# Patient Record
Sex: MALE | Race: WHITE
[De-identification: names, ages, dates, MRNs, and addresses within clinical notes are randomized per-mention and may not be internally consistent; named-entity substitution may affect disease eponyms.]

---

## 2019-01-28 ENCOUNTER — HOSPITAL ENCOUNTER (OUTPATIENT)
Age: 54
End: 2019-01-28
Payer: MEDICAID

## 2019-01-28 DIAGNOSIS — E11.621: Primary | ICD-10-CM

## 2019-01-28 DIAGNOSIS — L97.519: ICD-10-CM

## 2019-01-28 DIAGNOSIS — E11.8: ICD-10-CM

## 2019-01-28 LAB
ALBUMIN LEVEL: 3.3 GM/DL (ref 3.4–5)
ALBUMIN/GLOB SERPL: 1 {RATIO} (ref 1.1–1.8)
ALP ISO SERPL-ACNC: 58 U/L (ref 46–116)
ALT SERPLBLD-CCNC: 28 U/L (ref 12–78)
ANION GAP SERPL CALC-SCNC: 13.3 MEQ/L (ref 5–15)
AST SERPL QL: 28 U/L (ref 15–37)
BILIRUBIN,TOTAL: 0.3 MG/DL (ref 0.2–1)
BUN SERPL-MCNC: 15 MG/DL (ref 7–18)
CALCIUM SPEC-MCNC: 9.2 MG/DL (ref 8.5–10.1)
CHLORIDE SPEC-SCNC: 101 MMOL/L (ref 98–107)
CO2 SERPL-SCNC: 29 MMOL/L (ref 21–32)
CREAT BLD-SCNC: 1.08 MG/DL (ref 0.7–1.3)
CRP SERPL HS-MCNC: 2.1 MG/L (ref 0–0.9)
ESTIMATED GLOMERULAR FILT RATE: 71 ML/MIN (ref 60–?)
GFR (AFRICAN AMERICAN): 86 ML/MIN (ref 60–?)
GLOBULIN SER CALC-MCNC: 3.3 GM/DL (ref 1.3–3.2)
GLUCOSE: 78 MG/DL (ref 74–106)
HBA1C MFR BLD: 6.4 % (ref 0–7)
HCT VFR BLD CALC: 37.5 % (ref 42–52)
HGB BLD-MCNC: 12.1 G/DL (ref 14.1–18)
MCHC RBC-ENTMCNC: 32.3 G/DL (ref 31.8–35.4)
MCV RBC: 88 FL (ref 80–94)
MEAN CORPUSCULAR HEMOGLOBIN: 28.4 PG (ref 27–31.2)
PLATELET # BLD: 360 K/MM3 (ref 142–424)
POTASSIUM: 4.3 MMOL/L (ref 3.5–5.1)
PROT SERPL-MCNC: 6.6 GM/DL (ref 6.4–8.2)
RBC # BLD AUTO: 4.26 M/MM3 (ref 4.6–6.2)
SODIUM SPEC-SCNC: 139 MMOL/L (ref 136–145)
WBC # BLD AUTO: 7.1 K/MM3 (ref 4.8–10.8)

## 2019-01-28 PROCEDURE — 83036 HEMOGLOBIN GLYCOSYLATED A1C: CPT

## 2019-01-28 PROCEDURE — 36415 COLL VENOUS BLD VENIPUNCTURE: CPT

## 2019-01-28 PROCEDURE — 80053 COMPREHEN METABOLIC PANEL: CPT

## 2019-01-28 PROCEDURE — 86140 C-REACTIVE PROTEIN: CPT

## 2019-01-28 PROCEDURE — 73630 X-RAY EXAM OF FOOT: CPT

## 2019-01-28 PROCEDURE — 85651 RBC SED RATE NONAUTOMATED: CPT

## 2019-01-28 PROCEDURE — 85025 COMPLETE CBC W/AUTO DIFF WBC: CPT

## 2019-02-05 ENCOUNTER — HOSPITAL ENCOUNTER (OUTPATIENT)
Age: 54
End: 2019-02-05
Payer: MEDICAID

## 2019-02-05 DIAGNOSIS — L03.031: Primary | ICD-10-CM

## 2019-02-05 DIAGNOSIS — L97.514: ICD-10-CM

## 2019-02-05 DIAGNOSIS — L97.512: ICD-10-CM

## 2019-02-05 PROCEDURE — 87077 CULTURE AEROBIC IDENTIFY: CPT

## 2019-02-05 PROCEDURE — 87205 SMEAR GRAM STAIN: CPT

## 2019-02-05 PROCEDURE — 73720 MRI LWR EXTREMITY W/O&W/DYE: CPT

## 2019-02-05 PROCEDURE — 87186 SC STD MICRODIL/AGAR DIL: CPT

## 2019-02-05 PROCEDURE — 87070 CULTURE OTHR SPECIMN AEROBIC: CPT

## 2019-02-05 PROCEDURE — A9576 INJ PROHANCE MULTIPACK: HCPCS

## 2019-04-09 ENCOUNTER — HOSPITAL ENCOUNTER (OUTPATIENT)
Dept: HOSPITAL 22 - PT | Age: 54
Discharge: HOME | End: 2019-04-09
Payer: MEDICAID

## 2019-04-09 DIAGNOSIS — L97.512: ICD-10-CM

## 2019-04-09 DIAGNOSIS — L97.514: ICD-10-CM

## 2019-04-09 DIAGNOSIS — M86.9: ICD-10-CM

## 2019-04-09 DIAGNOSIS — L03.031: Primary | ICD-10-CM

## 2019-04-09 PROCEDURE — 97597 DBRDMT OPN WND 1ST 20 CM/<: CPT

## 2019-04-09 PROCEDURE — 97161 PT EVAL LOW COMPLEX 20 MIN: CPT

## 2019-04-10 ENCOUNTER — HOSPITAL ENCOUNTER (OUTPATIENT)
Age: 54
End: 2019-04-10
Payer: MEDICAID

## 2019-04-10 DIAGNOSIS — Z01.818: Primary | ICD-10-CM

## 2019-04-10 DIAGNOSIS — M86.9: ICD-10-CM

## 2019-04-10 DIAGNOSIS — L97.512: ICD-10-CM

## 2019-04-10 DIAGNOSIS — E11.9: ICD-10-CM

## 2019-04-10 DIAGNOSIS — L03.031: ICD-10-CM

## 2019-04-10 LAB
ALBUMIN LEVEL: 3.2 GM/DL (ref 3.4–5)
ALBUMIN/GLOB SERPL: 0.9 {RATIO} (ref 1.1–1.8)
ALP ISO SERPL-ACNC: 72 U/L (ref 46–116)
ALT SERPLBLD-CCNC: 26 U/L (ref 12–78)
ANION GAP SERPL CALC-SCNC: 13.8 MEQ/L (ref 5–15)
AST SERPL QL: 20 U/L (ref 15–37)
BILIRUBIN,TOTAL: 0.3 MG/DL (ref 0.2–1)
BUN SERPL-MCNC: 17 MG/DL (ref 7–18)
CALCIUM SPEC-MCNC: 9.2 MG/DL (ref 8.5–10.1)
CHLORIDE SPEC-SCNC: 101 MMOL/L (ref 98–107)
CO2 SERPL-SCNC: 28 MMOL/L (ref 21–32)
CREAT BLD-SCNC: 0.91 MG/DL (ref 0.7–1.3)
CRP SERPL HS-MCNC: 41.7 MG/L (ref 0–0.9)
ESTIMATED GLOMERULAR FILT RATE: 87 ML/MIN (ref 60–?)
GFR (AFRICAN AMERICAN): 105 ML/MIN (ref 60–?)
GLOBULIN SER CALC-MCNC: 3.6 GM/DL (ref 1.3–3.2)
GLUCOSE: 199 MG/DL (ref 74–106)
HBA1C MFR BLD: 7.1 % (ref 0–7)
HCT VFR BLD CALC: 38.8 % (ref 42–52)
HGB BLD-MCNC: 12.9 G/DL (ref 14.1–18)
MCHC RBC-ENTMCNC: 33.2 G/DL (ref 31.8–35.4)
MCV RBC: 83.5 FL (ref 80–94)
MEAN CORPUSCULAR HEMOGLOBIN: 27.7 PG (ref 27–31.2)
PLATELET # BLD: 271 K/MM3 (ref 142–424)
POTASSIUM: 3.8 MMOL/L (ref 3.5–5.1)
PROT SERPL-MCNC: 6.8 GM/DL (ref 6.4–8.2)
RBC # BLD AUTO: 4.64 M/MM3 (ref 4.6–6.2)
SODIUM SPEC-SCNC: 139 MMOL/L (ref 136–145)
WBC # BLD AUTO: 11.3 K/MM3 (ref 4.8–10.8)

## 2019-04-10 PROCEDURE — 85025 COMPLETE CBC W/AUTO DIFF WBC: CPT

## 2019-04-10 PROCEDURE — 87077 CULTURE AEROBIC IDENTIFY: CPT

## 2019-04-10 PROCEDURE — 83036 HEMOGLOBIN GLYCOSYLATED A1C: CPT

## 2019-04-10 PROCEDURE — 87186 SC STD MICRODIL/AGAR DIL: CPT

## 2019-04-10 PROCEDURE — 85651 RBC SED RATE NONAUTOMATED: CPT

## 2019-04-10 PROCEDURE — 80053 COMPREHEN METABOLIC PANEL: CPT

## 2019-04-10 PROCEDURE — 73630 X-RAY EXAM OF FOOT: CPT

## 2019-04-10 PROCEDURE — 71046 X-RAY EXAM CHEST 2 VIEWS: CPT

## 2019-04-10 PROCEDURE — 87205 SMEAR GRAM STAIN: CPT

## 2019-04-10 PROCEDURE — 86140 C-REACTIVE PROTEIN: CPT

## 2019-04-10 PROCEDURE — 36415 COLL VENOUS BLD VENIPUNCTURE: CPT

## 2019-04-10 PROCEDURE — 87070 CULTURE OTHR SPECIMN AEROBIC: CPT

## 2019-05-02 ENCOUNTER — HOSPITAL ENCOUNTER (OUTPATIENT)
Age: 54
End: 2019-05-02
Payer: MEDICAID

## 2019-05-02 DIAGNOSIS — Z51.89: ICD-10-CM

## 2019-05-02 DIAGNOSIS — E11.9: ICD-10-CM

## 2019-05-02 DIAGNOSIS — Z98.890: Primary | ICD-10-CM

## 2019-05-02 LAB
ALBUMIN LEVEL: 3.7 GM/DL (ref 3.4–5)
ALBUMIN/GLOB SERPL: 1.1 {RATIO} (ref 1.1–1.8)
ALP ISO SERPL-ACNC: 67 U/L (ref 46–116)
ALT SERPLBLD-CCNC: 32 U/L (ref 12–78)
ANION GAP SERPL CALC-SCNC: 14.1 MEQ/L (ref 5–15)
AST SERPL QL: 14 U/L (ref 15–37)
BILIRUBIN,TOTAL: 0.3 MG/DL (ref 0.2–1)
BUN SERPL-MCNC: 17 MG/DL (ref 7–18)
CALCIUM SPEC-MCNC: 8.9 MG/DL (ref 8.5–10.1)
CHLORIDE SPEC-SCNC: 101 MMOL/L (ref 98–107)
CO2 SERPL-SCNC: 27 MMOL/L (ref 21–32)
CREAT BLD-SCNC: 0.82 MG/DL (ref 0.7–1.3)
CRP SERPL HS-MCNC: < 0.2 MG/L (ref 0–0.9)
ESTIMATED GLOMERULAR FILT RATE: 98 ML/MIN (ref 60–?)
GFR (AFRICAN AMERICAN): 118 ML/MIN (ref 60–?)
GLOBULIN SER CALC-MCNC: 3.4 GM/DL (ref 1.3–3.2)
GLUCOSE: 85 MG/DL (ref 74–106)
HBA1C MFR BLD: 6.9 % (ref 0–7)
HCT VFR BLD CALC: 42.2 % (ref 42–52)
HGB BLD-MCNC: 14 G/DL (ref 14.1–18)
MCHC RBC-ENTMCNC: 33.1 G/DL (ref 31.8–35.4)
MCV RBC: 83.4 FL (ref 80–94)
MEAN CORPUSCULAR HEMOGLOBIN: 27.6 PG (ref 27–31.2)
PLATELET # BLD: 265 K/MM3 (ref 142–424)
POTASSIUM: 4.1 MMOL/L (ref 3.5–5.1)
PROT SERPL-MCNC: 7.1 GM/DL (ref 6.4–8.2)
RBC # BLD AUTO: 5.06 M/MM3 (ref 4.6–6.2)
SODIUM SPEC-SCNC: 138 MMOL/L (ref 136–145)
WBC # BLD AUTO: 8 K/MM3 (ref 4.8–10.8)

## 2019-05-02 PROCEDURE — 36415 COLL VENOUS BLD VENIPUNCTURE: CPT

## 2019-05-02 PROCEDURE — 73630 X-RAY EXAM OF FOOT: CPT

## 2019-05-02 PROCEDURE — 80053 COMPREHEN METABOLIC PANEL: CPT

## 2019-05-02 PROCEDURE — 85651 RBC SED RATE NONAUTOMATED: CPT

## 2019-05-02 PROCEDURE — 86140 C-REACTIVE PROTEIN: CPT

## 2019-05-02 PROCEDURE — 83036 HEMOGLOBIN GLYCOSYLATED A1C: CPT

## 2019-05-02 PROCEDURE — 85025 COMPLETE CBC W/AUTO DIFF WBC: CPT

## 2020-09-02 ENCOUNTER — HOSPITAL ENCOUNTER (OUTPATIENT)
Age: 55
End: 2020-09-02
Payer: MEDICAID

## 2020-09-02 DIAGNOSIS — Z79.84: ICD-10-CM

## 2020-09-02 DIAGNOSIS — L97.511: ICD-10-CM

## 2020-09-02 DIAGNOSIS — E11.621: Primary | ICD-10-CM

## 2020-09-02 LAB
ALBUMIN LEVEL: 4.2 G/DL (ref 3.5–5)
ALBUMIN/GLOB SERPL: 1.4 {RATIO} (ref 1.1–1.8)
ALP ISO SERPL-ACNC: 69 U/L (ref 38–126)
ALT SERPLBLD-CCNC: 17 U/L (ref 12–78)
ANION GAP SERPL CALC-SCNC: 13.6 MEQ/L (ref 5–15)
AST SERPL QL: 26 U/L (ref 17–59)
BILIRUBIN,TOTAL: 0.4 MG/DL (ref 0.2–1.3)
BUN SERPL-MCNC: 21 MG/DL (ref 9–20)
CALCIUM SPEC-MCNC: 9.7 MG/DL (ref 8.4–10.2)
CHLORIDE SPEC-SCNC: 96 MMOL/L (ref 98–107)
CO2 SERPL-SCNC: 31 MMOL/L (ref 22–30)
CREAT BLD-SCNC: 0.9 MG/DL (ref 0.66–1.25)
ESTIMATED GLOMERULAR FILT RATE: 88 ML/MIN (ref 60–?)
GFR (AFRICAN AMERICAN): 106 ML/MIN (ref 60–?)
GLOBULIN SER CALC-MCNC: 2.9 G/DL (ref 1.3–3.2)
GLUCOSE: 140 MG/DL (ref 74–100)
HBA1C MFR BLD: 8 % (ref 4–6)
HCT VFR BLD CALC: 38.6 % (ref 42–52)
HGB BLD-MCNC: 13.2 G/DL (ref 14.1–18)
MCHC RBC-ENTMCNC: 34.1 G/DL (ref 31.8–35.4)
MCV RBC: 85.2 FL (ref 80–94)
MEAN CORPUSCULAR HEMOGLOBIN: 29.1 PG (ref 27–31.2)
PLATELET # BLD: 264 K/MM3 (ref 142–424)
POTASSIUM: 4.6 MMOL/L (ref 3.5–5.1)
PROT SERPL-MCNC: 7.1 G/DL (ref 6.3–8.2)
RBC # BLD AUTO: 4.53 M/MM3 (ref 4.6–6.2)
SODIUM SPEC-SCNC: 136 MMOL/L (ref 136–145)
WBC # BLD AUTO: 7.8 K/MM3 (ref 4.8–10.8)

## 2020-09-02 PROCEDURE — 85651 RBC SED RATE NONAUTOMATED: CPT

## 2020-09-02 PROCEDURE — 83036 HEMOGLOBIN GLYCOSYLATED A1C: CPT

## 2020-09-02 PROCEDURE — 36415 COLL VENOUS BLD VENIPUNCTURE: CPT

## 2020-09-02 PROCEDURE — 85025 COMPLETE CBC W/AUTO DIFF WBC: CPT

## 2020-09-02 PROCEDURE — 80053 COMPREHEN METABOLIC PANEL: CPT

## 2020-09-02 PROCEDURE — 73630 X-RAY EXAM OF FOOT: CPT

## 2020-09-04 ENCOUNTER — HOSPITAL ENCOUNTER (OUTPATIENT)
Age: 55
End: 2020-09-04
Payer: MEDICAID

## 2020-09-04 DIAGNOSIS — Z51.89: ICD-10-CM

## 2020-09-04 DIAGNOSIS — E08.621: Primary | ICD-10-CM

## 2020-09-04 DIAGNOSIS — Z79.4: ICD-10-CM

## 2020-09-04 DIAGNOSIS — L97.511: ICD-10-CM

## 2020-09-04 LAB — CRP SERPL HS-MCNC: 3.5 MG/L (ref 0–4)

## 2020-09-04 PROCEDURE — 86140 C-REACTIVE PROTEIN: CPT

## 2020-09-17 ENCOUNTER — HOSPITAL ENCOUNTER (OUTPATIENT)
Age: 55
End: 2020-09-17
Payer: MEDICAID

## 2020-09-17 DIAGNOSIS — L97.512: ICD-10-CM

## 2020-09-17 DIAGNOSIS — E11.621: ICD-10-CM

## 2020-09-17 DIAGNOSIS — Z51.89: Primary | ICD-10-CM

## 2020-09-17 LAB
ALBUMIN LEVEL: 4 G/DL (ref 3.5–5)
ALBUMIN/GLOB SERPL: 1.5 {RATIO} (ref 1.1–1.8)
ALP ISO SERPL-ACNC: 89 U/L (ref 38–126)
ALT SERPLBLD-CCNC: 17 U/L (ref 12–78)
ANION GAP SERPL CALC-SCNC: 12.4 MEQ/L (ref 5–15)
AST SERPL QL: 24 U/L (ref 17–59)
BILIRUBIN,TOTAL: 0.2 MG/DL (ref 0.2–1.3)
BUN SERPL-MCNC: 20 MG/DL (ref 9–20)
CALCIUM SPEC-MCNC: 9.4 MG/DL (ref 8.4–10.2)
CHLORIDE SPEC-SCNC: 97 MMOL/L (ref 98–107)
CO2 SERPL-SCNC: 31 MMOL/L (ref 22–30)
CREAT BLD-SCNC: 0.9 MG/DL (ref 0.66–1.25)
CRP SERPL HS-MCNC: 3.3 MG/L (ref 0–4)
ESTIMATED GLOMERULAR FILT RATE: 88 ML/MIN (ref 60–?)
GFR (AFRICAN AMERICAN): 106 ML/MIN (ref 60–?)
GLOBULIN SER CALC-MCNC: 2.6 G/DL (ref 1.3–3.2)
GLUCOSE: 278 MG/DL (ref 74–100)
HCT VFR BLD CALC: 38.8 % (ref 42–52)
HGB BLD-MCNC: 12.8 G/DL (ref 14.1–18)
MCHC RBC-ENTMCNC: 33 G/DL (ref 31.8–35.4)
MCV RBC: 88.1 FL (ref 80–94)
MEAN CORPUSCULAR HEMOGLOBIN: 29.1 PG (ref 27–31.2)
PLATELET # BLD: 328 K/MM3 (ref 142–424)
POTASSIUM: 4.4 MMOL/L (ref 3.5–5.1)
PROT SERPL-MCNC: 6.6 G/DL (ref 6.3–8.2)
RBC # BLD AUTO: 4.4 M/MM3 (ref 4.6–6.2)
SODIUM SPEC-SCNC: 136 MMOL/L (ref 136–145)
WBC # BLD AUTO: 6.2 K/MM3 (ref 4.8–10.8)

## 2020-09-17 PROCEDURE — 86140 C-REACTIVE PROTEIN: CPT

## 2020-09-17 PROCEDURE — 85025 COMPLETE CBC W/AUTO DIFF WBC: CPT

## 2020-09-17 PROCEDURE — 36415 COLL VENOUS BLD VENIPUNCTURE: CPT

## 2020-09-17 PROCEDURE — 85651 RBC SED RATE NONAUTOMATED: CPT

## 2020-09-17 PROCEDURE — 80053 COMPREHEN METABOLIC PANEL: CPT

## 2020-10-07 ENCOUNTER — HOSPITAL ENCOUNTER (OUTPATIENT)
Age: 55
End: 2020-10-07
Payer: MEDICAID

## 2020-10-07 DIAGNOSIS — M86.171: Primary | ICD-10-CM

## 2020-10-07 PROCEDURE — A9576 INJ PROHANCE MULTIPACK: HCPCS

## 2020-10-07 PROCEDURE — 73720 MRI LWR EXTREMITY W/O&W/DYE: CPT

## 2022-03-23 ENCOUNTER — HOSPITAL ENCOUNTER (OUTPATIENT)
Dept: HOSPITAL 22 - PT | Age: 57
Discharge: HOME | End: 2022-03-23
Payer: MEDICAID

## 2022-03-23 DIAGNOSIS — Z89.512: ICD-10-CM

## 2022-03-23 DIAGNOSIS — B99.9: Primary | ICD-10-CM

## 2022-03-23 PROCEDURE — 97597 DBRDMT OPN WND 1ST 20 CM/<: CPT

## 2022-03-23 PROCEDURE — 97162 PT EVAL MOD COMPLEX 30 MIN: CPT

## 2022-03-23 PROCEDURE — 97164 PT RE-EVAL EST PLAN CARE: CPT

## 2022-04-01 ENCOUNTER — HOSPITAL ENCOUNTER (EMERGENCY)
Age: 57
Discharge: ANOTHER HOSPITAL | End: 2022-04-01
Payer: MEDICAID

## 2022-04-01 VITALS
RESPIRATION RATE: 18 BRPM | TEMPERATURE: 98.4 F | HEART RATE: 92 BPM | DIASTOLIC BLOOD PRESSURE: 74 MMHG | OXYGEN SATURATION: 97 % | SYSTOLIC BLOOD PRESSURE: 112 MMHG

## 2022-04-01 VITALS — BODY MASS INDEX: 40.6 KG/M2

## 2022-04-01 VITALS
RESPIRATION RATE: 20 BRPM | SYSTOLIC BLOOD PRESSURE: 93 MMHG | HEART RATE: 102 BPM | DIASTOLIC BLOOD PRESSURE: 63 MMHG | TEMPERATURE: 98.4 F | OXYGEN SATURATION: 99 %

## 2022-04-01 DIAGNOSIS — E66.01: ICD-10-CM

## 2022-04-01 DIAGNOSIS — I10: ICD-10-CM

## 2022-04-01 DIAGNOSIS — Z79.899: ICD-10-CM

## 2022-04-01 DIAGNOSIS — M86.142: Primary | ICD-10-CM

## 2022-04-01 DIAGNOSIS — N30.00: ICD-10-CM

## 2022-04-01 DIAGNOSIS — E11.40: ICD-10-CM

## 2022-04-01 DIAGNOSIS — A41.9: ICD-10-CM

## 2022-04-01 DIAGNOSIS — E78.5: ICD-10-CM

## 2022-04-01 LAB
ALBUMIN LEVEL: 3.2 G/DL (ref 3.5–5)
ALBUMIN/GLOB SERPL: 0.9 {RATIO} (ref 1.1–1.8)
ALP ISO SERPL-ACNC: 136 U/L (ref 38–126)
ALT SERPLBLD-CCNC: 28 U/L (ref 12–78)
ANION GAP SERPL CALC-SCNC: 15.9 MEQ/L (ref 5–15)
AST SERPL QL: 29 U/L (ref 17–59)
BILIRUBIN,TOTAL: 0.8 MG/DL (ref 0.2–1.3)
BUN SERPL-MCNC: 48 MG/DL (ref 9–20)
CALCIUM SPEC-MCNC: 8.8 MG/DL (ref 8.4–10.2)
CELLS COUNTED: 100
CHLORIDE SPEC-SCNC: 90 MMOL/L (ref 98–107)
CO2 SERPL-SCNC: 22 MMOL/L (ref 22–30)
COLOR UR: YELLOW
CREAT BLD-SCNC: 1.6 MG/DL (ref 0.66–1.25)
CREATININE CLEARANCE ESTIMATED: 98 ML/MIN (ref 50–200)
CRP SERPL HS-MCNC: 455.9 MG/L (ref 0–4)
ESTIMATED GLOMERULAR FILT RATE: 45 ML/MIN (ref 60–?)
GFR (AFRICAN AMERICAN): 54 ML/MIN (ref 60–?)
GLOBULIN SER CALC-MCNC: 3.4 G/DL (ref 1.3–3.2)
GLUCOSE BLDC GLUCOMTR-MCNC: 528 MG/DL (ref 70–110)
GLUCOSE UR QL STRIP.AUTO: (no result)
GLUCOSE: 717 MG/DL (ref 74–100)
HCT VFR BLD CALC: 34 % (ref 42–52)
HGB BLD-MCNC: 10.4 G/DL (ref 14.1–18)
HYPOCHROMIA BLD QL: (no result)
LEUKOCYTE ESTERASE UR QL STRIP: (no result)
MANUAL DIFFERENTIAL: (no result)
MCHC RBC-ENTMCNC: 30.7 G/DL (ref 31.8–35.4)
MCV RBC: 86.5 FL (ref 80–94)
MEAN CORPUSCULAR HEMOGLOBIN: 26.5 PG (ref 27–31.2)
MICRO URNS: (no result)
PH UR: 6 [PH] (ref 5–8.5)
PLATELET # BLD: 300 K/MM3 (ref 142–424)
POTASSIUM: 4.9 MMOL/L (ref 3.5–5.1)
PROCALCITONIN: 8.12 NG/ML (ref 0–2)
PROT SERPL-MCNC: 6.6 G/DL (ref 6.3–8.2)
RBC # BLD AUTO: 3.93 M/MM3 (ref 4.6–6.2)
RBC #/AREA URNS HPF: (no result) #/HPF (ref 0–3)
REFLEX LACTIC: (no result)
ROULEAUX BLD QL SMEAR: (no result)
SODIUM SPEC-SCNC: 123 MMOL/L (ref 136–145)
SP GR UR: <= 1.005 (ref 1–1.03)
TROPONIN I: < 0.01 NG/ML (ref 0–0.03)
UROBILINOGEN UR QL: 0.2 EU/DL
WBC # BLD AUTO: 12 K/MM3 (ref 4.8–10.8)
WBC # UR: (no result) #/HPF (ref 0–3)

## 2022-04-01 PROCEDURE — 82009 KETONE BODYS QUAL: CPT

## 2022-04-01 PROCEDURE — 85025 COMPLETE CBC W/AUTO DIFF WBC: CPT

## 2022-04-01 PROCEDURE — 99291 CRITICAL CARE FIRST HOUR: CPT

## 2022-04-01 PROCEDURE — 82962 GLUCOSE BLOOD TEST: CPT

## 2022-04-01 PROCEDURE — 87077 CULTURE AEROBIC IDENTIFY: CPT

## 2022-04-01 PROCEDURE — 96366 THER/PROPH/DIAG IV INF ADDON: CPT

## 2022-04-01 PROCEDURE — 96365 THER/PROPH/DIAG IV INF INIT: CPT

## 2022-04-01 PROCEDURE — U0005 INFEC AGEN DETEC AMPLI PROBE: HCPCS

## 2022-04-01 PROCEDURE — 87040 BLOOD CULTURE FOR BACTERIA: CPT

## 2022-04-01 PROCEDURE — 85007 BL SMEAR W/DIFF WBC COUNT: CPT

## 2022-04-01 PROCEDURE — 87186 SC STD MICRODIL/AGAR DIL: CPT

## 2022-04-01 PROCEDURE — 85651 RBC SED RATE NONAUTOMATED: CPT

## 2022-04-01 PROCEDURE — 87086 URINE CULTURE/COLONY COUNT: CPT

## 2022-04-01 PROCEDURE — 96375 TX/PRO/DX INJ NEW DRUG ADDON: CPT

## 2022-04-01 PROCEDURE — U0003 INFECTIOUS AGENT DETECTION BY NUCLEIC ACID (DNA OR RNA); SEVERE ACUTE RESPIRATORY SYNDROME CORONAVIRUS 2 (SARS-COV-2) (CORONAVIRUS DISEASE [COVID-19]), AMPLIFIED PROBE TECHNIQUE, MAKING USE OF HIGH THROUGHPUT TECHNOLOGIES AS DESCRIBED BY CMS-2020-01-R: HCPCS

## 2022-04-01 PROCEDURE — 84484 ASSAY OF TROPONIN QUANT: CPT

## 2022-04-01 PROCEDURE — 99292 CRITICAL CARE ADDL 30 MIN: CPT

## 2022-04-01 PROCEDURE — 86140 C-REACTIVE PROTEIN: CPT

## 2022-04-01 PROCEDURE — 84145 PROCALCITONIN (PCT): CPT

## 2022-04-01 PROCEDURE — 93005 ELECTROCARDIOGRAM TRACING: CPT

## 2022-04-01 PROCEDURE — 80053 COMPREHEN METABOLIC PANEL: CPT

## 2022-04-01 PROCEDURE — C9803 HOPD COVID-19 SPEC COLLECT: HCPCS

## 2022-04-01 PROCEDURE — 71045 X-RAY EXAM CHEST 1 VIEW: CPT

## 2022-04-01 PROCEDURE — 73130 X-RAY EXAM OF HAND: CPT

## 2022-04-01 PROCEDURE — 81001 URINALYSIS AUTO W/SCOPE: CPT

## 2022-04-01 PROCEDURE — 83605 ASSAY OF LACTIC ACID: CPT

## 2022-04-01 NOTE — PC.NURSE
PHONE CALL FROM CHARLENE @ [facility], PATIENT IS ACCEPTED AND WILL BE GOING BY POV TO ROOM 1460

## 2022-07-01 ENCOUNTER — HOSPITAL ENCOUNTER (EMERGENCY)
Age: 57
Discharge: HOME | End: 2022-07-01
Payer: MEDICAID

## 2022-07-01 VITALS — OXYGEN SATURATION: 96 % | HEART RATE: 102 BPM | SYSTOLIC BLOOD PRESSURE: 102 MMHG | DIASTOLIC BLOOD PRESSURE: 67 MMHG

## 2022-07-01 VITALS — DIASTOLIC BLOOD PRESSURE: 65 MMHG | OXYGEN SATURATION: 98 % | HEART RATE: 92 BPM | SYSTOLIC BLOOD PRESSURE: 91 MMHG

## 2022-07-01 VITALS — OXYGEN SATURATION: 94 % | SYSTOLIC BLOOD PRESSURE: 106 MMHG | HEART RATE: 102 BPM | DIASTOLIC BLOOD PRESSURE: 64 MMHG

## 2022-07-01 VITALS — HEART RATE: 98 BPM | SYSTOLIC BLOOD PRESSURE: 105 MMHG | OXYGEN SATURATION: 96 % | DIASTOLIC BLOOD PRESSURE: 66 MMHG

## 2022-07-01 VITALS — OXYGEN SATURATION: 98 % | HEART RATE: 104 BPM | SYSTOLIC BLOOD PRESSURE: 96 MMHG | DIASTOLIC BLOOD PRESSURE: 64 MMHG

## 2022-07-01 VITALS — OXYGEN SATURATION: 97 % | DIASTOLIC BLOOD PRESSURE: 70 MMHG | HEART RATE: 98 BPM | SYSTOLIC BLOOD PRESSURE: 97 MMHG

## 2022-07-01 VITALS — HEART RATE: 99 BPM | OXYGEN SATURATION: 97 % | SYSTOLIC BLOOD PRESSURE: 102 MMHG | DIASTOLIC BLOOD PRESSURE: 64 MMHG

## 2022-07-01 VITALS
TEMPERATURE: 98.3 F | HEART RATE: 99 BPM | DIASTOLIC BLOOD PRESSURE: 69 MMHG | OXYGEN SATURATION: 94 % | SYSTOLIC BLOOD PRESSURE: 101 MMHG | RESPIRATION RATE: 20 BRPM

## 2022-07-01 VITALS — OXYGEN SATURATION: 94 % | HEART RATE: 103 BPM | SYSTOLIC BLOOD PRESSURE: 96 MMHG | DIASTOLIC BLOOD PRESSURE: 66 MMHG

## 2022-07-01 VITALS
DIASTOLIC BLOOD PRESSURE: 70 MMHG | OXYGEN SATURATION: 97 % | SYSTOLIC BLOOD PRESSURE: 91 MMHG | RESPIRATION RATE: 18 BRPM | HEART RATE: 98 BPM | TEMPERATURE: 98.24 F

## 2022-07-01 VITALS — OXYGEN SATURATION: 95 % | HEART RATE: 93 BPM | DIASTOLIC BLOOD PRESSURE: 63 MMHG | SYSTOLIC BLOOD PRESSURE: 89 MMHG

## 2022-07-01 VITALS — OXYGEN SATURATION: 98 % | HEART RATE: 100 BPM | SYSTOLIC BLOOD PRESSURE: 101 MMHG | DIASTOLIC BLOOD PRESSURE: 67 MMHG

## 2022-07-01 VITALS — BODY MASS INDEX: 33.8 KG/M2

## 2022-07-01 DIAGNOSIS — E11.65: Primary | ICD-10-CM

## 2022-07-01 DIAGNOSIS — Z79.4: ICD-10-CM

## 2022-07-01 DIAGNOSIS — N28.9: ICD-10-CM

## 2022-07-01 DIAGNOSIS — I10: ICD-10-CM

## 2022-07-01 DIAGNOSIS — Z87.891: ICD-10-CM

## 2022-07-01 LAB
ALBUMIN LEVEL: 3.5 G/DL (ref 3.5–5)
ALBUMIN/GLOB SERPL: 0.9 {RATIO} (ref 1.1–1.8)
ALP ISO SERPL-ACNC: 129 U/L (ref 38–126)
ALT SERPLBLD-CCNC: 28 U/L (ref 12–78)
ANION GAP SERPL CALC-SCNC: 20.5 MEQ/L (ref 5–15)
AST SERPL QL: 41 U/L (ref 17–59)
BILIRUBIN,TOTAL: 1.3 MG/DL (ref 0.2–1.3)
BUN SERPL-MCNC: 98 MG/DL (ref 9–20)
CALCIUM SPEC-MCNC: 10.1 MG/DL (ref 8.4–10.2)
CELLS COUNTED: 100
CHLORIDE SPEC-SCNC: 97 MMOL/L (ref 98–107)
CO2 SERPL-SCNC: 16 MMOL/L (ref 22–30)
CREAT BLD-SCNC: 2.4 MG/DL (ref 0.66–1.25)
CREATININE CLEARANCE ESTIMATED: 61 ML/MIN (ref 50–200)
DOHLE BOD BLD QL SMEAR: (no result)
ESTIMATED GLOMERULAR FILT RATE: 28 ML/MIN (ref 60–?)
GFR (AFRICAN AMERICAN): 34 ML/MIN (ref 60–?)
GLOBULIN SER CALC-MCNC: 4.1 G/DL (ref 1.3–3.2)
GLUCOSE BLDC GLUCOMTR-MCNC: 346 MG/DL (ref 70–110)
GLUCOSE BLDC GLUCOMTR-MCNC: 360 MG/DL (ref 70–110)
GLUCOSE BLDC GLUCOMTR-MCNC: 428 MG/DL (ref 70–110)
GLUCOSE: 390 MG/DL (ref 74–100)
HCT VFR BLD CALC: 31.4 % (ref 42–52)
HGB BLD-MCNC: 9.4 G/DL (ref 14.1–18)
HYPOCHROMIA BLD QL: (no result)
MANUAL DIFFERENTIAL: (no result)
MCHC RBC-ENTMCNC: 29.9 G/DL (ref 31.8–35.4)
MCV RBC: 79.4 FL (ref 80–94)
MEAN CORPUSCULAR HEMOGLOBIN: 23.7 PG (ref 27–31.2)
NT PRO BRAIN NATRIURETIC PEP.: 400 PG/ML (ref 0–125)
PLATELET # BLD: 522 K/MM3 (ref 142–424)
POTASSIUM: 5.5 MMOL/L (ref 3.5–5.1)
PROT SERPL-MCNC: 7.6 G/DL (ref 6.3–8.2)
RBC # BLD AUTO: 3.96 M/MM3 (ref 4.6–6.2)
SODIUM SPEC-SCNC: 128 MMOL/L (ref 136–145)
TROPONIN I: < 0.01 NG/ML (ref 0–0.03)
TROPONIN I: < 0.01 NG/ML (ref 0–0.03)
WBC # BLD AUTO: 10.8 K/MM3 (ref 4.8–10.8)

## 2022-07-01 PROCEDURE — 85007 BL SMEAR W/DIFF WBC COUNT: CPT

## 2022-07-01 PROCEDURE — 93005 ELECTROCARDIOGRAM TRACING: CPT

## 2022-07-01 PROCEDURE — 83880 ASSAY OF NATRIURETIC PEPTIDE: CPT

## 2022-07-01 PROCEDURE — 85025 COMPLETE CBC W/AUTO DIFF WBC: CPT

## 2022-07-01 PROCEDURE — 96361 HYDRATE IV INFUSION ADD-ON: CPT

## 2022-07-01 PROCEDURE — 71045 X-RAY EXAM CHEST 1 VIEW: CPT

## 2022-07-01 PROCEDURE — 80053 COMPREHEN METABOLIC PANEL: CPT

## 2022-07-01 PROCEDURE — 82962 GLUCOSE BLOOD TEST: CPT

## 2022-07-01 PROCEDURE — 96375 TX/PRO/DX INJ NEW DRUG ADDON: CPT

## 2022-07-01 PROCEDURE — 96374 THER/PROPH/DIAG INJ IV PUSH: CPT

## 2022-07-01 PROCEDURE — 96372 THER/PROPH/DIAG INJ SC/IM: CPT

## 2022-07-01 PROCEDURE — 84484 ASSAY OF TROPONIN QUANT: CPT

## 2022-07-01 PROCEDURE — 99284 EMERGENCY DEPT VISIT MOD MDM: CPT

## 2022-07-01 NOTE — PC.NURSE
pts fluids are complete. His wife is on her way to pick him up and reports she is about 30 mins away.

## 2022-07-01 NOTE — PC.NURSE
Notified MD of pt's current v/s, including BP. MD states that he would like to receive BNP results and assess for CHF before starting fluids.

## 2022-07-01 NOTE — PC.NURSE
pt sitting up on side of bed per his request.  Pt requested urinal, curtain closed to room for pt privacy

## 2022-11-29 ENCOUNTER — HOSPITAL ENCOUNTER (OUTPATIENT)
Dept: HOSPITAL 22 - PT | Age: 57
Discharge: HOME | End: 2022-11-29
Payer: MEDICAID

## 2022-11-29 DIAGNOSIS — L97.519: Primary | ICD-10-CM

## 2022-11-29 DIAGNOSIS — S98.921D: ICD-10-CM

## 2022-11-29 DIAGNOSIS — T81.89XA: ICD-10-CM

## 2022-11-29 PROCEDURE — 97597 DBRDMT OPN WND 1ST 20 CM/<: CPT

## 2022-11-29 PROCEDURE — 97163 PT EVAL HIGH COMPLEX 45 MIN: CPT

## 2022-11-30 ENCOUNTER — APPOINTMENT (OUTPATIENT)
Dept: GENERAL RADIOLOGY | Facility: HOSPITAL | Age: 57
DRG: 854 | End: 2022-11-30
Payer: MEDICAID

## 2022-11-30 ENCOUNTER — HOSPITAL ENCOUNTER (INPATIENT)
Facility: HOSPITAL | Age: 57
LOS: 37 days | Discharge: HOME HEALTH | DRG: 854 | End: 2023-01-06
Attending: EMERGENCY MEDICINE | Admitting: FAMILY MEDICINE
Payer: MEDICAID

## 2022-11-30 DIAGNOSIS — R13.13 PHARYNGEAL DYSPHAGIA: ICD-10-CM

## 2022-11-30 DIAGNOSIS — E16.2 HYPOGLYCEMIA: ICD-10-CM

## 2022-11-30 DIAGNOSIS — L89.159 DECUBITUS ULCER OF COCCYGEAL REGION, UNSPECIFIED ULCER STAGE: ICD-10-CM

## 2022-11-30 DIAGNOSIS — L08.9 RIGHT FOOT INFECTION: Primary | ICD-10-CM

## 2022-11-30 PROBLEM — I10 ESSENTIAL HYPERTENSION: Status: ACTIVE | Noted: 2022-11-30

## 2022-11-30 PROBLEM — E78.5 HYPERLIPIDEMIA: Status: ACTIVE | Noted: 2022-11-30

## 2022-11-30 PROBLEM — E87.1 HYPONATREMIA: Status: ACTIVE | Noted: 2022-11-30

## 2022-11-30 PROBLEM — I48.0 PAROXYSMAL ATRIAL FIBRILLATION: Status: ACTIVE | Noted: 2022-11-30

## 2022-11-30 PROBLEM — E88.09 HYPOALBUMINEMIA: Status: ACTIVE | Noted: 2022-11-30

## 2022-11-30 PROBLEM — D64.9 ANEMIA: Status: ACTIVE | Noted: 2022-11-30

## 2022-11-30 LAB
ALBUMIN SERPL-MCNC: 3 G/DL (ref 3.5–5.2)
ALBUMIN/GLOB SERPL: 0.7 G/DL
ALP SERPL-CCNC: 85 U/L (ref 39–117)
ALT SERPL W P-5'-P-CCNC: 12 U/L (ref 1–41)
ANION GAP SERPL CALCULATED.3IONS-SCNC: 13 MMOL/L (ref 5–15)
AST SERPL-CCNC: 19 U/L (ref 1–40)
BASOPHILS # BLD AUTO: 0.03 10*3/MM3 (ref 0–0.2)
BASOPHILS NFR BLD AUTO: 0.2 % (ref 0–1.5)
BILIRUB SERPL-MCNC: 0.2 MG/DL (ref 0–1.2)
BUN SERPL-MCNC: 18 MG/DL (ref 6–20)
BUN/CREAT SERPL: 14 (ref 7–25)
CALCIUM SPEC-SCNC: 9.1 MG/DL (ref 8.6–10.5)
CHLORIDE SERPL-SCNC: 100 MMOL/L (ref 98–107)
CO2 SERPL-SCNC: 21 MMOL/L (ref 22–29)
CREAT SERPL-MCNC: 1.29 MG/DL (ref 0.76–1.27)
D-LACTATE SERPL-SCNC: 2.1 MMOL/L (ref 0.5–2)
DEPRECATED RDW RBC AUTO: 53.1 FL (ref 37–54)
EGFRCR SERPLBLD CKD-EPI 2021: 64.3 ML/MIN/1.73
EOSINOPHIL # BLD AUTO: 0.03 10*3/MM3 (ref 0–0.4)
EOSINOPHIL NFR BLD AUTO: 0.2 % (ref 0.3–6.2)
ERYTHROCYTE [DISTWIDTH] IN BLOOD BY AUTOMATED COUNT: 17.5 % (ref 12.3–15.4)
GLOBULIN UR ELPH-MCNC: 4.6 GM/DL
GLUCOSE BLDC GLUCOMTR-MCNC: 45 MG/DL (ref 70–130)
GLUCOSE BLDC GLUCOMTR-MCNC: 50 MG/DL (ref 70–130)
GLUCOSE BLDC GLUCOMTR-MCNC: 83 MG/DL (ref 70–130)
GLUCOSE SERPL-MCNC: 54 MG/DL (ref 65–99)
HCT VFR BLD AUTO: 35.5 % (ref 37.5–51)
HGB BLD-MCNC: 11.1 G/DL (ref 13–17.7)
HOLD SPECIMEN: NORMAL
IMM GRANULOCYTES # BLD AUTO: 0.14 10*3/MM3 (ref 0–0.05)
IMM GRANULOCYTES NFR BLD AUTO: 1 % (ref 0–0.5)
LIPASE SERPL-CCNC: 14 U/L (ref 13–60)
LYMPHOCYTES # BLD AUTO: 1.64 10*3/MM3 (ref 0.7–3.1)
LYMPHOCYTES NFR BLD AUTO: 11.8 % (ref 19.6–45.3)
MCH RBC QN AUTO: 26.4 PG (ref 26.6–33)
MCHC RBC AUTO-ENTMCNC: 31.3 G/DL (ref 31.5–35.7)
MCV RBC AUTO: 84.5 FL (ref 79–97)
MONOCYTES # BLD AUTO: 0.64 10*3/MM3 (ref 0.1–0.9)
MONOCYTES NFR BLD AUTO: 4.6 % (ref 5–12)
NEUTROPHILS NFR BLD AUTO: 11.44 10*3/MM3 (ref 1.7–7)
NEUTROPHILS NFR BLD AUTO: 82.2 % (ref 42.7–76)
NRBC BLD AUTO-RTO: 0 /100 WBC (ref 0–0.2)
PLATELET # BLD AUTO: 532 10*3/MM3 (ref 140–450)
PMV BLD AUTO: 8.5 FL (ref 6–12)
POTASSIUM SERPL-SCNC: 4.1 MMOL/L (ref 3.5–5.2)
PROT SERPL-MCNC: 7.6 G/DL (ref 6–8.5)
RBC # BLD AUTO: 4.2 10*6/MM3 (ref 4.14–5.8)
SODIUM SERPL-SCNC: 134 MMOL/L (ref 136–145)
WBC NRBC COR # BLD: 13.92 10*3/MM3 (ref 3.4–10.8)
WHOLE BLOOD HOLD COAG: NORMAL
WHOLE BLOOD HOLD SPECIMEN: NORMAL

## 2022-11-30 PROCEDURE — 88312 SPECIAL STAINS GROUP 1: CPT | Performed by: EMERGENCY MEDICINE

## 2022-11-30 PROCEDURE — 82962 GLUCOSE BLOOD TEST: CPT

## 2022-11-30 PROCEDURE — 83540 ASSAY OF IRON: CPT | Performed by: PHYSICIAN ASSISTANT

## 2022-11-30 PROCEDURE — 83690 ASSAY OF LIPASE: CPT | Performed by: EMERGENCY MEDICINE

## 2022-11-30 PROCEDURE — 86900 BLOOD TYPING SEROLOGIC ABO: CPT

## 2022-11-30 PROCEDURE — 71045 X-RAY EXAM CHEST 1 VIEW: CPT

## 2022-11-30 PROCEDURE — 84145 PROCALCITONIN (PCT): CPT | Performed by: INTERNAL MEDICINE

## 2022-11-30 PROCEDURE — 82746 ASSAY OF FOLIC ACID SERUM: CPT | Performed by: PHYSICIAN ASSISTANT

## 2022-11-30 PROCEDURE — 87150 DNA/RNA AMPLIFIED PROBE: CPT | Performed by: EMERGENCY MEDICINE

## 2022-11-30 PROCEDURE — 86901 BLOOD TYPING SEROLOGIC RH(D): CPT

## 2022-11-30 PROCEDURE — 36415 COLL VENOUS BLD VENIPUNCTURE: CPT

## 2022-11-30 PROCEDURE — 87040 BLOOD CULTURE FOR BACTERIA: CPT | Performed by: EMERGENCY MEDICINE

## 2022-11-30 PROCEDURE — 83605 ASSAY OF LACTIC ACID: CPT | Performed by: EMERGENCY MEDICINE

## 2022-11-30 PROCEDURE — 87186 SC STD MICRODIL/AGAR DIL: CPT | Performed by: EMERGENCY MEDICINE

## 2022-11-30 PROCEDURE — 84466 ASSAY OF TRANSFERRIN: CPT | Performed by: PHYSICIAN ASSISTANT

## 2022-11-30 PROCEDURE — 87147 CULTURE TYPE IMMUNOLOGIC: CPT | Performed by: EMERGENCY MEDICINE

## 2022-11-30 PROCEDURE — 99284 EMERGENCY DEPT VISIT MOD MDM: CPT

## 2022-11-30 PROCEDURE — 82607 VITAMIN B-12: CPT | Performed by: PHYSICIAN ASSISTANT

## 2022-11-30 PROCEDURE — 85045 AUTOMATED RETICULOCYTE COUNT: CPT | Performed by: PHYSICIAN ASSISTANT

## 2022-11-30 PROCEDURE — 85025 COMPLETE CBC W/AUTO DIFF WBC: CPT | Performed by: EMERGENCY MEDICINE

## 2022-11-30 PROCEDURE — 82728 ASSAY OF FERRITIN: CPT | Performed by: PHYSICIAN ASSISTANT

## 2022-11-30 PROCEDURE — 80053 COMPREHEN METABOLIC PANEL: CPT | Performed by: EMERGENCY MEDICINE

## 2022-11-30 PROCEDURE — 99223 1ST HOSP IP/OBS HIGH 75: CPT | Performed by: INTERNAL MEDICINE

## 2022-11-30 PROCEDURE — 25010000002 PIPERACILLIN SOD-TAZOBACTAM PER 1 G: Performed by: EMERGENCY MEDICINE

## 2022-11-30 RX ORDER — SODIUM CHLORIDE 0.9 % (FLUSH) 0.9 %
10 SYRINGE (ML) INJECTION AS NEEDED
Status: DISCONTINUED | OUTPATIENT
Start: 2022-11-30 | End: 2022-12-20

## 2022-11-30 RX ORDER — DEXTROSE MONOHYDRATE 25 G/50ML
25 INJECTION, SOLUTION INTRAVENOUS ONCE
Status: COMPLETED | OUTPATIENT
Start: 2022-11-30 | End: 2022-12-02

## 2022-11-30 RX ADMIN — TAZOBACTAM SODIUM AND PIPERACILLIN SODIUM 3.38 G: 375; 3 INJECTION, SOLUTION INTRAVENOUS at 23:35

## 2022-11-30 NOTE — LETTER
EMS Transport Request  For use at [facility], [place], [place], and [place] only   Patient Name: Buster Velasco : 1965   Weight:107 kg (235 lb) Pick-up Location: [facility] BLS/ALS: BLS/ALS: BLS   Insurance: [place] MEDICAID Auth End Date: 2023   Pre-Cert #: D/C Summary complete:    Destination: Home FOR [place]ZANIN: 24-hour notice needed and given, How many stairs 0, Will the patient be on the main level yes, Is there a ramp available yes, Can the patient stand and pivot no, Address [address] [address] and Name/contact number for who will be present wife Elina Velasco [phone]   Contact Precautions: None   Equipment (O2, Fluids, etc.): None   Arrive By Date/Time: 2023 Stretcher/WC: Stretcher   CM Requesting: Alan Downing RN Ext: [phone]   Notes/Medical Necessity: unable to sit in a wheelchair for extended period of time due to incontinence and skin issues, bilateral above the knee amputations.      ______________________________________________________________________    *Only 2 patient bags OR 1 carry-on size bag are permitted.  Wheelchairs and walkers CANNOT transported with the patient. Acknowledge: Yes

## 2022-11-30 NOTE — Clinical Note
Level of Care: Telemetry [5]   Diagnosis: Right foot infection [601295]   Admitting Physician: JULIOCESAR ARMSTRONG [[id number]]   Attending Physician: JULIOCESAR ARMSTRONG [[id number]]   Isolate for COVID?: No [0]   Bed Request Comments: tele   Certification: I Certify That Inpatient Hospital Services Are Medically Necessary For Greater Than 2 Midnights

## 2022-12-01 ENCOUNTER — ANESTHESIA EVENT (OUTPATIENT)
Dept: PERIOP | Facility: HOSPITAL | Age: 57
DRG: 854 | End: 2022-12-01
Payer: MEDICAID

## 2022-12-01 ENCOUNTER — APPOINTMENT (OUTPATIENT)
Dept: GENERAL RADIOLOGY | Facility: HOSPITAL | Age: 57
DRG: 854 | End: 2022-12-01
Payer: MEDICAID

## 2022-12-01 ENCOUNTER — APPOINTMENT (OUTPATIENT)
Dept: MRI IMAGING | Facility: HOSPITAL | Age: 57
DRG: 854 | End: 2022-12-01
Payer: MEDICAID

## 2022-12-01 PROBLEM — A41.9 SEPSIS: Status: ACTIVE | Noted: 2022-12-01

## 2022-12-01 LAB
ABO GROUP BLD: NORMAL
ABO GROUP BLD: NORMAL
ANION GAP SERPL CALCULATED.3IONS-SCNC: 11 MMOL/L (ref 5–15)
BASOPHILS # BLD AUTO: 0.02 10*3/MM3 (ref 0–0.2)
BASOPHILS NFR BLD AUTO: 0.3 % (ref 0–1.5)
BLD GP AB SCN SERPL QL: NEGATIVE
BUN SERPL-MCNC: 18 MG/DL (ref 6–20)
BUN/CREAT SERPL: 14.5 (ref 7–25)
CALCIUM SPEC-SCNC: 9.2 MG/DL (ref 8.6–10.5)
CHLORIDE SERPL-SCNC: 103 MMOL/L (ref 98–107)
CK SERPL-CCNC: 79 U/L (ref 20–200)
CO2 SERPL-SCNC: 21 MMOL/L (ref 22–29)
CREAT SERPL-MCNC: 1.24 MG/DL (ref 0.76–1.27)
CRP SERPL-MCNC: 9.1 MG/DL (ref 0–0.5)
D-LACTATE SERPL-SCNC: 2 MMOL/L (ref 0.5–2)
DEPRECATED RDW RBC AUTO: 53.2 FL (ref 37–54)
EGFRCR SERPLBLD CKD-EPI 2021: 67.4 ML/MIN/1.73
EOSINOPHIL # BLD AUTO: 0.06 10*3/MM3 (ref 0–0.4)
EOSINOPHIL NFR BLD AUTO: 0.8 % (ref 0.3–6.2)
ERYTHROCYTE [DISTWIDTH] IN BLOOD BY AUTOMATED COUNT: 17.5 % (ref 12.3–15.4)
ERYTHROCYTE [SEDIMENTATION RATE] IN BLOOD: 109 MM/HR (ref 0–20)
FERRITIN SERPL-MCNC: 317.4 NG/ML (ref 30–400)
FOLATE SERPL-MCNC: 15.9 NG/ML (ref 4.78–24.2)
GLUCOSE BLDC GLUCOMTR-MCNC: 101 MG/DL (ref 70–130)
GLUCOSE BLDC GLUCOMTR-MCNC: 105 MG/DL (ref 70–130)
GLUCOSE BLDC GLUCOMTR-MCNC: 123 MG/DL (ref 70–130)
GLUCOSE BLDC GLUCOMTR-MCNC: 147 MG/DL (ref 70–130)
GLUCOSE BLDC GLUCOMTR-MCNC: 154 MG/DL (ref 70–130)
GLUCOSE BLDC GLUCOMTR-MCNC: 165 MG/DL (ref 70–130)
GLUCOSE BLDC GLUCOMTR-MCNC: 42 MG/DL (ref 70–130)
GLUCOSE BLDC GLUCOMTR-MCNC: 44 MG/DL (ref 70–130)
GLUCOSE BLDC GLUCOMTR-MCNC: 46 MG/DL (ref 70–130)
GLUCOSE BLDC GLUCOMTR-MCNC: 55 MG/DL (ref 70–130)
GLUCOSE BLDC GLUCOMTR-MCNC: 60 MG/DL (ref 70–130)
GLUCOSE BLDC GLUCOMTR-MCNC: 62 MG/DL (ref 70–130)
GLUCOSE BLDC GLUCOMTR-MCNC: 70 MG/DL (ref 70–130)
GLUCOSE BLDC GLUCOMTR-MCNC: 78 MG/DL (ref 70–130)
GLUCOSE BLDC GLUCOMTR-MCNC: 80 MG/DL (ref 70–130)
GLUCOSE SERPL-MCNC: 52 MG/DL (ref 65–99)
HBA1C MFR BLD: 6.9 % (ref 4.8–5.6)
HCT VFR BLD AUTO: 32.2 % (ref 37.5–51)
HGB BLD-MCNC: 10 G/DL (ref 13–17.7)
IMM GRANULOCYTES # BLD AUTO: 0.06 10*3/MM3 (ref 0–0.05)
IMM GRANULOCYTES NFR BLD AUTO: 0.8 % (ref 0–0.5)
IRON 24H UR-MRATE: 18 MCG/DL (ref 59–158)
IRON SATN MFR SERPL: 6 % (ref 20–50)
LYMPHOCYTES # BLD AUTO: 1.02 10*3/MM3 (ref 0.7–3.1)
LYMPHOCYTES NFR BLD AUTO: 14.1 % (ref 19.6–45.3)
MCH RBC QN AUTO: 26.2 PG (ref 26.6–33)
MCHC RBC AUTO-ENTMCNC: 31.1 G/DL (ref 31.5–35.7)
MCV RBC AUTO: 84.3 FL (ref 79–97)
MONOCYTES # BLD AUTO: 0.42 10*3/MM3 (ref 0.1–0.9)
MONOCYTES NFR BLD AUTO: 5.8 % (ref 5–12)
NEUTROPHILS NFR BLD AUTO: 5.64 10*3/MM3 (ref 1.7–7)
NEUTROPHILS NFR BLD AUTO: 78.2 % (ref 42.7–76)
NRBC BLD AUTO-RTO: 0 /100 WBC (ref 0–0.2)
OSMOLALITY SERPL: 285 MOSM/KG (ref 275–295)
OSMOLALITY UR: 361 MOSM/KG (ref 300–1100)
PLATELET # BLD AUTO: 413 10*3/MM3 (ref 140–450)
PMV BLD AUTO: 8.9 FL (ref 6–12)
POTASSIUM SERPL-SCNC: 4.2 MMOL/L (ref 3.5–5.2)
PROCALCITONIN SERPL-MCNC: 0.17 NG/ML (ref 0–0.25)
RBC # BLD AUTO: 3.82 10*6/MM3 (ref 4.14–5.8)
RETICS # AUTO: 0.07 10*6/MM3 (ref 0.02–0.13)
RETICS/RBC NFR AUTO: 1.59 % (ref 0.7–1.9)
RH BLD: POSITIVE
RH BLD: POSITIVE
SODIUM SERPL-SCNC: 135 MMOL/L (ref 136–145)
SODIUM UR-SCNC: 64 MMOL/L
T&S EXPIRATION DATE: NORMAL
TIBC SERPL-MCNC: 277 MCG/DL (ref 298–536)
TRANSFERRIN SERPL-MCNC: 186 MG/DL (ref 200–360)
VIT B12 BLD-MCNC: 717 PG/ML (ref 211–946)
WBC NRBC COR # BLD: 7.22 10*3/MM3 (ref 3.4–10.8)

## 2022-12-01 PROCEDURE — 82550 ASSAY OF CK (CPK): CPT | Performed by: INTERNAL MEDICINE

## 2022-12-01 PROCEDURE — C1751 CATH, INF, PER/CENT/MIDLINE: HCPCS

## 2022-12-01 PROCEDURE — 86923 COMPATIBILITY TEST ELECTRIC: CPT

## 2022-12-01 PROCEDURE — 92610 EVALUATE SWALLOWING FUNCTION: CPT

## 2022-12-01 PROCEDURE — 93005 ELECTROCARDIOGRAM TRACING: CPT | Performed by: INTERNAL MEDICINE

## 2022-12-01 PROCEDURE — 80048 BASIC METABOLIC PNL TOTAL CA: CPT | Performed by: PHYSICIAN ASSISTANT

## 2022-12-01 PROCEDURE — 25010000002 DAPTOMYCIN PER 1 MG: Performed by: INTERNAL MEDICINE

## 2022-12-01 PROCEDURE — 99233 SBSQ HOSP IP/OBS HIGH 50: CPT | Performed by: INTERNAL MEDICINE

## 2022-12-01 PROCEDURE — 85652 RBC SED RATE AUTOMATED: CPT | Performed by: INTERNAL MEDICINE

## 2022-12-01 PROCEDURE — 73718 MRI LOWER EXTREMITY W/O DYE: CPT

## 2022-12-01 PROCEDURE — C1894 INTRO/SHEATH, NON-LASER: HCPCS

## 2022-12-01 PROCEDURE — 86140 C-REACTIVE PROTEIN: CPT | Performed by: INTERNAL MEDICINE

## 2022-12-01 PROCEDURE — 92611 MOTION FLUOROSCOPY/SWALLOW: CPT

## 2022-12-01 PROCEDURE — 82962 GLUCOSE BLOOD TEST: CPT

## 2022-12-01 PROCEDURE — 83036 HEMOGLOBIN GLYCOSYLATED A1C: CPT | Performed by: PHYSICIAN ASSISTANT

## 2022-12-01 PROCEDURE — 84300 ASSAY OF URINE SODIUM: CPT | Performed by: PHYSICIAN ASSISTANT

## 2022-12-01 PROCEDURE — 86901 BLOOD TYPING SEROLOGIC RH(D): CPT | Performed by: PHYSICIAN ASSISTANT

## 2022-12-01 PROCEDURE — 86900 BLOOD TYPING SEROLOGIC ABO: CPT | Performed by: PHYSICIAN ASSISTANT

## 2022-12-01 PROCEDURE — 74230 X-RAY XM SWLNG FUNCJ C+: CPT

## 2022-12-01 PROCEDURE — 25010000002 HEPARIN (PORCINE) PER 1000 UNITS: Performed by: PHYSICIAN ASSISTANT

## 2022-12-01 PROCEDURE — 83935 ASSAY OF URINE OSMOLALITY: CPT | Performed by: PHYSICIAN ASSISTANT

## 2022-12-01 PROCEDURE — 83930 ASSAY OF BLOOD OSMOLALITY: CPT | Performed by: PHYSICIAN ASSISTANT

## 2022-12-01 PROCEDURE — 25010000002 PIPERACILLIN SOD-TAZOBACTAM PER 1 G

## 2022-12-01 PROCEDURE — 83605 ASSAY OF LACTIC ACID: CPT | Performed by: EMERGENCY MEDICINE

## 2022-12-01 PROCEDURE — 86850 RBC ANTIBODY SCREEN: CPT | Performed by: PHYSICIAN ASSISTANT

## 2022-12-01 PROCEDURE — 93010 ELECTROCARDIOGRAM REPORT: CPT | Performed by: INTERNAL MEDICINE

## 2022-12-01 PROCEDURE — 85025 COMPLETE CBC W/AUTO DIFF WBC: CPT | Performed by: PHYSICIAN ASSISTANT

## 2022-12-01 PROCEDURE — 25010000002 VANCOMYCIN 10 G RECONSTITUTED SOLUTION: Performed by: EMERGENCY MEDICINE

## 2022-12-01 RX ORDER — CHOLECALCIFEROL (VITAMIN D3) 125 MCG
5 CAPSULE ORAL NIGHTLY PRN
Status: DISCONTINUED | OUTPATIENT
Start: 2022-12-01 | End: 2022-12-01

## 2022-12-01 RX ORDER — ACETAMINOPHEN 325 MG/1
650 TABLET ORAL EVERY 4 HOURS PRN
Status: DISCONTINUED | OUTPATIENT
Start: 2022-12-01 | End: 2022-12-01

## 2022-12-01 RX ORDER — CHOLECALCIFEROL (VITAMIN D3) 125 MCG
5 CAPSULE ORAL NIGHTLY PRN
Status: DISCONTINUED | OUTPATIENT
Start: 2022-12-01 | End: 2022-12-13

## 2022-12-01 RX ORDER — NICOTINE POLACRILEX 4 MG
15 LOZENGE BUCCAL
Status: DISCONTINUED | OUTPATIENT
Start: 2022-12-01 | End: 2022-12-20

## 2022-12-01 RX ORDER — OXYCODONE HYDROCHLORIDE 5 MG/1
5 TABLET ORAL EVERY 6 HOURS PRN
Status: DISCONTINUED | OUTPATIENT
Start: 2022-12-01 | End: 2022-12-02

## 2022-12-01 RX ORDER — ONDANSETRON 2 MG/ML
4 INJECTION INTRAMUSCULAR; INTRAVENOUS EVERY 6 HOURS PRN
Status: DISCONTINUED | OUTPATIENT
Start: 2022-12-01 | End: 2022-12-01

## 2022-12-01 RX ORDER — SODIUM CHLORIDE, SODIUM LACTATE, POTASSIUM CHLORIDE, CALCIUM CHLORIDE 600; 310; 30; 20 MG/100ML; MG/100ML; MG/100ML; MG/100ML
100 INJECTION, SOLUTION INTRAVENOUS CONTINUOUS
Status: DISCONTINUED | OUTPATIENT
Start: 2022-12-01 | End: 2022-12-01

## 2022-12-01 RX ORDER — ATORVASTATIN CALCIUM 20 MG/1
20 TABLET, FILM COATED ORAL NIGHTLY
Status: DISCONTINUED | OUTPATIENT
Start: 2022-12-01 | End: 2022-12-01

## 2022-12-01 RX ORDER — DEXTROSE MONOHYDRATE 50 MG/ML
75 INJECTION, SOLUTION INTRAVENOUS CONTINUOUS
Status: DISCONTINUED | OUTPATIENT
Start: 2022-12-01 | End: 2022-12-01

## 2022-12-01 RX ORDER — HYDROXYZINE HYDROCHLORIDE 25 MG/1
50 TABLET, FILM COATED ORAL NIGHTLY PRN
Status: DISCONTINUED | OUTPATIENT
Start: 2022-12-01 | End: 2022-12-13

## 2022-12-01 RX ORDER — DEXTROSE, SODIUM CHLORIDE, SODIUM LACTATE, POTASSIUM CHLORIDE, AND CALCIUM CHLORIDE 5; .6; .31; .03; .02 G/100ML; G/100ML; G/100ML; G/100ML; G/100ML
100 INJECTION, SOLUTION INTRAVENOUS CONTINUOUS
Status: DISCONTINUED | OUTPATIENT
Start: 2022-12-01 | End: 2022-12-04

## 2022-12-01 RX ORDER — INSULIN LISPRO 100 [IU]/ML
0-9 INJECTION, SOLUTION INTRAVENOUS; SUBCUTANEOUS
Status: DISCONTINUED | OUTPATIENT
Start: 2022-12-01 | End: 2022-12-02

## 2022-12-01 RX ORDER — ATORVASTATIN CALCIUM 20 MG/1
20 TABLET, FILM COATED ORAL NIGHTLY
Status: DISCONTINUED | OUTPATIENT
Start: 2022-12-01 | End: 2022-12-13

## 2022-12-01 RX ORDER — DEXTROSE, SODIUM CHLORIDE, SODIUM LACTATE, POTASSIUM CHLORIDE, AND CALCIUM CHLORIDE 5; .6; .31; .03; .02 G/100ML; G/100ML; G/100ML; G/100ML; G/100ML
75 INJECTION, SOLUTION INTRAVENOUS CONTINUOUS
Status: DISCONTINUED | OUTPATIENT
Start: 2022-12-01 | End: 2022-12-01

## 2022-12-01 RX ORDER — ONDANSETRON 4 MG/1
4 TABLET, FILM COATED ORAL EVERY 6 HOURS PRN
Status: DISCONTINUED | OUTPATIENT
Start: 2022-12-01 | End: 2022-12-01

## 2022-12-01 RX ORDER — VANCOMYCIN HYDROCHLORIDE 1 G/200ML
1000 INJECTION, SOLUTION INTRAVENOUS EVERY 12 HOURS
Status: DISCONTINUED | OUTPATIENT
Start: 2022-12-01 | End: 2022-12-01

## 2022-12-01 RX ORDER — ACETAMINOPHEN 325 MG/1
650 TABLET ORAL EVERY 4 HOURS PRN
Status: DISCONTINUED | OUTPATIENT
Start: 2022-12-01 | End: 2022-12-13

## 2022-12-01 RX ORDER — SODIUM CHLORIDE 0.9 % (FLUSH) 0.9 %
10 SYRINGE (ML) INJECTION AS NEEDED
Status: DISCONTINUED | OUTPATIENT
Start: 2022-12-01 | End: 2023-01-06 | Stop reason: HOSPADM

## 2022-12-01 RX ORDER — BUMETANIDE 0.5 MG/1
0.5 TABLET ORAL DAILY
COMMUNITY

## 2022-12-01 RX ORDER — HYDROXYZINE HYDROCHLORIDE 25 MG/1
50 TABLET, FILM COATED ORAL NIGHTLY PRN
COMMUNITY

## 2022-12-01 RX ORDER — SODIUM CHLORIDE 0.9 % (FLUSH) 0.9 %
10 SYRINGE (ML) INJECTION EVERY 12 HOURS SCHEDULED
Status: DISCONTINUED | OUTPATIENT
Start: 2022-12-01 | End: 2023-01-06 | Stop reason: HOSPADM

## 2022-12-01 RX ORDER — SODIUM CHLORIDE 0.9 % (FLUSH) 0.9 %
10 SYRINGE (ML) INJECTION AS NEEDED
Status: DISCONTINUED | OUTPATIENT
Start: 2022-12-01 | End: 2022-12-20

## 2022-12-01 RX ORDER — NICOTINE POLACRILEX 4 MG
15 LOZENGE BUCCAL
Status: DISCONTINUED | OUTPATIENT
Start: 2022-12-01 | End: 2022-12-01

## 2022-12-01 RX ORDER — SODIUM CHLORIDE 9 MG/ML
40 INJECTION, SOLUTION INTRAVENOUS AS NEEDED
Status: DISCONTINUED | OUTPATIENT
Start: 2022-12-01 | End: 2022-12-02

## 2022-12-01 RX ORDER — TAMSULOSIN HYDROCHLORIDE 0.4 MG/1
1 CAPSULE ORAL DAILY
COMMUNITY

## 2022-12-01 RX ORDER — SIMVASTATIN 40 MG
40 TABLET ORAL NIGHTLY
COMMUNITY

## 2022-12-01 RX ORDER — DEXTROSE MONOHYDRATE 100 MG/ML
50 INJECTION, SOLUTION INTRAVENOUS CONTINUOUS
Status: DISCONTINUED | OUTPATIENT
Start: 2022-12-01 | End: 2022-12-01

## 2022-12-01 RX ORDER — HYDROXYZINE HYDROCHLORIDE 25 MG/1
50 TABLET, FILM COATED ORAL NIGHTLY PRN
Status: DISCONTINUED | OUTPATIENT
Start: 2022-12-01 | End: 2022-12-01

## 2022-12-01 RX ORDER — DEXTROSE MONOHYDRATE 25 G/50ML
25 INJECTION, SOLUTION INTRAVENOUS
Status: DISCONTINUED | OUTPATIENT
Start: 2022-12-01 | End: 2023-01-06 | Stop reason: HOSPADM

## 2022-12-01 RX ORDER — FERROUS SULFATE 325(65) MG
325 TABLET ORAL
COMMUNITY

## 2022-12-01 RX ORDER — DAPAGLIFLOZIN 10 MG/1
10 TABLET, FILM COATED ORAL DAILY
COMMUNITY

## 2022-12-01 RX ORDER — HEPARIN SODIUM 5000 [USP'U]/ML
5000 INJECTION, SOLUTION INTRAVENOUS; SUBCUTANEOUS EVERY 8 HOURS SCHEDULED
Status: DISCONTINUED | OUTPATIENT
Start: 2022-12-01 | End: 2022-12-02

## 2022-12-01 RX ORDER — METHADONE HYDROCHLORIDE 10 MG/1
10 TABLET ORAL 4 TIMES DAILY
COMMUNITY

## 2022-12-01 RX ORDER — METHADONE HYDROCHLORIDE 10 MG/1
10 TABLET ORAL ONCE
Status: COMPLETED | OUTPATIENT
Start: 2022-12-01 | End: 2022-12-01

## 2022-12-01 RX ORDER — FLUTICASONE PROPIONATE 50 MCG
2 SPRAY, SUSPENSION (ML) NASAL 2 TIMES DAILY PRN
COMMUNITY

## 2022-12-01 RX ORDER — INSULIN DEGLUDEC 100 U/ML
72 INJECTION, SOLUTION SUBCUTANEOUS DAILY
COMMUNITY

## 2022-12-01 RX ORDER — LOSARTAN POTASSIUM 50 MG/1
50 TABLET ORAL DAILY
COMMUNITY

## 2022-12-01 RX ADMIN — ATORVASTATIN CALCIUM 20 MG: 20 TABLET, FILM COATED ORAL at 20:25

## 2022-12-01 RX ADMIN — BARIUM SULFATE 50 ML: 400 SUSPENSION ORAL at 15:57

## 2022-12-01 RX ADMIN — VANCOMYCIN HYDROCHLORIDE 2000 MG: 10 INJECTION, POWDER, LYOPHILIZED, FOR SOLUTION INTRAVENOUS at 01:15

## 2022-12-01 RX ADMIN — METHADONE HYDROCHLORIDE 10 MG: 10 TABLET ORAL at 22:09

## 2022-12-01 RX ADMIN — TAZOBACTAM SODIUM AND PIPERACILLIN SODIUM 3.38 G: 375; 3 INJECTION, SOLUTION INTRAVENOUS at 06:04

## 2022-12-01 RX ADMIN — HYDROXYZINE HYDROCHLORIDE 50 MG: 25 TABLET ORAL at 22:31

## 2022-12-01 RX ADMIN — Medication 10 ML: at 12:51

## 2022-12-01 RX ADMIN — DEXTROSE MONOHYDRATE 25 G: 25 INJECTION, SOLUTION INTRAVENOUS at 06:04

## 2022-12-01 RX ADMIN — DAPTOMYCIN 600 MG: 500 INJECTION, POWDER, LYOPHILIZED, FOR SOLUTION INTRAVENOUS at 11:39

## 2022-12-01 RX ADMIN — SODIUM CHLORIDE, POTASSIUM CHLORIDE, SODIUM LACTATE AND CALCIUM CHLORIDE 100 ML/HR: 600; 310; 30; 20 INJECTION, SOLUTION INTRAVENOUS at 01:15

## 2022-12-01 RX ADMIN — Medication 10 ML: at 20:26

## 2022-12-01 RX ADMIN — Medication 10 ML: at 01:17

## 2022-12-01 RX ADMIN — DEXTROSE MONOHYDRATE 25 G: 25 INJECTION, SOLUTION INTRAVENOUS at 01:31

## 2022-12-01 RX ADMIN — SODIUM CHLORIDE, SODIUM LACTATE, POTASSIUM CHLORIDE, CALCIUM CHLORIDE AND DEXTROSE MONOHYDRATE 100 ML/HR: 5; 600; 310; 30; 20 INJECTION, SOLUTION INTRAVENOUS at 12:47

## 2022-12-01 RX ADMIN — TAZOBACTAM SODIUM AND PIPERACILLIN SODIUM 3.38 G: 375; 3 INJECTION, SOLUTION INTRAVENOUS at 22:08

## 2022-12-01 RX ADMIN — BARIUM SULFATE 30 ML: 0.81 POWDER, FOR SUSPENSION ORAL at 15:57

## 2022-12-01 RX ADMIN — DEXTROSE MONOHYDRATE 50 ML/HR: 100 INJECTION, SOLUTION INTRAVENOUS at 06:12

## 2022-12-01 RX ADMIN — HEPARIN SODIUM 5000 UNITS: 5000 INJECTION INTRAVENOUS; SUBCUTANEOUS at 15:07

## 2022-12-01 RX ADMIN — DEXTROSE MONOHYDRATE 25 G: 25 INJECTION, SOLUTION INTRAVENOUS at 03:34

## 2022-12-01 RX ADMIN — OXYCODONE 5 MG: 5 TABLET ORAL at 17:15

## 2022-12-01 RX ADMIN — DEXTROSE MONOHYDRATE 25 G: 25 INJECTION, SOLUTION INTRAVENOUS at 05:02

## 2022-12-01 RX ADMIN — DEXTROSE MONOHYDRATE 75 ML/HR: 50 INJECTION, SOLUTION INTRAVENOUS at 02:18

## 2022-12-01 RX ADMIN — HEPARIN SODIUM 5000 UNITS: 5000 INJECTION INTRAVENOUS; SUBCUTANEOUS at 22:09

## 2022-12-01 RX ADMIN — SODIUM CHLORIDE, SODIUM LACTATE, POTASSIUM CHLORIDE, CALCIUM CHLORIDE AND DEXTROSE MONOHYDRATE 100 ML/HR: 5; 600; 310; 30; 20 INJECTION, SOLUTION INTRAVENOUS at 22:22

## 2022-12-01 RX ADMIN — TAZOBACTAM SODIUM AND PIPERACILLIN SODIUM 3.38 G: 375; 3 INJECTION, SOLUTION INTRAVENOUS at 15:07

## 2022-12-01 RX ADMIN — DEXTROSE MONOHYDRATE 25 G: 25 INJECTION, SOLUTION INTRAVENOUS at 08:54

## 2022-12-01 RX ADMIN — Medication 10 ML: at 11:40

## 2022-12-01 RX ADMIN — METOPROLOL TARTRATE 25 MG: 25 TABLET, FILM COATED ORAL at 20:25

## 2022-12-01 RX ADMIN — HEPARIN SODIUM 5000 UNITS: 5000 INJECTION INTRAVENOUS; SUBCUTANEOUS at 06:04

## 2022-12-01 RX ADMIN — Medication 10 ML: at 20:27

## 2022-12-01 RX ADMIN — SODIUM CHLORIDE, POTASSIUM CHLORIDE, SODIUM LACTATE AND CALCIUM CHLORIDE 500 ML: 600; 310; 30; 20 INJECTION, SOLUTION INTRAVENOUS at 12:19

## 2022-12-01 RX ADMIN — BARIUM SULFATE 20 ML: 400 PASTE ORAL at 15:57

## 2022-12-01 NOTE — PAYOR COMM NOTE
"  Hospital Admission:  Pt ID: [id number]    Utilization Review  Phone [phone]  Fax [phone]    [place] [address]    Buster Velasco (57 y.o. Male)     Date of Birth   1965    Social Security Number       Address   [address] [place] [address]    Home Phone   [phone]    MRN   [id number]       Protestant   Scientologist    Marital Status                               Admission Date   22    Admission Type   Emergency    Admitting Provider   Ernestina Chin MD    Attending Provider   Ernestina Chin MD    Department, Room/Bed   [facility] 3G, S366/1       Discharge Date       Discharge Disposition       Discharge Destination                               Attending Provider: Ernestina Chin MD    Allergies: Gabapentin, Lyrica [Pregabalin]    Isolation: None   Infection: None   Code Status: CPR    Ht: 193 cm (76\")   Wt: 100 kg (221 lb)    Admission Cmt: None   Principal Problem: Right foot infection [L08.9]                 Active Insurance as of 2022     Patient has no active insurance coverage on file for 2022.          Emergency Contacts      (Rel.) Home Phone Work Phone Mobile Phone    NELSY VELASCO (Spouse) [phone] -- [phone]            [place]: [facility] [id number] Tax ID [id number]  Insurance Information                [facility] MEDICAID Phone: [phone]    Subscriber: Buster Velasco Subscriber#: [id number]    Group#: -- Precert#: --             History & Physical      Tracy Oliveira DO at [facility]              [facility]  HISTORY AND PHYSICAL    Patient Name: Buster Velasco  : 1965  MRN: [id number]  Primary Care Physician: Ludivina Bowers MD  Date of admission: 2022    Subjective    Subjective     Chief Complaint:  Ulcer on foot    HPI:  Buster Alanis " Krista is a 57 y.o. 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 He states that these have been present for a few months, but have gotten worse. He does admit to some fatigue and chills, but denies fever, chest pain, SOB, nausea or vomiting. He denies any recent antibiotic use.           Review of Systems   Constitutional: Positive for chills and fatigue. Negative for fever and unexpected weight change.   HENT: Positive for voice change (chronic hoarseness). Negative for sinus pressure, sore throat and trouble swallowing.    Eyes: Negative for photophobia and visual disturbance.   Respiratory: Negative for cough, shortness of breath and wheezing.    Cardiovascular: Negative for chest pain and palpitations.   Gastrointestinal: Negative for abdominal pain, diarrhea, nausea and vomiting.   Genitourinary: Negative for dysuria and hematuria.   Musculoskeletal: Positive for arthralgias and myalgias.   Skin: Positive for color change and wound.   Neurological: Negative for tremors, syncope, speech difficulty and weakness.   Psychiatric/Behavioral: Negative for confusion. The patient is not nervous/anxious.       All other systems reviewed and are negative.     Personal History     Past Medical History:   Diagnosis Date   • Diabetes (HCC)    • DVT (deep venous thrombosis) (HCC)    • Hypertension    • Mood disorder (HCC)              Past Surgical History:   Procedure Laterality Date   • BELOW KNEE AMPUTATION Left    • TRANS METATARSAL AMPUTATION Right        Family History:  family history includes Diabetes in his maternal uncle; Diabetes (age of onset: 65) in his mother; Heart attack in his maternal grandfather;  Stroke (age of onset: 74) in his mother. Otherwise pertinent FHx was reviewed and unremarkable.     Social History:  reports that he has never smoked. He has never used smokeless tobacco. He reports that he does not drink alcohol.  Social History     Social History Narrative   • Not on file       Medications:  Insulin Degludec, bumetanide, dapagliflozin Propanediol, ferrous sulfate, fluticasone, hydrOXYzine, losartan, metFORMIN, methadone, metoprolol tartrate, simvastatin, and tamsulosin    Allergies   Allergen Reactions   • Gabapentin Rash   • Lyrica [Pregabalin] Rash       Objective    Objective     Vital Signs:   Temp:  [98 °F (36.7 °C)] 98 °F (36.7 °C)  Heart Rate:  [] 106  Resp:  [16-20] 16  BP: (102-108)/(58-65) 102/58    Physical Exam  Constitutional:       General: He is not in acute distress.     Appearance: Normal appearance.   HENT:      Head: Atraumatic.      Right Ear: External ear normal.      Left Ear: External ear normal.      Nose: Nose normal.      Mouth/Throat:      Comments: Chronic hoarseness when speaking.   Eyes:      Extraocular Movements: Extraocular movements intact.      Conjunctiva/sclera: Conjunctivae normal.      Pupils: Pupils are equal, round, and reactive to light.   Cardiovascular:      Rate and Rhythm: Regular rhythm. Tachycardia present.      Heart sounds: Normal heart sounds. No murmur heard.  Pulmonary:      Effort: Pulmonary effort is normal. No respiratory distress.      Breath sounds: Normal breath sounds. No wheezing, rhonchi or rales.   Abdominal:      General: Bowel sounds are normal. There is no distension.      Tenderness: There is no abdominal tenderness. There is no guarding or rebound.      Comments: PEG tube in place. No evidence of erythema.    Musculoskeletal:      Cervical back: No rigidity.      Comments: Hx left BKA. Hx right transmetatarsal amputation.   Skin:     General: Skin is warm and dry.      Coloration: Skin is not jaundiced.      Findings:  Erythema and lesion present. No rash.      Comments: 2 Large ulcerations on right lateral foot. Area of surrounding erythema.    Neurological:      General: No focal deficit present.      Mental Status: He is alert and oriented to person, place, and time.   Psychiatric:         Attention and Perception: Attention normal.         Mood and Affect: Mood normal.         Behavior: Behavior normal.         Thought Content: Thought content normal.          Result Review:  I have personally reviewed the results from the time of this admission to 12/1/2022 01:39 EST and agree with these findings:  [x]  Laboratory list / accordion  [x]  Microbiology  []  Radiology  [x]  EKG/Telemetry   []  Cardiology/Vascular   []  Pathology  []  Old records  []  Other:    LAB RESULTS:      Lab 12/01/22  0053 11/30/22 1905   WBC  --  13.92*   HEMOGLOBIN  --  11.1*   HEMATOCRIT  --  35.5*   PLATELETS  --  532*   NEUTROS ABS  --  11.44*   IMMATURE GRANS (ABS)  --  0.14*   LYMPHS ABS  --  1.64   MONOS ABS  --  0.64   EOS ABS  --  0.03   MCV  --  84.5   PROCALCITONIN  --  0.17   LACTATE 2.0 2.1*         Lab 11/30/22  1905   SODIUM 134*   POTASSIUM 4.1   CHLORIDE 100   CO2 21.0*   ANION GAP 13.0   BUN 18   CREATININE 1.29*   EGFR 64.3   GLUCOSE 54*   CALCIUM 9.1         Lab 11/30/22 1905   TOTAL PROTEIN 7.6   ALBUMIN 3.00*   GLOBULIN 4.6   ALT (SGPT) 12   AST (SGOT) 19   BILIRUBIN 0.2   ALK PHOS 85   LIPASE 14                 Lab 11/30/22 1905   IRON 18*   IRON SATURATION 6*   TIBC 277*   TRANSFERRIN 186*   FERRITIN 317.40         Brief Urine Lab Results  (Last result in the past 365 days)      Color   Clarity   Blood   Leuk Est   Nitrite   Protein   CREAT   Urine HCG        08/20/22 0108 Yellow   Clear   Negative   Negative     Negative               Microbiology Results (last 10 days)     ** No results found for the last 240 hours. **          XR Chest 1 View    Result Date: 12/1/2022  EXAMINATION: Chest one view. DATE: 11/30/2022.  COMPARISON: None available. CLINICAL HISTORY: Infection. FINDINGS: The lungs and pleural spaces are clear. The cardiomediastinal silhouette and the pulmonary vessels are within normal limits.     Impression: No acute cardiopulmonary process. Electronically signed by:  Pierre Baron D.O.  11/30/2022 10:15 PM Mountain Time          Assessment & Plan   Assessment & Plan       Right foot infection    Hypoglycemia    Sepsis (HCC)    Essential hypertension    Hyperlipidemia    Paroxysmal atrial fibrillation (HCC)    Anemia    Hyponatremia    Hypoalbuminemia      Buster Velasco is a 57 y.o. 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   Sepsis: infectious source, tachycardia, borderline BP, leukocytosis, elevated lactate.  - Check CRP, sed rate  -Blood cultures pending  -Given hx of MRSA osteomyelitis as well as multiple amputations, will need to start IV Vancomycin tonight.   -MRI right foot tonight to investigate for possible osteomyelitis.  -Wound care for the am  -Infectious Disease consult for the am  -Dr. Peguero to see in the am. May ultimately need right BKA.  -IV fluids    Decubitus ulcer  -Wound care to see.   -Bedrest. Turn q2    Chronic Vocal Cord Paralysis  -NPO tonight.   -Has PEG tube in place, but reportedly is requesting to be taken out?  -SLP to see in the am.     Hypoglycemia  T2DM  -Blood glucose 45 on arrival, persistent with rechecks.   -Point-of-care glucose check every hour  - Start D5W at 75 cc/hour  - Patient on Tresiba 75 units every morning, hold  - We will start SSI when  needed  -Check A1C  -Repeat bmp in the am    HTN  HLD  -Hold home BP meds for now d/t borderline hypotension  -Continue statin    Parox Atrial Fib  -Noted during admission to  back in July.   -EKG pending  -Not currently on anticoagulation    Anemia  -Hgb 11.1, Hct 35.5  -Appears chronic. Actually has improved since prior results.  -Anemia studies   -Repeat cbc in the am    Hyponatremia  -Na+ 134. Likely 2ry to poor PO intake.   -Check Serum Osm, Urine Osm, and Urine Na+  -IV fluids tonight  -Repeat bmp in the am    Hypoalbuminemia  -Alb 3.0  -Nutrition consult for the am    History of DVT  - S/p IVC filter      DVT prophylaxis:  Hep subQ    CODE STATUS:  Full Code  Code Status (Patient has no pulse and is not breathing): CPR (Attempt to Resuscitate)  Medical Interventions (Patient has pulse or is breathing): Full Support      This note has been completed as part of a split-shared workflow.     Signature: Electronically signed by Cecily Molina PA-C, 11/30/22, 11:33 PM EST      Attending   Admission Attestation       I have performed an independent face-to-face diagnostic evaluation including performing an independent physical examination as documented here.  The documented plan of care above was reviewed and developed with the advanced practice clinician (APC).      Brief Summary Statement:   Buster Velasco is a 57 y.o. male with a PMH significant for insulin-dependent diabetes mellitus type 2, DVT s/p IVC filter, atrial fibrillation not on anticoagulation, HTN, HLD, history of osteomyelitis s/p left BKA, s/p right transmetatarsal amputation, MRSA osteomyelitis s/p third metacarpal resection who comes to the ED due to worsening foot infection.  Patient reports a 3-month history of an open wound on his right foot.  Over the past several weeks the wound has become more erythematous, painful with drainage.  He reports fatigue, chills.  He denies fever, vomiting.  He reports diarrhea which is  currently resolved.  He is not following with wound care outpatient and has not been on any recent antibiotics.    Remainder of detailed HPI is as noted by APC and has been reviewed and/or edited by me for completeness.    Attending Physical Exam:  Temp:  [98 °F (36.7 °C)-98.6 °F (37 °C)] 98.6 °F (37 °C)  Heart Rate:  [] 89  Resp:  [16-20] 16  BP: (102-112)/(58-76) 112/74    Constitutional: Awake, alert  Eyes: PERRLA, sclerae anicteric, no conjunctival injection  HENT: NCAT, mucous membranes moist  Neck: Supple, no thyromegaly, no lymphadenopathy, trachea midline  Respiratory: Clear to auscultation bilaterally, nonlabored respirations   Cardiovascular: RRR, no murmurs, rubs, or gallops, palpable radial pulses bilaterally  Gastrointestinal: Positive bowel sounds, soft, nontender, nondistended  Musculoskeletal: Left BKA, metatarsal resection to right foot, foot bandaged  Psychiatric: Appropriate affect, cooperative  Neurologic: Oriented x 3, strength symmetric in all extremities, Cranial Nerves grossly intact to confrontation, speech clear  Skin: Bandage dry and intact to right foot      Brief Assessment/Plan :  See detailed assessment and plan developed with APC which I have reviewed and/or edited for completeness.    Tracy Oliveira DO  12/01/22                        Electronically signed by Tracy Oliveira DO at 12/01/22 0347       Emergency Department Notes    No notes of this type exist for this encounter.         Physician Progress Notes (last 24 hours)  Notes from 11/30/22 1511 through 12/01/22 1511   No notes of this type exist for this encounter.            Consult Notes (last 24 hours)      Zeke George MD at 12/01/22 0830      Consult Orders    1. Inpatient Infectious Diseases Consult [[id number]] ordered by Cecily Molina PA-C at 12/01/22 0055               Buster Velasco  1965  [id number]    Date of Consult: 12/1/2022    Admit Date:  11/30/2022      Requesting  Provider: No ref. provider found  Evaluating Physician: Zeke George MD    Chief Complaint:  Right LE infection    Reason for Consultation: rigth foot infection    History of present illness:    Patient is a 57 y.o.  Yr old male with history of prior lower extremity infection/amputations done in [facility].  He has a history of left BKA years ago.  More recent right transmetatarsal amputation but specific dates unclear and unclear who the surgeon was.  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  He has history DVT/IVC filter, diabetes and other comorbidities as below.  He reports a chronic right lateral foot wound present for months but apparently not precipitated by any specific event or trauma.  He is admitted to the hospital on November 30 with deterioration at the foot including redness/swelling    He has pain to the right foot which is dull at present, constant, nonradiating, worse with palpation, generally better with pain meds and 3 out of 10 in severity.  Not progressive    He denies any other specific exposure.  No specific blunt force or penetrating trauma.  No animal insect or arthropod bite.  No fresh/brackish/salt water exposure.  No prior history of VRE C. difficile or ESBL/K PC/CRE organisms    No headache photophobia or neck stiffness.  No shortness of breath cough or hemoptysis.  No nausea vomiting diarrhea or abdominal pain.  No dysuria hematuria or pyuria.  No other new skin rash    Past Medical History:   Diagnosis Date   • Diabetes (HCC)    • DVT (deep venous thrombosis) ([facility])    • Hypertension    • Mood disorder ([facility])        Past Surgical History:   Procedure Laterality Date   • BELOW KNEE AMPUTATION Left    • TRANS METATARSAL AMPUTATION Right        Pediatric History    Patient Parents   • Not on file     Other Topics Concern   • Not on file   Social History Narrative   • Not on file       family history includes Diabetes in his maternal uncle; Diabetes (age of onset: 65) in his mother; Heart attack in his maternal grandfather; Stroke (age of onset: 74) in his mother.    Allergies   Allergen Reactions   • Gabapentin Rash   • Lyrica [Pregabalin] Rash       Medication:  Current Facility-Administered Medications   Medication Dose Route Frequency Provider Last Rate Last Admin   • acetaminophen (TYLENOL) tablet 650 mg  650 mg Per PEG Tube Q4H PRN Ron Calles RPH       • atorvastatin (LIPITOR) tablet 20 mg  20 mg Per PEG Tube Nightly Ron Calles, TRICEH       • DAPTOmycin (CUBICIN) 600 mg in sodium chloride 0.9 % 50 mL IVPB  6 mg/kg Intravenous Q24H Zeke George MD       • dextrose (D50W) (25 g/50 mL) IV injection 25 g  25 g Intravenous Once Reggie Batres MD       • dextrose (D50W) (25 g/50 mL) IV injection 25 g  25 g Intravenous Q15 Min PRN Cecily Molina PA-C   25 g at 12/01/22 0604   • dextrose (GLUTOSE) oral gel 15 g  15 g Per PEG Tube Q15 Min PRN Ron Calles RPH       • dextrose 10 % infusion  50 mL/hr Intravenous Continuous Ernestina Chin MD       • glucagon (human recombinant) (GLUCAGEN DIAGNOSTIC) injection 1 mg  1 mg Intramuscular Q15 Min PRN Cecily Molina PA-C       • heparin (porcine) 5000 UNIT/ML injection 5,000 Units  5,000 Units Subcutaneous Q8H Cecily Molina PA-C   5,000 Units at 12/01/22 0604   • hydrOXYzine (ATARAX) tablet 50 mg  50 mg Per PEG Tube Nightly PRN Ron Calles RPH       • Insulin Lispro (humaLOG) injection 0-9 Units  0-9 Units Subcutaneous TID AC Cecily Molina PA-C       • melatonin tablet 5 mg  5 mg Per PEG Tube Nightly PRN Ron Calles RPH       • ondansetron (ZOFRAN) tablet 4 mg  4 mg Per PEG Tube Q6H PRN Ron Calles RPH        Or   • ondansetron (ZOFRAN) injection 4 mg  4 mg  Intravenous Q6H PRN Ron Calles, [facility]       • Pharmacy Consult - Pharmacy to dose   Does not apply Continuous PRN Cecily Molina PA-C       • piperacillin-tazobactam (ZOSYN) 3.375 g in iso-osmotic dextrose 50 ml (premix)  3.375 g Intravenous Q8H Saurav Frederick, PharmD   3.375 g at 12/01/22 0604   • sodium chloride 0.9 % flush 10 mL  10 mL Intravenous PRN Reggie Batres MD       • sodium chloride 0.9 % flush 10 mL  10 mL Intravenous Q12H Cecily Molina PA-C   10 mL at 12/01/22 0117   • sodium chloride 0.9 % flush 10 mL  10 mL Intravenous PRN Cecily Molina PA-C       • sodium chloride 0.9 % infusion 40 mL  40 mL Intravenous PRN Cecily Molina PA-C           Antibiotics:  Anti-Infectives (From admission, onward)    Ordered     Dose/Rate Route Frequency Start Stop    12/01/22 0829  DAPTOmycin (CUBICIN) 600 mg in sodium chloride 0.9 % 50 mL IVPB        Ordering Provider: Zeke George MD    6 mg/kg × 100 kg  100 mL/hr over 30 Minutes Intravenous Every 24 Hours 12/01/22 0915 12/15/22 0914    12/01/22 0055  piperacillin-tazobactam (ZOSYN) 3.375 g in iso-osmotic dextrose 50 ml (premix)        Ordering Provider: Saurav Frederick, PharmD    3.375 g  over 4 Hours Intravenous Every 8 Hours 12/01/22 0600 12/06/22 0559    11/30/22 2219  vancomycin 2000 mg/500 mL 0.9% NS IVPB ([facility])        Ordering Provider: Reggie Batres MD    20 mg/kg × 98.9 kg  over 2 Hours Intravenous Once 11/30/22 2221 12/01/22 0315    11/30/22 2219  piperacillin-tazobactam (ZOSYN) 3.375 g in iso-osmotic dextrose 50 ml (premix)        Ordering Provider: Reggie Batres MD    3.375 g Intravenous Once 11/30/22 2221 11/30/22 2335            Review of Systems    Constitutional-- No Fever, chills or sweats.  Appetite good, and no malaise. No fatigue.  Heent--chronic hoarse voice.  No new vision, hearing or throat complaints.  No epistaxis or oral sores.   No flashers, floaters or eye pain.   No headache,  "photophobia or neck stiffness.  Chronic PEG tube  CV-- No chest pain, palpitation or syncope  Resp-- No SOB/cough/Hemoptysis  GI- No nausea, vomiting, or diarrhea.  No hematochezia, melena, or hematemesis. Denies jaundice or chronic liver disease.  -- No dysuria, hematuria, or flank pain.  Denies hesitancy, urgency or flank pain.  Lymph- no swollen lymph nodes in neck/axilla or groin.   Heme- No active bruising or bleeding; no Hx of DVT or PE.  MS-- no swelling or pain in the bones or joints of arms/legs.  No new back pain.  Neuro-- No acute focal weakness or numbness in the arms or legs.  No seizures.    Full 12 point review of systems reviewed and negative otherwise for acute complaints, except for above    Physical Exam:   Vital Signs   BP 93/61 (BP Location: Right arm, Patient Position: Lying)   Pulse 86   Temp 98 °F (36.7 °C) (Oral)   Resp 16   Ht 193 cm (76\")   Wt 100 kg (221 lb)   SpO2 100%   BMI 26.90 kg/m²     GENERAL: Awake and alert, in no acute distress.  Hoarse voice noted  HEENT: Normocephalic, atraumatic.  PERRL. EOMI. No conjunctival injection. No icterus. Oropharynx   without evidence of thrush or exudate. No evidence of peridontal disease.    NECK: Supple without nuchal rigidity. No mass.  LYMPH: No cervical, axillary or inguinal lymphadenopathy.  HEART: RRR; No murmur, rubs, gallops.   LUNGS: Diminished at bases but otherwise clear to auscultation bilaterally without wheezing, rales, rhonchi. Normal respiratory effort. Nonlabored. No dullness.  ABDOMEN: Soft, nontender, nondistended. Positive bowel sounds. No rebound or guarding. NO mass or HSM.  PEG tube noted  EXT:  No cyanosis, clubbing or edema. No cord.  : Genitalia generally unremarkable.  Without Orantes catheter.  MSK: FROM without joint effusions noted arms/legs.    SKIN: Warm and dry without cutaneous eruptions on Inspection/palpation.    NEURO: Oriented to PPT. No focal deficits on motor/sensory exam at arms/legs.  PSYCHIATRIC: " Normal insight and judgement. Cooperative with PE    Right lower extremity with open wound lateral aspect near his fifth metatarsal, probable probe to bone and vague surrounding erythema but no discrete mass bulge or fluctuance.  No crepitus or bulla.  Right lateral ankle with open wound as well, unable to determine depth but likely through dermis and vague surrounding redness but no discrete mass bulge or fluctuance.  No crepitus or bulla.    No peripheral stigmata/phenomenon of endocarditis    IV without obvious redness or drainage    Left BKA site with no redness induration or warmth.  No discrete mass bulge or fluctuance.    Laboratory Data    Results from last 7 days   Lab Units 11/30/22 1905   WBC 10*3/mm3 13.92*   HEMOGLOBIN g/dL 11.1*   HEMATOCRIT % 35.5*   PLATELETS 10*3/mm3 532*     Results from last 7 days   Lab Units 11/30/22 1905   SODIUM mmol/L 134*   POTASSIUM mmol/L 4.1   CHLORIDE mmol/L 100   CO2 mmol/L 21.0*   BUN mg/dL 18   CREATININE mg/dL 1.29*   GLUCOSE mg/dL 54*   CALCIUM mg/dL 9.1     Results from last 7 days   Lab Units 11/30/22 1905   ALK PHOS U/L 85   BILIRUBIN mg/dL 0.2   ALT (SGPT) U/L 12   AST (SGOT) U/L 19               Estimated Creatinine Clearance: 88.3 mL/min (A) (by C-G formula based on SCr of 1.29 mg/dL (H)).      Microbiology:      Radiology:  Imaging Results (Last 72 Hours)     Procedure Component Value Units Date/Time    MRI Foot Right Without Contrast [[id number]] Collected: 12/01/22 0444     Updated: 12/01/22 0645    Narrative:      Preliminary report:    There is amputation of the forefoot. There is some mild bone marrow edema near the cut margin of the foot as this could relate to signs of osteomyelitis. Some mild cellulitic changes are also suspected.    XR Chest 1 View [[id number]] Collected: 11/30/22 2215     Updated: 12/01/22 0016    Narrative:      EXAMINATION: Chest one view.    DATE: 11/30/2022.    COMPARISON: None available.    CLINICAL HISTORY:  Infection.    FINDINGS:    The lungs and pleural spaces are clear.    The cardiomediastinal silhouette and the pulmonary vessels are within normal limits.      Impression:        No acute cardiopulmonary process.    Electronically signed by:  Pierre Baron D.O.    11/30/2022 10:15 PM Mountain Time            Impression:     --Acute right foot/ankle with multifocal ulceration, cellulitis/wound infection and probable osteomyelitis with probe to bone at the lateral foot.  Other comorbidities as outlined above and high risk for further serious morbidity and other serious sequelae including persistent/recurrent or nonhealing wounds, persistent/progressive or recurrent infection and risk for further functional/limb loss/amputation.  Surgery following to help define timing/option of threshold for surgical intervention including extent of debridement/amputation at their discretion.  High risk for mixed infection in the setting including gram-positive/gram-negative's and aerobic/anaerobic organisms.  Empiric broad-spectrum antimicrobials ongoing until further data    --History MRSA    --Chronic vocal cord paralysis per notes with chronic hoarseness and indwelling PEG tube.  Medicine team to clarify    --History left BKA.    --Diabetes.  You need to tightly control blood sugar to give best chance for healing    --History of DVT with IVC filter    PLAN: Thank you for asking us to see Buster Velasco, I recommend the following:    -- Daptomycin/Zosyn IV    -- Check/review labs cultures and scans    -- Partial history per nursing staff    -- Discussed with microbiology    --d/w Dr Peguero; he is making surgical plans    -- Highly complex set of issues with high risk for further serious morbidity and other serious sequela           Zeke George MD  12/1/2022                Electronically signed by Zeke George MD at 12/01/22 5611     Sherry Hinson PA-C at 12/01/22 0826      Consult Orders    1.  Inpatient Cardiothoracic Surgery Consult [[id number]] ordered by Cecily Molina PA-C at 12/01/22 0033               CTS Consult    Patient Care Team:  Ludivina Bowers MD as PCP - General (Family Medicine)      Reason for Consult:  Right foot wound    HPI  Patient is a 57 y.o. male with a history of paroxysmal atrial fibrillation, type 2 diabetes mellitus, DVT s/p IVC filter, hypertension, hyperlipidemia and previous osteomyelitis s/p left below knee amputation and right transmetatarsal amputations who presented to [facility] ED yesterday with complaints of progressively worsening wounds on right foot.  Patient states that wounds have been present for several months on right lateral foot with malodor over the last week and purulent discharge.  He denies fever, chills, nausea or vomiting.      Review of Systems  All other systems are reviewed and are negative unless otherwise stated in HPI    History  Past Medical History:   Diagnosis Date   • Diabetes (HCC)    • DVT (deep venous thrombosis) (HCC)    • Hypertension    • Mood disorder (HCC)      Past Surgical History:   Procedure Laterality Date   • BELOW KNEE AMPUTATION Left    • TRANS METATARSAL AMPUTATION Right      Family History   Problem Relation Age of Onset   • Diabetes Mother 65   • Stroke Mother 74   • Diabetes Maternal Uncle    • Heart attack Maternal Grandfather      Social History     Tobacco Use   • Smoking status: Never   • Smokeless tobacco: Never   Vaping Use   • Vaping Use: Never used   Substance Use Topics   • Alcohol use: Never     Medications Prior to Admission   Medication Sig Dispense Refill Last Dose   • bumetanide (BUMEX) 0.5 MG tablet Take 0.5 mg by mouth Daily.      • dapagliflozin Propanediol (Farxiga) 10 MG tablet Take 10 mg by mouth Daily.      • ferrous sulfate 325 (65 FE) MG tablet Take 325 mg by mouth Daily With Breakfast.      • fluticasone (FLONASE) 50 MCG/ACT nasal spray 2 sprays into the nostril(s) as directed by provider 2 (Two)  Times a Day As Needed for Rhinitis.      • hydrOXYzine (ATARAX) 25 MG tablet Take 50 mg by mouth At Night As Needed (sleep).      • Insulin Degludec (Tresiba) 100 UNIT/ML solution injection Inject 72 Units under the skin into the appropriate area as directed Daily.      • losartan (COZAAR) 50 MG tablet Take 50 mg by mouth Daily.      • metFORMIN (GLUCOPHAGE) 1000 MG tablet Take 1,000 mg by mouth 2 (Two) Times a Day With Meals.      • methadone (DOLOPHINE) 10 MG tablet Take 10 mg by mouth 4 (Four) Times a Day,      • metoprolol tartrate (LOPRESSOR) 25 MG tablet Take 25 mg by mouth 2 (Two) Times a Day.      • simvastatin (ZOCOR) 40 MG tablet Take 40 mg by mouth Every Night.      • tamsulosin (FLOMAX) 0.4 MG capsule 24 hr capsule Take 1 capsule by mouth Daily.        Allergies:  Gabapentin and Lyrica [pregabalin]    Objective    Vital Signs  Temp:  [98 °F (36.7 °C)-98.6 °F (37 °C)] 98 °F (36.7 °C)  Heart Rate:  [] 89  Resp:  [16-20] 16  BP: (102-112)/(58-76) 112/74    Physical Exam:  General Appearance: alert, in no acute distress  Head: normocephalic, without obvious abnormality and atraumatic  Throat: gums healthy and no oral lesions  Neck: no adenopathy, suppple, trachea midline, no thyromegaly, no carotid bruit and no JVD  Lungs: clear to auscultation, respirations regular, respirations even and respirations unlabored  Heart: regular rhythm & normal rate, normal S1, S2, no murmur, no veronique, no rub and no click  Abdomen: normal bowel sounds, no masses and soft non-tender  Extremities: left BKA  Skin: Right lateral foot and malleolus ulcerations with surrounding erythema and foul odor. Previous transmetatarsal amputation right foot.  Pulses:  Palpable right DP/AT      Data Review:  Results from last 7 days   Lab Units 11/30/22  1905   WBC 10*3/mm3 13.92*   HEMOGLOBIN g/dL 11.1*   HEMATOCRIT % 35.5*   PLATELETS 10*3/mm3 532*     Results from last 7 days   Lab Units 11/30/22  1905   SODIUM mmol/L 134*    POTASSIUM mmol/L 4.1   CHLORIDE mmol/L 100   CO2 mmol/L 21.0*   BUN mg/dL 18   CREATININE mg/dL 1.29*   GLUCOSE mg/dL 54*   CALCIUM mg/dL 9.1     Coagulation: No results found for: INR, APTT  Cardiac markers:     ABGs:       Invalid input(s): PO2      Imaging Results (Last 72 Hours)     Procedure Component Value Units Date/Time    MRI Foot Right Without Contrast [[id number]] Collected: 12/01/22 0444     Updated: 12/01/22 0645    Narrative:      Preliminary report:    There is amputation of the forefoot. There is some mild bone marrow edema near the cut margin of the foot as this could relate to signs of osteomyelitis. Some mild cellulitic changes are also suspected.    XR Chest 1 View [[id number]] Collected: 11/30/22 2215     Updated: 12/01/22 0016    Narrative:      EXAMINATION: Chest one view.    DATE: 11/30/2022.    COMPARISON: None available.    CLINICAL HISTORY: Infection.    FINDINGS:    The lungs and pleural spaces are clear.    The cardiomediastinal silhouette and the pulmonary vessels are within normal limits.      Impression:        No acute cardiopulmonary process.    Electronically signed by:  Pierre Baron D.O.    11/30/2022 10:15 PM Mountain Time            Assessment:        Right foot infection    Hypoglycemia    Anemia    Hyponatremia    Hypoalbuminemia    Essential hypertension    Hyperlipidemia    Paroxysmal atrial fibrillation (HCC)    Sepsis (HCC)      Plan:  Tentative right BKA tomorrow with Dr. Sudhir LOPES after midnight  Comorbidities per hospitalist  Antibiotics per ID      Sherry Hinson PA-C  12/01/22  08:11 EST        Electronically signed by Sherry Hinson PA-C at 12/01/22 0865

## 2022-12-01 NOTE — PROGRESS NOTES
[facility] Medicine Services  PROGRESS NOTE    Patient Name: Buster Velasco  : 1965  MRN: [id number]    Date of Admission: 2022  Primary Care Physician: Ludivina Bowers MD    Subjective   Subjective     CC:  F/u foot infection    HPI:  Complains of pain on buttocks. Otherwise feeling much better than earlier, awaiting surgery    ROS:  Gen- No fevers, chills  CV- No chest pain, palpitations  Resp- No cough, dyspnea  GI- No N/V/D, abd pain     Objective   Objective     Vital Signs:   Temp:  [98 °F (36.7 °C)-98.6 °F (37 °C)] 98.1 °F (36.7 °C)  Heart Rate:  [] 89  Resp:  [16-20] 16  BP: ()/(54-84) 110/84     Physical Exam:  Constitutional: No acute distress, awake, alert  HENT: NCAT, mucous membranes moist  Respiratory: Clear to auscultation bilaterally, respiratory effort normal   Cardiovascular: RRR, no murmurs, rubs, or gallops  Gastrointestinal: Soft, nontender, nondistended  Musculoskeletal: Digit amputation left hand, right foot without toes s/p surgical removal + wound on lateral side w dressing in place. Muscle tone within normal limits  Psychiatric: Appropriate affect, cooperative  Neurologic: Alert and oriented, facial movements symmetric and spontaneous movement of all 4 extremities grossly equal bilaterally, speech clear  Skin: No rashes    Results Reviewed:  LAB RESULTS:      Lab 22  0810 223 22  1905   WBC 7.22  --  13.92*   HEMOGLOBIN 10.0*  --  11.1*   HEMATOCRIT 32.2*  --  35.5*   PLATELETS 413  --  532*   NEUTROS ABS 5.64  --  11.44*   IMMATURE GRANS (ABS) 0.06*  --  0.14*   LYMPHS ABS 1.02  --  1.64   MONOS ABS 0.42  --  0.64   EOS ABS 0.06  --  0.03   MCV 84.3  --  84.5   SED RATE 109*  --   --    CRP 9.10*  --   --    PROCALCITONIN  --   --  0.17   LACTATE  --  2.0 2.1*         Lab 22  0810 22  1905   SODIUM 135* 134*   POTASSIUM 4.2 4.1   CHLORIDE 103 100   CO2 21.0* 21.0*   ANION GAP 11.0 13.0   BUN  18 18   CREATININE 1.24 1.29*   EGFR 67.4 64.3   GLUCOSE 52* 54*   CALCIUM 9.2 9.1   HEMOGLOBIN A1C 6.90*  --          Lab 11/30/22  1905   TOTAL PROTEIN 7.6   ALBUMIN 3.00*   GLOBULIN 4.6   ALT (SGPT) 12   AST (SGOT) 19   BILIRUBIN 0.2   ALK PHOS 85   LIPASE 14                 Lab 12/01/22  1035 11/30/22  1905   IRON  --  18*   IRON SATURATION  --  6*   TIBC  --  277*   TRANSFERRIN  --  186*   FERRITIN  --  317.40   FOLATE  --  15.90   VITAMIN B 12  --  717   ABO TYPING O O   RH TYPING Positive Positive   ANTIBODY SCREEN Negative  --          Brief Urine Lab Results  (Last result in the past 365 days)      Color   Clarity   Blood   Leuk Est   Nitrite   Protein   CREAT   Urine HCG        08/20/22 0108 Yellow   Clear   Negative   Negative     Negative                 Microbiology Results Abnormal     None          XR Chest 1 View    Result Date: 12/1/2022  EXAMINATION: Chest one view. DATE: 11/30/2022. COMPARISON: None available. CLINICAL HISTORY: Infection. FINDINGS: The lungs and pleural spaces are clear. The cardiomediastinal silhouette and the pulmonary vessels are within normal limits.     Impression: No acute cardiopulmonary process. Electronically signed by:  Pierre Baron D.O.  11/30/2022 10:15 PM Mountain Time    MRI Foot Right Without Contrast    Result Date: 12/1/2022  EXAMINATION: MRI FOOT RIGHT WO CONTRAST-  INDICATION: Right foot wound. Osteomyelitis?; L08.9-Local infection of the skin and subcutaneous tissue, unspecified; E16.2-Hypoglycemia, unspecified; L89.159-Pressure ulcer of sacral region, unspecified stage  TECHNIQUE: Multiplanar, multisequence MR imaging of the right foot without IV contrast.  COMPARISON: Photographs of right foot wounds available in the electronic medical record dated 11/30/2022  FINDINGS:  The exam is somewhat limited owing to some motion degradation of a few sequences.  There is evidence of prior transmetatarsal amputation.  There is a large deep wound/ulcer at the lateral  aspect of the stump without evidence of discrete/drainable fluid collection. There is tenosynovitis at the master knot of [place] which may be reactive or infectious. There is abnormal T2/STIR hyperintense, T1 hypointense marrow signal change of the remnant fifth metatarsal base with likely erosion, compatible with osteomyelitis (short axis image 17, sagittal image 6). There is some patchy T2/STIR hyperintense marrow signal within the adjacent third and fourth metatarsal base remnants and distal cuboid, without definite loss of the normal T1 hyperintense marrow signal, likely reactive marrow edema although contiguous early osteomyelitis would be difficult to exclude.  Additional wound at the lateral ankle, without evidence of discrete/drainable fluid collection. There is abnormal T2/STIR hyperintense marrow signal and T1 hypointensity of the underlying lateral malleolus (long axis image 5, short axis image 27).      Impression:  Deep ulceration at the lateral stump with osteomyelitis of the underlying remnant fifth metatarsal base.  Wound at the lateral ankle with osteomyelitis of the underlying lateral malleolus.  This report was finalized on 12/1/2022 1:33 PM by Min Tom MD.            I have reviewed the medications:  Scheduled Meds:atorvastatin, 20 mg, Per PEG Tube, Nightly  DAPTOmycin, 6 mg/kg, Intravenous, Q24H  dextrose, 25 g, Intravenous, Once  heparin (porcine), 5,000 Units, Subcutaneous, Q8H  insulin lispro, 0-9 Units, Subcutaneous, TID AC  piperacillin-tazobactam, 3.375 g, Intravenous, Q8H  sodium chloride, 10 mL, Intravenous, Q12H  sodium chloride, 10 mL, Intravenous, Q12H      Continuous Infusions:dextrose 5 % and lactated Ringer's, 100 mL/hr, Last Rate: 100 mL/hr (12/01/22 1247)  Pharmacy Consult - Pharmacy to dose,       PRN Meds:.•  acetaminophen  •  dextrose  •  dextrose  •  glucagon (human recombinant)  •  hydrOXYzine  •  melatonin  •  ondansetron **OR** ondansetron  •  oxyCODONE  •  Pharmacy  Consult - Pharmacy to dose  •  sodium chloride  •  sodium chloride  •  sodium chloride  •  sodium chloride    Assessment & Plan   Assessment & Plan     Active Hospital Problems    Diagnosis  POA   • **Right foot infection [L08.9]  Yes   • Sepsis (HCC) [A41.9]  Yes   • Hypoglycemia [E16.2]  Yes   • Anemia [D64.9]  Yes   • Hyponatremia [E87.1]  Yes   • Hypoalbuminemia [E88.09]  Yes   • Essential hypertension [I10]  Yes   • Hyperlipidemia [E78.5]  Yes   • Paroxysmal atrial fibrillation (HCC) [I48.0]  Yes      Resolved Hospital Problems   No resolved problems to display.        Brief Hospital Course to date:  Buster Velasco is a 57 y.o. male with a history of prior osteomyelitis s/p left BKA, s/p right transmetatarsal amputation, left hand 3rd metacarpal resection, paroxysmal Afib not on anticoagulation and DVT s/p IVC filter who presents w right foot wounds. Surgical removal 12/2 w Dr Peguero    1) Sepsis 2/2 acute right foot non-healing wound and likely osteomyelitis  -right BKA 12/2 w Dr Peguero, IV abx per ID    2) ITD of lower gluteal cleft - wound care following    3) Hypoglycemia in setting of DMII - high dose Tresiba qAM before arrival. On D5 maintenance fluids w resolution. SSI alone for now    Chronic vocal cord paralysis w PEG tube in place - SLP evaluation pending  HTN - hold home meds, hypotensive  HLD - statin  Paroxysmal Afib - per chart EKG's in 7-8/2022, unclear why not on anticoagulation. Metoprolol w hold parameters  DVT s/p IVC filter  H/o seizure in setting of meth use - not on AED's given meth trigger  BPH - tamsulosin    Expected Discharge Location and Transportation: home  Expected Discharge Date: 12/5 (Discharge date is tentative pending patient's medical condition and is subject to change)    DVT prophylaxis:  Medical DVT prophylaxis orders are present.          CODE STATUS:   Code Status and Medical Interventions:   Ordered at: 12/01/22 0035     Code Status (Patient has no pulse and is  not breathing):    CPR (Attempt to Resuscitate)     Medical Interventions (Patient has pulse or is breathing):    Full Support       Ernestina Chin MD  12/01/22

## 2022-12-01 NOTE — CONSULTS
Buster Velasco  1965  [id number]    Date of Consult: 12/1/2022    Admit Date:  11/30/2022      Requesting Provider: No ref. provider found  Evaluating Physician: Zeke George MD    Chief Complaint:  Right LE infection    Reason for Consultation: rigth foot infection    History of present illness:    Patient is a 57 y.o.  Yr old male with history of prior lower extremity infection/amputations done in [facility].  He has a history of left BKA years ago.  More recent right transmetatarsal amputation but specific dates unclear and unclear who the surgeon was.  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  He has history DVT/IVC filter, diabetes and other comorbidities as below.  He reports a chronic right lateral foot wound present for months but apparently not precipitated by any specific event or trauma.  He is admitted to the hospital on November 30 with deterioration at the foot including redness/swelling    He has pain to the right foot which is dull at present, constant, nonradiating, worse with palpation, generally better with pain meds and 3 out of 10 in severity.  Not progressive    He denies any other specific exposure.  No specific blunt force or penetrating trauma.  No animal insect or arthropod bite.  No fresh/brackish/salt water exposure.  No prior history of VRE C. difficile or ESBL/K PC/CRE organisms    No headache photophobia or neck stiffness.  No shortness of breath cough or hemoptysis.  No nausea vomiting diarrhea or abdominal pain.  No dysuria hematuria or pyuria.  No other new skin rash    Past Medical History:   Diagnosis Date   • Diabetes (HCC)    • DVT (deep venous thrombosis) (HCC)    • Hypertension    • Mood disorder (HCC)        Past Surgical History:    Procedure Laterality Date   • BELOW KNEE AMPUTATION Left    • TRANS METATARSAL AMPUTATION Right        Pediatric History   Patient Parents   • Not on file     Other Topics Concern   • Not on file   Social History Narrative   • Not on file       family history includes Diabetes in his maternal uncle; Diabetes (age of onset: 65) in his mother; Heart attack in his maternal grandfather; Stroke (age of onset: 74) in his mother.    Allergies   Allergen Reactions   • Gabapentin Rash   • Lyrica [Pregabalin] Rash       Medication:  Current Facility-Administered Medications   Medication Dose Route Frequency Provider Last Rate Last Admin   • acetaminophen (TYLENOL) tablet 650 mg  650 mg Per PEG Tube Q4H PRN Ron Calles, [facility]       • atorvastatin (LIPITOR) tablet 20 mg  20 mg Per PEG Tube Nightly Ron Calles, RP       • DAPTOmycin (CUBICIN) 600 mg in sodium chloride 0.9 % 50 mL IVPB  6 mg/kg Intravenous Q24H Zeke George MD       • dextrose (D50W) (25 g/50 mL) IV injection 25 g  25 g Intravenous Once Reggie Batres MD       • dextrose (D50W) (25 g/50 mL) IV injection 25 g  25 g Intravenous Q15 Min PRN Cecily Molina PA-C   25 g at 12/01/22 0604   • dextrose (GLUTOSE) oral gel 15 g  15 g Per PEG Tube Q15 Min PRN Ron Calles RPH       • dextrose 10 % infusion  50 mL/hr Intravenous Continuous Ernestina Chin MD       • glucagon (human recombinant) (GLUCAGEN DIAGNOSTIC) injection 1 mg  1 mg Intramuscular Q15 Min PRN Cecily Molina PA-C       • heparin (porcine) 5000 UNIT/ML injection 5,000 Units  5,000 Units Subcutaneous Q8H Cecily Molina PA-C   5,000 Units at 12/01/22 0604   • hydrOXYzine (ATARAX) tablet 50 mg  50 mg Per PEG Tube Nightly PRN Ron Calles RPH       • Insulin Lispro (humaLOG) injection 0-9 Units  0-9 Units Subcutaneous TID AC Cecily Molina PA-C       • melatonin tablet 5 mg  5 mg Per PEG Tube Nightly PRN Ron Calles, [facility]       • ondansetron (ZOFRAN)  tablet 4 mg  4 mg Per PEG Tube Q6H PRN Ron Calles, RPILEANA        Or   • ondansetron (ZOFRAN) injection 4 mg  4 mg Intravenous Q6H PRN Ron Calles, RPH       • Pharmacy Consult - Pharmacy to dose   Does not apply Continuous PRN Cecily Molina PA-C       • piperacillin-tazobactam (ZOSYN) 3.375 g in iso-osmotic dextrose 50 ml (premix)  3.375 g Intravenous Q8H Saurav Frederick, PharmD   3.375 g at 12/01/22 0604   • sodium chloride 0.9 % flush 10 mL  10 mL Intravenous PRN Reggie Batres MD       • sodium chloride 0.9 % flush 10 mL  10 mL Intravenous Q12H Cecily Molina PA-C   10 mL at 12/01/22 0117   • sodium chloride 0.9 % flush 10 mL  10 mL Intravenous PRN Cecily Molina PA-C       • sodium chloride 0.9 % infusion 40 mL  40 mL Intravenous PRN Cecily Molina PA-C           Antibiotics:  Anti-Infectives (From admission, onward)    Ordered     Dose/Rate Route Frequency Start Stop    12/01/22 0829  DAPTOmycin (CUBICIN) 600 mg in sodium chloride 0.9 % 50 mL IVPB        Ordering Provider: Zeke George MD    6 mg/kg × 100 kg  100 mL/hr over 30 Minutes Intravenous Every 24 Hours 12/01/22 0915 12/15/22 0914    12/01/22 0055  piperacillin-tazobactam (ZOSYN) 3.375 g in iso-osmotic dextrose 50 ml (premix)        Ordering Provider: Saurav Frederick, PharmD    3.375 g  over 4 Hours Intravenous Every 8 Hours 12/01/22 0600 12/06/22 0559    11/30/22 2219  vancomycin 2000 mg/500 mL 0.9% NS IVPB ([facility])        Ordering Provider: Reggie Batres MD    20 mg/kg × 98.9 kg  over 2 Hours Intravenous Once 11/30/22 2221 12/01/22 0315    11/30/22 2219  piperacillin-tazobactam (ZOSYN) 3.375 g in iso-osmotic dextrose 50 ml (premix)        Ordering Provider: Reggie Batres MD    3.375 g Intravenous Once 11/30/22 2221 11/30/22 3492            Review of Systems    Constitutional-- No Fever, chills or sweats.  Appetite good, and no malaise. No fatigue.  Heent--chronic hoarse voice.  No new vision,  "hearing or throat complaints.  No epistaxis or oral sores.   No flashers, floaters or eye pain.   No headache, photophobia or neck stiffness.  Chronic PEG tube  CV-- No chest pain, palpitation or syncope  Resp-- No SOB/cough/Hemoptysis  GI- No nausea, vomiting, or diarrhea.  No hematochezia, melena, or hematemesis. Denies jaundice or chronic liver disease.  -- No dysuria, hematuria, or flank pain.  Denies hesitancy, urgency or flank pain.  Lymph- no swollen lymph nodes in neck/axilla or groin.   Heme- No active bruising or bleeding; no Hx of DVT or PE.  MS-- no swelling or pain in the bones or joints of arms/legs.  No new back pain.  Neuro-- No acute focal weakness or numbness in the arms or legs.  No seizures.    Full 12 point review of systems reviewed and negative otherwise for acute complaints, except for above    Physical Exam:   Vital Signs   BP 93/61 (BP Location: Right arm, Patient Position: Lying)   Pulse 86   Temp 98 °F (36.7 °C) (Oral)   Resp 16   Ht 193 cm (76\")   Wt 100 kg (221 lb)   SpO2 100%   BMI 26.90 kg/m²     GENERAL: Awake and alert, in no acute distress.  Hoarse voice noted  HEENT: Normocephalic, atraumatic.  PERRL. EOMI. No conjunctival injection. No icterus. Oropharynx   without evidence of thrush or exudate. No evidence of peridontal disease.    NECK: Supple without nuchal rigidity. No mass.  LYMPH: No cervical, axillary or inguinal lymphadenopathy.  HEART: RRR; No murmur, rubs, gallops.   LUNGS: Diminished at bases but otherwise clear to auscultation bilaterally without wheezing, rales, rhonchi. Normal respiratory effort. Nonlabored. No dullness.  ABDOMEN: Soft, nontender, nondistended. Positive bowel sounds. No rebound or guarding. NO mass or HSM.  PEG tube noted  EXT:  No cyanosis, clubbing or edema. No cord.  : Genitalia generally unremarkable.  Without Orantes catheter.  MSK: FROM without joint effusions noted arms/legs.    SKIN: Warm and dry without cutaneous eruptions on " Inspection/palpation.    NEURO: Oriented to PPT. No focal deficits on motor/sensory exam at arms/legs.  PSYCHIATRIC: Normal insight and judgement. Cooperative with PE    Right lower extremity with open wound lateral aspect near his fifth metatarsal, probable probe to bone and vague surrounding erythema but no discrete mass bulge or fluctuance.  No crepitus or bulla.  Right lateral ankle with open wound as well, unable to determine depth but likely through dermis and vague surrounding redness but no discrete mass bulge or fluctuance.  No crepitus or bulla.    No peripheral stigmata/phenomenon of endocarditis    IV without obvious redness or drainage    Left BKA site with no redness induration or warmth.  No discrete mass bulge or fluctuance.    Laboratory Data    Results from last 7 days   Lab Units 11/30/22  1905   WBC 10*3/mm3 13.92*   HEMOGLOBIN g/dL 11.1*   HEMATOCRIT % 35.5*   PLATELETS 10*3/mm3 532*     Results from last 7 days   Lab Units 11/30/22  1905   SODIUM mmol/L 134*   POTASSIUM mmol/L 4.1   CHLORIDE mmol/L 100   CO2 mmol/L 21.0*   BUN mg/dL 18   CREATININE mg/dL 1.29*   GLUCOSE mg/dL 54*   CALCIUM mg/dL 9.1     Results from last 7 days   Lab Units 11/30/22  1905   ALK PHOS U/L 85   BILIRUBIN mg/dL 0.2   ALT (SGPT) U/L 12   AST (SGOT) U/L 19               Estimated Creatinine Clearance: 88.3 mL/min (A) (by C-G formula based on SCr of 1.29 mg/dL (H)).      Microbiology:      Radiology:  Imaging Results (Last 72 Hours)     Procedure Component Value Units Date/Time    MRI Foot Right Without Contrast [[id number]] Collected: 12/01/22 0444     Updated: 12/01/22 0645    Narrative:      Preliminary report:    There is amputation of the forefoot. There is some mild bone marrow edema near the cut margin of the foot as this could relate to signs of osteomyelitis. Some mild cellulitic changes are also suspected.    XR Chest 1 View [[id number]] Collected: 11/30/22 2215     Updated: 12/01/22 0016    Narrative:       EXAMINATION: Chest one view.    DATE: 11/30/2022.    COMPARISON: None available.    CLINICAL HISTORY: Infection.    FINDINGS:    The lungs and pleural spaces are clear.    The cardiomediastinal silhouette and the pulmonary vessels are within normal limits.      Impression:        No acute cardiopulmonary process.    Electronically signed by:  Pierre Baron D.O.    11/30/2022 10:15 PM Mountain Time            Impression:     --Acute right foot/ankle with multifocal ulceration, cellulitis/wound infection and probable osteomyelitis with probe to bone at the lateral foot.  Other comorbidities as outlined above and high risk for further serious morbidity and other serious sequelae including persistent/recurrent or nonhealing wounds, persistent/progressive or recurrent infection and risk for further functional/limb loss/amputation.  Surgery following to help define timing/option of threshold for surgical intervention including extent of debridement/amputation at their discretion.  High risk for mixed infection in the setting including gram-positive/gram-negative's and aerobic/anaerobic organisms.  Empiric broad-spectrum antimicrobials ongoing until further data    --History MRSA    --Chronic vocal cord paralysis per notes with chronic hoarseness and indwelling PEG tube.  Medicine team to clarify    --History left BKA.    --Diabetes.  You need to tightly control blood sugar to give best chance for healing    --History of DVT with IVC filter    PLAN: Thank you for asking us to see Buster Velasco, I recommend the following:    -- Daptomycin/Zosyn IV    -- Check/review labs cultures and scans    -- Partial history per nursing staff    -- Discussed with microbiology    --d/w Dr Peguero; he is making surgical plans    -- Highly complex set of issues with high risk for further serious morbidity and other serious sequela           Zeke George MD  12/1/2022

## 2022-12-01 NOTE — CASE MANAGEMENT/SOCIAL WORK
Discharge Planning Assessment  [facility]     Patient Name: Buster Velasco  MRN: [id number]  Today's Date: 12/1/2022    Admit Date: 11/30/2022    Plan: Home   Discharge Needs Assessment     Row Name 12/01/22 1536       Living Environment    People in Home spouse    Current Living Arrangements home    Primary Care Provided by self    Provides Primary Care For no one    Family Caregiver if Needed spouse    Able to Return to Prior Arrangements yes       Transition Planning    Patient/Family Anticipates Transition to home    Transportation Anticipated family or friend will provide       Discharge Needs Assessment    Equipment Currently Used at Home wheelchair;prosthesis;slide board;cane, straight;crutches;shower chair;commode    Current Discharge Risk physical impairment               Discharge Plan     Row Name 12/01/22 1537       Plan    Plan Home    Patient/Family in Agreement with Plan yes    Plan Comments I met with Mr. Velasco at the bedside. He lives with his wife in [facility]. He is independent with mobility and activities of daily living. He drives himself when leaving the home and is not current with home or outpatient services (he has used [facility] in the past). He has a wheelchair, slide board, straight cane, crutches, prothesis, shower chair, and bedside commode. He denies any difficulty in obtaining or affording his medications. He anticipates going home at the time of discharge and states that his wife or brother will be able to transport him home at that time. Case management will continue to follow.    Final Discharge Disposition Code 01 - home or self-care              Continued Care and Services - Admitted Since 11/30/2022    Coordination has not been started for this encounter.       Expected Discharge Date and Time     Expected Discharge Date Expected Discharge Time    Dec 2, 2022          Demographic Summary     Row Name 12/01/22 1535       General Information    General  Information Comments Confirmed PCP to be sulema Bowers and [facility] Medicaid to be insurer.               Functional Status     Row Name 12/01/22 1535       Functional Status, IADL    Medications independent    Meal Preparation independent    Housekeeping independent    Laundry independent    Shopping independent       Employment/    Employment Status disabled               Psychosocial    No documentation.                Abuse/Neglect    No documentation.                Legal    No documentation.                Substance Abuse    No documentation.                Patient Forms    No documentation.                   Alan Downing RN

## 2022-12-01 NOTE — THERAPY EVALUATION
Acute Care - Speech Language Pathology   Swallow Initial Evaluation [facility]   Clinical Swallow Evaluation     Patient Name: Buster Velasco  : 1965  MRN: [id number]  Today's Date: 2022               Admit Date: 2022    Visit Dx:     ICD-10-CM ICD-9-CM   1. Right foot infection  L08.9 686.9   2. Hypoglycemia  E16.2 251.2   3. Decubitus ulcer of coccygeal region, unspecified ulcer stage  L89.159 707.03     707.20     Patient Active Problem List   Diagnosis   • Right foot infection   • Hypoglycemia   • Anemia   • Hyponatremia   • Hypoalbuminemia   • Essential hypertension   • Hyperlipidemia   • Paroxysmal atrial fibrillation (HCC)   • Sepsis (HCC)     Past Medical History:   Diagnosis Date   • Diabetes (HCC)    • DVT (deep venous thrombosis) (HCC)    • Hypertension    • Mood disorder (HCC)      Past Surgical History:   Procedure Laterality Date   • BELOW KNEE AMPUTATION Left    • TRANS METATARSAL AMPUTATION Right        SLP Recommendation and Plan  SLP Swallowing Diagnosis: suspected pharyngeal dysphagia (22 102)  SLP Diet Recommendation: other (see comments) (diet per MD discretion until MBS - pt reported was rec'd thickened liquids after last swallow study but has been drinking thin @ home) (22 102)     SLP Rec. for Method of Medication Administration: meds whole, meds crushed, with puree, as tolerated (22 102)     Monitor for Signs of Aspiration: notify SLP if any concerns (22 102)  Recommended Diagn[place] [place] (22 102)  Swallow Criteria for Skilled Therapeutic Interventions Met: demonstrates skilled criteria (22 102)  Anticipated Discharge Disposition (SLP): unknown, anticipate therapy at next level of care (22 102)  Rehab Potential/Prognosis, Swallowing: good, to achieve stated therapy goals (22 102)     Predicted Duration Therapy Intervention (Days): until discharge (22)                                         Plan of Care Reviewed With: patient      SWALLOW EVALUATION (last 72 hours)     SLP Adult Swallow Evaluation     Row Name 12/01/22 1025                   Rehab Evaluation    Document Type evaluation  -MP        Subjective Information no complaints  -MP        Patient Observations alert;cooperative  -MP        Patient/Family/Caregiver Comments/Observations No family present  -MP        Patient Effort good  -MP           General Information    Patient Profile Reviewed yes  -MP        Pertinent History Of Current Problem Adm 11/30 w/ sepsis, R foot ulceration w/ cellulitis. Hx ? chronic vocal cord paralysis & dysphagia s/p PEG. Pt reports following surgery ~March, was intubated & had post-extubation dysphagia. Was rec'd thickened liquids during last swallow study, but has been drinking thin @ home. CXR w/ no acute process. Scheduled for BKA tomorrow. 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  -MP        Current Method of Nutrition regular textures;thin liquids  -MP        Precautions/Limitations, Vision WFL  -MP        Precautions/Limitations, Hearing WFL  -MP        Prior Level of Function-Communication WFL  -MP        Prior Level of Function-Swallowing other (see comments)  see hx above  -MP        Plans/Goals Discussed with patient  -MP        Barriers to Rehab previous functional deficit  -MP        Patient's Goals for Discharge patient did not state  -MP           Pain    Additional Documentation Pain Scale: FACES Pre/Post-Treatment (Group)  -MP           Pain Scale: FACES Pre/Post-Treatment    Pain: FACES Scale, Pretreatment 0-->no hurt  -MP        Posttreatment Pain Rating 0-->no hurt  -MP           Oral Motor Structure and Function    Dentition Assessment natural, present and adequate  -MP        Secretion Management WNL/WFL  -MP        Mucosal Quality moist, healthy  -MP           Oral Musculature and  Cranial Nerve Assessment    Oral Motor General Assessment vocal impairment  -MP        Vocal Impairment, Detail. Cranial Nerve X (Vagus) vocal quality abnormality (see comments)  -MP        Oral Motor, Comment Hoarse VQ - pt reported was almost back to baseline in re: voice, but was yelling a few days ago & now worse than usual  -MP           General Eating/Swallowing Observations    Respiratory Support Currently in Use room air  -MP        Eating/Swallowing Skills self-fed  -MP        Positioning During Eating upright in bed  -MP        Utensils Used spoon;cup;straw  -MP        Consistencies Trialed thin liquids;pureed;regular textures  -           Clinical Swallow Eval    Pharyngeal Phase suspected pharyngeal impairment  -        Clinical Swallow Evaluation Summary Throat clear/cough w/ thin liquids. No s/sxs w/ puree/solid. Pt in agrmt to complete MBS today to determine current swallow function, especially in setting of wanting PEG removed.  -           Pharyngeal Phase Concerns    Pharyngeal Phase Concerns throat clear;cough  -MP        Cough thin  -MP        Throat Clear thin  -MP           SLP Evaluation Clinical Impression    SLP Swallowing Diagnosis suspected pharyngeal dysphagia  -        Functional Impact risk of aspiration/pneumonia  -        Rehab Potential/Prognosis, Swallowing good, to achieve stated therapy goals  -        Swallow Criteria for Skilled Therapeutic Interventions Met demonstrates skilled criteria  -           Recommendations    Predicted Duration Therapy Intervention (Days) until discharge  -        SLP Diet Recommendation other (see comments)  diet per MD discretion until MBS - pt reported was rec'd thickened liquids after last swallow study but has been drinking thin @ home  -        Recommended Diagnostics [facility] ([facility] [place])  -        Oral Care Recommendations Oral Care BID/PRN;Suction toothbrush  -        SLP Rec. for Method of Medication Administration meds whole;meds  crushed;with puree;as tolerated  -MP        Monitor for Signs of Aspiration notify SLP if any concerns  -MP        Anticipated Discharge Disposition (SLP) unknown;anticipate therapy at next level of care  -MP              User Key  (r) = Recorded By, (t) = Taken By, (c) = Cosigned By    Initials Name Effective Dates    Jordan Morales MS CCC-SLP 12/28/21 -                 EDUCATION  The patient has been educated in the following areas:   Dysphagia (Swallowing Impairment).              Time Calculation:    Time Calculation- SLP     Row Name 12/01/22 1104             Time Calculation- SLP    SLP Start Time 1030  -MP      SLP Received On 12/01/22  -MP         Untimed Charges    86145-OF Eval Oral Pharyng Swallow Minutes 40  -MP         Total Minutes    Untimed Charges Total Minutes 40  -MP       Total Minutes 40  -MP            User Key  (r) = Recorded By, (t) = Taken By, (c) = Cosigned By    Initials Name Provider Type    Jordan Morales MS CCC-SLP Speech and Language Pathologist                Therapy Charges for Today     Code Description Service Date Service Provider Modifiers Qty    [id number] HC ST EVAL ORAL PHARYNG SWALLOW 3 12/1/2022 Jordan Crystal MS CCC-SLP GN 1               MS ALIA Barnes  12/1/2022   3

## 2022-12-01 NOTE — PLAN OF CARE
Goal Outcome Evaluation:  Plan of Care Reviewed With: patient            SLP evaluation completed. Will  plan for MBS to further assess current swallow function . Please see note for further details and recommendations.

## 2022-12-01 NOTE — NURSING NOTE
"Patient presents with ITD in the lower gluteal cleft near coccyx and buttocks.  All of area blanches there is yellow fibrinous material with pink epithelial islets again blanches.  Roughly about 1 cm x 0.5 cm x 0.01 cm.  There is another open area closer to the Raymundo rectum on the left buttock as well.  All of buttocks areas was reddened with Z guard in place.  Patient noted to be on a urine saturated shock so this tells Woc that this is moisture related.    Patient has irritant contact dermatitis related to frequent exposure to urine.    The therapy for this is to cleanse with pH no rinse foam and blue wipes and cover with Z guard.  Z guard was noted to not be sticking out well so calycine lotion was left in the room this was explained to nurse nurse knows this cream at is is is the pink paste \"\" and that this was left in room nursing notified to contact Woc if more is needed.    Woc also consulted for right foot but    Apparently from notes right foot is being considered for right BKA with Dr. Rivas in the morning.  We will sign off on that.    Pressure injury care and intervention interventions to be done by nursing incontinence associated dermatitis to be taken care of by nursing.  Woc will sign off.    Woc will order specialty mattress due to upcoming right BKA surgery.  "

## 2022-12-01 NOTE — PROGRESS NOTES
"Pharmacy Consult-Vancomycin Dosing  Buster Velasco is a  57 y.o. male receiving vancomycin therapy.     Indication: sepsis  Consulting Provider: hospitalist  ID Consult:     Goal AUC: 400 - 600 mg/L*hr    Current Antimicrobial Therapy  Anti-Infectives (From admission, onward)      Ordered     Dose/Rate Route Frequency Start Stop    12/01/22 0250  vancomycin (VANCOCIN) 1000 mg/200 mL dextrose 5% IVPB        Ordering Provider: Saurav Frederick, PharmD    1,000 mg  over 60 Minutes Intravenous Every 12 Hours 12/01/22 1800 12/06/22 1759    12/01/22 0055  piperacillin-tazobactam (ZOSYN) 3.375 g in iso-osmotic dextrose 50 ml (premix)        Ordering Provider: Saurav Frederick, PharmD    3.375 g  over 4 Hours Intravenous Every 8 Hours 12/01/22 0600 12/06/22 0559    11/30/22 2219  vancomycin 2000 mg/500 mL 0.9% NS IVPB ([facility])        Ordering Provider: Reggie Batres MD    20 mg/kg × 98.9 kg  over 2 Hours Intravenous Once 11/30/22 2221 11/30/22 2219  piperacillin-tazobactam (ZOSYN) 3.375 g in iso-osmotic dextrose 50 ml (premix)        Ordering Provider: Reggie Batres MD    3.375 g Intravenous Once 11/30/22 2221 11/30/22 2335            Allergies  Allergies as of 11/30/2022 - Reviewed 11/30/2022   Allergen Reaction Noted    Gabapentin Rash 11/30/2022    Lyrica [pregabalin] Rash 11/30/2022       Labs    Results from last 7 days   Lab Units 11/30/22  1905   BUN mg/dL 18   CREATININE mg/dL 1.29*       Results from last 7 days   Lab Units 11/30/22  1905   WBC 10*3/mm3 13.92*       Evaluation of Dosing     Last Dose Received in the ED/Outside Facility: 2000mg  Is Patient on Dialysis or Renal Replacement: no    Ht - 193 cm (76\")  Wt - 100 kg (221 lb)    Estimated Creatinine Clearance: 88.3 mL/min (A) (by C-G formula based on SCr of 1.29 mg/dL (H)).    Intake & Output (last 3 days)         11/28 0701  11/29 0700 11/29 0701 11/30 0700 11/30 0701 12/01 0700    Urine (mL/kg/hr)   750    Total Output   750    Net   " -750                   Microbiology and Radiology  Microbiology Results (last 10 days)       ** No results found for the last 240 hours. **            Reported Vancomycin Levels                         [facility] AUC Calculation:    Current AUC:   0 mg/L*hr    Predicted Steady State AUC :   559 mg/L*hr    Assessment/Plan: The patient will be started on a bolus of 20mg/kg for a dose of 2000mg . Will initiate maintenance dose at 1000 mg IV every 12 hours.  Plan for trough as patient approaches steady state, prior to the 3rd dose.  Due to infection severity, will target an AUC of 400-600.      Pharmacy will continue to follow the patient’s culture results and clinical progress daily.    Saurav Frederick, LeticiaD

## 2022-12-01 NOTE — MBS/VFSS/FEES
Acute Care - Speech Language Pathology   Swallow Initial Evaluation  Kirstie   Modified Barium Swallow Study (MBS)     Patient Name: Buster Velasco  : 1965  MRN: [id number]  Today's Date: 2022               Admit Date: 2022    Visit Dx:     ICD-10-CM ICD-9-CM   1. Right foot infection  L08.9 686.9   2. Hypoglycemia  E16.2 251.2   3. Decubitus ulcer of coccygeal region, unspecified ulcer stage  L89.159 707.03     707.20   4. Dysphagia, unspecified type  R13.10 787.20     Patient Active Problem List   Diagnosis   • Right foot infection   • Hypoglycemia   • Anemia   • Hyponatremia   • Hypoalbuminemia   • Essential hypertension   • Hyperlipidemia   • Paroxysmal atrial fibrillation (HCC)   • Sepsis (HCC)     Past Medical History:   Diagnosis Date   • Diabetes (HCC)    • DVT (deep venous thrombosis) (HCC)    • Hypertension    • Mood disorder (HCC)      Past Surgical History:   Procedure Laterality Date   • BELOW KNEE AMPUTATION Left    • TRANS METATARSAL AMPUTATION Right        SLP Recommendation and Plan  SLP Swallowing Diagnosis: mild-moderate, pharyngeal dysphagia (22 1600)  SLP Diet Recommendation: regular textures, no mixed consistencies, nectar thick liquids, ice chips between meals after oral care, with supervision (22)  Recommended Precautions and Strategies: no straw, upright posture during/after eating, small bites of food and sips of liquid, general aspiration precautions, other (see comments) (tsp sized sips nectar-thick) (22 1600)  SLP Rec. for Method of Medication Administration: meds whole, meds crushed, with puree, as tolerated (22 1600)     Monitor for Signs of Aspiration: notify SLP if any concerns (22)  Recommended Diagnostics: VFSS (MBS) (22 1025)  Swallow Criteria for Skilled Therapeutic Interventions Met: demonstrates skilled criteria (22)  Anticipated Discharge Disposition (SLP): unknown, anticipate therapy at  next level of care (12/01/22 1600)  Rehab Potential/Prognosis, Swallowing: good, to achieve stated therapy goals (12/01/22 1600)  Therapy Frequency (Swallow): 5 days per week (12/01/22 1600)  Predicted Duration Therapy Intervention (Days): until discharge (12/01/22 1600)                                        Plan of Care Reviewed With: patient      SWALLOW EVALUATION (last 72 hours)     SLP Adult Swallow Evaluation     Row Name 12/01/22 1600 12/01/22 1023                Rehab Evaluation    Document Type evaluation  -MP evaluation  -MP       Subjective Information no complaints  -MP no complaints  -MP       Patient Observations alert;cooperative  -MP alert;cooperative  -MP       Patient/Family/Caregiver Comments/Observations No family present  -MP No family present  -MP       Patient Effort good  -MP good  -MP          General Information    Patient Profile Reviewed yes  -MP yes  -MP       Pertinent History Of Current Problem See AM eval  -MP Adm 11/30 w/ sepsis, R foot ulceration w/ cellulitis. Hx ? chronic vocal cord paralysis & dysphagia s/p PEG. Pt reports following surgery ~March, was intubated & had post-extubation dysphagia. Was rec'd thickened liquids during last swallow study, but has been drinking thin @ home. CXR w/ no acute process. Scheduled for BKA tomorrow. 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  -MP       Current Method of Nutrition regular textures;thin liquids  -MP regular textures;thin liquids  -MP       Precautions/Limitations, Vision -- WFL  -MP       Precautions/Limitations, Hearing -- WFL  -MP       Prior Level of Function-Communication -- WFL  -MP       Prior Level of Function-Swallowing -- other (see comments)  see hx above  -MP       Plans/Goals Discussed with -- patient  -MP       Barriers to Rehab -- previous functional deficit  -MP       Patient's Goals for Discharge -- patient did  not state  -MP          Pain    Additional Documentation Pain Scale: FACES Pre/Post-Treatment (Group)  -MP Pain Scale: FACES Pre/Post-Treatment (Group)  -MP          Pain Scale: FACES Pre/Post-Treatment    Pain: FACES Scale, Pretreatment 0-->no hurt  -MP 0-->no hurt  -MP       Posttreatment Pain Rating 0-->no hurt  -MP 0-->no hurt  -MP          Oral Motor Structure and Function    Dentition Assessment -- natural, present and adequate  -MP       Secretion Management -- WNL/WFL  -MP       Mucosal Quality -- moist, healthy  -MP          Oral Musculature and Cranial Nerve Assessment    Oral Motor General Assessment -- vocal impairment  -MP       Vocal Impairment, Detail. Cranial Nerve X (Vagus) -- vocal quality abnormality (see comments)  -MP       Oral Motor, Comment -- Hoarse VQ - pt reported was almost back to baseline in re: voice, but was yelling a few days ago & now worse than usual  -MP          General Eating/Swallowing Observations    Respiratory Support Currently in Use -- room air  -MP       Eating/Swallowing Skills -- self-fed  -MP       Positioning During Eating -- upright in bed  -MP       Utensils Used -- spoon;cup;straw  -MP       Consistencies Trialed -- thin liquids;pureed;regular textures  -MP          Clinical Swallow Eval    Pharyngeal Phase -- suspected pharyngeal impairment  -MP       Clinical Swallow Evaluation Summary -- Throat clear/cough w/ thin liquids. No s/sxs w/ puree/solid. Pt in agrmt to complete MBS today to determine current swallow function, especially in setting of wanting PEG removed.  -MP          Pharyngeal Phase Concerns    Pharyngeal Phase Concerns -- throat clear;cough  -MP       Cough -- thin  -MP       Throat Clear -- thin  -MP          MBS/VFSS    Utensils Used spoon;cup;straw  -MP --       Consistencies Trialed thin liquids;nectar/syrup-thick liquids;pudding thick;regular textures  -MP --          MBS/VFSS Interpretation    Oral Prep Phase WFL  -MP --       Oral Transit  Phase WFL  -MP --       Oral Residue WFL  -MP --       VFSS Summary Mild-moderate pharyngeal dysphagia. Penetration during/after the swallow w/ thin & nectar-thick liquids, w/ eventual silent aspiration after the swallow w/ thin. Variety of compensations trialed (tsp-size bolus, chin tuck, head turn) - none prevented penetration/aspiration w/ thin liquid, but tsp-size bolus prevented penetration/aspiration w/ nectar-thick. No penetration/aspiration w/ pudding or solid. Moderate pyriform residue w/ thin liquids that eventually cleared w/ consecutive swallows. Mild-moderate vallecular residue across consistencies - never fully cleared but did not pose significant safety risk. Rec regular diet, no mixed consistencies, nectar-thick liquids via tsp sized sips, no straws. Meds whole or crushed in pudding/puree. Pt in agrmt to trial thickened liquids during acute hospital stay (did not continue thickened liquids when last rec'd by OSH SLP).  -MP --          Initiation of Pharyngeal Swallow    Initiation of Pharyngeal Swallow bolus in valleculae  -MP --       Pharyngeal Phase impaired pharyngeal phase of swallowing  -MP --       Anatomical abnormalities noted anterior cervical c-spine prominence;other (see comments)  -MP --       Anatomical abnormalities functional impact functional impact on swallowing;other (see comments)  prominence/curvature @ the level of C2-3 prevents complete inversion of epiglottis  -MP --       Penetration During the Swallow thin liquids;nectar-thick liquids;secondary to reduced laryngeal elevation;secondary to reduced vestibular closure  -MP --       Penetration After the Swallow thin liquids;nectar-thick liquids;secondary to residue;in valleculae;in pyriform sinuses  -MP --       Aspiration After the Swallow thin liquids;secondary to residue;in laryngeal vestibule  -MP --       Response to Penetration No  -MP --       No spontaneous response to penetration and non-effective laryngeal clearance  with cue (see comments)  -MP --       Response to Aspiration No  -MP --       No spontaneous response to aspiration and non-effective subglottic clearance with cue (see comments)  -MP --       Rosenbek's Scale thin:;8--->level 8  -MP --       Pharyngeal Residue thin liquids;pyriform sinuses;all consistencies tested;valleculae;secondary to reduced base of tongue retraction;secondary to reduced laryngeal elevation;secondary to reduced hyolaryngeal excursion;other (see comments)  incomplete epiglottic inversion contributing to vallecular residue  -MP --       Response to Residue other (see comments)  cleared pyriform residue  -MP --       Attempted Compensatory Maneuvers bolus size;bolus presentation style;chin tuck;head turn to right;additional subsequent swallow  -MP --       Response to Attempted Compensatory Maneuvers prevented penetration;prevented aspiration;other (see comments)  tsp-sized sips of nectar-thick  -MP --       Successful Compensatory Maneuver Competency patient able to;demonstrate compensations;teach back compensations;independently  -MP --          SLP Evaluation Clinical Impression    SLP Swallowing Diagnosis mild-moderate;pharyngeal dysphagia  -MP suspected pharyngeal dysphagia  -MP       Functional Impact risk of aspiration/pneumonia  -MP risk of aspiration/pneumonia  -MP       Rehab Potential/Prognosis, Swallowing good, to achieve stated therapy goals  -MP good, to achieve stated therapy goals  -MP       Swallow Criteria for Skilled Therapeutic Interventions Met demonstrates skilled criteria  -MP demonstrates skilled criteria  -MP          Recommendations    Therapy Frequency (Swallow) 5 days per week  -MP --       Predicted Duration Therapy Intervention (Days) until discharge  -MP until discharge  -MP       SLP Diet Recommendation regular textures;no mixed consistencies;nectar thick liquids;ice chips between meals after oral care, with supervision  -MP other (see comments)  diet per MD  discretion until MBS - pt reported was rec'd thickened liquids after last swallow study but has been drinking thin @ home  -MP       Recommended Diagnostics -- VFSS ([facility] [place])  -MP       Recommended Precautions and Strategies no straw;upright posture during/after eating;small bites of food and sips of liquid;general aspiration precautions;other (see comments)  tsp sized sips nectar-thick  -MP --       Oral Care Recommendations Oral Care BID/PRN;Suction toothbrush  -MP Oral Care BID/PRN;Suction toothbrush  -MP       SLP Rec. for Method of Medication Administration meds whole;meds crushed;with puree;as tolerated  -MP meds whole;meds crushed;with puree;as tolerated  -MP       Monitor for Signs of Aspiration notify SLP if any concerns  -MP notify SLP if any concerns  -MP       Anticipated Discharge Disposition (SLP) unknown;anticipate therapy at next level of care  -MP unknown;anticipate therapy at next level of care  -MP             User Key  (r) = Recorded By, (t) = Taken By, (c) = Cosigned By    Initials Name Effective Dates    MP Jordan Crystal MS CCC-SLP 12/28/21 -                 EDUCATION  The patient has been educated in the following areas:   Dysphagia (Swallowing Impairment) Modified Diet Instruction.        SLP GOALS     Row Name 12/01/22 1600             (LTG) Patient will demonstrate functional swallow for    Diet Texture (Demonstrate functional swallow) regular textures  -MP      Liquid viscosity (Demonstrate functional swallow) nectar/ mildly thick liquids  -MP      [place] (Demonstrate functional swallow) independently (over 90% accuracy)  -MP      Time Frame (Demonstrate functional swallow) by discharge  -MP         (STG) Patient will tolerate trials of    Consistencies Trialed (Tolerate trials) regular textures;nectar/ mildly thick liquids  -MP      Desired Outcome (Tolerate trials) without signs/symptoms of aspiration;without signs of distress  -MP      [place] (Tolerate trials)  independently (over 90% accuracy)  -MP      Time Frame (Tolerate trials) by discharge  -MP         (STG) Patient will tolerate therapeutic trials of    Consistencies Trialed (Tolerate therapeutic trials) thin liquids  -MP      Desired Outcome (Tolerate therapeutic trials) without signs/symptoms of aspiration;without signs of distress  -MP      [place] (Tolerate therapeutic trials) with minimal cues (75-90% accuracy)  -MP      Time Frame (Tolerate therapeutic trials) 1 week  -MP      Comment (Tolerate therapeutic trials) Silent on MBS but throat clearing during initial clinical swallow eval  -MP         (STG) Pharyngeal Strengthening Exercise Goal 1 (SLP)    Activity (Pharyngeal Strengthening Goal 1, SLP) increase superior movement of the hyolaryngeal complex;increase anterior movement of the hyolaryngeal complex;increase epiglottic inversion and retroflexion;increase closure at entrance to airway/closure of airway at glottis;increase squeeze/positive pressure generation;increase tongue base retraction  -MP      Increase Superior Movement of the Hyolaryngeal Complex effortful pitch glide (falsetto + pharyngeal squeeze)  -MP      Increase Anterior Movement of the Hyolaryngeal Complex chin tuck against resistance (CTAR)  -MP      Increase Epiglottic Inversion and Retroflexion hard effortful swallow  -MP      Increase Closure at Entrance to Airway/Closure of Airway at Glottis supraglottic swallow  -MP      Increase Squeeze/Positive Pressure Generation hard effortful swallow  -MP      Increase Tongue Base Retraction janine  -MP      [place]/Accuracy (Pharyngeal Strengthening Goal 1, SLP) with minimal cues (75-90% accuracy)  -MP      Time Frame (Pharyngeal Strengthening Goal 1, SLP) short term goal (STG)  -MP         (STG) Swallow Management Recall Goal 1 (SLP)    Activity (Swallow Management Recall Goal 1, SLP) independent recall of;compensatory swallow strategies/techniques;safe diet/liquid level  -MP       [place]/Accuracy (Swallow Management Recall Goal 1, SLP) independently (over 90% accuracy)  -MP      Time Frame (Swallow Management Recall Goal 1, SLP) short term goal (STG)  -MP         (STG) Swallow Compensatory Strategies Goal 1 (SLP)    Activity (Swallow Compensatory Strategies/Techniques Goal 1, SLP) compensatory strategies;small cup sips;during p.o. trials;during meal intake;other (see comments)  tsp-sized sips  -MP      [place]/Accuracy (Swallow Compensatory Strategies/Techniques Goal 1, SLP) independently (over 90% accuracy)  -MP      Time Frame (Swallow Compensatory Strategies/Techniques Goal 1, SLP) short term goal (STG)  -MP            User Key  (r) = Recorded By, (t) = Taken By, (c) = Cosigned By    Initials Name Provider Type    Jordan Morales MS CCC-SLP Speech and Language Pathologist                   Time Calculation:    Time Calculation- SLP     Row Name 12/01/22 1624 12/01/22 1104          Time Calculation- SLP    SLP Start Time 1600  -MP 1030  -MP     SLP Received On 12/01/22  -MP 12/01/22  -MP        Untimed Charges    52273-BH Eval Oral Pharyng Swallow Minutes -- 40  -MP     48440-UI Motion Fluoro Eval Swallow Minutes 60  -MP --        Total Minutes    Untimed Charges Total Minutes 60  -MP 40  -MP      Total Minutes 60  -MP 40  -MP           User Key  (r) = Recorded By, (t) = Taken By, (c) = Cosigned By    Initials Name Provider Type    Jordan Morales MS CCC-SLP Speech and Language Pathologist                Therapy Charges for Today     Code Description Service Date Service Provider Modifiers Qty    21848030653 HC ST EVAL ORAL PHARYNG SWALLOW 3 12/1/2022 Jordan Crystal MS CCC-SLP GN 1    [id number] HC ST MOTION FLUORO EVAL SWALLOW 4 12/1/2022 Jordan Crystal MS CCC-SLP GN 1               MS ALIA Barnes  12/1/2022

## 2022-12-01 NOTE — CONSULTS
"                    Clinical Nutrition       Patient Name: Buster Velasco  YOB: 1965  MRN: [id number]  Date of Encounter: 12/01/22 10:47 EST  Admission date: 11/30/2022    Pt not a great historian. Is able to tell me he had recent hospitalization where he had PEG placed/TFs and dysphagia diet. Dysphagia diet recommended for home, per his report TFs were stopped prior to d/c. Has been eating regular diet at home and per his report, eating very well since he has been home.    Does not currently meet criteria malnutrition. He has had weight loss not significant for time frame/body habitus in terms of malnutrition. Does not endorse poor intakes and had enteral nutrition when previously unable to meet needs.    Please note visceral proteins are not recommended to be used as indicators of nutritional status and not included in ASPEN malnutrition criteria due to being acute inflammatory markers. Note elevated CRP indicating along with low Albumin, acute inflammation as expected with infection.    Reason for Visit   Chronic Poor Intake    EMR  Reviewed   Yes    Height: Height: 193 cm (76\")  Weight: Weight: 100 kg (221 lb) (12/01/22 0101)  BMI: BMI (Calculated): 26.9     UBW: per EMR used to be ~300 lb => ~230 lb recently, per MDOV weights:  (1/26/21) 298 lb  (7/15/22) 238 lb  (8/17/22) 229 lb  (9/17/22) 236 lb    Weight Change: pt previously lost 60 lb / 20% body weight from 1/21-7/22  since then has lost 15 lb / 6.4% body weight in the past almost 3 months   Neither loss is significant for time frame/body habitus in terms of malnutrition    Also of note, while past records not available to verify, strongly suspect that weight loss at least partially intentional due to suspected permissive underfeeding when he was recently receiving EN r/t obesity.     Problem:    Right foot infection    Hypoglycemia    Anemia    Hyponatremia    Hypoalbuminemia    Essential hypertension    Hyperlipidemia    " "Paroxysmal atrial fibrillation (HCC)    Sepsis (HCC)    SLP Swallowing Diagnosis: suspected pharyngeal dysphagia (12/01/22 1025)  SLP Diet Recommendation: other (see comments) (diet per MD discretion until MBS - pt reported was rec'd thickened liquids after last swallow study but has been drinking thin @ home)     Reported/Observed/Food/Nutrition Related - Comments     Pt admit with nonhealing foot ulcer, to have R BKA tomorrow. Has previously had L BKA. Has another DU as well. EMR reviewed, does not appear to meet criteria malnutrition. He has had gradual weight loss since 1/2021. Of note he was admit at  at that time for seizures r/t methamphetamine use which pt reported at that time that he was \"using to lose weight.\" Of note pt with PEG which he has not been using per his report and asked MD about taking out, no current plan in place. SLP saw this am and reported pt had been recommended dysphagia diet at home but has not been following. Regular diet given with plans to f/u for MBS.    Pt tells me he had recent hospitalization where he had PEG placed, eventually had dysphagia diet, unclear if TFs were ever recommended to be stopped as he was sent home with PEG. He does not seem to have clear hx of care. Tells me he thinks tube feeds were stopped in 9/22 or 10/22. I do not see records of reported recent admission at OSH available in care everywhere and he is very cloudy on details. He is aware of weight loss and tells me \"because they were feeding me that mush I cant eat that\" and that \"the stuff they gave me through this tube gave me awful diarrhea.\" Tells me now bowels are normal and he is eating \"great\" has \"excellent appetite.\" States he knows he was recommened dysphagia diet but has been and plans to continue regular diet. Tells me he wants PEG out. Denies further dietary needs/preferences, NKFA.     Current Nutrition Prescription     NPO Diet NPO Type: Strict NPO  Diet: Regular/House Diet; Texture: Regular " Texture (IDDSI 7); Fluid Consistency: Thin (IDDSI 0)  Orders Placed This Encounter      DIET MESSAGE Could he get a tray if possible for breakfast?      Average Intake from Charting: none yet documented    Nutrition Diagnosis     Problem Swallowing difficulty   Etiology suspected pharyngeal dysphagia   Signs/Symptoms Med hx/pt report, SLP eval   Status:    Problem Increased nutrient needs   Etiology Wound healing   Signs/Symptoms DU, PI   Status:    Actions     Follow treatment progress, Care plan reviewed, Advise alternate selection, Advised available snacks, Interview for preferences, Menu provided, Encourage intake, Education Provided    Monitor Per Protocol      Fabiola Bentley RD  Time Spent: 25

## 2022-12-01 NOTE — ED PROVIDER NOTES
EMERGENCY DEPARTMENT ENCOUNTER    Pt Name: Buster Velasco  MRN: [id number]  Pt :   1965  Room Number:    Date of encounter:  2022  PCP: Ludivina Bowers MD  ED Provider: Reggie Batres MD    Historian: Patient      HPI:  Chief Complaint: Right foot infection        Context: Buster Velasco is a 57 y.o. male who presents to the ED c/o right foot infection that has been gradually worsening over the last 1 to 2 months.  The patient reports undergoing toe amputations in  of this year secondary to diabetic complications.  Now over the last couple months he has developed wounds on his right lateral foot.  He notes they have become deeper and deeper.  He notes that the odor has worsened over the last week.  They continue to drain.  His wife has been caring for him and he has no home health care.  She bandaged him yesterday.  The patient denies fever and chills.  He has had no nausea or vomiting.  He does complain of intermittent pain but has peripheral neuropathy and states that the pain is inconsistent.      PAST MEDICAL HISTORY  No past medical history on file.      PAST SURGICAL HISTORY  No past surgical history on file.      FAMILY HISTORY  No family history on file.      SOCIAL HISTORY  Social History     Socioeconomic History   • Marital status:          ALLERGIES  Patient has no allergy information on record.        REVIEW OF SYSTEMS  Review of Systems       All systems reviewed and negative except for those discussed in HPI.       PHYSICAL EXAM    I have reviewed the triage vital signs and nursing notes.    ED Triage Vitals [22 1632]   Temp Heart Rate Resp BP SpO2   98 °F (36.7 °C) (!) 20 20 108/65 100 %      Temp src Heart Rate Source Patient Position BP Location FiO2 (%)   Oral Monitor Sitting Left arm --       Physical Exam  GENERAL:   Appears very pleasant, nontoxic.  HENT: Nares patent.  Slightly dry mucous membranes  EYES: No scleral icterus.  CV:  Regular rhythm, regular rate.  2+ radial pulse  RESPIRATORY: Normal effort.  No audible wheezes, rales or rhonchi.  ABDOMEN: Soft, nontender  MUSCULOSKELETAL: Right foot with all toes amputated.  No signs of infection at this site of the amputations..   NEURO: Alert, moves all extremities, follows commands.  SKIN:         LAB RESULTS  Recent Results (from the past 24 hour(s))   Comprehensive Metabolic Panel    Collection Time: 11/30/22  7:05 PM    Specimen: Blood   Result Value Ref Range    Glucose 54 (L) 65 - 99 mg/dL    BUN 18 6 - 20 mg/dL    Creatinine 1.29 (H) 0.76 - 1.27 mg/dL    Sodium 134 (L) 136 - 145 mmol/L    Potassium 4.1 3.5 - 5.2 mmol/L    Chloride 100 98 - 107 mmol/L    CO2 21.0 (L) 22.0 - 29.0 mmol/L    Calcium 9.1 8.6 - 10.5 mg/dL    Total Protein 7.6 6.0 - 8.5 g/dL    Albumin 3.00 (L) 3.50 - 5.20 g/dL    ALT (SGPT) 12 1 - 41 U/L    AST (SGOT) 19 1 - 40 U/L    Alkaline Phosphatase 85 39 - 117 U/L    Total Bilirubin 0.2 0.0 - 1.2 mg/dL    Globulin 4.6 gm/dL    A/G Ratio 0.7 g/dL    BUN/Creatinine Ratio 14.0 7.0 - 25.0    Anion Gap 13.0 5.0 - 15.0 mmol/L    eGFR 64.3 >60.0 mL/min/1.73   Lipase    Collection Time: 11/30/22  7:05 PM    Specimen: Blood   Result Value Ref Range    Lipase 14 13 - 60 U/L   Green Top (Gel)    Collection Time: 11/30/22  7:05 PM   Result Value Ref Range    Extra Tube Hold for add-ons.    Lavender Top    Collection Time: 11/30/22  7:05 PM   Result Value Ref Range    Extra Tube hold for add-on    Gold Top - SST    Collection Time: 11/30/22  7:05 PM   Result Value Ref Range    Extra Tube Hold for add-ons.    Light Blue Top    Collection Time: 11/30/22  7:05 PM   Result Value Ref Range    Extra Tube Hold for add-ons.    CBC Auto Differential    Collection Time: 11/30/22  7:05 PM    Specimen: Blood   Result Value Ref Range    WBC 13.92 (H) 3.40 - 10.80 10*3/mm3    RBC 4.20 4.14 - 5.80 10*6/mm3    Hemoglobin 11.1 (L) 13.0 - 17.7 g/dL    Hematocrit 35.5 (L) 37.5 - 51.0 %    MCV 84.5  79.0 - 97.0 fL    MCH 26.4 (L) 26.6 - 33.0 pg    MCHC 31.3 (L) 31.5 - 35.7 g/dL    RDW 17.5 (H) 12.3 - 15.4 %    RDW-SD 53.1 37.0 - 54.0 fl    MPV 8.5 6.0 - 12.0 fL    Platelets 532 (H) 140 - 450 10*3/mm3    Neutrophil % 82.2 (H) 42.7 - 76.0 %    Lymphocyte % 11.8 (L) 19.6 - 45.3 %    Monocyte % 4.6 (L) 5.0 - 12.0 %    Eosinophil % 0.2 (L) 0.3 - 6.2 %    Basophil % 0.2 0.0 - 1.5 %    Immature Grans % 1.0 (H) 0.0 - 0.5 %    Neutrophils, Absolute 11.44 (H) 1.70 - 7.00 10*3/mm3    Lymphocytes, Absolute 1.64 0.70 - 3.10 10*3/mm3    Monocytes, Absolute 0.64 0.10 - 0.90 10*3/mm3    Eosinophils, Absolute 0.03 0.00 - 0.40 10*3/mm3    Basophils, Absolute 0.03 0.00 - 0.20 10*3/mm3    Immature Grans, Absolute 0.14 (H) 0.00 - 0.05 10*3/mm3    nRBC 0.0 0.0 - 0.2 /100 WBC   POC Glucose Once    Collection Time: 11/30/22  9:16 PM    Specimen: Blood   Result Value Ref Range    Glucose 45 (C) 70 - 130 mg/dL   POC Glucose Once    Collection Time: 11/30/22  9:38 PM    Specimen: Blood   Result Value Ref Range    Glucose 50 (L) 70 - 130 mg/dL       If labs were ordered, I independently reviewed the results.        RADIOLOGY  No Radiology Exams Resulted Within Past 24 Hours    I ordered and reviewed the above noted radiographic studies.        See radiologist's dictation for official interpretation.        PROCEDURES    Procedures    No orders to display       MEDICATIONS GIVEN IN ER    Medications   sodium chloride 0.9 % flush 10 mL (has no administration in time range)   vancomycin 2000 mg/500 mL 0.9% NS IVPB ([facility]) (has no administration in time range)   piperacillin-tazobactam (ZOSYN) 3.375 g in iso-osmotic dextrose 50 ml (premix) (has no administration in time range)   dextrose (D50W) (25 g/50 mL) IV injection 25 g (has no administration in time range)         PROGRESS, DATA ANALYSIS, CONSULTS, AND MEDICAL DECISION MAKING    All labs have been independently reviewed by me.  All radiology studies have been reviewed by me and the  radiologist dictating the report.   EKG's have been independently viewed and interpreted by me.      Differential diagnoses: Probable osteomyelitis.      ED Course as of 11/30/22 2221 Wed Nov 30, 2022 1912 Reggie Batres MD  I saw this patient in the hallway however he will need to be moved to a room for more formal evaluation. [MS]   2215 Glucose(!): 50 [MS]      ED Course User Index  [MS] Reggie Batres MD             AS OF 22:21 EST VITALS:    BP - 108/65  HR - 106  TEMP - 98 °F (36.7 °C) (Oral)  O2 SATS - 97%                  DIAGNOSIS  Final diagnoses:   Right foot infection   Hypoglycemia   Decubitus ulcer of coccygeal region, unspecified ulcer stage         DISPOSITION  Admission           Reggie Batres MD  12/04/22 1808

## 2022-12-01 NOTE — CONSULTS
CTS Consult    Patient Care Team:  Ludivina Bowers MD as PCP - General (Family Medicine)      Reason for Consult:  Right foot wound    HPI  Patient is a 57 y.o. male with a history of paroxysmal atrial fibrillation, type 2 diabetes mellitus, DVT s/p IVC filter, hypertension, hyperlipidemia and previous osteomyelitis s/p left below knee amputation and right transmetatarsal amputations who presented to [facility] ED yesterday with complaints of progressively worsening wounds on right foot.  Patient states that wounds have been present for several months on right lateral foot with malodor over the last week and purulent discharge.  He denies fever, chills, nausea or vomiting.      Review of Systems  All other systems are reviewed and are negative unless otherwise stated in HPI    History  Past Medical History:   Diagnosis Date   • Diabetes (HCC)    • DVT (deep venous thrombosis) (HCC)    • Hypertension    • Mood disorder (HCC)      Past Surgical History:   Procedure Laterality Date   • BELOW KNEE AMPUTATION Left    • TRANS METATARSAL AMPUTATION Right      Family History   Problem Relation Age of Onset   • Diabetes Mother 65   • Stroke Mother 74   • Diabetes Maternal Uncle    • Heart attack Maternal Grandfather      Social History     Tobacco Use   • Smoking status: Never   • Smokeless tobacco: Never   Vaping Use   • Vaping Use: Never used   Substance Use Topics   • Alcohol use: Never     Medications Prior to Admission   Medication Sig Dispense Refill Last Dose   • bumetanide (BUMEX) 0.5 MG tablet Take 0.5 mg by mouth Daily.      • dapagliflozin Propanediol (Farxiga) 10 MG tablet Take 10 mg by mouth Daily.      • ferrous sulfate 325 (65 FE) MG tablet Take 325 mg by mouth Daily With Breakfast.      • fluticasone (FLONASE) 50 MCG/ACT nasal spray 2 sprays into the nostril(s) as directed by provider 2 (Two) Times a Day As Needed for Rhinitis.      • hydrOXYzine (ATARAX) 25 MG tablet Take 50 mg by mouth At Night As Needed (sleep).       • Insulin Degludec (Tresiba) 100 UNIT/ML solution injection Inject 72 Units under the skin into the appropriate area as directed Daily.      • losartan (COZAAR) 50 MG tablet Take 50 mg by mouth Daily.      • metFORMIN (GLUCOPHAGE) 1000 MG tablet Take 1,000 mg by mouth 2 (Two) Times a Day With Meals.      • methadone (DOLOPHINE) 10 MG tablet Take 10 mg by mouth 4 (Four) Times a Day,      • metoprolol tartrate (LOPRESSOR) 25 MG tablet Take 25 mg by mouth 2 (Two) Times a Day.      • simvastatin (ZOCOR) 40 MG tablet Take 40 mg by mouth Every Night.      • tamsulosin (FLOMAX) 0.4 MG capsule 24 hr capsule Take 1 capsule by mouth Daily.        Allergies:  Gabapentin and Lyrica [pregabalin]    Objective    Vital Signs  Temp:  [98 °F (36.7 °C)-98.6 °F (37 °C)] 98 °F (36.7 °C)  Heart Rate:  [] 89  Resp:  [16-20] 16  BP: (102-112)/(58-76) 112/74    Physical Exam:  General Appearance: alert, in no acute distress  Head: normocephalic, without obvious abnormality and atraumatic  Throat: gums healthy and no oral lesions  Neck: no adenopathy, suppple, trachea midline, no thyromegaly, no carotid bruit and no JVD  Lungs: clear to auscultation, respirations regular, respirations even and respirations unlabored  Heart: regular rhythm & normal rate, normal S1, S2, no murmur, no veronique, no rub and no click  Abdomen: normal bowel sounds, no masses and soft non-tender  Extremities: left BKA  Skin: Right lateral foot and malleolus ulcerations with surrounding erythema and foul odor. Previous transmetatarsal amputation right foot.  Pulses:  Palpable right DP/AT      Data Review:  Results from last 7 days   Lab Units 11/30/22 1905   WBC 10*3/mm3 13.92*   HEMOGLOBIN g/dL 11.1*   HEMATOCRIT % 35.5*   PLATELETS 10*3/mm3 532*     Results from last 7 days   Lab Units 11/30/22 1905   SODIUM mmol/L 134*   POTASSIUM mmol/L 4.1   CHLORIDE mmol/L 100   CO2 mmol/L 21.0*   BUN mg/dL 18   CREATININE mg/dL 1.29*   GLUCOSE mg/dL 54*   CALCIUM  mg/dL 9.1     Coagulation: No results found for: INR, APTT  Cardiac markers:     ABGs:       Invalid input(s): PO2      Imaging Results (Last 72 Hours)     Procedure Component Value Units Date/Time    MRI Foot Right Without Contrast [[id number]] Collected: 12/01/22 0444     Updated: 12/01/22 0645    Narrative:      Preliminary report:    There is amputation of the forefoot. There is some mild bone marrow edema near the cut margin of the foot as this could relate to signs of osteomyelitis. Some mild cellulitic changes are also suspected.    XR Chest 1 View [[id number]] Collected: 11/30/22 2215     Updated: 12/01/22 0016    Narrative:      EXAMINATION: Chest one view.    DATE: 11/30/2022.    COMPARISON: None available.    CLINICAL HISTORY: Infection.    FINDINGS:    The lungs and pleural spaces are clear.    The cardiomediastinal silhouette and the pulmonary vessels are within normal limits.      Impression:        No acute cardiopulmonary process.    Electronically signed by:  Pierre Baron D.O.    11/30/2022 10:15 PM Mountain Time            Assessment:        Right foot infection    Hypoglycemia    Anemia    Hyponatremia    Hypoalbuminemia    Essential hypertension    Hyperlipidemia    Paroxysmal atrial fibrillation (HCC)    Sepsis (HCC)      Plan:  Tentative right BKA tomorrow with Dr. Sudhir LOPES after midnight  Comorbidities per hospitalist  Antibiotics per JAKOB Hinson PA-C  12/01/22  08:11 EST

## 2022-12-01 NOTE — H&P
[facility] Medicine Services  HISTORY AND PHYSICAL    Patient Name: Buster Velasco  : 1965  MRN: [id number]  Primary Care Physician: Ludivina Bowers MD  Date of admission: 2022    Subjective   Subjective     Chief Complaint:  Ulcer on foot    HPI:  Buster Velasco is a 57 y.o. 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 He states that these have been present for a few months, but have gotten worse. He does admit to some fatigue and chills, but denies fever, chest pain, SOB, nausea or vomiting. He denies any recent antibiotic use.           Review of Systems   Constitutional: Positive for chills and fatigue. Negative for fever and unexpected weight change.   HENT: Positive for voice change (chronic hoarseness). Negative for sinus pressure, sore throat and trouble swallowing.    Eyes: Negative for photophobia and visual disturbance.   Respiratory: Negative for cough, shortness of breath and wheezing.    Cardiovascular: Negative for chest pain and palpitations.   Gastrointestinal: Negative for abdominal pain, diarrhea, nausea and vomiting.   Genitourinary: Negative for dysuria and hematuria.   Musculoskeletal: Positive for arthralgias and myalgias.   Skin: Positive for color change and wound.   Neurological: Negative for tremors, syncope, speech difficulty and weakness.   Psychiatric/Behavioral: Negative for confusion. The patient is not nervous/anxious.       All other systems reviewed and are negative.     Personal History     Past Medical History:   Diagnosis Date   • Diabetes (HCC)    • DVT (deep venous thrombosis) (HCC)    • Hypertension    • Mood  disorder (HCC)              Past Surgical History:   Procedure Laterality Date   • BELOW KNEE AMPUTATION Left    • TRANS METATARSAL AMPUTATION Right        Family History:  family history includes Diabetes in his maternal uncle; Diabetes (age of onset: 65) in his mother; Heart attack in his maternal grandfather; Stroke (age of onset: 74) in his mother. Otherwise pertinent FHx was reviewed and unremarkable.     Social History:  reports that he has never smoked. He has never used smokeless tobacco. He reports that he does not drink alcohol.  Social History     Social History Narrative   • Not on file       Medications:  Insulin Degludec, bumetanide, dapagliflozin Propanediol, ferrous sulfate, fluticasone, hydrOXYzine, losartan, metFORMIN, methadone, metoprolol tartrate, simvastatin, and tamsulosin    Allergies   Allergen Reactions   • Gabapentin Rash   • Lyrica [Pregabalin] Rash       Objective   Objective     Vital Signs:   Temp:  [98 °F (36.7 °C)] 98 °F (36.7 °C)  Heart Rate:  [] 106  Resp:  [16-20] 16  BP: (102-108)/(58-65) 102/58    Physical Exam  Constitutional:       General: He is not in acute distress.     Appearance: Normal appearance.   HENT:      Head: Atraumatic.      Right Ear: External ear normal.      Left Ear: External ear normal.      Nose: Nose normal.      Mouth/Throat:      Comments: Chronic hoarseness when speaking.   Eyes:      Extraocular Movements: Extraocular movements intact.      Conjunctiva/sclera: Conjunctivae normal.      Pupils: Pupils are equal, round, and reactive to light.   Cardiovascular:      Rate and Rhythm: Regular rhythm. Tachycardia present.      Heart sounds: Normal heart sounds. No murmur heard.  Pulmonary:      Effort: Pulmonary effort is normal. No respiratory distress.      Breath sounds: Normal breath sounds. No wheezing, rhonchi or rales.   Abdominal:      General: Bowel sounds are normal. There is no distension.      Tenderness: There is no abdominal tenderness.  There is no guarding or rebound.      Comments: PEG tube in place. No evidence of erythema.    Musculoskeletal:      Cervical back: No rigidity.      Comments: Hx left BKA. Hx right transmetatarsal amputation.   Skin:     General: Skin is warm and dry.      Coloration: Skin is not jaundiced.      Findings: Erythema and lesion present. No rash.      Comments: 2 Large ulcerations on right lateral foot. Area of surrounding erythema.    Neurological:      General: No focal deficit present.      Mental Status: He is alert and oriented to person, place, and time.   Psychiatric:         Attention and Perception: Attention normal.         Mood and Affect: Mood normal.         Behavior: Behavior normal.         Thought Content: Thought content normal.          Result Review:  I have personally reviewed the results from the time of this admission to 12/1/2022 01:39 EST and agree with these findings:  [x]  Laboratory list / accordion  [x]  Microbiology  []  Radiology  [x]  EKG/Telemetry   []  Cardiology/Vascular   []  Pathology  []  Old records  []  Other:    LAB RESULTS:      Lab 12/01/22  0053 11/30/22 1905   WBC  --  13.92*   HEMOGLOBIN  --  11.1*   HEMATOCRIT  --  35.5*   PLATELETS  --  532*   NEUTROS ABS  --  11.44*   IMMATURE GRANS (ABS)  --  0.14*   LYMPHS ABS  --  1.64   MONOS ABS  --  0.64   EOS ABS  --  0.03   MCV  --  84.5   PROCALCITONIN  --  0.17   LACTATE 2.0 2.1*         Lab 11/30/22 1905   SODIUM 134*   POTASSIUM 4.1   CHLORIDE 100   CO2 21.0*   ANION GAP 13.0   BUN 18   CREATININE 1.29*   EGFR 64.3   GLUCOSE 54*   CALCIUM 9.1         Lab 11/30/22 1905   TOTAL PROTEIN 7.6   ALBUMIN 3.00*   GLOBULIN 4.6   ALT (SGPT) 12   AST (SGOT) 19   BILIRUBIN 0.2   ALK PHOS 85   LIPASE 14                 Lab 11/30/22 1905   IRON 18*   IRON SATURATION 6*   TIBC 277*   TRANSFERRIN 186*   FERRITIN 317.40         Brief Urine Lab Results  (Last result in the past 365 days)      Color   Clarity   Blood   Leuk Est   Nitrite    Protein   CREAT   Urine HCG        08/20/22 0108 Yellow   Clear   Negative   Negative     Negative               Microbiology Results (last 10 days)     ** No results found for the last 240 hours. **          XR Chest 1 View    Result Date: 12/1/2022  EXAMINATION: Chest one view. DATE: 11/30/2022. COMPARISON: None available. CLINICAL HISTORY: Infection. FINDINGS: The lungs and pleural spaces are clear. The cardiomediastinal silhouette and the pulmonary vessels are within normal limits.     Impression: No acute cardiopulmonary process. Electronically signed by:  Pierre Baron D.O.  11/30/2022 10:15 PM Mountain Time          Assessment & Plan   Assessment & Plan       Right foot infection    Hypoglycemia    Sepsis (HCC)    Essential hypertension    Hyperlipidemia    Paroxysmal atrial fibrillation (HCC)    Anemia    Hyponatremia    Hypoalbuminemia      Buster Velasco is a 57 y.o. 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   Sepsis: infectious source, tachycardia, borderline BP, leukocytosis, elevated lactate.  - Check CRP, sed rate  -Blood cultures pending  -Given hx of MRSA osteomyelitis as well as multiple amputations, will need to start IV Vancomycin tonight.   -MRI right foot tonight to investigate for possible osteomyelitis.  -Wound care for the am  -Infectious Disease consult for the am  -Dr. Peguero to see in the am. May ultimately need right BKA.  -IV fluids    Decubitus ulcer  -Wound care to see.   -Bedrest. Turn q2    Chronic Vocal Cord Paralysis  -NPO tonight.   -Has PEG tube in place,  but reportedly is requesting to be taken out?  -SLP to see in the am.     Hypoglycemia  T2DM  -Blood glucose 45 on arrival, persistent with rechecks.   -Point-of-care glucose check every hour  - Start D5W at 75 cc/hour  - Patient on Tresiba 75 units every morning, hold  - We will start SSI when needed  -Check A1C  -Repeat bmp in the am    HTN  HLD  -Hold home BP meds for now d/t borderline hypotension  -Continue statin    Parox Atrial Fib  -Noted during admission to  back in July.   -EKG pending  -Not currently on anticoagulation    Anemia  -Hgb 11.1, Hct 35.5  -Appears chronic. Actually has improved since prior results.  -Anemia studies   -Repeat cbc in the am    Hyponatremia  -Na+ 134. Likely 2ry to poor PO intake.   -Check Serum Osm, Urine Osm, and Urine Na+  -IV fluids tonight  -Repeat bmp in the am    Hypoalbuminemia  -Alb 3.0  -Nutrition consult for the am    History of DVT  - S/p IVC filter      DVT prophylaxis:  Hep subQ    CODE STATUS:  Full Code  Code Status (Patient has no pulse and is not breathing): CPR (Attempt to Resuscitate)  Medical Interventions (Patient has pulse or is breathing): Full Support      This note has been completed as part of a split-shared workflow.     Signature: Electronically signed by Cecily Molina PA-C, 11/30/22, 11:33 PM EST      Attending   Admission Attestation       I have performed an independent face-to-face diagnostic evaluation including performing an independent physical examination as documented here.  The documented plan of care above was reviewed and developed with the advanced practice clinician (APC).      Brief Summary Statement:   Buster Velasco is a 57 y.o. male with a PMH significant for insulin-dependent diabetes mellitus type 2, DVT s/p IVC filter, atrial fibrillation not on anticoagulation, HTN, HLD, history of osteomyelitis s/p left BKA, s/p right transmetatarsal amputation, MRSA osteomyelitis s/p third metacarpal resection who comes  to the ED due to worsening foot infection.  Patient reports a 3-month history of an open wound on his right foot.  Over the past several weeks the wound has become more erythematous, painful with drainage.  He reports fatigue, chills.  He denies fever, vomiting.  He reports diarrhea which is currently resolved.  He is not following with wound care outpatient and has not been on any recent antibiotics.    Remainder of detailed HPI is as noted by APC and has been reviewed and/or edited by me for completeness.    Attending Physical Exam:  Temp:  [98 °F (36.7 °C)-98.6 °F (37 °C)] 98.6 °F (37 °C)  Heart Rate:  [] 89  Resp:  [16-20] 16  BP: (102-112)/(58-76) 112/74    Constitutional: Awake, alert  Eyes: PERRLA, sclerae anicteric, no conjunctival injection  HENT: NCAT, mucous membranes moist  Neck: Supple, no thyromegaly, no lymphadenopathy, trachea midline  Respiratory: Clear to auscultation bilaterally, nonlabored respirations   Cardiovascular: RRR, no murmurs, rubs, or gallops, palpable radial pulses bilaterally  Gastrointestinal: Positive bowel sounds, soft, nontender, nondistended  Musculoskeletal: Left BKA, metatarsal resection to right foot, foot bandaged  Psychiatric: Appropriate affect, cooperative  Neurologic: Oriented x 3, strength symmetric in all extremities, Cranial Nerves grossly intact to confrontation, speech clear  Skin: Bandage dry and intact to right foot      Brief Assessment/Plan :  See detailed assessment and plan developed with APC which I have reviewed and/or edited for completeness.    Tracy Oliveira DO  12/01/22                       21-May-2021

## 2022-12-02 ENCOUNTER — ANESTHESIA EVENT CONVERTED (OUTPATIENT)
Dept: ANESTHESIOLOGY | Facility: HOSPITAL | Age: 57
DRG: 854 | End: 2022-12-02
Payer: MEDICAID

## 2022-12-02 ENCOUNTER — ANESTHESIA (OUTPATIENT)
Dept: PERIOP | Facility: HOSPITAL | Age: 57
DRG: 854 | End: 2022-12-02
Payer: MEDICAID

## 2022-12-02 LAB
ANION GAP SERPL CALCULATED.3IONS-SCNC: 11 MMOL/L (ref 5–15)
BASOPHILS # BLD AUTO: 0.06 10*3/MM3 (ref 0–0.2)
BASOPHILS NFR BLD AUTO: 0.9 % (ref 0–1.5)
BUN SERPL-MCNC: 14 MG/DL (ref 6–20)
BUN/CREAT SERPL: 12.4 (ref 7–25)
CALCIUM SPEC-SCNC: 9.2 MG/DL (ref 8.6–10.5)
CHLORIDE SERPL-SCNC: 105 MMOL/L (ref 98–107)
CO2 SERPL-SCNC: 23 MMOL/L (ref 22–29)
CREAT SERPL-MCNC: 1.13 MG/DL (ref 0.76–1.27)
DEPRECATED RDW RBC AUTO: 54.4 FL (ref 37–54)
EGFRCR SERPLBLD CKD-EPI 2021: 75.3 ML/MIN/1.73
EOSINOPHIL # BLD AUTO: 0.13 10*3/MM3 (ref 0–0.4)
EOSINOPHIL NFR BLD AUTO: 1.9 % (ref 0.3–6.2)
ERYTHROCYTE [DISTWIDTH] IN BLOOD BY AUTOMATED COUNT: 17.5 % (ref 12.3–15.4)
GLUCOSE BLDC GLUCOMTR-MCNC: 115 MG/DL (ref 70–130)
GLUCOSE BLDC GLUCOMTR-MCNC: 147 MG/DL (ref 70–130)
GLUCOSE BLDC GLUCOMTR-MCNC: 236 MG/DL (ref 70–130)
GLUCOSE BLDC GLUCOMTR-MCNC: 294 MG/DL (ref 70–130)
GLUCOSE BLDC GLUCOMTR-MCNC: 42 MG/DL (ref 70–130)
GLUCOSE BLDC GLUCOMTR-MCNC: 44 MG/DL (ref 70–130)
GLUCOSE BLDC GLUCOMTR-MCNC: 48 MG/DL (ref 70–130)
GLUCOSE BLDC GLUCOMTR-MCNC: 56 MG/DL (ref 70–130)
GLUCOSE BLDC GLUCOMTR-MCNC: 92 MG/DL (ref 70–130)
GLUCOSE SERPL-MCNC: 46 MG/DL (ref 65–99)
HCT VFR BLD AUTO: 36.1 % (ref 37.5–51)
HGB BLD-MCNC: 11 G/DL (ref 13–17.7)
IMM GRANULOCYTES # BLD AUTO: 0.09 10*3/MM3 (ref 0–0.05)
IMM GRANULOCYTES NFR BLD AUTO: 1.3 % (ref 0–0.5)
LYMPHOCYTES # BLD AUTO: 1.59 10*3/MM3 (ref 0.7–3.1)
LYMPHOCYTES NFR BLD AUTO: 23.1 % (ref 19.6–45.3)
MCH RBC QN AUTO: 26 PG (ref 26.6–33)
MCHC RBC AUTO-ENTMCNC: 30.5 G/DL (ref 31.5–35.7)
MCV RBC AUTO: 85.3 FL (ref 79–97)
MONOCYTES # BLD AUTO: 0.59 10*3/MM3 (ref 0.1–0.9)
MONOCYTES NFR BLD AUTO: 8.6 % (ref 5–12)
NEUTROPHILS NFR BLD AUTO: 4.43 10*3/MM3 (ref 1.7–7)
NEUTROPHILS NFR BLD AUTO: 64.2 % (ref 42.7–76)
NRBC BLD AUTO-RTO: 0 /100 WBC (ref 0–0.2)
PLATELET # BLD AUTO: 405 10*3/MM3 (ref 140–450)
PMV BLD AUTO: 8.7 FL (ref 6–12)
POTASSIUM SERPL-SCNC: 5 MMOL/L (ref 3.5–5.2)
QT INTERVAL: 382 MS
QTC INTERVAL: 467 MS
RBC # BLD AUTO: 4.23 10*6/MM3 (ref 4.14–5.8)
SODIUM SERPL-SCNC: 139 MMOL/L (ref 136–145)
WBC NRBC COR # BLD: 6.89 10*3/MM3 (ref 3.4–10.8)

## 2022-12-02 PROCEDURE — 80048 BASIC METABOLIC PNL TOTAL CA: CPT | Performed by: PHYSICIAN ASSISTANT

## 2022-12-02 PROCEDURE — 88307 TISSUE EXAM BY PATHOLOGIST: CPT | Performed by: THORACIC SURGERY (CARDIOTHORACIC VASCULAR SURGERY)

## 2022-12-02 PROCEDURE — 0Y6H0Z2 DETACHMENT AT RIGHT LOWER LEG, MID, OPEN APPROACH: ICD-10-PCS | Performed by: THORACIC SURGERY (CARDIOTHORACIC VASCULAR SURGERY)

## 2022-12-02 PROCEDURE — 25010000002 DAPTOMYCIN PER 1 MG: Performed by: PHYSICIAN ASSISTANT

## 2022-12-02 PROCEDURE — 25010000002 FENTANYL CITRATE (PF) 50 MCG/ML SOLUTION: Performed by: NURSE ANESTHETIST, CERTIFIED REGISTERED

## 2022-12-02 PROCEDURE — 99233 SBSQ HOSP IP/OBS HIGH 50: CPT | Performed by: INTERNAL MEDICINE

## 2022-12-02 PROCEDURE — 99024 POSTOP FOLLOW-UP VISIT: CPT | Performed by: THORACIC SURGERY (CARDIOTHORACIC VASCULAR SURGERY)

## 2022-12-02 PROCEDURE — 88311 DECALCIFY TISSUE: CPT | Performed by: THORACIC SURGERY (CARDIOTHORACIC VASCULAR SURGERY)

## 2022-12-02 PROCEDURE — 25010000002 PROPOFOL 10 MG/ML EMULSION: Performed by: NURSE ANESTHETIST, CERTIFIED REGISTERED

## 2022-12-02 PROCEDURE — 25010000002 DEXAMETHASONE SODIUM PHOSPHATE 10 MG/ML SOLUTION: Performed by: NURSE ANESTHETIST, CERTIFIED REGISTERED

## 2022-12-02 PROCEDURE — 25010000002 HEPARIN (PORCINE) PER 1000 UNITS: Performed by: PHYSICIAN ASSISTANT

## 2022-12-02 PROCEDURE — 25010000002 BUPRENORPHINE PER 0.1 MG: Performed by: NURSE ANESTHETIST, CERTIFIED REGISTERED

## 2022-12-02 PROCEDURE — 25010000002 MORPHINE PER 10 MG: Performed by: THORACIC SURGERY (CARDIOTHORACIC VASCULAR SURGERY)

## 2022-12-02 PROCEDURE — 25010000002 HYDROMORPHONE 1 MG/ML SOLUTION

## 2022-12-02 PROCEDURE — 25010000002 PIPERACILLIN SOD-TAZOBACTAM PER 1 G: Performed by: PHYSICIAN ASSISTANT

## 2022-12-02 PROCEDURE — 25010000002 ONDANSETRON PER 1 MG: Performed by: NURSE ANESTHETIST, CERTIFIED REGISTERED

## 2022-12-02 PROCEDURE — 82962 GLUCOSE BLOOD TEST: CPT

## 2022-12-02 PROCEDURE — 25010000002 PIPERACILLIN SOD-TAZOBACTAM PER 1 G

## 2022-12-02 PROCEDURE — 25010000002 ROPIVACAINE PER 1 MG: Performed by: NURSE ANESTHETIST, CERTIFIED REGISTERED

## 2022-12-02 PROCEDURE — 25010000002 FENTANYL CITRATE (PF) 50 MCG/ML SOLUTION

## 2022-12-02 PROCEDURE — 27880 AMPUTATION OF LOWER LEG: CPT | Performed by: THORACIC SURGERY (CARDIOTHORACIC VASCULAR SURGERY)

## 2022-12-02 PROCEDURE — 25010000002 PHENYLEPHRINE 10 MG/ML SOLUTION 1 ML VIAL: Performed by: NURSE ANESTHETIST, CERTIFIED REGISTERED

## 2022-12-02 PROCEDURE — 25010000002 DEXAMETHASONE PER 1 MG: Performed by: NURSE ANESTHETIST, CERTIFIED REGISTERED

## 2022-12-02 PROCEDURE — 85025 COMPLETE CBC W/AUTO DIFF WBC: CPT | Performed by: PHYSICIAN ASSISTANT

## 2022-12-02 PROCEDURE — 27880 AMPUTATION OF LOWER LEG: CPT | Performed by: PHYSICIAN ASSISTANT

## 2022-12-02 RX ORDER — AMOXICILLIN 250 MG
2 CAPSULE ORAL 2 TIMES DAILY PRN
Status: DISCONTINUED | OUTPATIENT
Start: 2022-12-02 | End: 2022-12-02

## 2022-12-02 RX ORDER — METHADONE HYDROCHLORIDE 10 MG/1
10 TABLET ORAL EVERY 6 HOURS PRN
Status: DISCONTINUED | OUTPATIENT
Start: 2022-12-02 | End: 2022-12-03

## 2022-12-02 RX ORDER — BISACODYL 10 MG
10 SUPPOSITORY, RECTAL RECTAL DAILY PRN
Status: DISCONTINUED | OUTPATIENT
Start: 2022-12-02 | End: 2022-12-22

## 2022-12-02 RX ORDER — FENTANYL CITRATE 50 UG/ML
INJECTION, SOLUTION INTRAMUSCULAR; INTRAVENOUS
Status: COMPLETED | OUTPATIENT
Start: 2022-12-02 | End: 2022-12-02

## 2022-12-02 RX ORDER — MIDAZOLAM HYDROCHLORIDE 1 MG/ML
1 INJECTION INTRAMUSCULAR; INTRAVENOUS
Status: DISCONTINUED | OUTPATIENT
Start: 2022-12-02 | End: 2022-12-02 | Stop reason: HOSPADM

## 2022-12-02 RX ORDER — ONDANSETRON 4 MG/1
4 TABLET, FILM COATED ORAL EVERY 6 HOURS PRN
Status: DISCONTINUED | OUTPATIENT
Start: 2022-12-02 | End: 2022-12-13

## 2022-12-02 RX ORDER — DEXAMETHASONE SODIUM PHOSPHATE 10 MG/ML
INJECTION, SOLUTION INTRAMUSCULAR; INTRAVENOUS
Status: COMPLETED | OUTPATIENT
Start: 2022-12-02 | End: 2022-12-02

## 2022-12-02 RX ORDER — SODIUM CHLORIDE 0.9 % (FLUSH) 0.9 %
10 SYRINGE (ML) INJECTION EVERY 12 HOURS SCHEDULED
Status: DISCONTINUED | OUTPATIENT
Start: 2022-12-02 | End: 2022-12-02 | Stop reason: HOSPADM

## 2022-12-02 RX ORDER — DEXAMETHASONE SODIUM PHOSPHATE 4 MG/ML
INJECTION, SOLUTION INTRA-ARTICULAR; INTRALESIONAL; INTRAMUSCULAR; INTRAVENOUS; SOFT TISSUE AS NEEDED
Status: DISCONTINUED | OUTPATIENT
Start: 2022-12-02 | End: 2022-12-02 | Stop reason: SURG

## 2022-12-02 RX ORDER — SODIUM CHLORIDE 450 MG/100ML
35 INJECTION, SOLUTION INTRAVENOUS CONTINUOUS
Status: DISCONTINUED | OUTPATIENT
Start: 2022-12-02 | End: 2022-12-02

## 2022-12-02 RX ORDER — FENTANYL CITRATE 50 UG/ML
INJECTION, SOLUTION INTRAMUSCULAR; INTRAVENOUS
Status: COMPLETED
Start: 2022-12-02 | End: 2022-12-02

## 2022-12-02 RX ORDER — HYDROMORPHONE HYDROCHLORIDE 1 MG/ML
0.5 INJECTION, SOLUTION INTRAMUSCULAR; INTRAVENOUS; SUBCUTANEOUS
Status: DISCONTINUED | OUTPATIENT
Start: 2022-12-02 | End: 2022-12-02

## 2022-12-02 RX ORDER — ONDANSETRON 4 MG/1
4 TABLET, FILM COATED ORAL EVERY 6 HOURS PRN
Status: DISCONTINUED | OUTPATIENT
Start: 2022-12-02 | End: 2022-12-02

## 2022-12-02 RX ORDER — ONDANSETRON 2 MG/ML
4 INJECTION INTRAMUSCULAR; INTRAVENOUS ONCE AS NEEDED
Status: DISCONTINUED | OUTPATIENT
Start: 2022-12-02 | End: 2022-12-02

## 2022-12-02 RX ORDER — ONDANSETRON 2 MG/ML
4 INJECTION INTRAMUSCULAR; INTRAVENOUS EVERY 6 HOURS PRN
Status: DISCONTINUED | OUTPATIENT
Start: 2022-12-02 | End: 2022-12-02

## 2022-12-02 RX ORDER — SODIUM CHLORIDE 0.9 % (FLUSH) 0.9 %
10 SYRINGE (ML) INJECTION AS NEEDED
Status: CANCELLED | OUTPATIENT
Start: 2022-12-02

## 2022-12-02 RX ORDER — FAMOTIDINE 20 MG/1
20 TABLET, FILM COATED ORAL ONCE
Status: COMPLETED | OUTPATIENT
Start: 2022-12-02 | End: 2022-12-02

## 2022-12-02 RX ORDER — ROPIVACAINE HYDROCHLORIDE 2 MG/ML
INJECTION, SOLUTION EPIDURAL; INFILTRATION; PERINEURAL CONTINUOUS
Status: DISCONTINUED | OUTPATIENT
Start: 2022-12-02 | End: 2022-12-19

## 2022-12-02 RX ORDER — LIDOCAINE HYDROCHLORIDE 10 MG/ML
INJECTION, SOLUTION EPIDURAL; INFILTRATION; INTRACAUDAL; PERINEURAL AS NEEDED
Status: DISCONTINUED | OUTPATIENT
Start: 2022-12-02 | End: 2022-12-02 | Stop reason: SURG

## 2022-12-02 RX ORDER — POLYETHYLENE GLYCOL 3350 17 G/17G
17 POWDER, FOR SOLUTION ORAL DAILY PRN
Status: DISCONTINUED | OUTPATIENT
Start: 2022-12-02 | End: 2022-12-22

## 2022-12-02 RX ORDER — DEXTROSE MONOHYDRATE 25 G/50ML
INJECTION, SOLUTION INTRAVENOUS
Status: COMPLETED
Start: 2022-12-02 | End: 2022-12-02

## 2022-12-02 RX ORDER — LIDOCAINE HYDROCHLORIDE 10 MG/ML
0.5 INJECTION, SOLUTION EPIDURAL; INFILTRATION; INTRACAUDAL; PERINEURAL ONCE AS NEEDED
Status: DISCONTINUED | OUTPATIENT
Start: 2022-12-02 | End: 2022-12-02 | Stop reason: HOSPADM

## 2022-12-02 RX ORDER — CEFAZOLIN SODIUM 2 G/100ML
2 INJECTION, SOLUTION INTRAVENOUS EVERY 8 HOURS
Status: DISCONTINUED | OUTPATIENT
Start: 2022-12-02 | End: 2022-12-02 | Stop reason: SDUPTHER

## 2022-12-02 RX ORDER — FENTANYL CITRATE 50 UG/ML
50 INJECTION, SOLUTION INTRAMUSCULAR; INTRAVENOUS
Status: DISCONTINUED | OUTPATIENT
Start: 2022-12-02 | End: 2022-12-02

## 2022-12-02 RX ORDER — FAMOTIDINE 10 MG/ML
20 INJECTION, SOLUTION INTRAVENOUS ONCE
Status: CANCELLED | OUTPATIENT
Start: 2022-12-02 | End: 2022-12-02

## 2022-12-02 RX ORDER — SODIUM CHLORIDE 9 MG/ML
40 INJECTION, SOLUTION INTRAVENOUS AS NEEDED
Status: CANCELLED | OUTPATIENT
Start: 2022-12-02

## 2022-12-02 RX ORDER — PHENYLEPHRINE HCL IN 0.9% NACL 1 MG/10 ML
SYRINGE (ML) INTRAVENOUS AS NEEDED
Status: DISCONTINUED | OUTPATIENT
Start: 2022-12-02 | End: 2022-12-02 | Stop reason: SURG

## 2022-12-02 RX ORDER — BISACODYL 5 MG/1
5 TABLET, DELAYED RELEASE ORAL DAILY PRN
Status: DISCONTINUED | OUTPATIENT
Start: 2022-12-02 | End: 2022-12-22

## 2022-12-02 RX ORDER — MORPHINE SULFATE 2 MG/ML
2 INJECTION, SOLUTION INTRAMUSCULAR; INTRAVENOUS EVERY 4 HOURS PRN
Status: DISCONTINUED | OUTPATIENT
Start: 2022-12-02 | End: 2022-12-09

## 2022-12-02 RX ORDER — ONDANSETRON 2 MG/ML
4 INJECTION INTRAMUSCULAR; INTRAVENOUS EVERY 6 HOURS PRN
Status: DISCONTINUED | OUTPATIENT
Start: 2022-12-02 | End: 2022-12-13

## 2022-12-02 RX ORDER — ONDANSETRON 2 MG/ML
INJECTION INTRAMUSCULAR; INTRAVENOUS AS NEEDED
Status: DISCONTINUED | OUTPATIENT
Start: 2022-12-02 | End: 2022-12-02 | Stop reason: SURG

## 2022-12-02 RX ORDER — ROCURONIUM BROMIDE 10 MG/ML
INJECTION, SOLUTION INTRAVENOUS AS NEEDED
Status: DISCONTINUED | OUTPATIENT
Start: 2022-12-02 | End: 2022-12-02 | Stop reason: SURG

## 2022-12-02 RX ORDER — BUPIVACAINE HYDROCHLORIDE 2.5 MG/ML
INJECTION, SOLUTION EPIDURAL; INFILTRATION; INTRACAUDAL
Status: COMPLETED | OUTPATIENT
Start: 2022-12-02 | End: 2022-12-02

## 2022-12-02 RX ORDER — PROPOFOL 10 MG/ML
VIAL (ML) INTRAVENOUS AS NEEDED
Status: DISCONTINUED | OUTPATIENT
Start: 2022-12-02 | End: 2022-12-02 | Stop reason: SURG

## 2022-12-02 RX ORDER — AMOXICILLIN 250 MG
2 CAPSULE ORAL 2 TIMES DAILY PRN
Status: DISCONTINUED | OUTPATIENT
Start: 2022-12-02 | End: 2022-12-13

## 2022-12-02 RX ORDER — OXYCODONE HYDROCHLORIDE 5 MG/1
5 TABLET ORAL EVERY 6 HOURS PRN
Status: DISCONTINUED | OUTPATIENT
Start: 2022-12-02 | End: 2022-12-02

## 2022-12-02 RX ORDER — BUPRENORPHINE HYDROCHLORIDE 0.32 MG/ML
INJECTION INTRAMUSCULAR; INTRAVENOUS
Status: COMPLETED | OUTPATIENT
Start: 2022-12-02 | End: 2022-12-02

## 2022-12-02 RX ORDER — AMOXICILLIN 250 MG
2 CAPSULE ORAL 2 TIMES DAILY
Status: DISCONTINUED | OUTPATIENT
Start: 2022-12-02 | End: 2022-12-22

## 2022-12-02 RX ORDER — BISACODYL 10 MG
10 SUPPOSITORY, RECTAL RECTAL DAILY PRN
Status: DISCONTINUED | OUTPATIENT
Start: 2022-12-02 | End: 2022-12-02

## 2022-12-02 RX ORDER — SODIUM CHLORIDE, SODIUM LACTATE, POTASSIUM CHLORIDE, CALCIUM CHLORIDE 600; 310; 30; 20 MG/100ML; MG/100ML; MG/100ML; MG/100ML
9 INJECTION, SOLUTION INTRAVENOUS CONTINUOUS
Status: DISCONTINUED | OUTPATIENT
Start: 2022-12-02 | End: 2022-12-13

## 2022-12-02 RX ADMIN — Medication 200 MCG: at 10:20

## 2022-12-02 RX ADMIN — DEXTROSE MONOHYDRATE 25 G: 25 INJECTION, SOLUTION INTRAVENOUS at 13:25

## 2022-12-02 RX ADMIN — DEXTROSE MONOHYDRATE 25 G: 500 INJECTION PARENTERAL at 13:25

## 2022-12-02 RX ADMIN — FENTANYL CITRATE 50 MCG: 50 INJECTION, SOLUTION INTRAMUSCULAR; INTRAVENOUS at 13:00

## 2022-12-02 RX ADMIN — METOPROLOL TARTRATE 25 MG: 25 TABLET, FILM COATED ORAL at 21:00

## 2022-12-02 RX ADMIN — ONDANSETRON 4 MG: 2 INJECTION INTRAMUSCULAR; INTRAVENOUS at 10:14

## 2022-12-02 RX ADMIN — FAMOTIDINE 20 MG: 20 TABLET ORAL at 08:43

## 2022-12-02 RX ADMIN — Medication 10 ML: at 21:33

## 2022-12-02 RX ADMIN — ATORVASTATIN CALCIUM 20 MG: 20 TABLET, FILM COATED ORAL at 21:00

## 2022-12-02 RX ADMIN — PHENYLEPHRINE HYDROCHLORIDE 88 MCG/MIN: 10 INJECTION INTRAVENOUS at 10:30

## 2022-12-02 RX ADMIN — MORPHINE SULFATE 2 MG: 2 INJECTION, SOLUTION INTRAMUSCULAR; INTRAVENOUS at 21:32

## 2022-12-02 RX ADMIN — METHADONE HYDROCHLORIDE 10 MG: 10 TABLET ORAL at 21:00

## 2022-12-02 RX ADMIN — DEXTROSE MONOHYDRATE 25 G: 25 INJECTION, SOLUTION INTRAVENOUS at 07:37

## 2022-12-02 RX ADMIN — HYDROMORPHONE HYDROCHLORIDE 0.5 MG: 1 INJECTION, SOLUTION INTRAMUSCULAR; INTRAVENOUS; SUBCUTANEOUS at 12:42

## 2022-12-02 RX ADMIN — TAZOBACTAM SODIUM AND PIPERACILLIN SODIUM 3.38 G: 375; 3 INJECTION, SOLUTION INTRAVENOUS at 21:02

## 2022-12-02 RX ADMIN — TAZOBACTAM SODIUM AND PIPERACILLIN SODIUM 3.38 G: 375; 3 INJECTION, SOLUTION INTRAVENOUS at 15:00

## 2022-12-02 RX ADMIN — METOPROLOL TARTRATE 25 MG: 25 TABLET, FILM COATED ORAL at 08:43

## 2022-12-02 RX ADMIN — PROPOFOL 150 MG: 10 INJECTION, EMULSION INTRAVENOUS at 10:14

## 2022-12-02 RX ADMIN — Medication 300 MCG: at 10:28

## 2022-12-02 RX ADMIN — FENTANYL CITRATE 100 MCG: 50 INJECTION, SOLUTION INTRAMUSCULAR; INTRAVENOUS at 09:28

## 2022-12-02 RX ADMIN — Medication 1000 MG: at 13:17

## 2022-12-02 RX ADMIN — DEXAMETHASONE SODIUM PHOSPHATE 2 MG: 10 INJECTION, SOLUTION INTRAMUSCULAR; INTRAVENOUS at 09:28

## 2022-12-02 RX ADMIN — Medication 300 MCG: at 10:31

## 2022-12-02 RX ADMIN — Medication 300 MCG: at 10:24

## 2022-12-02 RX ADMIN — SODIUM CHLORIDE, POTASSIUM CHLORIDE, SODIUM LACTATE AND CALCIUM CHLORIDE: 600; 310; 30; 20 INJECTION, SOLUTION INTRAVENOUS at 10:14

## 2022-12-02 RX ADMIN — HYDROMORPHONE HYDROCHLORIDE 0.5 MG: 1 INJECTION, SOLUTION INTRAMUSCULAR; INTRAVENOUS; SUBCUTANEOUS at 12:18

## 2022-12-02 RX ADMIN — DEXAMETHASONE SODIUM PHOSPHATE 4 MG: 4 INJECTION, SOLUTION INTRAMUSCULAR; INTRAVENOUS at 10:14

## 2022-12-02 RX ADMIN — LIDOCAINE HYDROCHLORIDE 40 MG: 10 INJECTION, SOLUTION EPIDURAL; INFILTRATION; INTRACAUDAL; PERINEURAL at 10:14

## 2022-12-02 RX ADMIN — SODIUM CHLORIDE, POTASSIUM CHLORIDE, SODIUM LACTATE AND CALCIUM CHLORIDE 1000 ML: 600; 310; 30; 20 INJECTION, SOLUTION INTRAVENOUS at 15:35

## 2022-12-02 RX ADMIN — FENTANYL CITRATE 50 MCG: 50 INJECTION, SOLUTION INTRAMUSCULAR; INTRAVENOUS at 13:25

## 2022-12-02 RX ADMIN — ROCURONIUM BROMIDE 50 MG: 10 SOLUTION INTRAVENOUS at 10:14

## 2022-12-02 RX ADMIN — BUPIVACAINE HYDROCHLORIDE 30 ML: 2.5 INJECTION, SOLUTION EPIDURAL; INFILTRATION; INTRACAUDAL; PERINEURAL at 09:28

## 2022-12-02 RX ADMIN — OXYCODONE 5 MG: 5 TABLET ORAL at 07:36

## 2022-12-02 RX ADMIN — Medication 200 MCG: at 10:19

## 2022-12-02 RX ADMIN — DAPTOMYCIN 600 MG: 500 INJECTION, POWDER, LYOPHILIZED, FOR SOLUTION INTRAVENOUS at 14:10

## 2022-12-02 RX ADMIN — TAZOBACTAM SODIUM AND PIPERACILLIN SODIUM 3.38 G: 375; 3 INJECTION, SOLUTION INTRAVENOUS at 05:50

## 2022-12-02 RX ADMIN — HYDROXYZINE HYDROCHLORIDE 50 MG: 25 TABLET ORAL at 23:17

## 2022-12-02 RX ADMIN — BUPIVACAINE HYDROCHLORIDE 30 ML: 2.5 INJECTION, SOLUTION EPIDURAL; INFILTRATION; INTRACAUDAL at 09:48

## 2022-12-02 RX ADMIN — BUPRENORPHINE HYDROCHLORIDE 0.3 MG: 0.32 INJECTION INTRAMUSCULAR; INTRAVENOUS at 09:28

## 2022-12-02 RX ADMIN — MORPHINE SULFATE 2 MG: 2 INJECTION, SOLUTION INTRAMUSCULAR; INTRAVENOUS at 17:39

## 2022-12-02 RX ADMIN — SUGAMMADEX 200 MG: 100 INJECTION, SOLUTION INTRAVENOUS at 11:51

## 2022-12-02 RX ADMIN — OXYCODONE 5 MG: 5 TABLET ORAL at 14:38

## 2022-12-02 RX ADMIN — SODIUM CHLORIDE 35 ML/HR: 4.5 INJECTION, SOLUTION INTRAVENOUS at 15:06

## 2022-12-02 NOTE — PROGRESS NOTES
[facility] Medicine Services  PROGRESS NOTE    Patient Name: Buster Velasco  : 1965  MRN: [id number]    Date of Admission: 2022  Primary Care Physician: Ludivina Bowers MD    Subjective   Subjective     CC:  F/u foot infection    HPI:  Saw patient post-op. Feels procedure went well. Asking for home methadone - verifying previous methadone dosing    ROS:  Gen- No fevers, chills  CV- No chest pain, palpitations  Resp- No cough, dyspnea  GI- No N/V/D, abd pain     Objective   Objective     Vital Signs:   Temp:  [97.2 °F (36.2 °C)-99 °F (37.2 °C)] 98.6 °F (37 °C)  Heart Rate:  [65-98] 76  Resp:  [16-22] 20  BP: ()/(46-85) 77/53  Flow (L/min):  [2] 2     Physical Exam:  Constitutional: Uncomfortable appearing awake alert male lying in bed  HENT: NCAT, mucous membranes moist  Respiratory: Clear to auscultation bilaterally, respiratory effort normal   Cardiovascular: RRR, no murmurs, rubs, or gallops  Gastrointestinal: Soft, nontender, nondistended  Musculoskeletal: Digit amputation left hand, right leg s/p BKA with surgical dressing and MEL drain in place. Muscle tone within normal limits  Psychiatric: Appropriate affect, cooperative  Neurologic: Alert and oriented, facial movements symmetric and spontaneous movement of all 4 extremities grossly equal bilaterally, speech clear  Skin: No rashes    Results Reviewed:  LAB RESULTS:      Lab 22  0715 22  0810 22  0053 22  1905   WBC 6.89 7.22  --  13.92*   HEMOGLOBIN 11.0* 10.0*  --  11.1*   HEMATOCRIT 36.1* 32.2*  --  35.5*   PLATELETS 405 413  --  532*   NEUTROS ABS 4.43 5.64  --  11.44*   IMMATURE GRANS (ABS) 0.09* 0.06*  --  0.14*   LYMPHS ABS 1.59 1.02  --  1.64   MONOS ABS 0.59 0.42  --  0.64   EOS ABS 0.13 0.06  --  0.03   MCV 85.3 84.3  --  84.5   SED RATE  --  109*  --   --    CRP  --  9.10*  --   --    PROCALCITONIN  --   --   --  0.17   LACTATE  --   --  2.0 2.1*         Lab  12/02/22  0715 12/01/22  0810 11/30/22  1905   SODIUM 139 135* 134*   POTASSIUM 5.0 4.2 4.1   CHLORIDE 105 103 100   CO2 23.0 21.0* 21.0*   ANION GAP 11.0 11.0 13.0   BUN 14 18 18   CREATININE 1.13 1.24 1.29*   EGFR 75.3 67.4 64.3   GLUCOSE 46* 52* 54*   CALCIUM 9.2 9.2 9.1   HEMOGLOBIN A1C  --  6.90*  --          Lab 11/30/22  1905   TOTAL PROTEIN 7.6   ALBUMIN 3.00*   GLOBULIN 4.6   ALT (SGPT) 12   AST (SGOT) 19   BILIRUBIN 0.2   ALK PHOS 85   LIPASE 14                 Lab 12/01/22  1035 11/30/22  1905   IRON  --  18*   IRON SATURATION  --  6*   TIBC  --  277*   TRANSFERRIN  --  186*   FERRITIN  --  317.40   FOLATE  --  15.90   VITAMIN B 12  --  717   ABO TYPING O O   RH TYPING Positive Positive   ANTIBODY SCREEN Negative  --          Brief Urine Lab Results  (Last result in the past 365 days)      Color   Clarity   Blood   Leuk Est   Nitrite   Protein   CREAT   Urine HCG        08/20/22 0108 Yellow   Clear   Negative   Negative     Negative                 Microbiology Results Abnormal     Procedure Component Value - Date/Time    Blood Culture - Blood, Arm, Right [[id number]]  (Normal) Collected: 11/30/22 2250    Lab Status: Preliminary result Specimen: Blood from Arm, Right Updated: 12/02/22 1500     Blood Culture No growth at 24 hours          FL Video Swallow With Speech Single Contrast    Result Date: 12/1/2022  EXAMINATION: FL VIDEO SWALLOW W SPEECH SINGLE-CONTRAST-  INDICATION: hx dysphagia, PEG- wants removed; L08.9-Local infection of the skin and subcutaneous tissue, unspecified; E16.2-Hypoglycemia, unspecified; L89.159-Pressure ulcer of sacral region, unspecified stage; R13.10-Dysphagia, unspecified  TECHNIQUE: 1 minute and 18 seconds of fluoroscopic time was used for this exam. 11 cc of fluoroscopic loops were saved. The patient was evaluated in the seated lateral position while taking a variety of consistencies of barium by mouth under the direction of speech pathology.  COMPARISON: NONE  FINDINGS: 1  minute and 18 seconds of fluoroscopy provided for a modified barium swallow. Please see speech therapy report for full details and recommendations.       Impression: Fluoroscopy provided for a modified barium swallow. Please see speech therapy report for full details and recommendations.    This report was finalized on 12/1/2022 10:11 PM by Dr. Yobany Dickey MD.      XR Chest 1 View    Result Date: 12/1/2022  EXAMINATION: Chest one view. DATE: 11/30/2022. COMPARISON: None available. CLINICAL HISTORY: Infection. FINDINGS: The lungs and pleural spaces are clear. The cardiomediastinal silhouette and the pulmonary vessels are within normal limits.     Impression: No acute cardiopulmonary process. Electronically signed by:  Pierre Baron D.O.  11/30/2022 10:15 PM Mountain Time    MRI Foot Right Without Contrast    Result Date: 12/1/2022  EXAMINATION: MRI FOOT RIGHT WO CONTRAST-  INDICATION: Right foot wound. Osteomyelitis?; L08.9-Local infection of the skin and subcutaneous tissue, unspecified; E16.2-Hypoglycemia, unspecified; L89.159-Pressure ulcer of sacral region, unspecified stage  TECHNIQUE: Multiplanar, multisequence MR imaging of the right foot without IV contrast.  COMPARISON: Photographs of right foot wounds available in the electronic medical record dated 11/30/2022  FINDINGS:  The exam is somewhat limited owing to some motion degradation of a few sequences.  There is evidence of prior transmetatarsal amputation.  There is a large deep wound/ulcer at the lateral aspect of the stump without evidence of discrete/drainable fluid collection. There is tenosynovitis at the master knot of Nav[place] which may be reactive or infectious. There is abnormal T2/STIR hyperintense, T1 hypointense marrow signal change of the remnant fifth metatarsal base with likely erosion, compatible with osteomyelitis (short axis image 17, sagittal image 6). There is some patchy T2/STIR hyperintense marrow signal within the adjacent third and  fourth metatarsal base remnants and distal cuboid, without definite loss of the normal T1 hyperintense marrow signal, likely reactive marrow edema although contiguous early osteomyelitis would be difficult to exclude.  Additional wound at the lateral ankle, without evidence of discrete/drainable fluid collection. There is abnormal T2/STIR hyperintense marrow signal and T1 hypointensity of the underlying lateral malleolus (long axis image 5, short axis image 27).      Impression:  Deep ulceration at the lateral stump with osteomyelitis of the underlying remnant fifth metatarsal base.  Wound at the lateral ankle with osteomyelitis of the underlying lateral malleolus.  This report was finalized on 12/1/2022 1:33 PM by Min Tom MD.      Right Popliteal Nerve Catheter    Result Date: 12/2/2022  Yokasta Fonseca CRNA     12/2/2022  9:56 AM Right Popliteal Nerve Catheter Patient reassessed immediately prior to procedure Patient location during procedure: pre-op Reason for block: at surgeon's request and post-op pain management Performed by ESTUARDO/CAA: Yokasta Fonseca CRNA Assisted by: Ruth Bales RN Preanesthetic Checklist Completed: patient identified, IV checked, site marked, risks and benefits discussed, surgical consent, monitors and equipment checked, pre-op evaluation and timeout performed Prep: Pt Position: left lateral decubitus Sterile barriers:cap, gloves, mask and washed/disinfected hands Prep: ChloraPrep Patient monitoring: blood pressure monitoring, continuous pulse oximetry and EKG Procedure Sedation: yes Performed under: local infiltration Guidance:ultrasound guided ULTRASOUND INTERPRETATION.  Using ultrasound guidance a 20 G gauge needle was placed in close proximity to the nerve, at which point, under ultrasound guidance anesthetic was injected in the area of the nerve and spread of the anesthesia was seen on ultrasound in close proximity thereto.  There were no abnormalities seen on  ultrasound; a digital image was taken; and the patient tolerated the procedure with no complications. Images:still images obtained, printed/placed on chart Laterality:right Block Type:popliteal Injection Technique:catheter Needle Type:echogenic and Tuohy Needle Gauge:18 G Resistance on Injection: none Catheter Size:20 G Cath Depth at skin: 8 cm Medications Used: bupivacaine PF (MARCAINE) 0.25 % injection - Injection, Popliteal  30 mL - 12/2/2022 9:48:00 AM Post Assessment Injection Assessment: negative aspiration for heme, no paresthesia on injection and incremental injection Patient Tolerance:comfortable throughout block Complications:no Additional Notes CATHETER                             A high-frequency linear transducer, with sterile cover, was placed in the popliteal fossa to identify the popliteal artery and vein, Tibial nerve (TN) and Common Peroneal nerve (CP). The transducer was then moved in a cephalad fashion to observe the TN and CP nerve bifurcation to form the Sciatic Nerve. The insertion site was prepped and draped in sterile fashion. Skin and cutaneous tissue was infiltrated with 2-5 ml of 1% Lidocaine. Using ultrasound-guidance, an 18-gauge Contiplex Ultra 360 Touhy needle was advanced in plane from lateral to medial. Preservative-free normal saline was utilized for hydro-dissection of tissue, advancement of Touhy needle, and to confirm final needle placement posterior to the nerves. Local anesthetic injection spread, in incremental 3-5 ml injections, to surround both nerve structures. Aspiration every 5 ml to prevent intravascular injection. Injection was completed with negative aspiration of blood and negative intravascular injection. Injection pressures were normal with minimal resistance. A 20-gauge Contiplex Echo catheter was placed through the needle and advance out the tip of the Touhy 1-3 cm. The Touhy needle was then removed, and final catheter position verified (Below/above or  "Anterior/Posterior) the nerve structures. The catheter was secured in the usual fashion with skin glue, benzoin, steri-strips, CHG tegaderm and Label noting \"Nerve Block Catheter\". Jerk tape applied at yellow connector and catheter connection.     Right Adductor Canal SS block    Result Date: 12/2/2022  Yokasta Fonseca CRNA     12/2/2022  9:55 AM Right Adductor Canal SS block Patient reassessed immediately prior to procedure Patient location during procedure: pre-op Reason for block: at surgeon's request and post-op pain management Performed by ESTUARDO/CAA: Yokasta Fonseca CRNA Assisted by: Ruth Bales RN Preanesthetic Checklist Completed: patient identified, IV checked, site marked, risks and benefits discussed, surgical consent, monitors and equipment checked, pre-op evaluation and timeout performed Prep: Pt Position: supine Sterile barriers:cap, gloves, mask, sterile barriers and washed/disinfected hands Prep: ChloraPrep Patient monitoring: blood pressure monitoring, continuous pulse oximetry and EKG Procedure Performed under: spinal Guidance:ultrasound guided ULTRASOUND INTERPRETATION.  Using ultrasound guidance a 20 G gauge needle was placed in close proximity to the nerve, at which point, under ultrasound guidance anesthetic was injected in the area of the nerve and spread of the anesthesia was seen on ultrasound in close proximity thereto.  There were no abnormalities seen on ultrasound; a digital image was taken; and the patient tolerated the procedure with no complications. Images:still images obtained, printed/placed on chart Laterality:right Block Type:adductor canal block Injection Technique:single-shot Needle Type:echogenic and short-bevel Needle Gauge:20 G Resistance on Injection: none Medications Used: fentaNYL citrate (PF) (SUBLIMAZE) injection - Intravenous, Right Arm  100 mcg - 12/2/2022 9:28:00 AM buprenorphine (BUPRENEX) injection - Injection, Adductor Canal  0.3 mg - 12/2/2022 9:28:00 " "AM dexamethasone sodium phosphate injection - Injection, Adductor Canal  2 mg - 12/2/2022 9:28:00 AM bupivacaine PF (MARCAINE) 0.25 % injection - Injection, Adductor Canal  30 mL - 12/2/2022 9:28:00 AM Post Assessment Injection Assessment: negative aspiration for heme, incremental injection and no paresthesia on injection Patient Tolerance:comfortable throughout block Complications:no Additional Notes SINGLE shot A high-frequency linear transducer, with sterile cover, was placed on the anterior mid-thigh (between the anterior superior iliac spine and patella). The transducer was then moved medially to identify the Sartorius muscle (Stevie), Vastus Medialis muscle (VMM), Superficial Femoral Artery (SFA) and Vein. The transducer was then moved cephalad or caudad to position the SFA in the middle of the Stevie. The insertion site was prepped and draped in sterile fashion. Skin and cutaneous tissue was infiltrated with 2-5 ml of 1% Lidocaine. Using ultrasound-guidance, a 20-gauge B-Butterfield 4\" Ultraplex 360 non-stimulating echogenic needle was advanced in plane from lateral to medial. Preservative-free normal saline was utilized for hydro-dissection of tissue, advancement of needle, and to confirm needle placement below the fascial plane of the Stevie where the Nerve to the VMM is located. Local anesthetic (LA) 5 ml deposited here. The needle continues its path lateral to the SFA at the level of the Saphenous Nerve. The remainder of the LA was deposited at the 10-11 o'clock position of the SFA. This injection created a space between the Stevie and the SFA. Aspiration every 5 ml to prevent intravascular injection. Injection was completed with negative aspiration of blood and negative intravascular injection. Injection pressures were normal with minimal resistance.           I have reviewed the medications:  Scheduled Meds:atorvastatin, 20 mg, Per PEG Tube, Nightly  DAPTOmycin, 6 mg/kg, Intravenous, Q24H  HYDROmorphone, , ,   insulin " lispro, 0-9 Units, Subcutaneous, TID AC  metoprolol tartrate, 25 mg, Per PEG Tube, Q12H  piperacillin-tazobactam, 3.375 g, Intravenous, Q8H  sodium chloride, 10 mL, Intravenous, Q12H  sodium chloride, 10 mL, Intravenous, Q12H      Continuous Infusions:dextrose 5 % and lactated Ringer's, 100 mL/hr, Last Rate: 100 mL/hr (12/01/22 2222)  lactated ringers, 9 mL/hr  Pharmacy Consult - Pharmacy to dose,   ropivacaine,   sodium chloride, 35 mL/hr, Last Rate: 35 mL/hr (12/02/22 1506)      PRN Meds:.•  acetaminophen  •  bisacodyl  •  dextrose  •  dextrose  •  glucagon (human recombinant)  •  hydrOXYzine  •  melatonin  •  Morphine  •  ondansetron **OR** ondansetron  •  oxyCODONE  •  Pharmacy Consult - Pharmacy to dose  •  sennosides-docusate  •  sodium chloride  •  sodium chloride  •  sodium chloride  •  sodium chloride    Assessment & Plan   Assessment & Plan     Active Hospital Problems    Diagnosis  POA   • **Right foot infection [L08.9]  Yes   • Sepsis (HCC) [A41.9]  Yes   • Hypoglycemia [E16.2]  Yes   • Anemia [D64.9]  Yes   • Hyponatremia [E87.1]  Yes   • Hypoalbuminemia [E88.09]  Yes   • Essential hypertension [I10]  Yes   • Hyperlipidemia [E78.5]  Yes   • Paroxysmal atrial fibrillation (HCC) [I48.0]  Yes      Resolved Hospital Problems   No resolved problems to display.        Brief Hospital Course to date:  Buster Velasco is a 57 y.o. male with a history of prior osteomyelitis s/p left BKA, s/p right transmetatarsal amputation, left hand 3rd metacarpal resection,  and DVT s/p IVC filter who presents w right foot wounds. S/p right BKA on 12/2 w Dr Peguero    1) Sepsis 2/2 acute right foot non-healing wound and likely osteomyelitis  -s/p right BKA 12/2 w Dr Peguero  -IV abx per ID  -post-op pain control, bowel regimen    2) ITD of lower gluteal cleft - wound care following    3) Recurrent hypoglycemia in setting of DMII - high dose Tresiba qAM before arrival which has been held. Low Glc when NPO --> on D5  "maintenance fluids now. D/c when able. Stop SSI    4) Chronic pain on opioids - restart home methadone 10 mg up to 4 times daily. Morphine for breakthrough post-op. ANGELIQUE reviewed and 3 day courses immediately before arrival    5) Reported h/o Afib - d/w patient who says this was evaluated and \"just a problem with the test\" and denies any h/o Afib. Not previously on anticoagulation and given murky history will not start. On metoprolol dose at home which is continued    Chronic vocal cord paralysis w PEG tube in place - SLP evaluation w clearance for diet  HTN - hold home meds 2/2 borderline BP  HLD - statin  DVT s/p IVC filter - d/w patient, appears this was placed in spring 2022. Defer to PCP for further assessment and time of removal  H/o seizure in setting of meth use - not on AED's given meth trigger  BPH - tamsulosin    Expected Discharge Location and Transportation: home  Expected Discharge Date: 12/5 (Discharge date is tentative pending patient's medical condition and is subject to change)    DVT prophylaxis:  Mechanical DVT prophylaxis orders are present.          CODE STATUS:   Code Status and Medical Interventions:   Ordered at: 12/01/22 0035     Code Status (Patient has no pulse and is not breathing):    CPR (Attempt to Resuscitate)     Medical Interventions (Patient has pulse or is breathing):    Full Support       Ernestina Chin MD  12/02/22              "

## 2022-12-02 NOTE — PLAN OF CARE
Goal Outcome Evaluation:  Plan of Care Reviewed With: patient        Progress: improving  Outcome Evaluation: VSS. Blood glucose are now stable. IV and PICC line are intact and patent. Patient schedule to have surgery tomorrow. He is now on a specialty bed due to limnited mobility. PRN medication provided for pain control.

## 2022-12-02 NOTE — PROGRESS NOTES
Cardiothoracic Surgery Progress Note      POD #:     LOS: 2 days      Subjective: Talk to patient today about pending below-knee amputation the right leg.  Told him about the MRI findings.  He understands.  He agrees to for us to proceed ahead with below-knee amputation the right leg.  Risk and benefits were explained.  They include bleeding, stroke, heart attack, death, possible postop phantom pain, possible need for amputation a higher level in the future due to infection and/or ischemia.    Objective:  Vital Signs vital signs below noted T-max past 2490.6  Temp:  [98 °F (36.7 °C)-98.6 °F (37 °C)] 98.2 °F (36.8 °C)  Heart Rate:  [69-98] 69  Resp:  [16] 16  BP: ()/(54-84) 101/65    Physical Exam:   General Appearance: Awake and oriented x3   Lungs:   Heart:   Skin:   Incision:     Results:  Results from last 7 days   Lab Units 12/01/22  0810   WBC 10*3/mm3 7.22   HEMOGLOBIN g/dL 10.0*   HEMATOCRIT % 32.2*   PLATELETS 10*3/mm3 413     Results from last 7 days   Lab Units 12/01/22  0810   SODIUM mmol/L 135*   POTASSIUM mmol/L 4.2   CHLORIDE mmol/L 103   CO2 mmol/L 21.0*   BUN mg/dL 18   CREATININE mg/dL 1.24   GLUCOSE mg/dL 52*   CALCIUM mg/dL 9.2   MRI reviewed with radiologist yesterday obvious osteomyelitis of the fifth metatarsal bone tarsal bones and lateral malleolar of the fibula      Assessment: Osteomyelitis of right foot and  right ankle  #2 status post left below-knee amputation in the past due to diabetic neuropathy osteomyelitis.    Plan: Right below-knee amputation planned for today.  Patient understands agrees for me to proceed ahead.  Risk and benefits were explained as noted above.  Overall medical manage per hospitalist.  Antibiotics infectious ease.      John Peguero MD - 12/02/22 - 05:37 EST

## 2022-12-02 NOTE — ANESTHESIA PROCEDURE NOTES
Right Popliteal Nerve Catheter      Patient reassessed immediately prior to procedure    Patient location during procedure: pre-op  Reason for block: at surgeon's request and post-op pain management  Performed by  CRNA/CAA: Yokasta Fonseac CRNA  Assisted by: Ruth Bales RN  Preanesthetic Checklist  Completed: patient identified, IV checked, site marked, risks and benefits discussed, surgical consent, monitors and equipment checked, pre-op evaluation and timeout performed  Prep:  Pt Position: left lateral decubitus  Sterile barriers:cap, gloves, mask and washed/disinfected hands  Prep: ChloraPrep  Patient monitoring: blood pressure monitoring, continuous pulse oximetry and EKG  Procedure    Sedation: yes  Performed under: local infiltration  Guidance:ultrasound guided    ULTRASOUND INTERPRETATION.  Using ultrasound guidance a 20 G gauge needle was placed in close proximity to the nerve, at which point, under ultrasound guidance anesthetic was injected in the area of the nerve and spread of the anesthesia was seen on ultrasound in close proximity thereto.  There were no abnormalities seen on ultrasound; a digital image was taken; and the patient tolerated the procedure with no complications. Images:still images obtained, printed/placed on chart    Laterality:right  Block Type:popliteal  Injection Technique:catheter  Needle Type:echogenic and Tuohy  Needle Gauge:18 G  Resistance on Injection: none  Catheter Size:20 G  Cath Depth at skin: 8 cm    Medications Used: bupivacaine PF (MARCAINE) 0.25 % injection - Injection, Popliteal   30 mL - 12/2/2022 9:48:00 AM      Post Assessment  Injection Assessment: negative aspiration for heme, no paresthesia on injection and incremental injection  Patient Tolerance:comfortable throughout block  Complications:no  Additional Notes  CATHETER                               A high-frequency linear transducer, with sterile cover, was placed in the popliteal fossa to identify the  "popliteal artery and vein, Tibial nerve (TN) and Common Peroneal nerve (CP). The transducer was then moved in a cephalad fashion to observe the TN and CP nerve bifurcation to form the Sciatic Nerve. The insertion site was prepped and draped in sterile fashion. Skin and cutaneous tissue was infiltrated with 2-5 ml of 1% Lidocaine. Using ultrasound-guidance, an 18-gauge Contiplex Ultra 360 Touhy needle was advanced in plane from lateral to medial. Preservative-free normal saline was utilized for hydro-dissection of tissue, advancement of Touhy needle, and to confirm final needle placement posterior to the nerves. Local anesthetic injection spread, in incremental 3-5 ml injections, to surround both nerve structures. Aspiration every 5 ml to prevent intravascular injection. Injection was completed with negative aspiration of blood and negative intravascular injection. Injection pressures were normal with minimal resistance. A 20-gauge VideoProsiplex Echo catheter was placed through the needle and advance out the tip of the Touhy 1-3 cm. The Touhy needle was then removed, and final catheter position verified (Below/above or Anterior/Posterior) the nerve structures. The catheter was secured in the usual fashion with skin glue, benzoin, steri-strips, CHG tegaderm and Label noting \"Nerve Block Catheter\". Jerk tape applied at yellow connector and catheter connection.            "

## 2022-12-02 NOTE — ANESTHESIA POSTPROCEDURE EVALUATION
Patient: Buster Velasco    Procedure Summary     Date: 12/02/22 Room / [facility]    Anesthesia Start: 1006 Anesthesia Stop: 1203    Procedure: Right AMPUTATION BELOW KNEE (Right: Leg Lower) Diagnosis:       Right foot infection      (Right foot infection [L08.9])    Surgeons: John Peguero MD Provider: Siva Crawford MD    Anesthesia Type: general ASA Status: 4          Anesthesia Type: general    Vitals  Vitals Value Taken Time   BP     Temp     Pulse     Resp     SpO2 99 % 12/02/22 1203           Post Anesthesia Care and Evaluation    Patient location during evaluation: PACU  Patient participation: complete - patient participated  Level of consciousness: awake and alert  Pain management: adequate    Airway patency: patent  Anesthetic complications: No anesthetic complications  PONV Status: none  Cardiovascular status: hemodynamically stable and acceptable  Respiratory status: nonlabored ventilation, acceptable and nasal cannula  Hydration status: acceptable

## 2022-12-02 NOTE — PLAN OF CARE
Goal Outcome Evaluation:  Plan of Care Reviewed With: patient, family        Progress: improving  Outcome Evaluation: VSS, alert and oriented, blood sugar was stable throughout the night, pt given one dose of methadone last night, he has been on methadone for 17 yrs.

## 2022-12-02 NOTE — PROGRESS NOTES
[facility] Progress Note        Antibiotics:  Anti-Infectives (From admission, onward)    Ordered     Dose/Rate Route Frequency Start Stop    12/01/22 0829  DAPTOmycin (CUBICIN) 600 mg in sodium chloride 0.9 % 50 mL IVPB        Ordering Provider: Zeke George MD    6 mg/kg × 100 kg  100 mL/hr over 30 Minutes Intravenous Every 24 Hours 12/01/22 1000 12/15/22 0959    12/01/22 0055  piperacillin-tazobactam (ZOSYN) 3.375 g in iso-osmotic dextrose 50 ml (premix)        Ordering Provider: Saurav Frederick, PharmD    3.375 g  over 4 Hours Intravenous Every 8 Hours 12/01/22 0600 12/06/22 0559    11/30/22 2219  vancomycin 2000 mg/500 mL 0.9% NS IVPB ([facility])        Ordering Provider: Reggie Batres MD    20 mg/kg × 98.9 kg  over 2 Hours Intravenous Once 11/30/22 2221 12/01/22 0315    11/30/22 2219  piperacillin-tazobactam (ZOSYN) 3.375 g in iso-osmotic dextrose 50 ml (premix)        Ordering Provider: Reggie Batres MD    3.375 g Intravenous Once 11/30/22 2221 11/30/22 2335          CC: foot infection    HPI:    Patient is a 57 y.o.  Yr old male with history of prior lower extremity infection/amputations done in [facility].  He has a history of left BKA years ago.  More recent right transmetatarsal amputation but specific dates unclear and unclear who the surgeon was.  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  He has history DVT/IVC filter, diabetes and other comorbidities as below.  He reports a chronic right lateral foot wound present for months but apparently not precipitated by any specific event or trauma.  He is admitted to the hospital on November 30 with deterioration at the foot including redness/swelling     12/2/22 Dr Peguero making surgical plans; same pain to the right foot which is dull at  "present, constant, nonradiating, worse with palpation, generally better with pain meds and 3 out of 10 in severity.  Not progressive     No headache photophobia or neck stiffness.  No shortness of breath cough or hemoptysis.  No nausea vomiting diarrhea or abdominal pain.  No dysuria hematuria or pyuria.  No other new skin rash       ROS:      12/2/22 No f/c/s. No n/v/d. No rash. No new ADR to Abx.     Constitutional-- No Fever, chills or sweats.  Appetite good, and no malaise. No fatigue.  Heent--chronic hoarse voice.  No new vision, hearing or throat complaints.  No epistaxis or oral sores.   No flashers, floaters or eye pain.   No headache, photophobia or neck stiffness.  Chronic PEG tube  CV-- No chest pain, palpitation or syncope  Resp-- No SOB/cough/Hemoptysis  GI- No nausea, vomiting, or diarrhea.  No hematochezia, melena, or hematemesis. Denies jaundice or chronic liver disease.  -- No dysuria, hematuria, or flank pain.  Denies hesitancy, urgency or flank pain.  Lymph- no swollen lymph nodes in neck/axilla or groin.   Heme- No active bruising or bleeding; no Hx of DVT or PE.  MS-- no swelling or pain in the bones or joints of arms/legs.  No new back pain.  Neuro-- No acute focal weakness or numbness in the arms or legs.  No seizures.     Full 12 point review of systems reviewed and negative otherwise for acute complaints, except for above      PE:   BP 98/72 (BP Location: Left arm, Patient Position: Lying)   Pulse 70   Temp 97.2 °F (36.2 °C) (Temporal)   Resp 16   Ht 193 cm (76\")   Wt 100 kg (221 lb)   SpO2 93%   BMI 26.90 kg/m²          GENERAL: Awake and alert, in no acute distress.  Hoarse voice noted  HEENT: Normocephalic, atraumatic.  PERRL. EOMI. No conjunctival injection. No icterus. Oropharynx   without evidence of thrush or exudate. No evidence of peridontal disease.    NECK: Supple without nuchal rigidity. No mass.  LYMPH: No cervical, axillary or inguinal lymphadenopathy.  HEART: RRR; No " murmur, rubs, gallops.   LUNGS: Diminished at bases but otherwise clear to auscultation bilaterally without wheezing, rales, rhonchi. Normal respiratory effort. Nonlabored. No dullness.  ABDOMEN: Soft, nontender, nondistended. Positive bowel sounds. No rebound or guarding. NO mass or HSM.  PEG tube noted  EXT:  No cyanosis, clubbing or edema. No cord.  : Genitalia generally unremarkable.  Without Orantes catheter.  MSK: FROM without joint effusions noted arms/legs.    SKIN: Warm and dry without cutaneous eruptions on Inspection/palpation.    NEURO: Oriented to PPT. No focal deficits on motor/sensory exam at arms/legs.  PSYCHIATRIC: Normal insight and judgement. Cooperative with PE     Right lower extremity with open wound lateral aspect near his fifth metatarsal, probable probe to bone and vague surrounding erythema but no discrete mass bulge or fluctuance.  No crepitus or bulla.  Right lateral ankle with open wound as well, unable to determine depth but likely through dermis and vague surrounding redness but no discrete mass bulge or fluctuance.  No crepitus or bulla.     No peripheral stigmata/phenomenon of endocarditis     IV without obvious redness or drainage     Left BKA site with no redness induration or warmth.  No discrete mass bulge or fluctuance.    Laboratory Data    Results from last 7 days   Lab Units 12/02/22  0715 12/01/22  0810 11/30/22  1905   WBC 10*3/mm3 6.89 7.22 13.92*   HEMOGLOBIN g/dL 11.0* 10.0* 11.1*   HEMATOCRIT % 36.1* 32.2* 35.5*   PLATELETS 10*3/mm3 405 413 532*     Results from last 7 days   Lab Units 12/02/22  0715   SODIUM mmol/L 139   POTASSIUM mmol/L 5.0   CHLORIDE mmol/L 105   CO2 mmol/L 23.0   BUN mg/dL 14   CREATININE mg/dL 1.13   GLUCOSE mg/dL 46*   CALCIUM mg/dL 9.2     Results from last 7 days   Lab Units 11/30/22  1905   ALK PHOS U/L 85   BILIRUBIN mg/dL 0.2   ALT (SGPT) U/L 12   AST (SGOT) U/L 19     Results from last 7 days   Lab Units 12/01/22  0810   SED RATE mm/hr 109*      Results from last 7 days   Lab Units 12/01/22  0810   CRP mg/dL 9.10*       Estimated Creatinine Clearance: 100.8 mL/min (by C-G formula based on SCr of 1.13 mg/dL).      Microbiology:      Radiology:  Imaging Results (Last 72 Hours)     Procedure Component Value Units Date/Time    FL Video Swallow With Speech Single Contrast [[id number]] Collected: 12/01/22 1611     Updated: 12/01/22 2214    Narrative:      EXAMINATION: FL VIDEO SWALLOW W SPEECH SINGLE-CONTRAST-     INDICATION: hx dysphagia, PEG- wants removed; L08.9-Local infection of  the skin and subcutaneous tissue, unspecified; E16.2-Hypoglycemia,  unspecified; L89.159-Pressure ulcer of sacral region, unspecified stage;  R13.10-Dysphagia, unspecified     TECHNIQUE: 1 minute and 18 seconds of fluoroscopic time was used for  this exam. 11 cc of fluoroscopic loops were saved. The patient was  evaluated in the seated lateral position while taking a variety of  consistencies of barium by mouth under the direction of speech  pathology.     COMPARISON: NONE     FINDINGS: 1 minute and 18 seconds of fluoroscopy provided for a modified  barium swallow. Please see speech therapy report for full details and  recommendations.          Impression:      Fluoroscopy provided for a modified barium swallow. Please  see speech therapy report for full details and recommendations.         This report was finalized on 12/1/2022 10:11 PM by Dr. Yobany Dickey MD.       MRI Foot Right Without Contrast [[id number]] Collected: 12/01/22 0444     Updated: 12/01/22 1336    Narrative:      EXAMINATION: MRI FOOT RIGHT WO CONTRAST-      INDICATION: Right foot wound. Osteomyelitis?; L08.9-Local infection of  the skin and subcutaneous tissue, unspecified; E16.2-Hypoglycemia,  unspecified; L89.159-Pressure ulcer of sacral region, unspecified stage     TECHNIQUE: Multiplanar, multisequence MR imaging of the right foot  without IV contrast.     COMPARISON: Photographs of right foot wounds available  in the electronic  medical record dated 11/30/2022     FINDINGS:      The exam is somewhat limited owing to some motion degradation of a few  sequences.     There is evidence of prior transmetatarsal amputation.     There is a large deep wound/ulcer at the lateral aspect of the stump  without evidence of discrete/drainable fluid collection. There is  tenosynovitis at the master knot of Nav[place]et which may be reactive or  infectious. There is abnormal T2/STIR hyperintense, T1 hypointense  marrow signal change of the remnant fifth metatarsal base with likely  erosion, compatible with osteomyelitis (short axis image 17, sagittal  image 6). There is some patchy T2/STIR hyperintense marrow signal within  the adjacent third and fourth metatarsal base remnants and distal  cuboid, without definite loss of the normal T1 hyperintense marrow  signal, likely reactive marrow edema although contiguous early  osteomyelitis would be difficult to exclude.     Additional wound at the lateral ankle, without evidence of  discrete/drainable fluid collection. There is abnormal T2/STIR  hyperintense marrow signal and T1 hypointensity of the underlying  lateral malleolus (long axis image 5, short axis image 27).       Impression:         Deep ulceration at the lateral stump with osteomyelitis of the  underlying remnant fifth metatarsal base.     Wound at the lateral ankle with osteomyelitis of the underlying lateral  malleolus.     This report was finalized on 12/1/2022 1:33 PM by Min Tom MD.       XR Chest 1 View [[id number]] Collected: 11/30/22 2215     Updated: 12/01/22 0016    Narrative:      EXAMINATION: Chest one view.    DATE: 11/30/2022.    COMPARISON: None available.    CLINICAL HISTORY: Infection.    FINDINGS:    The lungs and pleural spaces are clear.    The cardiomediastinal silhouette and the pulmonary vessels are within normal limits.      Impression:        No acute cardiopulmonary process.    Electronically signed by:   Pierre Baron D.O.    11/30/2022 10:15 PM Mountain Time            Impression:     --Acute right foot/ankle with multifocal ulceration, cellulitis/wound infection and probable osteomyelitis with probe to bone at the lateral foot and abnormal MRI.  Other comorbidities as outlined above and high risk for further serious morbidity and other serious sequelae including persistent/recurrent or nonhealing wounds, persistent/progressive or recurrent infection and risk for further functional/limb loss/amputation.  Surgery following to help define timing/option of threshold for surgical intervention including extent of debridement/amputation at their discretion.  High risk for mixed infection in the setting including gram-positive/gram-negative's and aerobic/anaerobic organisms.  Empiric broad-spectrum antimicrobials ongoing until further data     --History MRSA     --Chronic vocal cord paralysis per notes with chronic hoarseness and indwelling PEG tube.  Medicine team to clarify     --History left BKA.     --Diabetes.  You need to tightly control blood sugar to give best chance for healing     --History of DVT with IVC filter     PLAN:      -- Daptomycin/Zosyn IV     -- Check/review labs cultures and scans     -- Partial history per nursing staff     -- Discussed with microbiology and family     --d/w Dr Peguero; he is making surgical plans     -- Highly complex set of issues with high risk for further serious morbidity and other serious sequela     Zeke George MD  12/2/2022

## 2022-12-02 NOTE — CASE MANAGEMENT/SOCIAL WORK
Continued Stay Note   [place]     Patient Name: Buster Velasco  MRN: [id number]  Today's Date: 12/2/2022    Admit Date: 11/30/2022    Plan: Home   Discharge Plan     Row Name 12/02/22 1216       Plan    Plan Home    Patient/Family in Agreement with Plan yes    Plan Comments Mr. Velasco is in surgery today with Dr. Peguero for a R BKA. Case management will continue to follow.    Final Discharge Disposition Code 01 - home or self-care               Discharge Codes    No documentation.               Expected Discharge Date and Time     Expected Discharge Date Expected Discharge Time    Dec 3, 2022             Alan Downing RN

## 2022-12-02 NOTE — ANESTHESIA PREPROCEDURE EVALUATION
Anesthesia Evaluation     Patient summary reviewed and Nursing notes reviewed                Airway   Mallampati: I  TM distance: >3 FB  Neck ROM: full  No difficulty expected  Dental - normal exam     Pulmonary - negative pulmonary ROS and normal exam   Cardiovascular - normal exam    (+) hypertension, dysrhythmias Atrial Fib, DVT, hyperlipidemia,       Neuro/Psych  (+) psychiatric history,    GI/Hepatic/Renal/Endo    (+)   diabetes mellitus,     Musculoskeletal (-) negative ROS    Abdominal  - normal exam    Bowel sounds: normal.   Substance History - negative use     OB/GYN negative ob/gyn ROS         Other                        Anesthesia Plan    ASA 4     general     (POPLITEAL CATHETER/ FEMORAL SS FOR POSTOP PAIN)  intravenous induction     Anesthetic plan, risks, benefits, and alternatives have been provided, discussed and informed consent has been obtained with: patient.    Plan discussed with CRNA.        CODE STATUS:    Code Status (Patient has no pulse and is not breathing): CPR (Attempt to Resuscitate)  Medical Interventions (Patient has pulse or is breathing): Full Support

## 2022-12-02 NOTE — ANESTHESIA PROCEDURE NOTES
Right Adductor Canal SS block      Patient reassessed immediately prior to procedure    Patient location during procedure: pre-op  Reason for block: at surgeon's request and post-op pain management  Performed by  CRNA/CAA: Yokasta Fonseca CRNA  Assisted by: Ruth Bales RN  Preanesthetic Checklist  Completed: patient identified, IV checked, site marked, risks and benefits discussed, surgical consent, monitors and equipment checked, pre-op evaluation and timeout performed  Prep:  Pt Position: supine  Sterile barriers:cap, gloves, mask, sterile barriers and washed/disinfected hands  Prep: ChloraPrep  Patient monitoring: blood pressure monitoring, continuous pulse oximetry and EKG  Procedure  Performed under: spinal  Guidance:ultrasound guided    ULTRASOUND INTERPRETATION.  Using ultrasound guidance a 20 G gauge needle was placed in close proximity to the nerve, at which point, under ultrasound guidance anesthetic was injected in the area of the nerve and spread of the anesthesia was seen on ultrasound in close proximity thereto.  There were no abnormalities seen on ultrasound; a digital image was taken; and the patient tolerated the procedure with no complications. Images:still images obtained, printed/placed on chart    Laterality:right  Block Type:adductor canal block  Injection Technique:single-shot  Needle Type:echogenic and short-bevel  Needle Gauge:20 G  Resistance on Injection: none    Medications Used: fentaNYL citrate (PF) (SUBLIMAZE) injection - Intravenous, Right Arm   100 mcg - 12/2/2022 9:28:00 AM  buprenorphine (BUPRENEX) injection - Injection, Adductor Canal   0.3 mg - 12/2/2022 9:28:00 AM  dexamethasone sodium phosphate injection - Injection, Adductor Canal   2 mg - 12/2/2022 9:28:00 AM  bupivacaine PF (MARCAINE) 0.25 % injection - Injection, Adductor Canal   30 mL - 12/2/2022 9:28:00 AM      Post Assessment  Injection Assessment: negative aspiration for heme, incremental injection and no  "paresthesia on injection  Patient Tolerance:comfortable throughout block  Complications:no  Additional Notes  SINGLE shot   A high-frequency linear transducer, with sterile cover, was placed on the anterior mid-thigh (between the anterior superior iliac spine and patella). The transducer was then moved medially to identify the Sartorius muscle (Stevie), Vastus Medialis muscle (VMM), Superficial Femoral Artery (SFA) and Vein. The transducer was then moved cephalad or caudad to position the SFA in the middle of the Stevie. The insertion site was prepped and draped in sterile fashion. Skin and cutaneous tissue was infiltrated with 2-5 ml of 1% Lidocaine. Using ultrasound-guidance, a 20-gauge B-Butterfield 4\" Ultraplex 360 non-stimulating echogenic needle was advanced in plane from lateral to medial. Preservative-free normal saline was utilized for hydro-dissection of tissue, advancement of needle, and to confirm needle placement below the fascial plane of the Stevie where the Nerve to the VMM is located. Local anesthetic (LA) 5 ml deposited here. The needle continues its path lateral to the SFA at the level of the Saphenous Nerve. The remainder of the LA was deposited at the 10-11 o'clock position of the SFA. This injection created a space between the Stevie and the SFA. Aspiration every 5 ml to prevent intravascular injection. Injection was completed with negative aspiration of blood and negative intravascular injection. Injection pressures were normal with minimal resistance.           "

## 2022-12-02 NOTE — ANESTHESIA PROCEDURE NOTES
Airway  Urgency: elective    Date/Time: 12/2/2022 10:15 AM  Airway not difficult    General Information and Staff    Patient location during procedure: OR  CRNA/CAA: Rey Rodriguez CRNA    Indications and Patient Condition  Indications for airway management: airway protection    Preoxygenated: yes  MILS not maintained throughout  Mask difficulty assessment: 1 - vent by mask    Final Airway Details  Final airway type: endotracheal airway      Successful airway: ETT  Cuffed: yes   Successful intubation technique: direct laryngoscopy  Endotracheal tube insertion site: oral  Blade: Lizet  Blade size: 3  ETT size (mm): 7.5  Cormack-Lehane Classification: grade I - full view of glottis  Placement verified by: chest auscultation and capnometry   Measured from: lips  ETT/EBT  to lips (cm): 21  Number of attempts at approach: 1  Assessment: lips, teeth, and gum same as pre-op and atraumatic intubation    Additional Comments  Negative epigastric sounds, Breath sound equal bilaterally with symmetric chest rise and fall

## 2022-12-03 LAB
ANION GAP SERPL CALCULATED.3IONS-SCNC: 8 MMOL/L (ref 5–15)
BACTERIA SPEC AEROBE CULT: ABNORMAL
BASOPHILS # BLD AUTO: 0.02 10*3/MM3 (ref 0–0.2)
BASOPHILS NFR BLD AUTO: 0.2 % (ref 0–1.5)
BUN SERPL-MCNC: 17 MG/DL (ref 6–20)
BUN/CREAT SERPL: 13.5 (ref 7–25)
CALCIUM SPEC-SCNC: 9.1 MG/DL (ref 8.6–10.5)
CHLORIDE SERPL-SCNC: 102 MMOL/L (ref 98–107)
CO2 SERPL-SCNC: 25 MMOL/L (ref 22–29)
CREAT SERPL-MCNC: 1.26 MG/DL (ref 0.76–1.27)
DEPRECATED RDW RBC AUTO: 53.8 FL (ref 37–54)
EGFRCR SERPLBLD CKD-EPI 2021: 66.1 ML/MIN/1.73
EOSINOPHIL # BLD AUTO: 0.02 10*3/MM3 (ref 0–0.4)
EOSINOPHIL NFR BLD AUTO: 0.2 % (ref 0.3–6.2)
ERYTHROCYTE [DISTWIDTH] IN BLOOD BY AUTOMATED COUNT: 17.3 % (ref 12.3–15.4)
GLUCOSE BLDC GLUCOMTR-MCNC: 149 MG/DL (ref 70–130)
GLUCOSE BLDC GLUCOMTR-MCNC: 167 MG/DL (ref 70–130)
GLUCOSE BLDC GLUCOMTR-MCNC: 167 MG/DL (ref 70–130)
GLUCOSE BLDC GLUCOMTR-MCNC: 192 MG/DL (ref 70–130)
GLUCOSE SERPL-MCNC: 211 MG/DL (ref 65–99)
GRAM STN SPEC: ABNORMAL
HCT VFR BLD AUTO: 30.6 % (ref 37.5–51)
HGB BLD-MCNC: 9.3 G/DL (ref 13–17.7)
IMM GRANULOCYTES # BLD AUTO: 0.09 10*3/MM3 (ref 0–0.05)
IMM GRANULOCYTES NFR BLD AUTO: 1 % (ref 0–0.5)
ISOLATED FROM: ABNORMAL
LYMPHOCYTES # BLD AUTO: 1.33 10*3/MM3 (ref 0.7–3.1)
LYMPHOCYTES NFR BLD AUTO: 14.9 % (ref 19.6–45.3)
MCH RBC QN AUTO: 26 PG (ref 26.6–33)
MCHC RBC AUTO-ENTMCNC: 30.4 G/DL (ref 31.5–35.7)
MCV RBC AUTO: 85.5 FL (ref 79–97)
MONOCYTES # BLD AUTO: 0.57 10*3/MM3 (ref 0.1–0.9)
MONOCYTES NFR BLD AUTO: 6.4 % (ref 5–12)
NEUTROPHILS NFR BLD AUTO: 6.88 10*3/MM3 (ref 1.7–7)
NEUTROPHILS NFR BLD AUTO: 77.3 % (ref 42.7–76)
NRBC BLD AUTO-RTO: 0 /100 WBC (ref 0–0.2)
PLATELET # BLD AUTO: 425 10*3/MM3 (ref 140–450)
PMV BLD AUTO: 8.7 FL (ref 6–12)
POTASSIUM SERPL-SCNC: 4.4 MMOL/L (ref 3.5–5.2)
RBC # BLD AUTO: 3.58 10*6/MM3 (ref 4.14–5.8)
SODIUM SERPL-SCNC: 135 MMOL/L (ref 136–145)
WBC NRBC COR # BLD: 8.91 10*3/MM3 (ref 3.4–10.8)

## 2022-12-03 PROCEDURE — 25010000002 DAPTOMYCIN PER 1 MG: Performed by: PHYSICIAN ASSISTANT

## 2022-12-03 PROCEDURE — 25010000002 PIPERACILLIN SOD-TAZOBACTAM PER 1 G: Performed by: PHYSICIAN ASSISTANT

## 2022-12-03 PROCEDURE — 25010000002 MORPHINE PER 10 MG: Performed by: INTERNAL MEDICINE

## 2022-12-03 PROCEDURE — 80048 BASIC METABOLIC PNL TOTAL CA: CPT | Performed by: PHYSICIAN ASSISTANT

## 2022-12-03 PROCEDURE — 85025 COMPLETE CBC W/AUTO DIFF WBC: CPT | Performed by: PHYSICIAN ASSISTANT

## 2022-12-03 PROCEDURE — 99024 POSTOP FOLLOW-UP VISIT: CPT | Performed by: THORACIC SURGERY (CARDIOTHORACIC VASCULAR SURGERY)

## 2022-12-03 PROCEDURE — 25010000002 MORPHINE PER 10 MG: Performed by: THORACIC SURGERY (CARDIOTHORACIC VASCULAR SURGERY)

## 2022-12-03 PROCEDURE — 99233 SBSQ HOSP IP/OBS HIGH 50: CPT | Performed by: INTERNAL MEDICINE

## 2022-12-03 PROCEDURE — 82962 GLUCOSE BLOOD TEST: CPT

## 2022-12-03 RX ORDER — METHADONE HYDROCHLORIDE 10 MG/1
10 TABLET ORAL EVERY 6 HOURS SCHEDULED
Status: DISCONTINUED | OUTPATIENT
Start: 2022-12-03 | End: 2022-12-03

## 2022-12-03 RX ORDER — MORPHINE SULFATE 2 MG/ML
1 INJECTION, SOLUTION INTRAMUSCULAR; INTRAVENOUS ONCE
Status: COMPLETED | OUTPATIENT
Start: 2022-12-03 | End: 2022-12-03

## 2022-12-03 RX ORDER — METHADONE HYDROCHLORIDE 10 MG/1
10 TABLET ORAL EVERY 6 HOURS
Status: DISCONTINUED | OUTPATIENT
Start: 2022-12-03 | End: 2023-01-06 | Stop reason: HOSPADM

## 2022-12-03 RX ADMIN — METHADONE HYDROCHLORIDE 10 MG: 10 TABLET ORAL at 08:56

## 2022-12-03 RX ADMIN — MORPHINE SULFATE 1 MG: 2 INJECTION, SOLUTION INTRAMUSCULAR; INTRAVENOUS at 18:17

## 2022-12-03 RX ADMIN — MORPHINE SULFATE 2 MG: 2 INJECTION, SOLUTION INTRAMUSCULAR; INTRAVENOUS at 10:08

## 2022-12-03 RX ADMIN — Medication 10 ML: at 08:59

## 2022-12-03 RX ADMIN — DAPTOMYCIN 600 MG: 500 INJECTION, POWDER, LYOPHILIZED, FOR SOLUTION INTRAVENOUS at 08:59

## 2022-12-03 RX ADMIN — MORPHINE SULFATE 2 MG: 2 INJECTION, SOLUTION INTRAMUSCULAR; INTRAVENOUS at 06:19

## 2022-12-03 RX ADMIN — METHADONE HYDROCHLORIDE 10 MG: 10 TABLET ORAL at 20:16

## 2022-12-03 RX ADMIN — MORPHINE SULFATE 2 MG: 2 INJECTION, SOLUTION INTRAMUSCULAR; INTRAVENOUS at 02:18

## 2022-12-03 RX ADMIN — METHADONE HYDROCHLORIDE 10 MG: 10 TABLET ORAL at 15:33

## 2022-12-03 RX ADMIN — Medication 10 ML: at 20:17

## 2022-12-03 RX ADMIN — TAZOBACTAM SODIUM AND PIPERACILLIN SODIUM 3.38 G: 375; 3 INJECTION, SOLUTION INTRAVENOUS at 21:05

## 2022-12-03 RX ADMIN — MORPHINE SULFATE 2 MG: 2 INJECTION, SOLUTION INTRAMUSCULAR; INTRAVENOUS at 15:20

## 2022-12-03 RX ADMIN — HYDROXYZINE HYDROCHLORIDE 50 MG: 25 TABLET ORAL at 21:05

## 2022-12-03 RX ADMIN — ACETAMINOPHEN 650 MG: 325 TABLET, FILM COATED ORAL at 17:22

## 2022-12-03 RX ADMIN — SENNOSIDES AND DOCUSATE SODIUM 2 TABLET: 50; 8.6 TABLET ORAL at 08:57

## 2022-12-03 RX ADMIN — TAZOBACTAM SODIUM AND PIPERACILLIN SODIUM 3.38 G: 375; 3 INJECTION, SOLUTION INTRAVENOUS at 06:19

## 2022-12-03 RX ADMIN — TAZOBACTAM SODIUM AND PIPERACILLIN SODIUM 3.38 G: 375; 3 INJECTION, SOLUTION INTRAVENOUS at 15:33

## 2022-12-03 RX ADMIN — ATORVASTATIN CALCIUM 20 MG: 20 TABLET, FILM COATED ORAL at 20:15

## 2022-12-03 NOTE — PLAN OF CARE
Goal Outcome Evaluation:   Pt aox4, vss, room air, dressing CDI, angel output 20 ml this shift, c/o pain per pt with little relief from prn meds-paged md and new orders placed, repositioned q2h, infu block in place with some help with pain relief, voids spontaneously,  cm following for dc plan

## 2022-12-03 NOTE — PROGRESS NOTES
Cardiothoracic Surgery Progress Note      POD #: 1-right below-knee amputation     LOS: 3 days      Subjective: Awake and alert has incisional pain    Objective:  Vital Signs vital signs below noted T-max past 24 hours 99 °F  Temp:  [97.2 °F (36.2 °C)-99 °F (37.2 °C)] 98 °F (36.7 °C)  Heart Rate:  [65-84] 77  Resp:  [16-22] 16  BP: ()/(46-85) 108/71    Physical Exam:   General Appearance: Oriented x3   Lungs:   Heart:   Skin:   Incision: Amputation site dressing is dry and intact.  MEL drain since surgery: 60 mL     Results:  Results from last 7 days   Lab Units 12/02/22  0715   WBC 10*3/mm3 6.89   HEMOGLOBIN g/dL 11.0*   HEMATOCRIT % 36.1*   PLATELETS 10*3/mm3 405     Results from last 7 days   Lab Units 12/02/22  0715   SODIUM mmol/L 139   POTASSIUM mmol/L 5.0   CHLORIDE mmol/L 105   CO2 mmol/L 23.0   BUN mg/dL 14   CREATININE mg/dL 1.13   GLUCOSE mg/dL 46*   CALCIUM mg/dL 9.2         Assessment: #1.  Postop day 1 right below-knee amputation for osteomyelitis of the right foot  2.  Status post remote left below-knee amputation for diabetic neuropathy/ulceration      Plan: We will change amputation site dressing tomorrow and remove MEL drain.  Overall medical manage per hospitalist and antibiotics per infectious disease      John Peguero MD - 12/03/22 - 05:57 EST

## 2022-12-03 NOTE — PROGRESS NOTES
[facility]    Acute pain service Inpatient Progress Note    Patient Name: Buster Velasco  :  1965  MRN:  [id number]        Acute Pain  Service Inpatient Progress Note:    Analgesia:Good  Pain Score:7/10  LOC: alert and awake  Resp Status: room air  Cardiac: VS stable  Side Effects:None  Catheter Site:clean, dressing intact and dry  Cath type: peripheral nerve cath with ON Q  Volume: 1mL,8ml, 8ml InfuSystem Pump.  Catheter Plan:Catheter to remain Insitu and Continue catheter infusion rate unchanged  Comments:    His methadone had been given q 6 hours prn and was changed to his home dose of 10 mg q 6 .

## 2022-12-03 NOTE — PROGRESS NOTES
"[facility] Progress Note        Antibiotics:  Anti-Infectives (From admission, onward)    Ordered     Dose/Rate Route Frequency Start Stop    12/01/22 0829  DAPTOmycin (CUBICIN) 600 mg in sodium chloride 0.9 % 50 mL IVPB        Ordering Provider: Erlin Arzola PA    6 mg/kg × 100 kg  100 mL/hr over 30 Minutes Intravenous Every 24 Hours 12/01/22 1000 12/15/22 0959    12/01/22 0055  piperacillin-tazobactam (ZOSYN) 3.375 g in iso-osmotic dextrose 50 ml (premix)        Ordering Provider: Erlin Arzola PA    3.375 g  over 4 Hours Intravenous Every 8 Hours 12/01/22 0600 12/06/22 0559    11/30/22 2219  vancomycin 2000 mg/500 mL 0.9% NS IVPB ([facility])        Ordering Provider: Reggie Batres MD    20 mg/kg × 98.9 kg  over 2 Hours Intravenous Once 11/30/22 2221 12/01/22 0315    11/30/22 2219  piperacillin-tazobactam (ZOSYN) 3.375 g in iso-osmotic dextrose 50 ml (premix)        Ordering Provider: Reggie Batres MD    3.375 g Intravenous Once 11/30/22 2221 11/30/22 2335          CC: foot infection    HPI:    Patient is a 57 y.o.  Yr old male with history of prior lower extremity infection/amputations done in [facility].  He has a history of left BKA years ago.  More recent right transmetatarsal amputation but specific dates unclear and unclear who the surgeon was.  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  He has history DVT/IVC filter, diabetes and other comorbidities as below.  He reports a chronic right lateral foot wound present for months but apparently not precipitated by any specific event or trauma.  He is admitted to the hospital on November 30 with deterioration at the foot including redness/swelling     12/2/22 Dr Peguero did surgery  \"Procedure(s): Mid level right below-knee amputation and " "primary closure \"    12/3/22 sleepy; postop pain to the right LE which is dull at present, constant, nonradiating, worse with palpation, generally better with pain meds and 3 out of 10 in severity.  Not progressive     No headache photophobia or neck stiffness.  No shortness of breath cough or hemoptysis.  No nausea vomiting diarrhea or abdominal pain.  No dysuria hematuria or pyuria.  No other new skin rash       ROS:      12/3/22 No f/c/s. No n/v/d. No rash. No new ADR to Abx.     Constitutional-- No Fever, chills or sweats.  Appetite good, and no malaise. No fatigue.  Heent--chronic hoarse voice.  No new vision, hearing or throat complaints.  No epistaxis or oral sores.   No flashers, floaters or eye pain.   No headache, photophobia or neck stiffness.  Chronic PEG tube  CV-- No chest pain, palpitation or syncope  Resp-- No SOB/cough/Hemoptysis  GI- No nausea, vomiting, or diarrhea.  No hematochezia, melena, or hematemesis. Denies jaundice or chronic liver disease.  -- No dysuria, hematuria, or flank pain.  Denies hesitancy, urgency or flank pain.  Lymph- no swollen lymph nodes in neck/axilla or groin.   Heme- No active bruising or bleeding; no Hx of DVT or PE.  MS-- no swelling or pain in the bones or joints of arms/legs.  No new back pain.  Neuro-- No acute focal weakness or numbness in the arms or legs.  No seizures.     Full 12 point review of systems reviewed and negative otherwise for acute complaints, except for above      PE:   /72 (BP Location: Left arm, Patient Position: Lying)   Pulse 72   Temp 97.6 °F (36.4 °C) (Oral)   Resp 18   Ht 193 cm (76\")   Wt 100 kg (221 lb)   SpO2 93%   BMI 26.90 kg/m²          GENERAL: Awake and alert, in no acute distress.  Hoarse voice noted  HEENT: Normocephalic, atraumatic.  PERRL. EOMI. No conjunctival injection. No icterus. Oropharynx   without evidence of thrush or exudate. No evidence of peridontal disease.    NECK: Supple without nuchal rigidity. No " mass.  LYMPH: No cervical, axillary or inguinal lymphadenopathy.  HEART: RRR; No murmur, rubs, gallops.   LUNGS: Diminished at bases but otherwise clear to auscultation bilaterally without wheezing, rales, rhonchi. Normal respiratory effort. Nonlabored. No dullness.  ABDOMEN: Soft, nontender, nondistended. Positive bowel sounds. No rebound or guarding. NO mass or HSM.  PEG tube noted  EXT:  No cyanosis, clubbing or edema. No cord.  : Genitalia generally unremarkable.  Without Orantes catheter.  MSK: FROM without joint effusions noted arms/legs.    SKIN: Warm and dry without cutaneous eruptions on Inspection/palpation.    NEURO: Oriented to PPT. No focal deficits on motor/sensory exam at arms/legs.  PSYCHIATRIC: Normal insight and judgement. Cooperative with PE     Right lower extremity surgical site noted;  No new redness;  no discrete mass bulge or fluctuance.  No crepitus or bulla.       No peripheral stigmata/phenomenon of endocarditis     IV without obvious redness or drainage     Left BKA site with no redness induration or warmth.  No discrete mass bulge or fluctuance.    Laboratory Data    Results from last 7 days   Lab Units 12/02/22  0715 12/01/22  0810 11/30/22  1905   WBC 10*3/mm3 6.89 7.22 13.92*   HEMOGLOBIN g/dL 11.0* 10.0* 11.1*   HEMATOCRIT % 36.1* 32.2* 35.5*   PLATELETS 10*3/mm3 405 413 532*     Results from last 7 days   Lab Units 12/02/22  0715   SODIUM mmol/L 139   POTASSIUM mmol/L 5.0   CHLORIDE mmol/L 105   CO2 mmol/L 23.0   BUN mg/dL 14   CREATININE mg/dL 1.13   GLUCOSE mg/dL 46*   CALCIUM mg/dL 9.2     Results from last 7 days   Lab Units 11/30/22  1905   ALK PHOS U/L 85   BILIRUBIN mg/dL 0.2   ALT (SGPT) U/L 12   AST (SGOT) U/L 19     Results from last 7 days   Lab Units 12/01/22  0810   SED RATE mm/hr 109*     Results from last 7 days   Lab Units 12/01/22  0810   CRP mg/dL 9.10*       Estimated Creatinine Clearance: 100.8 mL/min (by C-G formula based on SCr of 1.13  mg/dL).      Microbiology:      Radiology:  Imaging Results (Last 72 Hours)     Procedure Component Value Units Date/Time    FL Video Swallow With Speech Single Contrast [[id number]] Collected: 12/01/22 1611     Updated: 12/01/22 2214    Narrative:      EXAMINATION: FL VIDEO SWALLOW W SPEECH SINGLE-CONTRAST-     INDICATION: hx dysphagia, PEG- wants removed; L08.9-Local infection of  the skin and subcutaneous tissue, unspecified; E16.2-Hypoglycemia,  unspecified; L89.159-Pressure ulcer of sacral region, unspecified stage;  R13.10-Dysphagia, unspecified     TECHNIQUE: 1 minute and 18 seconds of fluoroscopic time was used for  this exam. 11 cc of fluoroscopic loops were saved. The patient was  evaluated in the seated lateral position while taking a variety of  consistencies of barium by mouth under the direction of speech  pathology.     COMPARISON: NONE     FINDINGS: 1 minute and 18 seconds of fluoroscopy provided for a modified  barium swallow. Please see speech therapy report for full details and  recommendations.          Impression:      Fluoroscopy provided for a modified barium swallow. Please  see speech therapy report for full details and recommendations.         This report was finalized on 12/1/2022 10:11 PM by Dr. Yobany Dickey MD.       MRI Foot Right Without Contrast [[id number]] Collected: 12/01/22 0444     Updated: 12/01/22 1336    Narrative:      EXAMINATION: MRI FOOT RIGHT WO CONTRAST-      INDICATION: Right foot wound. Osteomyelitis?; L08.9-Local infection of  the skin and subcutaneous tissue, unspecified; E16.2-Hypoglycemia,  unspecified; L89.159-Pressure ulcer of sacral region, unspecified stage     TECHNIQUE: Multiplanar, multisequence MR imaging of the right foot  without IV contrast.     COMPARISON: Photographs of right foot wounds available in the electronic  medical record dated 11/30/2022     FINDINGS:      The exam is somewhat limited owing to some motion degradation of a few  sequences.      There is evidence of prior transmetatarsal amputation.     There is a large deep wound/ulcer at the lateral aspect of the stump  without evidence of discrete/drainable fluid collection. There is  tenosynovitis at the master knot of [place] which may be reactive or  infectious. There is abnormal T2/STIR hyperintense, T1 hypointense  marrow signal change of the remnant fifth metatarsal base with likely  erosion, compatible with osteomyelitis (short axis image 17, sagittal  image 6). There is some patchy T2/STIR hyperintense marrow signal within  the adjacent third and fourth metatarsal base remnants and distal  cuboid, without definite loss of the normal T1 hyperintense marrow  signal, likely reactive marrow edema although contiguous early  osteomyelitis would be difficult to exclude.     Additional wound at the lateral ankle, without evidence of  discrete/drainable fluid collection. There is abnormal T2/STIR  hyperintense marrow signal and T1 hypointensity of the underlying  lateral malleolus (long axis image 5, short axis image 27).       Impression:         Deep ulceration at the lateral stump with osteomyelitis of the  underlying remnant fifth metatarsal base.     Wound at the lateral ankle with osteomyelitis of the underlying lateral  malleolus.     This report was finalized on 12/1/2022 1:33 PM by Min Tom MD.       XR Chest 1 View [[id number]] Collected: 11/30/22 2215     Updated: 12/01/22 0016    Narrative:      EXAMINATION: Chest one view.    DATE: 11/30/2022.    COMPARISON: None available.    CLINICAL HISTORY: Infection.    FINDINGS:    The lungs and pleural spaces are clear.    The cardiomediastinal silhouette and the pulmonary vessels are within normal limits.      Impression:        No acute cardiopulmonary process.    Electronically signed by:  Pierre Baron D.O.    11/30/2022 10:15 PM Mountain Time            Impression:     --Acute right foot/ankle with multifocal ulceration, cellulitis/wound  infection and probable osteomyelitis with probe to bone at the lateral foot and abnormal MRI.  Other comorbidities as outlined above and high risk for further serious morbidity and other serious sequelae including persistent/recurrent or nonhealing wounds, persistent/progressive or recurrent infection and risk for further functional/limb loss/amputation.  Surgery following to help define timing/option of threshold for any future surgical intervention .      **Surgery 12/2 with BKA     --History MRSA     --Chronic vocal cord paralysis per notes with chronic hoarseness and indwelling PEG tube.  Medicine team to clarify     --History left BKA.     --Diabetes.  You need to tightly control blood sugar to give best chance for healing     --History of DVT with IVC filter     PLAN:      -- Daptomycin/Zosyn IV for now but anticipate taper soon if healing okay postop     -- Check/review labs cultures and scans     -- Partial history per nursing staff     -- Discussed with microbiology and family     --d/w Dr Peguero      -- Highly complex set of issues with high risk for further serious morbidity and other serious sequela     Zeke George MD  12/3/2022

## 2022-12-03 NOTE — PROGRESS NOTES
[facility] Medicine Services  PROGRESS NOTE    Patient Name: Buster Velasco  : 1965  MRN: [id number]    Date of Admission: 2022  Primary Care Physician: Ludivina Bowers MD    Subjective   Subjective     CC:  F/u foot infection    HPI:  Feels ok.  Some pain but controlled.     ROS:  Gen- No fevers, chills  CV- No chest pain, palpitations  Resp- No cough, dyspnea  GI- No N/V/D, abd pain     Objective   Objective     Vital Signs:   Temp:  [97.6 °F (36.4 °C)-99 °F (37.2 °C)] 97.6 °F (36.4 °C)  Heart Rate:  [65-84] 72  Resp:  [16-22] 18  BP: ()/(46-85) 102/72  Flow (L/min):  [2] 2     Physical Exam:  Constitutional: No acute distress, awake, alert  HENT: NCAT, mucous membranes moist  Respiratory: Clear to auscultation bilaterally, respiratory effort normal   Cardiovascular: RRR, no murmurs, rubs, or gallops  Gastrointestinal: Positive bowel sounds, soft, nontender, nondistended  Musculoskeletal: new right BKA bandaged with MEL drain with serosanguinous drainage, old left AKA  Psychiatric: Appropriate affect, cooperative  Neurologic: Oriented x 3, strength symmetric in all extremities, Cranial Nerves grossly intact to confrontation, speech clear  Skin: No rashes      Results Reviewed:  LAB RESULTS:      Lab 22  0715 22  0810 22  0053 22  1905   WBC 6.89 7.22  --  13.92*   HEMOGLOBIN 11.0* 10.0*  --  11.1*   HEMATOCRIT 36.1* 32.2*  --  35.5*   PLATELETS 405 413  --  532*   NEUTROS ABS 4.43 5.64  --  11.44*   IMMATURE GRANS (ABS) 0.09* 0.06*  --  0.14*   LYMPHS ABS 1.59 1.02  --  1.64   MONOS ABS 0.59 0.42  --  0.64   EOS ABS 0.13 0.06  --  0.03   MCV 85.3 84.3  --  84.5   SED RATE  --  109*  --   --    CRP  --  9.10*  --   --    PROCALCITONIN  --   --   --  0.17   LACTATE  --   --  2.0 2.1*         Lab 22  0715 22  0810 22  1905   SODIUM 139 135* 134*   POTASSIUM 5.0 4.2 4.1   CHLORIDE 105 103 100   CO2 23.0 21.0* 21.0*    ANION GAP 11.0 11.0 13.0   BUN 14 18 18   CREATININE 1.13 1.24 1.29*   EGFR 75.3 67.4 64.3   GLUCOSE 46* 52* 54*   CALCIUM 9.2 9.2 9.1   HEMOGLOBIN A1C  --  6.90*  --          Lab 11/30/22  1905   TOTAL PROTEIN 7.6   ALBUMIN 3.00*   GLOBULIN 4.6   ALT (SGPT) 12   AST (SGOT) 19   BILIRUBIN 0.2   ALK PHOS 85   LIPASE 14                 Lab 12/01/22  1035 11/30/22  1905   IRON  --  18*   IRON SATURATION  --  6*   TIBC  --  277*   TRANSFERRIN  --  186*   FERRITIN  --  317.40   FOLATE  --  15.90   VITAMIN B 12  --  717   ABO TYPING O O   RH TYPING Positive Positive   ANTIBODY SCREEN Negative  --          Brief Urine Lab Results  (Last result in the past 365 days)      Color   Clarity   Blood   Leuk Est   Nitrite   Protein   CREAT   Urine HCG        08/20/22 0108 Yellow   Clear   Negative   Negative     Negative                 Microbiology Results Abnormal     Procedure Component Value - Date/Time    Blood Culture - Blood, Arm, Right [[id number]]  (Normal) Collected: 11/30/22 2250    Lab Status: Preliminary result Specimen: Blood from Arm, Right Updated: 12/02/22 1500     Blood Culture No growth at 24 hours          FL Video Swallow With Speech Single Contrast    Result Date: 12/1/2022  EXAMINATION: FL VIDEO SWALLOW W SPEECH SINGLE-CONTRAST-  INDICATION: hx dysphagia, PEG- wants removed; L08.9-Local infection of the skin and subcutaneous tissue, unspecified; E16.2-Hypoglycemia, unspecified; L89.159-Pressure ulcer of sacral region, unspecified stage; R13.10-Dysphagia, unspecified  TECHNIQUE: 1 minute and 18 seconds of fluoroscopic time was used for this exam. 11 cc of fluoroscopic loops were saved. The patient was evaluated in the seated lateral position while taking a variety of consistencies of barium by mouth under the direction of speech pathology.  COMPARISON: NONE  FINDINGS: 1 minute and 18 seconds of fluoroscopy provided for a modified barium swallow. Please see speech therapy report for full details and  recommendations.       Impression: Fluoroscopy provided for a modified barium swallow. Please see speech therapy report for full details and recommendations.    This report was finalized on 12/1/2022 10:11 PM by Dr. Yobany Dickey MD.      Right Popliteal Nerve Catheter    Result Date: 12/2/2022  Yokasta Fonseca CRNA     12/2/2022  9:56 AM Right Popliteal Nerve Catheter Patient reassessed immediately prior to procedure Patient location during procedure: pre-op Reason for block: at surgeon's request and post-op pain management Performed by ESTUARDO/CAA: Yokasta Fonseca CRNA Assisted by: Ruth Bales RN Preanesthetic Checklist Completed: patient identified, IV checked, site marked, risks and benefits discussed, surgical consent, monitors and equipment checked, pre-op evaluation and timeout performed Prep: Pt Position: left lateral decubitus Sterile barriers:cap, gloves, mask and washed/disinfected hands Prep: ChloraPrep Patient monitoring: blood pressure monitoring, continuous pulse oximetry and EKG Procedure Sedation: yes Performed under: local infiltration Guidance:ultrasound guided ULTRASOUND INTERPRETATION.  Using ultrasound guidance a 20 G gauge needle was placed in close proximity to the nerve, at which point, under ultrasound guidance anesthetic was injected in the area of the nerve and spread of the anesthesia was seen on ultrasound in close proximity thereto.  There were no abnormalities seen on ultrasound; a digital image was taken; and the patient tolerated the procedure with no complications. Images:still images obtained, printed/placed on chart Laterality:right Block Type:popliteal Injection Technique:catheter Needle Type:echogenic and Tuohy Needle Gauge:18 G Resistance on Injection: none Catheter Size:20 G Cath Depth at skin: 8 cm Medications Used: bupivacaine PF (MARCAINE) 0.25 % injection - Injection, Popliteal  30 mL - 12/2/2022 9:48:00 AM Post Assessment Injection Assessment: negative aspiration  "for heme, no paresthesia on injection and incremental injection Patient Tolerance:comfortable throughout block Complications:no Additional Notes CATHETER                             A high-frequency linear transducer, with sterile cover, was placed in the popliteal fossa to identify the popliteal artery and vein, Tibial nerve (TN) and Common Peroneal nerve (CP). The transducer was then moved in a cephalad fashion to observe the TN and CP nerve bifurcation to form the Sciatic Nerve. The insertion site was prepped and draped in sterile fashion. Skin and cutaneous tissue was infiltrated with 2-5 ml of 1% Lidocaine. Using ultrasound-guidance, an 18-gauge Contiplex Ultra 360 Touhy needle was advanced in plane from lateral to medial. Preservative-free normal saline was utilized for hydro-dissection of tissue, advancement of Touhy needle, and to confirm final needle placement posterior to the nerves. Local anesthetic injection spread, in incremental 3-5 ml injections, to surround both nerve structures. Aspiration every 5 ml to prevent intravascular injection. Injection was completed with negative aspiration of blood and negative intravascular injection. Injection pressures were normal with minimal resistance. A 20-gauge Contiplex Echo catheter was placed through the needle and advance out the tip of the Touhy 1-3 cm. The Touhy needle was then removed, and final catheter position verified (Below/above or Anterior/Posterior) the nerve structures. The catheter was secured in the usual fashion with skin glue, benzoin, steri-strips, CHG tegaderm and Label noting \"Nerve Block Catheter\". Jerk tape applied at yellow connector and catheter connection.     Right Adductor Canal SS block    Result Date: 12/2/2022  Yokasta Fonseca, ESTUARDO     12/2/2022  9:55 AM Right Adductor Canal SS block Patient reassessed immediately prior to procedure Patient location during procedure: pre-op Reason for block: at surgeon's request and post-op " pain management Performed by CRNA/CAA: Yokasta Fonseca CRNA Assisted by: Ruth Bales RN Preanesthetic Checklist Completed: patient identified, IV checked, site marked, risks and benefits discussed, surgical consent, monitors and equipment checked, pre-op evaluation and timeout performed Prep: Pt Position: supine Sterile barriers:cap, gloves, mask, sterile barriers and washed/disinfected hands Prep: ChloraPrep Patient monitoring: blood pressure monitoring, continuous pulse oximetry and EKG Procedure Performed under: spinal Guidance:ultrasound guided ULTRASOUND INTERPRETATION.  Using ultrasound guidance a 20 G gauge needle was placed in close proximity to the nerve, at which point, under ultrasound guidance anesthetic was injected in the area of the nerve and spread of the anesthesia was seen on ultrasound in close proximity thereto.  There were no abnormalities seen on ultrasound; a digital image was taken; and the patient tolerated the procedure with no complications. Images:still images obtained, printed/placed on chart Laterality:right Block Type:adductor canal block Injection Technique:single-shot Needle Type:echogenic and short-bevel Needle Gauge:20 G Resistance on Injection: none Medications Used: fentaNYL citrate (PF) (SUBLIMAZE) injection - Intravenous, Right Arm  100 mcg - 12/2/2022 9:28:00 AM buprenorphine (BUPRENEX) injection - Injection, Adductor Canal  0.3 mg - 12/2/2022 9:28:00 AM dexamethasone sodium phosphate injection - Injection, Adductor Canal  2 mg - 12/2/2022 9:28:00 AM bupivacaine PF (MARCAINE) 0.25 % injection - Injection, Adductor Canal  30 mL - 12/2/2022 9:28:00 AM Post Assessment Injection Assessment: negative aspiration for heme, incremental injection and no paresthesia on injection Patient Tolerance:comfortable throughout block Complications:no Additional Notes SINGLE shot A high-frequency linear transducer, with sterile cover, was placed on the anterior mid-thigh (between the  "anterior superior iliac spine and patella). The transducer was then moved medially to identify the Sartorius muscle (Stevie), Vastus Medialis muscle (VMM), Superficial Femoral Artery (SFA) and Vein. The transducer was then moved cephalad or caudad to position the SFA in the middle of the Stevie. The insertion site was prepped and draped in sterile fashion. Skin and cutaneous tissue was infiltrated with 2-5 ml of 1% Lidocaine. Using ultrasound-guidance, a 20-gauge B-Butterfield 4\" Ultraplex 360 non-stimulating echogenic needle was advanced in plane from lateral to medial. Preservative-free normal saline was utilized for hydro-dissection of tissue, advancement of needle, and to confirm needle placement below the fascial plane of the Stevie where the Nerve to the VMM is located. Local anesthetic (LA) 5 ml deposited here. The needle continues its path lateral to the SFA at the level of the Saphenous Nerve. The remainder of the LA was deposited at the 10-11 o'clock position of the SFA. This injection created a space between the Stevie and the SFA. Aspiration every 5 ml to prevent intravascular injection. Injection was completed with negative aspiration of blood and negative intravascular injection. Injection pressures were normal with minimal resistance.           I have reviewed the medications:  Scheduled Meds:atorvastatin, 20 mg, Per PEG Tube, Nightly  DAPTOmycin, 6 mg/kg, Intravenous, Q24H  metoprolol tartrate, 25 mg, Per PEG Tube, Q12H  piperacillin-tazobactam, 3.375 g, Intravenous, Q8H  senna-docusate sodium, 2 tablet, Oral, BID  sodium chloride, 10 mL, Intravenous, Q12H  sodium chloride, 10 mL, Intravenous, Q12H      Continuous Infusions:dextrose 5 % and lactated Ringer's, 100 mL/hr, Last Rate: 100 mL/hr (12/01/22 2222)  lactated ringers, 9 mL/hr  Pharmacy Consult - Pharmacy to dose,   ropivacaine,       PRN Meds:.•  acetaminophen  •  senna-docusate sodium **AND** polyethylene glycol **AND** bisacodyl **AND** bisacodyl  •  " "dextrose  •  dextrose  •  glucagon (human recombinant)  •  hydrOXYzine  •  melatonin  •  methadone  •  Morphine  •  ondansetron **OR** ondansetron  •  Pharmacy Consult - Pharmacy to dose  •  sennosides-docusate  •  sodium chloride  •  sodium chloride  •  sodium chloride    Assessment & Plan   Assessment & Plan     Active Hospital Problems    Diagnosis  POA   • **Right foot infection [L08.9]  Yes   • Sepsis (HCC) [A41.9]  Yes   • Hypoglycemia [E16.2]  Yes   • Anemia [D64.9]  Yes   • Hyponatremia [E87.1]  Yes   • Hypoalbuminemia [E88.09]  Yes   • Essential hypertension [I10]  Yes   • Hyperlipidemia [E78.5]  Yes   • Paroxysmal atrial fibrillation (HCC) [I48.0]  Yes      Resolved Hospital Problems   No resolved problems to display.        Brief Hospital Course to date:  Buster Velasco is a 57 y.o. male with a history of prior osteomyelitis s/p left BKA, s/p right transmetatarsal amputation, left hand 3rd metacarpal resection,  and DVT s/p IVC filter who presents w right foot wounds. S/p right BKA on 12/2 w Dr Peguero    Sepsis 2/2 acute right foot non-healing wound and likely osteomyelitis  -s/p right BKA 12/2 w Dr Peguero  -IV abx per ID  -post-op pain control, bowel regimen     ITD of lower gluteal cleft - wound care following    Recurrent hypoglycemia in setting of DMII - high dose Tresiba qAM before arrival which has been held. Low Glc when NPO --> on D5 maintenance fluids now. D/c when able. Stop SSI    Chronic pain on opioids - restart home methadone 10 mg up to 4 times daily. Morphine for breakthrough post-op. ANGELIQUE reviewed and 3 day courses immediately before arrival    Reported h/o Afib - d/w patient who says this was evaluated and \"just a problem with the test\" and denies any h/o Afib. Not previously on anticoagulation and given murky history will not start. On metoprolol dose at home which is continued    Chronic vocal cord paralysis w PEG tube in place - SLP evaluation w clearance for " diet    Hypotension with h/o HTN - hold home meds HLD - statin    DVT s/p IVC filter -  placed in spring 2022 per patient. Defer to PCP for further assessment and time of removal    H/o seizure in setting of meth use - not on AED's given meth trigger    BPH - tamsulosin    Expected Discharge Location and Transportation: home  Expected Discharge Date: 12/7    DVT prophylaxis:  Mechanical DVT prophylaxis orders are present.          CODE STATUS:   Code Status and Medical Interventions:   Ordered at: 12/01/22 0035     Code Status (Patient has no pulse and is not breathing):    CPR (Attempt to Resuscitate)     Medical Interventions (Patient has pulse or is breathing):    Full Support       Justino Gee MD  12/03/22

## 2022-12-03 NOTE — PLAN OF CARE
Goal Outcome Evaluation:      Pt alert and orientated x4. VSS. Pt had complaints of pain but it was controlled with PRN meds.

## 2022-12-04 LAB
ANION GAP SERPL CALCULATED.3IONS-SCNC: 10 MMOL/L (ref 5–15)
BACTERIA BLD CULT: ABNORMAL
BUN SERPL-MCNC: 16 MG/DL (ref 6–20)
BUN/CREAT SERPL: 17.8 (ref 7–25)
CALCIUM SPEC-SCNC: 9.1 MG/DL (ref 8.6–10.5)
CHLORIDE SERPL-SCNC: 101 MMOL/L (ref 98–107)
CO2 SERPL-SCNC: 25 MMOL/L (ref 22–29)
CREAT SERPL-MCNC: 0.9 MG/DL (ref 0.76–1.27)
DEPRECATED RDW RBC AUTO: 53 FL (ref 37–54)
EGFRCR SERPLBLD CKD-EPI 2021: 99 ML/MIN/1.73
ERYTHROCYTE [DISTWIDTH] IN BLOOD BY AUTOMATED COUNT: 17.2 % (ref 12.3–15.4)
GLUCOSE BLDC GLUCOMTR-MCNC: 110 MG/DL (ref 70–130)
GLUCOSE BLDC GLUCOMTR-MCNC: 125 MG/DL (ref 70–130)
GLUCOSE BLDC GLUCOMTR-MCNC: 267 MG/DL (ref 70–130)
GLUCOSE BLDC GLUCOMTR-MCNC: 64 MG/DL (ref 70–130)
GLUCOSE BLDC GLUCOMTR-MCNC: 86 MG/DL (ref 70–130)
GLUCOSE SERPL-MCNC: 118 MG/DL (ref 65–99)
HCT VFR BLD AUTO: 31.6 % (ref 37.5–51)
HGB BLD-MCNC: 9.7 G/DL (ref 13–17.7)
MCH RBC QN AUTO: 26 PG (ref 26.6–33)
MCHC RBC AUTO-ENTMCNC: 30.7 G/DL (ref 31.5–35.7)
MCV RBC AUTO: 84.7 FL (ref 79–97)
PLATELET # BLD AUTO: 420 10*3/MM3 (ref 140–450)
PMV BLD AUTO: 8.6 FL (ref 6–12)
POTASSIUM SERPL-SCNC: 4.3 MMOL/L (ref 3.5–5.2)
RBC # BLD AUTO: 3.73 10*6/MM3 (ref 4.14–5.8)
SODIUM SERPL-SCNC: 136 MMOL/L (ref 136–145)
WBC NRBC COR # BLD: 6.8 10*3/MM3 (ref 3.4–10.8)

## 2022-12-04 PROCEDURE — 25010000002 PIPERACILLIN SOD-TAZOBACTAM PER 1 G: Performed by: PHYSICIAN ASSISTANT

## 2022-12-04 PROCEDURE — 25010000002 DAPTOMYCIN PER 1 MG: Performed by: PHYSICIAN ASSISTANT

## 2022-12-04 PROCEDURE — 99232 SBSQ HOSP IP/OBS MODERATE 35: CPT | Performed by: INTERNAL MEDICINE

## 2022-12-04 PROCEDURE — 80048 BASIC METABOLIC PNL TOTAL CA: CPT | Performed by: INTERNAL MEDICINE

## 2022-12-04 PROCEDURE — 85027 COMPLETE CBC AUTOMATED: CPT | Performed by: INTERNAL MEDICINE

## 2022-12-04 PROCEDURE — 82962 GLUCOSE BLOOD TEST: CPT

## 2022-12-04 PROCEDURE — 25010000002 MORPHINE PER 10 MG: Performed by: THORACIC SURGERY (CARDIOTHORACIC VASCULAR SURGERY)

## 2022-12-04 RX ADMIN — METHADONE HYDROCHLORIDE 10 MG: 10 TABLET ORAL at 13:27

## 2022-12-04 RX ADMIN — MORPHINE SULFATE 2 MG: 2 INJECTION, SOLUTION INTRAMUSCULAR; INTRAVENOUS at 11:22

## 2022-12-04 RX ADMIN — Medication 5 MG: at 21:14

## 2022-12-04 RX ADMIN — METHADONE HYDROCHLORIDE 10 MG: 10 TABLET ORAL at 01:49

## 2022-12-04 RX ADMIN — MORPHINE SULFATE 2 MG: 2 INJECTION, SOLUTION INTRAMUSCULAR; INTRAVENOUS at 05:59

## 2022-12-04 RX ADMIN — SODIUM CHLORIDE 500 ML: 9 INJECTION, SOLUTION INTRAVENOUS at 00:36

## 2022-12-04 RX ADMIN — DAPTOMYCIN 600 MG: 500 INJECTION, POWDER, LYOPHILIZED, FOR SOLUTION INTRAVENOUS at 09:34

## 2022-12-04 RX ADMIN — Medication 10 ML: at 20:06

## 2022-12-04 RX ADMIN — Medication 10 ML: at 09:37

## 2022-12-04 RX ADMIN — ATORVASTATIN CALCIUM 20 MG: 20 TABLET, FILM COATED ORAL at 20:06

## 2022-12-04 RX ADMIN — METHADONE HYDROCHLORIDE 10 MG: 10 TABLET ORAL at 19:42

## 2022-12-04 RX ADMIN — MORPHINE SULFATE 2 MG: 2 INJECTION, SOLUTION INTRAMUSCULAR; INTRAVENOUS at 15:03

## 2022-12-04 RX ADMIN — MORPHINE SULFATE 2 MG: 2 INJECTION, SOLUTION INTRAMUSCULAR; INTRAVENOUS at 21:14

## 2022-12-04 RX ADMIN — TAZOBACTAM SODIUM AND PIPERACILLIN SODIUM 3.38 G: 375; 3 INJECTION, SOLUTION INTRAVENOUS at 05:16

## 2022-12-04 RX ADMIN — METHADONE HYDROCHLORIDE 10 MG: 10 TABLET ORAL at 09:34

## 2022-12-04 NOTE — PROGRESS NOTES
Cardiothoracic Surgery Progress Note      POD #: 2-right below-knee amputation     LOS: 4 days      Subjective: Awake and alert in a lot of postop pain    Objective:  Vital Signs vital signs below noted T-max past 24 hours 90.1 °F  Temp:  [97.6 °F (36.4 °C)-98.1 °F (36.7 °C)] 97.7 °F (36.5 °C)  Heart Rate:  [62-84] 80  Resp:  [18] 18  BP: ()/(53-85) 128/81    Physical Exam:   General Appearance: Oriented times   Lungs:   Heart:   Skin:   Incision: The amputation site dressing change.  Sutures in intact skin margin viable.  MEL drain was removed.     Results:  Results from last 7 days   Lab Units 12/03/22  0936   WBC 10*3/mm3 8.91   HEMOGLOBIN g/dL 9.3*   HEMATOCRIT % 30.6*   PLATELETS 10*3/mm3 425     Results from last 7 days   Lab Units 12/03/22  0936   SODIUM mmol/L 135*   POTASSIUM mmol/L 4.4   CHLORIDE mmol/L 102   CO2 mmol/L 25.0   BUN mg/dL 17   CREATININE mg/dL 1.26   GLUCOSE mg/dL 211*   CALCIUM mg/dL 9.1         Assessment: #1.  Postop day 2 right below-knee amputation healing well at this time  2.Status post remote left below-knee amputation for diabetic neuropathy ulceration.    Plan: Continue daily dressing change amputation site.  MEL drain was removed today by me.  Overall medical manage per hospitalist.  Antibiotics per infectious disease.      John Peguero MD - 12/04/22 - 06:09 EST

## 2022-12-04 NOTE — PLAN OF CARE
Goal Outcome Evaluation:  Plan of Care Reviewed With: patient      Pt aox4, vss, room air, pain severe at beginning of shift with little relief by prn meds-anesthesia gave pt a bolus and pt stated pain had improved more, dressing is CDI, pt is resting comfortably, infu block infusing and dressing is cdi, voiding spontaneously, adequate intake, no dc plans currently.

## 2022-12-04 NOTE — PROGRESS NOTES
[facility] Medicine Services  PROGRESS NOTE    Patient Name: Buster Velasco  : 1965  MRN: [id number]    Date of Admission: 2022  Primary Care Physician: Ludivina Bowers MD    Subjective   Subjective     CC:  F/u foot infection    HPI:  Says his pain is an 8/10 and asks for something for that. Also wants a warm blanket. No other issues overnight, says he plans to go home with his wife at discharge.    ROS:  Gen- No fevers, chills  CV- No chest pain, palpitations  Resp- No cough, dyspnea  GI- No N/V/D, abd pain    Objective   Objective     Vital Signs:   Temp:  [97.7 °F (36.5 °C)-98.1 °F (36.7 °C)] 98.1 °F (36.7 °C)  Heart Rate:  [62-93] 93  Resp:  [18] 18  BP: ()/(53-85) 117/80     Physical Exam:  Constitutional: No acute distress, awake, alert  HENT: NCAT, mucous membranes moist  Respiratory: Clear to auscultation bilaterally, respiratory effort normal   Cardiovascular: RRR, no murmurs, rubs, or gallops  Gastrointestinal: Positive bowel sounds, soft, nontender, nondistended  Musculoskeletal: R BKA, bandaged and did not remove today.  Psychiatric: Appropriate affect, cooperative  Neurologic: Oriented x 3, speech clear, no focal deficits  Skin: No rashes      Results Reviewed:  LAB RESULTS:      Lab 22  0936 22  0715 22  0810 22  0053 22  1905   WBC 8.91 6.89 7.22  --  13.92*   HEMOGLOBIN 9.3* 11.0* 10.0*  --  11.1*   HEMATOCRIT 30.6* 36.1* 32.2*  --  35.5*   PLATELETS 425 405 413  --  532*   NEUTROS ABS 6.88 4.43 5.64  --  11.44*   IMMATURE GRANS (ABS) 0.09* 0.09* 0.06*  --  0.14*   LYMPHS ABS 1.33 1.59 1.02  --  1.64   MONOS ABS 0.57 0.59 0.42  --  0.64   EOS ABS 0.02 0.13 0.06  --  0.03   MCV 85.5 85.3 84.3  --  84.5   SED RATE  --   --  109*  --   --    CRP  --   --  9.10*  --   --    PROCALCITONIN  --   --   --   --  0.17   LACTATE  --   --   --  2.0 2.1*         Lab 22  0936 22  0715 22  0810 22  1902    SODIUM 135* 139 135* 134*   POTASSIUM 4.4 5.0 4.2 4.1   CHLORIDE 102 105 103 100   CO2 25.0 23.0 21.0* 21.0*   ANION GAP 8.0 11.0 11.0 13.0   BUN 17 14 18 18   CREATININE 1.26 1.13 1.24 1.29*   EGFR 66.1 75.3 67.4 64.3   GLUCOSE 211* 46* 52* 54*   CALCIUM 9.1 9.2 9.2 9.1   HEMOGLOBIN A1C  --   --  6.90*  --          Lab 11/30/22  1905   TOTAL PROTEIN 7.6   ALBUMIN 3.00*   GLOBULIN 4.6   ALT (SGPT) 12   AST (SGOT) 19   BILIRUBIN 0.2   ALK PHOS 85   LIPASE 14                 Lab 12/01/22  1035 11/30/22  1905   IRON  --  18*   IRON SATURATION  --  6*   TIBC  --  277*   TRANSFERRIN  --  186*   FERRITIN  --  317.40   FOLATE  --  15.90   VITAMIN B 12  --  717   ABO TYPING O O   RH TYPING Positive Positive   ANTIBODY SCREEN Negative  --          Brief Urine Lab Results  (Last result in the past 365 days)      Color   Clarity   Blood   Leuk Est   Nitrite   Protein   CREAT   Urine HCG        08/20/22 0108 Yellow   Clear   Negative   Negative     Negative                 Microbiology Results Abnormal     Procedure Component Value - Date/Time    Blood Culture - Blood, Arm, Right [[id number]]  (Normal) Collected: 11/30/22 2250    Lab Status: Preliminary result Specimen: Blood from Arm, Right Updated: 12/03/22 1501     Blood Culture No growth at 2 days          No radiology results from the last 24 hrs        I have reviewed the medications:  Scheduled Meds:atorvastatin, 20 mg, Per PEG Tube, Nightly  DAPTOmycin, 6 mg/kg, Intravenous, Q24H  methadone, 10 mg, Oral, Q6H  metoprolol tartrate, 25 mg, Per PEG Tube, Q12H  piperacillin-tazobactam, 3.375 g, Intravenous, Q8H  senna-docusate sodium, 2 tablet, Oral, BID  sodium chloride, 10 mL, Intravenous, Q12H  sodium chloride, 10 mL, Intravenous, Q12H      Continuous Infusions:lactated ringers, 9 mL/hr  Pharmacy Consult - Pharmacy to dose,   ropivacaine,       PRN Meds:.•  acetaminophen  •  senna-docusate sodium **AND** polyethylene glycol **AND** bisacodyl **AND** bisacodyl  •   dextrose  •  dextrose  •  glucagon (human recombinant)  •  hydrOXYzine  •  melatonin  •  Morphine  •  ondansetron **OR** ondansetron  •  Pharmacy Consult - Pharmacy to dose  •  sennosides-docusate  •  sodium chloride  •  sodium chloride  •  sodium chloride    Assessment & Plan   Assessment & Plan     Active Hospital Problems    Diagnosis  POA   • **Right foot infection [L08.9]  Yes   • Sepsis (HCC) [A41.9]  Yes   • Hypoglycemia [E16.2]  Yes   • Anemia [D64.9]  Yes   • Hyponatremia [E87.1]  Yes   • Hypoalbuminemia [E88.09]  Yes   • Essential hypertension [I10]  Yes   • Hyperlipidemia [E78.5]  Yes   • Paroxysmal atrial fibrillation (HCC) [I48.0]  Yes      Resolved Hospital Problems   No resolved problems to display.        Brief Hospital Course to date:  Buster Velasco is a 57 y.o. male with a history of prior osteomyelitis s/p left BKA, s/p right transmetatarsal amputation, left hand 3rd metacarpal resection,  and DVT s/p IVC filter who presents w right foot wounds. S/p right BKA on 12/2 w Dr Peguero    Sepsis 2/2 acute right foot non-healing wound and likely osteomyelitis  -s/p right BKA 12/2 w Dr Peugero  -IV abx per ID, currently on Dapto and Zosyn  -post-op pain control - has nerve block, bowel regimen    Recurrent hypoglycemia in setting of DMII   - high dose Tresiba qAM before arrival which has been held.   - briefly required D5, now discontinued    Chronic pain on opioids   - restart home methadone 10 mg up to 4 times daily.   - Morphine for breakthrough post-op.     Reported h/o Afib   - patient denies any h/o Afib. Not previously on anticoagulation and given murky history will not start.   - On metoprolol dose at home which is continued    Chronic vocal cord paralysis s/p PEG  - SLP evaluation w clearance for diet    Hypotension with h/o HTN   - hold home meds     HLD - statin    DVT s/p IVC filter -  placed in spring 2022 per patient. Defer to PCP for further assessment and time of removal    H/o  seizure in setting of meth use - not on AED's given meth trigger    BPH - tamsulosin    Expected Discharge Location and Transportation: home  Expected Discharge Date: 12/7    DVT prophylaxis:  Mechanical DVT prophylaxis orders are present.          CODE STATUS:   Code Status and Medical Interventions:   Ordered at: 12/01/22 0035     Code Status (Patient has no pulse and is not breathing):    CPR (Attempt to Resuscitate)     Medical Interventions (Patient has pulse or is breathing):    Full Support       Tianna Musa, DO  12/04/22

## 2022-12-04 NOTE — PROGRESS NOTES
"[facility] Progress Note        Antibiotics:  Anti-Infectives (From admission, onward)    Ordered     Dose/Rate Route Frequency Start Stop    12/01/22 0829  DAPTOmycin (CUBICIN) 600 mg in sodium chloride 0.9 % 50 mL IVPB        Ordering Provider: Erlin Arzola PA    6 mg/kg × 100 kg  100 mL/hr over 30 Minutes Intravenous Every 24 Hours 12/01/22 1000 12/15/22 0959    11/30/22 2219  vancomycin 2000 mg/500 mL 0.9% NS IVPB ([facility])        Ordering Provider: Reggie Batres MD    20 mg/kg × 98.9 kg  over 2 Hours Intravenous Once 11/30/22 2221 12/01/22 0315    11/30/22 2219  piperacillin-tazobactam (ZOSYN) 3.375 g in iso-osmotic dextrose 50 ml (premix)        Ordering Provider: Reggie Batres MD    3.375 g Intravenous Once 11/30/22 2221 11/30/22 2335          CC: foot infection    HPI:    Patient is a 57 y.o.  Yr old male with history of prior lower extremity infection/amputations done in [facility].  He has a history of left BKA years ago.  More recent right transmetatarsal amputation but specific dates unclear and unclear who the surgeon was.  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  He has history DVT/IVC filter, diabetes and other comorbidities as below.  He reports a chronic right lateral foot wound present for months but apparently not precipitated by any specific event or trauma.  He is admitted to the hospital on November 30 with deterioration at the foot including redness/swelling     12/2/22 Dr Peguero did surgery  \"Procedure(s): Mid level right below-knee amputation and primary closure \"    12/4/22 seen early and sleepy; nursing reports no new distress; postop pain to the right LE which is dull at present, constant, nonradiating, worse with palpation, generally better with pain meds and 2-3 out " "of 10 in severity.  Not progressive     No headache photophobia or neck stiffness.  No shortness of breath cough or hemoptysis.  No nausea vomiting diarrhea or abdominal pain.  No dysuria hematuria or pyuria.  No other new skin rash       ROS:      12/4/22 No f/c/s. No n/v/d. No rash. No new ADR to Abx.     Constitutional-- No Fever, chills or sweats.  Appetite good, and no malaise. No fatigue.  Heent--chronic hoarse voice.  No new vision, hearing or throat complaints.  No epistaxis or oral sores.   No flashers, floaters or eye pain.   No headache, photophobia or neck stiffness.  Chronic PEG tube  CV-- No chest pain, palpitation or syncope  Resp-- No SOB/cough/Hemoptysis  GI- No nausea, vomiting, or diarrhea.  No hematochezia, melena, or hematemesis. Denies jaundice or chronic liver disease.  -- No dysuria, hematuria, or flank pain.  Denies hesitancy, urgency or flank pain.  Lymph- no swollen lymph nodes in neck/axilla or groin.   Heme- No active bruising or bleeding; no Hx of DVT or PE.  MS-- no swelling or pain in the bones or joints of arms/legs.  No new back pain.  Neuro-- No acute focal weakness or numbness in the arms or legs.  No seizures.     Full 12 point review of systems reviewed and negative otherwise for acute complaints, except for above      PE:   /80   Pulse 93   Temp 98.1 °F (36.7 °C) (Oral)   Resp 18   Ht 193 cm (76\")   Wt 100 kg (221 lb)   SpO2 95%   BMI 26.90 kg/m²          GENERAL: sleepy, in no acute distress.  Hoarse voice noted  HEENT: Normocephalic, atraumatic.  PERRL. EOMI. No conjunctival injection. No icterus. Oropharynx   without evidence of thrush or exudate. No evidence of peridontal disease.    NECK: Supple without nuchal rigidity. No mass.  LYMPH: No cervical, axillary or inguinal lymphadenopathy.  HEART: RRR; No murmur, rubs, gallops.   LUNGS: Diminished at bases but otherwise clear to auscultation bilaterally without wheezing, rales, rhonchi. Normal respiratory " effort. Nonlabored. No dullness.  ABDOMEN: Soft, nontender, nondistended. Positive bowel sounds. No rebound or guarding. NO mass or HSM.  PEG tube noted  EXT:  No cyanosis, clubbing or edema. No cord.   MSK: FROM without joint effusions noted arms/legs.    SKIN: Warm and dry without cutaneous eruptions on Inspection/palpation.    NEURO: sleepy     Right lower extremity surgical site noted;  No new redness;  no discrete mass bulge or fluctuance.  No crepitus or bulla.       No peripheral stigmata/phenomenon of endocarditis     IV without obvious redness or drainage     Left BKA site with no redness induration or warmth.  No discrete mass bulge or fluctuance.    Laboratory Data    Results from last 7 days   Lab Units 12/03/22  0936 12/02/22  0715 12/01/22  0810   WBC 10*3/mm3 8.91 6.89 7.22   HEMOGLOBIN g/dL 9.3* 11.0* 10.0*   HEMATOCRIT % 30.6* 36.1* 32.2*   PLATELETS 10*3/mm3 425 405 413     Results from last 7 days   Lab Units 12/03/22  0936   SODIUM mmol/L 135*   POTASSIUM mmol/L 4.4   CHLORIDE mmol/L 102   CO2 mmol/L 25.0   BUN mg/dL 17   CREATININE mg/dL 1.26   GLUCOSE mg/dL 211*   CALCIUM mg/dL 9.1     Results from last 7 days   Lab Units 11/30/22  1905   ALK PHOS U/L 85   BILIRUBIN mg/dL 0.2   ALT (SGPT) U/L 12   AST (SGOT) U/L 19     Results from last 7 days   Lab Units 12/01/22  0810   SED RATE mm/hr 109*     Results from last 7 days   Lab Units 12/01/22  0810   CRP mg/dL 9.10*       Estimated Creatinine Clearance: 90.4 mL/min (by C-G formula based on SCr of 1.26 mg/dL).      Microbiology:      Radiology:  Imaging Results (Last 72 Hours)     Procedure Component Value Units Date/Time    FL Video Swallow With Speech Single Contrast [[id number]] Collected: 12/01/22 1611     Updated: 12/01/22 2214    Narrative:      EXAMINATION: FL VIDEO SWALLOW W SPEECH SINGLE-CONTRAST-     INDICATION: hx dysphagia, PEG- wants removed; L08.9-Local infection of  the skin and subcutaneous tissue, unspecified;  E16.2-Hypoglycemia,  unspecified; L89.159-Pressure ulcer of sacral region, unspecified stage;  R13.10-Dysphagia, unspecified     TECHNIQUE: 1 minute and 18 seconds of fluoroscopic time was used for  this exam. 11 cc of fluoroscopic loops were saved. The patient was  evaluated in the seated lateral position while taking a variety of  consistencies of barium by mouth under the direction of speech  pathology.     COMPARISON: NONE     FINDINGS: 1 minute and 18 seconds of fluoroscopy provided for a modified  barium swallow. Please see speech therapy report for full details and  recommendations.          Impression:      Fluoroscopy provided for a modified barium swallow. Please  see speech therapy report for full details and recommendations.         This report was finalized on 12/1/2022 10:11 PM by Dr. Yobany Dickey MD.       MRI Foot Right Without Contrast [[id number]] Collected: 12/01/22 0444     Updated: 12/01/22 1336    Narrative:      EXAMINATION: MRI FOOT RIGHT WO CONTRAST-      INDICATION: Right foot wound. Osteomyelitis?; L08.9-Local infection of  the skin and subcutaneous tissue, unspecified; E16.2-Hypoglycemia,  unspecified; L89.159-Pressure ulcer of sacral region, unspecified stage     TECHNIQUE: Multiplanar, multisequence MR imaging of the right foot  without IV contrast.     COMPARISON: Photographs of right foot wounds available in the electronic  medical record dated 11/30/2022     FINDINGS:      The exam is somewhat limited owing to some motion degradation of a few  sequences.     There is evidence of prior transmetatarsal amputation.     There is a large deep wound/ulcer at the lateral aspect of the stump  without evidence of discrete/drainable fluid collection. There is  tenosynovitis at the master knot of [place] which may be reactive or  infectious. There is abnormal T2/STIR hyperintense, T1 hypointense  marrow signal change of the remnant fifth metatarsal base with likely  erosion, compatible with  osteomyelitis (short axis image 17, sagittal  image 6). There is some patchy T2/STIR hyperintense marrow signal within  the adjacent third and fourth metatarsal base remnants and distal  cuboid, without definite loss of the normal T1 hyperintense marrow  signal, likely reactive marrow edema although contiguous early  osteomyelitis would be difficult to exclude.     Additional wound at the lateral ankle, without evidence of  discrete/drainable fluid collection. There is abnormal T2/STIR  hyperintense marrow signal and T1 hypointensity of the underlying  lateral malleolus (long axis image 5, short axis image 27).       Impression:         Deep ulceration at the lateral stump with osteomyelitis of the  underlying remnant fifth metatarsal base.     Wound at the lateral ankle with osteomyelitis of the underlying lateral  malleolus.     This report was finalized on 12/1/2022 1:33 PM by Min Tom MD.               Impression:     --Acute right foot/ankle with multifocal ulceration, cellulitis/wound infection and probable osteomyelitis with probe to bone at the lateral foot and abnormal MRI.  Other comorbidities as outlined above and high risk for further serious morbidity and other serious sequelae including persistent/recurrent or nonhealing wounds, persistent/progressive or recurrent infection and risk for further functional/limb loss/amputation.  Surgery following to help define timing/option of threshold for any future surgical intervention .      **Surgery 12/2 with BKA     --History MRSA    --blood culture + 1 of 2, CN staph and ?contaminant so far     --Chronic vocal cord paralysis per notes with chronic hoarseness and indwelling PEG tube.  Medicine team to clarify     --History left BKA.     --Diabetes.  You need to tightly control blood sugar to give best chance for healing     --History of DVT with IVC filter     PLAN:      -- Daptomycin  IV for now but anticipate further taper   if healing okay postop and  no new pathogen     -- Check/review labs cultures and scans     -- Partial history per nursing staff     -- Discussed with microbiology and family     --d/w Dr Peguero      -- Highly complex set of issues with high risk for further serious morbidity and other serious sequela     Zeke George MD  12/4/2022

## 2022-12-04 NOTE — PROGRESS NOTES
[facility]    Acute pain service Inpatient Progress Note    Patient Name: Buster Velasco  :  1965  MRN:  [id number]        Acute Pain  Service Inpatient Progress Note:    Analgesia:Fair  Pain Score:8/10  LOC: alert and awake  Resp Status: room air  Cardiac: VS stable  Side Effects:None  Catheter Site:clean, dressing intact and dry  Cath type: peripheral nerve cath with ON Q  Volume: 1mL,5ml, 5ml InfuSystem Pump.  Dosing/Volume: ropivacaine 0.2%  Catheter Plan:Catheter to remain Insitu and Continue catheter infusion rate unchanged  Comments:     10 cc bolus of 0.2% Ropivacaine given via neve catheter

## 2022-12-04 NOTE — PLAN OF CARE
Goal Outcome Evaluation:            Pt had episodes of hypotension. Order to give 500ml bolus. B/P came up. NSR on monitor. RA. Will continue with POC.

## 2022-12-05 LAB
BH BB BLOOD EXPIRATION DATE: NORMAL
BH BB BLOOD TYPE BARCODE: 5100
BH BB BLOOD TYPE BARCODE: 5100
BH BB BLOOD TYPE BARCODE: 9500
BH BB BLOOD TYPE BARCODE: 9500
BH BB DISPENSE STATUS: NORMAL
BH BB PRODUCT CODE: NORMAL
BH BB UNIT NUMBER: NORMAL
CROSSMATCH INTERPRETATION: NORMAL
CYTO UR: NORMAL
GLUCOSE BLDC GLUCOMTR-MCNC: 135 MG/DL (ref 70–130)
GLUCOSE BLDC GLUCOMTR-MCNC: 141 MG/DL (ref 70–130)
GLUCOSE BLDC GLUCOMTR-MCNC: 224 MG/DL (ref 70–130)
GLUCOSE BLDC GLUCOMTR-MCNC: 280 MG/DL (ref 70–130)
LAB AP CASE REPORT: NORMAL
LAB AP CLINICAL INFORMATION: NORMAL
PATH REPORT.FINAL DX SPEC: NORMAL
PATH REPORT.GROSS SPEC: NORMAL
UNIT  ABO: NORMAL
UNIT  RH: NORMAL

## 2022-12-05 PROCEDURE — 99024 POSTOP FOLLOW-UP VISIT: CPT | Performed by: THORACIC SURGERY (CARDIOTHORACIC VASCULAR SURGERY)

## 2022-12-05 PROCEDURE — 82962 GLUCOSE BLOOD TEST: CPT

## 2022-12-05 PROCEDURE — 99232 SBSQ HOSP IP/OBS MODERATE 35: CPT | Performed by: NURSE PRACTITIONER

## 2022-12-05 PROCEDURE — 25010000002 DAPTOMYCIN PER 1 MG: Performed by: PHYSICIAN ASSISTANT

## 2022-12-05 PROCEDURE — 92526 ORAL FUNCTION THERAPY: CPT

## 2022-12-05 PROCEDURE — 25010000002 MORPHINE PER 10 MG: Performed by: THORACIC SURGERY (CARDIOTHORACIC VASCULAR SURGERY)

## 2022-12-05 RX ADMIN — METHADONE HYDROCHLORIDE 10 MG: 10 TABLET ORAL at 02:23

## 2022-12-05 RX ADMIN — SENNOSIDES AND DOCUSATE SODIUM 2 TABLET: 50; 8.6 TABLET ORAL at 08:55

## 2022-12-05 RX ADMIN — METHADONE HYDROCHLORIDE 10 MG: 10 TABLET ORAL at 19:44

## 2022-12-05 RX ADMIN — METOPROLOL TARTRATE 25 MG: 25 TABLET, FILM COATED ORAL at 21:00

## 2022-12-05 RX ADMIN — METOPROLOL TARTRATE 25 MG: 25 TABLET, FILM COATED ORAL at 08:55

## 2022-12-05 RX ADMIN — METHADONE HYDROCHLORIDE 10 MG: 10 TABLET ORAL at 14:11

## 2022-12-05 RX ADMIN — ATORVASTATIN CALCIUM 20 MG: 20 TABLET, FILM COATED ORAL at 21:00

## 2022-12-05 RX ADMIN — DAPTOMYCIN 600 MG: 500 INJECTION, POWDER, LYOPHILIZED, FOR SOLUTION INTRAVENOUS at 09:59

## 2022-12-05 RX ADMIN — Medication 10 ML: at 21:00

## 2022-12-05 RX ADMIN — Medication 10 ML: at 08:55

## 2022-12-05 RX ADMIN — MORPHINE SULFATE 2 MG: 2 INJECTION, SOLUTION INTRAMUSCULAR; INTRAVENOUS at 18:16

## 2022-12-05 RX ADMIN — METHADONE HYDROCHLORIDE 10 MG: 10 TABLET ORAL at 08:55

## 2022-12-05 NOTE — PROGRESS NOTES
[facility]    Acute pain service Inpatient Progress Note    Patient Name: Buster Velasco  :  1965  MRN:  [id number]        Acute Pain  Service Inpatient Progress Note:    Analgesia:Good  LOC: alert and awake  Resp Status: room air  Cardiac: VS stable  Side Effects:None  Catheter Site:clean, dressing intact and dry  Cath type: peripheral nerve cath with ON Q  Volume: 1mL,8ml, 8ml InfuSystem Pump.  Catheter Plan:Catheter to remain Insitu and Continue catheter infusion rate unchanged  Comments:   Patient tells me that pain has been an issue since his surgery.  I see over the weekend that they got him on his scheduled home dose of methadone.  He has received several boluses through his popliteal nerve catheter.  He tells me that his pain is better today than it was yesterday.  He tells me he has been able to get a lot of sleep and appears quite comfortable.  We will continue our infuse system pump until the bag is complete which will probably be in 2 more days.

## 2022-12-05 NOTE — CASE MANAGEMENT/SOCIAL WORK
Continued Stay Note  [facility]     Patient Name: Buster Velasco  MRN: [id number]  Today's Date: 12/5/2022    Admit Date: 11/30/2022    Plan: Home with home health care   Discharge Plan     Row Name 12/05/22 1414       Plan    Plan Home with home health care    Patient/Family in Agreement with Plan yes    Plan Comments Mr. Velasco continues to anticipate going home at the time of discharge. He is interested in home health services and I will make these arrangements. Case management will continue to follow.    Final Discharge Disposition Code 06 - home with home health care               Discharge Codes    No documentation.               Expected Discharge Date and Time     Expected Discharge Date Expected Discharge Time    Dec 9, 2022             Alan Downing RN

## 2022-12-05 NOTE — PROGRESS NOTES
"[facility] Progress Note        Antibiotics:  Anti-Infectives (From admission, onward)    Ordered     Dose/Rate Route Frequency Start Stop    12/01/22 0829  DAPTOmycin (CUBICIN) 600 mg in sodium chloride 0.9 % 50 mL IVPB        Ordering Provider: Erlin Arzola PA    6 mg/kg × 100 kg  100 mL/hr over 30 Minutes Intravenous Every 24 Hours 12/01/22 1000 12/15/22 0959    11/30/22 2219  vancomycin 2000 mg/500 mL 0.9% NS IVPB ([facility])        Ordering Provider: Reggie Batres MD    20 mg/kg × 98.9 kg  over 2 Hours Intravenous Once 11/30/22 2221 12/01/22 0315    11/30/22 2219  piperacillin-tazobactam (ZOSYN) 3.375 g in iso-osmotic dextrose 50 ml (premix)        Ordering Provider: Reggie Batres MD    3.375 g Intravenous Once 11/30/22 2221 11/30/22 2335          CC: foot infection    HPI:    Patient is a 57 y.o.  Yr old male with history of prior lower extremity infection/amputations done in [facility].  He has a history of left BKA years ago.  More recent right transmetatarsal amputation but specific dates unclear and unclear who the surgeon was.  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  He has history DVT/IVC filter, diabetes and other comorbidities as below.  He reports a chronic right lateral foot wound present for months but apparently not precipitated by any specific event or trauma.  He is admitted to the hospital on November 30 with deterioration at the foot including redness/swelling     12/2/22 Dr Peguero did surgery  \"Procedure(s): Mid level right below-knee amputation and primary closure \"    12/5/22 seen early and sleepy; nursing reports no new distress; postop pain to the right LE which is dull at present, constant, nonradiating, worse with palpation, generally better with pain meds and 2-3 out " "of 10 in severity.  Not progressive     No headache photophobia or neck stiffness.  No shortness of breath cough or hemoptysis.  No nausea vomiting diarrhea or abdominal pain.  No dysuria hematuria or pyuria.  No other new skin rash       ROS:      12/5/22 No f/c/s. No n/v/d. No rash. No new ADR to Abx.     Constitutional-- No Fever, chills or sweats.  Appetite good, and no malaise. No fatigue.  Heent--chronic hoarse voice.  No new vision, hearing or throat complaints.  No epistaxis or oral sores.   No flashers, floaters or eye pain.   No headache, photophobia or neck stiffness.  Chronic PEG tube  CV-- No chest pain, palpitation or syncope  Resp-- No SOB/cough/Hemoptysis  GI- No nausea, vomiting, or diarrhea.  No hematochezia, melena, or hematemesis. Denies jaundice or chronic liver disease.  -- No dysuria, hematuria, or flank pain.  Denies hesitancy, urgency or flank pain.  Lymph- no swollen lymph nodes in neck/axilla or groin.   Heme- No active bruising or bleeding; no Hx of DVT or PE.  MS-- no swelling or pain in the bones or joints of arms/legs.  No new back pain.  Neuro-- No acute focal weakness or numbness in the arms or legs.  No seizures.     Full 12 point review of systems reviewed and negative otherwise for acute complaints, except for above      PE:   /69 (BP Location: Left arm, Patient Position: Lying)   Pulse 81   Temp 97.6 °F (36.4 °C) (Oral)   Resp 16   Ht 193 cm (76\")   Wt 100 kg (221 lb)   SpO2 96%   BMI 26.90 kg/m²          GENERAL: sleepy, in no acute distress.  Hoarse voice noted  HEENT: Normocephalic, atraumatic.  PERRL. EOMI. No conjunctival injection. No icterus. Oropharynx   without evidence of thrush or exudate. No evidence of peridontal disease.    NECK: Supple without nuchal rigidity. No mass.  LYMPH: No cervical, axillary or inguinal lymphadenopathy.  HEART: RRR; No murmur, rubs, gallops.   LUNGS: Diminished at bases but otherwise clear to auscultation bilaterally without " wheezing, rales, rhonchi. Normal respiratory effort. Nonlabored. No dullness.  ABDOMEN: Soft, nontender, nondistended. Positive bowel sounds. No rebound or guarding. NO mass or HSM.  PEG tube noted  EXT:  No cyanosis, clubbing or edema. No cord.   MSK: FROM without joint effusions noted arms/legs.    SKIN: Warm and dry without cutaneous eruptions on Inspection/palpation.    NEURO: sleepy     Right lower extremity surgical site noted;  No new redness;  no discrete mass bulge or fluctuance.  No crepitus or bulla.       No peripheral stigmata/phenomenon of endocarditis     IV without obvious redness or drainage     Left BKA site with no redness induration or warmth.  No discrete mass bulge or fluctuance.    Laboratory Data    Results from last 7 days   Lab Units 12/04/22  1149 12/03/22  0936 12/02/22  0715   WBC 10*3/mm3 6.80 8.91 6.89   HEMOGLOBIN g/dL 9.7* 9.3* 11.0*   HEMATOCRIT % 31.6* 30.6* 36.1*   PLATELETS 10*3/mm3 420 425 405     Results from last 7 days   Lab Units 12/04/22  1149   SODIUM mmol/L 136   POTASSIUM mmol/L 4.3   CHLORIDE mmol/L 101   CO2 mmol/L 25.0   BUN mg/dL 16   CREATININE mg/dL 0.90   GLUCOSE mg/dL 118*   CALCIUM mg/dL 9.1     Results from last 7 days   Lab Units 11/30/22  1905   ALK PHOS U/L 85   BILIRUBIN mg/dL 0.2   ALT (SGPT) U/L 12   AST (SGOT) U/L 19     Results from last 7 days   Lab Units 12/01/22  0810   SED RATE mm/hr 109*     Results from last 7 days   Lab Units 12/01/22  0810   CRP mg/dL 9.10*       Estimated Creatinine Clearance: 126.5 mL/min (by C-G formula based on SCr of 0.9 mg/dL).      Microbiology:      Radiology:  Imaging Results (Last 72 Hours)     ** No results found for the last 72 hours. **            Impression:     --Acute right foot/ankle with multifocal ulceration, cellulitis/wound infection and probable osteomyelitis with probe to bone at the lateral foot and abnormal MRI.  Other comorbidities as outlined above and high risk for further serious morbidity and other  serious sequelae including persistent/recurrent or nonhealing wounds, persistent/progressive or recurrent infection and risk for further functional/limb loss/amputation.  Surgery following to help define timing/option of threshold for any future surgical intervention .      **Surgery 12/2 with BKA     --History MRSA    --blood culture + 1 of 2, CN staph and ?contaminant so far     --Chronic vocal cord paralysis per notes with chronic hoarseness and indwelling PEG tube.  Medicine team to clarify     --History left BKA.     --Diabetes.  You need to tightly control blood sugar to give best chance for healing     --History of DVT with IVC filter     PLAN:      -- Daptomycin  IV for now but anticipate further taper   if healing okay postop and no new pathogen     -- Check/review labs cultures and scans     -- Partial history per nursing staff     -- Discussed with microbiology and family     --d/w Dr Peguero      -- Highly complex set of issues with high risk for further serious morbidity and other serious sequela     Zeke George MD  12/5/2022       minor surgery planned

## 2022-12-05 NOTE — PLAN OF CARE
Goal Outcome Evaluation:  Plan of Care Reviewed With: patient A&OX4 VSS  SR on tele. Pain in right incision controlled with INFUpump and IV narcotics. Small blood strike noted to right incision dressing. RLE elevated on pillows. Much encouragement needed to get pt turned due to increased pain.Dressing loose to PICC line and changed. Awaiting Case management for plan.

## 2022-12-05 NOTE — PROGRESS NOTES
[facility] Medicine Services  PROGRESS NOTE    Patient Name: Buster Velasco  : 1965  MRN: [id number]    Date of Admission: 2022  Primary Care Physician: Ludivina Bowers MD    Subjective   Subjective     CC:  F/u foot infection    HPI:  Still in quite a bit of pain even with home methadone restarted, but is better than yesterday  No other issues reported    ROS:  Gen- No fevers, chills  CV- No chest pain, palpitations  Resp- No cough, dyspnea  GI- No N/V/D, abd pain    Objective   Objective     Vital Signs:   Temp:  [97.6 °F (36.4 °C)-99 °F (37.2 °C)] 97.8 °F (36.6 °C)  Heart Rate:  [80-89] 81  Resp:  [16-18] 16  BP: (103-116)/(63-82) 103/71  Flow (L/min):  [2] 2     Physical Exam:  Constitutional: No acute distress, awake, alert, resting in bed  HENT: NCAT, mucous membranes moist  Respiratory: Clear to auscultation bilaterally, respiratory effort normal   Cardiovascular: RRR, no murmurs, rubs, or gallops  Gastrointestinal: Positive bowel sounds, soft, nontender, nondistended  Musculoskeletal: R BKA, bandaged and intact. Old L BKA  Psychiatric: Appropriate affect, cooperative  Neurologic: Oriented x 3, speech clear  Skin: No rashes      Results Reviewed:  LAB RESULTS:      Lab 22  1149 22  0936 22  0715 22  0810 22  0053 22  1905   WBC 6.80 8.91 6.89 7.22  --  13.92*   HEMOGLOBIN 9.7* 9.3* 11.0* 10.0*  --  11.1*   HEMATOCRIT 31.6* 30.6* 36.1* 32.2*  --  35.5*   PLATELETS 420 425 405 413  --  532*   NEUTROS ABS  --  6.88 4.43 5.64  --  11.44*   IMMATURE GRANS (ABS)  --  0.09* 0.09* 0.06*  --  0.14*   LYMPHS ABS  --  1.33 1.59 1.02  --  1.64   MONOS ABS  --  0.57 0.59 0.42  --  0.64   EOS ABS  --  0.02 0.13 0.06  --  0.03   MCV 84.7 85.5 85.3 84.3  --  84.5   SED RATE  --   --   --  109*  --   --    CRP  --   --   --  9.10*  --   --    PROCALCITONIN  --   --   --   --   --  0.17   LACTATE  --   --   --   --  2.0 2.1*         Lab  12/04/22  1149 12/03/22  0936 12/02/22  0715 12/01/22  0810 11/30/22  1905   SODIUM 136 135* 139 135* 134*   POTASSIUM 4.3 4.4 5.0 4.2 4.1   CHLORIDE 101 102 105 103 100   CO2 25.0 25.0 23.0 21.0* 21.0*   ANION GAP 10.0 8.0 11.0 11.0 13.0   BUN 16 17 14 18 18   CREATININE 0.90 1.26 1.13 1.24 1.29*   EGFR 99.0 66.1 75.3 67.4 64.3   GLUCOSE 118* 211* 46* 52* 54*   CALCIUM 9.1 9.1 9.2 9.2 9.1   HEMOGLOBIN A1C  --   --   --  6.90*  --          Lab 11/30/22 1905   TOTAL PROTEIN 7.6   ALBUMIN 3.00*   GLOBULIN 4.6   ALT (SGPT) 12   AST (SGOT) 19   BILIRUBIN 0.2   ALK PHOS 85   LIPASE 14                 Lab 12/01/22  1035 11/30/22  1905   IRON  --  18*   IRON SATURATION  --  6*   TIBC  --  277*   TRANSFERRIN  --  186*   FERRITIN  --  317.40   FOLATE  --  15.90   VITAMIN B 12  --  717   ABO TYPING O O   RH TYPING Positive Positive   ANTIBODY SCREEN Negative  --          Brief Urine Lab Results  (Last result in the past 365 days)      Color   Clarity   Blood   Leuk Est   Nitrite   Protein   CREAT   Urine HCG        08/20/22 0108 Yellow   Clear   Negative   Negative     Negative                 Microbiology Results Abnormal     Procedure Component Value - Date/Time    Blood Culture - Blood, Arm, Right [[id number]]  (Normal) Collected: 11/30/22 2250    Lab Status: Preliminary result Specimen: Blood from Arm, Right Updated: 12/04/22 1501     Blood Culture No growth at 3 days          No radiology results from the last 24 hrs        I have reviewed the medications:  Scheduled Meds:atorvastatin, 20 mg, Per PEG Tube, Nightly  DAPTOmycin, 6 mg/kg, Intravenous, Q24H  methadone, 10 mg, Oral, Q6H  metoprolol tartrate, 25 mg, Per PEG Tube, Q12H  senna-docusate sodium, 2 tablet, Oral, BID  sodium chloride, 10 mL, Intravenous, Q12H  sodium chloride, 10 mL, Intravenous, Q12H      Continuous Infusions:lactated ringers, 9 mL/hr  Pharmacy Consult - Pharmacy to dose,   ropivacaine,       PRN Meds:.•  acetaminophen  •  senna-docusate sodium  **AND** polyethylene glycol **AND** bisacodyl **AND** bisacodyl  •  dextrose  •  dextrose  •  glucagon (human recombinant)  •  hydrOXYzine  •  melatonin  •  Morphine  •  ondansetron **OR** ondansetron  •  Pharmacy Consult - Pharmacy to dose  •  sennosides-docusate  •  sodium chloride  •  sodium chloride  •  sodium chloride    Assessment & Plan   Assessment & Plan     Active Hospital Problems    Diagnosis  POA   • **Right foot infection [L08.9]  Yes   • Sepsis (HCC) [A41.9]  Yes   • Hypoglycemia [E16.2]  Yes   • Anemia [D64.9]  Yes   • Hyponatremia [E87.1]  Yes   • Hypoalbuminemia [E88.09]  Yes   • Essential hypertension [I10]  Yes   • Hyperlipidemia [E78.5]  Yes   • Paroxysmal atrial fibrillation (HCC) [I48.0]  Yes      Resolved Hospital Problems   No resolved problems to display.        Brief Hospital Course to date:  Buster Velasco is a 57 y.o. male with a history of prior osteomyelitis s/p left BKA, s/p right transmetatarsal amputation, left hand 3rd metacarpal resection,  and DVT s/p IVC filter who presents w right foot wounds. S/p right BKA on 12/2 w Dr Peguero    This patient's problems and plans were partially entered by my partner and updated as appropriate by me 12/05/22.      Sepsis 2/2 acute right foot non-healing wound and likely osteomyelitis  -s/p right BKA 12/2 w Dr Peguero  -IV abx per ID, currently on Dapto Monotherapy.   -post-op pain control - has nerve block, prn morphine and scheduled methadone  - bowel regimen    Recurrent hypoglycemia in setting of DMII   - high dose Tresiba qAM before arrival which has been held.   - briefly required D5, now discontinued  -blood sugars acceptable off insulin, continue accuchecks    Chronic pain on opioids   - restarted home methadone 10 mg QID  - Morphine for breakthrough post-op.     Reported h/o Afib   - patient denies any h/o Afib. Not previously on anticoagulation and given murky history will not start.   - On metoprolol dose at home which is  continued    Chronic vocal cord paralysis s/p PEG  - SLP evaluation w clearance for regular diet    Hypotension with h/o HTN   - hold parameters with metoprolol, hypotension resolved    HLD - statin    DVT s/p IVC filter -  placed in spring 2022 per patient. Defer to PCP for further assessment and time of removal    H/o seizure in setting of meth use - not on AED's given meth trigger    BPH - tamsulosin    Expected Discharge Location and Transportation: home  Expected Discharge Date: 12/7    DVT prophylaxis:  Mechanical DVT prophylaxis orders are present.          CODE STATUS:   Code Status and Medical Interventions:   Ordered at: 12/01/22 0035     Code Status (Patient has no pulse and is not breathing):    CPR (Attempt to Resuscitate)     Medical Interventions (Patient has pulse or is breathing):    Full Support       Tere Molina, APRN  12/05/22

## 2022-12-05 NOTE — PLAN OF CARE
Goal Outcome Evaluation:  Plan of Care Reviewed With: patient, spouse        Progress: no change   SLP treatment completed. Will continue to address dysphagia in tx and will tentatively plan for repeat instrumental study prior to d/c. Please see note for further details and recommendations.

## 2022-12-05 NOTE — THERAPY TREATMENT NOTE
Acute Care - Speech Language Pathology   Swallow Treatment Note [facility]     Patient Name: Buster Velasco  : 1965  MRN: [id number]  Today's Date: 2022               Admit Date: 2022    Visit Dx:     ICD-10-CM ICD-9-CM   1. Right foot infection  L08.9 686.9   2. Hypoglycemia  E16.2 251.2   3. Decubitus ulcer of coccygeal region, unspecified ulcer stage  L89.159 707.03     707.20   4. Pharyngeal dysphagia  R13.13 787.23     Patient Active Problem List   Diagnosis   • Right foot infection   • Hypoglycemia   • Anemia   • Hyponatremia   • Hypoalbuminemia   • Essential hypertension   • Hyperlipidemia   • Paroxysmal atrial fibrillation (HCC)   • Sepsis (HCC)     Past Medical History:   Diagnosis Date   • Diabetes (HCC)    • DVT (deep venous thrombosis) (HCC)    • Hypertension    • Mood disorder (HCC)      Past Surgical History:   Procedure Laterality Date   • BELOW KNEE AMPUTATION Left    • BELOW KNEE AMPUTATION Right 2022    Procedure: AMPUTATION BELOW KNEE RIGHT;  Surgeon: John Peguero MD;  Location: [facility];  Service: Vascular;  Laterality: Right;   • TRANS METATARSAL AMPUTATION Right        SLP Recommendation and Plan     SLP Diet Recommendation: regular textures, no mixed consistencies, nectar thick liquids, ice chips between meals after oral care, with supervision (22 163)  Recommended Precautions and Strategies: no straw, upright posture during/after eating, small bites of food and sips of liquid, general aspiration precautions, other (see comments) (tsp-sized sips nectar-thick) (22 1630)                 Anticipated Discharge Disposition (SLP): anticipate therapy at next level of care (22 163)           Demonstrates Need for Referral to Another Service: otolaryngology (ENT) (Reported he has never followed-up w/ ENT to address vocal cord paralysis (acute onset in July following intubation). May be able to provide recommendations/interventions that could  improve both voice & swallow function.) (12/05/22 1630)     Daily Summary of Progress (SLP): progress toward functional goals as expected (12/05/22 1630)               Treatment Assessment (SLP): continued, clinical signs of, aspiration, suspected, mild-moderate, pharyngeal dysphagia (silent aspiration per MBS 12/1) (12/05/22 1630)     Plan for Continued Treatment (SLP): continue treatment per plan of care, further assessment needed (see comments), other (see comments) (will tentatively plan for repeat instrumental swallow study in next few days prior to d/c) (12/05/22 1630)         Plan of Care Reviewed With: patient, spouse  Progress: no change      SWALLOW EVALUATION (last 72 hours)     SLP Adult Swallow Evaluation     Row Name 12/05/22 1630                   Rehab Evaluation    Document Type therapy note (daily note)  -AC        Subjective Information no complaints  -AC        Patient Observations alert;cooperative  -AC        Patient/Family/Caregiver Comments/Observations Spouse present.  -AC        Patient Effort good  -AC           Pain    Additional Documentation Pain Scale: FACES Pre/Post-Treatment (Group)  -AC           Pain Scale: FACES Pre/Post-Treatment    Pain: FACES Scale, Pretreatment 2-->hurts little bit  -AC        Posttreatment Pain Rating 2-->hurts little bit  -AC        Pain Location - Side/Orientation Right  -AC        Pain Location lower  -AC        Pain Location - extremity  -AC        Pre/Posttreatment Pain Comment R/t recent BKA. Pt reported RN managing.  -AC           SLP Treatment Clinical Impressions    Treatment Assessment (SLP) continued;clinical signs of;aspiration;suspected;mild-moderate;pharyngeal dysphagia  silent aspiration per MBS 12/1  -AC        Daily Summary of Progress (SLP) progress toward functional goals as expected  -AC        Barriers to Overall Progress (SLP) Baseline deficits;Other (see comments)  since hospitalization in July '22  -        Plan for Continued  Treatment (SLP) continue treatment per plan of care;further assessment needed (see comments);other (see comments)  will tentatively plan for repeat instrumental swallow study in next few days prior to d/c  -AC        Care Plan Review evaluation/treatment results reviewed;care plan/treatment goals reviewed;risks/benefits reviewed;current/potential barriers reviewed;patient/other agree to care plan  -        Care Plan Review, Other Participant(s) spouse  -           Recommendations    SLP Diet Recommendation regular textures;no mixed consistencies;nectar thick liquids;ice chips between meals after oral care, with supervision  -        Recommended Precautions and Strategies no straw;upright posture during/after eating;small bites of food and sips of liquid;general aspiration precautions;other (see comments)  tsp-sized sips nectar-thick  -AC        Anticipated Discharge Disposition (SLP) anticipate therapy at next level of care  -AC        Demonstrates Need for Referral to Another Service otolaryngology (ENT)  Reported he has never followed-up w/ ENT to address vocal cord paralysis (acute onset in July following intubation). May be able to provide recommendations/interventions that could improve both voice & swallow function.  -AC              User Key  (r) = Recorded By, (t) = Taken By, (c) = Cosigned By    Initials Name Effective Dates    AC Alma Rosa Tovar, MS CCC-SLP 06/16/21 -                 EDUCATION  The patient has been educated in the following areas:   Dysphagia (Swallowing Impairment) Oral Care/Hydration Modified Diet Instruction.        SLP GOALS     Row Name 12/05/22 4570             (LTG) Patient will demonstrate functional swallow for    Diet Texture (Demonstrate functional swallow) regular textures  -AC      Liquid viscosity (Demonstrate functional swallow) nectar/ mildly thick liquids  -AC      [place] (Demonstrate functional swallow) independently (over 90% accuracy)  -      Time Frame  (Demonstrate functional swallow) by discharge  -AC      Progress/Outcomes (Demonstrate functional swallow) continuing progress toward goal  -AC         (STG) Patient will tolerate trials of    Consistencies Trialed (Tolerate trials) regular textures;nectar/ mildly thick liquids  -AC      Desired Outcome (Tolerate trials) without signs/symptoms of aspiration;without signs of distress  -AC      [place] (Tolerate trials) independently (over 90% accuracy)  -AC      Time Frame (Tolerate trials) by discharge  -AC      Progress/Outcomes (Tolerate trials) continuing progress toward goal  -AC      Comment (Tolerate trials) Assessed w/ dinner tray. Impulsive self-feeding behaviors. No overt s/s aspiration w/ regular/cohesive textures & nectar-thick liquids via cup sips (but needs max cues).  -AC         (STG) Patient will tolerate therapeutic trials of    Consistencies Trialed (Tolerate therapeutic trials) thin liquids  -AC      Desired Outcome (Tolerate therapeutic trials) without signs/symptoms of aspiration;without signs of distress  -AC      [place] (Tolerate therapeutic trials) with minimal cues (75-90% accuracy)  -AC      Time Frame (Tolerate therapeutic trials) 1 week  -AC      Progress/Outcomes (Tolerate therapeutic trials) continuing progress toward goal  -AC      Comment (Tolerate therapeutic trials) Intemittent delayed cough. Has been drinking thin liquids since after BKA 12/2. Silent aspiration per MBS on 12/1.  -AC         (STG) Pharyngeal Strengthening Exercise Goal 1 (SLP)    Increase Superior Movement of the Hyolaryngeal Complex effortful pitch glide (falsetto + pharyngeal squeeze)  -AC      Increase Anterior Movement of the Hyolaryngeal Complex chin tuck against resistance (CTAR)  -AC      Increase Epiglottic Inversion and Retroflexion hard effortful swallow  -AC      Increase Closure at Entrance to Airway/Closure of Airway at Glottis supraglottic swallow  -AC      Increase Squeeze/Positive Pressure  Generation hard effortful swallow  -AC      Increase Tongue Base Retraction janine  -AC      [place]/Accuracy (Pharyngeal Strengthening Goal 1, SLP) with minimal cues (75-90% accuracy)  -AC      Time Frame (Pharyngeal Strengthening Goal 1, SLP) short term goal (STG)  -AC      Progress/Outcomes (Pharyngeal Strengthening Goal 1, SLP) continuing progress toward goal  -AC      Comment (Pharyngeal Strengthening Goal 1, SLP) Provided exercises/explanation/rationale. Encouraged pt to practice 3x/day, preferrably after meals. He indicated that exercises familiar to him.  -AC         (STG) Swallow Management Recall Goal 1 (SLP)    Activity (Swallow Management Recall Goal 1, SLP) independent recall of;compensatory swallow strategies/techniques;safe diet/liquid level  -AC      [place]/Accuracy (Swallow Management Recall Goal 1, SLP) independently (over 90% accuracy)  -AC      Time Frame (Swallow Management Recall Goal 1, SLP) short term goal (STG)  -AC      Progress/Outcomes (Swallow Management Recall Goal 1, SLP) goal ongoing  -AC         (STG) Swallow Compensatory Strategies Goal 1 (SLP)    Activity (Swallow Compensatory Strategies/Techniques Goal 1, SLP) compensatory strategies;small cup sips;during p.o. trials;during meal intake;other (see comments)  -AC      [place]/Accuracy (Swallow Compensatory Strategies/Techniques Goal 1, SLP) independently (over 90% accuracy)  -AC      Time Frame (Swallow Compensatory Strategies/Techniques Goal 1, SLP) short term goal (STG)  -AC      Progress/Outcomes (Swallow Compensatory Strategies/Techniques Goal 1, SLP) continuing progress toward goal  -AC      Comment (Swallow Compensatory Strategies/Techniques Goal 1, SLP) Impulsive self-feeding behaviors. W/ cues to take small sips, still took ~2oz liquid per drink. Required max cues.  -AC            User Key  (r) = Recorded By, (t) = Taken By, (c) = Cosigned By    Initials Name Provider Type    AC Alma Rosa Tovar MS CCC-SLP  Speech and Language Pathologist                   Time Calculation:    Time Calculation- SLP     Row Name 12/05/22 1715             Time Calculation- SLP    SLP Start Time 1630  -AC      SLP Received On 12/05/22  -AC         Untimed Charges    48685-[id number] Treatment Swallow Minutes 51  -AC         Total Minutes    Untimed Charges Total Minutes 51  -AC       Total Minutes 51  -AC            User Key  (r) = Recorded By, (t) = Taken By, (c) = Cosigned By    Initials Name Provider Type     Alma Rosa Tovar MS CCC-SLP Speech and Language Pathologist                Therapy Charges for Today     Code Description Service Date Service Provider Modifiers Qty    [id number]  ST TREATMENT SWALLOW 3 12/5/2022 Alma Rosa Tovar MS CCC-SLP GN 1        Patient was not wearing a face mask and did exhibit coughing during this therapy encounter.  Procedure performed was aerosolizing, involved close contact (within 6 feet for at least 15 minutes or longer), and did not involve contact with infectious secretions or specimens.  Therapist used appropriate personal protective equipment including gloves, standard procedure mask and eye protection.  Appropriate PPE was worn during the entire therapy session.  Hand hygiene was completed before and after therapy session.          MS JOE RileySLP  12/5/2022

## 2022-12-05 NOTE — PLAN OF CARE
Goal Outcome Evaluation:            Pt is s/p R BKA, he has an Infublock and is on methadone. He is getting IV antibiotics through a PICC line. Pt able to turn independently in bed, wound care per MD.

## 2022-12-05 NOTE — PROGRESS NOTES
Cardiothoracic Surgery Progress Note      POD #: 3-right below-knee amputation     LOS: 5 days      Subjective: Awake and alert    Objective:  Vital Signs vital signs below noted T-max past 24 hours 99 °F  Temp:  [97.6 °F (36.4 °C)-99 °F (37.2 °C)] 97.6 °F (36.4 °C)  Heart Rate:  [80-93] 80  Resp:  [16-18] 18  BP: (104-132)/(63-82) 104/63    Physical Exam:   General Appearance: Oriented x3   Lungs:   Heart:   Skin:   Incision: Amputation site dressing change.  Suture intact skin margin viable skin flaps viable.     Results:  Results from last 7 days   Lab Units 12/04/22  1149   WBC 10*3/mm3 6.80   HEMOGLOBIN g/dL 9.7*   HEMATOCRIT % 31.6*   PLATELETS 10*3/mm3 420     Results from last 7 days   Lab Units 12/04/22  1149   SODIUM mmol/L 136   POTASSIUM mmol/L 4.3   CHLORIDE mmol/L 101   CO2 mmol/L 25.0   BUN mg/dL 16   CREATININE mg/dL 0.90   GLUCOSE mg/dL 118*   CALCIUM mg/dL 9.1         Assessment: 1.  Postop day 3 right below-knee amputation healing well  2.  Status post remote left below-knee amputation for diabetic neuropathy/ulceration      Plan: Continue daily dressing changes amputation site.  Overall medical manage per hospitalist.  Antibiotics per infectious disease.      John Peguero MD - 12/05/22 - 05:41 EST

## 2022-12-06 ENCOUNTER — APPOINTMENT (OUTPATIENT)
Dept: CARDIOLOGY | Facility: HOSPITAL | Age: 57
DRG: 854 | End: 2022-12-06
Payer: MEDICAID

## 2022-12-06 ENCOUNTER — ANCILLARY PROCEDURE (OUTPATIENT)
Dept: SPEECH THERAPY | Facility: HOSPITAL | Age: 57
DRG: 854 | End: 2022-12-06
Payer: MEDICAID

## 2022-12-06 PROBLEM — R78.81 MRSA BACTEREMIA: Status: ACTIVE | Noted: 2022-12-06

## 2022-12-06 PROBLEM — B95.62 MRSA BACTEREMIA: Status: ACTIVE | Noted: 2022-12-06

## 2022-12-06 LAB
GLUCOSE BLDC GLUCOMTR-MCNC: 147 MG/DL (ref 70–130)
GLUCOSE BLDC GLUCOMTR-MCNC: 190 MG/DL (ref 70–130)
GLUCOSE BLDC GLUCOMTR-MCNC: 220 MG/DL (ref 70–130)
GLUCOSE BLDC GLUCOMTR-MCNC: 232 MG/DL (ref 70–130)

## 2022-12-06 PROCEDURE — 63710000001 INSULIN LISPRO (HUMAN) PER 5 UNITS: Performed by: HOSPITALIST

## 2022-12-06 PROCEDURE — 87040 BLOOD CULTURE FOR BACTERIA: CPT | Performed by: INTERNAL MEDICINE

## 2022-12-06 PROCEDURE — 25010000002 DAPTOMYCIN PER 1 MG: Performed by: PHYSICIAN ASSISTANT

## 2022-12-06 PROCEDURE — 25010000002 SULFUR HEXAFLUORIDE MICROSPH 60.7-25 MG RECONSTITUTED SUSPENSION: Performed by: HOSPITALIST

## 2022-12-06 PROCEDURE — 93306 TTE W/DOPPLER COMPLETE: CPT | Performed by: INTERNAL MEDICINE

## 2022-12-06 PROCEDURE — 25010000002 MORPHINE PER 10 MG: Performed by: THORACIC SURGERY (CARDIOTHORACIC VASCULAR SURGERY)

## 2022-12-06 PROCEDURE — 92612 ENDOSCOPY SWALLOW (FEES) VID: CPT | Performed by: SPEECH-LANGUAGE PATHOLOGIST

## 2022-12-06 PROCEDURE — 82962 GLUCOSE BLOOD TEST: CPT

## 2022-12-06 PROCEDURE — 99024 POSTOP FOLLOW-UP VISIT: CPT | Performed by: THORACIC SURGERY (CARDIOTHORACIC VASCULAR SURGERY)

## 2022-12-06 PROCEDURE — 99233 SBSQ HOSP IP/OBS HIGH 50: CPT | Performed by: HOSPITALIST

## 2022-12-06 PROCEDURE — 93306 TTE W/DOPPLER COMPLETE: CPT

## 2022-12-06 RX ORDER — INSULIN LISPRO 100 [IU]/ML
0-7 INJECTION, SOLUTION INTRAVENOUS; SUBCUTANEOUS
Status: DISCONTINUED | OUTPATIENT
Start: 2022-12-06 | End: 2023-01-06 | Stop reason: HOSPADM

## 2022-12-06 RX ADMIN — Medication 5 MG: at 20:59

## 2022-12-06 RX ADMIN — METHADONE HYDROCHLORIDE 10 MG: 10 TABLET ORAL at 14:24

## 2022-12-06 RX ADMIN — SULFUR HEXAFLUORIDE 2 ML: KIT at 19:18

## 2022-12-06 RX ADMIN — METHADONE HYDROCHLORIDE 10 MG: 10 TABLET ORAL at 20:59

## 2022-12-06 RX ADMIN — INSULIN LISPRO 3 UNITS: 100 INJECTION, SOLUTION INTRAVENOUS; SUBCUTANEOUS at 16:44

## 2022-12-06 RX ADMIN — METHADONE HYDROCHLORIDE 10 MG: 10 TABLET ORAL at 09:18

## 2022-12-06 RX ADMIN — METHADONE HYDROCHLORIDE 10 MG: 10 TABLET ORAL at 02:48

## 2022-12-06 RX ADMIN — ATORVASTATIN CALCIUM 20 MG: 20 TABLET, FILM COATED ORAL at 21:00

## 2022-12-06 RX ADMIN — ACETAMINOPHEN 650 MG: 325 TABLET, FILM COATED ORAL at 20:59

## 2022-12-06 RX ADMIN — DAPTOMYCIN 600 MG: 500 INJECTION, POWDER, LYOPHILIZED, FOR SOLUTION INTRAVENOUS at 09:19

## 2022-12-06 RX ADMIN — MORPHINE SULFATE 2 MG: 2 INJECTION, SOLUTION INTRAMUSCULAR; INTRAVENOUS at 16:35

## 2022-12-06 RX ADMIN — SENNOSIDES AND DOCUSATE SODIUM 2 TABLET: 50; 8.6 TABLET ORAL at 09:18

## 2022-12-06 RX ADMIN — SENNOSIDES AND DOCUSATE SODIUM 2 TABLET: 50; 8.6 TABLET ORAL at 21:00

## 2022-12-06 RX ADMIN — Medication 10 ML: at 09:28

## 2022-12-06 NOTE — MBS/VFSS/FEES
Acute Care - Speech Language Pathology   Swallow Re-Evaluation [facility]   Fiberoptic Endoscopic Evaluation of Swallowing (FEES)       Patient Name: Buster Velasco  : 1965  MRN: [id number]  Today's Date: 2022               Admit Date: 2022    Visit Dx:     ICD-10-CM ICD-9-CM   1. Right foot infection  L08.9 686.9   2. Hypoglycemia  E16.2 251.2   3. Decubitus ulcer of coccygeal region, unspecified ulcer stage  L89.159 707.03     707.20   4. Pharyngeal dysphagia  R13.13 787.23     Patient Active Problem List   Diagnosis   • Right foot infection   • Hypoglycemia   • Anemia   • Hyponatremia   • Hypoalbuminemia   • Essential hypertension   • Hyperlipidemia   • Paroxysmal atrial fibrillation (HCC)   • Sepsis (HCC)     Past Medical History:   Diagnosis Date   • Diabetes (HCC)    • DVT (deep venous thrombosis) (HCC)    • Hypertension    • Mood disorder (HCC)      Past Surgical History:   Procedure Laterality Date   • BELOW KNEE AMPUTATION Left    • BELOW KNEE AMPUTATION Right 2022    Procedure: AMPUTATION BELOW KNEE RIGHT;  Surgeon: John Peguero MD;  Location: Replaced by [facility];  Service: Vascular;  Laterality: Right;   • TRANS METATARSAL AMPUTATION Right        SLP Recommendation and Plan  SLP Swallowing Diagnosis: mild-moderate, pharyngeal dysphagia (22)  SLP Diet Recommendation: regular textures, no mixed consistencies, nectar thick liquids, ice chips between meals after oral care, with supervision (22)  Recommended Precautions and Strategies: no straw, upright posture during/after eating, small bites of food and sips of liquid, general aspiration precautions, other (see comments) (tsp sized sips) (22)  SLP Rec. for Method of Medication Administration: meds whole, meds crushed, with puree, as tolerated (22)     Monitor for Signs of Aspiration: notify SLP if any concerns (22)     Swallow Criteria for Skilled Therapeutic Interventions  Met: demonstrates skilled criteria (12/06/22 1100)  Anticipated Discharge Disposition (SLP): anticipate therapy at next level of care (12/06/22 1100)  Rehab Potential/Prognosis, Swallowing: good, to achieve stated therapy goals (12/06/22 1100)  Therapy Frequency (Swallow): 5 days per week (12/06/22 1100)  Predicted Duration Therapy Intervention (Days): until discharge (12/06/22 1100)                                        Plan of Care Reviewed With: patient  Progress: no change      SWALLOW EVALUATION (last 72 hours)     SLP Adult Swallow Evaluation     Row Name 12/06/22 1100       Rehab Evaluation    Document Type re-evaluation  -CJ    Subjective Information no complaints  -    Patient Observations alert;cooperative  -    Patient/Family/Caregiver Comments/Observations no family present  -    Patient Effort good  -    Symptoms Noted During/After Treatment none  -       General Information    Patient Profile Reviewed yes  -CJ    Pertinent History Of Current Problem see initial eval; referred for FEES  -    Current Method of Nutrition regular textures;thin liquids  -    Precautions/Limitations, Vision WFL  -    Precautions/Limitations, Hearing WFL  -    Prior Level of Function-Communication WFL  -    Prior Level of Function-Swallowing other (see comments)  -    Plans/Goals Discussed with patient  -    Barriers to Rehab previous functional deficit  -    Patient's Goals for Discharge patient did not state  -       Pain    Additional Documentation Pain Scale: FACES Pre/Post-Treatment (Group)  -       Pain Scale: FACES Pre/Post-Treatment    Pain: FACES Scale, Pretreatment 0-->no hurt  -CJ    Posttreatment Pain Rating 0-->no hurt  -CJ    Pain Location - Side/Orientation --    Pain Location --    Pain Location - --    Pre/Posttreatment Pain Comment --       Fiberoptic Endoscopic Evaluation of Swallowing (FEES)    Risks/Benefits Reviewed risks/benefits explained;patient;agreed to eval  -     Nasal Entry right:  -CJ    Scope serial number/identification [id number]  -CJ       Anatomy and Physiology    Anatomic Considerations anatomic deviation observed (see comments);other (see comments);vocal folds  -CJ    Comment Prominent posterior pharyngeal wall concerning for cspine prominence per most recent MBS; L true VF appears immobile  -CJ    Velopharyngeal WFL  -CJ    Base of Tongue symmetrical  -CJ    Epiglottis WFL  -CJ    Laryngeal Function Breathing decreased movement left;asymmetrical  -CJ    Laryngeal Function Phonation asymmetrical;decreased movement left  -CJ    Laryngeal Function to Breath Hold can't sustain closure  -CJ    Secretion Rating Scale (Lance et al. 1996) 2- secretions initially outside the vestibule but later entered the vestibule  -CJ    Secretion Description thin;clear  -CJ    Ice Chips DNA  -CJ    Spontaneous Swallow frequency reduced  -CJ    Sensory sensed scope  -CJ    Utensils Used Spoon;Cup;Straw  -CJ    Consistencies Trialed thin liquids;nectar-thick liquids;pudding/puree;regular textures;spoon;cup;straw  -CJ       FEES Interpretation    Oral Phase prespill of liquids into pharynx  -CJ       Initiation of Pharyngeal Swallow    Initiation of Pharyngeal Swallow bolus in pyriform sinuses  -CJ    Pharyngeal Phase impaired pharyngeal phase of swallowing  -CJ    Aspiration During the Swallow thin liquids;nectar-thick liquids;secondary to delayed swallow initiation or mistiming;secondary to reduced laryngeal elevation;secondary to reduced vestibular closure;other (see comments)  nectar via straw  -CJ    Aspiration After the Swallow thin liquids;nectar-thick liquids;secondary to residue;in pyriform sinuses;in laryngeal vestibule  -CJ    Depth of Penetration deep  -CJ    Response to Penetration No  -CJ    No spontaneous response to penetration and non-effective laryngeal clearance with cue (see comments)  -CJ    Response to Aspiration No  -CJ    No spontaneous response to aspiration with  non-effective subglottic clearance with cue (see comments)  -    Rosenbek's Scale thin:;nectar:;8-->Level 8  nectar via straw  -    Residue all consistencies tested;diffuse within pharynx;secondary to reduced posterior pharyngeal wall stripping;secondary to reduced laryngeal elevation;secondary to reduced hyolaryngeal excursion  -CJ    Response to Residue other (see comments)  partially cleared w/ cued swallow  -CJ    Attempted Compensatory Maneuvers bolus size;bolus presentation style;additional subsequent swallow;multiple swallows;throat clear after swallow  -CJ    Response to Attempted Compensatory Maneuvers reduced residue;prevented aspiration  -CJ    Successful Compensatory Maneuver Competency patient able to;demonstrate compensations  -    FEES Summary Mild-moderate pharyngeal dysphagia. C/w pt's most recent instrumental evaluation. Laryngeal penetration and silent aspiration noted w/ thins and nectar via straw/large cup sips during/after the swallow. Cued cough ineffective to clear. Able to eliminate aspiration w/ nectar thick liquids via teaspoon or small cup sips and extra swallow to diminish residue. Pt able to demonstrate these compensations. No other laryngeal penetration or aspiration evidenced across this evaluation. Recommned continue regular w/ no mixed consistencies, nectar thick and no straw, small sips  -       SLP Evaluation Clinical Impression    SLP Swallowing Diagnosis mild-moderate;pharyngeal dysphagia  -    Functional Impact risk of aspiration/pneumonia  -    Rehab Potential/Prognosis, Swallowing good, to achieve stated therapy goals  -    Swallow Criteria for Skilled Therapeutic Interventions Met demonstrates skilled criteria  -       SLP Treatment Clinical Impressions    Treatment Assessment (SLP) --    Daily Summary of Progress (SLP) --    Barriers to Overall Progress (SLP) --    Plan for Continued Treatment (SLP) --    Care Plan Review --    Care Plan Review, Other  Participant(s) --       Recommendations    Therapy Frequency (Swallow) 5 days per week  -    Predicted Duration Therapy Intervention (Days) until discharge  -    SLP Diet Recommendation regular textures;no mixed consistencies;nectar thick liquids;ice chips between meals after oral care, with supervision  -    Recommended Precautions and Strategies no straw;upright posture during/after eating;small bites of food and sips of liquid;general aspiration precautions;other (see comments)  tsp sized sips  -    Oral Care Recommendations Oral Care BID/PRN;Suction toothbrush  -    SLP Rec. for Method of Medication Administration meds whole;meds crushed;with puree;as tolerated  -    Monitor for Signs of Aspiration notify SLP if any concerns  -    Anticipated Discharge Disposition (SLP) anticipate therapy at next level of care  -CJ    Demonstrates Need for Referral to Another Service otolaryngology (ENT)  Reported he has never followed-up w/ ENT to address vocal cord paralysis (acute onset in July following intubation). May be able to provide recommendations/interventions that could improve both voice & swallow function          User Key  (r) = Recorded By, (t) = Taken By, (c) = Cosigned By    Initials Name Effective Dates    AC Alma Rosa Tovar, MS CCC-SLP 06/16/21 -     CJ Jaye Denney, MS CCC-SLP 06/16/21 -                 EDUCATION  The patient has been educated in the following areas:   Dysphagia (Swallowing Impairment) Oral Care/Hydration Modified Diet Instruction.        SLP GOALS     Row Name 12/06/22 1100 12/05/22 1630          (LTG) Patient will demonstrate functional swallow for    Diet Texture (Demonstrate functional swallow) regular textures  - regular textures  -AC     Liquid viscosity (Demonstrate functional swallow) nectar/ mildly thick liquids  -CJ nectar/ mildly thick liquids  -AC     [place] (Demonstrate functional swallow) independently (over 90% accuracy)  -CJ independently (over 90%  accuracy)  -AC     Time Frame (Demonstrate functional swallow) by discharge  -CJ by discharge  -AC     Progress/Outcomes (Demonstrate functional swallow) goal ongoing  -CJ continuing progress toward goal  -AC        (STG) Patient will tolerate trials of    Consistencies Trialed (Tolerate trials) regular textures;nectar/ mildly thick liquids  -CJ regular textures;nectar/ mildly thick liquids  -AC     Desired Outcome (Tolerate trials) without signs/symptoms of aspiration;without signs of distress  -CJ without signs/symptoms of aspiration;without signs of distress  -AC     [place] (Tolerate trials) independently (over 90% accuracy)  -CJ independently (over 90% accuracy)  -AC     Time Frame (Tolerate trials) by discharge  -CJ by discharge  -AC     Progress/Outcomes (Tolerate trials) goal ongoing  -CJ continuing progress toward goal  -AC     Comment (Tolerate trials) -- Assessed w/ dinner tray. Impulsive self-feeding behaviors. No overt s/s aspiration w/ regular/cohesive textures & nectar-thick liquids via cup sips (but needs max cues).  -AC        (STG) Patient will tolerate therapeutic trials of    Consistencies Trialed (Tolerate therapeutic trials) thin liquids  -CJ thin liquids  -AC     Desired Outcome (Tolerate therapeutic trials) without signs/symptoms of aspiration;without signs of distress  -CJ without signs/symptoms of aspiration;without signs of distress  -AC     [place] (Tolerate therapeutic trials) with minimal cues (75-90% accuracy)  -CJ with minimal cues (75-90% accuracy)  -AC     Time Frame (Tolerate therapeutic trials) 1 week  -CJ 1 week  -AC     Progress/Outcomes (Tolerate therapeutic trials) goal ongoing  -CJ continuing progress toward goal  -AC     Comment (Tolerate therapeutic trials) -- Intemittent delayed cough. Has been drinking thin liquids since after BKA 12/2. Silent aspiration per MBS on 12/1.  -AC        (STG) Pharyngeal Strengthening Exercise Goal 1 (SLP)    Activity (Pharyngeal  Strengthening Goal 1, SLP) increase superior movement of the hyolaryngeal complex;increase anterior movement of the hyolaryngeal complex;increase epiglottic inversion and retroflexion;increase closure at entrance to airway/closure of airway at glottis;increase squeeze/positive pressure generation;increase tongue base retraction  -CJ --     Increase Superior Movement of the Hyolaryngeal Complex effortful pitch glide (falsetto + pharyngeal squeeze)  -CJ effortful pitch glide (falsetto + pharyngeal squeeze)  -AC     Increase Anterior Movement of the Hyolaryngeal Complex chin tuck against resistance (CTAR)  -CJ chin tuck against resistance (CTAR)  -AC     Increase Epiglottic Inversion and Retroflexion hard effortful swallow  -CJ hard effortful swallow  -AC     Increase Closure at Entrance to Airway/Closure of Airway at Glottis supraglottic swallow  -CJ supraglottic swallow  -AC     Increase Squeeze/Positive Pressure Generation hard effortful swallow  -CJ hard effortful swallow  -AC     Increase Tongue Base Retraction janine  -CJ janine  -AC     [place]/Accuracy (Pharyngeal Strengthening Goal 1, SLP) with minimal cues (75-90% accuracy)  -CJ with minimal cues (75-90% accuracy)  -AC     Time Frame (Pharyngeal Strengthening Goal 1, SLP) short term goal (STG)  -CJ short term goal (STG)  -AC     Progress/Outcomes (Pharyngeal Strengthening Goal 1, SLP) goal ongoing  -CJ continuing progress toward goal  -AC     Comment (Pharyngeal Strengthening Goal 1, SLP) -- Provided exercises/explanation/rationale. Encouraged pt to practice 3x/day, preferrably after meals. He indicated that exercises familiar to him.  -AC        (STG) Swallow Management Recall Goal 1 (SLP)    Activity (Swallow Management Recall Goal 1, SLP) independent recall of;compensatory swallow strategies/techniques;safe diet/liquid level  -CJ independent recall of;compensatory swallow strategies/techniques;safe diet/liquid level  -AC     [place]/Accuracy  (Swallow Management Recall Goal 1, SLP) independently (over 90% accuracy)  -CJ independently (over 90% accuracy)  -AC     Time Frame (Swallow Management Recall Goal 1, SLP) short term goal (STG)  -CJ short term goal (STG)  -AC     Progress/Outcomes (Swallow Management Recall Goal 1, SLP) goal ongoing  -CJ goal ongoing  -AC        (STG) Swallow Compensatory Strategies Goal 1 (SLP)    Activity (Swallow Compensatory Strategies/Techniques Goal 1, SLP) compensatory strategies;small cup sips;during p.o. trials;during meal intake;other (see comments)  -CJ compensatory strategies;small cup sips;during p.o. trials;during meal intake;other (see comments)  -AC     [place]/Accuracy (Swallow Compensatory Strategies/Techniques Goal 1, SLP) independently (over 90% accuracy)  -CJ independently (over 90% accuracy)  -AC     Time Frame (Swallow Compensatory Strategies/Techniques Goal 1, SLP) short term goal (STG)  -CJ short term goal (STG)  -AC     Progress/Outcomes (Swallow Compensatory Strategies/Techniques Goal 1, SLP) goal ongoing  -CJ continuing progress toward goal  -AC     Comment (Swallow Compensatory Strategies/Techniques Goal 1, SLP) -- Impulsive self-feeding behaviors. W/ cues to take small sips, still took ~2oz liquid per drink. Required max cues.  -           User Key  (r) = Recorded By, (t) = Taken By, (c) = Cosigned By    Initials Name Provider Type    Alma Rosa Callahan, MS CCC-SLP Speech and Language Pathologist    Jaye Kumari, MS CCC-SLP Speech and Language Pathologist                   Time Calculation:    Time Calculation- SLP     Row Name 12/06/22 1325             Time Calculation- SLP    SLP Start Time 1100  -CJ      SLP Received On 12/06/22  -         Untimed Charges    80367-TK Fiberoptic Endo Eval Swallow Minutes 98  -CJ         Total Minutes    Untimed Charges Total Minutes 98  -CJ       Total Minutes 98  -CJ            User Key  (r) = Recorded By, (t) = Taken By, (c) = Cosigned By    Initials  Name Provider Type     Jaye Denney, MS CCC-SLP Speech and Language Pathologist                Therapy Charges for Today     Code Description Service Date Service Provider Modifiers Qty    53614592010 HC ST FIBEROPTIC ENDO EVAL SWALL 7 12/6/2022 Jaye Denney, MS CCC-SLP GN 1               Jaye Denney MS CCC-SLP  12/6/2022

## 2022-12-06 NOTE — PROGRESS NOTES
[facility]    Acute pain service Inpatient Progress Note    Patient Name: Buster Velasco  :  1965  MRN:  [id number]        Acute Pain  Service Inpatient Progress Note:    Analgesia:Fair  Pain Score:6/10  LOC: alert and awake  Resp Status: room air  Cardiac: VS stable  Side Effects:None  Catheter Site:clean, dressing intact and dry  Cath type: peripheral nerve cath with ON Q  Volume: 1mL,5ml, 5ml InfuSystem Pump.  Dosing/Volume: ropivacaine 0.2%  Catheter Plan:Catheter to remain Insitu and Continue catheter infusion rate unchanged  Comments:

## 2022-12-06 NOTE — PROGRESS NOTES
[facility] Medicine Services  PROGRESS NOTE    Patient Name: Buster Velasco  : 1965  MRN: [id number]    Date of Admission: 2022  Primary Care Physician: Ludivina Bowers MD    Subjective   Subjective     CC:  F/U s/p right BKA    HPI:  Patient seen this morning. No major complaints. Does not want nectar thick liquids.     ROS:  Gen-no fevers, no chills  CV-no chest pain, no palpitations  Resp-no cough, no dyspnea  GI-no N/V/D, no abd pain    All other systems reviewed and negative except any additional pertinent positives and negatives as discussed in HPI.       Objective   Objective     Vital Signs:   Temp:  [97.5 °F (36.4 °C)-98.7 °F (37.1 °C)] 98.7 °F (37.1 °C)  Heart Rate:  [65-93] 65  Resp:  [18] 18  BP: ()/(63-85) 92/70  Flow (L/min):  [2] 2     Physical Exam:  Gen-no acute distress  HENT-NCAT, mucous membranes moist  CV-RRR, S1 S2 normal, no m/r/g  Resp-CTAB, no wheezes or rales  Abd-soft, NT, ND, +BS  Ext-left BKA present, new right BKA with dressing intact  Neuro-A&Ox3, no focal deficits  Skin-no rashes  Psych-appropriate mood      Results Reviewed:  LAB RESULTS:      Lab 22  1149 22  0936 22  0715 22  0810 22  0053 22  1905   WBC 6.80 8.91 6.89 7.22  --  13.92*   HEMOGLOBIN 9.7* 9.3* 11.0* 10.0*  --  11.1*   HEMATOCRIT 31.6* 30.6* 36.1* 32.2*  --  35.5*   PLATELETS 420 425 405 413  --  532*   NEUTROS ABS  --  6.88 4.43 5.64  --  11.44*   IMMATURE GRANS (ABS)  --  0.09* 0.09* 0.06*  --  0.14*   LYMPHS ABS  --  1.33 1.59 1.02  --  1.64   MONOS ABS  --  0.57 0.59 0.42  --  0.64   EOS ABS  --  0.02 0.13 0.06  --  0.03   MCV 84.7 85.5 85.3 84.3  --  84.5   SED RATE  --   --   --  109*  --   --    CRP  --   --   --  9.10*  --   --    PROCALCITONIN  --   --   --   --   --  0.17   LACTATE  --   --   --   --  2.0 2.1*         Lab 22  1149 22  0936 22  0715 22  0810 22  1905   SODIUM 136 135*  139 135* 134*   POTASSIUM 4.3 4.4 5.0 4.2 4.1   CHLORIDE 101 102 105 103 100   CO2 25.0 25.0 23.0 21.0* 21.0*   ANION GAP 10.0 8.0 11.0 11.0 13.0   BUN 16 17 14 18 18   CREATININE 0.90 1.26 1.13 1.24 1.29*   EGFR 99.0 66.1 75.3 67.4 64.3   GLUCOSE 118* 211* 46* 52* 54*   CALCIUM 9.1 9.1 9.2 9.2 9.1   HEMOGLOBIN A1C  --   --   --  6.90*  --          Lab 11/30/22  1905   TOTAL PROTEIN 7.6   ALBUMIN 3.00*   GLOBULIN 4.6   ALT (SGPT) 12   AST (SGOT) 19   BILIRUBIN 0.2   ALK PHOS 85   LIPASE 14                 Lab 12/01/22  1035 11/30/22  1905   IRON  --  18*   IRON SATURATION  --  6*   TIBC  --  277*   TRANSFERRIN  --  186*   FERRITIN  --  317.40   FOLATE  --  15.90   VITAMIN B 12  --  717   ABO TYPING O O   RH TYPING Positive Positive   ANTIBODY SCREEN Negative  --          Brief Urine Lab Results  (Last result in the past 365 days)      Color   Clarity   Blood   Leuk Est   Nitrite   Protein   CREAT   Urine HCG        08/20/22 0108 Yellow   Clear   Negative   Negative     Negative                 Microbiology Results Abnormal     None          SLP FEES - Fiberoptic Endo Eval Swallow    Result Date: 12/6/2022  This procedure was auto-finalized with no dictation required.          I have reviewed the medications:  Scheduled Meds:atorvastatin, 20 mg, Per PEG Tube, Nightly  DAPTOmycin, 6 mg/kg, Intravenous, Q24H  methadone, 10 mg, Oral, Q6H  metoprolol tartrate, 25 mg, Per PEG Tube, Q12H  senna-docusate sodium, 2 tablet, Oral, BID  sodium chloride, 10 mL, Intravenous, Q12H  sodium chloride, 10 mL, Intravenous, Q12H      Continuous Infusions:lactated ringers, 9 mL/hr  Pharmacy Consult - Pharmacy to dose,   ropivacaine,       PRN Meds:.•  acetaminophen  •  senna-docusate sodium **AND** polyethylene glycol **AND** bisacodyl **AND** bisacodyl  •  dextrose  •  dextrose  •  glucagon (human recombinant)  •  hydrOXYzine  •  melatonin  •  Morphine  •  ondansetron **OR** ondansetron  •  Pharmacy Consult - Pharmacy to dose  •   sennosides-docusate  •  sodium chloride  •  sodium chloride  •  sodium chloride    Assessment & Plan   Assessment & Plan     Active Hospital Problems    Diagnosis  POA   • **Right foot infection [L08.9]  Yes   • MRSA bacteremia [R78.81, B95.62]  Unknown   • Sepsis (HCC) [A41.9]  Yes   • Hypoglycemia [E16.2]  Yes   • Anemia [D64.9]  Yes   • Hyponatremia [E87.1]  Yes   • Hypoalbuminemia [E88.09]  Yes   • Essential hypertension [I10]  Yes   • Hyperlipidemia [E78.5]  Yes   • Paroxysmal atrial fibrillation (HCC) [I48.0]  Yes      Resolved Hospital Problems   No resolved problems to display.        Brief Hospital Course to date:  Buster Velasco is a 57 y.o. male with hx of prior osteomyelitis s/p left BKA, s/p right transmetatarsal amputation, left hand 3rd metacarpal resection (April 2022 at OSH, had 6 weeks of IV antibiotics for MRSA bacteremia), and DVT s/p IVC filter who presents with right foot wounds. S/p right BKA on 12/2/22 with Dr. Peguero.     This patient's problems and plans were partially entered by my partner and updated as appropriate by me 12/06/22.    *All problems are new to me today.     Sepsis secondary acute right foot non-healing wound and likely osteomyelitis  MRSA bacteremia  - s/p right BKA 12/2/22 with Dr. Peguero  - ID following, currently on Daptomycin  - Blood cultures now with coag negative Staph and MRSA, have discussed with Dr. George, he will require IV antibiotics at discharge  - Repeat blood cultures ordered  - Echo ordered     Recurrent hypoglycemia in setting of DMII   - HbA1c 6.9%  - High dose Tresiba qAM before arrival which has been held  - Briefly required D5, now discontinued  - Glucose trending back up, will add low dose SSI     Chronic pain on opioids   Previous IVDA/substance abuse  - Restarted home methadone 10 mg QID  - Morphine for breakthrough post-op pain  - Patient denies hx of IVDA to me but in Care Everywhere admission notes from April 2022 at [facility]  they documented prior IVDA/substance abuse     Reported hx of Afib   - Patient denies any hx of Afib. Not previously on anticoagulation and given murky history will not start.   - On Metoprolol at home which is continued     Chronic vocal cord paralysis s/p PEG  - SLP evaluation: regular diet but with nectar thick liquids recommended, patient wants thin liquids and understands risks of aspiration  - Comfort diet as tolerated  - Discussed code status today, he will think about it     Hypotension with h/o HTN  - Hold parameters with metoprolol  - Hypotension resolved     HLD   - Continue statin     DVT s/p IVC filter   - Placed in spring 2022 per patient. Defer to PCP for further assessment and time of removal.     Hx of seizure in setting of meth use   - Not on AED's given meth trigger     BPH   - Continue Flomax       Expected Discharge Location and Transportation: home  Expected Discharge Date: 12/12/22, TBD    DVT prophylaxis:  Mechanical DVT prophylaxis orders are present.          CODE STATUS:   Code Status and Medical Interventions:   Ordered at: 12/01/22 0035     Code Status (Patient has no pulse and is not breathing):    CPR (Attempt to Resuscitate)     Medical Interventions (Patient has pulse or is breathing):    Full Support       Carmen Bolaños MD  12/06/22

## 2022-12-06 NOTE — PLAN OF CARE
Goal Outcome Evaluation:  Plan of Care Reviewed With: patient        Progress: no change   SLP re-evaluation completed. Will continue to address dysphagia. FEES completed. Please see note for further details and recommendations.

## 2022-12-06 NOTE — PROGRESS NOTES
Cardiothoracic Surgery Progress Note      POD #: 4-right below-knee amputation     LOS: 6 days      Subjective: awake and alert    Objective:  Vital Signs vital signs below noted T-max past 24 hours 98.1 °F  Temp:  [97.6 °F (36.4 °C)-98.1 °F (36.7 °C)] 97.6 °F (36.4 °C)  Heart Rate:  [67-93] 68  Resp:  [16-18] 18  BP: ()/(67-85) 101/77    Physical Exam:   General Appearance: Oriented x3   Lungs:   Heart:   Skin:   Incision:   Amputation site dressing change.  Suture intact skin margin viable skin flaps are viable  Results:  Results from last 7 days   Lab Units 12/04/22  1149   WBC 10*3/mm3 6.80   HEMOGLOBIN g/dL 9.7*   HEMATOCRIT % 31.6*   PLATELETS 10*3/mm3 420     Results from last 7 days   Lab Units 12/04/22  1149   SODIUM mmol/L 136   POTASSIUM mmol/L 4.3   CHLORIDE mmol/L 101   CO2 mmol/L 25.0   BUN mg/dL 16   CREATININE mg/dL 0.90   GLUCOSE mg/dL 118*   CALCIUM mg/dL 9.1         Assessment: #1.  Postop day 4 right below-knee amputation is healing well  2 status post remote left below-knee amputation for diabetic neuropathy ulceration      Plan: Continue daily dressing change amputation site.  Overall medical manage per hospitalist.  Antibiotics per infectious disease.   working on rehab facility.      John Peguero MD - 12/06/22 - 05:46 EST

## 2022-12-06 NOTE — PLAN OF CARE
Goal Outcome Evaluation:  Plan of Care Reviewed With: patientpatient A&OX4 VSS  SR on tele. Pain in right incision controlled with INFUpump and IV narcotics. Small blood strike noted to right incision dressing. RLE elevated on pillows. Much encouragement needed to get pt turned due to increased pain. Awaiting Case management for plan.

## 2022-12-06 NOTE — PROGRESS NOTES
"[facility] Progress Note        Antibiotics:  Anti-Infectives (From admission, onward)    Ordered     Dose/Rate Route Frequency Start Stop    12/01/22 0829  DAPTOmycin (CUBICIN) 600 mg in sodium chloride 0.9 % 50 mL IVPB        Ordering Provider: Erlin Arzola PA    6 mg/kg × 100 kg  100 mL/hr over 30 Minutes Intravenous Every 24 Hours 12/01/22 1000 12/15/22 0959    11/30/22 2219  vancomycin 2000 mg/500 mL 0.9% NS IVPB ([facility])        Ordering Provider: Reggie Batres MD    20 mg/kg × 98.9 kg  over 2 Hours Intravenous Once 11/30/22 2221 12/01/22 0315    11/30/22 2219  piperacillin-tazobactam (ZOSYN) 3.375 g in iso-osmotic dextrose 50 ml (premix)        Ordering Provider: Reggie Batres MD    3.375 g Intravenous Once 11/30/22 2221 11/30/22 2335          CC: foot infection    HPI:    Patient is a 57 y.o.  Yr old male with history of prior lower extremity infection/amputations done in [facility].  He has a history of left BKA years ago.  More recent right transmetatarsal amputation but specific dates unclear and unclear who the surgeon was.  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  He has history DVT/IVC filter, diabetes and other comorbidities as below.  He reports a chronic right lateral foot wound present for months but apparently not precipitated by any specific event or trauma.  He is admitted to the hospital on November 30 with deterioration at the foot including redness/swelling     12/2/22 Dr Peguero did surgery  \"Procedure(s): Mid level right below-knee amputation and primary closure \"    12/6/22 d/w micro regarding blood culture results;   postop pain to the right LE which is controlled/dull at present, constant, nonradiating, worse with palpation, generally better with pain meds and 2-3 out " "of 10 in severity.  Not progressive     No headache photophobia or neck stiffness.  No shortness of breath cough or hemoptysis.  No nausea vomiting diarrhea or abdominal pain.  No dysuria hematuria or pyuria.  No other new skin rash       ROS:      12/6/22 No f/c/s. No n/v/d. No rash. No new ADR to Abx.     Constitutional-- No Fever, chills or sweats.  Appetite good, and no malaise. No fatigue.  Heent--chronic hoarse voice.  No new vision, hearing or throat complaints.  No epistaxis or oral sores.   No flashers, floaters or eye pain.   No headache, photophobia or neck stiffness.  Chronic PEG tube  CV-- No chest pain, palpitation or syncope  Resp-- No SOB/cough/Hemoptysis  GI- No nausea, vomiting, or diarrhea.  No hematochezia, melena, or hematemesis. Denies jaundice or chronic liver disease.  -- No dysuria, hematuria, or flank pain.  Denies hesitancy, urgency or flank pain.  Lymph- no swollen lymph nodes in neck/axilla or groin.   Heme- No active bruising or bleeding; no Hx of DVT or PE.  MS-- no swelling or pain in the bones or joints of arms/legs.  No new back pain.  Neuro-- No acute focal weakness or numbness in the arms or legs.  No seizures.     Full 12 point review of systems reviewed and negative otherwise for acute complaints, except for above      PE:   BP 94/65 (BP Location: Left arm, Patient Position: Lying)   Pulse 66   Temp 97.5 °F (36.4 °C) (Oral)   Resp 18   Ht 193 cm (76\")   Wt 100 kg (221 lb)   SpO2 97%   BMI 26.90 kg/m²          GENERAL: sleepy, in no acute distress.  chronic Hoarse voice noted  HEENT: Normocephalic, atraumatic.   No conjunctival injection. No icterus. Oropharynx   without evidence of thrush or exudate. No evidence of peridontal disease.    NECK: Supple without nuchal rigidity. No mass.   HEART: RRR; No murmur, rubs, gallops.   LUNGS: Diminished at bases but otherwise clear to auscultation bilaterally without wheezing, rales, rhonchi. Normal respiratory effort. Nonlabored. " No dullness.  ABDOMEN: Soft, nontender, nondistended. Positive bowel sounds. No rebound or guarding. NO mass or HSM.  PEG tube noted  EXT:  No cyanosis, clubbing;No cord.   MSK: FROM without joint effusions noted arms/legs.    SKIN: Warm and dry without cutaneous eruptions on Inspection/palpation.    NEURO: sleepy     Right lower extremity surgical site noted;  No new redness;  no discrete mass bulge or fluctuance.  No crepitus or bulla.       No peripheral stigmata/phenomenon of endocarditis     IV without obvious redness or drainage     Left BKA site with no redness induration or warmth.  No discrete mass bulge or fluctuance.    Laboratory Data    Results from last 7 days   Lab Units 12/04/22  1149 12/03/22  0936 12/02/22  0715   WBC 10*3/mm3 6.80 8.91 6.89   HEMOGLOBIN g/dL 9.7* 9.3* 11.0*   HEMATOCRIT % 31.6* 30.6* 36.1*   PLATELETS 10*3/mm3 420 425 405     Results from last 7 days   Lab Units 12/04/22  1149   SODIUM mmol/L 136   POTASSIUM mmol/L 4.3   CHLORIDE mmol/L 101   CO2 mmol/L 25.0   BUN mg/dL 16   CREATININE mg/dL 0.90   GLUCOSE mg/dL 118*   CALCIUM mg/dL 9.1     Results from last 7 days   Lab Units 11/30/22  1905   ALK PHOS U/L 85   BILIRUBIN mg/dL 0.2   ALT (SGPT) U/L 12   AST (SGOT) U/L 19     Results from last 7 days   Lab Units 12/01/22  0810   SED RATE mm/hr 109*     Results from last 7 days   Lab Units 12/01/22  0810   CRP mg/dL 9.10*       Estimated Creatinine Clearance: 126.5 mL/min (by C-G formula based on SCr of 0.9 mg/dL).      Microbiology:      Radiology:  Imaging Results (Last 72 Hours)     ** No results found for the last 72 hours. **            Impression:     --Acute right foot/ankle with multifocal ulceration, cellulitis/wound infection and probable osteomyelitis with probe to bone at the lateral foot and abnormal MRI.  Other comorbidities as outlined above and high risk for further serious morbidity and other serious sequelae including persistent/recurrent or nonhealing wounds,  persistent/progressive or recurrent infection and risk for further functional/limb loss/amputation.  Surgery following to help define timing/option of threshold for any future surgical intervention .      **Surgery 12/2 with BKA     --History MRSA    --blood culture +  ; initial set with CN staph in anaerobic bottle;  subsequently the other set anaerobic bottle with GPC also; micro is working this up; ?contaminant -v- pthatogen     --Chronic vocal cord paralysis per notes with chronic hoarseness and indwelling PEG tube.  Medicine team to clarify     --History left BKA.     --Diabetes.  You need to tightly control blood sugar to give best chance for healing     --History of DVT with IVC filter     PLAN:      -- Daptomycin  IV for now with blood culture isolates gettnig further evaluation     -- Check/review labs cultures and scans     -- Partial history per nursing staff     -- Discussed with microbiology and family     --d/w Dr Peguero      -- Highly complex set of issues with high risk for further serious morbidity and other serious sequela     **Copied text in this note has been reviewed and is accurate as of 12/06/22.     Zeke George MD  12/6/2022

## 2022-12-07 LAB
ANION GAP SERPL CALCULATED.3IONS-SCNC: 9 MMOL/L (ref 5–15)
BACTERIA SPEC AEROBE CULT: ABNORMAL
BH CV ECHO MEAS - AO MAX PG: 3.8 MMHG
BH CV ECHO MEAS - AO MEAN PG: 1.84 MMHG
BH CV ECHO MEAS - AO ROOT DIAM: 3.5 CM
BH CV ECHO MEAS - AO V2 MAX: 97.1 CM/SEC
BH CV ECHO MEAS - AO V2 VTI: 16 CM
BH CV ECHO MEAS - AVA(I,D): 3.2 CM2
BH CV ECHO MEAS - EDV(CUBED): 181.4 ML
BH CV ECHO MEAS - EDV(MOD-SP2): 138 ML
BH CV ECHO MEAS - EDV(MOD-SP4): 172 ML
BH CV ECHO MEAS - EF(MOD-BP): 59.2 %
BH CV ECHO MEAS - EF(MOD-SP2): 60.7 %
BH CV ECHO MEAS - EF(MOD-SP4): 58.7 %
BH CV ECHO MEAS - ESV(CUBED): 66.6 ML
BH CV ECHO MEAS - ESV(MOD-SP2): 54.3 ML
BH CV ECHO MEAS - ESV(MOD-SP4): 71 ML
BH CV ECHO MEAS - FS: 28.4 %
BH CV ECHO MEAS - IVS/LVPW: 0.79 CM
BH CV ECHO MEAS - IVSD: 0.41 CM
BH CV ECHO MEAS - LA DIMENSION: 4 CM
BH CV ECHO MEAS - LAT PEAK E' VEL: 10.6 CM/SEC
BH CV ECHO MEAS - LV MASS(C)D: 86.8 GRAMS
BH CV ECHO MEAS - LV MAX PG: 4.2 MMHG
BH CV ECHO MEAS - LV MEAN PG: 1.58 MMHG
BH CV ECHO MEAS - LV V1 MAX: 102.4 CM/SEC
BH CV ECHO MEAS - LV V1 VTI: 17.1 CM
BH CV ECHO MEAS - LVIDD: 5.7 CM
BH CV ECHO MEAS - LVIDS: 4.1 CM
BH CV ECHO MEAS - LVOT AREA: 3 CM2
BH CV ECHO MEAS - LVOT DIAM: 1.96 CM
BH CV ECHO MEAS - LVPWD: 0.52 CM
BH CV ECHO MEAS - MED PEAK E' VEL: 6.1 CM/SEC
BH CV ECHO MEAS - MV A MAX VEL: 60 CM/SEC
BH CV ECHO MEAS - MV DEC SLOPE: 254.2 CM/SEC2
BH CV ECHO MEAS - MV DEC TIME: 0.28 MSEC
BH CV ECHO MEAS - MV E MAX VEL: 52.7 CM/SEC
BH CV ECHO MEAS - MV E/A: 0.88
BH CV ECHO MEAS - MV MAX PG: 1.54 MMHG
BH CV ECHO MEAS - MV MEAN PG: 0.8 MMHG
BH CV ECHO MEAS - MV P1/2T: 66.6 MSEC
BH CV ECHO MEAS - MV V2 VTI: 18.9 CM
BH CV ECHO MEAS - MVA(P1/2T): 3.3 CM2
BH CV ECHO MEAS - MVA(VTI): 2.7 CM2
BH CV ECHO MEAS - PA ACC TIME: 0.09 SEC
BH CV ECHO MEAS - PA PR(ACCEL): 37.8 MMHG
BH CV ECHO MEAS - PA V2 MAX: 85.3 CM/SEC
BH CV ECHO MEAS - SV(LVOT): 51.6 ML
BH CV ECHO MEAS - SV(MOD-SP2): 83.7 ML
BH CV ECHO MEAS - SV(MOD-SP4): 101 ML
BH CV ECHO MEAS - TAPSE (>1.6): 1.83 CM
BH CV ECHO MEASUREMENTS AVERAGE E/E' RATIO: 6.31
BH CV ECHO SHUNT ASSESSMENT PERFORMED (HIDDEN SCRIPTING): 1
BH CV XLRA - TDI S': 15.6 CM/SEC
BUN SERPL-MCNC: 25 MG/DL (ref 6–20)
BUN/CREAT SERPL: 28.1 (ref 7–25)
CALCIUM SPEC-SCNC: 9.3 MG/DL (ref 8.6–10.5)
CHLORIDE SERPL-SCNC: 103 MMOL/L (ref 98–107)
CK SERPL-CCNC: 23 U/L (ref 20–200)
CO2 SERPL-SCNC: 24 MMOL/L (ref 22–29)
CREAT SERPL-MCNC: 0.89 MG/DL (ref 0.76–1.27)
DEPRECATED RDW RBC AUTO: 52.5 FL (ref 37–54)
EGFRCR SERPLBLD CKD-EPI 2021: 99.3 ML/MIN/1.73
ERYTHROCYTE [DISTWIDTH] IN BLOOD BY AUTOMATED COUNT: 17 % (ref 12.3–15.4)
GLUCOSE BLDC GLUCOMTR-MCNC: 133 MG/DL (ref 70–130)
GLUCOSE BLDC GLUCOMTR-MCNC: 171 MG/DL (ref 70–130)
GLUCOSE BLDC GLUCOMTR-MCNC: 190 MG/DL (ref 70–130)
GLUCOSE BLDC GLUCOMTR-MCNC: 216 MG/DL (ref 70–130)
GLUCOSE SERPL-MCNC: 181 MG/DL (ref 65–99)
GRAM STN SPEC: ABNORMAL
HCT VFR BLD AUTO: 30.1 % (ref 37.5–51)
HGB BLD-MCNC: 9.2 G/DL (ref 13–17.7)
ISOLATED FROM: ABNORMAL
LEFT ATRIUM VOLUME INDEX: 20 ML/M2
LV EF 2D ECHO EST: 65 %
MAXIMAL PREDICTED HEART RATE: 162 BPM
MCH RBC QN AUTO: 26 PG (ref 26.6–33)
MCHC RBC AUTO-ENTMCNC: 30.6 G/DL (ref 31.5–35.7)
MCV RBC AUTO: 85 FL (ref 79–97)
PLATELET # BLD AUTO: 388 10*3/MM3 (ref 140–450)
PMV BLD AUTO: 8.8 FL (ref 6–12)
POTASSIUM SERPL-SCNC: 4.3 MMOL/L (ref 3.5–5.2)
RBC # BLD AUTO: 3.54 10*6/MM3 (ref 4.14–5.8)
SODIUM SERPL-SCNC: 136 MMOL/L (ref 136–145)
STRESS TARGET HR: 138 BPM
WBC NRBC COR # BLD: 6.24 10*3/MM3 (ref 3.4–10.8)

## 2022-12-07 PROCEDURE — 99232 SBSQ HOSP IP/OBS MODERATE 35: CPT | Performed by: HOSPITALIST

## 2022-12-07 PROCEDURE — 97162 PT EVAL MOD COMPLEX 30 MIN: CPT

## 2022-12-07 PROCEDURE — 25010000002 DAPTOMYCIN PER 1 MG: Performed by: INTERNAL MEDICINE

## 2022-12-07 PROCEDURE — 99024 POSTOP FOLLOW-UP VISIT: CPT | Performed by: THORACIC SURGERY (CARDIOTHORACIC VASCULAR SURGERY)

## 2022-12-07 PROCEDURE — 85027 COMPLETE CBC AUTOMATED: CPT | Performed by: HOSPITALIST

## 2022-12-07 PROCEDURE — 82550 ASSAY OF CK (CPK): CPT | Performed by: INTERNAL MEDICINE

## 2022-12-07 PROCEDURE — 80048 BASIC METABOLIC PNL TOTAL CA: CPT | Performed by: HOSPITALIST

## 2022-12-07 PROCEDURE — 82962 GLUCOSE BLOOD TEST: CPT

## 2022-12-07 PROCEDURE — 97166 OT EVAL MOD COMPLEX 45 MIN: CPT

## 2022-12-07 PROCEDURE — 63710000001 INSULIN LISPRO (HUMAN) PER 5 UNITS: Performed by: HOSPITALIST

## 2022-12-07 RX ADMIN — METHADONE HYDROCHLORIDE 10 MG: 10 TABLET ORAL at 01:57

## 2022-12-07 RX ADMIN — SENNOSIDES AND DOCUSATE SODIUM 2 TABLET: 50; 8.6 TABLET ORAL at 08:38

## 2022-12-07 RX ADMIN — ATORVASTATIN CALCIUM 20 MG: 20 TABLET, FILM COATED ORAL at 21:07

## 2022-12-07 RX ADMIN — METOPROLOL TARTRATE 25 MG: 25 TABLET, FILM COATED ORAL at 08:39

## 2022-12-07 RX ADMIN — INSULIN LISPRO 3 UNITS: 100 INJECTION, SOLUTION INTRAVENOUS; SUBCUTANEOUS at 18:24

## 2022-12-07 RX ADMIN — METHADONE HYDROCHLORIDE 10 MG: 10 TABLET ORAL at 21:08

## 2022-12-07 RX ADMIN — HYDROXYZINE HYDROCHLORIDE 50 MG: 25 TABLET ORAL at 21:12

## 2022-12-07 RX ADMIN — METHADONE HYDROCHLORIDE 10 MG: 10 TABLET ORAL at 08:39

## 2022-12-07 RX ADMIN — DAPTOMYCIN 800 MG: 500 INJECTION, POWDER, LYOPHILIZED, FOR SOLUTION INTRAVENOUS at 10:30

## 2022-12-07 RX ADMIN — METHADONE HYDROCHLORIDE 10 MG: 10 TABLET ORAL at 14:47

## 2022-12-07 RX ADMIN — INSULIN LISPRO 2 UNITS: 100 INJECTION, SOLUTION INTRAVENOUS; SUBCUTANEOUS at 14:46

## 2022-12-07 RX ADMIN — INSULIN LISPRO 2 UNITS: 100 INJECTION, SOLUTION INTRAVENOUS; SUBCUTANEOUS at 08:38

## 2022-12-07 NOTE — PROGRESS NOTES
[facility]    Acute pain service Inpatient Progress Note    Patient Name: Buster Velasco  :  1965  MRN:  [id number]        Acute Pain  Service Inpatient Progress Note:    Analgesia:Fair  Pain Score:6/10  LOC: alert and awake  Resp Status: room air  Cardiac: VS stable  Side Effects:None  Catheter Site:clean, dressing intact and dry  Cath type: peripheral nerve cath with ON Q  Volume: 1mL,5ml, 5ml InfuSystem Pump.  Catheter Plan:Catheter to remain Insitu and Continue catheter infusion rate unchanged  Comments:

## 2022-12-07 NOTE — PLAN OF CARE
Goal Outcome Evaluation:  Plan of Care Reviewed With: patient        Progress: no change (Initial Eval)  Outcome Evaluation: OT initial eval completed. Pt presenting s/p R BKA. Pt limited by pain and decreased skin integrity. OOB deferred d/t pain. ModA for supine to sit, pt tolerated sitting EOB for approx 5 minutes. Returned to supine w/ CGA. MaxA x 2 to boost pt higher in bed. SUA for feeding and grooming tasks. IP OT warranted to address deficits. Recommend inpatient rehab at d/c.

## 2022-12-07 NOTE — PLAN OF CARE
Goal Outcome Evaluation:  Plan of Care Reviewed With: patient           Outcome Evaluation: PATIENT IS VERY LIMITED WITH HIS MOBILITY DUE TO PAIN AND WEAKNESS. HE HAS NOT WALKED IN MONTHS AND HAS BEEN IN BED FOR THE LAST 2 WEEKS. I BELIEVE HE NEED AN ACUTE REHAB STAY TO ADDRESS HIS MOBILITY DEFICITS ALONG WITH HIS NEED FOR NEW W/C AND W/C CUSHION.

## 2022-12-07 NOTE — PROGRESS NOTES
Cardiothoracic Surgery Progress Note      POD #: 5-right below-knee amputation   LOS: 7 days      Subjective: Awake and alert    Objective:  Vital Signs vital signs below noted T-max past 24 hours 98.7 °F  Temp:  [97.5 °F (36.4 °C)-98.7 °F (37.1 °C)] 97.9 °F (36.6 °C)  Heart Rate:  [65-76] 65  Resp:  [16-18] 18  BP: ()/(63-81) 95/66    Physical Exam:   General Appearance: Oriented x3   Lungs:   Heart:   Skin:   Incision: Amputation site dressing change.  Sutures intact skin margin viable skin flaps are viable     Results:  Results from last 7 days   Lab Units 12/07/22  0436   WBC 10*3/mm3 6.24   HEMOGLOBIN g/dL 9.2*   HEMATOCRIT % 30.1*   PLATELETS 10*3/mm3 388     Results from last 7 days   Lab Units 12/07/22  0436   SODIUM mmol/L 136   POTASSIUM mmol/L 4.3   CHLORIDE mmol/L 103   CO2 mmol/L 24.0   BUN mg/dL 25*   CREATININE mg/dL 0.89   GLUCOSE mg/dL 181*   CALCIUM mg/dL 9.3         Assessment: #1 postop day 5 right below-knee amputation healing well  2 status post remote left below-knee amputation for diabetic neuropathy/ulceration completely healed    Plan: Continue daily dressing change amputation site.  Overall medical manage per hospitalist.  Antibiotics per infectious disease.  Case management working on rehab facility      John Peguero MD - 12/07/22 - 06:14 EST

## 2022-12-07 NOTE — THERAPY EVALUATION
Patient Name: Buster Velasco  : 1965    MRN: [id number]                              Today's Date: 2022       Admit Date: 2022    Visit Dx:     ICD-10-CM ICD-9-CM   1. Right foot infection  L08.9 686.9   2. Hypoglycemia  E16.2 251.2   3. Decubitus ulcer of coccygeal region, unspecified ulcer stage  L89.159 707.03     707.20   4. Pharyngeal dysphagia  R13.13 787.23     Patient Active Problem List   Diagnosis   • Right foot infection   • Hypoglycemia   • Anemia   • Hyponatremia   • Hypoalbuminemia   • Essential hypertension   • Hyperlipidemia   • Paroxysmal atrial fibrillation (HCC)   • Sepsis (HCC)   • MRSA bacteremia     Past Medical History:   Diagnosis Date   • Diabetes (HCC)    • DVT (deep venous thrombosis) (HCC)    • Hypertension    • Mood disorder (HCC)      Past Surgical History:   Procedure Laterality Date   • BELOW KNEE AMPUTATION Left    • BELOW KNEE AMPUTATION Right 2022    Procedure: AMPUTATION BELOW KNEE RIGHT;  Surgeon: John Peguero MD;  Location: [facility];  Service: Vascular;  Laterality: Right;   • TRANS METATARSAL AMPUTATION Right       General Information     Row Name 22 1101          Physical Therapy Time and Intention    Document Type evaluation  -SC     Mode of Treatment physical therapy  -SC     Row Name 22 1101          General Information    Patient Profile Reviewed yes  -SC     Prior Level of Function mod assist:  HAS BEEN IN BED FOR TWO WEEKS. HAS NOT WALKED WITH HIS PROSTHESIS IN A YEAR. BASELINE FOR THE PAST YEAR IS ASSISTANCE WITH SLIDING BOARD TRANSFERS. HE IS INDEPENDENT WITH W/C MOBILITY  -SC     Existing Precautions/Restrictions fall  NEW L BKA  -SC     Barriers to Rehab previous functional deficit;medically complex  -SC     Row Name 22 1101          Living Environment    People in Home spouse  -SC     Row Name 22 1101          Home Main Entrance    Number of Stairs, Main Entrance none  -SC     Row Name 22 1101           Stairs Within Home, Primary    Number of Stairs, Within Home, Primary none  -SC     Row Name 12/07/22 1101          Cognition    Orientation Status (Cognition) oriented x 4  -SC     Row Name 12/07/22 1101          Safety Issues, Functional Mobility    Impairments Affecting Function (Mobility) balance;endurance/activity tolerance;strength;pain  -SC     Comment, Safety Issues/Impairments (Mobility) ALERT, FOLLOWING COMMANDS  -SC           User Key  (r) = Recorded By, (t) = Taken By, (c) = Cosigned By    Initials Name Provider Type    SC Shamika Patel, PT Physical Therapist               Mobility     Row Name 12/07/22 1104          Bed Mobility    Bed Mobility supine-sit;scooting/bridging  -SC     Scooting/Bridging [place] (Bed Mobility) verbal cues;contact guard  -SC     Supine-Sit [place] (Bed Mobility) moderate assist (50% patient effort)  -SC     Assistive Device (Bed Mobility) head of bed elevated  -SC     Comment, (Bed Mobility) UP TO EDGE OF BED . REQUIRED UE SUPPORT. C/O STUMP PAIN ON SITTING. ABLE TO SCOOT FORWARD USING BILATERAL UE'S  -[facility] Name 12/07/22 1104          Transfers    Comment, (Transfers) DEFERRED- PATIENT C/O BUTT PAIN FOR WOUNDS  -[facility] Name 12/07/22 1104          Gait/Stairs (Locomotion)    Comment, (Gait/Stairs) NO AMBULATORY FOR ONE YEAR  -[facility] Name 12/07/22 1104          Mobility    Extremity Weight-bearing Status right lower extremity  -SC     Right Lower Extremity (Weight-bearing Status) non weight-bearing (NWB)  -SC           User Key  (r) = Recorded By, (t) = Taken By, (c) = Cosigned By    Initials Name Provider Type    SC Shamika Patel, PT Physical Therapist               Obj/Interventions     Row Name 12/07/22 1106          Range of Motion Comprehensive    General Range of Motion upper extremity range of motion deficits identified  -SC     Comment, General Range of Motion R KNEE ROM LIMITED 30% BY PAIN, UES WITH SOME LIMITATIONS--SEE OT NOTE  -SC      [facility] Name 12/07/22 1106          Strength Comprehensive (MMT)    Comment, General Manual Muscle Testing (MMT) Assessment R RESIDUAL LIMB,   QUADS 3/5, HIP FLEXORS 3+/5, L RESIDUAL LIMB :GROSSLY 3+/5  -[facility] Name 12/07/22 1106          Motor Skills    Therapeutic Exercise knee;hip  -[facility] Name 12/07/22 1106          Hip (Therapeutic Exercise)    Hip (Therapeutic Exercise) AROM (active range of motion)  -SC     Hip AROM (Therapeutic Exercise) right;aDduction;10 repetitions;supine  -[facility] Name 12/07/22 1106          Knee (Therapeutic Exercise)    Knee (Therapeutic Exercise) AROM (active range of motion)  -SC     Knee AROM (Therapeutic Exercise) right;flexion;extension;sitting;10 repetitions;SLR (straight leg raise)  -[facility] Name 12/07/22 1106          Balance    Balance Assessment sitting static balance  -SC     Static Sitting Balance standby assist  -SC     Position, Sitting Balance unsupported;sitting edge of bed  -SC     Comment, Balance NO LOB ON EOB  -[facility] Name 12/07/22 1106          Sensory Assessment (Somatosensory)    Sensory Assessment (Somatosensory) other (see comments)  + PHANTOM PAIN  -SC           User Key  (r) = Recorded By, (t) = Taken By, (c) = Cosigned By    Initials Name Provider Type    SC Shamika Patel, PT Physical Therapist               Goals/Plan     [facility] Name 12/07/22 1113          Bed Mobility Goal 1 (PT)    Activity/Assistive Device (Bed Mobility Goal 1, PT) bed mobility activities, all  -SC     [place] Level/Cues Needed (Bed Mobility Goal 1, PT) modified independence  -SC     Time Frame (Bed Mobility Goal 1, PT) long term goal (LTG);10 days  -[facility] Name 12/07/22 1113          Transfer Goal 1 (PT)    Activity/Assistive Device (Transfer Goal 1, PT) bed-to-chair/chair-to-bed;wheelchair transfer;sliding board  -SC     [place] Level/Cues Needed (Transfer Goal 1, PT) maximum assist (25-49% patient effort)  -SC     Time Frame (Transfer Goal 1, PT) long term  goal (LTG);10 days  -SC     Row Name 12/07/22 1113          Problem Specific Goal 1 (PT)    Problem Specific Goal 1 (PT) PROPELL W/C INDEPENDENTLY 150 FEET  -SC     Time Frame (Problem Specific Goal 1, PT) 2 weeks;long-term goal (LTG)  -SC           User Key  (r) = Recorded By, (t) = Taken By, (c) = Cosigned By    Initials Name Provider Type    SC Shamika Patel PT Physical Therapist               Clinical Impression     Row Name 12/07/22 1109          Pain    Pretreatment Pain Rating 8/10  -SC     Posttreatment Pain Rating 8/10  -SC     Pain Location - Side/Orientation Right  -SC     Pain Location lower  -SC     Pain Location - residual limb  -SC     Pain Intervention(s) Repositioned  -SC     Row Name 12/07/22 1109          Plan of Care Review    Plan of Care Reviewed With patient  -SC     Outcome Evaluation PATIENT IS VERY LIMITED WITH HIS MOBILITY DUE TO PAIN AND WEAKNESS. HE HAS NOT WALKED IN MONTHS AND HAS BEEN IN BED FOR THE LAST 2 WEEKS. I BELIEVE HE NEED AN ACUTE REHAB STAY TO ADDRESS HIS MOBILITY DEFICITS ALONG WITH HIS NEED FOR NEW W/C AND W/C CUSHION.  -SC     Row Name 12/07/22 1109          Therapy Assessment/Plan (PT)    Patient/Family Therapy Goals Statement (PT) GO TO REHAB  -SC     Rehab Potential (PT) good, to achieve stated therapy goals  -SC     Criteria for Skilled Interventions Met (PT) yes;meets criteria;skilled treatment is necessary  -SC     Therapy Frequency (PT) daily  -SC     Row Name 12/07/22 1109          Vital Signs    Intra Systolic BP Rehab 95  -SC     Intra Treatment Diastolic BP 69  -SC           User Key  (r) = Recorded By, (t) = Taken By, (c) = Cosigned By    Initials Name Provider Type    SC Shamika Patel PT Physical Therapist               Outcome Measures     Row Name 12/07/22 1114          How much help from another person do you currently need...    Turning from your back to your side while in flat bed without using bedrails? 3  -SC     Moving from lying on back to  sitting on the side of a flat bed without bedrails? 2  -SC     Moving to and from a bed to a chair (including a wheelchair)? 1  -SC     Standing up from a chair using your arms (e.g., wheelchair, bedside chair)? 1  -SC     Climbing 3-5 steps with a railing? 1  -SC     To walk in hospital room? 1  -SC     AM-PAC 6 Clicks Score (PT) 9  -SC     Highest level of mobility 3 --> Sat at edge of bed  -SC     Row Name 12/07/22 1114 12/07/22 1110       Functional Assessment    Outcome Measure Options AM-PAC 6 Clicks Basic Mobility (PT)  -SC AM-PAC 6 Clicks Daily Activity (OT)  -          User Key  (r) = Recorded By, (t) = Taken By, (c) = Cosigned By    Initials Name Provider Type    SC Shamika Patel, PT Physical Therapist    Marilou Mcfarland, OT Occupational Therapist                             Physical Therapy Education     Title: PT OT SLP Therapies (Done)     Topic: Physical Therapy (Done)     Point: Mobility training (Done)     Learning Progress Summary           Patient JuanKRIS VU, DU by SC at 12/7/2022 1115    Comment: REVIEWED HEP                   Point: Home exercise program (Done)     Learning Progress Summary           Patient KRIS Martinez VU, DU by SC at 12/7/2022 1115    Comment: REVIEWED HEP                   Point: Body mechanics (Done)     Learning Progress Summary           Patient KRIS Martinez VU, DU by SC at 12/7/2022 1115    Comment: REVIEWED HEP                   Point: Precautions (Done)     Learning Progress Summary           Patient KRIS Martinez VU, DU by SC at 12/7/2022 1115    Comment: REVIEWED HEP                               User Key     Initials Effective Dates Name Provider Type [facility] 06/16/21 -  Shamika Patel PT Physical Therapist PT              PT Recommendation and Plan     Plan of Care Reviewed With: patient  Outcome Evaluation: PATIENT IS VERY LIMITED WITH HIS MOBILITY DUE TO PAIN AND WEAKNESS. HE HAS NOT WALKED IN MONTHS AND HAS BEEN IN BED FOR THE LAST 2 WEEKS. I BELIEVE HE NEED  AN ACUTE REHAB STAY TO ADDRESS HIS MOBILITY DEFICITS ALONG WITH HIS NEED FOR NEW W/C AND W/C CUSHION.     Time Calculation:    PT Charges     Row Name 12/07/22 1038             Time Calculation    Start Time 1038  -SC      PT Received On 12/07/22  -SC      PT Goal Re-Cert Due Date 12/17/22  -SC         Untimed Charges    PT Eval/Re-eval Minutes 40  -SC         Total Minutes    Untimed Charges Total Minutes 40  -SC       Total Minutes 40  -SC            User Key  (r) = Recorded By, (t) = Taken By, (c) = Cosigned By    Initials Name Provider Type    SC Shamika Patel PT Physical Therapist              Therapy Charges for Today     Code Description Service Date Service Provider Modifiers Qty    [id number] HC PT EVAL MOD COMPLEXITY 3 12/7/2022 Shamika Patel PT GP 1          PT G-Codes  Outcome Measure Options: AM-PAC 6 Clicks Basic Mobility (PT)  AM-PAC 6 Clicks Score (PT): 9  AM-PAC 6 Clicks Score (OT): 11       Shamika Patel PT  12/7/2022

## 2022-12-07 NOTE — PROGRESS NOTES
[facility] Progress Note        Antibiotics:  Anti-Infectives (From admission, onward)    Ordered     Dose/Rate Route Frequency Start Stop    12/07/22 0603  DAPTOmycin (CUBICIN) 800 mg in sodium chloride 0.9 % 50 mL IVPB        Ordering Provider: Zeke George MD    8 mg/kg × 100 kg  100 mL/hr over 30 Minutes Intravenous Every 24 Hours 12/07/22 1000 12/15/22 0959    11/30/22 2219  vancomycin 2000 mg/500 mL 0.9% NS IVPB ([facility])        Ordering Provider: Reggie Batres MD    20 mg/kg × 98.9 kg  over 2 Hours Intravenous Once 11/30/22 2221 12/01/22 0315    11/30/22 2219  piperacillin-tazobactam (ZOSYN) 3.375 g in iso-osmotic dextrose 50 ml (premix)        Ordering Provider: Reggie Batres MD    3.375 g Intravenous Once 11/30/22 2221 11/30/22 2335          CC: foot infection    HPI:    Patient is a 57 y.o.  Yr old male with history of prior lower extremity infection/amputations done in [facility].  He has a history of left BKA years ago.  More recent right transmetatarsal amputation but specific dates unclear and unclear who the surgeon was.  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  He has history DVT/IVC filter, diabetes and other comorbidities as below.  He reports a chronic right lateral foot wound present for months but apparently not precipitated by any specific event or trauma.  He is admitted to the hospital on November 30 with deterioration at the foot including redness/swelling    **patient had reported remote IV drug abuse, 17 years ago and abstains, not recent and on chronic methadone.    ** patient reports MRSA blood stream infection treated in [place] spring of 2022, 6 weeks of vancomycin by his report.  Otherwise data deficit.  Try to retrieve information     12/2/22   "Sudhir did surgery  \"Procedure(s): Mid level right below-knee amputation and primary closure \"    12/6 with MRSA in blood culture;  TTE doen but limited per cardiology    12/7/22   No new focal pain or distress per nursing;  postop pain to the right LE which is controlled/dull at present, constant, nonradiating, worse with palpation, generally better with pain meds and 2-3 out of 10 in severity.  Not progressive     No headache photophobia or neck stiffness.  No shortness of breath cough or hemoptysis.  No nausea vomiting diarrhea or abdominal pain.  No dysuria hematuria or pyuria.  No other new skin rash       ROS:      12/7/22 No f/c/s. No n/v/d. No rash. No new ADR to Abx.     Constitutional-- No Fever, chills or sweats.  Appetite good, and no malaise. No fatigue.  Heent--chronic hoarse voice.  No new vision, hearing or throat complaints.  No epistaxis or oral sores.   No flashers, floaters or eye pain.   No headache, photophobia or neck stiffness.  Chronic PEG tube  CV-- No chest pain, palpitation or syncope  Resp-- No SOB/cough/Hemoptysis  GI- No nausea, vomiting, or diarrhea.  No hematochezia, melena, or hematemesis. Denies jaundice or chronic liver disease.  -- No dysuria, hematuria, or flank pain.  Denies hesitancy, urgency or flank pain.  Lymph- no swollen lymph nodes in neck/axilla or groin.   Heme- No active bruising or bleeding; no Hx of DVT or PE.  MS-- no swelling or pain in the bones or joints of arms/legs.  No new back pain.  Neuro-- No acute focal weakness or numbness in the arms or legs.  No seizures.     Full 12 point review of systems reviewed and negative otherwise for acute complaints, except for above      PE:   BP 92/60   Pulse 67   Temp 97.7 °F (36.5 °C) (Oral)   Resp 18   Ht 193 cm (75.98\")   Wt 100 kg (220 lb 7.4 oz)   SpO2 96%   BMI 26.85 kg/m²          GENERAL: awake;  in no acute distress.  chronic Hoarse voice noted and chronic per him  HEENT: Normocephalic, atraumatic.   No " conjunctival injection. No icterus. Oropharynx   without evidence of thrush or exudate. No evidence of peridontal disease.    NECK: Supple without nuchal rigidity. No mass.   HEART: RRR; No murmur, rubs, gallops.   LUNGS: Diminished at bases but otherwise clear to auscultation bilaterally without wheezing, rales, rhonchi. Normal respiratory effort. Nonlabored. No dullness.  ABDOMEN: Soft, nontender, nondistended. Positive bowel sounds. No rebound or guarding. NO mass or HSM.  PEG tube noted  EXT:  No cyanosis, clubbing;No cord.   MSK: FROM without joint effusions noted arms/legs.    SKIN: Warm and dry without cutaneous eruptions on Inspection/palpation.    NEURO: awake     Right lower extremity surgical site noted;  No new redness;  no discrete mass bulge or fluctuance.  No crepitus or bulla.       No peripheral stigmata/phenomenon of endocarditis     IV without obvious redness or drainage     Left BKA site with no redness induration or warmth.  No discrete mass bulge or fluctuance.    Laboratory Data    Results from last 7 days   Lab Units 12/07/22  0436 12/04/22  1149 12/03/22  0936   WBC 10*3/mm3 6.24 6.80 8.91   HEMOGLOBIN g/dL 9.2* 9.7* 9.3*   HEMATOCRIT % 30.1* 31.6* 30.6*   PLATELETS 10*3/mm3 388 420 425     Results from last 7 days   Lab Units 12/07/22  0436   SODIUM mmol/L 136   POTASSIUM mmol/L 4.3   CHLORIDE mmol/L 103   CO2 mmol/L 24.0   BUN mg/dL 25*   CREATININE mg/dL 0.89   GLUCOSE mg/dL 181*   CALCIUM mg/dL 9.3     Results from last 7 days   Lab Units 11/30/22  1905   ALK PHOS U/L 85   BILIRUBIN mg/dL 0.2   ALT (SGPT) U/L 12   AST (SGOT) U/L 19     Results from last 7 days   Lab Units 12/01/22  0810   SED RATE mm/hr 109*     Results from last 7 days   Lab Units 12/01/22  0810   CRP mg/dL 9.10*       Estimated Creatinine Clearance: 128 mL/min (by C-G formula based on SCr of 0.89 mg/dL).      Microbiology:      Radiology:  Imaging Results (Last 72 Hours)     Procedure Component Value Units Date/Time     SLP FEES - Fiberoptic Endo Eval Swallow [[id number]] Resulted: 12/06/22 1142     Updated: 12/06/22 1142    Narrative:      This procedure was auto-finalized with no dictation required.            Impression:     --Acute right foot/ankle with multifocal ulceration, cellulitis/wound infection and probable osteomyelitis with probe to bone at the lateral foot and abnormal MRI.  Other comorbidities as outlined above and high risk for further serious morbidity and other serious sequelae including persistent/recurrent or nonhealing wounds, persistent/progressive or recurrent infection and risk for further functional/limb loss/amputation.  Surgery following to help define timing/option of threshold for any future surgical intervention .      **Surgery 12/2 with BKA    --MRSA bacteremia from November 30 (daptomycin sensitive).  Presumed from foot but also risk for endovascular focus particularly with  History of prior MRSA bloodstream infection.  Transthoracic echocardiogram limited per cardiology.  JERALD ordered     --History MRSA with bacteremia by his report in spring/summer 2022 and treated with 6 weeks of vancomycin in [place].  Specifics unclear and trying to retrieve information    --coag-negative staph in blood culture likely contaminant     --Chronic vocal cord paralysis per notes with chronic hoarseness and indwelling PEG tube.  Medicine team to clarify     --History left BKA.     --Diabetes.  You need to tightly control blood sugar to give best chance for healing     --History of DVT with IVC filter     PLAN:      -- Daptomycin  IV potentially 6 weeks but final duration to depend on clinical course of study results and response to therapy     -- Check/review labs cultures and scans     -- Partial history per nursing staff     -- Discussed with microbiology and family     --d/w Dr Peguero      -- Highly complex set of issues with high risk for further serious morbidity and other serious sequela     --JERALD  pending    **Copied text in this note has been reviewed and is accurate as of 12/07/22.     Zeke George MD  12/7/2022

## 2022-12-07 NOTE — PLAN OF CARE
Goal Outcome Evaluation:  Plan of Care Reviewed With: patient A&OX4 VSS Pain increased with positioning and controlled with PO meds. Dresssing to RLE C/D/I. Voiding without difficulty. Awaiting rehab placement.

## 2022-12-07 NOTE — CASE MANAGEMENT/SOCIAL WORK
Continued Stay Note  [facility]     Patient Name: Buster Velasco  MRN: [id number]  Today's Date: 12/7/2022    Admit Date: 11/30/2022    Plan: Rehab   Discharge Plan     Row Name 12/07/22 1449       Plan    Plan Rehab    Patient/Family in Agreement with Plan yes    Plan Comments Mr. Velasco has worked with therapy and they are recommending inpatient rehab. He is agreeable to this and would like a referral sent to [facility]. I submitted a referral to April, admissions liaison with [facility], and due to Mr. Velasco's history of IVDA he will be unable to go to [facility] until the last 2 weeks of his antibiotic therapy as they will not discharge a patient with a PICC and an IVDA history to home. Will speak with Mr. Velasco about additional facilities in which to make referrals. Case management will continue to follow.    Final Discharge Disposition Code 30 - still a patient               Discharge Codes    No documentation.               Expected Discharge Date and Time     Expected Discharge Date Expected Discharge Time    Dec 7, 2022             Alan Downing RN

## 2022-12-07 NOTE — PROGRESS NOTES
[facility] Medicine Services  PROGRESS NOTE    Patient Name: Buster Velasco  : 1965  MRN: [id number]    Date of Admission: 2022  Primary Care Physician: Ludivina Bowers MD    Subjective   Subjective     CC:  F/U s/p right BKA    HPI:  Patient seen this morning. About to work with PT. He has no major complaints at this time.     ROS:  Gen-no fevers, no chills  CV-no chest pain, no palpitations  Resp-no cough, no dyspnea  GI-no N/V/D, no abd pain    All other systems reviewed and negative except any additional pertinent positives and negatives as discussed in HPI.       Objective   Objective     Vital Signs:   Temp:  [97.6 °F (36.4 °C)-98.2 °F (36.8 °C)] 98.2 °F (36.8 °C)  Heart Rate:  [65-76] 67  Resp:  [16-18] 18  BP: ()/(60-81) 97/68  Flow (L/min):  [2] 2     Physical Exam:  Gen-no acute distress  HENT-NCAT, mucous membranes moist  CV-RRR, S1 S2 normal, no m/r/g  Resp-CTAB, no wheezes or rales  Abd-soft, NT, ND, +BS  Ext-left BKA present, new right BKA with dressing intact  Neuro-A&Ox3, no focal deficits  Skin-no rashes  Psych-appropriate mood  No change from yesterday      Results Reviewed:  LAB RESULTS:      Lab 22  0436 22  1149 22  0936 22  0715 22  0810 22  0053 22  1905   WBC 6.24 6.80 8.91 6.89 7.22  --  13.92*   HEMOGLOBIN 9.2* 9.7* 9.3* 11.0* 10.0*  --  11.1*   HEMATOCRIT 30.1* 31.6* 30.6* 36.1* 32.2*  --  35.5*   PLATELETS 388 420 425 405 413  --  532*   NEUTROS ABS  --   --  6.88 4.43 5.64  --  11.44*   IMMATURE GRANS (ABS)  --   --  0.09* 0.09* 0.06*  --  0.14*   LYMPHS ABS  --   --  1.33 1.59 1.02  --  1.64   MONOS ABS  --   --  0.57 0.59 0.42  --  0.64   EOS ABS  --   --  0.02 0.13 0.06  --  0.03   MCV 85.0 84.7 85.5 85.3 84.3  --  84.5   SED RATE  --   --   --   --  109*  --   --    CRP  --   --   --   --  9.10*  --   --    PROCALCITONIN  --   --   --   --   --   --  0.17   LACTATE  --   --   --   --    --  2.0 2.1*         Lab 12/07/22  0436 12/04/22  1149 12/03/22  0936 12/02/22  0715 12/01/22  0810   SODIUM 136 136 135* 139 135*   POTASSIUM 4.3 4.3 4.4 5.0 4.2   CHLORIDE 103 101 102 105 103   CO2 24.0 25.0 25.0 23.0 21.0*   ANION GAP 9.0 10.0 8.0 11.0 11.0   BUN 25* 16 17 14 18   CREATININE 0.89 0.90 1.26 1.13 1.24   EGFR 99.3 99.0 66.1 75.3 67.4   GLUCOSE 181* 118* 211* 46* 52*   CALCIUM 9.3 9.1 9.1 9.2 9.2   HEMOGLOBIN A1C  --   --   --   --  6.90*         Lab 11/30/22  1905   TOTAL PROTEIN 7.6   ALBUMIN 3.00*   GLOBULIN 4.6   ALT (SGPT) 12   AST (SGOT) 19   BILIRUBIN 0.2   ALK PHOS 85   LIPASE 14                 Lab 12/01/22  1035 11/30/22  1905   IRON  --  18*   IRON SATURATION  --  6*   TIBC  --  277*   TRANSFERRIN  --  186*   FERRITIN  --  317.40   FOLATE  --  15.90   VITAMIN B 12  --  717   ABO TYPING O O   RH TYPING Positive Positive   ANTIBODY SCREEN Negative  --          Brief Urine Lab Results  (Last result in the past 365 days)      Color   Clarity   Blood   Leuk Est   Nitrite   Protein   CREAT   Urine HCG        08/20/22 0108 Yellow   Clear   Negative   Negative     Negative                 Microbiology Results Abnormal     None          Adult Transthoracic Echo Complete W/ Cont if Necessary Per Protocol    Result Date: 12/7/2022  •  Left ventricular systolic function is normal. Estimated left ventricular EF = 65% •  The right ventricular cavity is mildly dilated. •  Saline test results are negative.     SLP FEES - Fiberoptic Endo Eval Swallow    Result Date: 12/6/2022  This procedure was auto-finalized with no dictation required.      Results for orders placed during the hospital encounter of 11/30/22    Adult Transthoracic Echo Complete W/ Cont if Necessary Per Protocol    Interpretation Summary  •  Left ventricular systolic function is normal. Estimated left ventricular EF = 65%  •  The right ventricular cavity is mildly dilated.  •  Saline test results are negative.      I have reviewed the  medications:  Scheduled Meds:atorvastatin, 20 mg, Per PEG Tube, Nightly  DAPTOmycin, 8 mg/kg, Intravenous, Q24H  insulin lispro, 0-7 Units, Subcutaneous, TID AC  methadone, 10 mg, Oral, Q6H  metoprolol tartrate, 25 mg, Per PEG Tube, Q12H  senna-docusate sodium, 2 tablet, Oral, BID  sodium chloride, 10 mL, Intravenous, Q12H  sodium chloride, 10 mL, Intravenous, Q12H      Continuous Infusions:lactated ringers, 9 mL/hr  ropivacaine,       PRN Meds:.•  acetaminophen  •  senna-docusate sodium **AND** polyethylene glycol **AND** bisacodyl **AND** bisacodyl  •  dextrose  •  dextrose  •  glucagon (human recombinant)  •  hydrOXYzine  •  melatonin  •  Morphine  •  ondansetron **OR** ondansetron  •  sennosides-docusate  •  sodium chloride  •  sodium chloride  •  sodium chloride    Assessment & Plan   Assessment & Plan     Active Hospital Problems    Diagnosis  POA   • **Right foot infection [L08.9]  Yes   • MRSA bacteremia [R78.81, B95.62]  Unknown   • Sepsis (HCC) [A41.9]  Yes   • Hypoglycemia [E16.2]  Yes   • Anemia [D64.9]  Yes   • Hyponatremia [E87.1]  Yes   • Hypoalbuminemia [E88.09]  Yes   • Essential hypertension [I10]  Yes   • Hyperlipidemia [E78.5]  Yes   • Paroxysmal atrial fibrillation (HCC) [I48.0]  Yes      Resolved Hospital Problems   No resolved problems to display.        Brief Hospital Course to date:  Buster Velasco is a 57 y.o. male with hx of prior osteomyelitis s/p left BKA, s/p right transmetatarsal amputation, left hand 3rd metacarpal resection (April 2022 at OSH, had 6 weeks of IV antibiotics for MRSA bacteremia), remote hx of IVDA, more recent hx of polysubstance abuse (meth), and DVT s/p IVC filter who presents with right foot wounds. S/p right BKA on 12/2/22 with Dr. Peguero.     This patient's problems and plans were partially entered by my partner and updated as appropriate by me 12/07/22.    Sepsis secondary acute right foot non-healing wound and likely osteomyelitis  MRSA bacteremia  -  s/p right BKA 12/2/22 with Dr. Peguero  - ID following, currently on Daptomycin monotherapy  - Blood cultures now with coag negative Staph and MRSA, have discussed with Dr. George, he will require IV antibiotics at discharge for at least 6 weeks but final duration TBD  - Repeat blood cultures in progress  - TTE no acute findings, JERALD has been ordered  - Not a good candidate for PICC line/home IV antibiotics given hx of substance abuse, current plan is discharge to rehab     Recurrent hypoglycemia in setting of DMII   - HbA1c 6.9%  - High dose Tresiba qAM before arrival which has been held  - Briefly required D5, now discontinued  - Glucose trending back up, added back low dose SSI     Chronic pain on opioids   Previous IVDA/substance abuse  - Restarted home methadone 10 mg QID  - Morphine for breakthrough post-op pain  - Patient denied hx of IVDA to me but in [facility] admission notes from April 2022 at [facility], they documented prior IVDA/substance abuse, most recently with meth in [facility] from 4/2/22     Reported hx of Afib   - Patient denies any hx of Afib. Not previously on anticoagulation and given murky history will not start.   - On Metoprolol at home which is continued     Chronic vocal cord paralysis s/p PEG  Dysphagia  - SLP evaluation: regular diet but with nectar thick liquids recommended, patient wants thin liquids and understands risks of aspiration  - Comfort diet as tolerated  - Discussed code status 12/6/22, he will think about it, will discuss again tomorrow  - SLP should continue to follow for possible advance of diet recommendations  - Unclear where/when PEG was placed, will eventually need it removed but defer to outpatient setting as it was not placed here     Hypotension with h/o HTN  - Hold parameters with metoprolol  - Hypotension resolved     HLD   - Continue statin     DVT s/p IVC filter   - Placed in spring 2022 per patient. Defer to PCP for further assessment and time of  removal.     Hx of seizure in setting of meth use   - Not on AED's given meth trigger     BPH   - Continue Flomax       Expected Discharge Location and Transportation: rehab  Expected Discharge Date: 12/12/22, TBD, pending ID workup of bacteremia    DVT prophylaxis:  Mechanical DVT prophylaxis orders are present.     AM-PAC 6 Clicks Score (PT): 9 (12/07/22 1114)    CODE STATUS:   Code Status and Medical Interventions:   Ordered at: 12/01/22 0035     Code Status (Patient has no pulse and is not breathing):    CPR (Attempt to Resuscitate)     Medical Interventions (Patient has pulse or is breathing):    Full Support       Carmen Bolaños MD  12/07/22

## 2022-12-07 NOTE — THERAPY EVALUATION
Patient Name: Buster Velasco  : 1965    MRN: [id number]                              Today's Date: 2022       Admit Date: 2022    Visit Dx:     ICD-10-CM ICD-9-CM   1. Right foot infection  L08.9 686.9   2. Hypoglycemia  E16.2 251.2   3. Decubitus ulcer of coccygeal region, unspecified ulcer stage  L89.159 707.03     707.20   4. Pharyngeal dysphagia  R13.13 787.23     Patient Active Problem List   Diagnosis   • Right foot infection   • Hypoglycemia   • Anemia   • Hyponatremia   • Hypoalbuminemia   • Essential hypertension   • Hyperlipidemia   • Paroxysmal atrial fibrillation (HCC)   • Sepsis (HCC)   • MRSA bacteremia     Past Medical History:   Diagnosis Date   • Diabetes (HCC)    • DVT (deep venous thrombosis) (HCC)    • Hypertension    • Mood disorder (HCC)      Past Surgical History:   Procedure Laterality Date   • BELOW KNEE AMPUTATION Left    • BELOW KNEE AMPUTATION Right 2022    Procedure: AMPUTATION BELOW KNEE RIGHT;  Surgeon: John Peguero MD;  Location: [facility];  Service: Vascular;  Laterality: Right;   • TRANS METATARSAL AMPUTATION Right       General Information     Row Name 22 1100          OT Time and Intention    Document Type evaluation  -MR     Mode of Treatment occupational therapy  -MR     Row Name 22 1100          General Information    Patient Profile Reviewed yes  -MR     Prior Level of Function min assist:;transfer;w/c or scooter;all household mobility;bed mobility;ADL's  PTA pt requiring assistance for transfers, bed mobility and ADLs.  -MR     Existing Precautions/Restrictions fall;other (see comments)  B BKA (R BKA 2022); PEG tube; Poor skin integrity on buttocks; R peripheral nerve cath  -MR     Barriers to Rehab medically complex;previous functional deficit;physical barrier  -MR     Row Name 22 1100          Living Environment    People in Home spouse  -MR     Row Name 22 1100          Home Main Entrance    Number of  Stairs, Main Entrance other (see comments)  ramp to enter home  -MR     Row Name 12/07/22 1100          Stairs Within Home, Primary    Stairs, Within Home, Primary Lives in 2 story home but resides on the main floor w/ all needs met.  -MR     Number of Stairs, Within Home, Primary other (see comments)  see above  -MR     Row Name 12/07/22 1100          Cognition    Orientation Status (Cognition) oriented x 4  -MR     Row Name 12/07/22 1100          Safety Issues, Functional Mobility    Safety Issues Affecting Function (Mobility) awareness of need for assistance;insight into deficits/self-awareness;ability to follow commands;safety precaution awareness;judgment;friction/shear risk;safety precautions follow-through/compliance;sequencing abilities  -MR     Impairments Affecting Function (Mobility) balance;endurance/activity tolerance;strength;pain  -MR           User Key  (r) = Recorded By, (t) = Taken By, (c) = Cosigned By    Initials Name Provider Type    MR Marilou Lennon, OT Occupational Therapist                 Mobility/ADL's     Row Name 12/07/22 1103          Bed Mobility    Bed Mobility supine-sit;sit-supine;scooting/bridging  -MR     Scooting/Bridging [place] (Bed Mobility) maximum assist (25% patient effort);2 person assist;verbal cues;nonverbal cues (demo/gesture)  -MR     Supine-Sit [place] (Bed Mobility) moderate assist (50% patient effort);verbal cues;nonverbal cues (demo/gesture)  -MR     Sit-Supine [place] (Bed Mobility) contact guard;verbal cues;nonverbal cues (demo/gesture)  -MR     Assistive Device (Bed Mobility) head of bed elevated  -MR     Comment, (Bed Mobility) Pt utilized therapist to pull self to upright w/ ModA. Returned to supine w/ CGA.  -MR     Row Name 12/07/22 1103          Transfers    Comment, (Transfers) Pt deferred OOB to chair d/t poor skin integrity on buttocks  -MR     Row Name 12/07/22 1103          Activities of Daily Living    BADL Assessment/Intervention  feeding;grooming  -     Row Name 12/07/22 1103          Self-Feeding Assessment/Training    [place] Level (Feeding) liquids to mouth;set up  -MR     Position (Self-Feeding) sitting up in bed  -MR     Row Name 12/07/22 1103          Grooming Assessment/Training    [place] Level (Grooming) wash face, hands;set up  -MR     Position (Grooming) sitting up in bed  -MR           User Key  (r) = Recorded By, (t) = Taken By, (c) = Cosigned By    Initials Name Provider Type    Marilou Mcfarland OT Occupational Therapist               Obj/Interventions     Row Name 12/07/22 1104          Sensory Assessment (Somatosensory)    Sensory Assessment (Somatosensory) UE sensation intact  -MR     Row Name 12/07/22 1104          Vision Assessment/Intervention    Visual Impairment/Limitations WFL  -MR     Row Name 12/07/22 1104          Range of Motion Comprehensive    General Range of Motion upper extremity range of motion deficits identified  -MR     Comment, General Range of Motion RUE shoulder ROM to approx 90 degrees, LUE approx 115 degrees. PROM WFL  -MR     Row Name 12/07/22 1104          Strength Comprehensive (MMT)    Comment, General Manual Muscle Testing (MMT) Assessment BUE grossly 4/5 in all functional planes  -     Row Name 12/07/22 1104          Balance    Balance Assessment sitting static balance;sitting dynamic balance  -MR     Static Sitting Balance supervision  -MR     Dynamic Sitting Balance contact guard  -MR     Position, Sitting Balance unsupported;sitting edge of bed  -MR     Balance Interventions sitting;dynamic;static  -MR           User Key  (r) = Recorded By, (t) = Taken By, (c) = Cosigned By    Initials Name Provider Type     Marilou Lennon OT Occupational Therapist               Goals/Plan     Row Name 12/07/22 1109          Transfer Goal 1 (OT)    Activity/Assistive Device (Transfer Goal 1, OT) bed-to-chair/chair-to-bed;other (see comments)  sliding board transfer bed to w/c.  -      [place] Level/Cues Needed (Transfer Goal 1, OT) moderate assist (50-74% patient effort);tactile cues required;verbal cues required  -MR     Time Frame (Transfer Goal 1, OT) long term goal (LTG);by discharge  -MR     Row Name 12/07/22 1109          Dressing Goal 1 (OT)    Activity/Device (Dressing Goal 1, OT) upper body dressing  -MR     [place]/Cues Needed (Dressing Goal 1, OT) minimum assist (75% or more patient effort)  -MR     Time Frame (Dressing Goal 1, OT) long term goal (LTG);by discharge  -MR     Progress/Outcome (Dressing Goal 1, OT) new goal  -MR     Row Name 12/07/22 1109          Grooming Goal 1 (OT)    Activity/Device (Grooming Goal 1, OT) grooming skills, all;other (see comments)  sitting at EOB  -MR     [place] (Grooming Goal 1, OT) supervision required  -MR     Time Frame (Grooming Goal 1, OT) long term goal (LTG);by discharge  -MR     Progress/Outcome (Grooming Goal 1, OT) new goal  -MR     Row Name 12/07/22 1109          Therapy Assessment/Plan (OT)    Planned Therapy Interventions (OT) activity tolerance training;adaptive equipment training;BADL retraining;functional balance retraining;IADL retraining;occupation/activity based interventions;patient/caregiver education/training;transfer/mobility retraining;strengthening exercise;ROM/therapeutic exercise  -MR           User Key  (r) = Recorded By, (t) = Taken By, (c) = Cosigned By    Initials Name Provider Type    Marilou Mcfarland, OT Occupational Therapist               Clinical Impression     Row Name 12/07/22 1108          Pain Assessment    Pretreatment Pain Rating 8/10  -MR     Posttreatment Pain Rating 8/10  -MR     Pain Location - Side/Orientation Right  -MR     Pain Location incisional  -MR     Pain Location - other (see comments)  stump  -MR     Pre/Posttreatment Pain Comment RN notified. Pt utilized PCA button on nerve cath during session.  -MR     Pain Intervention(s) Ambulation/increased activity;Repositioned  -MR     Row  Name 12/07/22 1105          Plan of Care Review    Plan of Care Reviewed With patient  -MR     Progress no change  Initial Eval  -MR     Outcome Evaluation OT initial eval completed. Pt presenting s/p R BKA. Pt limited by pain and decreased skin integrity. OOB deferred d/t pain. ModA for supine to sit, pt tolerated sitting EOB for approx 5 minutes. Returned to supine w/ CGA. MaxA x 2 to boost pt higher in bed. SUA for feeding and grooming tasks. IP OT warranted to address deficits. Recommend inpatient rehab at d/c.  -MR     Row Name 12/07/22 1105          Therapy Assessment/Plan (OT)    Patient/Family Therapy Goal Statement (OT) Return to PLOF  -MR     Rehab Potential (OT) good, to achieve stated therapy goals  -MR     Criteria for Skilled Therapeutic Interventions Met (OT) yes;meets criteria;skilled treatment is necessary  -MR     Therapy Frequency (OT) daily  -MR     Row Name 12/07/22 1105          Therapy Plan Review/Discharge Plan (OT)    Anticipated Discharge Disposition (OT) inpatient rehabilitation facility  -MR     Row Name 12/07/22 1105          Vital Signs    Pre Systolic BP Rehab 87  -MR     Pre Treatment Diastolic BP 55  -MR     Post Systolic BP Rehab 95  -MR     Post Treatment Diastolic BP 69  -MR     Pretreatment Heart Rate (beats/min) 78  -MR     Pre SpO2 (%) 96  -MR     O2 Delivery Pre Treatment room air  -MR     O2 Delivery Intra Treatment room air  -MR     O2 Delivery Post Treatment room air  -MR     Pre Patient Position Supine  -MR     Intra Patient Position Sitting  -MR     Post Patient Position Supine  -MR     Row Name 12/07/22 1105          Positioning and Restraints    Pre-Treatment Position in bed  -MR     Post Treatment Position bed  -MR     In Bed supine;with PT  -MR           User Key  (r) = Recorded By, (t) = Taken By, (c) = Cosigned By    Initials Name Provider Type    MR Marilou Lennon, OT Occupational Therapist               Outcome Measures     Row Name 12/07/22 1118          How much  help from another is currently needed...    Putting on and taking off regular lower body clothing? 1  -MR     Bathing (including washing, rinsing, and drying) 1  -MR     Toileting (which includes using toilet bed pan or urinal) 1  -MR     Putting on and taking off regular upper body clothing 2  -MR     Taking care of personal grooming (such as brushing teeth) 3  -MR     Eating meals 3  -MR     AM-PAC 6 Clicks Score (OT) 11  -MR     Row Name 12/07/22 1110          Functional Assessment    Outcome Measure Options AM-PAC 6 Clicks Daily Activity (OT)  -MR           User Key  (r) = Recorded By, (t) = Taken By, (c) = Cosigned By    Initials Name Provider Type    MR Marilou Lennon, OT Occupational Therapist                Occupational Therapy Education     Title: PT OT SLP Therapies (In Progress)     Topic: Occupational Therapy (Done)     Point: ADL training (Done)     Description:   Instruct learner(s) on proper safety adaptation and remediation techniques during self care or transfers.   Instruct in proper use of assistive devices.              Learning Progress Summary           Patient Acceptance, E, VU by MR at 12/7/2022 1110                   Point: Home exercise program (Done)     Description:   Instruct learner(s) on appropriate technique for monitoring, assisting and/or progressing therapeutic exercises/activities.              Learning Progress Summary           Patient Acceptance, E, VU by MR at 12/7/2022 1110                   Point: Precautions (Done)     Description:   Instruct learner(s) on prescribed precautions during self-care and functional transfers.              Learning Progress Summary           Patient Acceptance, E, VU by MR at 12/7/2022 1110                   Point: Body mechanics (Done)     Description:   Instruct learner(s) on proper positioning and spine alignment during self-care, functional mobility activities and/or exercises.              Learning Progress Summary           Patient  Acceptance, E, VU by MR at 12/7/2022 1110                               User Key     Initials Effective Dates Name Provider Type Discipline    MR 09/22/22 -  Marilou Lennon OT Occupational Therapist OT              OT Recommendation and Plan  Planned Therapy Interventions (OT): activity tolerance training, adaptive equipment training, BADL retraining, functional balance retraining, IADL retraining, occupation/activity based interventions, patient/caregiver education/training, transfer/mobility retraining, strengthening exercise, ROM/therapeutic exercise  Therapy Frequency (OT): daily  Plan of Care Review  Plan of Care Reviewed With: patient  Progress: no change (Initial Eval)  Outcome Evaluation: OT initial eval completed. Pt presenting s/p R BKA. Pt limited by pain and decreased skin integrity. OOB deferred d/t pain. ModA for supine to sit, pt tolerated sitting EOB for approx 5 minutes. Returned to supine w/ CGA. MaxA x 2 to boost pt higher in bed. SUA for feeding and grooming tasks. IP OT warranted to address deficits. Recommend inpatient rehab at d/c.     Time Calculation:    Time Calculation- OT     Row Name 12/07/22 1112             Time Calculation- OT    OT Start Time 1020  -MR      OT Received On 12/07/22  -MR      OT Goal Re-Cert Due Date 12/17/22  -MR         Untimed Charges    OT Eval/Re-eval Minutes 49  -MR         Total Minutes    Untimed Charges Total Minutes 49  -MR       Total Minutes 49  -MR            User Key  (r) = Recorded By, (t) = Taken By, (c) = Cosigned By    Initials Name Provider Type    MR Marilou Lennon OT Occupational Therapist              Therapy Charges for Today     Code Description Service Date Service Provider Modifiers Qty    [id number]  OT EVAL MOD COMPLEXITY 4 12/7/2022 Marilou Lennon OT GO 1               Marilou Lennon OT  12/7/2022

## 2022-12-08 LAB
GLUCOSE BLDC GLUCOMTR-MCNC: 106 MG/DL (ref 70–130)
GLUCOSE BLDC GLUCOMTR-MCNC: 147 MG/DL (ref 70–130)
GLUCOSE BLDC GLUCOMTR-MCNC: 178 MG/DL (ref 70–130)
GLUCOSE BLDC GLUCOMTR-MCNC: 192 MG/DL (ref 70–130)

## 2022-12-08 PROCEDURE — 63710000001 INSULIN LISPRO (HUMAN) PER 5 UNITS: Performed by: HOSPITALIST

## 2022-12-08 PROCEDURE — 99024 POSTOP FOLLOW-UP VISIT: CPT | Performed by: THORACIC SURGERY (CARDIOTHORACIC VASCULAR SURGERY)

## 2022-12-08 PROCEDURE — 25010000002 DAPTOMYCIN PER 1 MG: Performed by: INTERNAL MEDICINE

## 2022-12-08 PROCEDURE — 82962 GLUCOSE BLOOD TEST: CPT

## 2022-12-08 PROCEDURE — 99232 SBSQ HOSP IP/OBS MODERATE 35: CPT | Performed by: HOSPITALIST

## 2022-12-08 RX ADMIN — METHADONE HYDROCHLORIDE 10 MG: 10 TABLET ORAL at 02:06

## 2022-12-08 RX ADMIN — Medication 10 ML: at 08:52

## 2022-12-08 RX ADMIN — METHADONE HYDROCHLORIDE 10 MG: 10 TABLET ORAL at 08:47

## 2022-12-08 RX ADMIN — METHADONE HYDROCHLORIDE 10 MG: 10 TABLET ORAL at 14:20

## 2022-12-08 RX ADMIN — INSULIN LISPRO 2 UNITS: 100 INJECTION, SOLUTION INTRAVENOUS; SUBCUTANEOUS at 12:01

## 2022-12-08 RX ADMIN — DAPTOMYCIN 800 MG: 500 INJECTION, POWDER, LYOPHILIZED, FOR SOLUTION INTRAVENOUS at 09:43

## 2022-12-08 RX ADMIN — HYDROXYZINE HYDROCHLORIDE 50 MG: 25 TABLET ORAL at 20:31

## 2022-12-08 RX ADMIN — Medication 10 ML: at 20:31

## 2022-12-08 RX ADMIN — METHADONE HYDROCHLORIDE 10 MG: 10 TABLET ORAL at 20:31

## 2022-12-08 RX ADMIN — ATORVASTATIN CALCIUM 20 MG: 20 TABLET, FILM COATED ORAL at 20:31

## 2022-12-08 NOTE — PROGRESS NOTES
[facility] Medicine Services  PROGRESS NOTE    Patient Name: Buster Velasco  : 1965  MRN: [id number]    Date of Admission: 2022  Primary Care Physician: Ludivina Bowers MD    Subjective   Subjective     CC:  F/U s/p right BKA    HPI:  Patient seen this morning. No major complaints. Says he has been drinking the nectar thick liquids they are bringing him, even though he doesn't like it. He is hoping speech will come back and re-evaluate him soon.    ROS:  Gen-no fevers, no chills  CV-no chest pain, no palpitations  Resp-no cough, no dyspnea  GI-no N/V/D, no abd pain    All other systems reviewed and negative except any additional pertinent positives and negatives as discussed in HPI.       Objective   Objective     Vital Signs:   Temp:  [97.9 °F (36.6 °C)-98 °F (36.7 °C)] 98 °F (36.7 °C)  Heart Rate:  [60-76] 60  Resp:  [16-18] 18  BP: ()/(61-85) 124/78  Flow (L/min):  [2] 2     Physical Exam:  Gen-no acute distress  HENT-NCAT, mucous membranes moist  CV-RRR, S1 S2 normal, no m/r/g  Resp-CTAB, no wheezes or rales  Abd-soft, NT, ND, +BS  Ext-left BKA present, new right BKA with dressing intact  Neuro-A&Ox3, no focal deficits  Skin-no rashes  Psych-appropriate mood  No change from yesterday      Results Reviewed:  LAB RESULTS:      Lab 22  0436 22  1149 22  0936 22  0715   WBC 6.24 6.80 8.91 6.89   HEMOGLOBIN 9.2* 9.7* 9.3* 11.0*   HEMATOCRIT 30.1* 31.6* 30.6* 36.1*   PLATELETS 388 420 425 405   NEUTROS ABS  --   --  6.88 4.43   IMMATURE GRANS (ABS)  --   --  0.09* 0.09*   LYMPHS ABS  --   --  1.33 1.59   MONOS ABS  --   --  0.57 0.59   EOS ABS  --   --  0.02 0.13   MCV 85.0 84.7 85.5 85.3         Lab 22  0436 22  1149 22  0936 22  0715   SODIUM 136 136 135* 139   POTASSIUM 4.3 4.3 4.4 5.0   CHLORIDE 103 101 102 105   CO2 24.0 25.0 25.0 23.0   ANION GAP 9.0 10.0 8.0 11.0   BUN 25* 16 17 14   CREATININE 0.89 0.90  1.26 1.13   EGFR 99.3 99.0 66.1 75.3   GLUCOSE 181* 118* 211* 46*   CALCIUM 9.3 9.1 9.1 9.2                         Brief Urine Lab Results  (Last result in the past 365 days)      Color   Clarity   Blood   Leuk Est   Nitrite   Protein   CREAT   Urine HCG        08/20/22 0108 Yellow   Clear   Negative   Negative     Negative                 Microbiology Results Abnormal     Procedure Component Value - Date/Time    Blood Culture - Blood, Wrist, Left [[id number]]  (Normal) Collected: 12/06/22 1402    Lab Status: Preliminary result Specimen: Blood from Wrist, Left Updated: 12/07/22 1545     Blood Culture No growth at 24 hours    Blood Culture - Blood, Arm, Left [[id number]]  (Normal) Collected: 12/06/22 1402    Lab Status: Preliminary result Specimen: Blood from Arm, Left Updated: 12/07/22 1545     Blood Culture No growth at 24 hours          Adult Transthoracic Echo Complete W/ Cont if Necessary Per Protocol    Result Date: 12/7/2022  •  Left ventricular systolic function is normal. Estimated left ventricular EF = 65% •  The right ventricular cavity is mildly dilated. •  Saline test results are negative.       Results for orders placed during the hospital encounter of 11/30/22    Adult Transthoracic Echo Complete W/ Cont if Necessary Per Protocol    Interpretation Summary  •  Left ventricular systolic function is normal. Estimated left ventricular EF = 65%  •  The right ventricular cavity is mildly dilated.  •  Saline test results are negative.      I have reviewed the medications:  Scheduled Meds:atorvastatin, 20 mg, Per PEG Tube, Nightly  DAPTOmycin, 8 mg/kg, Intravenous, Q24H  insulin lispro, 0-7 Units, Subcutaneous, TID AC  methadone, 10 mg, Oral, Q6H  metoprolol tartrate, 25 mg, Per PEG Tube, Q12H  senna-docusate sodium, 2 tablet, Oral, BID  sodium chloride, 10 mL, Intravenous, Q12H  sodium chloride, 10 mL, Intravenous, Q12H      Continuous Infusions:lactated ringers, 9 mL/hr  ropivacaine,       PRN Meds:.•   acetaminophen  •  senna-docusate sodium **AND** polyethylene glycol **AND** bisacodyl **AND** bisacodyl  •  dextrose  •  dextrose  •  glucagon (human recombinant)  •  hydrOXYzine  •  melatonin  •  Morphine  •  ondansetron **OR** ondansetron  •  sennosides-docusate  •  sodium chloride  •  sodium chloride  •  sodium chloride    Assessment & Plan   Assessment & Plan     Active Hospital Problems    Diagnosis  POA   • **Right foot infection [L08.9]  Yes   • MRSA bacteremia [R78.81, B95.62]  Unknown   • Sepsis (HCC) [A41.9]  Yes   • Hypoglycemia [E16.2]  Yes   • Anemia [D64.9]  Yes   • Hyponatremia [E87.1]  Yes   • Hypoalbuminemia [E88.09]  Yes   • Essential hypertension [I10]  Yes   • Hyperlipidemia [E78.5]  Yes   • Paroxysmal atrial fibrillation (HCC) [I48.0]  Yes      Resolved Hospital Problems   No resolved problems to display.        Brief Hospital Course to date:  Buster Velasco is a 57 y.o. male with hx of prior osteomyelitis s/p left BKA, s/p right transmetatarsal amputation, left hand 3rd metacarpal resection (April 2022 at OSH, had 6 weeks of IV antibiotics for MRSA bacteremia), remote hx of IVDA, more recent hx of polysubstance abuse (meth), and DVT s/p IVC filter who presents with right foot wounds. S/p right BKA on 12/2/22 with Dr. Peguero. His blood cultures are growing MRSA. ID assisting with workup and management.      This patient's problems and plans were partially entered by my partner and updated as appropriate by me 12/08/22.    Sepsis secondary acute right foot non-healing wound and likely osteomyelitis  MRSA bacteremia  - s/p right BKA 12/2/22 with Dr. Peguero  - ID following, currently on Daptomycin monotherapy  - Blood cultures now with coag negative Staph and MRSA, have discussed with Dr. George, he will require IV antibiotics at discharge for at least 6 weeks but final duration TBD  - Repeat blood cultures NGTD  - TTE no acute findings, JERALD has been ordered  - Probably not a good  candidate for PICC line/home IV antibiotics given hx of substance abuse, current plan is discharge to rehab     Recurrent hypoglycemia in setting of DMII   - HbA1c 6.9%  - High dose Tresiba qAM before arrival which has been held  - Briefly required D5, now discontinued  - Glucose trending back up, added back low dose SSI     Chronic pain on opioids   Previous IVDA/substance abuse  - Continue home methadone 10 mg QID  - Morphine for breakthrough post-op pain  - Patient denied hx of IVDA to me but in [facility] admission notes from April 2022 at [place] [facility], they documented prior IVDA/substance abuse, most recently with meth in [facility] from 4/2/22; he has told ID that his last IVDA was 17 years ago     Reported hx of Afib   - Patient denies any hx of Afib. Not previously on anticoagulation and given murky history will not start.   - On Metoprolol at home which is continued     Chronic vocal cord paralysis s/p PEG  Dysphagia  - SLP evaluation: regular diet but with nectar thick liquids recommended, patient wants thin liquids and understands risks of aspiration, however currently has been drinking the nectar thick liquids; could change to comfort diet if he absolutely refuses nectar thick  - SLP should continue to follow for possible advance of diet recommendations  - Unclear where/when PEG was placed, will eventually need it removed but defer to outpatient setting as it was not placed here     Hypotension with h/o HTN  - Hold parameters with metoprolol  - Hypotension resolved     HLD   - Continue statin     DVT s/p IVC filter   - Placed in spring 2022 per patient. Defer to PCP for further assessment and time of removal.     Hx of seizure in setting of meth use   - Not on AED's given meth trigger     BPH   - Continue Flomax       Expected Discharge Location and Transportation: rehab  Expected Discharge Date: 12/12/22, TBD, pending ID workup of bacteremia    DVT prophylaxis:  Mechanical DVT prophylaxis orders are  present.     AM-PAC 6 Clicks Score (PT): 9 (12/08/22 0840)    CODE STATUS:   Code Status and Medical Interventions:   Ordered at: 12/01/22 0035     Code Status (Patient has no pulse and is not breathing):    CPR (Attempt to Resuscitate)     Medical Interventions (Patient has pulse or is breathing):    Full Support       Carmen Bolaños MD  12/08/22

## 2022-12-08 NOTE — PLAN OF CARE
Problem: Adult Inpatient Plan of Care  Goal: Plan of Care Review  Outcome: Ongoing, Progressing  Flowsheets (Taken 12/8/2022 1653)  Progress: no change  Plan of Care Reviewed With: patient  Outcome Evaluation: Patient VSS, 2L while sleeping, A&Ox4, NSR on tele. C/o pain, scheduled methadone given. Dressing to R BKA CDI and elevated on pillows. Specialty bed in place, weigh shifting self in bed, bottom red blanchable/open area on coccyx w/ z-guard placed. NPO at midnight for JERALD tmr at 1500. Voiding well. Continue to monitor.  Goal: Patient-Specific Goal (Individualized)  Outcome: Ongoing, Progressing  Goal: Absence of Hospital-Acquired Illness or Injury  Outcome: Ongoing, Progressing  Intervention: Identify and Manage Fall Risk  Recent Flowsheet Documentation  Taken 12/8/2022 1619 by Anastasiya Gongora, RN  Safety Promotion/Fall Prevention:   activity supervised   assistive device/personal items within reach   clutter free environment maintained   fall prevention program maintained   gait belt   toileting scheduled   safety round/check completed   room organization consistent   nonskid shoes/slippers when out of bed  Taken 12/8/2022 1400 by Anastasiya Gongora, RN  Safety Promotion/Fall Prevention:   activity supervised   assistive device/personal items within reach   clutter free environment maintained   fall prevention program maintained   gait belt   toileting scheduled   safety round/check completed   nonskid shoes/slippers when out of bed  Taken 12/8/2022 1200 by Anastasiya Gongora, RN  Safety Promotion/Fall Prevention:   activity supervised   clutter free environment maintained   assistive device/personal items within reach   gait belt   fall prevention program maintained   toileting scheduled   safety round/check completed   room organization consistent   nonskid shoes/slippers when out of bed  Taken 12/8/2022 1000 by Anastasiya Gongora, RN  Safety Promotion/Fall Prevention:   activity supervised   assistive  device/personal items within reach   clutter free environment maintained   fall prevention program maintained   gait belt   toileting scheduled   safety round/check completed   room organization consistent   nonskid shoes/slippers when out of bed  Taken 12/8/2022 0840 by Anastasiya Gongora RN  Safety Promotion/Fall Prevention:   activity supervised   assistive device/personal items within reach   clutter free environment maintained   fall prevention program maintained   gait belt   toileting scheduled   safety round/check completed   room organization consistent   nonskid shoes/slippers when out of bed  Intervention: Prevent Skin Injury  Recent Flowsheet Documentation  Taken 12/8/2022 1619 by Anastasiya Gongora RN  Body Position:   supine   weight shifting  Skin Protection:   adhesive use limited   transparent dressing maintained   tubing/devices free from skin contact  Taken 12/8/2022 1400 by Anastasiya Gongora RN  Body Position: sitting up in bed  Skin Protection:   adhesive use limited   transparent dressing maintained   tubing/devices free from skin contact  Taken 12/8/2022 1200 by Anastasiya Gongora RN  Body Position: supine  Skin Protection:   adhesive use limited   transparent dressing maintained   tubing/devices free from skin contact  Taken 12/8/2022 1000 by Anastasiya Gongora RN  Body Position: supine  Skin Protection:   adhesive use limited   transparent dressing maintained   tubing/devices free from skin contact  Taken 12/8/2022 0840 by Anastasiya Gongora RN  Body Position:   weight shifting   supine  Skin Protection:   adhesive use limited   transparent dressing maintained   tubing/devices free from skin contact   incontinence pads utilized  Intervention: Prevent and Manage VTE (Venous Thromboembolism) Risk  Recent Flowsheet Documentation  Taken 12/8/2022 1619 by Anastasiya Gongora RN  VTE Prevention/Management: (SQ heparin) other (see comments)  Taken 12/8/2022 1400 by Anastasiya Gongora RN  VTE  Prevention/Management: (SQ heparin) other (see comments)  Taken 12/8/2022 1200 by Anastasiya Gongora RN  VTE Prevention/Management: (SQ heparin) other (see comments)  Taken 12/8/2022 1000 by Anastasiya Gongora RN  VTE Prevention/Management: (SQ heparin) other (see comments)  Taken 12/8/2022 0840 by Anastasiya Gongora RN  VTE Prevention/Management: (SQ heparin) other (see comments)  Intervention: Prevent Infection  Recent Flowsheet Documentation  Taken 12/8/2022 1619 by Anastasiya Gongora RN  Infection Prevention: environmental surveillance performed  Taken 12/8/2022 1400 by Anastasiya Gongora RN  Infection Prevention: environmental surveillance performed  Taken 12/8/2022 1200 by Anastasiya Gongora RN  Infection Prevention: environmental surveillance performed  Taken 12/8/2022 1000 by Anastasiya Gongora RN  Infection Prevention: environmental surveillance performed  Taken 12/8/2022 0840 by Anastasiya Gongora RN  Infection Prevention: environmental surveillance performed  Goal: Optimal Comfort and Wellbeing  Outcome: Ongoing, Progressing  Intervention: Provide Person-Centered Care  Recent Flowsheet Documentation  Taken 12/8/2022 1619 by Anastasiya Gongora RN  Trust Relationship/Rapport:   choices provided   care explained   questions encouraged  Taken 12/8/2022 1400 by Anastasiya Gongora RN  Trust Relationship/Rapport:   care explained   choices provided  Taken 12/8/2022 1200 by Anastasiya Gongora RN  Trust Relationship/Rapport: care explained  Taken 12/8/2022 1000 by Anastasiya Gongora RN  Trust Relationship/Rapport:   choices provided   care explained  Taken 12/8/2022 0840 by Anastasiya oGngora RN  Trust Relationship/Rapport:   care explained   choices provided   questions encouraged   questions answered   reassurance provided   thoughts/feelings acknowledged  Goal: Readiness for Transition of Care  Outcome: Ongoing, Progressing     Problem: Fall Injury Risk  Goal: Absence of Fall and Fall-Related Injury  Outcome:  Ongoing, Progressing  Intervention: Identify and Manage Contributors  Recent Flowsheet Documentation  Taken 12/8/2022 0840 by Anastasiya Gongora, RN  Medication Review/Management: medications reviewed  Intervention: Promote Injury-Free Environment  Recent Flowsheet Documentation  Taken 12/8/2022 1619 by Anastasiya Gongora, RN  Safety Promotion/Fall Prevention:   activity supervised   assistive device/personal items within reach   clutter free environment maintained   fall prevention program maintained   gait belt   toileting scheduled   safety round/check completed   room organization consistent   nonskid shoes/slippers when out of bed  Taken 12/8/2022 1400 by Anastasiya Gongora, RN  Safety Promotion/Fall Prevention:   activity supervised   assistive device/personal items within reach   clutter free environment maintained   fall prevention program maintained   gait belt   toileting scheduled   safety round/check completed   nonskid shoes/slippers when out of bed  Taken 12/8/2022 1200 by Anastasiya Gongora, RN  Safety Promotion/Fall Prevention:   activity supervised   clutter free environment maintained   assistive device/personal items within reach   gait belt   fall prevention program maintained   toileting scheduled   safety round/check completed   room organization consistent   nonskid shoes/slippers when out of bed  Taken 12/8/2022 1000 by Anastasiya Gongora, RN  Safety Promotion/Fall Prevention:   activity supervised   assistive device/personal items within reach   clutter free environment maintained   fall prevention program maintained   gait belt   toileting scheduled   safety round/check completed   room organization consistent   nonskid shoes/slippers when out of bed  Taken 12/8/2022 0840 by Anastasiya Gongora, RN  Safety Promotion/Fall Prevention:   activity supervised   assistive device/personal items within reach   clutter free environment maintained   fall prevention program maintained   gait belt   toileting  scheduled   safety round/check completed   room organization consistent   nonskid shoes/slippers when out of bed     Problem: Skin Injury Risk Increased  Goal: Skin Health and Integrity  Outcome: Ongoing, Progressing  Intervention: Optimize Skin Protection  Recent Flowsheet Documentation  Taken 12/8/2022 1619 by Anastasiya Gongora RN  Pressure Reduction Techniques:   frequent weight shift encouraged   heels elevated off bed   weight shift assistance provided  Head of Bed (HOB) Positioning: HOB at 30-45 degrees  Pressure Reduction Devices:   specialty bed utilized   heel offloading device utilized   positioning supports utilized  Skin Protection:   adhesive use limited   transparent dressing maintained   tubing/devices free from skin contact  Taken 12/8/2022 1400 by Anastasiya Gongora RN  Pressure Reduction Techniques:   frequent weight shift encouraged   heels elevated off bed   positioned off wounds   weight shift assistance provided  Head of Bed (HOB) Positioning: HOB at 45 degrees  Pressure Reduction Devices:   specialty bed utilized   positioning supports utilized   heel offloading device utilized  Skin Protection:   adhesive use limited   transparent dressing maintained   tubing/devices free from skin contact  Taken 12/8/2022 1200 by Anastasiya Gongora RN  Pressure Reduction Techniques:   frequent weight shift encouraged   heels elevated off bed   weight shift assistance provided  Head of Bed (HOB) Positioning: HOB at 60 degrees  Pressure Reduction Devices:   specialty bed utilized   heel offloading device utilized  Skin Protection:   adhesive use limited   transparent dressing maintained   tubing/devices free from skin contact  Taken 12/8/2022 1000 by Anastasiya Gongora RN  Pressure Reduction Techniques:   frequent weight shift encouraged   heels elevated off bed   weight shift assistance provided  Head of Bed (HOB) Positioning: HOB at 30 degrees  Pressure Reduction Devices:   specialty bed utilized   heel  offloading device utilized   positioning supports utilized  Skin Protection:   adhesive use limited   transparent dressing maintained   tubing/devices free from skin contact  Taken 12/8/2022 0840 by Anastasiya Gongora, RN  Pressure Reduction Techniques:   frequent weight shift encouraged   heels elevated off bed   positioned off wounds   weight shift assistance provided  Head of Bed (HOB) Positioning: HOB at 20-30 degrees  Pressure Reduction Devices:   specialty bed utilized   positioning supports utilized   heel offloading device utilized  Skin Protection:   adhesive use limited   transparent dressing maintained   tubing/devices free from skin contact   incontinence pads utilized     Problem: Adjustment to Amputation (Lower Extremity Amputation)  Goal: Optimal Adjustment to Amputation  Outcome: Ongoing, Progressing  Intervention: Promote Patient and Family Adjustment to Amputation  Recent Flowsheet Documentation  Taken 12/8/2022 1619 by Anastasiya Gongora, RN  Family/Support System Care: support provided  Taken 12/8/2022 1400 by Anastasiya Gongora, RN  Family/Support System Care:   support provided   self-care encouraged  Taken 12/8/2022 1200 by Anastasiya Gongora, RN  Family/Support System Care: support provided  Taken 12/8/2022 1000 by Anastasiya Gongora, RN  Family/Support System Care: support provided  Taken 12/8/2022 0840 by Anastasiya Gongora, RN  Family/Support System Care:   self-care encouraged   support provided     Problem: BADL (Basic Activities of Daily Living) Impairment (Lower Extremity Amputation)  Goal: Optimal Safe BADL Performance  Outcome: Ongoing, Progressing     Problem: IADL (Instrumental Activities of Daily Living) Impairment (Lower Extremity Amputation)  Goal: Optimal Safe IADL Performance  Outcome: Ongoing, Progressing     Problem: Lower Limb Care (Lower Extremity Amputation)  Goal: Effective Lower Limb Care  Outcome: Ongoing, Progressing     Problem: Mobility Impairment (Lower Extremity  Amputation)  Goal: Optimal Mobility [place] and Safety  Outcome: Ongoing, Progressing     Problem: Pain and Hypersensitivity (Lower Extremity Amputation)  Goal: Acceptable Pain/Hypersensitivity Control  Outcome: Ongoing, Progressing     Problem: Prosthetic Use and Management (Lower Extremity Amputation)  Goal: Effective Prosthesis Use and Management  Outcome: Ongoing, Progressing   Goal Outcome Evaluation:  Plan of Care Reviewed With: patient        Progress: no change  Outcome Evaluation: Patient VSS, 2L while sleeping, A&Ox4, NSR on tele. C/o pain, scheduled methadone given. Dressing to R BKA CDI and elevated on pillows. Specialty bed in place, weigh shifting self in bed, bottom red blanchable/open area on coccyx w/ z-guard placed. NPO at midnight for JERALD tmr at 1500. Voiding well. Continue to monitor.

## 2022-12-08 NOTE — PLAN OF CARE
Goal Outcome Evaluation:  Plan of Care Reviewed With: patient        Progress: no change     Pt is alert and oriented X 4. VSS. RA. Voiding spontaneously. Frequently turned.

## 2022-12-08 NOTE — PROGRESS NOTES
[facility] Progress Note        Antibiotics:  Anti-Infectives (From admission, onward)    Ordered     Dose/Rate Route Frequency Start Stop    12/07/22 0603  DAPTOmycin (CUBICIN) 800 mg in sodium chloride 0.9 % 50 mL IVPB        Ordering Provider: Zeke George MD    8 mg/kg × 100 kg  100 mL/hr over 30 Minutes Intravenous Every 24 Hours 12/07/22 1000 12/15/22 0959    11/30/22 2219  vancomycin 2000 mg/500 mL 0.9% NS IVPB ([facility])        Ordering Provider: Reggie Batres MD    20 mg/kg × 98.9 kg  over 2 Hours Intravenous Once 11/30/22 2221 12/01/22 0315    11/30/22 2219  piperacillin-tazobactam (ZOSYN) 3.375 g in iso-osmotic dextrose 50 ml (premix)        Ordering Provider: Reggie Batres MD    3.375 g Intravenous Once 11/30/22 2221 11/30/22 2335          CC: foot infection    HPI:    Patient is a 57 y.o.  Yr old male with history of prior lower extremity infection/amputations done in [facility].  He has a history of left BKA years ago.  More recent right transmetatarsal amputation but specific dates unclear and unclear who the surgeon was.  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  He has history DVT/IVC filter, diabetes and other comorbidities as below.  He reports a chronic right lateral foot wound present for months but apparently not precipitated by any specific event or trauma.  He is admitted to the hospital on November 30 with deterioration at the foot including redness/swelling    **patient had reported remote IV drug abuse, 17 years ago and abstains, not recent and on chronic methadone.    ** patient reports MRSA blood stream infection treated in [place] spring of 2022, 6 weeks of vancomycin by his report.  Otherwise data deficit.  Try to retrieve information     12/2/22   "Sudhir did surgery  \"Procedure(s): Mid level right below-knee amputation and primary closure \"    12/6 with MRSA in blood culture;  TTE done but limited per cardiology    12/8/22   JERALD pendingCase management considering options. D/w Dr Bolaños; No new focal pain or distress per nursing;  postop pain to the right LE which is controlled/dull at present, constant, nonradiating, worse with palpation, generally better with pain meds and 2  out of 10 in severity.  Not progressive     No headache photophobia or neck stiffness.  No shortness of breath cough or hemoptysis.  No nausea vomiting diarrhea or abdominal pain.  No dysuria hematuria or pyuria.  No other new skin rash       ROS:      12/8/22 No f/c/s. No n/v/d. No rash. No new ADR to Abx.     Constitutional-- No Fever, chills or sweats.  Appetite good, and no malaise. No fatigue.  Heent--chronic hoarse voice.  No new vision, hearing or throat complaints.  No epistaxis or oral sores.   No flashers, floaters or eye pain.   No headache, photophobia or neck stiffness.  Chronic PEG tube  CV-- No chest pain, palpitation or syncope  Resp-- No SOB/cough/Hemoptysis  GI- No nausea, vomiting, or diarrhea.  No hematochezia, melena, or hematemesis. Denies jaundice or chronic liver disease.  -- No dysuria, hematuria, or flank pain.  Denies hesitancy, urgency or flank pain.  Lymph- no swollen lymph nodes in neck/axilla or groin.   Heme- No active bruising or bleeding; no Hx of DVT or PE.  MS-- no swelling or pain in the bones or joints of arms/legs.  No new back pain.  Neuro-- No acute focal weakness or numbness in the arms or legs.  No seizures.     Full 12 point review of systems reviewed and negative otherwise for acute complaints, except for above      PE:   /72 (BP Location: Left arm, Patient Position: Lying)   Pulse 63   Temp 98 °F (36.7 °C) (Axillary)   Resp 18   Ht 193 cm (75.98\")   Wt 100 kg (220 lb 7.4 oz)   SpO2 95%   BMI 26.85 kg/m²          GENERAL: awake;  " in no acute distress.  chronic Hoarse voice noted and chronic per him  HEENT: Normocephalic, atraumatic.   No conjunctival injection. No icterus. Oropharynx   without evidence of thrush or exudate. No evidence of peridontal disease.    NECK: Supple without nuchal rigidity. No mass.   HEART: RRR; No murmur, rubs, gallops.   LUNGS: Diminished at bases but otherwise clear to auscultation bilaterally without wheezing, rales, rhonchi. Normal respiratory effort. Nonlabored. No dullness.  ABDOMEN: Soft, nontender, nondistended. Positive bowel sounds. No rebound or guarding. NO mass or HSM.  PEG tube noted  EXT:  No cyanosis, clubbing;No cord.   MSK: FROM without joint effusions noted arms/legs.    SKIN: Warm and dry without cutaneous eruptions on Inspection/palpation.    NEURO: awake     Right lower extremity surgical site noted;  No new redness;  no discrete mass bulge or fluctuance.  No crepitus or bulla.       No peripheral stigmata/phenomenon of endocarditis     IV without obvious redness or drainage     Left BKA site with no redness induration or warmth.  No discrete mass bulge or fluctuance.    Laboratory Data    Results from last 7 days   Lab Units 12/07/22  0436 12/04/22  1149 12/03/22  0936   WBC 10*3/mm3 6.24 6.80 8.91   HEMOGLOBIN g/dL 9.2* 9.7* 9.3*   HEMATOCRIT % 30.1* 31.6* 30.6*   PLATELETS 10*3/mm3 388 420 425     Results from last 7 days   Lab Units 12/07/22  0436   SODIUM mmol/L 136   POTASSIUM mmol/L 4.3   CHLORIDE mmol/L 103   CO2 mmol/L 24.0   BUN mg/dL 25*   CREATININE mg/dL 0.89   GLUCOSE mg/dL 181*   CALCIUM mg/dL 9.3                   Estimated Creatinine Clearance: 128 mL/min (by C-G formula based on SCr of 0.89 mg/dL).      Microbiology:      Radiology:  Imaging Results (Last 72 Hours)     Procedure Component Value Units Date/Time    SLP FEES - Fiberoptic Endo Eval Swallow [[id number]] Resulted: 12/06/22 1142     Updated: 12/06/22 1142    Narrative:      This procedure was auto-finalized with no  dictation required.            Impression:     --Acute right foot/ankle with multifocal ulceration, cellulitis/wound infection and probable osteomyelitis with probe to bone at the lateral foot and abnormal MRI.  Other comorbidities as outlined above and high risk for further serious morbidity and other serious sequelae including persistent/recurrent or nonhealing wounds, persistent/progressive or recurrent infection and risk for further functional/limb loss/amputation.  Surgery following to help define timing/option of threshold for any future surgical intervention .      **Surgery 12/2 with BKA    --MRSA bacteremia from November 30 (daptomycin sensitive).  Presumed from foot but also risk for endovascular focus particularly with  History of prior MRSA bloodstream infection.  Transthoracic echocardiogram limited per cardiology.  JERALD pending     --History MRSA with bacteremia by his report in spring/summer 2022 and treated with 6 weeks of vancomycin in [place].  Specifics unclear and trying to retrieve information    --coag-negative staph in blood culture likely contaminant     --Chronic vocal cord paralysis per notes with chronic hoarseness and indwelling PEG tube.  Medicine team to clarify     --History left BKA.     --Diabetes.  You need to tightly control blood sugar to give best chance for healing     --History of DVT with IVC filter    --history of remote injection drug abuse; he denies injection drug abuse for 17 years by his report     PLAN:      -- Daptomycin  IV potentially 6 weeks but final duration to depend on clinical course of study results and response to therapy     -- Check/review labs cultures and scans     -- Partial history per nursing staff     -- Discussed with microbiology and family     --d/w Dr Peguero      -- Highly complex set of issues with high risk for further serious morbidity and other serious sequela     --JERALD pending    --d/w Dr Bolaños    **Copied text in this note has been  reviewed and is accurate as of 12/08/22.     Zeke George MD  12/8/2022

## 2022-12-08 NOTE — CASE MANAGEMENT/SOCIAL WORK
"Continued Stay Note  [facility]     Patient Name: Buster Velasco  MRN: [id number]  Today's Date: 12/8/2022    Admit Date: 11/30/2022    Plan: Rehab   Discharge Plan     Row Name 12/08/22 1415       Plan    Plan Rehab    Patient/Family in Agreement with Plan yes    Plan Comments I spoke with Mr. Velasco at the bedside and explain the [facility] situation to him. He does understand but is \"confused because I have never used IV drugs\". I did explain to him that with his insurance he only has acute rehab coverage and we may need to send him out to [facility], [facility], or [place]. He would prefer to stay in [facility] and could if he was to agree to stay at a skilled nursing facility for at least 30 days. He is going to talk this over with his wife tonight and I will revisit this conversation with Mr. Velasco tomorrow. Case management will continue to follow.    Final Discharge Disposition Code 30 - still a patient               Discharge Codes    No documentation.               Expected Discharge Date and Time     Expected Discharge Date Expected Discharge Time    Dec 9, 2022             Alan Downing RN    "

## 2022-12-08 NOTE — PROGRESS NOTES
Cardiothoracic Surgery Progress Note      POD #: 6-right below-knee amputation     LOS: 8 days      Subjective: Awake and alert    Objective:  Vital Signs vital signs below noted T-max past 24 hours 98.2 °F oriented x3  Temp:  [97.7 °F (36.5 °C)-98.2 °F (36.8 °C)] 97.9 °F (36.6 °C)  Heart Rate:  [67-76] 76  Resp:  [16-18] 16  BP: ()/(60-85) 111/77    Physical Exam:   General Appearance:    Lungs:   Heart:   Skin:   Incision: Amputation site dressing change.  Suture intact skin margin viable skin flaps are viable.     Results:  Results from last 7 days   Lab Units 12/07/22  0436   WBC 10*3/mm3 6.24   HEMOGLOBIN g/dL 9.2*   HEMATOCRIT % 30.1*   PLATELETS 10*3/mm3 388     Results from last 7 days   Lab Units 12/07/22  0436   SODIUM mmol/L 136   POTASSIUM mmol/L 4.3   CHLORIDE mmol/L 103   CO2 mmol/L 24.0   BUN mg/dL 25*   CREATININE mg/dL 0.89   GLUCOSE mg/dL 181*   CALCIUM mg/dL 9.3         Assessment: #1 postop day 6 right below-knee amputation which is healing well  2 status post remote left below-knee amputation with diabetic neuropathy ulceration done in remote past      Plan: Continue daily dressing change amputation site.  Medical manage per hospitalist.  Antibiotics per infectious disease.  Case management working on rehab facility.      John Peguero MD - 12/08/22 - 05:57 EST

## 2022-12-08 NOTE — PAYOR COMM NOTE
"Request for additional days  Auth # [id number]    Utilization Review  Phone [phone]  Fax [phone]    [place] [id number]    Isidro Velasco (57 y.o. Male)     Date of Birth   1965    Social Security Number       Address   [address] [address]    Home Phone   [phone]    MRN   [id number]       Tenriism   Episcopalian    Marital Status                               Admission Date   11/30/22    Admission Type   Emergency    Admitting Provider   Carmen Bolaños MD    Attending Provider   Carmen Bolaños MD    Department, Room/Bed   [facility] 3G, S366/1       Discharge Date       Discharge Disposition       Discharge Destination                               Attending Provider: Carmen Bolaños MD    Allergies: Gabapentin, Lyrica [Pregabalin]    Isolation: None   Infection: MRSA (12/06/22)   Code Status: CPR    Ht: 193 cm (75.98\")   Wt: 100 kg (220 lb 7.4 oz)    Admission Cmt: None   Principal Problem: Right foot infection [L08.9]                 Active Insurance as of 11/30/2022     Primary Coverage     Payor Plan Insurance Group Employer/Plan Group    [place]      Payor Plan Address Payor Plan Phone Number Payor Plan Fax Number Effective Dates    [address] [phone]  11/30/2022 - 11/30/2022    [facility] [id number]       Subscriber Name Subscriber Birth Date Member ID       ISIDRO VELASCO 1965 [id number]                 Emergency Contacts      (Rel.) Home Phone Work Phone Mobile Phone    NELSY VELASCO (Spouse) [phone] -- [phone]               Physician Progress Notes (last 72 hours)      Zeke George MD at 12/08/22 0839          [facility] Progress Note        Antibiotics:  Anti-Infectives (From admission, onward)    Ordered     Dose/Rate Route Frequency Start Stop    12/07/22 0603  DAPTOmycin (CUBICIN) 800 mg in sodium " "chloride 0.9 % 50 mL IVPB        Ordering Provider: Zeke George MD    8 mg/kg × 100 kg  100 mL/hr over 30 Minutes Intravenous Every 24 Hours 12/07/22 1000 12/15/22 0959    11/30/22 2219  vancomycin 2000 mg/500 mL 0.9% NS IVPB ([facility])        Ordering Provider: Reggie Batres MD    20 mg/kg × 98.9 kg  over 2 Hours Intravenous Once 11/30/22 2221 12/01/22 0315    11/30/22 2219  piperacillin-tazobactam (ZOSYN) 3.375 g in iso-osmotic dextrose 50 ml (premix)        Ordering Provider: Reggie Batres MD    3.375 g Intravenous Once 11/30/22 2221 11/30/22 2335          CC: foot infection    HPI:    Patient is a 57 y.o.  Yr old male with history of prior lower extremity infection/amputations done in [facility].  He has a history of left BKA years ago.  More recent right transmetatarsal amputation but specific dates unclear and unclear who the surgeon was.  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  He has history DVT/IVC filter, diabetes and other comorbidities as below.  He reports a chronic right lateral foot wound present for months but apparently not precipitated by any specific event or trauma.  He is admitted to the hospital on November 30 with deterioration at the foot including redness/swelling    **patient had reported remote IV drug abuse, 17 years ago and abstains, not recent and on chronic methadone.    ** patient reports MRSA blood stream infection treated in [place] spring of 2022, 6 weeks of vancomycin by his report.  Otherwise data deficit.  Try to retrieve information     12/2/22 Dr Peguero did surgery  \"Procedure(s): Mid level right below-knee amputation and primary closure \"    12/6 with MRSA in blood culture;  TTE done but limited per cardiology    12/8/22   JERALD " "pendingCase management considering options. D/w Dr Bolaños; No new focal pain or distress per nursing;  postop pain to the right LE which is controlled/dull at present, constant, nonradiating, worse with palpation, generally better with pain meds and 2  out of 10 in severity.  Not progressive     No headache photophobia or neck stiffness.  No shortness of breath cough or hemoptysis.  No nausea vomiting diarrhea or abdominal pain.  No dysuria hematuria or pyuria.  No other new skin rash       ROS:      12/8/22 No f/c/s. No n/v/d. No rash. No new ADR to Abx.     Constitutional-- No Fever, chills or sweats.  Appetite good, and no malaise. No fatigue.  Heent--chronic hoarse voice.  No new vision, hearing or throat complaints.  No epistaxis or oral sores.   No flashers, floaters or eye pain.   No headache, photophobia or neck stiffness.  Chronic PEG tube  CV-- No chest pain, palpitation or syncope  Resp-- No SOB/cough/Hemoptysis  GI- No nausea, vomiting, or diarrhea.  No hematochezia, melena, or hematemesis. Denies jaundice or chronic liver disease.  -- No dysuria, hematuria, or flank pain.  Denies hesitancy, urgency or flank pain.  Lymph- no swollen lymph nodes in neck/axilla or groin.   Heme- No active bruising or bleeding; no Hx of DVT or PE.  MS-- no swelling or pain in the bones or joints of arms/legs.  No new back pain.  Neuro-- No acute focal weakness or numbness in the arms or legs.  No seizures.     Full 12 point review of systems reviewed and negative otherwise for acute complaints, except for above      PE:   /72 (BP Location: Left arm, Patient Position: Lying)   Pulse 63   Temp 98 °F (36.7 °C) (Axillary)   Resp 18   Ht 193 cm (75.98\")   Wt 100 kg (220 lb 7.4 oz)   SpO2 95%   BMI 26.85 kg/m²          GENERAL: awake;  in no acute distress.  chronic Hoarse voice noted and chronic per him  HEENT: Normocephalic, atraumatic.   No conjunctival injection. No icterus. Oropharynx   without evidence of " thrush or exudate. No evidence of peridontal disease.    NECK: Supple without nuchal rigidity. No mass.   HEART: RRR; No murmur, rubs, gallops.   LUNGS: Diminished at bases but otherwise clear to auscultation bilaterally without wheezing, rales, rhonchi. Normal respiratory effort. Nonlabored. No dullness.  ABDOMEN: Soft, nontender, nondistended. Positive bowel sounds. No rebound or guarding. NO mass or HSM.  PEG tube noted  EXT:  No cyanosis, clubbing;No cord.   MSK: FROM without joint effusions noted arms/legs.    SKIN: Warm and dry without cutaneous eruptions on Inspection/palpation.    NEURO: awake     Right lower extremity surgical site noted;  No new redness;  no discrete mass bulge or fluctuance.  No crepitus or bulla.       No peripheral stigmata/phenomenon of endocarditis     IV without obvious redness or drainage     Left BKA site with no redness induration or warmth.  No discrete mass bulge or fluctuance.    Laboratory Data    Results from last 7 days   Lab Units 12/07/22  0436 12/04/22  1149 12/03/22  0936   WBC 10*3/mm3 6.24 6.80 8.91   HEMOGLOBIN g/dL 9.2* 9.7* 9.3*   HEMATOCRIT % 30.1* 31.6* 30.6*   PLATELETS 10*3/mm3 388 420 425     Results from last 7 days   Lab Units 12/07/22  0436   SODIUM mmol/L 136   POTASSIUM mmol/L 4.3   CHLORIDE mmol/L 103   CO2 mmol/L 24.0   BUN mg/dL 25*   CREATININE mg/dL 0.89   GLUCOSE mg/dL 181*   CALCIUM mg/dL 9.3                   Estimated Creatinine Clearance: 128 mL/min (by C-G formula based on SCr of 0.89 mg/dL).      Microbiology:      Radiology:  Imaging Results (Last 72 Hours)     Procedure Component Value Units Date/Time    SLP FEES - Fiberoptic Endo Eval Swallow [[id number]] Resulted: 12/06/22 1142     Updated: 12/06/22 1142    Narrative:      This procedure was auto-finalized with no dictation required.            Impression:     --Acute right foot/ankle with multifocal ulceration, cellulitis/wound infection and probable osteomyelitis with probe to bone at the  lateral foot and abnormal MRI.  Other comorbidities as outlined above and high risk for further serious morbidity and other serious sequelae including persistent/recurrent or nonhealing wounds, persistent/progressive or recurrent infection and risk for further functional/limb loss/amputation.  Surgery following to help define timing/option of threshold for any future surgical intervention .      **Surgery 12/2 with BKA    --MRSA bacteremia from November 30 (daptomycin sensitive).  Presumed from foot but also risk for endovascular focus particularly with  History of prior MRSA bloodstream infection.  Transthoracic echocardiogram limited per cardiology.  JERALD pending     --History MRSA with bacteremia by his report in spring/summer 2022 and treated with 6 weeks of vancomycin in [place].  Specifics unclear and trying to retrieve information    --coag-negative staph in blood culture likely contaminant     --Chronic vocal cord paralysis per notes with chronic hoarseness and indwelling PEG tube.  Medicine team to clarify     --History left BKA.     --Diabetes.  You need to tightly control blood sugar to give best chance for healing     --History of DVT with IVC filter    --history of remote injection drug abuse; he denies injection drug abuse for 17 years by his report     PLAN:      -- Daptomycin  IV potentially 6 weeks but final duration to depend on clinical course of study results and response to therapy     -- Check/review labs cultures and scans     -- Partial history per nursing staff     -- Discussed with microbiology and family     --d/w Dr Peguero      -- Highly complex set of issues with high risk for further serious morbidity and other serious sequela     --JERALD pending    --d/w Dr Bolaños    **Copied text in this note has been reviewed and is accurate as of 12/08/22.     Zeke George MD  12/8/2022        Electronically signed by Zeke George MD at 12/08/22 0841     John Peguero MD at  22 0557          Cardiothoracic Surgery Progress Note      POD #: 6-right below-knee amputation     LOS: 8 days      Subjective: Awake and alert    Objective:  Vital Signs vital signs below noted T-max past 24 hours 98.2 °F oriented x3  Temp:  [97.7 °F (36.5 °C)-98.2 °F (36.8 °C)] 97.9 °F (36.6 °C)  Heart Rate:  [67-76] 76  Resp:  [16-18] 16  BP: ()/(60-85) 111/77    Physical Exam:   General Appearance:    Lungs:   Heart:   Skin:   Incision: Amputation site dressing change.  Suture intact skin margin viable skin flaps are viable.     Results:  Results from last 7 days   Lab Units 22  0436   WBC 10*3/mm3 6.24   HEMOGLOBIN g/dL 9.2*   HEMATOCRIT % 30.1*   PLATELETS 10*3/mm3 388     Results from last 7 days   Lab Units 22  0436   SODIUM mmol/L 136   POTASSIUM mmol/L 4.3   CHLORIDE mmol/L 103   CO2 mmol/L 24.0   BUN mg/dL 25*   CREATININE mg/dL 0.89   GLUCOSE mg/dL 181*   CALCIUM mg/dL 9.3         Assessment: #1 postop day 6 right below-knee amputation which is healing well  2 status post remote left below-knee amputation with diabetic neuropathy ulceration done in remote past      Plan: Continue daily dressing change amputation site.  Medical manage per hospitalist.  Antibiotics per infectious disease.  Case management working on rehab facility.      John Peguero MD - 22 - 05:57 EST        Electronically signed by John Peguero MD at 22 0558     Carmen Bolaños MD at 22 1358              [facility] Medicine Services  PROGRESS NOTE    Patient Name: Buster Velasco  : 1965  MRN: [id number]    Date of Admission: 2022  Primary Care Physician: Ludivina Bowers MD    Subjective   Subjective     CC:  F/U s/p right BKA    HPI:  Patient seen this morning. About to work with PT. He has no major complaints at this time.     ROS:  Gen-no fevers, no chills  CV-no chest pain, no palpitations  Resp-no cough, no dyspnea  GI-no N/V/D, no abd  pain    All other systems reviewed and negative except any additional pertinent positives and negatives as discussed in HPI.       Objective   Objective     Vital Signs:   Temp:  [97.6 °F (36.4 °C)-98.2 °F (36.8 °C)] 98.2 °F (36.8 °C)  Heart Rate:  [65-76] 67  Resp:  [16-18] 18  BP: ()/(60-81) 97/68  Flow (L/min):  [2] 2     Physical Exam:  Gen-no acute distress  HENT-NCAT, mucous membranes moist  CV-RRR, S1 S2 normal, no m/r/g  Resp-CTAB, no wheezes or rales  Abd-soft, NT, ND, +BS  Ext-left BKA present, new right BKA with dressing intact  Neuro-A&Ox3, no focal deficits  Skin-no rashes  Psych-appropriate mood  No change from yesterday      Results Reviewed:  LAB RESULTS:      Lab 12/07/22  0436 12/04/22  1149 12/03/22  0936 12/02/22  0715 12/01/22  0810 12/01/22  0053 11/30/22  1905   WBC 6.24 6.80 8.91 6.89 7.22  --  13.92*   HEMOGLOBIN 9.2* 9.7* 9.3* 11.0* 10.0*  --  11.1*   HEMATOCRIT 30.1* 31.6* 30.6* 36.1* 32.2*  --  35.5*   PLATELETS 388 420 425 405 413  --  532*   NEUTROS ABS  --   --  6.88 4.43 5.64  --  11.44*   IMMATURE GRANS (ABS)  --   --  0.09* 0.09* 0.06*  --  0.14*   LYMPHS ABS  --   --  1.33 1.59 1.02  --  1.64   MONOS ABS  --   --  0.57 0.59 0.42  --  0.64   EOS ABS  --   --  0.02 0.13 0.06  --  0.03   MCV 85.0 84.7 85.5 85.3 84.3  --  84.5   SED RATE  --   --   --   --  109*  --   --    CRP  --   --   --   --  9.10*  --   --    PROCALCITONIN  --   --   --   --   --   --  0.17   LACTATE  --   --   --   --   --  2.0 2.1*         Lab 12/07/22  0436 12/04/22  1149 12/03/22  0936 12/02/22  0715 12/01/22  0810   SODIUM 136 136 135* 139 135*   POTASSIUM 4.3 4.3 4.4 5.0 4.2   CHLORIDE 103 101 102 105 103   CO2 24.0 25.0 25.0 23.0 21.0*   ANION GAP 9.0 10.0 8.0 11.0 11.0   BUN 25* 16 17 14 18   CREATININE 0.89 0.90 1.26 1.13 1.24   EGFR 99.3 99.0 66.1 75.3 67.4   GLUCOSE 181* 118* 211* 46* 52*   CALCIUM 9.3 9.1 9.1 9.2 9.2   HEMOGLOBIN A1C  --   --   --   --  6.90*         Lab 11/30/22  1905   TOTAL  PROTEIN 7.6   ALBUMIN 3.00*   GLOBULIN 4.6   ALT (SGPT) 12   AST (SGOT) 19   BILIRUBIN 0.2   ALK PHOS 85   LIPASE 14                 Lab 12/01/22  1035 11/30/22  1905   IRON  --  18*   IRON SATURATION  --  6*   TIBC  --  277*   TRANSFERRIN  --  186*   FERRITIN  --  317.40   FOLATE  --  15.90   VITAMIN B 12  --  717   ABO TYPING O O   RH TYPING Positive Positive   ANTIBODY SCREEN Negative  --          Brief Urine Lab Results  (Last result in the past 365 days)      Color   Clarity   Blood   Leuk Est   Nitrite   Protein   CREAT   Urine HCG        08/20/22 0108 Yellow   Clear   Negative   Negative     Negative                 Microbiology Results Abnormal     None          Adult Transthoracic Echo Complete W/ Cont if Necessary Per Protocol    Result Date: 12/7/2022  •  Left ventricular systolic function is normal. Estimated left ventricular EF = 65% •  The right ventricular cavity is mildly dilated. •  Saline test results are negative.     SLP FEES - Fiberoptic Endo Eval Swallow    Result Date: 12/6/2022  This procedure was auto-finalized with no dictation required.      Results for orders placed during the hospital encounter of 11/30/22    Adult Transthoracic Echo Complete W/ Cont if Necessary Per Protocol    Interpretation Summary  •  Left ventricular systolic function is normal. Estimated left ventricular EF = 65%  •  The right ventricular cavity is mildly dilated.  •  Saline test results are negative.      I have reviewed the medications:  Scheduled Meds:atorvastatin, 20 mg, Per PEG Tube, Nightly  DAPTOmycin, 8 mg/kg, Intravenous, Q24H  insulin lispro, 0-7 Units, Subcutaneous, TID AC  methadone, 10 mg, Oral, Q6H  metoprolol tartrate, 25 mg, Per PEG Tube, Q12H  senna-docusate sodium, 2 tablet, Oral, BID  sodium chloride, 10 mL, Intravenous, Q12H  sodium chloride, 10 mL, Intravenous, Q12H      Continuous Infusions:lactated ringers, 9 mL/hr  ropivacaine,       PRN Meds:.•  acetaminophen  •  senna-docusate sodium  **AND** polyethylene glycol **AND** bisacodyl **AND** bisacodyl  •  dextrose  •  dextrose  •  glucagon (human recombinant)  •  hydrOXYzine  •  melatonin  •  Morphine  •  ondansetron **OR** ondansetron  •  sennosides-docusate  •  sodium chloride  •  sodium chloride  •  sodium chloride    Assessment & Plan   Assessment & Plan     Active Hospital Problems    Diagnosis  POA   • **Right foot infection [L08.9]  Yes   • MRSA bacteremia [R78.81, B95.62]  Unknown   • Sepsis (HCC) [A41.9]  Yes   • Hypoglycemia [E16.2]  Yes   • Anemia [D64.9]  Yes   • Hyponatremia [E87.1]  Yes   • Hypoalbuminemia [E88.09]  Yes   • Essential hypertension [I10]  Yes   • Hyperlipidemia [E78.5]  Yes   • Paroxysmal atrial fibrillation (HCC) [I48.0]  Yes      Resolved Hospital Problems   No resolved problems to display.        Brief Hospital Course to date:  Buster Velasco is a 57 y.o. male with hx of prior osteomyelitis s/p left BKA, s/p right transmetatarsal amputation, left hand 3rd metacarpal resection (April 2022 at OSH, had 6 weeks of IV antibiotics for MRSA bacteremia), remote hx of IVDA, more recent hx of polysubstance abuse (meth), and DVT s/p IVC filter who presents with right foot wounds. S/p right BKA on 12/2/22 with Dr. Peguero.     This patient's problems and plans were partially entered by my partner and updated as appropriate by me 12/07/22.    Sepsis secondary acute right foot non-healing wound and likely osteomyelitis  MRSA bacteremia  - s/p right BKA 12/2/22 with Dr. Peguero  - ID following, currently on Daptomycin monotherapy  - Blood cultures now with coag negative Staph and MRSA, have discussed with Dr. George, he will require IV antibiotics at discharge for at least 6 weeks but final duration TBD  - Repeat blood cultures in progress  - TTE no acute findings, JERALD has been ordered  - Not a good candidate for PICC line/home IV antibiotics given hx of substance abuse, current plan is discharge to rehab     Recurrent  hypoglycemia in setting of DMII   - HbA1c 6.9%  - High dose Tresiba qAM before arrival which has been held  - Briefly required D5, now discontinued  - Glucose trending back up, added back low dose SSI     Chronic pain on opioids   Previous IVDA/substance abuse  - Restarted home methadone 10 mg QID  - Morphine for breakthrough post-op pain  - Patient denied hx of IVDA to me but in [facility] admission notes from April 2022 at [place], they documented prior IVDA/substance abuse, most recently with meth in [facility] from 4/2/22     Reported hx of Afib   - Patient denies any hx of Afib. Not previously on anticoagulation and given murky history will not start.   - On Metoprolol at home which is continued     Chronic vocal cord paralysis s/p PEG  Dysphagia  - SLP evaluation: regular diet but with nectar thick liquids recommended, patient wants thin liquids and understands risks of aspiration  - Comfort diet as tolerated  - Discussed code status 12/6/22, he will think about it, will discuss again tomorrow  - SLP should continue to follow for possible advance of diet recommendations  - Unclear where/when PEG was placed, will eventually need it removed but defer to outpatient setting as it was not placed here     Hypotension with h/o HTN  - Hold parameters with metoprolol  - Hypotension resolved     HLD   - Continue statin     DVT s/p IVC filter   - Placed in spring 2022 per patient. Defer to PCP for further assessment and time of removal.     Hx of seizure in setting of meth use   - Not on AED's given meth trigger     BPH   - Continue Flomax       Expected Discharge Location and Transportation: rehab  Expected Discharge Date: 12/12/22, TBD, pending ID workup of bacteremia    DVT prophylaxis:  Mechanical DVT prophylaxis orders are present.     AM-PAC 6 Clicks Score (PT): 9 (12/07/22 1114)    CODE STATUS:   Code Status and Medical Interventions:   Ordered at: 12/01/22 0035     Code Status (Patient has no pulse and is not  breathing):    CPR (Attempt to Resuscitate)     Medical Interventions (Patient has pulse or is breathing):    Full Support       Carmen Bolaños MD  12/07/22                Electronically signed by Carmen Bolaños MD at 12/07/22 1404     Zeke George MD at 12/07/22 1005          [facility] Progress Note        Antibiotics:  Anti-Infectives (From admission, onward)    Ordered     Dose/Rate Route Frequency Start Stop    12/07/22 0603  DAPTOmycin (CUBICIN) 800 mg in sodium chloride 0.9 % 50 mL IVPB        Ordering Provider: Zeke George MD    8 mg/kg × 100 kg  100 mL/hr over 30 Minutes Intravenous Every 24 Hours 12/07/22 1000 12/15/22 0959    11/30/22 2219  vancomycin 2000 mg/500 mL 0.9% NS IVPB ([facility])        Ordering Provider: Reggie Batres MD    20 mg/kg × 98.9 kg  over 2 Hours Intravenous Once 11/30/22 2221 12/01/22 0315    11/30/22 2219  piperacillin-tazobactam (ZOSYN) 3.375 g in iso-osmotic dextrose 50 ml (premix)        Ordering Provider: Reggie Batres MD    3.375 g Intravenous Once 11/30/22 2221 11/30/22 2335          CC: foot infection    HPI:    Patient is a 57 y.o.  Yr old male with history of prior lower extremity infection/amputations done in [facility].  He has a history of left BKA years ago.  More recent right transmetatarsal amputation but specific dates unclear and unclear who the surgeon was.  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  He has history DVT/IVC filter, diabetes and other comorbidities as below.  He reports a chronic right lateral foot wound present for months but apparently not precipitated by any specific event or trauma.  He is admitted to the hospital on November 30 with deterioration at the foot including redness/swelling    **patient had reported  "remote IV drug abuse, 17 years ago and abstains, not recent and on chronic methadone.    ** patient reports MRSA blood stream infection treated in [place] spring of 2022, 6 weeks of vancomycin by his report.  Otherwise data deficit.  Try to retrieve information     12/2/22 Dr Peguero did surgery  \"Procedure(s): Mid level right below-knee amputation and primary closure \"    12/6 with MRSA in blood culture;  TTE doen but limited per cardiology    12/7/22   No new focal pain or distress per nursing;  postop pain to the right LE which is controlled/dull at present, constant, nonradiating, worse with palpation, generally better with pain meds and 2-3 out of 10 in severity.  Not progressive     No headache photophobia or neck stiffness.  No shortness of breath cough or hemoptysis.  No nausea vomiting diarrhea or abdominal pain.  No dysuria hematuria or pyuria.  No other new skin rash       ROS:      12/7/22 No f/c/s. No n/v/d. No rash. No new ADR to Abx.     Constitutional-- No Fever, chills or sweats.  Appetite good, and no malaise. No fatigue.  Heent--chronic hoarse voice.  No new vision, hearing or throat complaints.  No epistaxis or oral sores.   No flashers, floaters or eye pain.   No headache, photophobia or neck stiffness.  Chronic PEG tube  CV-- No chest pain, palpitation or syncope  Resp-- No SOB/cough/Hemoptysis  GI- No nausea, vomiting, or diarrhea.  No hematochezia, melena, or hematemesis. Denies jaundice or chronic liver disease.  -- No dysuria, hematuria, or flank pain.  Denies hesitancy, urgency or flank pain.  Lymph- no swollen lymph nodes in neck/axilla or groin.   Heme- No active bruising or bleeding; no Hx of DVT or PE.  MS-- no swelling or pain in the bones or joints of arms/legs.  No new back pain.  Neuro-- No acute focal weakness or numbness in the arms or legs.  No seizures.     Full 12 point review of systems reviewed and negative otherwise for acute complaints, except for above      PE: " "  BP 92/60   Pulse 67   Temp 97.7 °F (36.5 °C) (Oral)   Resp 18   Ht 193 cm (75.98\")   Wt 100 kg (220 lb 7.4 oz)   SpO2 96%   BMI 26.85 kg/m²          GENERAL: awake;  in no acute distress.  chronic Hoarse voice noted and chronic per him  HEENT: Normocephalic, atraumatic.   No conjunctival injection. No icterus. Oropharynx   without evidence of thrush or exudate. No evidence of peridontal disease.    NECK: Supple without nuchal rigidity. No mass.   HEART: RRR; No murmur, rubs, gallops.   LUNGS: Diminished at bases but otherwise clear to auscultation bilaterally without wheezing, rales, rhonchi. Normal respiratory effort. Nonlabored. No dullness.  ABDOMEN: Soft, nontender, nondistended. Positive bowel sounds. No rebound or guarding. NO mass or HSM.  PEG tube noted  EXT:  No cyanosis, clubbing;No cord.   MSK: FROM without joint effusions noted arms/legs.    SKIN: Warm and dry without cutaneous eruptions on Inspection/palpation.    NEURO: awake     Right lower extremity surgical site noted;  No new redness;  no discrete mass bulge or fluctuance.  No crepitus or bulla.       No peripheral stigmata/phenomenon of endocarditis     IV without obvious redness or drainage     Left BKA site with no redness induration or warmth.  No discrete mass bulge or fluctuance.    Laboratory Data    Results from last 7 days   Lab Units 12/07/22  0436 12/04/22  1149 12/03/22  0936   WBC 10*3/mm3 6.24 6.80 8.91   HEMOGLOBIN g/dL 9.2* 9.7* 9.3*   HEMATOCRIT % 30.1* 31.6* 30.6*   PLATELETS 10*3/mm3 388 420 425     Results from last 7 days   Lab Units 12/07/22  0436   SODIUM mmol/L 136   POTASSIUM mmol/L 4.3   CHLORIDE mmol/L 103   CO2 mmol/L 24.0   BUN mg/dL 25*   CREATININE mg/dL 0.89   GLUCOSE mg/dL 181*   CALCIUM mg/dL 9.3     Results from last 7 days   Lab Units 11/30/22  1905   ALK PHOS U/L 85   BILIRUBIN mg/dL 0.2   ALT (SGPT) U/L 12   AST (SGOT) U/L 19     Results from last 7 days   Lab Units 12/01/22  0810   SED RATE mm/hr 109* "     Results from last 7 days   Lab Units 12/01/22  0810   CRP mg/dL 9.10*       Estimated Creatinine Clearance: 128 mL/min (by C-G formula based on SCr of 0.89 mg/dL).      Microbiology:      Radiology:  Imaging Results (Last 72 Hours)     Procedure Component Value Units Date/Time    SLP FEES - Fiberoptic Endo Eval Swallow [[id number]] Resulted: 12/06/22 1142     Updated: 12/06/22 1142    Narrative:      This procedure was auto-finalized with no dictation required.            Impression:     --Acute right foot/ankle with multifocal ulceration, cellulitis/wound infection and probable osteomyelitis with probe to bone at the lateral foot and abnormal MRI.  Other comorbidities as outlined above and high risk for further serious morbidity and other serious sequelae including persistent/recurrent or nonhealing wounds, persistent/progressive or recurrent infection and risk for further functional/limb loss/amputation.  Surgery following to help define timing/option of threshold for any future surgical intervention .      **Surgery 12/2 with BKA    --MRSA bacteremia from November 30 (daptomycin sensitive).  Presumed from foot but also risk for endovascular focus particularly with  History of prior MRSA bloodstream infection.  Transthoracic echocardiogram limited per cardiology.  JERALD ordered     --History MRSA with bacteremia by his report in spring/summer 2022 and treated with 6 weeks of vancomycin in [place].  Specifics unclear and trying to retrieve information    --coag-negative staph in blood culture likely contaminant     --Chronic vocal cord paralysis per notes with chronic hoarseness and indwelling PEG tube.  Medicine team to clarify     --History left BKA.     --Diabetes.  You need to tightly control blood sugar to give best chance for healing     --History of DVT with IVC filter     PLAN:      -- Daptomycin  IV potentially 6 weeks but final duration to depend on clinical course of study results and response  to therapy     -- Check/review labs cultures and scans     -- Partial history per nursing staff     -- Discussed with microbiology and family     --d/w Dr Peguero      -- Highly complex set of issues with high risk for further serious morbidity and other serious sequela     --JERALD pending    **Copied text in this note has been reviewed and is accurate as of 12/07/22.     Zeke George MD  12/7/2022        Electronically signed by Zeke George MD at 12/07/22 1009     John Peguero MD at 12/07/22 0614          Cardiothoracic Surgery Progress Note      POD #: 5-right below-knee amputation   LOS: 7 days      Subjective: Awake and alert    Objective:  Vital Signs vital signs below noted T-max past 24 hours 98.7 °F  Temp:  [97.5 °F (36.4 °C)-98.7 °F (37.1 °C)] 97.9 °F (36.6 °C)  Heart Rate:  [65-76] 65  Resp:  [16-18] 18  BP: ()/(63-81) 95/66    Physical Exam:   General Appearance: Oriented x3   Lungs:   Heart:   Skin:   Incision: Amputation site dressing change.  Sutures intact skin margin viable skin flaps are viable     Results:  Results from last 7 days   Lab Units 12/07/22  0436   WBC 10*3/mm3 6.24   HEMOGLOBIN g/dL 9.2*   HEMATOCRIT % 30.1*   PLATELETS 10*3/mm3 388     Results from last 7 days   Lab Units 12/07/22  0436   SODIUM mmol/L 136   POTASSIUM mmol/L 4.3   CHLORIDE mmol/L 103   CO2 mmol/L 24.0   BUN mg/dL 25*   CREATININE mg/dL 0.89   GLUCOSE mg/dL 181*   CALCIUM mg/dL 9.3         Assessment: #1 postop day 5 right below-knee amputation healing well  2 status post remote left below-knee amputation for diabetic neuropathy/ulceration completely healed    Plan: Continue daily dressing change amputation site.  Overall medical manage per hospitalist.  Antibiotics per infectious disease.  Case management working on rehab facility      John Peguero MD - 12/07/22 - 06:14 EST        Electronically signed by John Peguero MD at 12/07/22 0615     Carmen Bolaños MD at 12/06/22 1349               [facility] Medicine Services  PROGRESS NOTE    Patient Name: Buster Velasco  : 1965  MRN: [id number]    Date of Admission: 2022  Primary Care Physician: Ludivina Bowers MD    Subjective   Subjective     CC:  F/U s/p right BKA    HPI:  Patient seen this morning. No major complaints. Does not want nectar thick liquids.     ROS:  Gen-no fevers, no chills  CV-no chest pain, no palpitations  Resp-no cough, no dyspnea  GI-no N/V/D, no abd pain    All other systems reviewed and negative except any additional pertinent positives and negatives as discussed in HPI.       Objective   Objective     Vital Signs:   Temp:  [97.5 °F (36.4 °C)-98.7 °F (37.1 °C)] 98.7 °F (37.1 °C)  Heart Rate:  [65-93] 65  Resp:  [18] 18  BP: ()/(63-85) 92/70  Flow (L/min):  [2] 2     Physical Exam:  Gen-no acute distress  HENT-NCAT, mucous membranes moist  CV-RRR, S1 S2 normal, no m/r/g  Resp-CTAB, no wheezes or rales  Abd-soft, NT, ND, +BS  Ext-left BKA present, new right BKA with dressing intact  Neuro-A&Ox3, no focal deficits  Skin-no rashes  Psych-appropriate mood      Results Reviewed:  LAB RESULTS:      Lab 22  1149 22  0936 22  0715 22  0810 22  0053 22  1905   WBC 6.80 8.91 6.89 7.22  --  13.92*   HEMOGLOBIN 9.7* 9.3* 11.0* 10.0*  --  11.1*   HEMATOCRIT 31.6* 30.6* 36.1* 32.2*  --  35.5*   PLATELETS 420 425 405 413  --  532*   NEUTROS ABS  --  6.88 4.43 5.64  --  11.44*   IMMATURE GRANS (ABS)  --  0.09* 0.09* 0.06*  --  0.14*   LYMPHS ABS  --  1.33 1.59 1.02  --  1.64   MONOS ABS  --  0.57 0.59 0.42  --  0.64   EOS ABS  --  0.02 0.13 0.06  --  0.03   MCV 84.7 85.5 85.3 84.3  --  84.5   SED RATE  --   --   --  109*  --   --    CRP  --   --   --  9.10*  --   --    PROCALCITONIN  --   --   --   --   --  0.17   LACTATE  --   --   --   --  2.0 2.1*         Lab 22  1149 22  0936 22  0715 22  0810 22  1905   SODIUM 136  135* 139 135* 134*   POTASSIUM 4.3 4.4 5.0 4.2 4.1   CHLORIDE 101 102 105 103 100   CO2 25.0 25.0 23.0 21.0* 21.0*   ANION GAP 10.0 8.0 11.0 11.0 13.0   BUN 16 17 14 18 18   CREATININE 0.90 1.26 1.13 1.24 1.29*   EGFR 99.0 66.1 75.3 67.4 64.3   GLUCOSE 118* 211* 46* 52* 54*   CALCIUM 9.1 9.1 9.2 9.2 9.1   HEMOGLOBIN A1C  --   --   --  6.90*  --          Lab 11/30/22  1905   TOTAL PROTEIN 7.6   ALBUMIN 3.00*   GLOBULIN 4.6   ALT (SGPT) 12   AST (SGOT) 19   BILIRUBIN 0.2   ALK PHOS 85   LIPASE 14                 Lab 12/01/22  1035 11/30/22  1905   IRON  --  18*   IRON SATURATION  --  6*   TIBC  --  277*   TRANSFERRIN  --  186*   FERRITIN  --  317.40   FOLATE  --  15.90   VITAMIN B 12  --  717   ABO TYPING O O   RH TYPING Positive Positive   ANTIBODY SCREEN Negative  --          Brief Urine Lab Results  (Last result in the past 365 days)      Color   Clarity   Blood   Leuk Est   Nitrite   Protein   CREAT   Urine HCG        08/20/22 0108 Yellow   Clear   Negative   Negative     Negative                 Microbiology Results Abnormal     None          SLP FEES - Fiberoptic Endo Eval Swallow    Result Date: 12/6/2022  This procedure was auto-finalized with no dictation required.          I have reviewed the medications:  Scheduled Meds:atorvastatin, 20 mg, Per PEG Tube, Nightly  DAPTOmycin, 6 mg/kg, Intravenous, Q24H  methadone, 10 mg, Oral, Q6H  metoprolol tartrate, 25 mg, Per PEG Tube, Q12H  senna-docusate sodium, 2 tablet, Oral, BID  sodium chloride, 10 mL, Intravenous, Q12H  sodium chloride, 10 mL, Intravenous, Q12H      Continuous Infusions:lactated ringers, 9 mL/hr  Pharmacy Consult - Pharmacy to dose,   ropivacaine,       PRN Meds:.•  acetaminophen  •  senna-docusate sodium **AND** polyethylene glycol **AND** bisacodyl **AND** bisacodyl  •  dextrose  •  dextrose  •  glucagon (human recombinant)  •  hydrOXYzine  •  melatonin  •  Morphine  •  ondansetron **OR** ondansetron  •  Pharmacy Consult - Pharmacy to dose  •   sennosides-docusate  •  sodium chloride  •  sodium chloride  •  sodium chloride    Assessment & Plan   Assessment & Plan     Active Hospital Problems    Diagnosis  POA   • **Right foot infection [L08.9]  Yes   • MRSA bacteremia [R78.81, B95.62]  Unknown   • Sepsis (HCC) [A41.9]  Yes   • Hypoglycemia [E16.2]  Yes   • Anemia [D64.9]  Yes   • Hyponatremia [E87.1]  Yes   • Hypoalbuminemia [E88.09]  Yes   • Essential hypertension [I10]  Yes   • Hyperlipidemia [E78.5]  Yes   • Paroxysmal atrial fibrillation (HCC) [I48.0]  Yes      Resolved Hospital Problems   No resolved problems to display.        Brief Hospital Course to date:  Buster Velasco is a 57 y.o. male with hx of prior osteomyelitis s/p left BKA, s/p right transmetatarsal amputation, left hand 3rd metacarpal resection (April 2022 at OSH, had 6 weeks of IV antibiotics for MRSA bacteremia), and DVT s/p IVC filter who presents with right foot wounds. S/p right BKA on 12/2/22 with Dr. Peguero.     This patient's problems and plans were partially entered by my partner and updated as appropriate by me 12/06/22.    *All problems are new to me today.     Sepsis secondary acute right foot non-healing wound and likely osteomyelitis  MRSA bacteremia  - s/p right BKA 12/2/22 with Dr. Peguero  - ID following, currently on Daptomycin  - Blood cultures now with coag negative Staph and MRSA, have discussed with Dr. George, he will require IV antibiotics at discharge  - Repeat blood cultures ordered  - Echo ordered     Recurrent hypoglycemia in setting of DMII   - HbA1c 6.9%  - High dose Tresiba qAM before arrival which has been held  - Briefly required D5, now discontinued  - Glucose trending back up, will add low dose SSI     Chronic pain on opioids   Previous IVDA/substance abuse  - Restarted home methadone 10 mg QID  - Morphine for breakthrough post-op pain  - Patient denies hx of IVDA to me but in Care Everywhere admission notes from April [facility] at [facility]  they documented prior IVDA/substance abuse     Reported hx of Afib   - Patient denies any hx of Afib. Not previously on anticoagulation and given murky history will not start.   - On Metoprolol at home which is continued     Chronic vocal cord paralysis s/p PEG  - SLP evaluation: regular diet but with nectar thick liquids recommended, patient wants thin liquids and understands risks of aspiration  - Comfort diet as tolerated  - Discussed code status today, he will think about it     Hypotension with h/o HTN  - Hold parameters with metoprolol  - Hypotension resolved     HLD   - Continue statin     DVT s/p IVC filter   - Placed in spring 2022 per patient. Defer to PCP for further assessment and time of removal.     Hx of seizure in setting of meth use   - Not on AED's given meth trigger     BPH   - Continue Flomax       Expected Discharge Location and Transportation: home  Expected Discharge Date: 12/12/22, TBD    DVT prophylaxis:  Mechanical DVT prophylaxis orders are present.          CODE STATUS:   Code Status and Medical Interventions:   Ordered at: 12/01/22 0035     Code Status (Patient has no pulse and is not breathing):    CPR (Attempt to Resuscitate)     Medical Interventions (Patient has pulse or is breathing):    Full Support       Carmen Bolaños MD  12/06/22                Electronically signed by Carmen Bolaños MD at 12/06/22 1408     Zeke George MD at 12/06/22 0855          [facility] Progress Note        Antibiotics:  Anti-Infectives (From admission, onward)    Ordered     Dose/Rate Route Frequency Start Stop    12/01/22 0829  DAPTOmycin (CUBICIN) 600 mg in sodium chloride 0.9 % 50 mL IVPB        Ordering Provider: Erlin Arzola PA    6 mg/kg × 100 kg  100 mL/hr over 30 Minutes Intravenous Every 24 Hours 12/01/22 1000 12/15/22 0959    11/30/22 2219  vancomycin 2000 mg/500 mL 0.9% NS IVPB ([facility])        Ordering Provider: Reggie Batres MD    20 mg/kg × 98.9 kg  over 2 Hours Intravenous Once  "11/30/22 2221 12/01/22 0315    11/30/22 2219  piperacillin-tazobactam (ZOSYN) 3.375 g in iso-osmotic dextrose 50 ml (premix)        Ordering Provider: Reggie Batres MD    3.375 g Intravenous Once 11/30/22 2221 11/30/22 1437          CC: foot infection    HPI:    Patient is a 57 y.o.  Yr old male with history of prior lower extremity infection/amputations done in [facility].  He has a history of left BKA years ago.  More recent right transmetatarsal amputation but specific dates unclear and unclear who the surgeon was.  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  He has history DVT/IVC filter, diabetes and other comorbidities as below.  He reports a chronic right lateral foot wound present for months but apparently not precipitated by any specific event or trauma.  He is admitted to the hospital on November 30 with deterioration at the foot including redness/swelling     12/2/22 Dr Peguero did surgery  \"Procedure(s): Mid level right below-knee amputation and primary closure \"    12/6/22 d/w micro regarding blood culture results;   postop pain to the right LE which is controlled/dull at present, constant, nonradiating, worse with palpation, generally better with pain meds and 2-3 out of 10 in severity.  Not progressive     No headache photophobia or neck stiffness.  No shortness of breath cough or hemoptysis.  No nausea vomiting diarrhea or abdominal pain.  No dysuria hematuria or pyuria.  No other new skin rash       ROS:      12/6/22 No f/c/s. No n/v/d. No rash. No new ADR to Abx.     Constitutional-- No Fever, chills or sweats.  Appetite good, and no malaise. No fatigue.  Heent--chronic hoarse voice.  No new vision, hearing or throat complaints.  No epistaxis or oral sores.   No flashers, " "floaters or eye pain.   No headache, photophobia or neck stiffness.  Chronic PEG tube  CV-- No chest pain, palpitation or syncope  Resp-- No SOB/cough/Hemoptysis  GI- No nausea, vomiting, or diarrhea.  No hematochezia, melena, or hematemesis. Denies jaundice or chronic liver disease.  -- No dysuria, hematuria, or flank pain.  Denies hesitancy, urgency or flank pain.  Lymph- no swollen lymph nodes in neck/axilla or groin.   Heme- No active bruising or bleeding; no Hx of DVT or PE.  MS-- no swelling or pain in the bones or joints of arms/legs.  No new back pain.  Neuro-- No acute focal weakness or numbness in the arms or legs.  No seizures.     Full 12 point review of systems reviewed and negative otherwise for acute complaints, except for above      PE:   BP 94/65 (BP Location: Left arm, Patient Position: Lying)   Pulse 66   Temp 97.5 °F (36.4 °C) (Oral)   Resp 18   Ht 193 cm (76\")   Wt 100 kg (221 lb)   SpO2 97%   BMI 26.90 kg/m²          GENERAL: sleepy, in no acute distress.  chronic Hoarse voice noted  HEENT: Normocephalic, atraumatic.   No conjunctival injection. No icterus. Oropharynx   without evidence of thrush or exudate. No evidence of peridontal disease.    NECK: Supple without nuchal rigidity. No mass.   HEART: RRR; No murmur, rubs, gallops.   LUNGS: Diminished at bases but otherwise clear to auscultation bilaterally without wheezing, rales, rhonchi. Normal respiratory effort. Nonlabored. No dullness.  ABDOMEN: Soft, nontender, nondistended. Positive bowel sounds. No rebound or guarding. NO mass or HSM.  PEG tube noted  EXT:  No cyanosis, clubbing;No cord.   MSK: FROM without joint effusions noted arms/legs.    SKIN: Warm and dry without cutaneous eruptions on Inspection/palpation.    NEURO: sleepy     Right lower extremity surgical site noted;  No new redness;  no discrete mass bulge or fluctuance.  No crepitus or bulla.       No peripheral stigmata/phenomenon of endocarditis     IV without " obvious redness or drainage     Left BKA site with no redness induration or warmth.  No discrete mass bulge or fluctuance.    Laboratory Data    Results from last 7 days   Lab Units 12/04/22  1149 12/03/22  0936 12/02/22  0715   WBC 10*3/mm3 6.80 8.91 6.89   HEMOGLOBIN g/dL 9.7* 9.3* 11.0*   HEMATOCRIT % 31.6* 30.6* 36.1*   PLATELETS 10*3/mm3 420 425 405     Results from last 7 days   Lab Units 12/04/22  1149   SODIUM mmol/L 136   POTASSIUM mmol/L 4.3   CHLORIDE mmol/L 101   CO2 mmol/L 25.0   BUN mg/dL 16   CREATININE mg/dL 0.90   GLUCOSE mg/dL 118*   CALCIUM mg/dL 9.1     Results from last 7 days   Lab Units 11/30/22  1905   ALK PHOS U/L 85   BILIRUBIN mg/dL 0.2   ALT (SGPT) U/L 12   AST (SGOT) U/L 19     Results from last 7 days   Lab Units 12/01/22  0810   SED RATE mm/hr 109*     Results from last 7 days   Lab Units 12/01/22  0810   CRP mg/dL 9.10*       Estimated Creatinine Clearance: 126.5 mL/min (by C-G formula based on SCr of 0.9 mg/dL).      Microbiology:      Radiology:  Imaging Results (Last 72 Hours)     ** No results found for the last 72 hours. **            Impression:     --Acute right foot/ankle with multifocal ulceration, cellulitis/wound infection and probable osteomyelitis with probe to bone at the lateral foot and abnormal MRI.  Other comorbidities as outlined above and high risk for further serious morbidity and other serious sequelae including persistent/recurrent or nonhealing wounds, persistent/progressive or recurrent infection and risk for further functional/limb loss/amputation.  Surgery following to help define timing/option of threshold for any future surgical intervention .      **Surgery 12/2 with BKA     --History MRSA    --blood culture +  ; initial set with CN staph in anaerobic bottle;  subsequently the other set anaerobic bottle with GPC also; micro is working this up; ?contaminant -v- pthatogen     --Chronic vocal cord paralysis per notes with chronic hoarseness and indwelling  PEG tube.  Medicine team to clarify     --History left BKA.     --Diabetes.  You need to tightly control blood sugar to give best chance for healing     --History of DVT with IVC filter     PLAN:      -- Daptomycin  IV for now with blood culture isolates gettnig further evaluation     -- Check/review labs cultures and scans     -- Partial history per nursing staff     -- Discussed with microbiology and family     --d/w Dr Peguero      -- Highly complex set of issues with high risk for further serious morbidity and other serious sequela     **Copied text in this note has been reviewed and is accurate as of 12/06/22.     Zeke George MD  12/6/2022        Electronically signed by Zeke George MD at 12/06/22 0803     John Peguero MD at 12/06/22 0546          Cardiothoracic Surgery Progress Note      POD #: 4-right below-knee amputation     LOS: 6 days      Subjective: awake and alert    Objective:  Vital Signs vital signs below noted T-max past 24 hours 98.1 °F  Temp:  [97.6 °F (36.4 °C)-98.1 °F (36.7 °C)] 97.6 °F (36.4 °C)  Heart Rate:  [67-93] 68  Resp:  [16-18] 18  BP: ()/(67-85) 101/77    Physical Exam:   General Appearance: Oriented x3   Lungs:   Heart:   Skin:   Incision:   Amputation site dressing change.  Suture intact skin margin viable skin flaps are viable  Results:  Results from last 7 days   Lab Units 12/04/22  1149   WBC 10*3/mm3 6.80   HEMOGLOBIN g/dL 9.7*   HEMATOCRIT % 31.6*   PLATELETS 10*3/mm3 420     Results from last 7 days   Lab Units 12/04/22  1149   SODIUM mmol/L 136   POTASSIUM mmol/L 4.3   CHLORIDE mmol/L 101   CO2 mmol/L 25.0   BUN mg/dL 16   CREATININE mg/dL 0.90   GLUCOSE mg/dL 118*   CALCIUM mg/dL 9.1         Assessment: #1.  Postop day 4 right below-knee amputation is healing well  2 status post remote left below-knee amputation for diabetic neuropathy ulceration      Plan: Continue daily dressing change amputation site.  Overall medical manage per hospitalist.   Antibiotics per infectious disease.   working on rehab facility.      John Peguero MD - 22 - 05:46 EST        Electronically signed by John Peguero MD at 22 0547     Tere Molina APRN at 22 1253              [facility] Medicine Services  PROGRESS NOTE    Patient Name: Buster Velasco  : 1965  MRN: [id number]    Date of Admission: 2022  Primary Care Physician: Ludivina Bowers MD    Subjective   Subjective     CC:  F/u foot infection    HPI:  Still in quite a bit of pain even with home methadone restarted, but is better than yesterday  No other issues reported    ROS:  Gen- No fevers, chills  CV- No chest pain, palpitations  Resp- No cough, dyspnea  GI- No N/V/D, abd pain    Objective   Objective     Vital Signs:   Temp:  [97.6 °F (36.4 °C)-99 °F (37.2 °C)] 97.8 °F (36.6 °C)  Heart Rate:  [80-89] 81  Resp:  [16-18] 16  BP: (103-116)/(63-82) 103/71  Flow (L/min):  [2] 2     Physical Exam:  Constitutional: No acute distress, awake, alert, resting in bed  HENT: NCAT, mucous membranes moist  Respiratory: Clear to auscultation bilaterally, respiratory effort normal   Cardiovascular: RRR, no murmurs, rubs, or gallops  Gastrointestinal: Positive bowel sounds, soft, nontender, nondistended  Musculoskeletal: R BKA, bandaged and intact. Old L BKA  Psychiatric: Appropriate affect, cooperative  Neurologic: Oriented x 3, speech clear  Skin: No rashes      Results Reviewed:  LAB RESULTS:      Lab 22  1149 22  0936 22  0715 22  0810 22  0053 22  1905   WBC 6.80 8.91 6.89 7.22  --  13.92*   HEMOGLOBIN 9.7* 9.3* 11.0* 10.0*  --  11.1*   HEMATOCRIT 31.6* 30.6* 36.1* 32.2*  --  35.5*   PLATELETS 420 425 405 413  --  532*   NEUTROS ABS  --  6.88 4.43 5.64  --  11.44*   IMMATURE GRANS (ABS)  --  0.09* 0.09* 0.06*  --  0.14*   LYMPHS ABS  --  1.33 1.59 1.02  --  1.64   MONOS ABS  --  0.57 0.59 0.42  --  0.64   EOS ABS   --  0.02 0.13 0.06  --  0.03   MCV 84.7 85.5 85.3 84.3  --  84.5   SED RATE  --   --   --  109*  --   --    CRP  --   --   --  9.10*  --   --    PROCALCITONIN  --   --   --   --   --  0.17   LACTATE  --   --   --   --  2.0 2.1*         Lab 12/04/22  1149 12/03/22  0936 12/02/22  0715 12/01/22  0810 11/30/22  1905   SODIUM 136 135* 139 135* 134*   POTASSIUM 4.3 4.4 5.0 4.2 4.1   CHLORIDE 101 102 105 103 100   CO2 25.0 25.0 23.0 21.0* 21.0*   ANION GAP 10.0 8.0 11.0 11.0 13.0   BUN 16 17 14 18 18   CREATININE 0.90 1.26 1.13 1.24 1.29*   EGFR 99.0 66.1 75.3 67.4 64.3   GLUCOSE 118* 211* 46* 52* 54*   CALCIUM 9.1 9.1 9.2 9.2 9.1   HEMOGLOBIN A1C  --   --   --  6.90*  --          Lab 11/30/22  1905   TOTAL PROTEIN 7.6   ALBUMIN 3.00*   GLOBULIN 4.6   ALT (SGPT) 12   AST (SGOT) 19   BILIRUBIN 0.2   ALK PHOS 85   LIPASE 14                 Lab 12/01/22  1035 11/30/22  1905   IRON  --  18*   IRON SATURATION  --  6*   TIBC  --  277*   TRANSFERRIN  --  186*   FERRITIN  --  317.40   FOLATE  --  15.90   VITAMIN B 12  --  717   ABO TYPING O O   RH TYPING Positive Positive   ANTIBODY SCREEN Negative  --          Brief Urine Lab Results  (Last result in the past 365 days)      Color   Clarity   Blood   Leuk Est   Nitrite   Protein   CREAT   Urine HCG        08/20/22 0108 Yellow   Clear   Negative   Negative     Negative                 Microbiology Results Abnormal     Procedure Component Value - Date/Time    Blood Culture - Blood, Arm, Right [[id number]]  (Normal) Collected: 11/30/22 2250    Lab Status: Preliminary result Specimen: Blood from Arm, Right Updated: 12/04/22 1501     Blood Culture No growth at 3 days          No radiology results from the last 24 hrs        I have reviewed the medications:  Scheduled Meds:atorvastatin, 20 mg, Per PEG Tube, Nightly  DAPTOmycin, 6 mg/kg, Intravenous, Q24H  methadone, 10 mg, Oral, Q6H  metoprolol tartrate, 25 mg, Per PEG Tube, Q12H  senna-docusate sodium, 2 tablet, Oral, BID  sodium  chloride, 10 mL, Intravenous, Q12H  sodium chloride, 10 mL, Intravenous, Q12H      Continuous Infusions:lactated ringers, 9 mL/hr  Pharmacy Consult - Pharmacy to dose,   ropivacaine,       PRN Meds:.•  acetaminophen  •  senna-docusate sodium **AND** polyethylene glycol **AND** bisacodyl **AND** bisacodyl  •  dextrose  •  dextrose  •  glucagon (human recombinant)  •  hydrOXYzine  •  melatonin  •  Morphine  •  ondansetron **OR** ondansetron  •  Pharmacy Consult - Pharmacy to dose  •  sennosides-docusate  •  sodium chloride  •  sodium chloride  •  sodium chloride    Assessment & Plan   Assessment & Plan     Active Hospital Problems    Diagnosis  POA   • **Right foot infection [L08.9]  Yes   • Sepsis (HCC) [A41.9]  Yes   • Hypoglycemia [E16.2]  Yes   • Anemia [D64.9]  Yes   • Hyponatremia [E87.1]  Yes   • Hypoalbuminemia [E88.09]  Yes   • Essential hypertension [I10]  Yes   • Hyperlipidemia [E78.5]  Yes   • Paroxysmal atrial fibrillation (HCC) [I48.0]  Yes      Resolved Hospital Problems   No resolved problems to display.        Brief Hospital Course to date:  Buster Velasco is a 57 y.o. male with a history of prior osteomyelitis s/p left BKA, s/p right transmetatarsal amputation, left hand 3rd metacarpal resection,  and DVT s/p IVC filter who presents w right foot wounds. S/p right BKA on 12/2 w Dr Peguero    This patient's problems and plans were partially entered by my partner and updated as appropriate by me 12/05/22.      Sepsis 2/2 acute right foot non-healing wound and likely osteomyelitis  -s/p right BKA 12/2 w Dr Peguero  -IV abx per ID, currently on Dapto Monotherapy.   -post-op pain control - has nerve block, prn morphine and scheduled methadone  - bowel regimen    Recurrent hypoglycemia in setting of DMII   - high dose Tresiba qAM before arrival which has been held.   - briefly required D5, now discontinued  -blood sugars acceptable off insulin, continue accuchecks    Chronic pain on opioids   -  restarted home methadone 10 mg QID  - Morphine for breakthrough post-op.     Reported h/o Afib   - patient denies any h/o Afib. Not previously on anticoagulation and given murky history will not start.   - On metoprolol dose at home which is continued    Chronic vocal cord paralysis s/p PEG  - SLP evaluation w clearance for regular diet    Hypotension with h/o HTN   - hold parameters with metoprolol, hypotension resolved    HLD - statin    DVT s/p IVC filter -  placed in spring 2022 per patient. Defer to PCP for further assessment and time of removal    H/o seizure in setting of meth use - not on AED's given meth trigger    BPH - tamsulosin    Expected Discharge Location and Transportation: home  Expected Discharge Date: 12/7    DVT prophylaxis:  Mechanical DVT prophylaxis orders are present.          CODE STATUS:   Code Status and Medical Interventions:   Ordered at: 12/01/22 0035     Code Status (Patient has no pulse and is not breathing):    CPR (Attempt to Resuscitate)     Medical Interventions (Patient has pulse or is breathing):    Full Support       PITER Cronin  12/05/22                Electronically signed by Tere Molina APRN at 12/05/22 1258     Zeke George MD at 12/05/22 0935          [facility] Progress Note        Antibiotics:  Anti-Infectives (From admission, onward)    Ordered     Dose/Rate Route Frequency Start Stop    12/01/22 0829  DAPTOmycin (CUBICIN) 600 mg in sodium chloride 0.9 % 50 mL IVPB        Ordering Provider: Erlin Arzola PA    6 mg/kg × 100 kg  100 mL/hr over 30 Minutes Intravenous Every 24 Hours 12/01/22 1000 12/15/22 0959    11/30/22 2219  vancomycin 2000 mg/500 mL 0.9% NS IVPB ([facility])        Ordering Provider: Reggie Batres MD    20 mg/kg × 98.9 kg  over 2 Hours Intravenous Once 11/30/22 2221 12/01/22 0315    11/30/22 2219  piperacillin-tazobactam (ZOSYN) 3.375 g in iso-osmotic dextrose 50 ml (premix)        Ordering Provider: Reggie Batres MD    3.375 g  "Intravenous Once 11/30/22 2221 11/30/22 2335          CC: foot infection    HPI:    Patient is a 57 y.o.  Yr old male with history of prior lower extremity infection/amputations done in [facility].  He has a history of left BKA years ago.  More recent right transmetatarsal amputation but specific dates unclear and unclear who the surgeon was.  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  He has history DVT/IVC filter, diabetes and other comorbidities as below.  He reports a chronic right lateral foot wound present for months but apparently not precipitated by any specific event or trauma.  He is admitted to the hospital on November 30 with deterioration at the foot including redness/swelling     12/2/22 Dr Peguero did surgery  \"Procedure(s): Mid level right below-knee amputation and primary closure \"    12/5/22 seen early and sleepy; nursing reports no new distress; postop pain to the right LE which is dull at present, constant, nonradiating, worse with palpation, generally better with pain meds and 2-3 out of 10 in severity.  Not progressive     No headache photophobia or neck stiffness.  No shortness of breath cough or hemoptysis.  No nausea vomiting diarrhea or abdominal pain.  No dysuria hematuria or pyuria.  No other new skin rash       ROS:      12/5/22 No f/c/s. No n/v/d. No rash. No new ADR to Abx.     Constitutional-- No Fever, chills or sweats.  Appetite good, and no malaise. No fatigue.  Heent--chronic hoarse voice.  No new vision, hearing or throat complaints.  No epistaxis or oral sores.   No flashers, floaters or eye pain.   No headache, photophobia or neck stiffness.  Chronic PEG tube  CV-- No chest pain, palpitation or syncope  Resp-- No SOB/cough/Hemoptysis  GI- No nausea, vomiting, " "or diarrhea.  No hematochezia, melena, or hematemesis. Denies jaundice or chronic liver disease.  -- No dysuria, hematuria, or flank pain.  Denies hesitancy, urgency or flank pain.  Lymph- no swollen lymph nodes in neck/axilla or groin.   Heme- No active bruising or bleeding; no Hx of DVT or PE.  MS-- no swelling or pain in the bones or joints of arms/legs.  No new back pain.  Neuro-- No acute focal weakness or numbness in the arms or legs.  No seizures.     Full 12 point review of systems reviewed and negative otherwise for acute complaints, except for above      PE:   /69 (BP Location: Left arm, Patient Position: Lying)   Pulse 81   Temp 97.6 °F (36.4 °C) (Oral)   Resp 16   Ht 193 cm (76\")   Wt 100 kg (221 lb)   SpO2 96%   BMI 26.90 kg/m²          GENERAL: sleepy, in no acute distress.  Hoarse voice noted  HEENT: Normocephalic, atraumatic.  PERRL. EOMI. No conjunctival injection. No icterus. Oropharynx   without evidence of thrush or exudate. No evidence of peridontal disease.    NECK: Supple without nuchal rigidity. No mass.  LYMPH: No cervical, axillary or inguinal lymphadenopathy.  HEART: RRR; No murmur, rubs, gallops.   LUNGS: Diminished at bases but otherwise clear to auscultation bilaterally without wheezing, rales, rhonchi. Normal respiratory effort. Nonlabored. No dullness.  ABDOMEN: Soft, nontender, nondistended. Positive bowel sounds. No rebound or guarding. NO mass or HSM.  PEG tube noted  EXT:  No cyanosis, clubbing or edema. No cord.   MSK: FROM without joint effusions noted arms/legs.    SKIN: Warm and dry without cutaneous eruptions on Inspection/palpation.    NEURO: sleepy     Right lower extremity surgical site noted;  No new redness;  no discrete mass bulge or fluctuance.  No crepitus or bulla.       No peripheral stigmata/phenomenon of endocarditis     IV without obvious redness or drainage     Left BKA site with no redness induration or warmth.  No discrete mass bulge or " fluctuance.    Laboratory Data    Results from last 7 days   Lab Units 12/04/22  1149 12/03/22  0936 12/02/22  0715   WBC 10*3/mm3 6.80 8.91 6.89   HEMOGLOBIN g/dL 9.7* 9.3* 11.0*   HEMATOCRIT % 31.6* 30.6* 36.1*   PLATELETS 10*3/mm3 420 425 405     Results from last 7 days   Lab Units 12/04/22  1149   SODIUM mmol/L 136   POTASSIUM mmol/L 4.3   CHLORIDE mmol/L 101   CO2 mmol/L 25.0   BUN mg/dL 16   CREATININE mg/dL 0.90   GLUCOSE mg/dL 118*   CALCIUM mg/dL 9.1     Results from last 7 days   Lab Units 11/30/22  1905   ALK PHOS U/L 85   BILIRUBIN mg/dL 0.2   ALT (SGPT) U/L 12   AST (SGOT) U/L 19     Results from last 7 days   Lab Units 12/01/22  0810   SED RATE mm/hr 109*     Results from last 7 days   Lab Units 12/01/22  0810   CRP mg/dL 9.10*       Estimated Creatinine Clearance: 126.5 mL/min (by C-G formula based on SCr of 0.9 mg/dL).      Microbiology:      Radiology:  Imaging Results (Last 72 Hours)     ** No results found for the last 72 hours. **            Impression:     --Acute right foot/ankle with multifocal ulceration, cellulitis/wound infection and probable osteomyelitis with probe to bone at the lateral foot and abnormal MRI.  Other comorbidities as outlined above and high risk for further serious morbidity and other serious sequelae including persistent/recurrent or nonhealing wounds, persistent/progressive or recurrent infection and risk for further functional/limb loss/amputation.  Surgery following to help define timing/option of threshold for any future surgical intervention .      **Surgery 12/2 with BKA     --History MRSA    --blood culture + 1 of 2, CN staph and ?contaminant so far     --Chronic vocal cord paralysis per notes with chronic hoarseness and indwelling PEG tube.  Medicine team to clarify     --History left BKA.     --Diabetes.  You need to tightly control blood sugar to give best chance for healing     --History of DVT with IVC filter     PLAN:      -- Daptomycin  IV for now but  anticipate further taper   if healing okay postop and no new pathogen     -- Check/review labs cultures and scans     -- Partial history per nursing staff     -- Discussed with microbiology and family     --d/w Dr Peguero      -- Highly complex set of issues with high risk for further serious morbidity and other serious sequela     Zeke George MD  12/5/2022        Electronically signed by Zeke George MD at 12/05/22 0935       Consult Notes (last 72 hours)  Notes from 12/05/22 0929 through 12/08/22 0929   No notes of this type exist for this encounter.

## 2022-12-09 ENCOUNTER — APPOINTMENT (OUTPATIENT)
Dept: CARDIOLOGY | Facility: HOSPITAL | Age: 57
DRG: 854 | End: 2022-12-09
Payer: MEDICAID

## 2022-12-09 ENCOUNTER — ANESTHESIA (OUTPATIENT)
Dept: CARDIOLOGY | Facility: HOSPITAL | Age: 57
DRG: 854 | End: 2022-12-09
Payer: MEDICAID

## 2022-12-09 ENCOUNTER — ANESTHESIA EVENT (OUTPATIENT)
Dept: CARDIOLOGY | Facility: HOSPITAL | Age: 57
DRG: 854 | End: 2022-12-09
Payer: MEDICAID

## 2022-12-09 LAB
GLUCOSE BLDC GLUCOMTR-MCNC: 117 MG/DL (ref 70–130)
GLUCOSE BLDC GLUCOMTR-MCNC: 129 MG/DL (ref 70–130)
GLUCOSE BLDC GLUCOMTR-MCNC: 138 MG/DL (ref 70–130)
GLUCOSE BLDC GLUCOMTR-MCNC: 252 MG/DL (ref 70–130)
LV EF 2D ECHO EST: 55 %
MAXIMAL PREDICTED HEART RATE: 162 BPM
STRESS TARGET HR: 138 BPM

## 2022-12-09 PROCEDURE — 99232 SBSQ HOSP IP/OBS MODERATE 35: CPT | Performed by: HOSPITALIST

## 2022-12-09 PROCEDURE — 93321 DOPPLER ECHO F-UP/LMTD STD: CPT

## 2022-12-09 PROCEDURE — 93312 ECHO TRANSESOPHAGEAL: CPT

## 2022-12-09 PROCEDURE — 93325 DOPPLER ECHO COLOR FLOW MAPG: CPT

## 2022-12-09 PROCEDURE — 93325 DOPPLER ECHO COLOR FLOW MAPG: CPT | Performed by: INTERNAL MEDICINE

## 2022-12-09 PROCEDURE — 93312 ECHO TRANSESOPHAGEAL: CPT | Performed by: INTERNAL MEDICINE

## 2022-12-09 PROCEDURE — 93321 DOPPLER ECHO F-UP/LMTD STD: CPT | Performed by: INTERNAL MEDICINE

## 2022-12-09 PROCEDURE — 82962 GLUCOSE BLOOD TEST: CPT

## 2022-12-09 PROCEDURE — 25010000002 DAPTOMYCIN PER 1 MG: Performed by: INTERNAL MEDICINE

## 2022-12-09 PROCEDURE — 99024 POSTOP FOLLOW-UP VISIT: CPT | Performed by: THORACIC SURGERY (CARDIOTHORACIC VASCULAR SURGERY)

## 2022-12-09 RX ADMIN — HYDROXYZINE HYDROCHLORIDE 50 MG: 25 TABLET ORAL at 20:59

## 2022-12-09 RX ADMIN — METOPROLOL TARTRATE 25 MG: 25 TABLET, FILM COATED ORAL at 08:36

## 2022-12-09 RX ADMIN — METOPROLOL TARTRATE 25 MG: 25 TABLET, FILM COATED ORAL at 20:59

## 2022-12-09 RX ADMIN — ATORVASTATIN CALCIUM 20 MG: 20 TABLET, FILM COATED ORAL at 20:58

## 2022-12-09 RX ADMIN — Medication 10 ML: at 21:02

## 2022-12-09 RX ADMIN — METHADONE HYDROCHLORIDE 10 MG: 10 TABLET ORAL at 14:07

## 2022-12-09 RX ADMIN — Medication 10 ML: at 09:34

## 2022-12-09 RX ADMIN — SENNOSIDES AND DOCUSATE SODIUM 2 TABLET: 50; 8.6 TABLET ORAL at 20:58

## 2022-12-09 RX ADMIN — DAPTOMYCIN 800 MG: 500 INJECTION, POWDER, LYOPHILIZED, FOR SOLUTION INTRAVENOUS at 09:34

## 2022-12-09 RX ADMIN — METHADONE HYDROCHLORIDE 10 MG: 10 TABLET ORAL at 01:18

## 2022-12-09 RX ADMIN — METHADONE HYDROCHLORIDE 10 MG: 10 TABLET ORAL at 08:36

## 2022-12-09 RX ADMIN — METHADONE HYDROCHLORIDE 10 MG: 10 TABLET ORAL at 20:59

## 2022-12-09 NOTE — PROGRESS NOTES
Cardiothoracic Surgery Progress Note      POD #: 7-right below-knee amputation     LOS: 9 days      Subjective: Awake and alert    Objective:  Vital Signs vital signs below noted T-max past 24 hours 98.2 °F  Temp:  [97.8 °F (36.6 °C)-98.2 °F (36.8 °C)] 97.8 °F (36.6 °C)  Heart Rate:  [60-71] 71  Resp:  [16-18] 16  BP: ()/(61-80) 113/80    Physical Exam:   General Appearance: Oriented x3   Lungs:   Heart:   Skin:   Incision: Amputation site dressing change.  Sutures intact skin margin viable skin flaps are viable.     Results:  Results from last 7 days   Lab Units 12/07/22  0436   WBC 10*3/mm3 6.24   HEMOGLOBIN g/dL 9.2*   HEMATOCRIT % 30.1*   PLATELETS 10*3/mm3 388     Results from last 7 days   Lab Units 12/07/22  0436   SODIUM mmol/L 136   POTASSIUM mmol/L 4.3   CHLORIDE mmol/L 103   CO2 mmol/L 24.0   BUN mg/dL 25*   CREATININE mg/dL 0.89   GLUCOSE mg/dL 181*   CALCIUM mg/dL 9.3         Assessment: #1.  Postop day 7 right below-knee amputation healing well  2.  Status post remote left below-knee amputation for diabetic neuropathy.      Plan: Continue daily dressing change to the amputation site.  Medical manage per hospitalist.  Case management working on rehabilitation facility.      John Peguero MD - 12/09/22 - 05:31 EST

## 2022-12-09 NOTE — POST-PROCEDURE NOTE
Brief JERALD Note    JERALD performed with anesthesia assistance for further evaluation of bacteremia    Normal LV size and function  RV appears mildly dilated with normal function  Mitral valve has some mild thickening with trace regurgitation, no vegetation  Aortic valve is trileaflet and structurally normal with trace regurgitation seen  Tricuspid valve appears structurally normal with mild regurgitation, no vegetation seen  Pulmonic valve appears slightly thickened but otherwise structurally normal with trace to mild regurgitation and no vegetation seen    PHUONG Avery MD, MS  12/09/22 15:14 EST

## 2022-12-09 NOTE — ANESTHESIA PREPROCEDURE EVALUATION
Anesthesia Evaluation     Patient summary reviewed and Nursing notes reviewed   no history of anesthetic complications:  NPO Solid Status: > 8 hours  NPO Liquid Status: > 2 hours           Airway   Mallampati: I  TM distance: >3 FB  Neck ROM: full  No difficulty expected  Dental - normal exam     Pulmonary - negative pulmonary ROS and normal exam   Cardiovascular - normal exam    (+) hypertension, dysrhythmias Atrial Fib, DVT, hyperlipidemia,       Neuro/Psych  (+) psychiatric history,    (-) seizures, CVA  GI/Hepatic/Renal/Endo    (+)   diabetes mellitus,     Musculoskeletal (-) negative ROS    Abdominal  - normal exam    Bowel sounds: normal.   Substance History - negative use     OB/GYN negative ob/gyn ROS         Other                          Anesthesia Plan    ASA 4     MAC     intravenous induction     Anesthetic plan, risks, benefits, and alternatives have been provided, discussed and informed consent has been obtained with: patient.    Plan discussed with CRNA.        CODE STATUS:

## 2022-12-09 NOTE — PROGRESS NOTES
Clinical Nutrition     Patient Name: Buster Velasco  YOB: 1965  MRN: [id number]  Date of Encounter: 22 10:25 EST  Admission date: 2022    Reason for Visit   Follow up    EMR reviewed    Yes    Diet Nutrition Related History     Patient was sleeping hard and did not wake up to my voice.Patient's intake is good at 87.5% of last 4 meals. Last documented BM was on 22.    Current Nutrition Prescription    NPO Diet NPO Type: Strict NPO  Orders Placed This Encounter      DIET MESSAGE Please send pt a lunch tray!! Thank you!!    Average Intake from [place].5% last 4 meals    Actions:    Follow treatment progress, Care plan reviewed, Menu provided    Monitor Per Protocol    Cornelia Lam,   Time Spent:20 minutes

## 2022-12-09 NOTE — PLAN OF CARE
Goal Outcome Evaluation:  Plan of Care Reviewed With: patient        Progress: no change   Pt is alert and oriented X 4. VSS. 2L NC. Voiding spontaneously. Frequently turned.NPO at midnight for JERALD later today.

## 2022-12-09 NOTE — CASE MANAGEMENT/SOCIAL WORK
Continued Stay Note  [facility]     Patient Name: Buster Velasco  MRN: [id number]  Today's Date: 12/9/2022    Admit Date: 11/30/2022    Plan: Rehab   Discharge Plan     Row Name 12/09/22 1415       Plan    Plan Rehab    Patient/Family in Agreement with Plan yes    Plan Comments I spoke with Mr. Velasco at the bedside and updated him on the situation of insurance and [facility]. He is agreeable to have referrals sent to additional rehab facilities and I have made a referral to [facility] and will await their decision. Case management will continue to follow.    Final Discharge Disposition Code 30 - still a patient               Discharge Codes    No documentation.               Expected Discharge Date and Time     Expected Discharge Date Expected Discharge Time    Dec 10, 2022             Alan Downing RN

## 2022-12-09 NOTE — PROGRESS NOTES
[facility] Progress Note        Antibiotics:  Anti-Infectives (From admission, onward)    Ordered     Dose/Rate Route Frequency Start Stop    12/07/22 0603  DAPTOmycin (CUBICIN) 800 mg in sodium chloride 0.9 % 50 mL IVPB        Ordering Provider: Zeke George MD    8 mg/kg × 100 kg  100 mL/hr over 30 Minutes Intravenous Every 24 Hours 12/07/22 1000 12/15/22 0959    11/30/22 2219  vancomycin 2000 mg/500 mL 0.9% NS IVPB ([facility])        Ordering Provider: Reggie Batres MD    20 mg/kg × 98.9 kg  over 2 Hours Intravenous Once 11/30/22 2221 12/01/22 0315    11/30/22 2219  piperacillin-tazobactam (ZOSYN) 3.375 g in iso-osmotic dextrose 50 ml (premix)        Ordering Provider: Reggie Batres MD    3.375 g Intravenous Once 11/30/22 2221 11/30/22 2335          CC: foot infection    HPI:    Patient is a 57 y.o.  Yr old male with history of prior lower extremity infection/amputations done in [facility].  He has a history of left BKA years ago.  More recent right transmetatarsal amputation but specific dates unclear and unclear who the surgeon was.  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  He has history DVT/IVC filter, diabetes and other comorbidities as below.  He reports a chronic right lateral foot wound present for months but apparently not precipitated by any specific event or trauma.  He is admitted to the hospital on November 30 with deterioration at the foot including redness/swelling    **patient had reported remote drug abuse (initially to me reported as IV drug abuse but then later he reported not injection use and [facility]; +drug screen earlier this year per d/w Dr Chau for Meth at [facility]),   chronic methadone.    ** patient reports MRSA  "blood stream infection treated in [place] spring of 2022, 6 weeks of vancomycin by his report.  Otherwise data deficit.  Try to retrieve information     12/2/22 Dr Peguero did surgery  \"Procedure(s): Mid level right below-knee amputation and primary closure \"    12/6 with MRSA in blood culture;  TTE done but limited per cardiology    12/9/22   JERALD pendingCase management considering options.No new focal pain or distress per nursing;  postop pain to the right LE which is controlled/dull at present, constant, nonradiating, worse with palpation, generally better with pain meds and 2  out of 10 in severity.  Not progressive     No headache photophobia or neck stiffness.  No shortness of breath cough or hemoptysis.  No nausea vomiting diarrhea or abdominal pain.  No dysuria hematuria or pyuria.  No other new skin rash       ROS:      12/9/22 No f/c/s. No n/v/d. No rash. No new ADR to Abx.     Constitutional-- No Fever, chills or sweats.  Appetite good, and no malaise. No fatigue.  Heent--chronic hoarse voice.  No new vision, hearing or throat complaints.  No epistaxis or oral sores.   No flashers, floaters or eye pain.   No headache, photophobia or neck stiffness.  Chronic PEG tube  CV-- No chest pain, palpitation or syncope  Resp-- No SOB/cough/Hemoptysis  GI- No nausea, vomiting, or diarrhea.  No hematochezia, melena, or hematemesis. Denies jaundice or chronic liver disease.  -- No dysuria, hematuria, or flank pain.  Denies hesitancy, urgency or flank pain.  Lymph- no swollen lymph nodes in neck/axilla or groin.   Heme- No active bruising or bleeding; no Hx of DVT or PE.  MS-- no swelling or pain in the bones or joints of arms/legs.  No new back pain.  Neuro-- No acute focal weakness or numbness in the arms or legs.  No seizures.     Full 12 point review of systems reviewed and negative otherwise for acute complaints, except for above      PE:   /71 (BP Location: Left arm, Patient Position: Lying)   " "Pulse 75   Temp 97.8 °F (36.6 °C) (Oral)   Resp 16   Ht 193 cm (75.98\")   Wt 100 kg (220 lb 7.4 oz)   SpO2 96%   BMI 26.85 kg/m²          GENERAL: awake;  in no acute distress.  chronic Hoarse voice noted and chronic per him  HEENT: Normocephalic, atraumatic.   No conjunctival injection. No icterus. Oropharynx   without evidence of thrush or exudate. No evidence of peridontal disease.    NECK: Supple without nuchal rigidity. No mass.   HEART: RRR; No murmur, rubs, gallops.   LUNGS: Diminished at bases but otherwise clear to auscultation bilaterally without wheezing, rales, rhonchi. Normal respiratory effort. Nonlabored. No dullness.  ABDOMEN: Soft, nontender, nondistended. Positive bowel sounds. No rebound or guarding. NO mass or HSM.  PEG tube noted  EXT:  No cyanosis, clubbing;No cord.   MSK: FROM without joint effusions noted arms/legs.    SKIN: Warm and dry without cutaneous eruptions on Inspection/palpation.    NEURO: awake     Right lower extremity surgical site noted;  No new redness;  no discrete mass bulge or fluctuance.  No crepitus or bulla.       No peripheral stigmata/phenomenon of endocarditis     IV without obvious redness or drainage     Left BKA site with no redness induration or warmth.  No discrete mass bulge or fluctuance.    Laboratory Data    Results from last 7 days   Lab Units 12/07/22  0436 12/04/22  1149 12/03/22  0936   WBC 10*3/mm3 6.24 6.80 8.91   HEMOGLOBIN g/dL 9.2* 9.7* 9.3*   HEMATOCRIT % 30.1* 31.6* 30.6*   PLATELETS 10*3/mm3 388 420 425     Results from last 7 days   Lab Units 12/07/22  0436   SODIUM mmol/L 136   POTASSIUM mmol/L 4.3   CHLORIDE mmol/L 103   CO2 mmol/L 24.0   BUN mg/dL 25*   CREATININE mg/dL 0.89   GLUCOSE mg/dL 181*   CALCIUM mg/dL 9.3                   Estimated Creatinine Clearance: 128 mL/min (by C-G formula based on SCr of 0.89 mg/dL).      Microbiology:      Radiology:  Imaging Results (Last 72 Hours)     Procedure Component Value Units Date/Time    SLP " FEES - Fiberoptic Endo Eval Swallow [[id number]] Resulted: 12/06/22 1142     Updated: 12/06/22 1142    Narrative:      This procedure was auto-finalized with no dictation required.            Impression:     --Acute right foot/ankle with multifocal ulceration, cellulitis/wound infection and probable osteomyelitis with probe to bone at the lateral foot and abnormal MRI.  Other comorbidities as outlined above and high risk for further serious morbidity and other serious sequelae including persistent/recurrent or nonhealing wounds, persistent/progressive or recurrent infection and risk for further functional/limb loss/amputation.  Surgery following to help define timing/option of threshold for any future surgical intervention .      **Surgery 12/2 with BKA    --MRSA bacteremia from November 30 (daptomycin sensitive).  Presumed from foot but also risk for endovascular focus particularly with  History of prior MRSA bloodstream infection.  Transthoracic echocardiogram limited per cardiology.  JERALD pending     --History MRSA with bacteremia by his report in spring/summer 2022 and treated with 6 weeks of vancomycin in [place].  Specifics unclear and trying to retrieve information    --coag-negative staph in blood culture likely contaminant     --Chronic vocal cord paralysis per notes with chronic hoarseness and indwelling PEG tube.  Medicine team to clarify     --History left BKA.     --Diabetes.  You need to tightly control blood sugar to give best chance for healing     --History of DVT with IVC filter    --history of remote injection drug abuse; he denies injection drug abuse for 17 years by his report     PLAN:      -- Daptomycin  IV potentially 6 weeks but final duration to depend on clinical course of study results and response to therapy     -- Check/review labs cultures and scans     -- Partial history per nursing staff     -- Discussed with microbiology and family     --d/w Dr Peguero      -- Highly complex  set of issues with high risk for further serious morbidity and other serious sequela     --JERALD pending     **Copied text in this note has been reviewed and is accurate as of 12/09/22.     Zeke George MD  12/9/2022

## 2022-12-09 NOTE — PROGRESS NOTES
[facility] Medicine Services  PROGRESS NOTE    Patient Name: Buster Velasco  : 1965  MRN: [id number]    Date of Admission: 2022  Primary Care Physician: Ludivina Bowers MD    Subjective   Subjective     CC:  F/U s/p right BKA    HPI:  Patient seen this morning. No major complaints. Going for JERALD today. He is hungry.    ROS:  Gen-no fevers, no chills  CV-no chest pain, no palpitations  Resp-no cough, no dyspnea  GI-no N/V/D, no abd pain    All other systems reviewed and negative except any additional pertinent positives and negatives as discussed in HPI.       Objective   Objective     Vital Signs:   Temp:  [97.6 °F (36.4 °C)-98.2 °F (36.8 °C)] 97.6 °F (36.4 °C)  Heart Rate:  [61-79] 79  Resp:  [16-18] 16  BP: ()/(71-81) 102/71  Flow (L/min):  [2] 2     Physical Exam:  Gen-no acute distress  HENT-NCAT, mucous membranes moist  CV-RRR, S1 S2 normal, no m/r/g  Resp-CTAB, no wheezes or rales  Abd-soft, NT, ND, +BS  Ext-left BKA present, new right BKA with dressing intact  Neuro-A&Ox3, no focal deficits  Skin-no rashes  Psych-appropriate mood  No change from yesterday      Results Reviewed:  LAB RESULTS:      Lab 22  0436 22  1149 22  0936   WBC 6.24 6.80 8.91   HEMOGLOBIN 9.2* 9.7* 9.3*   HEMATOCRIT 30.1* 31.6* 30.6*   PLATELETS 388 420 425   NEUTROS ABS  --   --  6.88   IMMATURE GRANS (ABS)  --   --  0.09*   LYMPHS ABS  --   --  1.33   MONOS ABS  --   --  0.57   EOS ABS  --   --  0.02   MCV 85.0 84.7 85.5         Lab 22  0436 22  1149 22  0936   SODIUM 136 136 135*   POTASSIUM 4.3 4.3 4.4   CHLORIDE 103 101 102   CO2 24.0 25.0 25.0   ANION GAP 9.0 10.0 8.0   BUN 25* 16 17   CREATININE 0.89 0.90 1.26   EGFR 99.3 99.0 66.1   GLUCOSE 181* 118* 211*   CALCIUM 9.3 9.1 9.1                         Brief Urine Lab Results  (Last result in the past 365 days)      Color   Clarity   Blood   Leuk Est   Nitrite   Protein   CREAT   Urine HCG         08/20/22 0108 Yellow   Clear   Negative   Negative     Negative                 Microbiology Results Abnormal     Procedure Component Value - Date/Time    Blood Culture - Blood, Wrist, Left [[id number]]  (Normal) Collected: 12/06/22 1402    Lab Status: Preliminary result Specimen: Blood from Wrist, Left Updated: 12/08/22 1545     Blood Culture No growth at 2 days    Blood Culture - Blood, Arm, Left [[id number]]  (Normal) Collected: 12/06/22 1402    Lab Status: Preliminary result Specimen: Blood from Arm, Left Updated: 12/08/22 1545     Blood Culture No growth at 2 days          No radiology results from the last 24 hrs    Results for orders placed during the hospital encounter of 11/30/22    Adult Transthoracic Echo Complete W/ Cont if Necessary Per Protocol    Interpretation Summary  •  Left ventricular systolic function is normal. Estimated left ventricular EF = 65%  •  The right ventricular cavity is mildly dilated.  •  Saline test results are negative.      I have reviewed the medications:  Scheduled Meds:atorvastatin, 20 mg, Per PEG Tube, Nightly  DAPTOmycin, 8 mg/kg, Intravenous, Q24H  insulin lispro, 0-7 Units, Subcutaneous, TID AC  methadone, 10 mg, Oral, Q6H  metoprolol tartrate, 25 mg, Per PEG Tube, Q12H  senna-docusate sodium, 2 tablet, Oral, BID  sodium chloride, 10 mL, Intravenous, Q12H  sodium chloride, 10 mL, Intravenous, Q12H      Continuous Infusions:lactated ringers, 9 mL/hr  ropivacaine,       PRN Meds:.•  acetaminophen  •  senna-docusate sodium **AND** polyethylene glycol **AND** bisacodyl **AND** bisacodyl  •  dextrose  •  dextrose  •  glucagon (human recombinant)  •  hydrOXYzine  •  melatonin  •  Morphine  •  ondansetron **OR** ondansetron  •  sennosides-docusate  •  sodium chloride  •  sodium chloride  •  sodium chloride    Assessment & Plan   Assessment & Plan     Active Hospital Problems    Diagnosis  POA   • **Right foot infection [L08.9]  Yes   • MRSA bacteremia [R78.81, B95.62]   Unknown   • Sepsis (HCC) [A41.9]  Yes   • Hypoglycemia [E16.2]  Yes   • Anemia [D64.9]  Yes   • Hyponatremia [E87.1]  Yes   • Hypoalbuminemia [E88.09]  Yes   • Essential hypertension [I10]  Yes   • Hyperlipidemia [E78.5]  Yes   • Paroxysmal atrial fibrillation (HCC) [I48.0]  Yes      Resolved Hospital Problems   No resolved problems to display.        Brief Hospital Course to date:  Buster Velasco is a 57 y.o. male with hx of prior osteomyelitis s/p left BKA, s/p right transmetatarsal amputation, left hand 3rd metacarpal resection (April 2022 at OSH, had 6 weeks of IV antibiotics for MRSA bacteremia), remote hx of IVDA, more recent hx of polysubstance abuse (meth), and DVT s/p IVC filter who presents with right foot wounds. S/p right BKA on 12/2/22 with Dr. Peguero. His blood cultures are growing MRSA. ID assisting with workup and management.      This patient's problems and plans were partially entered by my partner and updated as appropriate by me 12/09/22.    Sepsis secondary acute right foot non-healing wound and likely osteomyelitis  MRSA bacteremia  - s/p right BKA 12/2/22 with Dr. Peguero  - ID following, currently on Daptomycin monotherapy  - Blood cultures now with coag negative Staph and MRSA, have discussed with Dr. George, he will require IV antibiotics at discharge for at least 6 weeks but final duration TBD based on rest of workup  - Repeat blood cultures NGTD  - TTE no acute findings, JERALD planned for today  - Probably not a good candidate for PICC line/home IV antibiotics given hx of substance abuse, current plan is discharge to rehab     Recurrent hypoglycemia in setting of DMII   - HbA1c 6.9%  - High dose Tresiba qAM before arrival which has been held  - Briefly required D5, now discontinued  - Glucose was trending back up, added back low dose SSI     Chronic pain on opioids   Previous IVDA/substance abuse  - Continue home methadone 10 mg QID  - Morphine for breakthrough post-op pain -  discontinued 12/8/22 as he is not using it  - Patient denied hx of IVDA to me but in [facility] admission notes from April 2022 at [facility], they documented prior IVDA/substance abuse, most recently with meth in [facility] from 4/2/22; he has told ID that his last IVDA was 17 years ago     Reported hx of Afib   - Patient denies any hx of Afib. Not previously on anticoagulation and given murky history will not start.   - On Metoprolol at home which is continued     Chronic vocal cord paralysis s/p PEG  Dysphagia  - SLP evaluation: regular diet but with nectar thick liquids recommended, patient initially wanted thin liquids and understood risk of aspiration, however currently has been drinking the nectar thick liquids; could change to pure comfort diet if he absolutely refuses nectar thick  - SLP should continue to follow for possible advancement of diet recommendations  - Unclear where/when PEG was placed, will eventually need it removed but defer to outpatient setting as it was not placed here; routine PEG care/flushing per nursing as needed     Hypotension with h/o HTN  - Hold parameters with metoprolol  - Hypotension resolved     HLD   - Continue statin     DVT s/p IVC filter   - Placed in spring 2022 per patient. Defer to PCP for further assessment and time of removal.     Hx of seizure in setting of meth use   - Not on AED's given meth trigger     BPH   - Continue Flomax       Expected Discharge Location and Transportation: rehab  Expected Discharge Date: 12/12/22, TBD, pending JERALD and final ID recommendations    DVT prophylaxis:  Mechanical DVT prophylaxis orders are present.     AM-PAC 6 Clicks Score (PT): 9 (12/08/22 0840)    CODE STATUS:   Code Status and Medical Interventions:   Ordered at: 12/01/22 0035     Code Status (Patient has no pulse and is not breathing):    CPR (Attempt to Resuscitate)     Medical Interventions (Patient has pulse or is breathing):    Full Support       Carmen Bolaños,  MD  12/09/22

## 2022-12-10 LAB
GLUCOSE BLDC GLUCOMTR-MCNC: 136 MG/DL (ref 70–130)
GLUCOSE BLDC GLUCOMTR-MCNC: 162 MG/DL (ref 70–130)
GLUCOSE BLDC GLUCOMTR-MCNC: 175 MG/DL (ref 70–130)
GLUCOSE BLDC GLUCOMTR-MCNC: 221 MG/DL (ref 70–130)

## 2022-12-10 PROCEDURE — 82962 GLUCOSE BLOOD TEST: CPT

## 2022-12-10 PROCEDURE — 63710000001 INSULIN LISPRO (HUMAN) PER 5 UNITS: Performed by: HOSPITALIST

## 2022-12-10 PROCEDURE — 25010000002 DAPTOMYCIN PER 1 MG: Performed by: INTERNAL MEDICINE

## 2022-12-10 PROCEDURE — 99232 SBSQ HOSP IP/OBS MODERATE 35: CPT | Performed by: HOSPITALIST

## 2022-12-10 PROCEDURE — 92526 ORAL FUNCTION THERAPY: CPT

## 2022-12-10 PROCEDURE — 99024 POSTOP FOLLOW-UP VISIT: CPT | Performed by: THORACIC SURGERY (CARDIOTHORACIC VASCULAR SURGERY)

## 2022-12-10 RX ADMIN — Medication 10 ML: at 11:46

## 2022-12-10 RX ADMIN — Medication 10 ML: at 20:07

## 2022-12-10 RX ADMIN — DAPTOMYCIN 800 MG: 500 INJECTION, POWDER, LYOPHILIZED, FOR SOLUTION INTRAVENOUS at 10:07

## 2022-12-10 RX ADMIN — METHADONE HYDROCHLORIDE 10 MG: 10 TABLET ORAL at 08:26

## 2022-12-10 RX ADMIN — METOPROLOL TARTRATE 25 MG: 25 TABLET, FILM COATED ORAL at 08:26

## 2022-12-10 RX ADMIN — INSULIN LISPRO 3 UNITS: 100 INJECTION, SOLUTION INTRAVENOUS; SUBCUTANEOUS at 16:57

## 2022-12-10 RX ADMIN — METHADONE HYDROCHLORIDE 10 MG: 10 TABLET ORAL at 20:06

## 2022-12-10 RX ADMIN — METHADONE HYDROCHLORIDE 10 MG: 10 TABLET ORAL at 01:54

## 2022-12-10 RX ADMIN — INSULIN LISPRO 2 UNITS: 100 INJECTION, SOLUTION INTRAVENOUS; SUBCUTANEOUS at 11:46

## 2022-12-10 RX ADMIN — METOPROLOL TARTRATE 25 MG: 25 TABLET, FILM COATED ORAL at 20:06

## 2022-12-10 RX ADMIN — ATORVASTATIN CALCIUM 20 MG: 20 TABLET, FILM COATED ORAL at 20:07

## 2022-12-10 RX ADMIN — Medication 10 ML: at 10:07

## 2022-12-10 RX ADMIN — METHADONE HYDROCHLORIDE 10 MG: 10 TABLET ORAL at 14:00

## 2022-12-10 RX ADMIN — HYDROXYZINE HYDROCHLORIDE 50 MG: 25 TABLET ORAL at 20:17

## 2022-12-10 NOTE — PROGRESS NOTES
[facility] Progress Note        Antibiotics:  Anti-Infectives (From admission, onward)    Ordered     Dose/Rate Route Frequency Start Stop    12/07/22 0603  DAPTOmycin (CUBICIN) 800 mg in sodium chloride 0.9 % 50 mL IVPB        Ordering Provider: Zeke George MD    8 mg/kg × 100 kg  100 mL/hr over 30 Minutes Intravenous Every 24 Hours 12/07/22 1000 12/15/22 0959    11/30/22 2219  vancomycin 2000 mg/500 mL 0.9% NS IVPB ([facility])        Ordering Provider: Reggie Batres MD    20 mg/kg × 98.9 kg  over 2 Hours Intravenous Once 11/30/22 2221 12/01/22 0315    11/30/22 2219  piperacillin-tazobactam (ZOSYN) 3.375 g in iso-osmotic dextrose 50 ml (premix)        Ordering Provider: Reggie Batres MD    3.375 g Intravenous Once 11/30/22 2221 11/30/22 2335          CC: foot infection    HPI:    Patient is a 57 y.o.  Yr old male with history of prior lower extremity infection/amputations done in [facility][1].  He has a history of left BKA years ago.  More recent right transmetatarsal amputation but specific dates unclear and unclear who the surgeon was.  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  He has history DVT/IVC filter, diabetes and other comorbidities as below.  He reports a chronic right lateral foot wound present for months but apparently not precipitated by any specific event or trauma.  He is admitted to the hospital on November 30 with deterioration at the foot including redness/swelling    **patient had reported remote drug abuse (initially to me reported as IV drug abuse but then later he reported not injection use and [facility]; +drug screen earlier this year per d/w Dr Chau for Meth at [facility]),   chronic methadone.    ** patient reports MRSA  "blood stream infection treated in [place] spring of 2022, 6 weeks of vancomycin by his report.  Otherwise data deficit.  Try to retrieve information     12/2/22 Dr Peguero did surgery  \"Procedure(s): Mid level right below-knee amputation and primary closure \"    12/6 with MRSA in blood culture;  TTE done but limited per cardiology    12/9/22   JERALD no vegetation per cardiology    12/10/22 Case management considering options.No new focal pain or distress per nursing;  postop pain to the right LE which is controlled/dull at present, constant, nonradiating, worse with palpation, generally better with pain meds and 2  out of 10 in severity.  Not progressive     No headache photophobia or neck stiffness.  No shortness of breath cough or hemoptysis.  No nausea vomiting diarrhea or abdominal pain.  No dysuria hematuria or pyuria.  No other new skin rash       ROS:      12/10/22 No f/c/s. No n/v/d. No rash. No new ADR to Abx.     Constitutional-- No Fever, chills or sweats.  Appetite good, and no malaise. No fatigue.  Heent--chronic hoarse voice.  No new vision, hearing or throat complaints.  No epistaxis or oral sores.   No flashers, floaters or eye pain.   No headache, photophobia or neck stiffness.  Chronic PEG tube  CV-- No chest pain, palpitation or syncope  Resp-- No SOB/cough/Hemoptysis  GI- No nausea, vomiting, or diarrhea.  No hematochezia, melena, or hematemesis. Denies jaundice or chronic liver disease.  -- No dysuria, hematuria, or flank pain.  Denies hesitancy, urgency or flank pain.  Lymph- no swollen lymph nodes in neck/axilla or groin.   Heme- No active bruising or bleeding; no Hx of DVT or PE.  MS-- no swelling or pain in the bones or joints of arms/legs.  No new back pain.  Neuro-- No acute focal weakness or numbness in the arms or legs.  No seizures.     Full 12 point review of systems reviewed and negative otherwise for acute complaints, except for above      PE:   /76 (BP Location: Left " "arm, Patient Position: Lying)   Pulse 68   Temp 97.7 °F (36.5 °C) (Oral)   Resp 16   Ht 193 cm (75.98\")   Wt 100 kg (220 lb 7.4 oz)   SpO2 96%   BMI 26.85 kg/m²          GENERAL: awake;  in no acute distress.  chronic Hoarse voice noted and chronic per him  HEENT: Normocephalic, atraumatic.   No conjunctival injection. No icterus. Oropharynx   without evidence of thrush or exudate. No evidence of peridontal disease.    NECK: Supple without nuchal rigidity. No mass.   HEART: RRR; No murmur, rubs, gallops.   LUNGS: Diminished at bases but otherwise clear to auscultation bilaterally without wheezing, rales, rhonchi. Normal respiratory effort. Nonlabored. No dullness.  ABDOMEN: Soft, nontender, nondistended. Positive bowel sounds. No rebound or guarding. NO mass or HSM.  PEG tube noted  EXT:  No cyanosis, clubbing;No cord.   MSK: FROM without joint effusions noted arms/legs.    SKIN: Warm and dry without cutaneous eruptions on Inspection/palpation.    NEURO: awake     Right lower extremity surgical site noted;  No new redness;  no discrete mass bulge or fluctuance.  No crepitus or bulla.       No peripheral stigmata/phenomenon of endocarditis     IV without obvious redness or drainage     Left BKA site with no redness induration or warmth.  No discrete mass bulge or fluctuance.    Laboratory Data    Results from last 7 days   Lab Units 12/07/22  0436 12/04/22  1149 12/03/22  0936   WBC 10*3/mm3 6.24 6.80 8.91   HEMOGLOBIN g/dL 9.2* 9.7* 9.3*   HEMATOCRIT % 30.1* 31.6* 30.6*   PLATELETS 10*3/mm3 388 420 425     Results from last 7 days   Lab Units 12/07/22  0436   SODIUM mmol/L 136   POTASSIUM mmol/L 4.3   CHLORIDE mmol/L 103   CO2 mmol/L 24.0   BUN mg/dL 25*   CREATININE mg/dL 0.89   GLUCOSE mg/dL 181*   CALCIUM mg/dL 9.3                   Estimated Creatinine Clearance: 128 mL/min (by C-G formula based on SCr of 0.89 mg/dL).      Microbiology:      Radiology:  Imaging Results (Last 72 Hours)     ** No results " found for the last 72 hours. **            Impression:     --Acute right foot/ankle with multifocal ulceration, cellulitis/wound infection and probable osteomyelitis with probe to bone at the lateral foot and abnormal MRI.  Other comorbidities as outlined above and high risk for further serious morbidity and other serious sequelae including persistent/recurrent or nonhealing wounds, persistent/progressive or recurrent infection and risk for further functional/limb loss/amputation.  Surgery following to help define timing/option of threshold for any future surgical intervention .      **Surgery 12/2 with BKA    --MRSA bacteremia from November 30 (daptomycin sensitive).  Presumed from foot but also risk for endovascular focus particularly with  History of prior MRSA bloodstream infection.  Transthoracic echocardiogram limited per cardiology.  JERALD no vegetation per cardiology; follow-up blood cultures negative so far from December 6    --History MRSA with bacteremia by his report in spring/summer 2022 and treated with 6 weeks of vancomycin in [place].  Specifics unclear and trying to retrieve information    --coag-negative staph in blood culture likely contaminant     --Chronic vocal cord paralysis per notes with chronic hoarseness and indwelling PEG tube.  Medicine team to clarify     --History left BKA.     --Diabetes.  You need to tightly control blood sugar to give best chance for healing     --History of DVT with IVC filter    --history of remote injection drug abuse; he denies injection drug abuse for 17 years by his report     PLAN:      -- Daptomycin  IV potentially 6 weeks but final duration to depend on clinical course of study results and response to therapy     -- Check/review labs cultures and scans     -- Partial history per nursing staff     -- Discussed with microbiology and family     --d/w Dr Peguero      -- Highly complex set of issues with high risk for further serious morbidity and other  serious sequela     --JERALD noted     **Copied text in this note has been reviewed and is accurate as of 12/10/22.     Zeke George MD  12/10/2022

## 2022-12-10 NOTE — PLAN OF CARE
Patient VSS, A&Ox4, telemetry discontinued. Pain is controlled with scheduled methadone.  Dressing to R BKA CDI and elevated on pillows. Specialty bed in place, weigh shifting self in bed, bottom red blanchable/open area on coccyx w/ z-guard placed.  Voiding well. Continue to monitor

## 2022-12-10 NOTE — THERAPY TREATMENT NOTE
Acute Care - Speech Language Pathology   Swallow Treatment Note [facility]     Patient Name: Buster Velasco  : 1965  MRN: [id number]  Today's Date: 12/10/2022               Admit Date: 2022    Visit Dx:     ICD-10-CM ICD-9-CM   1. Right foot infection  L08.9 686.9   2. Hypoglycemia  E16.2 251.2   3. Decubitus ulcer of coccygeal region, unspecified ulcer stage  L89.159 707.03     707.20   4. Pharyngeal dysphagia  R13.13 787.23     Patient Active Problem List   Diagnosis   • Right foot infection   • Hypoglycemia   • Anemia   • Hyponatremia   • Hypoalbuminemia   • Essential hypertension   • Hyperlipidemia   • Paroxysmal atrial fibrillation (HCC)   • Sepsis (HCC)   • MRSA bacteremia     Past Medical History:   Diagnosis Date   • Diabetes (HCC)    • DVT (deep venous thrombosis) (HCC)    • Hypertension    • Mood disorder (HCC)      Past Surgical History:   Procedure Laterality Date   • BELOW KNEE AMPUTATION Left    • BELOW KNEE AMPUTATION Right 2022    Procedure: AMPUTATION BELOW KNEE RIGHT;  Surgeon: John Peguero MD;  Location: [facility];  Service: Vascular;  Laterality: Right;   • TRANS METATARSAL AMPUTATION Right        SLP Recommendation and Plan     SLP Diet Recommendation: regular textures, no mixed consistencies, nectar thick liquids, ice chips between meals after oral care, with supervision (12/10/22 1450)  Recommended Precautions and Strategies: no straw, upright posture during/after eating, small bites of food and sips of liquid, general aspiration precautions, other (see comments) (tsp sized sips nectar-thick) (12/10/22 1450)  SLP Rec. for Method of Medication Administration: meds whole, meds crushed, with puree, as tolerated (12/10/22 1450)     Monitor for Signs of Aspiration: notify SLP if any concerns (12/10/22 1450)        Anticipated Discharge Disposition (SLP): anticipate therapy at next level of care (12/10/22 1450)     Therapy Frequency (Swallow): 5 days per week  (12/10/22 1450)  Predicted Duration Therapy Intervention (Days): until discharge (12/10/22 1450)        Daily Summary of Progress (SLP): progress toward functional goals as expected (12/10/22 1450)               Treatment Assessment (SLP): suspected, continued, pharyngeal dysphagia (12/10/22 1450)  Treatment Assessment Comments (SLP): Reviewed & completed dysphagia exercises w/ pt. Left new handout & encouraged independent practice. SLP will continue to follow for tx. (12/10/22 1450)  Plan for Continued Treatment (SLP): continue treatment per plan of care (12/10/22 1450)         Plan of Care Reviewed With: patient      SWALLOW EVALUATION (last 72 hours)     SLP Adult Swallow Evaluation     Row Name 12/10/22 1450                   Rehab Evaluation    Document Type therapy note (daily note)  -MP        Subjective Information no complaints  -MP        Patient Observations alert;cooperative  -MP        Patient/Family/Caregiver Comments/Observations No family present  -MP        Patient Effort good  -MP           Pain    Additional Documentation Pain Scale: FACES Pre/Post-Treatment (Group)  -MP           Pain Scale: FACES Pre/Post-Treatment    Pain: FACES Scale, Pretreatment 0-->no hurt  -MP        Posttreatment Pain Rating 0-->no hurt  -MP           SLP Treatment Clinical Impressions    Treatment Assessment (SLP) suspected;continued;pharyngeal dysphagia  -MP        Treatment Assessment Comments (SLP) Reviewed & completed dysphagia exercises w/ pt. Left new handout & encouraged independent practice. SLP will continue to follow for tx.  -MP        Daily Summary of Progress (SLP) progress toward functional goals as expected  -MP        Barriers to Overall Progress (SLP) Baseline deficits  -MP        Plan for Continued Treatment (SLP) continue treatment per plan of care  -MP        Care Plan Review care plan/treatment goals reviewed;patient/other agree to care plan  -MP           Recommendations    Therapy Frequency  (Swallow) 5 days per week  -MP        Predicted Duration Therapy Intervention (Days) until discharge  -MP        SLP Diet Recommendation regular textures;no mixed consistencies;nectar thick liquids;ice chips between meals after oral care, with supervision  -MP        Recommended Precautions and Strategies no straw;upright posture during/after eating;small bites of food and sips of liquid;general aspiration precautions;other (see comments)  tsp sized sips nectar-thick  -MP        Oral Care Recommendations Oral Care BID/PRN;Suction toothbrush  -MP        SLP Rec. for Method of Medication Administration meds whole;meds crushed;with puree;as tolerated  -MP        Monitor for Signs of Aspiration notify SLP if any concerns  -MP        Anticipated Discharge Disposition (SLP) anticipate therapy at next level of care  -MP              User Key  (r) = Recorded By, (t) = Taken By, (c) = Cosigned By    Initials Name Effective Dates    MP Jordan Crystal, MS CCC-SLP 12/28/21 -                 EDUCATION  The patient has been educated in the following areas:   Dysphagia (Swallowing Impairment) Modified Diet Instruction.        SLP GOALS     Row Name 12/10/22 1450             (LTG) Patient will demonstrate functional swallow for    Diet Texture (Demonstrate functional swallow) regular textures  -MP      Liquid viscosity (Demonstrate functional swallow) nectar/ mildly thick liquids  -MP      [place] (Demonstrate functional swallow) independently (over 90% accuracy)  -MP      Time Frame (Demonstrate functional swallow) by discharge  -MP      Progress/Outcomes (Demonstrate functional swallow) continuing progress toward goal  -MP         (STG) Patient will tolerate trials of    Consistencies Trialed (Tolerate trials) regular textures;nectar/ mildly thick liquids  -MP      Desired Outcome (Tolerate trials) without signs/symptoms of aspiration;without signs of distress  -MP      [place] (Tolerate trials) independently  (over 90% accuracy)  -MP      Time Frame (Tolerate trials) by discharge  -MP      Progress/Outcomes (Tolerate trials) continuing progress toward goal  -MP         (STG) Patient will tolerate therapeutic trials of    Consistencies Trialed (Tolerate therapeutic trials) thin liquids  -MP      Desired Outcome (Tolerate therapeutic trials) without signs/symptoms of aspiration;without signs of distress  -MP      [place] (Tolerate therapeutic trials) with minimal cues (75-90% accuracy)  -MP      Time Frame (Tolerate therapeutic trials) 1 week  -MP      Progress/Outcomes (Tolerate therapeutic trials) goal ongoing  -MP         (STG) Pharyngeal Strengthening Exercise Goal 1 (SLP)    Increase Superior Movement of the Hyolaryngeal Complex effortful pitch glide (falsetto + pharyngeal squeeze)  -MP      Increase Anterior Movement of the Hyolaryngeal Complex chin tuck against resistance (CTAR)  -MP      Increase Epiglottic Inversion and Retroflexion hard effortful swallow  -MP      Increase Closure at Entrance to Airway/Closure of Airway at Glottis supraglottic swallow  -MP      Increase Squeeze/Positive Pressure Generation hard effortful swallow  -MP      Increase Tongue Base Retraction janine  -MP      [place]/Accuracy (Pharyngeal Strengthening Goal 1, SLP) with minimal cues (75-90% accuracy)  -MP      Time Frame (Pharyngeal Strengthening Goal 1, SLP) short term goal (STG)  -MP      Progress/Outcomes (Pharyngeal Strengthening Goal 1, SLP) continuing progress toward goal  -MP      Comment (Pharyngeal Strengthening Goal 1, SLP) Reviewed & completed - left new handout  -MP         (STG) Swallow Management Recall Goal 1 (SLP)    Activity (Swallow Management Recall Goal 1, SLP) independent recall of;compensatory swallow strategies/techniques;safe diet/liquid level  -MP      [place]/Accuracy (Swallow Management Recall Goal 1, SLP) independently (over 90% accuracy)  -MP      Time Frame (Swallow Management Recall Goal 1,  SLP) short term goal (STG)  -MP      Progress/Outcomes (Swallow Management Recall Goal 1, SLP) continuing progress toward goal  -MP         (STG) Swallow Compensatory Strategies Goal 1 (SLP)    Activity (Swallow Compensatory Strategies/Techniques Goal 1, SLP) compensatory strategies;small cup sips;during p.o. trials;during meal intake;other (see comments)  -MP      [place]/Accuracy (Swallow Compensatory Strategies/Techniques Goal 1, SLP) independently (over 90% accuracy)  -MP      Time Frame (Swallow Compensatory Strategies/Techniques Goal 1, SLP) short term goal (STG)  -MP      Progress/Outcomes (Swallow Compensatory Strategies/Techniques Goal 1, SLP) continuing progress toward goal  -MP            User Key  (r) = Recorded By, (t) = Taken By, (c) = Cosigned By    Initials Name Provider Type    Jordan Morales MS CCC-SLP Speech and Language Pathologist                   Time Calculation:    Time Calculation- SLP     Row Name 12/10/22 1505             Time Calculation- SLP    SLP Start Time 1450  -MP      SLP Received On 12/10/22  -MP         Untimed Charges    48425-YS Treatment Swallow Minutes 25  -MP         Total Minutes    Untimed Charges Total Minutes 25  -MP       Total Minutes 25  -MP            User Key  (r) = Recorded By, (t) = Taken By, (c) = Cosigned By    Initials Name Provider Type    Jordan Morales MS CCC-SLP Speech and Language Pathologist                Therapy Charges for Today     Code Description Service Date Service Provider Modifiers Qty    [id number]  ST TREATMENT SWALLOW 2 12/10/2022 Jordan Crystal MS CCC-SLP GN 1               MS ALIA Barnes  12/10/2022

## 2022-12-10 NOTE — PROGRESS NOTES
[facility] Medicine Services  PROGRESS NOTE    Patient Name: Buster Velasco  : 1965  MRN: [id number]    Date of Admission: 2022  Primary Care Physician: Ludivina Bowers MD    Subjective   Subjective     CC:  F/U s/p right BKA    HPI:  Patient seen this morning. No issues overnight. No complaints.     ROS:  Gen-no fevers, no chills  CV-no chest pain, no palpitations  Resp-no cough, no dyspnea  GI-no N/V/D, no abd pain    All other systems reviewed and negative except any additional pertinent positives and negatives as discussed in HPI.       Objective   Objective     Vital Signs:   Temp:  [97.6 °F (36.4 °C)-98.1 °F (36.7 °C)] 98.1 °F (36.7 °C)  Heart Rate:  [68-94] 74  Resp:  [16-18] 18  BP: ()/(62-88) 110/74  Flow (L/min):  [2] 2     Physical Exam:  Gen-no acute distress  HENT-NCAT, mucous membranes moist  CV-RRR, S1 S2 normal, no m/r/g  Resp-CTAB, no wheezes or rales  Abd-soft, NT, ND, +BS  Ext-left BKA present, new right BKA with dressing intact  Neuro-A&Ox3, no focal deficits  Skin-no rashes  Psych-appropriate mood  No change from yesterday      Results Reviewed:  LAB RESULTS:      Lab 22  0436 22  1149   WBC 6.24 6.80   HEMOGLOBIN 9.2* 9.7*   HEMATOCRIT 30.1* 31.6*   PLATELETS 388 420   MCV 85.0 84.7         Lab 22  0436 22  1149   SODIUM 136 136   POTASSIUM 4.3 4.3   CHLORIDE 103 101   CO2 24.0 25.0   ANION GAP 9.0 10.0   BUN 25* 16   CREATININE 0.89 0.90   EGFR 99.3 99.0   GLUCOSE 181* 118*   CALCIUM 9.3 9.1                         Brief Urine Lab Results  (Last result in the past 365 days)      Color   Clarity   Blood   Leuk Est   Nitrite   Protein   CREAT   Urine HCG        22 0108 Yellow   Clear   Negative   Negative     Negative                 Microbiology Results Abnormal     Procedure Component Value - Date/Time    Blood Culture - Blood, Wrist, Left [[id number]]  (Normal) Collected: 22 1402    Lab Status:  Preliminary result Specimen: Blood from Wrist, Left Updated: 12/09/22 1545     Blood Culture No growth at 3 days    Blood Culture - Blood, Arm, Left [[id number]]  (Normal) Collected: 12/06/22 1402    Lab Status: Preliminary result Specimen: Blood from Arm, Left Updated: 12/09/22 1545     Blood Culture No growth at 3 days          Adult Transesophageal Echo (JERALD) W/ Cont if Necessary Per Protocol    Result Date: 12/9/2022  •  Left ventricular systolic function is normal. Estimated left ventricular EF = 55% •  The aortic valve is structurally normal with no stenosis present. Trace aortic valve regurgitation is present. There is no evidence of an aortic valve mass is present. •  There is mild, anterior mitral leaflet thickening present. No evidence of a mitral valve mass is present. Trace mitral valve regurgitation is present. No significant mitral valve stenosis is present •  The tricuspid valve is normal in structure. There is no evidence of a mass on the tricuspid valve. Mild tricuspid valve regurgitation is present. •  There is mild thickening of the pulmonic valve. There is trace pulmonic valve regurgitation present. There is no pulmonic valve stenosis present.       Results for orders placed during the hospital encounter of 11/30/22    Adult Transesophageal Echo (JERALD) W/ Cont if Necessary Per Protocol    Interpretation Summary  •  Left ventricular systolic function is normal. Estimated left ventricular EF = 55%  •  The aortic valve is structurally normal with no stenosis present. Trace aortic valve regurgitation is present. There is no evidence of an aortic valve mass is present.  •  There is mild, anterior mitral leaflet thickening present. No evidence of a mitral valve mass is present. Trace mitral valve regurgitation is present. No significant mitral valve stenosis is present  •  The tricuspid valve is normal in structure. There is no evidence of a mass on the tricuspid valve. Mild tricuspid valve regurgitation  is present.  •  There is mild thickening of the pulmonic valve. There is trace pulmonic valve regurgitation present. There is no pulmonic valve stenosis present.      I have reviewed the medications:  Scheduled Meds:atorvastatin, 20 mg, Per PEG Tube, Nightly  DAPTOmycin, 8 mg/kg, Intravenous, Q24H  insulin lispro, 0-7 Units, Subcutaneous, TID AC  methadone, 10 mg, Oral, Q6H  metoprolol tartrate, 25 mg, Per PEG Tube, Q12H  senna-docusate sodium, 2 tablet, Oral, BID  sodium chloride, 10 mL, Intravenous, Q12H  sodium chloride, 10 mL, Intravenous, Q12H      Continuous Infusions:lactated ringers, 9 mL/hr  ropivacaine,       PRN Meds:.•  acetaminophen  •  senna-docusate sodium **AND** polyethylene glycol **AND** bisacodyl **AND** bisacodyl  •  dextrose  •  dextrose  •  glucagon (human recombinant)  •  hydrOXYzine  •  melatonin  •  ondansetron **OR** ondansetron  •  sennosides-docusate  •  sodium chloride  •  sodium chloride  •  sodium chloride    Assessment & Plan   Assessment & Plan     Active Hospital Problems    Diagnosis  POA   • **Right foot infection [L08.9]  Yes   • MRSA bacteremia [R78.81, B95.62]  Unknown   • Sepsis (HCC) [A41.9]  Yes   • Hypoglycemia [E16.2]  Yes   • Anemia [D64.9]  Yes   • Hyponatremia [E87.1]  Yes   • Hypoalbuminemia [E88.09]  Yes   • Essential hypertension [I10]  Yes   • Hyperlipidemia [E78.5]  Yes   • Paroxysmal atrial fibrillation (HCC) [I48.0]  Yes      Resolved Hospital Problems   No resolved problems to display.        Brief Hospital Course to date:  Buster Velasco is a 57 y.o. male with hx of prior osteomyelitis s/p left BKA, s/p right transmetatarsal amputation, left hand 3rd metacarpal resection (April 2022 at OSH, had 6 weeks of IV antibiotics for MRSA bacteremia), remote hx of IVDA, more recent hx of polysubstance abuse (meth), and DVT s/p IVC filter who presents with right foot wounds. S/p right BKA on 12/2/22 with Dr. Peguero. His blood cultures are growing MRSA. ID  assisting with workup and management.      This patient's problems and plans were partially entered by my partner and updated as appropriate by me 12/10/22.    Sepsis secondary to acute right foot non-healing wound and likely osteomyelitis, resolved  MRSA bacteremia  - s/p right BKA 12/2/22 with Dr. Peguero  - ID following, currently on Daptomycin monotherapy  - Blood cultures with coag negative Staph and MRSA, have discussed with Dr. George, he will require IV antibiotics at discharge for at least 6 weeks but final duration TBD   - Repeat blood cultures NGTD  - TTE no acute findings, JERALD 12/9/22 negative  - Probably not a good candidate for PICC line/home IV antibiotics given hx of substance abuse, current plan is discharge to rehab     Recurrent hypoglycemia in setting of DMII, resolved  - HbA1c 6.9%  - High dose Tresiba qAM before arrival which has been held  - Briefly required D5, now discontinued  - Glucose was trending back up, added back low dose SSI  - Glucose now stable overall     Chronic pain on opioids   Previous IVDA/substance abuse  - Continue home methadone 10 mg QID  - Morphine for breakthrough post-op pain - discontinued 12/8/22 as he is not using it  - Patient denied hx of IVDA to me but in Care Everywhere admission notes from April 2022 at [place], they documented prior IVDA/substance abuse, most recently with meth in [facility] from 4/2/22; he has told ID that his last IVDA was 17 years ago     Reported hx of Afib   - Patient denies any hx of Afib. Not previously on anticoagulation and given murky history will not start.   - On Metoprolol at home which is continued  - No evidence of Afib on telemetry, will discontinue telemetry monitoring today 12/10/22     Chronic vocal cord paralysis s/p PEG  Dysphagia  - SLP evaluation: regular diet but with nectar thick liquids recommended, patient initially wanted thin liquids and understood risk of aspiration, however currently has been drinking the nectar  thick liquids; could change to pure comfort diet if he absolutely refuses nectar thick  - SLP should continue to follow for possible advancement of diet recommendations  - Unclear where/when PEG was placed, will eventually need it removed but defer to outpatient setting as it was not placed here; routine PEG care/flushing per nursing as needed     Hypotension with hx of HTN  - Hold parameters with metoprolol  - Hypotension resolved     HLD   - Continue statin     DVT s/p IVC filter   - Placed in spring 2022 per patient. Defer to PCP for further assessment and time of removal.     Hx of seizure in setting of meth use   - Not on AED's given meth trigger     BPH   - Continue Flomax       Expected Discharge Location and Transportation: rehab  Expected Discharge Date: 12/12/22, he now awaits rehab placement    Discontinue telemetry monitoring today.     DVT prophylaxis:  Mechanical DVT prophylaxis orders are present.     AM-PAC 6 Clicks Score (PT): 9 (12/08/22 0840)    CODE STATUS:   Code Status and Medical Interventions:   Ordered at: 12/01/22 0035     Code Status (Patient has no pulse and is not breathing):    CPR (Attempt to Resuscitate)     Medical Interventions (Patient has pulse or is breathing):    Full Support       Carmen Bolaños MD  12/10/22

## 2022-12-10 NOTE — PROGRESS NOTES
Cardiothoracic Surgery Progress Note      POD #: 8-right below-knee amputation     LOS: 10 days      Subjective: Awake and alert    Objective:  Vital Signs vital signs below noted T-max past 24 hours 98 °F  Temp:  [97.6 °F (36.4 °C)-98.1 °F (36.7 °C)] 97.6 °F (36.4 °C)  Heart Rate:  [67-94] 71  Resp:  [16-18] 18  BP: ()/(62-88) 121/80    Physical Exam:   General Appearance: Oriented x3   Lungs:   Heart:   Skin:   Incision: Amputation site dressing change.  Suture intact skin margin viable skin flaps are viable.     Results:  Results from last 7 days   Lab Units 12/07/22  0436   WBC 10*3/mm3 6.24   HEMOGLOBIN g/dL 9.2*   HEMATOCRIT % 30.1*   PLATELETS 10*3/mm3 388     Results from last 7 days   Lab Units 12/07/22  0436   SODIUM mmol/L 136   POTASSIUM mmol/L 4.3   CHLORIDE mmol/L 103   CO2 mmol/L 24.0   BUN mg/dL 25*   CREATININE mg/dL 0.89   GLUCOSE mg/dL 181*   CALCIUM mg/dL 9.3         Assessment: #1.  Postop day 8 right below-knee amputation healing well  2 status post remote left below-knee amputation with diabetic neuropathy  3.  Diabetes mellitus with neuropathy      Plan: Continue daily dressing changes to the amputation site.  Medical manage per hospitalist.  Antibiotics per infectious disease    John Peguero MD - 12/10/22 - 06:06 EST

## 2022-12-11 LAB
BACTERIA SPEC AEROBE CULT: NORMAL
BACTERIA SPEC AEROBE CULT: NORMAL
GLUCOSE BLDC GLUCOMTR-MCNC: 158 MG/DL (ref 70–130)
GLUCOSE BLDC GLUCOMTR-MCNC: 204 MG/DL (ref 70–130)
GLUCOSE BLDC GLUCOMTR-MCNC: 230 MG/DL (ref 70–130)
GLUCOSE BLDC GLUCOMTR-MCNC: 251 MG/DL (ref 70–130)

## 2022-12-11 PROCEDURE — 25010000002 DAPTOMYCIN PER 1 MG: Performed by: INTERNAL MEDICINE

## 2022-12-11 PROCEDURE — 97110 THERAPEUTIC EXERCISES: CPT

## 2022-12-11 PROCEDURE — 63710000001 INSULIN LISPRO (HUMAN) PER 5 UNITS: Performed by: HOSPITALIST

## 2022-12-11 PROCEDURE — 99232 SBSQ HOSP IP/OBS MODERATE 35: CPT | Performed by: HOSPITALIST

## 2022-12-11 PROCEDURE — 82962 GLUCOSE BLOOD TEST: CPT

## 2022-12-11 RX ADMIN — Medication 10 ML: at 20:16

## 2022-12-11 RX ADMIN — Medication 10 ML: at 08:35

## 2022-12-11 RX ADMIN — INSULIN LISPRO 2 UNITS: 100 INJECTION, SOLUTION INTRAVENOUS; SUBCUTANEOUS at 11:53

## 2022-12-11 RX ADMIN — DAPTOMYCIN 800 MG: 500 INJECTION, POWDER, LYOPHILIZED, FOR SOLUTION INTRAVENOUS at 10:34

## 2022-12-11 RX ADMIN — ATORVASTATIN CALCIUM 20 MG: 20 TABLET, FILM COATED ORAL at 20:16

## 2022-12-11 RX ADMIN — HYDROXYZINE HYDROCHLORIDE 50 MG: 25 TABLET ORAL at 22:27

## 2022-12-11 RX ADMIN — METHADONE HYDROCHLORIDE 10 MG: 10 TABLET ORAL at 20:16

## 2022-12-11 RX ADMIN — METHADONE HYDROCHLORIDE 10 MG: 10 TABLET ORAL at 08:35

## 2022-12-11 RX ADMIN — INSULIN LISPRO 3 UNITS: 100 INJECTION, SOLUTION INTRAVENOUS; SUBCUTANEOUS at 08:34

## 2022-12-11 RX ADMIN — INSULIN LISPRO 3 UNITS: 100 INJECTION, SOLUTION INTRAVENOUS; SUBCUTANEOUS at 17:14

## 2022-12-11 RX ADMIN — METOPROLOL TARTRATE 25 MG: 25 TABLET, FILM COATED ORAL at 08:35

## 2022-12-11 RX ADMIN — METOPROLOL TARTRATE 25 MG: 25 TABLET, FILM COATED ORAL at 20:16

## 2022-12-11 RX ADMIN — METHADONE HYDROCHLORIDE 10 MG: 10 TABLET ORAL at 14:16

## 2022-12-11 RX ADMIN — METHADONE HYDROCHLORIDE 10 MG: 10 TABLET ORAL at 02:39

## 2022-12-11 NOTE — PROGRESS NOTES
Cardiothoracic Surgery Progress Note      POD #: 9-right below-knee amputation     LOS: 11 days      Subjective: Awake and seems alert    Objective:  Vital Signs vital signs below noted T-max past 24 hours 98.2 °F  Temp:  [97.7 °F (36.5 °C)-98.2 °F (36.8 °C)] 97.8 °F (36.6 °C)  Heart Rate:  [60-75] 67  Resp:  [16-18] 16  BP: (107-119)/(74-80) 119/78    Physical Exam:   General Appearance: Oriented   Lungs:   Heart:   Skin:   Incision: Amputation dressing change.  Sutures intact skin flaps are viable and skin margins are viable.     Results:  Results from last 7 days   Lab Units 12/07/22  0436   WBC 10*3/mm3 6.24   HEMOGLOBIN g/dL 9.2*   HEMATOCRIT % 30.1*   PLATELETS 10*3/mm3 388     Results from last 7 days   Lab Units 12/07/22  0436   SODIUM mmol/L 136   POTASSIUM mmol/L 4.3   CHLORIDE mmol/L 103   CO2 mmol/L 24.0   BUN mg/dL 25*   CREATININE mg/dL 0.89   GLUCOSE mg/dL 181*   CALCIUM mg/dL 9.3         Assessment: #1.  Postop day 9 right below-knee amputation is healing well at this time  2.  Status post remote left below-knee amputation for diabetic neuropathy/ulceration/gangrene  3.  Diabetes mellitus with neuropathy      Plan: Continue daily dressing change amputation site.  Medical manage per hospitalist.  Antibiotics per infectious disease.      John Peguero MD - 12/11/22 - 05:58 EST

## 2022-12-11 NOTE — THERAPY TREATMENT NOTE
Patient Name: Buster Velasco  : 1965    MRN: [id number]                              Today's Date: 2022       Admit Date: 2022    Visit Dx:     ICD-10-CM ICD-9-CM   1. Right foot infection  L08.9 686.9   2. Hypoglycemia  E16.2 251.2   3. Decubitus ulcer of coccygeal region, unspecified ulcer stage  L89.159 707.03     707.20   4. Pharyngeal dysphagia  R13.13 787.23     Patient Active Problem List   Diagnosis   • Right foot infection   • Hypoglycemia   • Anemia   • Hyponatremia   • Hypoalbuminemia   • Essential hypertension   • Hyperlipidemia   • Paroxysmal atrial fibrillation (HCC)   • Sepsis (HCC)   • MRSA bacteremia     Past Medical History:   Diagnosis Date   • Diabetes (HCC)    • DVT (deep venous thrombosis) (HCC)    • Hypertension    • Mood disorder (HCC)      Past Surgical History:   Procedure Laterality Date   • BELOW KNEE AMPUTATION Left    • BELOW KNEE AMPUTATION Right 2022    Procedure: AMPUTATION BELOW KNEE RIGHT;  Surgeon: John Peguero MD;  Location: [facility];  Service: Vascular;  Laterality: Right;   • TRANS METATARSAL AMPUTATION Right       General Information     Row Name 22 1430          Physical Therapy Time and Intention    Document Type therapy note (daily note)  -ES     Mode of Treatment physical therapy  -ES     Row Name 22 1430          General Information    Patient Profile Reviewed yes  -ES     Existing Precautions/Restrictions fall;other (see comments)  B BKA  -ES     Barriers to Rehab medically complex;previous functional deficit;physical barrier  -ES     Row Name 22 1430          Cognition    Orientation Status (Cognition) oriented x 4  -ES     Row Name 22 1430          Safety Issues, Functional Mobility    Safety Issues Affecting Function (Mobility) awareness of need for assistance;insight into deficits/self-awareness;problem-solving;safety precaution awareness;safety precautions follow-through/compliance;sequencing abilities   -ES     Impairments Affecting Function (Mobility) balance;endurance/activity tolerance;strength;pain  -ES           User Key  (r) = Recorded By, (t) = Taken By, (c) = Cosigned By    Initials Name Provider Type    ES Jacqueline Ortiz PT Physical Therapist               Mobility     Row Name 12/11/22 [facility]          Bed Mobility    Bed Mobility sit-supine  -ES     Sit-Supine [place] (Bed Mobility) contact guard;verbal cues  -ES     Comment, (Bed Mobility) --  -ES     Row Name 12/11/22 1431          Transfers    Comment, (Transfers) Pt declined OOB activity due to pain at wounds  -ES     Row Name 12/11/22 1431          Mobility    Extremity Weight-bearing Status right lower extremity  -ES     Right Lower Extremity (Weight-bearing Status) non weight-bearing (NWB)   -ES           User Key  (r) = Recorded By, (t) = Taken By, (c) = Cosigned By    Initials Name Provider Type    ES Jacqueline Ortiz PT Physical Therapist               Obj/Interventions     Row Name 12/11/22 1431          Motor Skills    Therapeutic Exercise knee;ankle;hip  -ES     Row Name 12/11/22 1431          Hip (Therapeutic Exercise)    Hip (Therapeutic Exercise) strengthening exercise  -ES     Hip Strengthening (Therapeutic Exercise) bilateral;sitting;aBduction;10 repetitions  -ES     Row Name 12/11/22 1431          Knee (Therapeutic Exercise)    Knee (Therapeutic Exercise) strengthening exercise  -ES     Knee Strengthening (Therapeutic Exercise) bilateral;SLR (straight leg raise);LAQ (long arc quad);10 repetitions;other (see comments)  hold LAQ x3 seconds at end range extension to promote improved ROM  -ES     Row Name 12/11/22 [facility]          Balance    Balance Assessment sitting static balance;sitting dynamic balance  -ES     Static Sitting Balance standby assist  -ES     Dynamic Sitting Balance contact guard  -ES     Position, Sitting Balance unsupported;sitting edge of bed  -ES     Balance Interventions sitting;static;dynamic  -ES     Comment,  Balance No LOB  -ES           User Key  (r) = Recorded By, (t) = Taken By, (c) = Cosigned By    Initials Name Provider Type    ES Jacqueline Ortiz, PT Physical Therapist               Goals/Plan    No documentation.                Clinical Impression     Row Name 12/11/22 1433          Pain    Additional Documentation Pain Scale: FACES Pre/Post-Treatment (Group)  -ES     Row Name 12/11/22 1433          Pain Scale: FACES Pre/Post-Treatment    Pain: FACES Scale, Pretreatment 2-->hurts little bit  -ES     Posttreatment Pain Rating 2-->hurts little bit  -ES     Pain Location - Side/Orientation Right  -ES     Pain Location incisional  -ES     Pain Location - knee  -ES     Pre/Posttreatment Pain Comment tolerated session well  -ES     Row Name 12/11/22 1433          Plan of Care Review    Plan of Care Reviewed With patient  -ES     Progress improving  -ES     Outcome Evaluation Pt demo'd improved tolerance to dynamic activity with minimal c/o pain. Pt able to maintain sitting EOB for BLE strengthening activities with SBA-CGA. Pt educated on importance of continuing HEP to promote improved muscular control and ROM, verbalized/demonstrated agreement. Will continue to progress as able, recommend attempting OOB activity next session. PT rec IRF upon d/c.  -ES     Row Name 12/11/22 1433          Therapy Assessment/Plan (PT)    Rehab Potential (PT) good, to achieve stated therapy goals  -ES     Criteria for Skilled Interventions Met (PT) yes;meets criteria;skilled treatment is necessary  -ES     Therapy Frequency (PT) daily  -ES     Row Name 12/11/22 1433          Vital Signs    Pre Systolic BP Rehab --  VSS  -ES     Pre Patient Position Sitting  -ES     Intra Patient Position Sitting  -ES     Post Patient Position Supine  -ES     Row Name 12/11/22 1433          Positioning and Restraints    Pre-Treatment Position in bed  -ES     Post Treatment Position bed  -ES     In Bed notified nsg;fowlers;call light within reach;encouraged  to call for assist;exit alarm on;RLE elevated;side rails up x3  -ES           User Key  (r) = Recorded By, (t) = Taken By, (c) = Cosigned By    Initials Name Provider Type    Jacqueline Moreno PT Physical Therapist               Outcome Measures     Row Name 12/11/22 1437          How much help from another person do you currently need...    Turning from your back to your side while in flat bed without using bedrails? 3  -ES     Moving from lying on back to sitting on the side of a flat bed without bedrails? 3  -ES     Moving to and from a bed to a chair (including a wheelchair)? 1  -ES     Standing up from a chair using your arms (e.g., wheelchair, bedside chair)? 1  -ES     Climbing 3-5 steps with a railing? 1  -ES     To walk in hospital room? 1  -ES     AM-PAC 6 Clicks Score (PT) 10  -ES     Highest level of mobility 4 --> Transferred to chair/commode  -ES     Row Name 12/11/22 1437          Functional Assessment    Outcome Measure Options AM-PAC 6 Clicks Basic Mobility (PT)  -ES           User Key  (r) = Recorded By, (t) = Taken By, (c) = Cosigned By    Initials Name Provider Type    Jacqueline Moreno PT Physical Therapist                             Physical Therapy Education     Title: PT OT SLP Therapies (Done)     Topic: Physical Therapy (Done)     Point: Mobility training (Done)     Learning Progress Summary           Patient KRIS Martinez VU, DU by SC at 12/7/2022 1115    Comment: REVIEWED HEP                   Point: Home exercise program (Done)     Learning Progress Summary           KRIS Ace VU, DU by SC at 12/7/2022 1115    Comment: REVIEWED HEP                   Point: Body mechanics (Done)     Learning Progress Summary           Patient KRIS Martinez VU, DU by SC at 12/7/2022 1115    Comment: REVIEWED HEP                   Point: Precautions (Done)     Learning Progress Summary           KRIS Ace VU, DU by SC at 12/7/2022 1115    Comment: REVIEWED HEP                               User  Key     Initials Effective Dates Name Provider Type Discipline    SC 06/16/21 -  Shamika Patel PT Physical Therapist PT              PT Recommendation and Plan     Plan of Care Reviewed With: patient  Progress: improving  Outcome Evaluation: Pt demo'd improved tolerance to dynamic activity with minimal c/o pain. Pt able to maintain sitting EOB for BLE strengthening activities with SBA-CGA. Pt educated on importance of continuing HEP to promote improved muscular control and ROM, verbalized/demonstrated agreement. Will continue to progress as able, recommend attempting OOB activity next session. PT rec IRF upon d/c.     Time Calculation:    PT Charges     Row Name 12/11/22 1438             Time Calculation    Start Time 1357  -ES      PT Received On 12/11/22  -ES      PT Goal Re-Cert Due Date 12/17/22  -ES         Time Calculation- PT    Total Timed Code Minutes- PT 13 minute(s)  -ES         Timed Charges    69533 - PT Therapeutic Exercise Minutes 13  -ES         Total Minutes    Timed Charges Total Minutes 13  -ES       Total Minutes 13  -ES            User Key  (r) = Recorded By, (t) = Taken By, (c) = Cosigned By    Initials Name Provider Type    ES Jacqueline Ortiz, PT Physical Therapist              Therapy Charges for Today     Code Description Service Date Service Provider Modifiers Qty    81082771474 HC PT THER PROC EA 15 MIN 12/11/2022 Jacqueline Ortiz PT GP 1          PT G-Codes  Outcome Measure Options: AM-PAC 6 Clicks Basic Mobility (PT)  AM-PAC 6 Clicks Score (PT): 10  AM-PAC 6 Clicks Score (OT): 11  PT Discharge Summary  Anticipated Discharge Disposition (PT): inpatient rehabilitation facility    Jacqueline Ortiz PT  12/11/2022

## 2022-12-11 NOTE — PROGRESS NOTES
[facility] Progress Note        Antibiotics:  Anti-Infectives (From admission, onward)    Ordered     Dose/Rate Route Frequency Start Stop    12/07/22 0603  DAPTOmycin (CUBICIN) 800 mg in sodium chloride 0.9 % 50 mL IVPB        Ordering Provider: Zeke George MD    8 mg/kg × 100 kg  100 mL/hr over 30 Minutes Intravenous Every 24 Hours 12/07/22 1000 12/15/22 0959    11/30/22 2219  vancomycin 2000 mg/500 mL 0.9% NS IVPB ([facility])        Ordering Provider: Reggie Batres MD    20 mg/kg × 98.9 kg  over 2 Hours Intravenous Once 11/30/22 2221 12/01/22 0315    11/30/22 2219  piperacillin-tazobactam (ZOSYN) 3.375 g in iso-osmotic dextrose 50 ml (premix)        Ordering Provider: Reggie Batres MD    3.375 g Intravenous Once 11/30/22 2221 11/30/22 2335          CC: foot infection    HPI:    Patient is a 57 y.o.  Yr old male with history of prior lower extremity infection/amputations done in [facility].  He has a history of left BKA years ago.  More recent right transmetatarsal amputation but specific dates unclear and unclear who the surgeon was.  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  He has history DVT/IVC filter, diabetes and other comorbidities as below.  He reports a chronic right lateral foot wound present for months but apparently not precipitated by any specific event or trauma.  He is admitted to the hospital on November 30 with deterioration at the foot including redness/swelling    **patient had reported remote drug abuse (initially to me reported as IV drug abuse but then later he reported not injection use and [facility]; +drug screen earlier this year per d/w Dr Chau for Meth at [facility]),   chronic methadone.    ** patient reports MRSA  "blood stream infection treated in [place] spring of 2022, 6 weeks of vancomycin by his report.  Otherwise data deficit.  Try to retrieve information     12/2/22 Dr Peguero did surgery  \"Procedure(s): Mid level right below-knee amputation and primary closure \"    12/6 with MRSA in blood culture;  TTE done but limited per cardiology    12/9/22   JERALD no vegetation per cardiology    12/11/22 doing okay overall per him.  Case management considering options.No new focal pain;  postop pain to the right LE which is controlled/dull at present, constant, nonradiating, worse with palpation, generally better with pain meds and 2  out of 10 in severity.  Not progressive     No headache photophobia or neck stiffness.  No shortness of breath cough or hemoptysis.  No nausea vomiting diarrhea or abdominal pain.  No dysuria hematuria or pyuria.  No other new skin rash       ROS:      12/11/22 No f/c/s. No n/v/d. No rash. No new ADR to Abx.     Constitutional-- No Fever, chills or sweats.  Appetite good, and no malaise. No fatigue.  Heent--chronic hoarse voice.  No new vision, hearing or throat complaints.  No epistaxis or oral sores.   No flashers, floaters or eye pain.   No headache, photophobia or neck stiffness.  Chronic PEG tube  CV-- No chest pain, palpitation or syncope  Resp-- No SOB/cough/Hemoptysis  GI- No nausea, vomiting, or diarrhea.  No hematochezia, melena, or hematemesis. Denies jaundice or chronic liver disease.  -- No dysuria, hematuria, or flank pain.  Denies hesitancy, urgency or flank pain.  Lymph- no swollen lymph nodes in neck/axilla or groin.   Heme- No active bruising or bleeding; no Hx of DVT or PE.  MS-- no swelling or pain in the bones or joints of arms/legs.  No new back pain.  Neuro-- No acute focal weakness or numbness in the arms or legs.  No seizures.     Full 12 point review of systems reviewed and negative otherwise for acute complaints, except for above      PE:   /81 (BP Location: " "Left arm, Patient Position: Lying)   Pulse 84   Temp 98.2 °F (36.8 °C) (Oral)   Resp 16   Ht 193 cm (75.98\")   Wt 100 kg (220 lb 7.4 oz)   SpO2 95%   BMI 26.85 kg/m²          GENERAL: awake;  in no acute distress.  chronic Hoarse voice noted and chronic per him  HEENT: Normocephalic, atraumatic.   No conjunctival injection. No icterus. Oropharynx   without evidence of thrush or exudate. No evidence of peridontal disease.    NECK: Supple without nuchal rigidity. No mass.   HEART: RRR; No murmur, rubs, gallops.   LUNGS: Diminished at bases but otherwise clear to auscultation bilaterally without wheezing, rales, rhonchi. Normal respiratory effort. Nonlabored. No dullness.  ABDOMEN: Soft, nontender, nondistended. Positive bowel sounds. No rebound or guarding. NO mass or HSM.  PEG tube noted  EXT:  No cyanosis, clubbing;No cord.   MSK: FROM without joint effusions noted arms/legs.    SKIN: Warm and dry without cutaneous eruptions on Inspection/palpation.    NEURO: awake; moves all 4 extremities     Right lower extremity surgical site noted;  No new redness;  no discrete mass bulge or fluctuance.  No crepitus or bulla.       No peripheral stigmata/phenomenon of endocarditis     IV without obvious redness or drainage     Left BKA site with no redness induration or warmth.  No discrete mass bulge or fluctuance.    Laboratory Data    Results from last 7 days   Lab Units 12/07/22  0436 12/04/22  1149   WBC 10*3/mm3 6.24 6.80   HEMOGLOBIN g/dL 9.2* 9.7*   HEMATOCRIT % 30.1* 31.6*   PLATELETS 10*3/mm3 388 420     Results from last 7 days   Lab Units 12/07/22  0436   SODIUM mmol/L 136   POTASSIUM mmol/L 4.3   CHLORIDE mmol/L 103   CO2 mmol/L 24.0   BUN mg/dL 25*   CREATININE mg/dL 0.89   GLUCOSE mg/dL 181*   CALCIUM mg/dL 9.3                   Estimated Creatinine Clearance: 128 mL/min (by C-G formula based on SCr of 0.89 mg/dL).      Microbiology:      Radiology:  Imaging Results (Last 72 Hours)     ** No results found " for the last 72 hours. **            Impression:     --Acute right foot/ankle with multifocal ulceration, cellulitis/wound infection and probable osteomyelitis with probe to bone at the lateral foot and abnormal MRI.  Other comorbidities as outlined above and high risk for further serious morbidity and other serious sequelae including persistent/recurrent or nonhealing wounds, persistent/progressive or recurrent infection and risk for further functional/limb loss/amputation.  Surgery following to help define timing/option of threshold for any future surgical intervention .      **Surgery 12/2 with BKA    --MRSA bacteremia from November 30 (daptomycin sensitive).  Presumed from foot but also risk for endovascular focus particularly with  History of prior MRSA bloodstream infection.  Transthoracic echocardiogram limited per cardiology.  JERALD no vegetation per cardiology; follow-up blood cultures negative so far from December 6    **No new focal symptoms to suggest new metastatic foci of involvement as of December 11    --History MRSA with bacteremia by his report in spring/summer 2022 and treated with 6 weeks of vancomycin in [place].  Specifics unclear and trying to retrieve information    --coag-negative staph in blood culture likely contaminant     --Chronic vocal cord paralysis per notes with chronic hoarseness and indwelling PEG tube.  Medicine team to clarify     --History left BKA.     --Diabetes.  You need to tightly control blood sugar to give best chance for healing     --History of DVT with IVC filter    --history of remote injection drug abuse; he denies injection drug abuse for 17 years by his report     PLAN:      -- Daptomycin  IV potentially 6 weeks but final duration to depend on clinical course of study results and response to therapy     -- Check/review labs cultures and scans     -- Partial history per nursing staff     -- Discussed with microbiology and family     --d/w Dr Peguero      --  Highly complex set of issues with high risk for further serious morbidity and other serious sequela     --JERALD noted    --  continue to consider option     **Copied text in this note has been reviewed and is accurate as of 12/11/22.     Zeke George MD  12/11/2022

## 2022-12-11 NOTE — PROGRESS NOTES
[facility] Medicine Services  PROGRESS NOTE    Patient Name: Buster Velasco  : 1965  MRN: [id number]    Date of Admission: 2022  Primary Care Physician: Ludivina Bowers MD    Subjective   Subjective     CC:  F/U s/p right BKA    HPI:  Patient seen this morning. No complaints. Rested well overnight.    ROS:  Gen-no fevers, no chills  CV-no chest pain, no palpitations  Resp-no cough, no dyspnea  GI-no N/V/D, no abd pain    All other systems reviewed and negative except any additional pertinent positives and negatives as discussed in HPI.       Objective   Objective     Vital Signs:   Temp:  [97.8 °F (36.6 °C)-98.2 °F (36.8 °C)] 98.2 °F (36.8 °C)  Heart Rate:  [60-84] 84  Resp:  [16-18] 16  BP: (107-120)/(74-81) 120/81     Physical Exam:  Gen-no acute distress  HENT-NCAT, mucous membranes moist  CV-RRR, S1 S2 normal, no m/r/g  Resp-CTAB, no wheezes or rales  Abd-soft, NT, ND, +BS  Ext-left BKA present, new right BKA with dressing intact  Neuro-A&Ox3, no focal deficits  Skin-no rashes  Psych-appropriate mood  No change from yesterday      Results Reviewed:  LAB RESULTS:      Lab 22  0436 22  1149   WBC 6.24 6.80   HEMOGLOBIN 9.2* 9.7*   HEMATOCRIT 30.1* 31.6*   PLATELETS 388 420   MCV 85.0 84.7         Lab 22  0436 22  1149   SODIUM 136 136   POTASSIUM 4.3 4.3   CHLORIDE 103 101   CO2 24.0 25.0   ANION GAP 9.0 10.0   BUN 25* 16   CREATININE 0.89 0.90   EGFR 99.3 99.0   GLUCOSE 181* 118*   CALCIUM 9.3 9.1                         Brief Urine Lab Results  (Last result in the past 365 days)      Color   Clarity   Blood   Leuk Est   Nitrite   Protein   CREAT   Urine HCG        22 0108 Yellow   Clear   Negative   Negative     Negative                 Microbiology Results Abnormal     Procedure Component Value - Date/Time    Blood Culture - Blood, Wrist, Left [[id number]]  (Normal) Collected: 22 1402    Lab Status: Preliminary result  Specimen: Blood from Wrist, Left Updated: 12/10/22 1545     Blood Culture No growth at 4 days    Blood Culture - Blood, Arm, Left [[id number]]  (Normal) Collected: 12/06/22 1402    Lab Status: Preliminary result Specimen: Blood from Arm, Left Updated: 12/10/22 1545     Blood Culture No growth at 4 days          Adult Transesophageal Echo (JERALD) W/ Cont if Necessary Per Protocol    Result Date: 12/9/2022  •  Left ventricular systolic function is normal. Estimated left ventricular EF = 55% •  The aortic valve is structurally normal with no stenosis present. Trace aortic valve regurgitation is present. There is no evidence of an aortic valve mass is present. •  There is mild, anterior mitral leaflet thickening present. No evidence of a mitral valve mass is present. Trace mitral valve regurgitation is present. No significant mitral valve stenosis is present •  The tricuspid valve is normal in structure. There is no evidence of a mass on the tricuspid valve. Mild tricuspid valve regurgitation is present. •  There is mild thickening of the pulmonic valve. There is trace pulmonic valve regurgitation present. There is no pulmonic valve stenosis present.       Results for orders placed during the hospital encounter of 11/30/22    Adult Transesophageal Echo (JERALD) W/ Cont if Necessary Per Protocol    Interpretation Summary  •  Left ventricular systolic function is normal. Estimated left ventricular EF = 55%  •  The aortic valve is structurally normal with no stenosis present. Trace aortic valve regurgitation is present. There is no evidence of an aortic valve mass is present.  •  There is mild, anterior mitral leaflet thickening present. No evidence of a mitral valve mass is present. Trace mitral valve regurgitation is present. No significant mitral valve stenosis is present  •  The tricuspid valve is normal in structure. There is no evidence of a mass on the tricuspid valve. Mild tricuspid valve regurgitation is present.  •   There is mild thickening of the pulmonic valve. There is trace pulmonic valve regurgitation present. There is no pulmonic valve stenosis present.      I have reviewed the medications:  Scheduled Meds:atorvastatin, 20 mg, Per PEG Tube, Nightly  DAPTOmycin, 8 mg/kg, Intravenous, Q24H  insulin lispro, 0-7 Units, Subcutaneous, TID AC  methadone, 10 mg, Oral, Q6H  metoprolol tartrate, 25 mg, Per PEG Tube, Q12H  senna-docusate sodium, 2 tablet, Oral, BID  sodium chloride, 10 mL, Intravenous, Q12H  sodium chloride, 10 mL, Intravenous, Q12H      Continuous Infusions:lactated ringers, 9 mL/hr  ropivacaine,       PRN Meds:.•  acetaminophen  •  senna-docusate sodium **AND** polyethylene glycol **AND** bisacodyl **AND** bisacodyl  •  dextrose  •  dextrose  •  glucagon (human recombinant)  •  hydrOXYzine  •  melatonin  •  ondansetron **OR** ondansetron  •  sennosides-docusate  •  sodium chloride  •  sodium chloride  •  sodium chloride    Assessment & Plan   Assessment & Plan     Active Hospital Problems    Diagnosis  POA   • **Right foot infection [L08.9]  Yes   • MRSA bacteremia [R78.81, B95.62]  Unknown   • Sepsis (HCC) [A41.9]  Yes   • Hypoglycemia [E16.2]  Yes   • Anemia [D64.9]  Yes   • Hyponatremia [E87.1]  Yes   • Hypoalbuminemia [E88.09]  Yes   • Essential hypertension [I10]  Yes   • Hyperlipidemia [E78.5]  Yes   • Paroxysmal atrial fibrillation (HCC) [I48.0]  Yes      Resolved Hospital Problems   No resolved problems to display.        Brief Hospital Course to date:  Buster Velasco is a 57 y.o. male with hx of prior osteomyelitis s/p left BKA, s/p right transmetatarsal amputation, left hand 3rd metacarpal resection (April 2022 at OSH, had 6 weeks of IV antibiotics for MRSA bacteremia), remote hx of IVDA, more recent hx of polysubstance abuse (meth), and DVT s/p IVC filter who presents with right foot wounds. S/p right BKA on 12/2/22 with Dr. Peguero. His blood cultures grew MRSA. ID following and recommend at  least 6 weeks of IV ATBx.     This patient's problems and plans were partially entered by my partner and updated as appropriate by me 12/11/22.    Sepsis secondary to acute right foot non-healing wound and likely osteomyelitis, resolved  MRSA bacteremia  - s/p right BKA 12/2/22 with Dr. Peguero  - ID following, currently on Daptomycin monotherapy  - Blood cultures with coag negative Staph and MRSA, have discussed with Dr. George, he will require IV antibiotics at discharge for at least 6 weeks but final duration TBD   - Repeat blood cultures NGTD  - TTE no acute findings, JERALD 12/9/22 negative  - Probably not a good candidate for PICC line/home IV antibiotics given hx of substance abuse, current plan is discharge to rehab  - Check labs in AM     Recurrent hypoglycemia in setting of DMII, resolved  - HbA1c 6.9%  - High dose Tresiba qAM before arrival which has been held  - Briefly required D5, now discontinued  - Glucose was trending back up, added back low dose SSI  - Glucose now stable overall     Chronic pain on opioids   Previous IVDA/substance abuse  - Continue home methadone 10 mg QID  - Morphine for breakthrough post-op pain - discontinued 12/8/22 as he is not using it  - Patient denied hx of IVDA to me but in Care Everywhere admission notes from April 2022 at [facility], they documented prior IVDA/substance abuse, most recently with meth in [facility] from 4/2/22; he has told ID that his last IVDA was 17 years ago     Reported hx of Afib   - Patient denies any hx of Afib. Not previously on anticoagulation and given murky history will not start.   - On Metoprolol at home which is continued  - No evidence of Afib on telemetry, discontinued telemetry monitoring 12/10/22      Chronic vocal cord paralysis s/p PEG  Dysphagia  - SLP evaluation: regular diet but with nectar thick liquids recommended, patient initially wanted thin liquids and understood risk of aspiration, however currently has been drinking the nectar  thick liquids; could change to pure comfort diet if he absolutely refuses nectar thick  - SLP should continue to follow for possible advancement of diet recommendations  - Unclear where/when PEG was placed, will eventually need it removed but defer to outpatient setting as it was not placed here; routine PEG care/flushing per nursing as needed     Hypotension with hx of HTN  - Hold parameters with metoprolol  - Hypotension resolved     HLD   - Continue statin     DVT s/p IVC filter   - Placed in spring 2022 per patient. Defer to PCP for further assessment and time of removal.     Hx of seizure in setting of meth use   - Not on AED's given meth trigger     BPH   - Continue Flomax       Expected Discharge Location and Transportation: rehab  Expected Discharge Date: 12/12/22 anytime rehab placement is found       DVT prophylaxis:  Mechanical DVT prophylaxis orders are present.     AM-PAC 6 Clicks Score (PT): 9 (12/08/22 0840)    CODE STATUS:   Code Status and Medical Interventions:   Ordered at: 12/01/22 0035     Code Status (Patient has no pulse and is not breathing):    CPR (Attempt to Resuscitate)     Medical Interventions (Patient has pulse or is breathing):    Full Support       Carmen Bolaños MD  12/11/22

## 2022-12-11 NOTE — PLAN OF CARE
Goal Outcome Evaluation:  Plan of Care Reviewed With: patient        Progress: improving  Outcome Evaluation: Pt completed bed mobility w/ ModA for supine to sit d/t positioning in bed. Good effort noted w/ BUE and core engagement activity challenging balance and COG. Pt deferred OOB d/t skin integrity on buttocks. Pt agreeable to progress to sliding board transfers during next session. Continue to recommend inpatient rehab at d/c.

## 2022-12-11 NOTE — PLAN OF CARE
Goal Outcome Evaluation:  Plan of Care Reviewed With: patient        Progress: improving  Outcome Evaluation: Pt demo'd improved tolerance to dynamic activity with minimal c/o pain. Pt able to maintain sitting EOB for BLE strengthening activities with SBA-CGA. Pt educated on importance of continuing HEP to promote improved muscular control and ROM, verbalized/demonstrated agreement. Will continue to progress as able, recommend attempting OOB activity next session. PT rec IRF upon d/c.

## 2022-12-11 NOTE — THERAPY TREATMENT NOTE
Patient Name: Buster Velasco  : 1965    MRN: [id number]                              Today's Date: 2022       Admit Date: 2022    Visit Dx:     ICD-10-CM ICD-9-CM   1. Right foot infection  L08.9 686.9   2. Hypoglycemia  E16.2 251.2   3. Decubitus ulcer of coccygeal region, unspecified ulcer stage  L89.159 707.03     707.20   4. Pharyngeal dysphagia  R13.13 787.23     Patient Active Problem List   Diagnosis   • Right foot infection   • Hypoglycemia   • Anemia   • Hyponatremia   • Hypoalbuminemia   • Essential hypertension   • Hyperlipidemia   • Paroxysmal atrial fibrillation (HCC)   • Sepsis (HCC)   • MRSA bacteremia     Past Medical History:   Diagnosis Date   • Diabetes (HCC)    • DVT (deep venous thrombosis) (HCC)    • Hypertension    • Mood disorder (HCC)      Past Surgical History:   Procedure Laterality Date   • BELOW KNEE AMPUTATION Left    • BELOW KNEE AMPUTATION Right 2022    Procedure: AMPUTATION BELOW KNEE RIGHT;  Surgeon: John Peguero MD;  [facility]: [facility];  Service: Vascular;  Laterality: Right;   • TRANS METATARSAL AMPUTATION Right       General Information     Row Name 22 1437          OT Time and Intention    Document Type therapy note (daily note)  -MR     Mode of Treatment occupational therapy  -MR     Row Name 22 1437          General Information    Patient Profile Reviewed yes  -MR     Existing Precautions/Restrictions fall;other (see comments)  B BKA  -MR     Barriers to Rehab medically complex;previous functional deficit;physical barrier  -MR     Row Name 22 1437          Cognition    Orientation Status (Cognition) oriented x 4  -MR     Row Name 22 1437          Safety Issues, Functional Mobility    Safety Issues Affecting Function (Mobility) awareness of need for assistance;insight into deficits/self-awareness;problem-solving;safety precaution awareness;safety precautions follow-through/compliance;sequencing abilities  -MR      Impairments Affecting Function (Mobility) balance;endurance/activity tolerance;strength;pain  -MR           User Key  (r) = Recorded By, (t) = Taken By, (c) = Cosigned By    Initials Name Provider Type    Marilou Mcfarland OT Occupational Therapist                 Mobility/ADL's     Row Name 12/11/22 1439          Bed Mobility    Bed Mobility supine-sit;scooting/bridging  -MR     Scooting/Bridging Rush (Bed Mobility) contact guard;nonverbal cues (demo/gesture);verbal cues  -MR     Supine-Sit Rush (Bed Mobility) moderate assist (50% patient effort);verbal cues;nonverbal cues (demo/gesture)  -MR     Assistive Device (Bed Mobility) head of bed elevated  -MR     Comment, (Bed Mobility) Pt requiring ModA for supine to sit d/t positioning in bed.  -MR     Row Name 12/11/22 1439          Transfers    Comment, (Transfers) Deferred transfer to chair d/t sacral wounds.  -MR     Row Name 12/11/22 1439          Activities of Daily Living    BADL Assessment/Intervention upper body dressing  -MR     Row Name 12/11/22 1439          Mobility    Extremity Weight-bearing Status right lower extremity  -MR     Right Lower Extremity (Weight-bearing Status) non weight-bearing (NWB)   -MR     Row Name 12/11/22 1439          Upper Body Dressing Assessment/Training    Rush Level (Upper Body Dressing) other (see comments);minimum assist (75% patient effort)  adjusting hospital gown  -MR     Position (Upper Body Dressing) edge of bed sitting  -MR           User Key  (r) = Recorded By, (t) = Taken By, (c) = Cosigned By    Initials Name Provider Type    Marilou Mcfarland OT Occupational Therapist               Obj/Interventions     Row Name 12/11/22 1441          Shoulder (Therapeutic Exercise)    Shoulder (Therapeutic Exercise) AROM (active range of motion)  -MR     Shoulder AROM (Therapeutic Exercise) bilateral;flexion;extension;aBduction;aDduction;sitting;10 repetitions  -MR     Row Name 12/11/22 1441           Elbow/Forearm (Therapeutic Exercise)    Elbow/Forearm (Therapeutic Exercise) AROM (active range of motion)  -MR     Elbow/Forearm AROM (Therapeutic Exercise) bilateral;flexion;extension;10 repetitions  -MR     Row Name 12/11/22 1441          Motor Skills    Therapeutic Exercise shoulder;elbow/forearm;other (see comments)  Core engagement activity completed while at EOB reaching out of GENNY across midline.  -MR     Row Name 12/11/22 1441          Balance    Balance Assessment sitting static balance;sitting dynamic balance  -MR     Static Sitting Balance standby assist  -MR     Dynamic Sitting Balance contact guard  -MR     Position, Sitting Balance unsupported;sitting edge of bed  -MR     Balance Interventions sitting;static;dynamic  -MR     Comment, Balance No LOB noted w/ seated dynamic reaching activity.  -MR           User Key  (r) = Recorded By, (t) = Taken By, (c) = Cosigned By    Initials Name Provider Type    Marilou Mcfarland, OT Occupational Therapist               Goals/Plan    No documentation.                Clinical Impression     Row Name 12/11/22 1444          Pain Assessment    Pretreatment Pain Rating 5/10  -MR     Posttreatment Pain Rating 5/10  -MR     Pain Location - Side/Orientation Right  -MR     Pain Location lower  -MR     Pain Location - residual limb  -MR     Pre/Posttreatment Pain Comment Pt reporting pain is tolerable.  -MR     Pain Intervention(s) Ambulation/increased activity;Repositioned  -MR     Row Name 12/11/22 1445          Plan of Care Review    Plan of Care Reviewed With patient  -MR     Progress improving  -MR     Outcome Evaluation Pt completed bed mobility w/ ModA for supine to sit d/t positioning in bed. Good effort noted w/ BUE and core engagement challenging balance and COG. Pt deferred OOB d/t skin integrity on buttocks. Pt agreeable to progress to sliding board transfers during next session. Continue to recommend inpatient rehab at d/c.  -MR     Row Name 12/11/22 1449           Therapy Plan Review/Discharge Plan (OT)    Anticipated Discharge Disposition (OT) inpatient rehabilitation facility  -MR     Row Name 12/11/22 1444          Vital Signs    Pre Systolic BP Rehab --  VSS  -MR     O2 Delivery Pre Treatment room air  -MR     O2 Delivery Intra Treatment room air  -MR     O2 Delivery Post Treatment room air  -MR     Pre Patient Position Supine  -MR     Intra Patient Position Sitting  -MR     Post Patient Position Sitting  -MR     Row Name 12/11/22 1444          Positioning and Restraints    Pre-Treatment Position in bed  -MR     Post Treatment Position bed  -MR     In Bed sitting EOB;with PT  -MR           User Key  (r) = Recorded By, (t) = Taken By, (c) = Cosigned By    Initials Name Provider Type    MR LennonMarilou, OT Occupational Therapist               Outcome Measures     Row Name 12/11/22 1447          How much help from another is currently needed...    Putting on and taking off regular lower body clothing? 1  -MR     Bathing (including washing, rinsing, and drying) 1  -MR     Toileting (which includes using toilet bed pan or urinal) 2  -MR     Putting on and taking off regular upper body clothing 2  -MR     Taking care of personal grooming (such as brushing teeth) 3  -MR     Eating meals 4  -MR     AM-PAC 6 Clicks Score (OT) 13  -MR     Row Name 12/11/22 1437          How much help from another person do you currently need...    Turning from your back to your side while in flat bed without using bedrails? 3  -ES     Moving from lying on back to sitting on the side of a flat bed without bedrails? 3  -ES     Moving to and from a bed to a chair (including a wheelchair)? 1  -ES     Standing up from a chair using your arms (e.g., wheelchair, bedside chair)? 1  -ES     Climbing 3-5 steps with a railing? 1  -ES     To walk in hospital room? 1  -ES     AM-PAC 6 Clicks Score (PT) 10  -ES     Highest level of mobility 4 --> Transferred to chair/commode  -ES     Row Name 12/11/22  1447 12/11/22 1437       Functional Assessment    Outcome Measure Options AM-PAC 6 Clicks Daily Activity (OT)  -MR AM-PAC 6 Clicks Basic Mobility (PT)  -ES          User Key  (r) = Recorded By, (t) = Taken By, (c) = Cosigned By    Initials Name Provider Type    MR Marilou Lennon, OT Occupational Therapist    ES Jacqueline Ortiz, PT Physical Therapist                Occupational Therapy Education     Title: PT OT SLP Therapies (Done)     Topic: Occupational Therapy (Done)     Point: ADL training (Done)     Description:   Instruct learner(s) on proper safety adaptation and remediation techniques during self care or transfers.   Instruct in proper use of assistive devices.              Learning Progress Summary           Patient Acceptance, E, VU by MR at 12/11/2022 1448    Eager, E, VU,DU by SC at 12/7/2022 1115    Comment: REVIEWED HEP    Acceptance, E, VU by MR at 12/7/2022 1110                   Point: Home exercise program (Done)     Description:   Instruct learner(s) on appropriate technique for monitoring, assisting and/or progressing therapeutic exercises/activities.              Learning Progress Summary           Patient Acceptance, E, VU by MR at 12/11/2022 1448    Eager, E, VU,DU by SC at 12/7/2022 1115    Comment: REVIEWED HEP    Acceptance, E, VU by MR at 12/7/2022 1110                   Point: Precautions (Done)     Description:   Instruct learner(s) on prescribed precautions during self-care and functional transfers.              Learning Progress Summary           Patient Acceptance, E, VU by MR at 12/11/2022 1448    Eager, E, VU,DU by SC at 12/7/2022 1115    Comment: REVIEWED HEP    Acceptance, E, VU by MR at 12/7/2022 1110                   Point: Body mechanics (Done)     Description:   Instruct learner(s) on proper positioning and spine alignment during self-care, functional mobility activities and/or exercises.              Learning Progress Summary           Patient Acceptance, E, VU by MR at  12/11/2022 1448    KRIS Martinez VU, DU by SC at 12/7/2022 1115    Comment: REVIEWED HEP    KRIS Espinal VU by  at 12/7/2022 1110                               User Key     Initials Effective Dates Name Provider Type Discipline    SC 06/16/21 -  Shamika Patel, PT Physical Therapist PT     09/22/22 -  Marilou Lennon OT Occupational Therapist OT              OT Recommendation and Plan  Planned Therapy Interventions (OT): activity tolerance training, adaptive equipment training, BADL retraining, functional balance retraining, IADL retraining, occupation/activity based interventions, patient/caregiver education/training, transfer/mobility retraining, strengthening exercise, ROM/therapeutic exercise  Therapy Frequency (OT): daily  Plan of Care Review  Plan of Care Reviewed With: patient  Progress: improving  Outcome Evaluation: Pt completed bed mobility w/ ModA for supine to sit d/t positioning in bed. Good effort noted w/ BUE and core engagement challenging balance and COG. Pt deferred OOB d/t skin integrity on buttocks. Pt agreeable to progress to sliding board transfers during next session. Continue to recommend inpatient rehab at d/c.     Time Calculation:    Time Calculation- OT     Row Name 12/11/22 1448             Time Calculation- OT    OT Start Time 1349  -MR      OT Received On 12/11/22  -      OT Goal Re-Cert Due Date 12/17/22  -         Timed Charges    02924 - OT Therapeutic Exercise Minutes 5  -MR      68523 - OT Therapeutic Activity Minutes 5  -MR      13435 - OT Self Care/Mgmt Minutes 2  -MR         Total Minutes    Timed Charges Total Minutes 12  -MR       Total Minutes 12  -MR            User Key  (r) = Recorded By, (t) = Taken By, (c) = Cosigned By    Initials Name Provider Type     Saji Marilou, LUCAS Occupational Therapist              Therapy Charges for Today     Code Description Service Date Service Provider Modifiers Qty    84375311375 HC OT THER PROC EA 15 MIN 12/11/2022 Saji  Marilou, OT GO 1               Marilou Lennon OT  12/11/2022

## 2022-12-12 LAB
ANION GAP SERPL CALCULATED.3IONS-SCNC: 11 MMOL/L (ref 5–15)
BUN SERPL-MCNC: 15 MG/DL (ref 6–20)
BUN/CREAT SERPL: 26.8 (ref 7–25)
CALCIUM SPEC-SCNC: 9.6 MG/DL (ref 8.6–10.5)
CHLORIDE SERPL-SCNC: 103 MMOL/L (ref 98–107)
CO2 SERPL-SCNC: 25 MMOL/L (ref 22–29)
CREAT SERPL-MCNC: 0.56 MG/DL (ref 0.76–1.27)
DEPRECATED RDW RBC AUTO: 53.5 FL (ref 37–54)
EGFRCR SERPLBLD CKD-EPI 2021: 114.3 ML/MIN/1.73
ERYTHROCYTE [DISTWIDTH] IN BLOOD BY AUTOMATED COUNT: 16.9 % (ref 12.3–15.4)
GLUCOSE BLDC GLUCOMTR-MCNC: 196 MG/DL (ref 70–130)
GLUCOSE BLDC GLUCOMTR-MCNC: 199 MG/DL (ref 70–130)
GLUCOSE BLDC GLUCOMTR-MCNC: 209 MG/DL (ref 70–130)
GLUCOSE BLDC GLUCOMTR-MCNC: 214 MG/DL (ref 70–130)
GLUCOSE SERPL-MCNC: 189 MG/DL (ref 65–99)
HCT VFR BLD AUTO: 31.5 % (ref 37.5–51)
HGB BLD-MCNC: 9.4 G/DL (ref 13–17.7)
MCH RBC QN AUTO: 25.8 PG (ref 26.6–33)
MCHC RBC AUTO-ENTMCNC: 29.8 G/DL (ref 31.5–35.7)
MCV RBC AUTO: 86.3 FL (ref 79–97)
PLATELET # BLD AUTO: 355 10*3/MM3 (ref 140–450)
PMV BLD AUTO: 9.6 FL (ref 6–12)
POTASSIUM SERPL-SCNC: 4.2 MMOL/L (ref 3.5–5.2)
RBC # BLD AUTO: 3.65 10*6/MM3 (ref 4.14–5.8)
SODIUM SERPL-SCNC: 139 MMOL/L (ref 136–145)
WBC NRBC COR # BLD: 7.83 10*3/MM3 (ref 3.4–10.8)

## 2022-12-12 PROCEDURE — 25010000002 DAPTOMYCIN PER 1 MG: Performed by: INTERNAL MEDICINE

## 2022-12-12 PROCEDURE — 99232 SBSQ HOSP IP/OBS MODERATE 35: CPT | Performed by: PEDIATRICS

## 2022-12-12 PROCEDURE — 80048 BASIC METABOLIC PNL TOTAL CA: CPT | Performed by: HOSPITALIST

## 2022-12-12 PROCEDURE — 63710000001 INSULIN DETEMIR PER 5 UNITS: Performed by: PEDIATRICS

## 2022-12-12 PROCEDURE — 63710000001 INSULIN LISPRO (HUMAN) PER 5 UNITS: Performed by: HOSPITALIST

## 2022-12-12 PROCEDURE — 82962 GLUCOSE BLOOD TEST: CPT

## 2022-12-12 PROCEDURE — 92526 ORAL FUNCTION THERAPY: CPT

## 2022-12-12 PROCEDURE — 99024 POSTOP FOLLOW-UP VISIT: CPT | Performed by: THORACIC SURGERY (CARDIOTHORACIC VASCULAR SURGERY)

## 2022-12-12 PROCEDURE — 97110 THERAPEUTIC EXERCISES: CPT

## 2022-12-12 PROCEDURE — 85027 COMPLETE CBC AUTOMATED: CPT | Performed by: HOSPITALIST

## 2022-12-12 RX ADMIN — INSULIN DETEMIR 10 UNITS: 100 INJECTION, SOLUTION SUBCUTANEOUS at 20:46

## 2022-12-12 RX ADMIN — DAPTOMYCIN 800 MG: 500 INJECTION, POWDER, LYOPHILIZED, FOR SOLUTION INTRAVENOUS at 10:29

## 2022-12-12 RX ADMIN — METHADONE HYDROCHLORIDE 10 MG: 10 TABLET ORAL at 10:31

## 2022-12-12 RX ADMIN — METHADONE HYDROCHLORIDE 10 MG: 10 TABLET ORAL at 20:45

## 2022-12-12 RX ADMIN — METOPROLOL TARTRATE 25 MG: 25 TABLET, FILM COATED ORAL at 08:12

## 2022-12-12 RX ADMIN — ATORVASTATIN CALCIUM 20 MG: 20 TABLET, FILM COATED ORAL at 20:45

## 2022-12-12 RX ADMIN — INSULIN LISPRO 3 UNITS: 100 INJECTION, SOLUTION INTRAVENOUS; SUBCUTANEOUS at 12:02

## 2022-12-12 RX ADMIN — INSULIN LISPRO 3 UNITS: 100 INJECTION, SOLUTION INTRAVENOUS; SUBCUTANEOUS at 08:12

## 2022-12-12 RX ADMIN — Medication 10 ML: at 20:45

## 2022-12-12 RX ADMIN — METOPROLOL TARTRATE 25 MG: 25 TABLET, FILM COATED ORAL at 20:45

## 2022-12-12 RX ADMIN — METHADONE HYDROCHLORIDE 10 MG: 10 TABLET ORAL at 05:32

## 2022-12-12 RX ADMIN — HYDROXYZINE HYDROCHLORIDE 50 MG: 25 TABLET ORAL at 21:02

## 2022-12-12 RX ADMIN — METHADONE HYDROCHLORIDE 10 MG: 10 TABLET ORAL at 15:05

## 2022-12-12 RX ADMIN — INSULIN LISPRO 2 UNITS: 100 INJECTION, SOLUTION INTRAVENOUS; SUBCUTANEOUS at 16:48

## 2022-12-12 NOTE — DISCHARGE PLACEMENT REQUEST
"Isidro Velasco (57 y.o. Male)     Date of Birth   1965    Social Security Number       Address   [address] [facility] [address]    Home Phone   [phone]    MRN   [id number]       Evangelical   Buddhist    Marital Status                               Admission Date   22    Admission Type   Emergency    Admitting Provider   Ruth Ratliff MD    Attending Provider   Ruth Ratliff MD    Department, Room/Bed   [facility] 3G, S366/1       Discharge Date       Discharge Disposition       Discharge Destination                               Attending Provider: Ruth Ratliff MD    Allergies: Gabapentin, Lyrica [Pregabalin]    Isolation: None   Infection: MRSA (22)   Code Status: CPR    Ht: 193 cm (75.98\")   Wt: 100 kg (220 lb 7.4 oz)    Admission Cmt: None   Principal Problem: Right foot infection [L08.9]                 Active Insurance as of 2022     Primary Coverage     Payor Plan Insurance Group Employer/Plan Group    [place]      Payor Plan Address Payor Plan Phone Number Payor Plan Fax Number Effective Dates    [address] [phone]  2022 - 2022    [facility] [address]       Subscriber Name Subscriber Birth Date Member ID       ISIDRO VELASCO 1965 [id number]                 Emergency Contacts      (Rel.) Home Phone Work Phone Mobile Phone    NELSY VELASCO (Spouse) [phone] -- [phone]               History & Physical      Tracy Oliveira DO at 22 [facility]              [facility] Medicine Services  HISTORY AND PHYSICAL    Patient Name: Isidro Velasco  : 1965  MRN: [id number]  Primary Care Physician: Ludivina Bowers MD  Date of admission: 2022    Subjective    Subjective     Chief Complaint:  Ulcer on foot    HPI:  Isidro Velasco is a 57 y.o. 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 He states that these have been present for a few months, but have gotten worse. He does admit to some fatigue and chills, but denies fever, chest pain, SOB, nausea or vomiting. He denies any recent antibiotic use.           Review of Systems   Constitutional: Positive for chills and fatigue. Negative for fever and unexpected weight change.   HENT: Positive for voice change (chronic hoarseness). Negative for sinus pressure, sore throat and trouble swallowing.    Eyes: Negative for photophobia and visual disturbance.   Respiratory: Negative for cough, shortness of breath and wheezing.    Cardiovascular: Negative for chest pain and palpitations.   Gastrointestinal: Negative for abdominal pain, diarrhea, nausea and vomiting.   Genitourinary: Negative for dysuria and hematuria.   Musculoskeletal: Positive for arthralgias and myalgias.   Skin: Positive for color change and wound.   Neurological: Negative for tremors, syncope, speech difficulty and weakness.   Psychiatric/Behavioral: Negative for confusion. The patient is not nervous/anxious.       All other systems reviewed and are negative.     Personal History     Past Medical History:   Diagnosis Date   • Diabetes (HCC)    • DVT (deep venous thrombosis) (HCC)    • Hypertension    • Mood disorder (HCC)              Past Surgical History:   Procedure Laterality Date   • BELOW KNEE AMPUTATION Left    • TRANS METATARSAL AMPUTATION Right        Family History:  family history includes Diabetes in his maternal uncle; Diabetes (age of onset: 65) in his mother; Heart attack in his maternal grandfather; Stroke (age of onset: 74) in his mother. Otherwise  pertinent FHx was reviewed and unremarkable.     Social History:  reports that he has never smoked. He has never used smokeless tobacco. He reports that he does not drink alcohol.  Social History     Social History Narrative   • Not on file       Medications:  Insulin Degludec, bumetanide, dapagliflozin Propanediol, ferrous sulfate, fluticasone, hydrOXYzine, losartan, metFORMIN, methadone, metoprolol tartrate, simvastatin, and tamsulosin    Allergies   Allergen Reactions   • Gabapentin Rash   • Lyrica [Pregabalin] Rash       Objective    Objective     Vital Signs:   Temp:  [98 °F (36.7 °C)] 98 °F (36.7 °C)  Heart Rate:  [] 106  Resp:  [16-20] 16  BP: (102-108)/(58-65) 102/58    Physical Exam  Constitutional:       General: He is not in acute distress.     Appearance: Normal appearance.   HENT:      Head: Atraumatic.      Right Ear: External ear normal.      Left Ear: External ear normal.      Nose: Nose normal.      Mouth/Throat:      Comments: Chronic hoarseness when speaking.   Eyes:      Extraocular Movements: Extraocular movements intact.      Conjunctiva/sclera: Conjunctivae normal.      Pupils: Pupils are equal, round, and reactive to light.   Cardiovascular:      Rate and Rhythm: Regular rhythm. Tachycardia present.      Heart sounds: Normal heart sounds. No murmur heard.  Pulmonary:      Effort: Pulmonary effort is normal. No respiratory distress.      Breath sounds: Normal breath sounds. No wheezing, rhonchi or rales.   Abdominal:      General: Bowel sounds are normal. There is no distension.      Tenderness: There is no abdominal tenderness. There is no guarding or rebound.      Comments: PEG tube in place. No evidence of erythema.    Musculoskeletal:      Cervical back: No rigidity.      Comments: Hx left BKA. Hx right transmetatarsal amputation.   Skin:     General: Skin is warm and dry.      Coloration: Skin is not jaundiced.      Findings: Erythema and lesion present. No rash.      Comments: 2  Large ulcerations on right lateral foot. Area of surrounding erythema.    Neurological:      General: No focal deficit present.      Mental Status: He is alert and oriented to person, place, and time.   Psychiatric:         Attention and Perception: Attention normal.         Mood and Affect: Mood normal.         Behavior: Behavior normal.         Thought Content: Thought content normal.          Result Review:  I have personally reviewed the results from the time of this admission to 12/1/2022 01:39 EST and agree with these findings:  [x]  Laboratory list / accordion  [x]  Microbiology  []  Radiology  [x]  EKG/Telemetry   []  Cardiology/Vascular   []  Pathology  []  Old records  []  Other:    LAB RESULTS:      Lab 12/01/22  0053 11/30/22 1905   WBC  --  13.92*   HEMOGLOBIN  --  11.1*   HEMATOCRIT  --  35.5*   PLATELETS  --  532*   NEUTROS ABS  --  11.44*   IMMATURE GRANS (ABS)  --  0.14*   LYMPHS ABS  --  1.64   MONOS ABS  --  0.64   EOS ABS  --  0.03   MCV  --  84.5   PROCALCITONIN  --  0.17   LACTATE 2.0 2.1*         Lab 11/30/22 1905   SODIUM 134*   POTASSIUM 4.1   CHLORIDE 100   CO2 21.0*   ANION GAP 13.0   BUN 18   CREATININE 1.29*   EGFR 64.3   GLUCOSE 54*   CALCIUM 9.1         Lab 11/30/22 1905   TOTAL PROTEIN 7.6   ALBUMIN 3.00*   GLOBULIN 4.6   ALT (SGPT) 12   AST (SGOT) 19   BILIRUBIN 0.2   ALK PHOS 85   LIPASE 14                 Lab 11/30/22 1905   IRON 18*   IRON SATURATION 6*   TIBC 277*   TRANSFERRIN 186*   FERRITIN 317.40         Brief Urine Lab Results  (Last result in the past 365 days)      Color   Clarity   Blood   Leuk Est   Nitrite   Protein   CREAT   Urine HCG        08/20/22 0108 Yellow   Clear   Negative   Negative     Negative               Microbiology Results (last 10 days)     ** No results found for the last 240 hours. **          XR Chest 1 View    Result Date: 12/1/2022  EXAMINATION: Chest one view. DATE: 11/30/2022. COMPARISON: None available. CLINICAL HISTORY: Infection.  FINDINGS: The lungs and pleural spaces are clear. The cardiomediastinal silhouette and the pulmonary vessels are within normal limits.     Impression: No acute cardiopulmonary process. Electronically signed by:  Pierre Baron D.O.  11/30/2022 10:15 PM Mountain Time          Assessment & Plan   Assessment & Plan       Right foot infection    Hypoglycemia    Sepsis (HCC)    Essential hypertension    Hyperlipidemia    Paroxysmal atrial fibrillation (HCC)    Anemia    Hyponatremia    Hypoalbuminemia      Buster Velasco is a 57 y.o. 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   Sepsis: infectious source, tachycardia, borderline BP, leukocytosis, elevated lactate.  - Check CRP, sed rate  -Blood cultures pending  -Given hx of MRSA osteomyelitis as well as multiple amputations, will need to start IV Vancomycin tonight.   -MRI right foot tonight to investigate for possible osteomyelitis.  -Wound care for the am  -Infectious Disease consult for the am  -Dr. ePguero to see in the am. May ultimately need right BKA.  -IV fluids    Decubitus ulcer  -Wound care to see.   -Bedrest. Turn q2    Chronic Vocal Cord Paralysis  -NPO tonight.   -Has PEG tube in place, but reportedly is requesting to be taken out?  -SLP to see in the am.     Hypoglycemia  T2DM  -Blood glucose 45 on arrival, persistent with rechecks.   -Point-of-care glucose check every hour  - Start D5W at 75 cc/hour  - Patient on Tresiba 75 units every morning, hold  - We will start SSI when needed  -Check A1C  -Repeat bmp in the am    HTN  HLD  -Hold  home BP meds for now d/t borderline hypotension  -Continue statin    Parox Atrial Fib  -Noted during admission to  back in July.   -EKG pending  -Not currently on anticoagulation    Anemia  -Hgb 11.1, Hct 35.5  -Appears chronic. Actually has improved since prior results.  -Anemia studies   -Repeat cbc in the am    Hyponatremia  -Na+ 134. Likely 2ry to poor PO intake.   -Check Serum Osm, Urine Osm, and Urine Na+  -IV fluids tonight  -Repeat bmp in the am    Hypoalbuminemia  -Alb 3.0  -Nutrition consult for the am    History of DVT  - S/p IVC filter      DVT prophylaxis:  Hep subQ    CODE STATUS:  Full Code  Code Status (Patient has no pulse and is not breathing): CPR (Attempt to Resuscitate)  Medical Interventions (Patient has pulse or is breathing): Full Support      This note has been completed as part of a split-shared workflow.     Signature: Electronically signed by Cecily Molina PA-C, 11/30/22, 11:33 PM EST      Attending   Admission Attestation       I have performed an independent face-to-face diagnostic evaluation including performing an independent physical examination as documented here.  The documented plan of care above was reviewed and developed with the advanced practice clinician (APC).      Brief Summary Statement:   Buster Velasco is a 57 y.o. male with a PMH significant for insulin-dependent diabetes mellitus type 2, DVT s/p IVC filter, atrial fibrillation not on anticoagulation, HTN, HLD, history of osteomyelitis s/p left BKA, s/p right transmetatarsal amputation, MRSA osteomyelitis s/p third metacarpal resection who comes to the ED due to worsening foot infection.  Patient reports a 3-month history of an open wound on his right foot.  Over the past several weeks the wound has become more erythematous, painful with drainage.  He reports fatigue, chills.  He denies fever, vomiting.  He reports diarrhea which is currently resolved.  He is not following with wound care  outpatient and has not been on any recent antibiotics.    Remainder of detailed HPI is as noted by APC and has been reviewed and/or edited by me for completeness.    Attending Physical Exam:  Temp:  [98 °F (36.7 °C)-98.6 °F (37 °C)] 98.6 °F (37 °C)  Heart Rate:  [] 89  Resp:  [16-20] 16  BP: (102-112)/(58-76) 112/74    Constitutional: Awake, alert  Eyes: PERRLA, sclerae anicteric, no conjunctival injection  HENT: NCAT, mucous membranes moist  Neck: Supple, no thyromegaly, no lymphadenopathy, trachea midline  Respiratory: Clear to auscultation bilaterally, nonlabored respirations   Cardiovascular: RRR, no murmurs, rubs, or gallops, palpable radial pulses bilaterally  Gastrointestinal: Positive bowel sounds, soft, nontender, nondistended  Musculoskeletal: Left BKA, metatarsal resection to right foot, foot bandaged  Psychiatric: Appropriate affect, cooperative  Neurologic: Oriented x 3, strength symmetric in all extremities, Cranial Nerves grossly intact to confrontation, speech clear  Skin: Bandage dry and intact to right foot      Brief Assessment/Plan :  See detailed assessment and plan developed with APC which I have reviewed and/or edited for completeness.    Tracy Oliveira DO  12/01/22                        Electronically signed by Tracy Oliveira DO at 12/01/22 0347          Operative/Procedure Notes (all)      John Peguero MD at 12/02/22 1040  Version 1 of 1       OP NOTE    Patient Name:  Buster Velasco    Date of Surgery: December 2, 2022      Pre-op Diagnosis: Osteomyelitis of the right foot status post prior transmetatarsal amputation of all toes right foot approximately 6 months ago secondary to diabetic neuropathy/peripheral vascular disease    Post-op Diagnosis: Same    Surgeon(s):  John Peguero MD      Assistant(s): Assistant: Erlin Arzola PA  Assistant: Erlin Arzola PA  was responsible for performing the following activities: Retraction, Suction, Irrigation, Suturing,  Closing and Placing Dressing and their skilled assistance was necessary for the success of this case.    Anesthesia: Right adductor canal single shot nerve block under ultrasound guidance per anesthesia followed by right popliteal nerve block catheter placement under ultrasound guidance by anesthesia followed by intravenous induction of general endotracheal anesthesia    Indications: Patient's longstanding diabetic with severe diabetic neuropathy and severe peripheral disease he is status post remote left below-knee amputation for diabetic neuropathy and peripheral vascular disease.  He not-too-distant past had a right transmetatarsal amputation of all the toes of the right foot performed at a hospital [facility].  He is now developed osteomyelitis of the residual portion of the right fifth metatarsal bone along with ulceration and osteomyelitis of the lateral malleolus/fibular head.  He is now for right below-knee amputation.  He understood the need for procedure and the risk involved include: Bleeding, stroke, heart attack, possible  death, possible postop phantom pain possible need for amputation a higher level in the future due to infection and/or ischemia at the amputation site.  He understood and agreed for me proceed ahead    Procedure(s): Mid level right below-knee amputation and primary closure due to    Patient underwent a single shot right adductor canal nerve block by anesthesia in the preop area under ultrasound guidance followed by right popliteal nerve block catheter placement under ultrasound guidance in the preoperative anesthesia was taken the operating suite and underwent intravenous induction of general endotracheal anesthesia difficulty.  Timeout was called to verify the procedure be done right below-knee amputation and the patient had placement of a pneumatic tourniquet in the upper thigh area and then had prepping of the right lower extremity clean the right foot and the right lower  extremity circumferentially with ChloraPrep up to the knee level and then sterilely draped.  The leg was held in a high extension to drain the venous blood from the right lower extremity.  This was done for 5 minutes and then the pneumatic tourniquet cuff was placed to 230 mmHg.  The anterior skin flap was made approxi-4 fingerbreadths below the tibial tuberosity and carried to the midline medial medially and laterally.  Anterior flap was developed down to the anterior tibial compartment with a tibial anterior tibial artery and leg were then defined ligated proximal and distally with 2-0 silk suture followed by a transfixion stitch of 2 oh proximally and divided.  Laterally in the calf the fibula was identified dissected free from the surrounding musculature and then periosteal elevator was used for the elevated    Approxi-2 cm above the anterior skin flap line where it was then transected with the double-action bone cutter.  Posterior skin flap was made through the midline medial laterally from the calf down toward the ankle for approxi-15 cm and then these 2 skin incisions were met posteriorly at the ankle level.  The tibia was then transected at the anterior skin flap line with the bone power saw.  The amputation knife knife was then placed jus under the fibula and carried down through the media and l lateral calf incisions of the posterior flap down to the ankle level and the specimen was moved from the  operative field.  The posterior tibial artery and the peroneal artery identified in the posterior flap at the anterior skin flap line and were clamped and ligated with 2-0 silk suture followed by transition stitch of 2 oh.  After these 2 arteries have been ligated the pneumatic cuff was deflated.  The tourniquet time was approximately 19 minutes.  Further hemostasis was obtained with Bovie cautery to the muscle bundles were needed.  Within the posterior flap the sciatic nerve was identified and brought to lie on  extension and ligated with 0 chromic catgut suture at the anterior skin flap line and then cut sharply with a knife blade distal to this chromic suture and was allowed to retract into the depth of wound.  Using blunt dissection the soleus was dissected free from the gastrocnemius fascia of the posterior flap and then was transected with the Bovie cautery at the anterior skin flap line.  Further hemostasis controlled with the Bovie cautery were needed.  The open amputation stump was then irrigated with 4 and 50 mL of Irrisept orthopedic antibiotic solution.  The anterior border of the tibia was then beveled with the bone power saw and rasp for smoothness.  Through a separate stab laterally in the calf a 15 Jamaican Serge-Obregon drain was placed and brought along the depth of the open surgical wound and cut the appropriate length.  It was anchored to the skin at the exit site with 2-0 silk suture.  After assurance of hemostasis the gastrocnemius fascia was cut to proper length and sutured to the anterior border of the periosteum of the tibia and medial and lateral to the muscle fascia with interrupted 0 Vicryl sutures.  The posterior skin flap was cut to proper length and sutured to the anterior skin flap with a cutaneous interrupted 2-0 Vicryl's skin was reapproximated with interrupted simple 2-0 nylon sutures.  Incision was covered with a 5 x 9 Xeroform gauze followed by 4 x 4 fluffs Kerlix wrap and a #8 Spandage tube that dressing to hold the Kerlix in place.  The Serge-Obregon drain was attached to bulb vacuum reservoir was then placed in a Velcro pouch which was then anchored to the Spandage tube net dressing.  Patient was awakened from the general endotracheal anesthesia and taken the recovery area with stable vital signs.  Sponge and needle count were correct x2.    Estimated Blood Loss: Approximately 50 mL    Findings: Very calcified tibial vessels.  There appeared to be adequate muscle and skin bleeding for  healing of this amputation stump to occur.  Total tourniquet time was 19 minutes  Complications: No immediate complications occurred      John Peguero MD     Date: 2022 Time: 12:08 EST    Electronically signed by John Peguero MD at 22 1220          Physical Therapy Notes (most recent note)      Jacqueline Ortiz, PT at 22 1357  Version 1 of 1         Patient Name: Buster Velasco  : 1965    MRN: [id number]                              Today's Date: 2022       Admit Date: 2022    Visit Dx:     ICD-10-CM ICD-9-CM   1. Right foot infection  L08.9 686.9   2. Hypoglycemia  E16.2 251.2   3. Decubitus ulcer of coccygeal region, unspecified ulcer stage  L89.159 707.03     707.20   4. Pharyngeal dysphagia  R13.13 787.23     Patient Active Problem List   Diagnosis   • Right foot infection   • Hypoglycemia   • Anemia   • Hyponatremia   • Hypoalbuminemia   • Essential hypertension   • Hyperlipidemia   • Paroxysmal atrial fibrillation (HCC)   • Sepsis (HCC)   • MRSA bacteremia     Past Medical History:   Diagnosis Date   • Diabetes (HCC)    • DVT (deep venous thrombosis) (HCC)    • Hypertension    • Mood disorder (HCC)      Past Surgical History:   Procedure Laterality Date   • BELOW KNEE AMPUTATION Left    • BELOW KNEE AMPUTATION Right 2022    Procedure: AMPUTATION BELOW KNEE RIGHT;  Surgeon: John Peguero MD;  Location: [facility];  Service: Vascular;  Laterality: Right;   • TRANS METATARSAL AMPUTATION Right       General Information     Row Name 22 1430          Physical Therapy Time and Intention    Document Type therapy note (daily note)  -ES     Mode of Treatment physical therapy  -ES     Row Name 22 1430          General Information    Patient Profile Reviewed yes  -ES     Existing Precautions/Restrictions fall;other (see comments)  B BKA  -ES     Barriers to Rehab medically complex;previous functional deficit;physical barrier  -ES     Row Name 22  1430          Cognition    Orientation Status (Cognition) oriented x 4  -ES     Row Name 12/11/22 1430          Safety Issues, Functional Mobility    Safety Issues Affecting Function (Mobility) awareness of need for assistance;insight into deficits/self-awareness;problem-solving;safety precaution awareness;safety precautions follow-through/compliance;sequencing abilities  -ES     Impairments Affecting Function (Mobility) balance;endurance/activity tolerance;strength;pain  -ES           User Key  (r) = Recorded By, (t) = Taken By, (c) = Cosigned By    Initials Name Provider Type    ES Jacqueline Ortiz PT Physical Therapist               Mobility     Row Name 12/11/22 1431          Bed Mobility    Bed Mobility sit-supine  -ES     Sit-Supine [place]ion (Bed Mobility) contact guard;verbal cues  -ES     Comment, (Bed Mobility) --  -ES     Row Name 12/11/22 1431          Transfers    Comment, (Transfers) Pt declined OOB activity due to pain at wounds  -ES     Row Name 12/11/22 1431          Mobility    Extremity Weight-bearing Status right lower extremity  -ES     Right Lower Extremity (Weight-bearing Status) non weight-bearing (NWB)   -ES           User Key  (r) = Recorded By, (t) = Taken By, (c) = Cosigned By    Initials Name Provider Type    ES Jacqueline Ortiz PT Physical Therapist               Obj/Interventions     Row Name 12/11/22 1431          Motor Skills    Therapeutic Exercise knee;ankle;hip  -ES     Row Name 12/11/22 1431          Hip (Therapeutic Exercise)    Hip (Therapeutic Exercise) strengthening exercise  -ES     Hip Strengthening (Therapeutic Exercise) bilateral;sitting;aBduction;10 repetitions  -ES     Row Name 12/11/22 1431          Knee (Therapeutic Exercise)    Knee (Therapeutic Exercise) strengthening exercise  -ES     Knee Strengthening (Therapeutic Exercise) bilateral;SLR (straight leg raise);LAQ (long arc quad);10 repetitions;other (see comments)  hold LAQ x3 seconds at end range extension  to promote improved ROM  -ES     Row Name 12/11/22 1431          Balance    Balance Assessment sitting static balance;sitting dynamic balance  -ES     Static Sitting Balance standby assist  -ES     Dynamic Sitting Balance contact guard  -ES     Position, Sitting Balance unsupported;sitting edge of bed  -ES     Balance Interventions sitting;static;dynamic  -ES     Comment, Balance No LOB  -ES           User Key  (r) = Recorded By, (t) = Taken By, (c) = Cosigned By    Initials Name Provider Type    ES Jacqueline Ortiz, PT Physical Therapist               Goals/Plan    No documentation.                Clinical Impression     Row Name 12/11/22 1433          Pain    Additional Documentation Pain Scale: FACES Pre/Post-Treatment (Group)  -ES     Row Name 12/11/22 1433          Pain Scale: FACES Pre/Post-Treatment    Pain: FACES Scale, Pretreatment 2-->hurts little bit  -ES     Posttreatment Pain Rating 2-->hurts little bit  -ES     Pain Location - Side/Orientation Right  -ES     Pain Location incisional  -ES     Pain Location - knee  -ES     Pre/Posttreatment Pain Comment tolerated session well  -ES     Row Name 12/11/22 1433          Plan of Care Review    Plan of Care Reviewed With patient  -ES     Progress improving  -ES     Outcome Evaluation Pt demo'd improved tolerance to dynamic activity with minimal c/o pain. Pt able to maintain sitting EOB for BLE strengthening activities with SBA-CGA. Pt educated on importance of continuing HEP to promote improved muscular control and ROM, verbalized/demonstrated agreement. Will continue to progress as able, recommend attempting OOB activity next session. PT rec IRF upon d/c.  -ES     Row Name 12/11/22 1433          Therapy Assessment/Plan (PT)    Rehab Potential (PT) good, to achieve stated therapy goals  -ES     Criteria for Skilled Interventions Met (PT) yes;meets criteria;skilled treatment is necessary  -ES     Therapy Frequency (PT) daily  -ES     Row Name 12/11/22 1433           Vital Signs    Pre Systolic BP Rehab --  VSS  -ES     Pre Patient Position Sitting  -ES     Intra Patient Position Sitting  -ES     Post Patient Position Supine  -ES     Row Name 12/11/22 1433          Positioning and Restraints    Pre-Treatment Position in bed  -ES     Post Treatment Position bed  -ES     In Bed notified nsg;fowlers;call light within reach;encouraged to call for assist;exit alarm on;RLE elevated;side rails up x3  -ES           User Key  (r) = Recorded By, (t) = Taken By, (c) = Cosigned By    Initials Name Provider Type    Jacqueline Moreno PT Physical Therapist               Outcome Measures     Row Name 12/11/22 1437          How much help from another person do you currently need...    Turning from your back to your side while in flat bed without using bedrails? 3  -ES     Moving from lying on back to sitting on the side of a flat bed without bedrails? 3  -ES     Moving to and from a bed to a chair (including a wheelchair)? 1  -ES     Standing up from a chair using your arms (e.g., wheelchair, bedside chair)? 1  -ES     Climbing 3-5 steps with a railing? 1  -ES     To walk in hospital room? 1  -ES     AM-PAC 6 Clicks Score (PT) 10  -ES     Highest level of mobility 4 --> Transferred to chair/commode  -ES     Row Name 12/11/22 1437          Functional Assessment    Outcome Measure Options AM-PAC 6 Clicks Basic Mobility (PT)  -ES           User Key  (r) = Recorded By, (t) = Taken By, (c) = Cosigned By    Initials Name Provider Type    Jacqueline Moreno PT Physical Therapist                             Physical Therapy Education     Title: PT OT SLP Therapies (Done)     Topic: Physical Therapy (Done)     Point: Mobility training (Done)     Learning Progress Summary           Patient KRIS Martinez VU, DU by SC at 12/7/2022 1115    Comment: REVIEWED HEP                   Point: Home exercise program (Done)     Learning Progress Summary           KRIS Ace VU, DU by SC at 12/7/2022 1115     Comment: REVIEWED HEP                   Point: Body mechanics (Done)     Learning Progress Summary           Patient KRIS Martinez VU,DEB by SC at 12/7/2022 1115    Comment: REVIEWED HEP                   Point: Precautions (Done)     Learning Progress Summary           Patient KRIS Martinez VU, DU by SC at 12/7/2022 1115    Comment: REVIEWED HEP                               User Key     Initials Effective Dates Name Provider Type Discipline    SC 06/16/21 -  Shamika Patel PT Physical Therapist PT              PT Recommendation and Plan     Plan of Care Reviewed With: patient  Progress: improving  Outcome Evaluation: Pt demo'd improved tolerance to dynamic activity with minimal c/o pain. Pt able to maintain sitting EOB for BLE strengthening activities with SBA-CGA. Pt educated on importance of continuing HEP to promote improved muscular control and ROM, verbalized/demonstrated agreement. Will continue to progress as able, recommend attempting OOB activity next session. PT rec IRF upon d/c.     Time Calculation:    PT Charges     Row Name 12/11/22 1438             Time Calculation    Start Time 1357  -ES      PT Received On 12/11/22  -ES      PT Goal Re-Cert Due Date 12/17/22  -ES         Time Calculation- PT    Total Timed Code Minutes- PT 13 minute(s)  -ES         Timed Charges    83467 - PT Therapeutic Exercise Minutes 13  -ES         Total Minutes    Timed Charges Total Minutes 13  -ES       Total Minutes 13  -ES            User Key  (r) = Recorded By, (t) = Taken By, (c) = Cosigned By    Initials Name Provider Type    ES Jacqueline Ortiz PT Physical Therapist              Therapy Charges for Today     Code Description Service Date Service Provider Modifiers Qty    62300978181 HC PT THER PROC EA 15 MIN 12/11/2022 Jacqueline Ortiz PT GP 1          PT G-Codes  Outcome Measure Options: AM-PAC 6 Clicks Basic Mobility (PT)  AM-PAC 6 Clicks Score (PT): 10  AM-PAC 6 Clicks Score (OT): 11  PT Discharge  Summary  Anticipated Discharge Disposition (PT): inpatient rehabilitation facility    Jacqueline Ortiz, PT  2022      Electronically signed by Jacqueline Ortiz, PT at 22 1439          Occupational Therapy Notes (most recent note)      Marilou Lennon, OT at 22 1349          Patient Name: Buster Velasco  : 1965    MRN: [id number]                              Today's Date: 2022       Admit Date: 2022    Visit Dx:     ICD-10-CM ICD-9-CM   1. Right foot infection  L08.9 686.9   2. Hypoglycemia  E16.2 251.2   3. Decubitus ulcer of coccygeal region, unspecified ulcer stage  L89.159 707.03     707.20   4. Pharyngeal dysphagia  R13.13 787.23     Patient Active Problem List   Diagnosis   • Right foot infection   • Hypoglycemia   • Anemia   • Hyponatremia   • Hypoalbuminemia   • Essential hypertension   • Hyperlipidemia   • Paroxysmal atrial fibrillation (HCC)   • Sepsis (HCC)   • MRSA bacteremia     Past Medical History:   Diagnosis Date   • Diabetes (HCC)    • DVT (deep venous thrombosis) (HCC)    • Hypertension    • Mood disorder (HCC)      Past Surgical History:   Procedure Laterality Date   • BELOW KNEE AMPUTATION Left    • BELOW KNEE AMPUTATION Right 2022    Procedure: AMPUTATION BELOW KNEE RIGHT;  Surgeon: John Peguero MD;  Location: [facility];  Service: Vascular;  Laterality: Right;   • TRANS METATARSAL AMPUTATION Right       General Information     Row Name 22 1437          OT Time and Intention    Document Type therapy note (daily note)  -MR     Mode of Treatment occupational therapy  -MR     Row Name 22 3817          General Information    Patient Profile Reviewed yes  -MR     Existing Precautions/Restrictions fall;other (see comments)  B BKA  -MR     Barriers to Rehab medically complex;previous functional deficit;physical barrier  -MR     Row Name 22 1201          Cognition    Orientation Status (Cognition) oriented x 4  -MR     Row Name  12/11/22 1437          Safety Issues, Functional Mobility    Safety Issues Affecting Function (Mobility) awareness of need for assistance;insight into deficits/self-awareness;problem-solving;safety precaution awareness;safety precautions follow-through/compliance;sequencing abilities  -MR     Impairments Affecting Function (Mobility) balance;endurance/activity tolerance;strength;pain  -MR           User Key  (r) = Recorded By, (t) = Taken By, (c) = Cosigned By    Initials Name Provider Type    Marilou Mcfarland, LUCAS Occupational Therapist                 Mobility/ADL's     Row Name 12/11/22 1439          Bed Mobility    Bed Mobility supine-sit;scooting/bridging  -MR     Scooting/Bridging [place] (Bed Mobility) contact guard;nonverbal cues (demo/gesture);verbal cues  -MR     Supine-Sit [place] (Bed Mobility) moderate assist (50% patient effort);verbal cues;nonverbal cues (demo/gesture)  -MR     Assistive Device (Bed Mobility) head of bed elevated  -MR     Comment, (Bed Mobility) Pt requiring ModA for supine to sit d/t positioning in bed.  -MR     Row Name 12/11/22 1439          Transfers    Comment, (Transfers) Deferred transfer to chair d/t sacral wounds.  -MR     Row Name 12/11/22 1439          Activities of Daily Living    BADL Assessment/Intervention upper body dressing  -MR     Row Name 12/11/22 1439          Mobility    Extremity Weight-bearing Status right lower extremity  -MR     Right Lower Extremity (Weight-bearing Status) non weight-bearing (NWB)   -MR     Row Name 12/11/22 1439          Upper Body Dressing Assessment/Training    [place] Level (Upper Body Dressing) other (see comments);minimum assist (75% patient effort)  adjusting hospital gown  -MR     Position (Upper Body Dressing) edge of bed sitting  -MR           User Key  (r) = Recorded By, (t) = Taken By, (c) = Cosigned By    Initials Name Provider Type    Marilou Mcfarland OT Occupational Therapist               Obj/Interventions      Row Name 12/11/22 1441          Shoulder (Therapeutic Exercise)    Shoulder (Therapeutic Exercise) AROM (active range of motion)  -MR     Shoulder AROM (Therapeutic Exercise) bilateral;flexion;extension;aBduction;aDduction;sitting;10 repetitions  -MR     Row Name 12/11/22 1441          Elbow/Forearm (Therapeutic Exercise)    Elbow/Forearm (Therapeutic Exercise) AROM (active range of motion)  -MR     Elbow/Forearm AROM (Therapeutic Exercise) bilateral;flexion;extension;10 repetitions  -MR     Row Name 12/11/22 1441          Motor Skills    Therapeutic Exercise shoulder;elbow/forearm;other (see comments)  Core engagement activity completed while at EOB reaching out of GENNY across midline.  -MR     Row Name 12/11/22 1441          Balance    Balance Assessment sitting static balance;sitting dynamic balance  -MR     Static Sitting Balance standby assist  -MR     Dynamic Sitting Balance contact guard  -MR     Position, Sitting Balance unsupported;sitting edge of bed  -MR     Balance Interventions sitting;static;dynamic  -MR     Comment, Balance No LOB noted w/ seated dynamic reaching activity.  -MR           User Key  (r) = Recorded By, (t) = Taken By, (c) = Cosigned By    Initials Name Provider Type    MR Marilou Lennon, OT Occupational Therapist               Goals/Plan    No documentation.                Clinical Impression     Row Name 12/11/22 1444          Pain Assessment    Pretreatment Pain Rating 5/10  -MR     Posttreatment Pain Rating 5/10  -MR     Pain Location - Side/Orientation Right  -MR     Pain Location lower  -MR     Pain Location - residual limb  -MR     Pre/Posttreatment Pain Comment Pt reporting pain is tolerable.  -MR     Pain Intervention(s) Ambulation/increased activity;Repositioned  -MR     Row Name 12/11/22 1444          Plan of Care Review    Plan of Care Reviewed With patient  -MR     Progress improving  -MR     Outcome Evaluation Pt completed bed mobility w/ ModA for supine to sit d/t  positioning in bed. Good effort noted w/ BUE and core engagement challenging balance and COG. Pt deferred OOB d/t skin integrity on buttocks. Pt agreeable to progress to sliding board transfers during next session. Continue to recommend inpatient rehab at d/c.  -MR     Row Name 12/11/22 1444          Therapy Plan Review/Discharge Plan (OT)    Anticipated Discharge Disposition (OT) inpatient rehabilitation facility  -MR     Row Name 12/11/22 1444          Vital Signs    Pre Systolic BP Rehab --  VSS  -MR     O2 Delivery Pre Treatment room air  -MR     O2 Delivery Intra Treatment room air  -MR     O2 Delivery Post Treatment room air  -MR     Pre Patient Position Supine  -MR     Intra Patient Position Sitting  -MR     Post Patient Position Sitting  -MR     Row Name 12/11/22 1444          Positioning and Restraints    Pre-Treatment Position in bed  -MR     Post Treatment Position bed  -MR     In Bed sitting EOB;with PT  -MR           User Key  (r) = Recorded By, (t) = Taken By, (c) = Cosigned By    Initials Name Provider Type    MR Marilou Lennon, OT Occupational Therapist               Outcome Measures     Row Name 12/11/22 9272          How much help from another is currently needed...    Putting on and taking off regular lower body clothing? 1  -MR     Bathing (including washing, rinsing, and drying) 1  -MR     Toileting (which includes using toilet bed pan or urinal) 2  -MR     Putting on and taking off regular upper body clothing 2  -MR     Taking care of personal grooming (such as brushing teeth) 3  -MR     Eating meals 4  -MR     AM-PAC 6 Clicks Score (OT) 13  -MR     Row Name 12/11/22 3407          How much help from another person do you currently need...    Turning from your back to your side while in flat bed without using bedrails? 3  -ES     Moving from lying on back to sitting on the side of a flat bed without bedrails? 3  -ES     Moving to and from a bed to a chair (including a wheelchair)? 1  -ES      Standing up from a chair using your arms (e.g., wheelchair, bedside chair)? 1  -ES     Climbing 3-5 steps with a railing? 1  -ES     To walk in hospital room? 1  -ES     AM-PAC 6 Clicks Score (PT) 10  -ES     Highest level of mobility 4 --> Transferred to chair/commode  -ES     Row Name 12/11/22 1447 12/11/22 1437       Functional Assessment    Outcome Measure Options AM-PAC 6 Clicks Daily Activity (OT)  -MR AM-PAC 6 Clicks Basic Mobility (PT)  -ES          User Key  (r) = Recorded By, (t) = Taken By, (c) = Cosigned By    Initials Name Provider Type    MR Jose Lennonelle, OT Occupational Therapist    ES Jacqueline Ortiz, PT Physical Therapist                Occupational Therapy Education     Title: PT OT SLP Therapies (Done)     Topic: Occupational Therapy (Done)     Point: ADL training (Done)     Description:   Instruct learner(s) on proper safety adaptation and remediation techniques during self care or transfers.   Instruct in proper use of assistive devices.              Learning Progress Summary           Patient Acceptance, E, VU by MR at 12/11/2022 1448    Eager, E, VU,DU by SC at 12/7/2022 1115    Comment: REVIEWED HEP    Acceptance, E, VU by MR at 12/7/2022 1110                   Point: Home exercise program (Done)     Description:   Instruct learner(s) on appropriate technique for monitoring, assisting and/or progressing therapeutic exercises/activities.              Learning Progress Summary           Patient Acceptance, E, VU by MR at 12/11/2022 1448    Eager, E, VU,DU by SC at 12/7/2022 1115    Comment: REVIEWED HEP    Acceptance, E, VU by MR at 12/7/2022 1110                   Point: Precautions (Done)     Description:   Instruct learner(s) on prescribed precautions during self-care and functional transfers.              Learning Progress Summary           Patient Acceptance, E, VU by MR at 12/11/2022 1448    Eager, E, VU,DU by SC at 12/7/2022 1115    Comment: REVIEWED HEP    Acceptance, E, VU by MR at  12/7/2022 1110                   Point: Body mechanics (Done)     Description:   Instruct learner(s) on proper positioning and spine alignment during self-care, functional mobility activities and/or exercises.              Learning Progress Summary           Patient Acceptance, E, VU by  at 12/11/2022 1448    Eager, E, VU,DU by SC at 12/7/2022 1115    Comment: REVIEWED HEP    Acceptance, E, VU by  at 12/7/2022 1110                               User Key     Initials Effective Dates Name Provider Type Discipline    SC 06/16/21 -  Shamika Patel, PT Physical Therapist PT     09/22/22 -  Marilou Lennon OT Occupational Therapist OT              OT Recommendation and Plan  Planned Therapy Interventions (OT): activity tolerance training, adaptive equipment training, BADL retraining, functional balance retraining, IADL retraining, occupation/activity based interventions, patient/caregiver education/training, transfer/mobility retraining, strengthening exercise, ROM/therapeutic exercise  Therapy Frequency (OT): daily  Plan of Care Review  Plan of Care Reviewed With: patient  Progress: improving  Outcome Evaluation: Pt completed bed mobility w/ ModA for supine to sit d/t positioning in bed. Good effort noted w/ BUE and core engagement challenging balance and COG. Pt deferred OOB d/t skin integrity on buttocks. Pt agreeable to progress to sliding board transfers during next session. Continue to recommend inpatient rehab at d/c.     Time Calculation:    Time Calculation- OT     Row Name 12/11/22 1448             Time Calculation- OT    OT Start Time 1349  -MR      OT Received On 12/11/22  -      OT Goal Re-Cert Due Date 12/17/22  -MR         Timed Charges    18760 - OT Therapeutic Exercise Minutes 5  -MR      32768 - OT Therapeutic Activity Minutes 5  -MR      63656 - OT Self Care/Mgmt Minutes 2  -MR         Total Minutes    Timed Charges Total Minutes 12  -MR       Total Minutes 12  -MR            User Key  (r) =  Recorded By, (t) = Taken By, (c) = Cosigned By    Initials Name Provider Type     Marilou Lennon, LUCAS Occupational Therapist              Therapy Charges for Today     Code Description Service Date Service Provider Modifiers Qty    09728562808 HC OT THER PROC EA 15 MIN 12/11/2022 Marilou Lennon OT GO 1               Marilou Lennon OT  12/11/2022    Electronically signed by Marilou Lennon OT at 12/11/22 6579

## 2022-12-12 NOTE — THERAPY TREATMENT NOTE
Acute Care - Speech Language Pathology   Swallow Treatment Note [facility]     Patient Name: Buster Velasco  : 1965  MRN: [id number]  Today's Date: 2022               Admit Date: 2022    Visit Dx:     ICD-10-CM ICD-9-CM   1. Right foot infection  L08.9 686.9   2. Hypoglycemia  E16.2 251.2   3. Decubitus ulcer of coccygeal region, unspecified ulcer stage  L89.159 707.03     707.20   4. Pharyngeal dysphagia  R13.13 787.23     Patient Active Problem List   Diagnosis   • Right foot infection   • Hypoglycemia   • Anemia   • Hyponatremia   • Hypoalbuminemia   • Essential hypertension   • Hyperlipidemia   • Paroxysmal atrial fibrillation (HCC)   • Sepsis (HCC)   • MRSA bacteremia     Past Medical History:   Diagnosis Date   • Diabetes (HCC)    • DVT (deep venous thrombosis) (HCC)    • Hypertension    • Mood disorder (HCC)      Past Surgical History:   Procedure Laterality Date   • BELOW KNEE AMPUTATION Left    • BELOW KNEE AMPUTATION Right 2022    Procedure: AMPUTATION BELOW KNEE RIGHT;  Surgeon: John Peguero MD;  Location: [facility];  Service: Vascular;  Laterality: Right;   • TRANS METATARSAL AMPUTATION Right        SLP Recommendation and Plan     SLP Diet Recommendation: regular textures, no mixed consistencies, nectar thick liquids, ice chips between meals after oral care, with supervision (22)  Recommended Precautions and Strategies: no straw, upright posture during/after eating, small bites of food and sips of liquid, general aspiration precautions, other (see comments) (tsp sized sips nectar-thick) (22)  SLP Rec. for Method of Medication Administration: meds whole, meds crushed, with puree, as tolerated (22)     Monitor for Signs of Aspiration: notify SLP if any concerns (22)        Anticipated Discharge Disposition (SLP): anticipate therapy at next level of care (22)     Therapy Frequency (Swallow): 5 days per week  (12/12/22 0910)  Predicted Duration Therapy Intervention (Days): until discharge (12/12/22 0910)        Daily Summary of Progress (SLP): progress toward functional goals as expected (12/12/22 0910)               Treatment Assessment (SLP): suspected, continued, pharyngeal dysphagia (12/12/22 0910)  Treatment Assessment Comments (SLP): Pt w/ exercise handout at bedside, reviewed and completed all. Encouraged independent practice. Discussed completing repeat instrumental. Pt declined MBS 2/2 concerns for inability to tolerate sitting in chair due to pain. FEES not ideal given c-spine prominence impacting visualization. SLP will cont to f/u as appropriate and plan for repeat instrumental pending pt readiness (12/12/22 0910)  Plan for Continued Treatment (SLP): continue treatment per plan of care (12/12/22 0910)                SWALLOW EVALUATION (last 72 hours)     SLP Adult Swallow Evaluation     Row Name 12/12/22 0910 12/10/22 [id number]                Rehab Evaluation    Document Type therapy note (daily note)  - therapy note (daily note)  -MP       Subjective Information no complaints  - no complaints  -       Patient Observations alert;cooperative  - alert;cooperative  -       Patient/Family/Caregiver Comments/Observations No family present  - No family present  -MP       Patient Effort good  - good  -MP       Symptoms Noted During/After Treatment none  - --          Pain    Additional Documentation Pain Scale: FACES Pre/Post-Treatment (Group)  -MH Pain Scale: FACES Pre/Post-Treatment (Group)  -MP          Pain Scale: FACES Pre/Post-Treatment    Pain: FACES Scale, Pretreatment 0-->no hurt  -MH 0-->no hurt  -MP       Posttreatment Pain Rating 0-->no hurt  -MH 0-->no hurt  -MP          SLP Treatment Clinical Impressions    Treatment Assessment (SLP) suspected;continued;pharyngeal dysphagia  -MH suspected;continued;pharyngeal dysphagia  -MP       Treatment Assessment Comments (SLP) Pt w/ exercise handout at  bedside, reviewed and completed all. Encouraged independent practice. Discussed completing repeat instrumental. Pt declined MBS 2/2 concerns for inability to tolerate sitting in chair due to pain. FEES not ideal given c-spine prominence impacting visualization. SLP will cont to f/u as appropriate and plan for repeat instrumental pending pt readiness  - Reviewed & completed dysphagia exercises w/ pt. Left new handout & encouraged independent practice. SLP will continue to follow for tx.  -       Daily Summary of Progress (SLP) progress toward functional goals as expected  - progress toward functional goals as expected  -MP       Barriers to Overall Progress (SLP) -- Baseline deficits  -MP       Plan for Continued Treatment (SLP) continue treatment per plan of care  - continue treatment per plan of care  -       Care Plan Review care plan/treatment goals reviewed  - care plan/treatment goals reviewed;patient/other agree to care plan  -          Recommendations    Therapy Frequency (Swallow) 5 days per week  - 5 days per week  -       Predicted Duration Therapy Intervention (Days) until discharge  - until discharge  -       SLP Diet Recommendation regular textures;no mixed consistencies;nectar thick liquids;ice chips between meals after oral care, with supervision  - regular textures;no mixed consistencies;nectar thick liquids;ice chips between meals after oral care, with supervision  -       Recommended Precautions and Strategies no straw;upright posture during/after eating;small bites of food and sips of liquid;general aspiration precautions;other (see comments)  tsp sized sips nectar-thick  - no straw;upright posture during/after eating;small bites of food and sips of liquid;general aspiration precautions;other (see comments)  tsp sized sips nectar-thick  -       Oral Care Recommendations Oral Care BID/PRN;Suction toothbrush  - Oral Care BID/PRN;Suction toothbrush  -       SLP Rec.  for Method of Medication Administration meds whole;meds crushed;with puree;as tolerated  - meds whole;meds crushed;with puree;as tolerated  -       Monitor for Signs of Aspiration notify SLP if any concerns  - notify SLP if any concerns  -       Anticipated Discharge Disposition (SLP) anticipate therapy at next level of care  - anticipate therapy at next level of care  -             User Key  (r) = Recorded By, (t) = Taken By, (c) = Cosigned By    Initials Name Effective Dates    MP Jordan Crystal, MS CCC-SLP 12/28/21 -      Li Rhoades MS, CFY-SLP 06/22/22 -                 EDUCATION  The patient has been educated in the following areas:   Dysphagia (Swallowing Impairment).        SLP GOALS     Row Name 12/12/22 0910 12/10/22 1450          (LTG) Patient will demonstrate functional swallow for    Diet Texture (Demonstrate functional swallow) regular textures  - regular textures  -     Liquid viscosity (Demonstrate functional swallow) nectar/ mildly thick liquids  - nectar/ mildly thick liquids  -     [place] (Demonstrate functional swallow) independently (over 90% accuracy)  - independently (over 90% accuracy)  -     Time Frame (Demonstrate functional swallow) by discharge  - by discharge  -     Progress/Outcomes (Demonstrate functional swallow) continuing progress toward goal  - continuing progress toward goal  -     Comment (Demonstrate functional swallow) No overt s/s of aspiration w/ NTL trials  - --        (STG) Patient will tolerate trials of    Consistencies Trialed (Tolerate trials) regular textures;nectar/ mildly thick liquids  - regular textures;nectar/ mildly thick liquids  -     Desired Outcome (Tolerate trials) without signs/symptoms of aspiration;without signs of distress  - without signs/symptoms of aspiration;without signs of distress  -     [place] (Tolerate trials) independently (over 90% accuracy)  - independently (over 90%  accuracy)  -MP     Time Frame (Tolerate trials) by discharge  - by discharge  -MP     Progress/Outcomes (Tolerate trials) continuing progress toward goal  - continuing progress toward goal  -MP        (STG) Patient will tolerate therapeutic trials of    Consistencies Trialed (Tolerate therapeutic trials) thin liquids  - thin liquids  -MP     Desired Outcome (Tolerate therapeutic trials) without signs/symptoms of aspiration;without signs of distress  - without signs/symptoms of aspiration;without signs of distress  -     [place] (Tolerate therapeutic trials) with minimal cues (75-90% accuracy)  - with minimal cues (75-90% accuracy)  -MP     Time Frame (Tolerate therapeutic trials) 1 week  - 1 week  -MP     Progress/Outcomes (Tolerate therapeutic trials) continuing progress toward goal  - goal ongoing  -MP     Comment (Tolerate therapeutic trials) no overt s/s w/ thin liquid trials, however pt w/ silent aspiration of thins per FEES  - --        (STG) Pharyngeal Strengthening Exercise Goal 1 (SLP)    Activity (Pharyngeal Strengthening Goal 1, SLP) increase superior movement of the hyolaryngeal complex;increase anterior movement of the hyolaryngeal complex;increase epiglottic inversion and retroflexion;increase closure at entrance to airway/closure of airway at glottis;increase squeeze/positive pressure generation;increase tongue base retraction  - --     Increase Superior Movement of the Hyolaryngeal Complex effortful pitch glide (falsetto + pharyngeal squeeze)  - effortful pitch glide (falsetto + pharyngeal squeeze)  -MP     Increase Anterior Movement of the Hyolaryngeal Complex chin tuck against resistance (CTAR)  - chin tuck against resistance (CTAR)  -MP     Increase Epiglottic Inversion and Retroflexion hard effortful swallow  - hard effortful swallow  -MP     Increase Closure at Entrance to Airway/Closure of Airway at Glottis supraglottic swallow  - supraglottic swallow  -MP      Increase Squeeze/Positive Pressure Generation hard effortful swallow  - hard effortful swallow  -MP     Increase Tongue Base Retraction janine  - janine  -MP     [place]/Accuracy (Pharyngeal Strengthening Goal 1, SLP) with minimal cues (75-90% accuracy)  - with minimal cues (75-90% accuracy)  -MP     Time Frame (Pharyngeal Strengthening Goal 1, SLP) short term goal (STG)  - short term goal (STG)  -MP     Progress/Outcomes (Pharyngeal Strengthening Goal 1, SLP) continuing progress toward goal  - continuing progress toward goal  -MP     Comment (Pharyngeal Strengthening Goal 1, SLP) Reviewed and completed all, handout at bedside  - Reviewed & completed - left new handout  -MP        (STG) Swallow Management Recall Goal 1 (SLP)    Activity (Swallow Management Recall Goal 1, SLP) independent recall of;compensatory swallow strategies/techniques;safe diet/liquid level  - independent recall of;compensatory swallow strategies/techniques;safe diet/liquid level  -MP     [place]/Accuracy (Swallow Management Recall Goal 1, SLP) independently (over 90% accuracy)  - independently (over 90% accuracy)  -MP     Time Frame (Swallow Management Recall Goal 1, SLP) short term goal (STG)  - short term goal (STG)  -MP     Progress/Outcomes (Swallow Management Recall Goal 1, SLP) continuing progress toward goal  - continuing progress toward goal  -MP     Comment (Swallow Management Recall Goal 1, SLP) Verbal cue required for pt to state comps  - --        (STG) Swallow Compensatory Strategies Goal 1 (SLP)    Activity (Swallow Compensatory Strategies/Techniques Goal 1, SLP) compensatory strategies;small cup sips;during p.o. trials;during meal intake;other (see comments)  - compensatory strategies;small cup sips;during p.o. trials;during meal intake;other (see comments)  -MP     [place]/Accuracy (Swallow Compensatory Strategies/Techniques Goal 1, SLP) independently (over 90% accuracy)  - independently  (over 90% accuracy)  -MP     Time Frame (Swallow Compensatory Strategies/Techniques Goal 1, SLP) short term goal (STG)  - short term goal (STG)  -MP     Progress/Outcomes (Swallow Compensatory Strategies/Techniques Goal 1, SLP) continuing progress toward goal  - continuing progress toward goal  -MP     Comment (Swallow Compensatory Strategies/Techniques Goal 1, SLP) Requied cues for small cup sips, able to accept small sips following cue  - --           User Key  (r) = Recorded By, (t) = Taken By, (c) = Cosigned By    Initials Name Provider Type    Jordan Morales, MS CCC-SLP Speech and Language Pathologist    Li Navarro MS, CFY-SLP Speech and Language Pathologist                   Time Calculation:    Time Calculation- SLP     Row Name 12/12/22 1053             Time Calculation- SLP    SLP Start Time 0910  -      SLP Received On 12/12/22  -         Untimed Charges    75865-AA Treatment Swallow Minutes 40  -MH         Total Minutes    Untimed Charges Total Minutes 40  -MH       Total Minutes 40  -MH            User Key  (r) = Recorded By, (t) = Taken By, (c) = Cosigned By    Initials Name Provider Type     Li Rhoades, MS, CFY-SLP Speech and Language Pathologist                Therapy Charges for Today     Code Description Service Date Service Provider Modifiers Qty    [id number] HC ST TREATMENT SWALLOW 3 12/12/2022 Li Rhoades MS, CFY-SLP GN 1            Patient was not wearing a face mask and did not exhibit coughing during this therapy encounter.  Procedure performed was not aerosolizing, did not involve close contact (within 6 feet for at least 15 minutes or longer), and did not involve contact with infectious secretions or specimens.  Therapist used appropriate personal protective equipment including gloves, standard procedure mask and eye protection.  Appropriate PPE was worn during the entire therapy session.  Hand hygiene was completed before and after therapy  session.       Li Rhoades MS, CFY-SLP  12/12/2022

## 2022-12-12 NOTE — DISCHARGE PLACEMENT REQUEST
"Isidro Velasco (57 y.o. Male)     Date of Birth   1965    Social Security Number       Address   [address] [id number]    Home Phone   [phone]    MRN   [id number]       Gnosticist   Adventism    Marital Status                               Admission Date   11/30/22    Admission Type   Emergency    Admitting Provider   Ruth Ratliff MD    Attending Provider   Ruth Ratliff MD    Department, Room/Bed   [facility] 3G, S366/1       Discharge Date       Discharge Disposition       Discharge Destination                               Attending Provider: Ruth Ratliff MD    Allergies: Gabapentin, Lyrica [Pregabalin]    Isolation: None   Infection: MRSA (12/06/22)   Code Status: CPR    Ht: 193 cm (75.98\")   Wt: 100 kg (220 lb 7.4 oz)    Admission Cmt: None   Principal Problem: Right foot infection [L08.9]                 Active Insurance as of 11/30/2022     Primary Coverage     Payor Plan Insurance Group Employer/Plan Group    [place]      Payor Plan Address Payor Plan Phone Number Payor Plan Fax Number Effective Dates    [address] [phone]  11/30/2022 - 11/30/2022    [facility] [address]       Subscriber Name Subscriber Birth Date Member ID       ISIDRO VELASCO 1965 [id number]                 Emergency Contacts      (Rel.) Home Phone Work Phone Mobile Phone    NELSY VELASCO (Spouse) [phone] -- [phone]               Physician Progress Notes (last 48 hours)      Zeke George MD at 12/12/22 0843          [facility] Progress Note        Antibiotics:  Anti-Infectives (From admission, onward)    Ordered     Dose/Rate Route Frequency Start Stop    12/07/22 0603  DAPTOmycin (CUBICIN) 800 mg in sodium chloride 0.9 % 50 mL IVPB        Ordering Provider: Zeke George MD    8 mg/kg × 100 kg  100 mL/hr over 30 Minutes Intravenous Every 24 Hours 12/07/22 1000 " "12/15/22 0959    11/30/22 2219  vancomycin 2000 mg/500 mL 0.9% NS IVPB ([facility])        Ordering Provider: Reggie Batres MD    20 mg/kg × 98.9 kg  over 2 Hours Intravenous Once 11/30/22 2221 12/01/22 0315 11/30/22 2219  piperacillin-tazobactam (ZOSYN) 3.375 g in iso-osmotic dextrose 50 ml (premix)        Ordering Provider: Reggie Batres MD    3.375 g Intravenous Once 11/30/22 2221 11/30/22 2335          CC: foot infection    HPI:    Patient is a 57 y.o.  Yr old male with history of prior lower extremity infection/amputations done in [facility].  He has a history of left BKA years ago.  More recent right transmetatarsal amputation but specific dates unclear and unclear who the surgeon was.  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  He has history DVT/IVC filter, diabetes and other comorbidities as below.  He reports a chronic right lateral foot wound present for months but apparently not precipitated by any specific event or trauma.  He is admitted to the hospital on November 30 with deterioration at the foot including redness/swelling    **patient had reported remote drug abuse (initially to me reported as IV drug abuse but then later he reported not injection use and I am unable to corroborate otherwise at prsent; +drug screen earlier this year per d/w Dr Chau for Meth at [facility] and reported by them to be IVDA/substance abuse),   chronic methadone. He reported to me this was 17 years ago and therefore some inconsistencies    ** patient reports MRSA blood stream infection treated in [place] spring of 2022, 6 weeks of vancomycin by his report.  Otherwise data deficit.  Try to retrieve information     12/2/22 Dr Peguero did surgery  \"Procedure(s): Mid level right " "below-knee amputation and primary closure \"    12/6 with MRSA in blood culture;  TTE done but limited per cardiology    12/9/22   JERALD no vegetation per cardiology    12/12/22 no new focal pain or distress per nursing staff. doing okay overall per him.  Case management considering options.   postop pain to the right LE which is controlled/dull at present, constant, nonradiating, worse with palpation, generally better with pain meds and 2  out of 10 in severity.  Not progressive     No headache photophobia or neck stiffness.  No shortness of breath cough or hemoptysis.  No nausea vomiting diarrhea or abdominal pain.  No dysuria hematuria or pyuria.  No other new skin rash       ROS:      12/12/22 No f/c/s. No n/v/d. No rash. No new ADR to Abx.     Constitutional-- No Fever, chills or sweats.  Appetite good, and no malaise. No fatigue.  Heent--chronic hoarse voice.  No new vision, hearing or throat complaints.  No epistaxis or oral sores.   No flashers, floaters or eye pain.   No headache, photophobia or neck stiffness.  Chronic PEG tube  CV-- No chest pain, palpitation or syncope  Resp-- No SOB/cough/Hemoptysis  GI- No nausea, vomiting, or diarrhea.  No hematochezia, melena, or hematemesis. Denies jaundice or chronic liver disease.  -- No dysuria, hematuria, or flank pain.  Denies hesitancy, urgency or flank pain.  Lymph- no swollen lymph nodes in neck/axilla or groin.   Heme- No active bruising or bleeding; no Hx of DVT or PE.  MS-- no swelling or pain in the bones or joints of arms/legs.  No new back pain.  Neuro-- No acute focal weakness or numbness in the arms or legs.  No seizures.     Full 12 point review of systems reviewed and negative otherwise for acute complaints, except for above      PE:   /87 (BP Location: Left arm, Patient Position: Lying)   Pulse 64   Temp 98.2 °F (36.8 °C) (Oral)   Resp 16   Ht 193 cm (75.98\")   Wt 100 kg (220 lb 7.4 oz)   SpO2 94%   BMI 26.85 kg/m²          GENERAL: " awake;  in no acute distress.  chronic Hoarse voice noted and chronic per him  HEENT: Normocephalic, atraumatic.   No conjunctival injection. No icterus. Oropharynx   without evidence of thrush or exudate. No evidence of peridontal disease.    NECK: Supple without nuchal rigidity. No mass.   HEART: RRR; No murmur, rubs, gallops.   LUNGS: Diminished at bases but otherwise clear to auscultation bilaterally without wheezing, rales, rhonchi. Normal respiratory effort. Nonlabored. No dullness.  ABDOMEN: Soft, nontender, nondistended. Positive bowel sounds. No rebound or guarding. NO mass or HSM.  PEG tube noted  EXT:  No cyanosis, clubbing;No cord.   MSK: FROM without joint effusions noted arms/legs.    SKIN: Warm and dry without cutaneous eruptions on Inspection/palpation.    NEURO: awake; moves all 4 extremities     Right lower extremity surgical site noted;  No new redness;  no discrete mass bulge or fluctuance.  No crepitus or bulla.       No peripheral stigmata/phenomenon of endocarditis     IV without obvious redness or drainage     Left BKA site with no redness induration or warmth.  No discrete mass bulge or fluctuance.    Laboratory Data    Results from last 7 days   Lab Units 12/12/22  0353 12/07/22  0436   WBC 10*3/mm3 7.83 6.24   HEMOGLOBIN g/dL 9.4* 9.2*   HEMATOCRIT % 31.5* 30.1*   PLATELETS 10*3/mm3 355 388     Results from last 7 days   Lab Units 12/12/22  0353   SODIUM mmol/L 139   POTASSIUM mmol/L 4.2   CHLORIDE mmol/L 103   CO2 mmol/L 25.0   BUN mg/dL 15   CREATININE mg/dL 0.56*   GLUCOSE mg/dL 189*   CALCIUM mg/dL 9.6                   Estimated Creatinine Clearance: 203.4 mL/min (A) (by C-G formula based on SCr of 0.56 mg/dL (L)).      Microbiology:      Radiology:  Imaging Results (Last 72 Hours)     ** No results found for the last 72 hours. **            Impression:     --Acute right foot/ankle with multifocal ulceration, cellulitis/wound infection and probable osteomyelitis with probe to bone at  the lateral foot and abnormal MRI.  Other comorbidities as outlined above and high risk for further serious morbidity and other serious sequelae including persistent/recurrent or nonhealing wounds, persistent/progressive or recurrent infection and risk for further functional/limb loss/amputation.  Surgery following to help define timing/option of threshold for any future surgical intervention .      **Surgery 12/2 with BKA    --MRSA bacteremia from November 30 (daptomycin sensitive).  Presumed from foot but also risk for endovascular focus particularly with  History of prior MRSA bloodstream infection.  Transthoracic echocardiogram limited per cardiology.  JERALD no vegetation per cardiology; follow-up blood cultures negative so far from December 6    **No new focal symptoms to suggest new metastatic foci of involvement as of December 11    --History MRSA with bacteremia by his report in spring/summer 2022 and treated with 6 weeks of vancomycin in [place].  Specifics unclear and trying to retrieve information    --coag-negative staph in blood culture likely contaminant     --Chronic vocal cord paralysis per notes with chronic hoarseness and indwelling PEG tube.  Medicine team to clarify     --History left BKA.     --Diabetes.  You need to tightly control blood sugar to give best chance for healing     --History of DVT with IVC filter    --history of remote drug abuse Per his reports to me; he denies injection drug abuse for 17 years by his report to me although as above, medicine team has find records indicating more recent abuse in 2022 [facility] [place] [facility]     PLAN:      -- Daptomycin  IV potentially 6 weeks but final duration to depend on clinical course of study results and response to therapy     -- Check/review labs cultures and scans     -- Partial history per nursing staff     -- Discussed with microbiology and family     --d/w Dr Peguero and Dr Bolaños      -- Highly complex set of  issues with high risk for further serious morbidity and other serious sequela     --JERALD noted    --  continue to consider options     **Copied text in this note has been reviewed and is accurate as of 22.     Zeke George MD  2022        Electronically signed by Zeke George MD at 22 0846     John Peguero MD at 22 0529          Cardiothoracic Surgery Progress Note      POD #: 10-right below-knee amputation     LOS: 12 days      Subjective: Awake and alert    Objective:  Vital Signs vital signs below noted T-max past 24 hours 98.5 °F  Temp:  [98 °F (36.7 °C)-98.5 °F (36.9 °C)] 98 °F (36.7 °C)  Heart Rate:  [68-84] 68  Resp:  [16] 16  BP: ()/(69-93) 106/72    Physical Exam:   General Appearance: Oriented x3   Lungs:   Heart:   Skin:   Incision: Amputation site dressing change.  Sutures intact skin margin viable and skin flaps are viable.     Results:  Results from last 7 days   Lab Units 22  0353   WBC 10*3/mm3 7.83   HEMOGLOBIN g/dL 9.4*   HEMATOCRIT % 31.5*   PLATELETS 10*3/mm3 355     Results from last 7 days   Lab Units 22  0353   SODIUM mmol/L 139   POTASSIUM mmol/L 4.2   CHLORIDE mmol/L 103   CO2 mmol/L 25.0   BUN mg/dL 15   CREATININE mg/dL 0.56*   GLUCOSE mg/dL 189*   CALCIUM mg/dL 9.6         Assessment: #1Postop day 10 right below-knee amputation which is healing well at this time  2.  Status post remote left below-knee amputation for diabetic neuropathy/gangrene  3.  Diabetes mellitus with neuropathy      Plan: Continue daily dressing change amputation site.  Medical manage per hospitalist.  Antibiotics per infectious disease.      John Peguero MD - 22 - 05:29 EST        Electronically signed by John Peguero MD at 22 0530     Carmen Bolaños MD at 22 1059              [facility] Medicine Services  PROGRESS NOTE    Patient Name: uBster Velasco  : 1965  MRN:  [id number]    Date of Admission: 11/30/2022  Primary Care Physician: Ludivina Bowers MD    Subjective   Subjective     CC:  F/U s/p right BKA    HPI:  Patient seen this morning. No complaints. Rested well overnight.    ROS:  Gen-no fevers, no chills  CV-no chest pain, no palpitations  Resp-no cough, no dyspnea  GI-no N/V/D, no abd pain    All other systems reviewed and negative except any additional pertinent positives and negatives as discussed in HPI.       Objective   Objective     Vital Signs:   Temp:  [97.8 °F (36.6 °C)-98.2 °F (36.8 °C)] 98.2 °F (36.8 °C)  Heart Rate:  [60-84] 84  Resp:  [16-18] 16  BP: (107-120)/(74-81) 120/81     Physical Exam:  Gen-no acute distress  HENT-NCAT, mucous membranes moist  CV-RRR, S1 S2 normal, no m/r/g  Resp-CTAB, no wheezes or rales  Abd-soft, NT, ND, +BS  Ext-left BKA present, new right BKA with dressing intact  Neuro-A&Ox3, no focal deficits  Skin-no rashes  Psych-appropriate mood  No change from yesterday      Results Reviewed:  LAB RESULTS:      Lab 12/07/22  0436 12/04/22  1149   WBC 6.24 6.80   HEMOGLOBIN 9.2* 9.7*   HEMATOCRIT 30.1* 31.6*   PLATELETS 388 420   MCV 85.0 84.7         Lab 12/07/22  0436 12/04/22  1149   SODIUM 136 136   POTASSIUM 4.3 4.3   CHLORIDE 103 101   CO2 24.0 25.0   ANION GAP 9.0 10.0   BUN 25* 16   CREATININE 0.89 0.90   EGFR 99.3 99.0   GLUCOSE 181* 118*   CALCIUM 9.3 9.1                         Brief Urine Lab Results  (Last result in the past 365 days)      Color   Clarity   Blood   Leuk Est   Nitrite   Protein   CREAT   Urine HCG        08/20/22 0108 Yellow   Clear   Negative   Negative     Negative                 Microbiology Results Abnormal     Procedure Component Value - Date/Time    Blood Culture - Blood, Wrist, Left [[id number]]  (Normal) Collected: 12/06/22 1402    Lab Status: Preliminary result Specimen: Blood from Wrist, Left Updated: 12/10/22 1545     Blood Culture No growth at 4 days    Blood Culture - Blood, Arm, Left [[id number]]   (Normal) Collected: 12/06/22 1402    Lab Status: Preliminary result Specimen: Blood from Arm, Left Updated: 12/10/22 3097     Blood Culture No growth at 4 days          Adult Transesophageal Echo (JERALD) W/ Cont if Necessary Per Protocol    Result Date: 12/9/2022  •  Left ventricular systolic function is normal. Estimated left ventricular EF = 55% •  The aortic valve is structurally normal with no stenosis present. Trace aortic valve regurgitation is present. There is no evidence of an aortic valve mass is present. •  There is mild, anterior mitral leaflet thickening present. No evidence of a mitral valve mass is present. Trace mitral valve regurgitation is present. No significant mitral valve stenosis is present •  The tricuspid valve is normal in structure. There is no evidence of a mass on the tricuspid valve. Mild tricuspid valve regurgitation is present. •  There is mild thickening of the pulmonic valve. There is trace pulmonic valve regurgitation present. There is no pulmonic valve stenosis present.       Results for orders placed during the hospital encounter of 11/30/22    Adult Transesophageal Echo (JERALD) W/ Cont if Necessary Per Protocol    Interpretation Summary  •  Left ventricular systolic function is normal. Estimated left ventricular EF = 55%  •  The aortic valve is structurally normal with no stenosis present. Trace aortic valve regurgitation is present. There is no evidence of an aortic valve mass is present.  •  There is mild, anterior mitral leaflet thickening present. No evidence of a mitral valve mass is present. Trace mitral valve regurgitation is present. No significant mitral valve stenosis is present  •  The tricuspid valve is normal in structure. There is no evidence of a mass on the tricuspid valve. Mild tricuspid valve regurgitation is present.  •  There is mild thickening of the pulmonic valve. There is trace pulmonic valve regurgitation present. There is no pulmonic valve stenosis  present.      I have reviewed the medications:  Scheduled Meds:atorvastatin, 20 mg, Per PEG Tube, Nightly  DAPTOmycin, 8 mg/kg, Intravenous, Q24H  insulin lispro, 0-7 Units, Subcutaneous, TID AC  methadone, 10 mg, Oral, Q6H  metoprolol tartrate, 25 mg, Per PEG Tube, Q12H  senna-docusate sodium, 2 tablet, Oral, BID  sodium chloride, 10 mL, Intravenous, Q12H  sodium chloride, 10 mL, Intravenous, Q12H      Continuous Infusions:lactated ringers, 9 mL/hr  ropivacaine,       PRN Meds:.•  acetaminophen  •  senna-docusate sodium **AND** polyethylene glycol **AND** bisacodyl **AND** bisacodyl  •  dextrose  •  dextrose  •  glucagon (human recombinant)  •  hydrOXYzine  •  melatonin  •  ondansetron **OR** ondansetron  •  sennosides-docusate  •  sodium chloride  •  sodium chloride  •  sodium chloride    Assessment & Plan   Assessment & Plan     Active Hospital Problems    Diagnosis  POA   • **Right foot infection [L08.9]  Yes   • MRSA bacteremia [R78.81, B95.62]  Unknown   • Sepsis (HCC) [A41.9]  Yes   • Hypoglycemia [E16.2]  Yes   • Anemia [D64.9]  Yes   • Hyponatremia [E87.1]  Yes   • Hypoalbuminemia [E88.09]  Yes   • Essential hypertension [I10]  Yes   • Hyperlipidemia [E78.5]  Yes   • Paroxysmal atrial fibrillation (HCC) [I48.0]  Yes      Resolved Hospital Problems   No resolved problems to display.        Brief Hospital Course to date:  Buster Velasco is a 57 y.o. male with hx of prior osteomyelitis s/p left BKA, s/p right transmetatarsal amputation, left hand 3rd metacarpal resection (April 2022 at OSH, had 6 weeks of IV antibiotics for MRSA bacteremia), remote hx of IVDA, more recent hx of polysubstance abuse (meth), and DVT s/p IVC filter who presents with right foot wounds. S/p right BKA on 12/2/22 with Dr. Peguero. His blood cultures grew MRSA. ID following and recommend at least 6 weeks of IV ATBx.     This patient's problems and plans were partially entered by my partner and updated as appropriate by me  12/11/22.    Sepsis secondary to acute right foot non-healing wound and likely osteomyelitis, resolved  MRSA bacteremia  - s/p right BKA 12/2/22 with Dr. Peguero  - ID following, currently on Daptomycin monotherapy  - Blood cultures with coag negative Staph and MRSA, have discussed with Dr. George, he will require IV antibiotics at discharge for at least 6 weeks but final duration TBD   - Repeat blood cultures NGTD  - TTE no acute findings, JERALD 12/9/22 negative  - Probably not a good candidate for PICC line/home IV antibiotics given hx of substance abuse, current plan is discharge to rehab  - Check labs in AM     Recurrent hypoglycemia in setting of DMII, resolved  - HbA1c 6.9%  - High dose Tresiba qAM before arrival which has been held  - Briefly required D5, now discontinued  - Glucose was trending back up, added back low dose SSI  - Glucose now stable overall     Chronic pain on opioids   Previous IVDA/substance abuse  - Continue home methadone 10 mg QID  - Morphine for breakthrough post-op pain - discontinued 12/8/22 as he is not using it  - Patient denied hx of IVDA to me but in Care Everywhere admission notes from April 2022 at [place], they documented prior IVDA/substance abuse, most recently with meth in [facility] from 4/2/22; he has told ID that his last IVDA was 17 years ago     Reported hx of Afib   - Patient denies any hx of Afib. Not previously on anticoagulation and given murky history will not start.   - On Metoprolol at home which is continued  - No evidence of Afib on telemetry, discontinued telemetry monitoring 12/10/22      Chronic vocal cord paralysis s/p PEG  Dysphagia  - SLP evaluation: regular diet but with nectar thick liquids recommended, patient initially wanted thin liquids and understood risk of aspiration, however currently has been drinking the nectar thick liquids; could change to pure comfort diet if he absolutely refuses nectar thick  - SLP should continue to follow for possible  advancement of diet recommendations  - Unclear where/when PEG was placed, will eventually need it removed but defer to outpatient setting as it was not placed here; routine PEG care/flushing per nursing as needed     Hypotension with hx of HTN  - Hold parameters with metoprolol  - Hypotension resolved     HLD   - Continue statin     DVT s/p IVC filter   - Placed in spring 2022 per patient. Defer to PCP for further assessment and time of removal.     Hx of seizure in setting of meth use   - Not on AED's given meth trigger     BPH   - Continue Flomax       Expected Discharge Location and Transportation: rehab  Expected Discharge Date: 12/12/22 anytime rehab placement is found       DVT prophylaxis:  Mechanical DVT prophylaxis orders are present.     AM-PAC 6 Clicks Score (PT): 9 (12/08/22 0840)    CODE STATUS:   Code Status and Medical Interventions:   Ordered at: 12/01/22 0035     Code Status (Patient has no pulse and is not breathing):    CPR (Attempt to Resuscitate)     Medical Interventions (Patient has pulse or is breathing):    Full Support       Carmen Bolaños MD  12/11/22                Electronically signed by Carmen Bolaños MD at 12/11/22 1108     Zeke George MD at 12/11/22 0913          [facility] Progress Note        Antibiotics:  Anti-Infectives (From admission, onward)    Ordered     Dose/Rate Route Frequency Start Stop    12/07/22 0603  DAPTOmycin (CUBICIN) 800 mg in sodium chloride 0.9 % 50 mL IVPB        Ordering Provider: Zeke George MD    8 mg/kg × 100 kg  100 mL/hr over 30 Minutes Intravenous Every 24 Hours 12/07/22 1000 12/15/22 0959    11/30/22 2219  vancomycin 2000 mg/500 mL 0.9% NS IVPB ([facility])        Ordering Provider: Reggie Batres MD    20 mg/kg × 98.9 kg  over 2 Hours Intravenous Once 11/30/22 2221 12/01/22 0315    11/30/22 2219  piperacillin-tazobactam (ZOSYN) 3.375 g in iso-osmotic dextrose 50 ml (premix)        Ordering Provider: Reggie Batres MD    3.375 g Intravenous  "Once 11/30/22 2221 11/30/22 2335          CC: foot infection    HPI:    Patient is a 57 y.o.  Yr old male with history of prior lower extremity infection/amputations done in [facility].  He has a history of left BKA years ago.  More recent right transmetatarsal amputation but specific dates unclear and unclear who the surgeon was.  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  He has history DVT/IVC filter, diabetes and other comorbidities as below.  He reports a chronic right lateral foot wound present for months but apparently not precipitated by any specific event or trauma.  He is admitted to the hospital on November 30 with deterioration at the foot including redness/swelling    **patient had reported remote drug abuse (initially to me reported as IV drug abuse but then later he reported not injection use and I am unable to corroborate otherwise at prsent; +drug screen earlier this year per d/w Dr Chau for Meth at [facility]),   chronic methadone.    ** patient reports MRSA blood stream infection treated in [place] spring of 2022, 6 weeks of vancomycin by his report.  Otherwise data deficit.  Try to retrieve information     12/2/22 Dr Peguero did surgery  \"Procedure(s): Mid level right below-knee amputation and primary closure \"    12/6 with MRSA in blood culture;  TTE done but limited per cardiology    12/9/22   JERALD no vegetation per cardiology    12/11/22 doing okay overall per him.  Case management considering options.No new focal pain;  postop pain to the right LE which is controlled/dull at present, constant, nonradiating, worse with palpation, generally better with pain meds and 2  out of 10 in severity.  Not progressive     No headache photophobia or neck stiffness.  " "No shortness of breath cough or hemoptysis.  No nausea vomiting diarrhea or abdominal pain.  No dysuria hematuria or pyuria.  No other new skin rash       ROS:      12/11/22 No f/c/s. No n/v/d. No rash. No new ADR to Abx.     Constitutional-- No Fever, chills or sweats.  Appetite good, and no malaise. No fatigue.  Heent--chronic hoarse voice.  No new vision, hearing or throat complaints.  No epistaxis or oral sores.   No flashers, floaters or eye pain.   No headache, photophobia or neck stiffness.  Chronic PEG tube  CV-- No chest pain, palpitation or syncope  Resp-- No SOB/cough/Hemoptysis  GI- No nausea, vomiting, or diarrhea.  No hematochezia, melena, or hematemesis. Denies jaundice or chronic liver disease.  -- No dysuria, hematuria, or flank pain.  Denies hesitancy, urgency or flank pain.  Lymph- no swollen lymph nodes in neck/axilla or groin.   Heme- No active bruising or bleeding; no Hx of DVT or PE.  MS-- no swelling or pain in the bones or joints of arms/legs.  No new back pain.  Neuro-- No acute focal weakness or numbness in the arms or legs.  No seizures.     Full 12 point review of systems reviewed and negative otherwise for acute complaints, except for above      PE:   /81 (BP Location: Left arm, Patient Position: Lying)   Pulse 84   Temp 98.2 °F (36.8 °C) (Oral)   Resp 16   Ht 193 cm (75.98\")   Wt 100 kg (220 lb 7.4 oz)   SpO2 95%   BMI 26.85 kg/m²          GENERAL: awake;  in no acute distress.  chronic Hoarse voice noted and chronic per him  HEENT: Normocephalic, atraumatic.   No conjunctival injection. No icterus. Oropharynx   without evidence of thrush or exudate. No evidence of peridontal disease.    NECK: Supple without nuchal rigidity. No mass.   HEART: RRR; No murmur, rubs, gallops.   LUNGS: Diminished at bases but otherwise clear to auscultation bilaterally without wheezing, rales, rhonchi. Normal respiratory effort. Nonlabored. No dullness.  ABDOMEN: Soft, nontender, " nondistended. Positive bowel sounds. No rebound or guarding. NO mass or HSM.  PEG tube noted  EXT:  No cyanosis, clubbing;No cord.   MSK: FROM without joint effusions noted arms/legs.    SKIN: Warm and dry without cutaneous eruptions on Inspection/palpation.    NEURO: awake; moves all 4 extremities     Right lower extremity surgical site noted;  No new redness;  no discrete mass bulge or fluctuance.  No crepitus or bulla.       No peripheral stigmata/phenomenon of endocarditis     IV without obvious redness or drainage     Left BKA site with no redness induration or warmth.  No discrete mass bulge or fluctuance.    Laboratory Data    Results from last 7 days   Lab Units 12/07/22  0436 12/04/22  1149   WBC 10*3/mm3 6.24 6.80   HEMOGLOBIN g/dL 9.2* 9.7*   HEMATOCRIT % 30.1* 31.6*   PLATELETS 10*3/mm3 388 420     Results from last 7 days   Lab Units 12/07/22  0436   SODIUM mmol/L 136   POTASSIUM mmol/L 4.3   CHLORIDE mmol/L 103   CO2 mmol/L 24.0   BUN mg/dL 25*   CREATININE mg/dL 0.89   GLUCOSE mg/dL 181*   CALCIUM mg/dL 9.3                   Estimated Creatinine Clearance: 128 mL/min (by C-G formula based on SCr of 0.89 mg/dL).      Microbiology:      Radiology:  Imaging Results (Last 72 Hours)     ** No results found for the last 72 hours. **            Impression:     --Acute right foot/ankle with multifocal ulceration, cellulitis/wound infection and probable osteomyelitis with probe to bone at the lateral foot and abnormal MRI.  Other comorbidities as outlined above and high risk for further serious morbidity and other serious sequelae including persistent/recurrent or nonhealing wounds, persistent/progressive or recurrent infection and risk for further functional/limb loss/amputation.  Surgery following to help define timing/option of threshold for any future surgical intervention .      **Surgery 12/2 with BKA    --MRSA bacteremia from November 30 (daptomycin sensitive).  Presumed from foot but also risk for  endovascular focus particularly with  History of prior MRSA bloodstream infection.  Transthoracic echocardiogram limited per cardiology.  JERALD no vegetation per cardiology; follow-up blood cultures negative so far from December 6    **No new focal symptoms to suggest new metastatic foci of involvement as of December 11    --History MRSA with bacteremia by his report in spring/summer 2022 and treated with 6 weeks of vancomycin in [place].  Specifics unclear and trying to retrieve information    --coag-negative staph in blood culture likely contaminant     --Chronic vocal cord paralysis per notes with chronic hoarseness and indwelling PEG tube.  Medicine team to clarify     --History left BKA.     --Diabetes.  You need to tightly control blood sugar to give best chance for healing     --History of DVT with IVC filter    --history of remote injection drug abuse; he denies injection drug abuse for 17 years by his report     PLAN:      -- Daptomycin  IV potentially 6 weeks but final duration to depend on clinical course of study results and response to therapy     -- Check/review labs cultures and scans     -- Partial history per nursing staff     -- Discussed with microbiology and family     --d/w Dr Peguero      -- Highly complex set of issues with high risk for further serious morbidity and other serious sequela     --JERALD noted    --  continue to consider option     **Copied text in this note has been reviewed and is accurate as of 12/11/22.     Zeke George MD  12/11/2022        Electronically signed by Zeke George MD at 12/11/22 0911     John Peguero MD at 12/11/22 0591          Cardiothoracic Surgery Progress Note      POD #: 9-right below-knee amputation     LOS: 11 days      Subjective: Awake and seems alert    Objective:  Vital Signs vital signs below noted T-max past 24 hours 98.2 °F  Temp:  [97.7 °F (36.5 °C)-98.2 °F (36.8 °C)] 97.8 °F (36.6 °C)  Heart Rate:  [60-75]  67  Resp:  [16-18] 16  BP: (107-119)/(74-80) 119/78    Physical Exam:   General Appearance: Oriented   Lungs:   Heart:   Skin:   Incision: Amputation dressing change.  Sutures intact skin flaps are viable and skin margins are viable.     Results:  Results from last 7 days   Lab Units 22  0436   WBC 10*3/mm3 6.24   HEMOGLOBIN g/dL 9.2*   HEMATOCRIT % 30.1*   PLATELETS 10*3/mm3 388     Results from last 7 days   Lab Units 22  0436   SODIUM mmol/L 136   POTASSIUM mmol/L 4.3   CHLORIDE mmol/L 103   CO2 mmol/L 24.0   BUN mg/dL 25*   CREATININE mg/dL 0.89   GLUCOSE mg/dL 181*   CALCIUM mg/dL 9.3         Assessment: #1.  Postop day 9 right below-knee amputation is healing well at this time  2.  Status post remote left below-knee amputation for diabetic neuropathy/ulceration/gangrene  3.  Diabetes mellitus with neuropathy      Plan: Continue daily dressing change amputation site.  Medical manage per hospitalist.  Antibiotics per infectious disease.      John Peguero MD - 22 - 05:58 EST        Electronically signed by John Peguero MD at 22 0559     Carmen Bolaños MD at 12/10/22 1230              [facility] Medicine Services  PROGRESS NOTE    Patient Name: Buster Velasco  : 1965  MRN: [id number]    Date of Admission: 2022  Primary Care Physician: Ludivina Bowers MD    Subjective   Subjective     CC:  F/U s/p right BKA    HPI:  Patient seen this morning. No issues overnight. No complaints.     ROS:  Gen-no fevers, no chills  CV-no chest pain, no palpitations  Resp-no cough, no dyspnea  GI-no N/V/D, no abd pain    All other systems reviewed and negative except any additional pertinent positives and negatives as discussed in HPI.       Objective   Objective     Vital Signs:   Temp:  [97.6 °F (36.4 °C)-98.1 °F (36.7 °C)] 98.1 °F (36.7 °C)  Heart Rate:  [68-94] 74  Resp:  [16-18] 18  BP: ()/(62-88) 110/74  Flow (L/min):  [2] 2     Physical  Exam:  Gen-no acute distress  HENT-NCAT, mucous membranes moist  CV-RRR, S1 S2 normal, no m/r/g  Resp-CTAB, no wheezes or rales  Abd-soft, NT, ND, +BS  Ext-left BKA present, new right BKA with dressing intact  Neuro-A&Ox3, no focal deficits  Skin-no rashes  Psych-appropriate mood  No change from yesterday      Results Reviewed:  LAB RESULTS:      Lab 12/07/22  0436 12/04/22  1149   WBC 6.24 6.80   HEMOGLOBIN 9.2* 9.7*   HEMATOCRIT 30.1* 31.6*   PLATELETS 388 420   MCV 85.0 84.7         Lab 12/07/22  0436 12/04/22  1149   SODIUM 136 136   POTASSIUM 4.3 4.3   CHLORIDE 103 101   CO2 24.0 25.0   ANION GAP 9.0 10.0   BUN 25* 16   CREATININE 0.89 0.90   EGFR 99.3 99.0   GLUCOSE 181* 118*   CALCIUM 9.3 9.1                         Brief Urine Lab Results  (Last result in the past 365 days)      Color   Clarity   Blood   Leuk Est   Nitrite   Protein   CREAT   Urine HCG        08/20/22 0108 Yellow   Clear   Negative   Negative     Negative                 Microbiology Results Abnormal     Procedure Component Value - Date/Time    Blood Culture - Blood, Wrist, Left [[id number]]  (Normal) Collected: 12/06/22 1402    Lab Status: Preliminary result Specimen: Blood from Wrist, Left Updated: 12/09/22 1545     Blood Culture No growth at 3 days    Blood Culture - Blood, Arm, Left [[id number]]  (Normal) Collected: 12/06/22 1402    Lab Status: Preliminary result Specimen: Blood from Arm, Left Updated: 12/09/22 1545     Blood Culture No growth at 3 days          Adult Transesophageal Echo (JERALD) W/ Cont if Necessary Per Protocol    Result Date: 12/9/2022  •  Left ventricular systolic function is normal. Estimated left ventricular EF = 55% •  The aortic valve is structurally normal with no stenosis present. Trace aortic valve regurgitation is present. There is no evidence of an aortic valve mass is present. •  There is mild, anterior mitral leaflet thickening present. No evidence of a mitral valve mass is present. Trace mitral valve  regurgitation is present. No significant mitral valve stenosis is present •  The tricuspid valve is normal in structure. There is no evidence of a mass on the tricuspid valve. Mild tricuspid valve regurgitation is present. •  There is mild thickening of the pulmonic valve. There is trace pulmonic valve regurgitation present. There is no pulmonic valve stenosis present.       Results for orders placed during the hospital encounter of 11/30/22    Adult Transesophageal Echo (JERALD) W/ Cont if Necessary Per Protocol    Interpretation Summary  •  Left ventricular systolic function is normal. Estimated left ventricular EF = 55%  •  The aortic valve is structurally normal with no stenosis present. Trace aortic valve regurgitation is present. There is no evidence of an aortic valve mass is present.  •  There is mild, anterior mitral leaflet thickening present. No evidence of a mitral valve mass is present. Trace mitral valve regurgitation is present. No significant mitral valve stenosis is present  •  The tricuspid valve is normal in structure. There is no evidence of a mass on the tricuspid valve. Mild tricuspid valve regurgitation is present.  •  There is mild thickening of the pulmonic valve. There is trace pulmonic valve regurgitation present. There is no pulmonic valve stenosis present.      I have reviewed the medications:  Scheduled Meds:atorvastatin, 20 mg, Per PEG Tube, Nightly  DAPTOmycin, 8 mg/kg, Intravenous, Q24H  insulin lispro, 0-7 Units, Subcutaneous, TID AC  methadone, 10 mg, Oral, Q6H  metoprolol tartrate, 25 mg, Per PEG Tube, Q12H  senna-docusate sodium, 2 tablet, Oral, BID  sodium chloride, 10 mL, Intravenous, Q12H  sodium chloride, 10 mL, Intravenous, Q12H      Continuous Infusions:lactated ringers, 9 mL/hr  ropivacaine,       PRN Meds:.•  acetaminophen  •  senna-docusate sodium **AND** polyethylene glycol **AND** bisacodyl **AND** bisacodyl  •  dextrose  •  dextrose  •  glucagon (human recombinant)  •   hydrOXYzine  •  melatonin  •  ondansetron **OR** ondansetron  •  sennosides-docusate  •  sodium chloride  •  sodium chloride  •  sodium chloride    Assessment & Plan   Assessment & Plan     Active Hospital Problems    Diagnosis  POA   • **Right foot infection [L08.9]  Yes   • MRSA bacteremia [R78.81, B95.62]  Unknown   • Sepsis (HCC) [A41.9]  Yes   • Hypoglycemia [E16.2]  Yes   • Anemia [D64.9]  Yes   • Hyponatremia [E87.1]  Yes   • Hypoalbuminemia [E88.09]  Yes   • Essential hypertension [I10]  Yes   • Hyperlipidemia [E78.5]  Yes   • Paroxysmal atrial fibrillation (HCC) [I48.0]  Yes      Resolved Hospital Problems   No resolved problems to display.        Brief Hospital Course to date:  Buster Velasco is a 57 y.o. male with hx of prior osteomyelitis s/p left BKA, s/p right transmetatarsal amputation, left hand 3rd metacarpal resection (April 2022 at OSH, had 6 weeks of IV antibiotics for MRSA bacteremia), remote hx of IVDA, more recent hx of polysubstance abuse (meth), and DVT s/p IVC filter who presents with right foot wounds. S/p right BKA on 12/2/22 with Dr. Peguero. His blood cultures are growing MRSA. ID assisting with workup and management.      This patient's problems and plans were partially entered by my partner and updated as appropriate by me 12/10/22.    Sepsis secondary to acute right foot non-healing wound and likely osteomyelitis, resolved  MRSA bacteremia  - s/p right BKA 12/2/22 with Dr. Peguero  - ID following, currently on Daptomycin monotherapy  - Blood cultures with coag negative Staph and MRSA, have discussed with Dr. George, he will require IV antibiotics at discharge for at least 6 weeks but final duration TBD   - Repeat blood cultures NGTD  - TTE no acute findings, JERALD 12/9/22 negative  - Probably not a good candidate for PICC line/home IV antibiotics given hx of substance abuse, current plan is discharge to rehab     Recurrent hypoglycemia in setting of DMII, resolved  - HbA1c  6.9%  - High dose Tresiba qAM before arrival which has been held  - Briefly required D5, now discontinued  - Glucose was trending back up, added back low dose SSI  - Glucose now stable overall     Chronic pain on opioids   Previous IVDA/substance abuse  - Continue home methadone 10 mg QID  - Morphine for breakthrough post-op pain - discontinued 12/8/22 as he is not using it  - Patient denied hx of IVDA to me but in Care Everywhere admission notes from April 2022 at [facility], they documented prior IVDA/substance abuse, most recently with meth in [facility] from 4/2/22; he has told ID that his last IVDA was 17 years ago     Reported hx of Afib   - Patient denies any hx of Afib. Not previously on anticoagulation and given murky history will not start.   - On Metoprolol at home which is continued  - No evidence of Afib on telemetry, will discontinue telemetry monitoring today 12/10/22     Chronic vocal cord paralysis s/p PEG  Dysphagia  - SLP evaluation: regular diet but with nectar thick liquids recommended, patient initially wanted thin liquids and understood risk of aspiration, however currently has been drinking the nectar thick liquids; could change to pure comfort diet if he absolutely refuses nectar thick  - SLP should continue to follow for possible advancement of diet recommendations  - Unclear where/when PEG was placed, will eventually need it removed but defer to outpatient setting as it was not placed here; routine PEG care/flushing per nursing as needed     Hypotension with hx of HTN  - Hold parameters with metoprolol  - Hypotension resolved     HLD   - Continue statin     DVT s/p IVC filter   - Placed in spring 2022 per patient. Defer to PCP for further assessment and time of removal.     Hx of seizure in setting of meth use   - Not on AED's given meth trigger     BPH   - Continue Flomax       Expected Discharge Location and Transportation: rehab  Expected Discharge Date: 12/12/22, he now awaits rehab  placement    Discontinue telemetry monitoring today.     DVT prophylaxis:  Mechanical DVT prophylaxis orders are present.     AM-PAC 6 Clicks Score (PT): 9 (12/08/22 0840)    CODE STATUS:   Code Status and Medical Interventions:   Ordered at: 12/01/22 0035     Code Status (Patient has no pulse and is not breathing):    CPR (Attempt to Resuscitate)     Medical Interventions (Patient has pulse or is breathing):    Full Support       Carmen Bolaños MD  12/10/22                Electronically signed by Carmen Bolaños MD at 12/10/22 5641

## 2022-12-12 NOTE — PLAN OF CARE
Goal Outcome Evaluation:  Plan of Care Reviewed With: patient        Progress: improving  Outcome Evaluation: Patient demonstrated improving pain control and overall improving bed mobility. He has not yet been in w/c and I have encouraged him to try a short session.

## 2022-12-12 NOTE — PROGRESS NOTES
[facility] Medicine Services  PROGRESS NOTE    Patient Name: Buster Velasco  : 1965  MRN: [id number]    Date of Admission: 2022  Primary Care Physician: Ludivina Bowers MD    Subjective   Subjective     CC:  F/U s/p right BKA    HPI:  Patient seen this morning. No complaints. Rested well overnight, but feels a little tired today.    ROS:  Gen-no fevers, no chills  CV-no chest pain, no palpitations  Resp-no cough, no dyspnea  GI-no N/V/D, no abd pain    All other systems reviewed and negative except any additional pertinent positives and negatives as discussed in HPI.       Objective   Objective     Vital Signs:   Temp:  [98 °F (36.7 °C)-98.4 °F (36.9 °C)] 98.2 °F (36.8 °C)  Heart Rate:  [60-80] 60  Resp:  [16] 16  BP: ()/(69-93) 142/89     Physical Exam:  Gen-no acute distress  HENT-NCAT, mucous membranes moist  CV-RRR, S1 S2 normal, no m/r/g  Resp-CTAB, no wheezes or rales  Abd-soft, NT, ND, +BS  Ext-left BKA present, new right BKA with dressing intact  Neuro-A&Ox3, no focal deficits  Skin-no rashes  Psych-appropriate mood        Results Reviewed:  LAB RESULTS:      Lab 22  0353 22  0436   WBC 7.83 6.24   HEMOGLOBIN 9.4* 9.2*   HEMATOCRIT 31.5* 30.1*   PLATELETS 355 388   MCV 86.3 85.0         Lab 22  0353 22  0436   SODIUM 139 136   POTASSIUM 4.2 4.3   CHLORIDE 103 103   CO2 25.0 24.0   ANION GAP 11.0 9.0   BUN 15 25*   CREATININE 0.56* 0.89   EGFR 114.3 99.3   GLUCOSE 189* 181*   CALCIUM 9.6 9.3                         Brief Urine Lab Results  (Last result in the past 365 days)      Color   Clarity   Blood   Leuk Est   Nitrite   Protein   CREAT   Urine HCG        22 0108 Yellow   Clear   Negative   Negative     Negative                 Microbiology Results Abnormal     Procedure Component Value - Date/Time    Blood Culture - Blood, Wrist, Left [[id number]]  (Normal) Collected: 22 9226    Lab Status: Final result Specimen:  Blood from Wrist, Left Updated: 12/11/22 1545     Blood Culture No growth at 5 days    Blood Culture - Blood, Arm, Left [[id number]]  (Normal) Collected: 12/06/22 1402    Lab Status: Final result Specimen: Blood from Arm, Left Updated: 12/11/22 1545     Blood Culture No growth at 5 days          No radiology results from the last 24 hrs    Results for orders placed during the hospital encounter of 11/30/22    Adult Transesophageal Echo (JERALD) W/ Cont if Necessary Per Protocol    Interpretation Summary  •  Left ventricular systolic function is normal. Estimated left ventricular EF = 55%  •  The aortic valve is structurally normal with no stenosis present. Trace aortic valve regurgitation is present. There is no evidence of an aortic valve mass is present.  •  There is mild, anterior mitral leaflet thickening present. No evidence of a mitral valve mass is present. Trace mitral valve regurgitation is present. No significant mitral valve stenosis is present  •  The tricuspid valve is normal in structure. There is no evidence of a mass on the tricuspid valve. Mild tricuspid valve regurgitation is present.  •  There is mild thickening of the pulmonic valve. There is trace pulmonic valve regurgitation present. There is no pulmonic valve stenosis present.      I have reviewed the medications:  Scheduled Meds:atorvastatin, 20 mg, Per PEG Tube, Nightly  DAPTOmycin, 8 mg/kg, Intravenous, Q24H  insulin lispro, 0-7 Units, Subcutaneous, TID AC  methadone, 10 mg, Oral, Q6H  metoprolol tartrate, 25 mg, Per PEG Tube, Q12H  senna-docusate sodium, 2 tablet, Oral, BID  sodium chloride, 10 mL, Intravenous, Q12H  sodium chloride, 10 mL, Intravenous, Q12H      Continuous Infusions:lactated ringers, 9 mL/hr  ropivacaine,       PRN Meds:.•  acetaminophen  •  senna-docusate sodium **AND** polyethylene glycol **AND** bisacodyl **AND** bisacodyl  •  dextrose  •  dextrose  •  glucagon (human recombinant)  •  hydrOXYzine  •  melatonin  •   ondansetron **OR** ondansetron  •  sennosides-docusate  •  sodium chloride  •  sodium chloride  •  sodium chloride    Assessment & Plan   Assessment & Plan     Active Hospital Problems    Diagnosis  POA   • **Right foot infection [L08.9]  Yes   • MRSA bacteremia [R78.81, B95.62]  Unknown   • Sepsis (HCC) [A41.9]  Yes   • Hypoglycemia [E16.2]  Yes   • Anemia [D64.9]  Yes   • Hyponatremia [E87.1]  Yes   • Hypoalbuminemia [E88.09]  Yes   • Essential hypertension [I10]  Yes   • Hyperlipidemia [E78.5]  Yes   • Paroxysmal atrial fibrillation (HCC) [I48.0]  Yes      Resolved Hospital Problems   No resolved problems to display.        Brief Hospital Course to date:  Buster Velasco is a 57 y.o. male with hx of prior osteomyelitis s/p left BKA, s/p right transmetatarsal amputation, left hand 3rd metacarpal resection (April 2022 at OSH, had 6 weeks of IV antibiotics for MRSA bacteremia), remote hx of IVDA, more recent hx of polysubstance abuse (meth), and DVT s/p IVC filter who presents with right foot wounds. S/p right BKA on 12/2/22 with Dr. Peguero. His blood cultures grew MRSA. ID following and recommend at least 6 weeks of IV ATBx.     This patient's problems and plans were partially entered by my partner and updated as appropriate by me 12/12/22.    Sepsis secondary to acute right foot non-healing wound and likely osteomyelitis, resolved  MRSA bacteremia  - s/p right BKA 12/2/22 with Dr. Peguero  - ID following, currently on Daptomycin monotherapy  - Blood cultures with coag negative Staph and MRSA, have discussed with Dr. George, he will require IV antibiotics at discharge for at least 6 weeks but final duration TBD   - Repeat blood cultures NGTD  - TTE no acute findings, JERALD 12/9/22 negative  - Probably not a good candidate for PICC line/home IV antibiotics given hx of substance abuse, current plan is discharge to rehab       Recurrent hypoglycemia in setting of DMII, resolved  - HbA1c 6.9%  - High dose Tresiba  qAM before arrival which has been held  - Glucose was trending back up--add levemir 10un qhs. +low dose SSI  - Glucose now stable overall-     Chronic pain on opioids   Previous IVDA/substance abuse  - Continue home methadone 10 mg QID  - Morphine for breakthrough post-op pain - discontinued 12/8/22 as he is not using it  - Patient denied hx of IVDA to me but in Care Everywhere admission notes from April 2022 at [place], they documented prior IVDA/substance abuse, most recently with meth in [facility] from 4/2/22; he has told ID that his last IVDA was 17 years ago     Reported hx of Afib   - Patient denies any hx of Afib. Not previously on anticoagulation and given murky history will not start.   - On Metoprolol at home which is continued  - No evidence of Afib on telemetry, discontinued telemetry monitoring 12/10/22      Chronic vocal cord paralysis s/p PEG  Dysphagia  - SLP evaluation: regular diet but with nectar thick liquids recommended, patient initially wanted thin liquids and understood risk of aspiration, however currently has been drinking the nectar thick liquids; could change to pure comfort diet if he absolutely refuses nectar thick  - SLP should continue to follow for possible advancement of diet recommendations  - Unclear where/when PEG was placed, will eventually need it removed but defer to outpatient setting as it was not placed here; routine PEG care/flushing per nursing as needed     Hypotension with hx of HTN--resolved     HLD   - Continue statin     DVT s/p IVC filter   - Placed in spring 2022 per patient. Defer to PCP for further assessment and time of removal.     Hx of seizure in setting of meth use   - Not on AED's given meth trigger     BPH   - Continue Flomax       Expected Discharge Location and Transportation: rehab  Expected Discharge Date: 12/13/22 anytime rehab placement is found       DVT prophylaxis:  Mechanical DVT prophylaxis orders are present.     AM-PAC 6 Clicks Score (PT): 10  (12/12/22 1117)    CODE STATUS:   Code Status and Medical Interventions:   Ordered at: 12/01/22 0035     Code Status (Patient has no pulse and is not breathing):    CPR (Attempt to Resuscitate)     Medical Interventions (Patient has pulse or is breathing):    Full Support       Ruth Ratliff MD  12/12/22

## 2022-12-12 NOTE — PROGRESS NOTES
Cardiothoracic Surgery Progress Note      POD #: 10-right below-knee amputation     LOS: 12 days      Subjective: Awake and alert    Objective:  Vital Signs vital signs below noted T-max past 24 hours 98.5 °F  Temp:  [98 °F (36.7 °C)-98.5 °F (36.9 °C)] 98 °F (36.7 °C)  Heart Rate:  [68-84] 68  Resp:  [16] 16  BP: ()/(69-93) 106/72    Physical Exam:   General Appearance: Oriented x3   Lungs:   Heart:   Skin:   Incision: Amputation site dressing change.  Sutures intact skin margin viable and skin flaps are viable.     Results:  Results from last 7 days   Lab Units 12/12/22  0353   WBC 10*3/mm3 7.83   HEMOGLOBIN g/dL 9.4*   HEMATOCRIT % 31.5*   PLATELETS 10*3/mm3 355     Results from last 7 days   Lab Units 12/12/22  0353   SODIUM mmol/L 139   POTASSIUM mmol/L 4.2   CHLORIDE mmol/L 103   CO2 mmol/L 25.0   BUN mg/dL 15   CREATININE mg/dL 0.56*   GLUCOSE mg/dL 189*   CALCIUM mg/dL 9.6         Assessment: #1Postop day 10 right below-knee amputation which is healing well at this time  2.  Status post remote left below-knee amputation for diabetic neuropathy/gangrene  3.  Diabetes mellitus with neuropathy      Plan: Continue daily dressing change amputation site.  Medical manage per hospitalist.  Antibiotics per infectious disease.      John Peguero MD - 12/12/22 - 05:29 EST

## 2022-12-12 NOTE — THERAPY TREATMENT NOTE
Patient Name: Buster Velasco  : 1965    MRN: [id number]                              Today's Date: 2022       Admit Date: 2022    Visit Dx:     ICD-10-CM ICD-9-CM   1. Right foot infection  L08.9 686.9   2. Hypoglycemia  E16.2 251.2   3. Decubitus ulcer of coccygeal region, unspecified ulcer stage  L89.159 707.03     707.20   4. Pharyngeal dysphagia  R13.13 787.23     Patient Active Problem List   Diagnosis   • Right foot infection   • Hypoglycemia   • Anemia   • Hyponatremia   • Hypoalbuminemia   • Essential hypertension   • Hyperlipidemia   • Paroxysmal atrial fibrillation (HCC)   • Sepsis (HCC)   • MRSA bacteremia     Past Medical History:   Diagnosis Date   • Diabetes (HCC)    • DVT (deep venous thrombosis) (HCC)    • Hypertension    • Mood disorder (HCC)      Past Surgical History:   Procedure Laterality Date   • BELOW KNEE AMPUTATION Left    • BELOW KNEE AMPUTATION Right 2022    Procedure: AMPUTATION BELOW KNEE RIGHT;  Surgeon: John Peguero MD;  Location: [facility];  Service: Vascular;  Laterality: Right;   • TRANS METATARSAL AMPUTATION Right       General Information     Row Name 22 1103          Physical Therapy Time and Intention    Document Type therapy note (daily note)  -SC     Mode of Treatment physical therapy  -SC     Row Name 22 1103          General Information    Patient Profile Reviewed yes  -SC     Existing Precautions/Restrictions other (see comments)  B BKA  -SC     Row Name 22 1103          Cognition    Orientation Status (Cognition) oriented x 4  -SC     Row Name 22 1103          Safety Issues, Functional Mobility    Impairments Affecting Function (Mobility) balance;endurance/activity tolerance;strength;pain;other (see comments)  bottom is sore  -SC     Comment, Safety Issues/Impairments (Mobility) alert following commands  -SC           User Key  (r) = Recorded By, (t) = Taken By, (c) = Cosigned By    Initials Name Provider Type  "   Shamika Brar PT Physical Therapist               Mobility     Row Name 12/12/22 1110          Bed Mobility    Bed Mobility rolling left;rolling right;scooting/bridging;sit-supine;supine-sit  -SC     Rolling Left [place] (Bed Mobility) 1 person assist;1 person to manage equipment;nonverbal cues (demo/gesture)  -SC     Rolling Right [place] (Bed Mobility) 1 person assist;minimum assist (75% patient effort);verbal cues  -SC     Scooting/Bridging [place] (Bed Mobility) verbal cues;standby assist  -SC     Supine-Sit [place] (Bed Mobility) 1 person assist;minimum assist (75% patient effort);verbal cues  -SC     Sit-Supine [place] (Bed Mobility) 1 person assist;minimum assist (75% patient effort);verbal cues  -SC     Comment, (Bed Mobility) worked on bed mobility- rolling to change sheets-cues for reaching for bars. Some assist needed to roll more. Supine to sit and back with cue for bed rail. Still required assist with L UE support. Able to scoot fwd/backwards using upper extremities. Took time to work on sitting blance on EOB. No LOB notednon ambulatory  -SC     Row Name 12/12/22 1110          Transfers    Comment, (Transfers) Patient refusing getting oob stating his butt hurts. I explained that we have a waffle cushion we can use. Stated \"I'll think about it\"  -SC     Row Name 12/12/22 1110          Bed-Chair Transfer    Comment, (Bed-Chair Transfer) deferred  -SC     Row Name 12/12/22 1110          Gait/Stairs (Locomotion)    Comment, (Gait/Stairs) non ambulaory for one year  -SC     Row Name 12/12/22 1110          Mobility    Extremity Weight-bearing Status right lower extremity  -SC     Right Lower Extremity (Weight-bearing Status) non weight-bearing (NWB)  -SC           User Key  (r) = Recorded By, (t) = Taken By, (c) = Cosigned By    Initials Name Provider Type    Shamika Brar PT Physical Therapist               Obj/Interventions     Row Name 12/12/22 1115          Motor " Skills    Therapeutic Exercise hip;knee  -[facility] Name 12/12/22 1115          Hip (Therapeutic Exercise)    Hip (Therapeutic Exercise) AROM (active range of motion)  -SC     Hip AROM (Therapeutic Exercise) bilateral;flexion;extension;supine;10 repetitions  -[facility] Name 12/12/22 1115          Knee (Therapeutic Exercise)    Knee (Therapeutic Exercise) AROM (active range of motion)  -SC     Knee AROM (Therapeutic Exercise) bilateral;flexion;extension;sitting;standing;supine;10 repetitions  -SC     Row Name 12/12/22 1115          Balance    Static Sitting Balance standby assist;1-person assist  -SC     Dynamic Sitting Balance standby assist;1-person assist  -SC     Position, Sitting Balance supported  -SC     Comment, Balance no LOB able to scoot using UEs  -SC           User Key  (r) = Recorded By, (t) = Taken By, (c) = Cosigned By    Initials Name Provider Type    SC Shamika Patel, PT Physical Therapist               Goals/Plan    No documentation.                Clinical Impression     Row Name 12/12/22 1116          Pain    Additional Documentation Pain Scale: FACES Pre/Post-Treatment (Group)  -SC     Row Name 12/12/22 1116          Pain Scale: FACES Pre/Post-Treatment    Pain: FACES Scale, Pretreatment 2-->hurts little bit  -SC     Posttreatment Pain Rating 2-->hurts little bit  -SC     Pain Location - Side/Orientation Right  -SC     Pain Location - residual limb  -SC     Row Name 12/12/22 1116          Plan of Care Review    Plan of Care Reviewed With patient  -SC     Progress improving  -SC     Outcome Evaluation Patient demonstrated improving pain control and overall improving bed mobility. He has not yet been in w/c and I have encouraged him to try a short session.  -SC     Row Name 12/12/22 1116          Therapy Assessment/Plan (PT)    Rehab Potential (PT) good, to achieve stated therapy goals  -SC     Criteria for Skilled Interventions Met (PT) yes;meets criteria;skilled treatment is necessary  -SC      Therapy Frequency (PT) daily  -SC     Row Name 12/12/22 1116          Positioning and Restraints    Pre-Treatment Position in bed  -SC     Post Treatment Position bed  -SC     In Bed notified nsg;supine;call light within reach;exit alarm on  -SC           User Key  (r) = Recorded By, (t) = Taken By, (c) = Cosigned By    Initials Name Provider Type    Shamika Brar PT Physical Therapist               Outcome Measures     Row Name 12/12/22 1117          How much help from another person do you currently need...    Turning from your back to your side while in flat bed without using bedrails? 3  -SC     Moving from lying on back to sitting on the side of a flat bed without bedrails? 3  -SC     Moving to and from a bed to a chair (including a wheelchair)? 1  -SC     Standing up from a chair using your arms (e.g., wheelchair, bedside chair)? 1  -SC     Climbing 3-5 steps with a railing? 1  -SC     To walk in hospital room? 1  -SC     AM-PAC 6 Clicks Score (PT) 10  -SC     Highest level of mobility 4 --> Transferred to chair/commode  -SC     Row Name 12/12/22 1117          Functional Assessment    Outcome Measure Options AM-PAC 6 Clicks Basic Mobility (PT)  -SC           User Key  (r) = Recorded By, (t) = Taken By, (c) = Cosigned By    Initials Name Provider Type    Shamika Brar PT Physical Therapist                             Physical Therapy Education     Title: PT OT SLP Therapies (Done)     Topic: Physical Therapy (Done)     Point: Mobility training (Done)     Learning Progress Summary           Patient KRIS Martinez VU by SC at 12/12/2022 1121    Comment: reviewed benefits of activity    KRIS Martinez VU, DU by SC at 12/7/2022 1115    Comment: REVIEWED HEP                   Point: Home exercise program (Done)     Learning Progress Summary           Patient KRIS Martinez VU by SC at 12/12/2022 1121    Comment: reviewed benefits of activity    KRIS Martinez VU, DU by SC at 12/7/2022 1115    Comment: REVIEWED HEP                    Point: Body mechanics (Done)     Learning Progress Summary           Patient KRIS Martinez VU by SC at 12/12/2022 1121    Comment: reviewed benefits of activity    KRIS Martinez VU,DU by SC at 12/7/2022 1115    Comment: REVIEWED HEP                   Point: Precautions (Done)     Learning Progress Summary           Patient KRIS Martinez VU by SC at 12/12/2022 1121    Comment: reviewed benefits of activity    KRIS Martinez VU,DU by SC at 12/7/2022 1115    Comment: REVIEWED HEP                               User Key     Initials Effective Dates Name Provider Type Discipline    SC 06/16/21 -  Shamika Patel, PT Physical Therapist PT              PT Recommendation and Plan     Plan of Care Reviewed With: patient  Progress: improving  Outcome Evaluation: Patient demonstrated improving pain control and overall improving bed mobility. He has not yet been in w/c and I have encouraged him to try a short session.     Time Calculation:    PT Charges     Row Name 12/12/22 1047             Time Calculation    Start Time 1047  -SC      PT Received On 12/12/22  -SC      PT Goal Re-Cert Due Date 12/17/22  -SC         Time Calculation- PT    Total Timed Code Minutes- PT 15 minute(s)  -SC         Timed Charges    49570 - PT Therapeutic Exercise Minutes 10  -SC      44858 - PT Therapeutic Activity Minutes 5  -SC         Total Minutes    Timed Charges Total Minutes 15  -SC       Total Minutes 15  -SC            User Key  (r) = Recorded By, (t) = Taken By, (c) = Cosigned By    Initials Name Provider Type    SC Shamika Patel, PT Physical Therapist              Therapy Charges for Today     Code Description Service Date Service Provider Modifiers Qty    53833832177 HC PT THER PROC EA 15 MIN 12/12/2022 Shamika Patel, PT GP 1    52074486453 HC PT THER SUPP EA 15 MIN 12/12/2022 Shamika Patel, PT GP 1          PT G-Codes  Outcome Measure Options: AM-PAC 6 Clicks Basic Mobility (PT)  AM-PAC 6 Clicks Score (PT): 10  AM-PAC 6 Clicks Score (OT):  13  PT Discharge Summary  Anticipated Discharge Disposition (PT): inpatient rehabilitation facility    Shamika Patel, PT  12/12/2022

## 2022-12-12 NOTE — CASE MANAGEMENT/SOCIAL WORK
Continued Stay Note   [place]     Patient Name: Buster Velasco  MRN: [id number]  Today's Date: 12/12/2022    Admit Date: 11/30/2022    Plan: Rehab   Discharge Plan     Row Name 12/12/22 1427       Plan    Plan Rehab    Patient/Family in Agreement with Plan yes    Plan Comments Spoke with Kristy, admissions liaison with [facility], and she continues to review his case. Case management will continue to follow.    Final Discharge Disposition Code 30 - still a patient               Discharge Codes    No documentation.               Expected Discharge Date and Time     Expected Discharge Date Expected Discharge Time    Dec 13, 2022             Alan Downing RN

## 2022-12-12 NOTE — PROGRESS NOTES
[facility] Progress Note        Antibiotics:  Anti-Infectives (From admission, onward)    Ordered     Dose/Rate Route Frequency Start Stop    12/07/22 0603  DAPTOmycin (CUBICIN) 800 mg in sodium chloride 0.9 % 50 mL IVPB        Ordering Provider: Zeke George MD    8 mg/kg × 100 kg  100 mL/hr over 30 Minutes Intravenous Every 24 Hours 12/07/22 1000 12/15/22 0959    11/30/22 2219  vancomycin 2000 mg/500 mL 0.9% NS IVPB ([facility])        Ordering Provider: Reggie Batres MD    20 mg/kg × 98.9 kg  over 2 Hours Intravenous Once 11/30/22 2221 12/01/22 0315    11/30/22 2219  piperacillin-tazobactam (ZOSYN) 3.375 g in iso-osmotic dextrose 50 ml (premix)        Ordering Provider: Reggie Batres MD    3.375 g Intravenous Once 11/30/22 2221 11/30/22 2335          CC: foot infection    HPI:    Patient is a 57 y.o.  Yr old male with history of prior lower extremity infection/amputations done in [facility].  He has a history of left BKA years ago.  More recent right transmetatarsal amputation but specific dates unclear and unclear who the surgeon was.  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  He has history DVT/IVC filter, diabetes and other comorbidities as below.  He reports a chronic right lateral foot wound present for months but apparently not precipitated by any specific event or trauma.  He is admitted to the hospital on November 30 with deterioration at the foot including redness/swelling    **patient had reported remote drug abuse (initially to me reported as IV drug abuse but then later he reported not injection use and I am unable to corroborate otherwise at prsent; +drug screen earlier this year per d/w Dr Chau for Meth at [facility] and reported by them to be IVDA/substance abuse),   " chronic methadone. He reported to me this was 17 years ago and therefore some inconsistencies    ** patient reports MRSA blood stream infection treated in [place] spring of 2022, 6 weeks of vancomycin by his report.  Otherwise data deficit.  Try to retrieve information     12/2/22 Dr Peguero did surgery  \"Procedure(s): Mid level right below-knee amputation and primary closure \"    12/6 with MRSA in blood culture;  TTE done but limited per cardiology    12/9/22   JERALD no vegetation per cardiology    12/12/22 no new focal pain or distress per nursing staff. doing okay overall per him.  Case management considering options.   postop pain to the right LE which is controlled/dull at present, constant, nonradiating, worse with palpation, generally better with pain meds and 2  out of 10 in severity.  Not progressive     No headache photophobia or neck stiffness.  No shortness of breath cough or hemoptysis.  No nausea vomiting diarrhea or abdominal pain.  No dysuria hematuria or pyuria.  No other new skin rash       ROS:      12/12/22 No f/c/s. No n/v/d. No rash. No new ADR to Abx.     Constitutional-- No Fever, chills or sweats.  Appetite good, and no malaise. No fatigue.  Heent--chronic hoarse voice.  No new vision, hearing or throat complaints.  No epistaxis or oral sores.   No flashers, floaters or eye pain.   No headache, photophobia or neck stiffness.  Chronic PEG tube  CV-- No chest pain, palpitation or syncope  Resp-- No SOB/cough/Hemoptysis  GI- No nausea, vomiting, or diarrhea.  No hematochezia, melena, or hematemesis. Denies jaundice or chronic liver disease.  -- No dysuria, hematuria, or flank pain.  Denies hesitancy, urgency or flank pain.  Lymph- no swollen lymph nodes in neck/axilla or groin.   Heme- No active bruising or bleeding; no Hx of DVT or PE.  MS-- no swelling or pain in the bones or joints of arms/legs.  No new back pain.  Neuro-- No acute focal weakness or numbness in the arms or legs.  No " "seizures.     Full 12 point review of systems reviewed and negative otherwise for acute complaints, except for above      PE:   /87 (BP Location: Left arm, Patient Position: Lying)   Pulse 64   Temp 98.2 °F (36.8 °C) (Oral)   Resp 16   Ht 193 cm (75.98\")   Wt 100 kg (220 lb 7.4 oz)   SpO2 94%   BMI 26.85 kg/m²          GENERAL: awake;  in no acute distress.  chronic Hoarse voice noted and chronic per him  HEENT: Normocephalic, atraumatic.   No conjunctival injection. No icterus. Oropharynx   without evidence of thrush or exudate. No evidence of peridontal disease.    NECK: Supple without nuchal rigidity. No mass.   HEART: RRR; No murmur, rubs, gallops.   LUNGS: Diminished at bases but otherwise clear to auscultation bilaterally without wheezing, rales, rhonchi. Normal respiratory effort. Nonlabored. No dullness.  ABDOMEN: Soft, nontender, nondistended. Positive bowel sounds. No rebound or guarding. NO mass or HSM.  PEG tube noted  EXT:  No cyanosis, clubbing;No cord.   MSK: FROM without joint effusions noted arms/legs.    SKIN: Warm and dry without cutaneous eruptions on Inspection/palpation.    NEURO: awake; moves all 4 extremities     Right lower extremity surgical site noted;  No new redness;  no discrete mass bulge or fluctuance.  No crepitus or bulla.       No peripheral stigmata/phenomenon of endocarditis     IV without obvious redness or drainage     Left BKA site with no redness induration or warmth.  No discrete mass bulge or fluctuance.    Laboratory Data    Results from last 7 days   Lab Units 12/12/22  0353 12/07/22  0436   WBC 10*3/mm3 7.83 6.24   HEMOGLOBIN g/dL 9.4* 9.2*   HEMATOCRIT % 31.5* 30.1*   PLATELETS 10*3/mm3 355 388     Results from last 7 days   Lab Units 12/12/22  0353   SODIUM mmol/L 139   POTASSIUM mmol/L 4.2   CHLORIDE mmol/L 103   CO2 mmol/L 25.0   BUN mg/dL 15   CREATININE mg/dL 0.56*   GLUCOSE mg/dL 189*   CALCIUM mg/dL 9.6                   Estimated Creatinine " Clearance: 203.4 mL/min (A) (by C-G formula based on SCr of 0.56 mg/dL (L)).      Microbiology:      Radiology:  Imaging Results (Last 72 Hours)     ** No results found for the last 72 hours. **            Impression:     --Acute right foot/ankle with multifocal ulceration, cellulitis/wound infection and probable osteomyelitis with probe to bone at the lateral foot and abnormal MRI.  Other comorbidities as outlined above and high risk for further serious morbidity and other serious sequelae including persistent/recurrent or nonhealing wounds, persistent/progressive or recurrent infection and risk for further functional/limb loss/amputation.  Surgery following to help define timing/option of threshold for any future surgical intervention .      **Surgery 12/2 with BKA    --MRSA bacteremia from November 30 (daptomycin sensitive).  Presumed from foot but also risk for endovascular focus particularly with  History of prior MRSA bloodstream infection.  Transthoracic echocardiogram limited per cardiology.  JERALD no vegetation per cardiology; follow-up blood cultures negative so far from December 6    **No new focal symptoms to suggest new metastatic foci of involvement as of December 11    --History MRSA with bacteremia by his report in spring/summer 2022 and treated with 6 weeks of vancomycin in [place].  Specifics unclear and trying to retrieve information    --coag-negative staph in blood culture likely contaminant     --Chronic vocal cord paralysis per notes with chronic hoarseness and indwelling PEG tube.  Medicine team to clarify     --History left BKA.     --Diabetes.  You need to tightly control blood sugar to give best chance for healing     --History of DVT with IVC filter    --history of remote drug abuse Per his reports to me; he denies injection drug abuse for 17 years by his report to me although as above, medicine team has find records indicating more recent abuse in 2022 [facility],  [facility]     PLAN:      -- Daptomycin  IV potentially 6 weeks but final duration to depend on clinical course of study results and response to therapy     -- Check/review labs cultures and scans     -- Partial history per nursing staff     -- Discussed with microbiology and family     --d/w Dr Peguero and Dr Bolaños      -- Highly complex set of issues with high risk for further serious morbidity and other serious sequela     --JERALD noted    --  continue to consider options     **Copied text in this note has been reviewed and is accurate as of 12/12/22.     Zeke George MD  12/12/2022

## 2022-12-13 LAB
GLUCOSE BLDC GLUCOMTR-MCNC: 154 MG/DL (ref 70–130)
GLUCOSE BLDC GLUCOMTR-MCNC: 195 MG/DL (ref 70–130)
GLUCOSE BLDC GLUCOMTR-MCNC: 199 MG/DL (ref 70–130)
GLUCOSE BLDC GLUCOMTR-MCNC: 208 MG/DL (ref 70–130)

## 2022-12-13 PROCEDURE — 63710000001 INSULIN DETEMIR PER 5 UNITS: Performed by: PEDIATRICS

## 2022-12-13 PROCEDURE — 99233 SBSQ HOSP IP/OBS HIGH 50: CPT | Performed by: NURSE PRACTITIONER

## 2022-12-13 PROCEDURE — 99024 POSTOP FOLLOW-UP VISIT: CPT | Performed by: THORACIC SURGERY (CARDIOTHORACIC VASCULAR SURGERY)

## 2022-12-13 PROCEDURE — 63710000001 INSULIN LISPRO (HUMAN) PER 5 UNITS: Performed by: HOSPITALIST

## 2022-12-13 PROCEDURE — 25010000002 DAPTOMYCIN PER 1 MG: Performed by: INTERNAL MEDICINE

## 2022-12-13 PROCEDURE — 63710000001 INSULIN LISPRO (HUMAN) PER 5 UNITS: Performed by: NURSE PRACTITIONER

## 2022-12-13 PROCEDURE — 82962 GLUCOSE BLOOD TEST: CPT

## 2022-12-13 RX ORDER — HYDROXYZINE HYDROCHLORIDE 25 MG/1
50 TABLET, FILM COATED ORAL NIGHTLY PRN
Status: DISCONTINUED | OUTPATIENT
Start: 2022-12-13 | End: 2023-01-06 | Stop reason: HOSPADM

## 2022-12-13 RX ORDER — ATORVASTATIN CALCIUM 20 MG/1
20 TABLET, FILM COATED ORAL NIGHTLY
Status: DISCONTINUED | OUTPATIENT
Start: 2022-12-13 | End: 2023-01-06 | Stop reason: HOSPADM

## 2022-12-13 RX ORDER — ACETAMINOPHEN 325 MG/1
650 TABLET ORAL EVERY 4 HOURS PRN
Status: DISCONTINUED | OUTPATIENT
Start: 2022-12-13 | End: 2023-01-06 | Stop reason: HOSPADM

## 2022-12-13 RX ORDER — INSULIN LISPRO 100 [IU]/ML
2 INJECTION, SOLUTION INTRAVENOUS; SUBCUTANEOUS
Status: DISCONTINUED | OUTPATIENT
Start: 2022-12-13 | End: 2022-12-22

## 2022-12-13 RX ORDER — AMOXICILLIN 250 MG
2 CAPSULE ORAL 2 TIMES DAILY PRN
Status: DISCONTINUED | OUTPATIENT
Start: 2022-12-13 | End: 2022-12-22

## 2022-12-13 RX ORDER — ONDANSETRON 4 MG/1
4 TABLET, FILM COATED ORAL EVERY 6 HOURS PRN
Status: DISCONTINUED | OUTPATIENT
Start: 2022-12-13 | End: 2023-01-06 | Stop reason: HOSPADM

## 2022-12-13 RX ORDER — ONDANSETRON 2 MG/ML
4 INJECTION INTRAMUSCULAR; INTRAVENOUS EVERY 6 HOURS PRN
Status: DISCONTINUED | OUTPATIENT
Start: 2022-12-13 | End: 2023-01-06 | Stop reason: HOSPADM

## 2022-12-13 RX ORDER — TAMSULOSIN HYDROCHLORIDE 0.4 MG/1
0.4 CAPSULE ORAL NIGHTLY
Status: DISCONTINUED | OUTPATIENT
Start: 2022-12-13 | End: 2023-01-06 | Stop reason: HOSPADM

## 2022-12-13 RX ORDER — CHOLECALCIFEROL (VITAMIN D3) 125 MCG
5 CAPSULE ORAL NIGHTLY PRN
Status: DISCONTINUED | OUTPATIENT
Start: 2022-12-13 | End: 2023-01-06 | Stop reason: HOSPADM

## 2022-12-13 RX ADMIN — INSULIN LISPRO 2 UNITS: 100 INJECTION, SOLUTION INTRAVENOUS; SUBCUTANEOUS at 16:59

## 2022-12-13 RX ADMIN — METOPROLOL TARTRATE 25 MG: 25 TABLET, FILM COATED ORAL at 20:16

## 2022-12-13 RX ADMIN — INSULIN LISPRO 2 UNITS: 100 INJECTION, SOLUTION INTRAVENOUS; SUBCUTANEOUS at 08:22

## 2022-12-13 RX ADMIN — Medication 10 ML: at 08:23

## 2022-12-13 RX ADMIN — METHADONE HYDROCHLORIDE 10 MG: 10 TABLET ORAL at 20:16

## 2022-12-13 RX ADMIN — METHADONE HYDROCHLORIDE 10 MG: 10 TABLET ORAL at 03:20

## 2022-12-13 RX ADMIN — METHADONE HYDROCHLORIDE 10 MG: 10 TABLET ORAL at 08:22

## 2022-12-13 RX ADMIN — HYDROXYZINE HYDROCHLORIDE 50 MG: 25 TABLET ORAL at 20:25

## 2022-12-13 RX ADMIN — INSULIN DETEMIR 10 UNITS: 100 INJECTION, SOLUTION SUBCUTANEOUS at 20:15

## 2022-12-13 RX ADMIN — Medication 10 ML: at 20:15

## 2022-12-13 RX ADMIN — INSULIN LISPRO 2 UNITS: 100 INJECTION, SOLUTION INTRAVENOUS; SUBCUTANEOUS at 17:00

## 2022-12-13 RX ADMIN — METHADONE HYDROCHLORIDE 10 MG: 10 TABLET ORAL at 13:06

## 2022-12-13 RX ADMIN — DAPTOMYCIN 800 MG: 500 INJECTION, POWDER, LYOPHILIZED, FOR SOLUTION INTRAVENOUS at 08:21

## 2022-12-13 RX ADMIN — TAMSULOSIN HYDROCHLORIDE 0.4 MG: 0.4 CAPSULE ORAL at 20:16

## 2022-12-13 RX ADMIN — ATORVASTATIN CALCIUM 20 MG: 20 TABLET, FILM COATED ORAL at 20:16

## 2022-12-13 RX ADMIN — INSULIN LISPRO 2 UNITS: 100 INJECTION, SOLUTION INTRAVENOUS; SUBCUTANEOUS at 11:40

## 2022-12-13 NOTE — PLAN OF CARE
Goal Outcome Evaluation:   VSS. Alert and oriented x 4. Right stump dressing changed by Dr Peguero this AM. Nury bowman SAMMY. Skin interventions in place. PICC dressing CDI. - [facility] referral made.

## 2022-12-13 NOTE — PLAN OF CARE
Goal Outcome Evaluation:  Plan of Care Reviewed With: patient           Outcome Evaluation: Rested well. Scheduled pain meds given. No complaints. Non-tele. VSS.

## 2022-12-13 NOTE — PROGRESS NOTES
[facility] Progress Note        Antibiotics:  Anti-Infectives (From admission, onward)    Ordered     Dose/Rate Route Frequency Start Stop    12/07/22 0603  DAPTOmycin (CUBICIN) 800 mg in sodium chloride 0.9 % 50 mL IVPB        Ordering Provider: Zeke George MD    8 mg/kg × 100 kg  100 mL/hr over 30 Minutes Intravenous Every 24 Hours 12/07/22 1000 12/15/22 0959    11/30/22 2219  vancomycin 2000 mg/500 mL 0.9% NS IVPB ([facility])        Ordering Provider: Reggie Batres MD    20 mg/kg × 98.9 kg  over 2 Hours Intravenous Once 11/30/22 2221 12/01/22 0315    11/30/22 2219  piperacillin-tazobactam (ZOSYN) 3.375 g in iso-osmotic dextrose 50 ml (premix)        Ordering Provider: Reggie Batres MD    3.375 g Intravenous Once 11/30/22 2221 11/30/22 2335          CC: foot infection    HPI:    Patient is a 57 y.o.  Yr old male with history of prior lower extremity infection/amputations done in [facility].  He has a history of left BKA years ago.  More recent right transmetatarsal amputation but specific dates unclear and unclear who the surgeon was.  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  He has history DVT/IVC filter, diabetes and other comorbidities as below.  He reports a chronic right lateral foot wound present for months but apparently not precipitated by any specific event or trauma.  He is admitted to the hospital on November 30 with deterioration at the foot including redness/swelling    **patient had reported remote drug abuse (initially to me reported as IV drug abuse but then later he reported not injection use and I am unable to corroborate otherwise at prsent; +drug screen earlier this year per d/w Dr Chau for Meth at [facility] and reported by them to be IVDA/substance abuse),   " chronic methadone. He reported to me this was 17 years ago and therefore some inconsistencies    ** patient reports MRSA blood stream infection treated in [place] spring of 2022, 6 weeks of vancomycin by his report.  Otherwise data deficit.  Try to retrieve information     12/2/22 Dr Peguero did surgery  \"Procedure(s): Mid level right below-knee amputation and primary closure \"    12/6 with MRSA in blood culture;  TTE done but limited per cardiology    12/9/22   JERALD no vegetation per cardiology    12/13/22 on room air with no new focal symptoms. no new   distress per nursing staff.   Case management considering options.   postop pain to the right LE which is controlled/dull at present, constant, nonradiating, worse with palpation, generally better with pain meds and 2  out of 10 in severity.  Not progressive     No headache photophobia or neck stiffness.  No shortness of breath cough or hemoptysis.  No nausea vomiting diarrhea or abdominal pain.  No dysuria hematuria or pyuria.  No other new skin rash       ROS:      12/13/22 No f/c/s. No n/v/d. No rash. No new ADR to Abx.     Constitutional-- No Fever, chills or sweats.  Appetite good, and no malaise. No fatigue.  Heent--chronic hoarse voice.  No new vision, hearing or throat complaints.  No epistaxis or oral sores.   No flashers, floaters or eye pain.   No headache, photophobia or neck stiffness.  Chronic PEG tube  CV-- No chest pain, palpitation or syncope  Resp-- No SOB/cough/Hemoptysis  GI- No nausea, vomiting, or diarrhea.  No hematochezia, melena, or hematemesis. Denies jaundice or chronic liver disease.  -- No dysuria, hematuria, or flank pain.  Denies hesitancy, urgency or flank pain.  Lymph- no swollen lymph nodes in neck/axilla or groin.   Heme- No active bruising or bleeding; no Hx of DVT or PE.  MS-- no swelling or pain in the bones or joints of arms/legs.  No new back pain.  Neuro-- No acute focal weakness or numbness in the arms or legs.  " "No seizures.     Full 12 point review of systems reviewed and negative otherwise for acute complaints, except for above      PE:   BP 93/67   Pulse 70   Temp 98.2 °F (36.8 °C) (Axillary)   Resp 17   Ht 193 cm (75.98\")   Wt 100 kg (220 lb 7.4 oz)   SpO2 93%   BMI 26.85 kg/m²          GENERAL: awake;  in no acute distress.  chronic Hoarse voice noted and chronic per him; room air  HEENT: Normocephalic, atraumatic.   No conjunctival injection. No icterus. Oropharynx   without evidence of thrush or exudate. No evidence of peridontal disease.    NECK: Supple without nuchal rigidity. No mass.   HEART: RRR; No murmur, rubs, gallops.   LUNGS: Diminished at bases but otherwise clear to auscultation bilaterally without wheezing, rales, rhonchi. Normal respiratory effort. Nonlabored. No dullness.  ABDOMEN: Soft, nontender, nondistended. Positive bowel sounds. No rebound or guarding. NO mass or HSM.  PEG tube noted  EXT:  No cyanosis, clubbing;No cord.   MSK: FROM without joint effusions noted arms/legs.    SKIN: Warm and dry without cutaneous eruptions on Inspection/palpation.    NEURO: awake; moves all 4 extremities     Right lower extremity surgical site noted;  No new redness;  no discrete mass bulge or fluctuance.  No crepitus or bulla.       No peripheral stigmata/phenomenon of endocarditis     IV without obvious redness or drainage     Left BKA site with no redness induration or warmth.  No discrete mass bulge or fluctuance.    Laboratory Data    Results from last 7 days   Lab Units 12/12/22  0353 12/07/22  0436   WBC 10*3/mm3 7.83 6.24   HEMOGLOBIN g/dL 9.4* 9.2*   HEMATOCRIT % 31.5* 30.1*   PLATELETS 10*3/mm3 355 388     Results from last 7 days   Lab Units 12/12/22  0353   SODIUM mmol/L 139   POTASSIUM mmol/L 4.2   CHLORIDE mmol/L 103   CO2 mmol/L 25.0   BUN mg/dL 15   CREATININE mg/dL 0.56*   GLUCOSE mg/dL 189*   CALCIUM mg/dL 9.6                   Estimated Creatinine Clearance: 203.4 mL/min (A) (by C-G " formula based on SCr of 0.56 mg/dL (L)).      Microbiology:      Radiology:  Imaging Results (Last 72 Hours)     ** No results found for the last 72 hours. **            Impression:     --Acute right foot/ankle with multifocal ulceration, cellulitis/wound infection and probable osteomyelitis with probe to bone at the lateral foot and abnormal MRI.  Other comorbidities as outlined above and high risk for further serious morbidity and other serious sequelae including persistent/recurrent or nonhealing wounds, persistent/progressive or recurrent infection and risk for further functional/limb loss/amputation.  Surgery following to help define timing/option of threshold for any future surgical intervention .      **Surgery 12/2 with BKA    --MRSA bacteremia from November 30 (daptomycin sensitive).  Presumed from foot but also risk for endovascular focus particularly with  History of prior MRSA bloodstream infection.  Transthoracic echocardiogram limited per cardiology.  JERALD no vegetation per cardiology; follow-up blood cultures negative so far from December 6    **No new focal symptoms to suggest new metastatic foci of involvement as of December 13    --History MRSA with bacteremia by his report in spring/summer 2022 and treated with 6 weeks of vancomycin in [place].  Specifics unclear and trying to retrieve information    --coag-negative staph in blood culture likely contaminant     --Chronic vocal cord paralysis per notes with chronic hoarseness and indwelling PEG tube.  Medicine team to clarify     --History left BKA.     --Diabetes.  You need to tightly control blood sugar to give best chance for healing     --History of DVT with IVC filter    --history of remote drug abuse Per his reports to me; he denies injection drug abuse for 17 years by his report to me although as above, medicine team has find records indicating more recent abuse in 2022 [facility] [place] [facility]     PLAN:      --  Daptomycin  IV potentially 6 weeks but final duration to depend on clinical course of study results and response to therapy     -- Check/review labs cultures and scans     -- Partial history per nursing staff     -- Discussed with microbiology and family     --d/w Dr Peguero       -- Highly complex set of issues with high risk for further serious morbidity and other serious sequela     --JERALD noted    --  continue to consider options; anticipate rehab facility     **Copied text in this note has been reviewed and is accurate as of 12/13/22.     Zeke George MD  12/13/2022

## 2022-12-13 NOTE — CASE MANAGEMENT/SOCIAL WORK
Continued Stay Note  [facility]     Patient Name: Buster Velasco  MRN: [id number]  Today's Date: 12/13/2022    Admit Date: 11/30/2022    Plan: Rehab   Discharge Plan     Row Name 12/13/22 1518       Plan    Plan Rehab    Patient/Family in Agreement with Plan yes    Plan Comments I spoke with Mr. Velasco at the bedside. He states he would rather die than go back to [facility]. I have given him a list of all acute rehabs that his insurance will cover. He will speak to his wife tonight and give me a couple of names in which to make referrals. Case management will continue to follow.    Final Discharge Disposition Code 30 - still a patient               Discharge Codes    No documentation.               Expected Discharge Date and Time     Expected Discharge Date Expected Discharge Time    Dec 14, 2022             Alan Downing RN

## 2022-12-13 NOTE — PAYOR COMM NOTE
"Request for additional days  Auth # [id number] 12/1/22-12/6/22    Utilization Review  Phone [phone]  Fax [phone]    [place] [id number]      Isidro Velasco (57 y.o. Male)     Date of Birth   1965    Social Security Number       Address   [address] [place] [address]    Home Phone   [phone]    MRN   [id number]       Pentecostal   Islam    Marital Status                               Admission Date   11/30/22    Admission Type   Emergency    Admitting Provider   Ruth Ratliff MD    Attending Provider   Ruth Ratliff MD    Department, Room/Bed   [facility] 3G, S366/1       Discharge Date       Discharge Disposition       Discharge Destination                               Attending Provider: Ruth Ratliff MD    Allergies: Gabapentin, Lyrica [Pregabalin]    Isolation: None   Infection: MRSA (12/06/22)   Code Status: CPR    Ht: 193 cm (75.98\")   Wt: 100 kg (220 lb 7.4 oz)    Admission Cmt: None   Principal Problem: Right foot infection [L08.9]                 Active Insurance as of 11/30/2022     Primary Coverage     Payor Plan Insurance Group Employer/Plan Group    [place]      Payor Plan Address Payor Plan Phone Number Payor Plan Fax Number Effective Dates    [address] [phone]  11/30/2022 - 11/30/2022    [facility] [id number]       Subscriber Name Subscriber Birth Date Member ID       ISIDRO VELASCO 1965 [id number]                 Emergency Contacts      (Rel.) Home Phone Work Phone Mobile Phone    JANENELSY (Spouse) [phone] -- [phone]            Operative/Procedure Notes (last 7 days)  Notes from 12/06/22 1421 through 12/13/22 1421   No notes of this type exist for this encounter.            Physician Progress Notes (last 72 hours)      Zeke George MD at 12/13/22 0857          [facility] " Progress Note        Antibiotics:  Anti-Infectives (From admission, onward)    Ordered     Dose/Rate Route Frequency Start Stop    12/07/22 0603  DAPTOmycin (CUBICIN) 800 mg in sodium chloride 0.9 % 50 mL IVPB        Ordering Provider: Zeke George MD    8 mg/kg × 100 kg  100 mL/hr over 30 Minutes Intravenous Every 24 Hours 12/07/22 1000 12/15/22 0959    11/30/22 2219  vancomycin 2000 mg/500 mL 0.9% NS IVPB ([facility])        Ordering Provider: Reggie Batres MD    20 mg/kg × 98.9 kg  over 2 Hours Intravenous Once 11/30/22 2221 12/01/22 0315    11/30/22 2219  piperacillin-tazobactam (ZOSYN) 3.375 g in iso-osmotic dextrose 50 ml (premix)        Ordering Provider: Reggie Batres MD    3.375 g Intravenous Once 11/30/22 2221 11/30/22 2335          CC: foot infection    HPI:    Patient is a 57 y.o.  Yr old male with history of prior lower extremity infection/amputations done in [facility].  He has a history of left BKA years ago.  More recent right transmetatarsal amputation but specific dates unclear and unclear who the surgeon was.  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  He has history DVT/IVC filter, diabetes and other comorbidities as below.  He reports a chronic right lateral foot wound present for months but apparently not precipitated by any specific event or trauma.  He is admitted to the hospital on November 30 with deterioration at the foot including redness/swelling    **patient had reported remote drug abuse (initially to me reported as IV drug abuse but then later he reported not injection use and [facility]; +drug screen earlier this year per d/w Dr Chau for Meth at [facility] and reported by them to be IVDA/substance abuse),    "chronic methadone. He reported to me this was 17 years ago and therefore some inconsistencies    ** patient reports MRSA blood stream infection treated in [place] spring of [place]22, 6 weeks of vancomycin by his report.  Otherwise data deficit.  Try to retrieve information     12/2/22 Dr Peguero did surgery  \"Procedure(s): Mid level right below-knee amputation and primary closure \"    12/6 with MRSA in blood culture;  TTE done but limited per cardiology    12/9/22   JERALD no vegetation per cardiology    12/13/22 on room air with no new focal symptoms. no new   distress per nursing staff.   Case management considering options.   postop pain to the right LE which is controlled/dull at present, constant, nonradiating, worse with palpation, generally better with pain meds and 2  out of 10 in severity.  Not progressive     No headache photophobia or neck stiffness.  No shortness of breath cough or hemoptysis.  No nausea vomiting diarrhea or abdominal pain.  No dysuria hematuria or pyuria.  No other new skin rash       ROS:      12/13/22 No f/c/s. No n/v/d. No rash. No new ADR to Abx.     Constitutional-- No Fever, chills or sweats.  Appetite good, and no malaise. No fatigue.  Heent--chronic hoarse voice.  No new vision, hearing or throat complaints.  No epistaxis or oral sores.   No flashers, floaters or eye pain.   No headache, photophobia or neck stiffness.  Chronic PEG tube  CV-- No chest pain, palpitation or syncope  Resp-- No SOB/cough/Hemoptysis  GI- No nausea, vomiting, or diarrhea.  No hematochezia, melena, or hematemesis. Denies jaundice or chronic liver disease.  -- No dysuria, hematuria, or flank pain.  Denies hesitancy, urgency or flank pain.  Lymph- no swollen lymph nodes in neck/axilla or groin.   Heme- No active bruising or bleeding; no Hx of DVT or PE.  MS-- no swelling or pain in the bones or joints of arms/legs.  No new back pain.  Neuro-- No acute focal weakness or numbness in the arms or legs.  No " "seizures.     Full 12 point review of systems reviewed and negative otherwise for acute complaints, except for above      PE:   BP 93/67   Pulse 70   Temp 98.2 °F (36.8 °C) (Axillary)   Resp 17   Ht 193 cm (75.98\")   Wt 100 kg (220 lb 7.4 oz)   SpO2 93%   BMI 26.85 kg/m²          GENERAL: awake;  in no acute distress.  chronic Hoarse voice noted and chronic per him; room air  HEENT: Normocephalic, atraumatic.   No conjunctival injection. No icterus. Oropharynx   without evidence of thrush or exudate. No evidence of peridontal disease.    NECK: Supple without nuchal rigidity. No mass.   HEART: RRR; No murmur, rubs, gallops.   LUNGS: Diminished at bases but otherwise clear to auscultation bilaterally without wheezing, rales, rhonchi. Normal respiratory effort. Nonlabored. No dullness.  ABDOMEN: Soft, nontender, nondistended. Positive bowel sounds. No rebound or guarding. NO mass or HSM.  PEG tube noted  EXT:  No cyanosis, clubbing;No cord.   MSK: FROM without joint effusions noted arms/legs.    SKIN: Warm and dry without cutaneous eruptions on Inspection/palpation.    NEURO: awake; moves all 4 extremities     Right lower extremity surgical site noted;  No new redness;  no discrete mass bulge or fluctuance.  No crepitus or bulla.       No peripheral stigmata/phenomenon of endocarditis     IV without obvious redness or drainage     Left BKA site with no redness induration or warmth.  No discrete mass bulge or fluctuance.    Laboratory Data    Results from last 7 days   Lab Units 12/12/22  0353 12/07/22  0436   WBC 10*3/mm3 7.83 6.24   HEMOGLOBIN g/dL 9.4* 9.2*   HEMATOCRIT % 31.5* 30.1*   PLATELETS 10*3/mm3 355 388     Results from last 7 days   Lab Units 12/12/22  0353   SODIUM mmol/L 139   POTASSIUM mmol/L 4.2   CHLORIDE mmol/L 103   CO2 mmol/L 25.0   BUN mg/dL 15   CREATININE mg/dL 0.56*   GLUCOSE mg/dL 189*   CALCIUM mg/dL 9.6                   Estimated Creatinine Clearance: 203.4 mL/min (A) (by C-G formula " based on SCr of 0.56 mg/dL (L)).      Microbiology:      Radiology:  Imaging Results (Last 72 Hours)     ** No results found for the last 72 hours. **            Impression:     --Acute right foot/ankle with multifocal ulceration, cellulitis/wound infection and probable osteomyelitis with probe to bone at the lateral foot and abnormal MRI.  Other comorbidities as outlined above and high risk for further serious morbidity and other serious sequelae including persistent/recurrent or nonhealing wounds, persistent/progressive or recurrent infection and risk for further functional/limb loss/amputation.  Surgery following to help define timing/option of threshold for any future surgical intervention .      **Surgery 12/2 with BKA    --MRSA bacteremia from November 30 (daptomycin sensitive).  Presumed from foot but also risk for endovascular focus particularly with  History of prior MRSA bloodstream infection.  Transthoracic echocardiogram limited per cardiology.  JERALD no vegetation per cardiology; follow-up blood cultures negative so far from December 6    **No new focal symptoms to suggest new metastatic foci of involvement as of December 13    --History MRSA with bacteremia by his report in spring/summer 2022 and treated with 6 weeks of vancomycin in [place].  Specifics unclear and trying to retrieve information    --coag-negative staph in blood culture likely contaminant     --Chronic vocal cord paralysis per notes with chronic hoarseness and indwelling PEG tube.  Medicine team to clarify     --History left BKA.     --Diabetes.  You need to tightly control blood sugar to give best chance for healing     --History of DVT with IVC filter    --history of remote drug abuse Per his reports to me; he denies injection drug abuse for 17 years by his report to me although as above, medicine team has find records indicating more recent abuse in 2022 [facility] [place] [facility]     PLAN:      --  Daptomycin  IV potentially 6 weeks but final duration to depend on clinical course of study results and response to therapy     -- Check/review labs cultures and scans     -- Partial history per nursing staff     -- Discussed with microbiology and family     --d/w Dr Peguero       -- Highly complex set of issues with high risk for further serious morbidity and other serious sequela     --JERALD noted    --  continue to consider options; anticipate rehab facility     **Copied text in this note has been reviewed and is accurate as of 12/13/22.     Zeke George MD  12/13/2022        Electronically signed by Zeke George MD at 12/13/22 0814     John Peguero MD at 12/13/22 0611          Cardiothoracic Surgery Progress Note      POD #: 11-right below-knee amputation     LOS: 13 days      Subjective: Awake and seems alert    Objective:  Vital Signs vital signs below noted T-max past 24 hours 90.2 °F  Temp:  [97.8 °F (36.6 °C)-98.2 °F (36.8 °C)] 97.9 °F (36.6 °C)  Heart Rate:  [60-77] 70  Resp:  [16-17] 16  BP: (119-145)/(81-90) 119/81    Physical Exam:   General Appearance: Oriented x3   Lungs:   Heart:   Skin:   Incision: Amputation site dressing change.  Suture intact skin margin viable and skin flaps are viable     Results:  Results from last 7 days   Lab Units 12/12/22  0353   WBC 10*3/mm3 7.83   HEMOGLOBIN g/dL 9.4*   HEMATOCRIT % 31.5*   PLATELETS 10*3/mm3 355     Results from last 7 days   Lab Units 12/12/22  0353   SODIUM mmol/L 139   POTASSIUM mmol/L 4.2   CHLORIDE mmol/L 103   CO2 mmol/L 25.0   BUN mg/dL 15   CREATININE mg/dL 0.56*   GLUCOSE mg/dL 189*   CALCIUM mg/dL 9.6         Assessment: #1 postop day 11 right below-knee amputation healing at this time  2 status post remote left below-knee amputation for diabetic neuropathy/gangrene  3 diabetes mellitus and neuropathy.      Plan: Continue daily dressing changes to the amputation site.  Medical manage per hospitalist.  Antibiotics per  infectious disease.      John Peguero MD - 22 - 06:12 EST        Electronically signed by John Peguero MD at 22 0613     Ruth Ratliff MD at 22 1430              [facility] Medicine Services  PROGRESS NOTE    Patient Name: Buster Velasco  : 1965  MRN: [id number]    Date of Admission: 2022  Primary Care Physician: Ludivina Bowers MD    Subjective   Subjective     CC:  F/U s/p right BKA    HPI:  Patient seen this morning. No complaints. Rested well overnight, but feels a little tired today.    ROS:  Gen-no fevers, no chills  CV-no chest pain, no palpitations  Resp-no cough, no dyspnea  GI-no N/V/D, no abd pain    All other systems reviewed and negative except any additional pertinent positives and negatives as discussed in HPI.       Objective   Objective     Vital Signs:   Temp:  [98 °F (36.7 °C)-98.4 °F (36.9 °C)] 98.2 °F (36.8 °C)  Heart Rate:  [60-80] 60  Resp:  [16] 16  BP: ()/(69-93) 142/89     Physical Exam:  Gen-no acute distress  HENT-NCAT, mucous membranes moist  CV-RRR, S1 S2 normal, no m/r/g  Resp-CTAB, no wheezes or rales  Abd-soft, NT, ND, +BS  Ext-left BKA present, new right BKA with dressing intact  Neuro-A&Ox3, no focal deficits  Skin-no rashes  Psych-appropriate mood        Results Reviewed:  LAB RESULTS:      Lab 22  0353 22  043   WBC 7.83 6.24   HEMOGLOBIN 9.4* 9.2*   HEMATOCRIT 31.5* 30.1*   PLATELETS 355 388   MCV 86.3 85.0         Lab 22  0353 22  0436   SODIUM 139 136   POTASSIUM 4.2 4.3   CHLORIDE 103 103   CO2 25.0 24.0   ANION GAP 11.0 9.0   BUN 15 25*   CREATININE 0.56* 0.89   EGFR 114.3 99.3   GLUCOSE 189* 181*   CALCIUM 9.6 9.3                         Brief Urine Lab Results  (Last result in the past 365 days)      Color   Clarity   Blood   Leuk Est   Nitrite   Protein   CREAT   Urine HCG        22 0108 Yellow   Clear   Negative   Negative     Negative                  Microbiology Results Abnormal     Procedure Component Value - Date/Time    Blood Culture - Blood, Wrist, Left [[id number]]  (Normal) Collected: 12/06/22 1402    Lab Status: Final result Specimen: Blood from Wrist, Left Updated: 12/11/22 1545     Blood Culture No growth at 5 days    Blood Culture - Blood, Arm, Left [[id number]]  (Normal) Collected: 12/06/22 1402    Lab Status: Final result Specimen: Blood from Arm, Left Updated: 12/11/22 1545     Blood Culture No growth at 5 days          No radiology results from the last 24 hrs    Results for orders placed during the hospital encounter of 11/30/22    Adult Transesophageal Echo (JERALD) W/ Cont if Necessary Per Protocol    Interpretation Summary  •  Left ventricular systolic function is normal. Estimated left ventricular EF = 55%  •  The aortic valve is structurally normal with no stenosis present. Trace aortic valve regurgitation is present. There is no evidence of an aortic valve mass is present.  •  There is mild, anterior mitral leaflet thickening present. No evidence of a mitral valve mass is present. Trace mitral valve regurgitation is present. No significant mitral valve stenosis is present  •  The tricuspid valve is normal in structure. There is no evidence of a mass on the tricuspid valve. Mild tricuspid valve regurgitation is present.  •  There is mild thickening of the pulmonic valve. There is trace pulmonic valve regurgitation present. There is no pulmonic valve stenosis present.      I have reviewed the medications:  Scheduled Meds:atorvastatin, 20 mg, Per PEG Tube, Nightly  DAPTOmycin, 8 mg/kg, Intravenous, Q24H  insulin lispro, 0-7 Units, Subcutaneous, TID AC  methadone, 10 mg, Oral, Q6H  metoprolol tartrate, 25 mg, Per PEG Tube, Q12H  senna-docusate sodium, 2 tablet, Oral, BID  sodium chloride, 10 mL, Intravenous, Q12H  sodium chloride, 10 mL, Intravenous, Q12H      Continuous Infusions:lactated ringers, 9 mL/hr  ropivacaine,       PRN Meds:.•   acetaminophen  •  senna-docusate sodium **AND** polyethylene glycol **AND** bisacodyl **AND** bisacodyl  •  dextrose  •  dextrose  •  glucagon (human recombinant)  •  hydrOXYzine  •  melatonin  •  ondansetron **OR** ondansetron  •  sennosides-docusate  •  sodium chloride  •  sodium chloride  •  sodium chloride    Assessment & Plan   Assessment & Plan     Active Hospital Problems    Diagnosis  POA   • **Right foot infection [L08.9]  Yes   • MRSA bacteremia [R78.81, B95.62]  Unknown   • Sepsis (HCC) [A41.9]  Yes   • Hypoglycemia [E16.2]  Yes   • Anemia [D64.9]  Yes   • Hyponatremia [E87.1]  Yes   • Hypoalbuminemia [E88.09]  Yes   • Essential hypertension [I10]  Yes   • Hyperlipidemia [E78.5]  Yes   • Paroxysmal atrial fibrillation (HCC) [I48.0]  Yes      Resolved Hospital Problems   No resolved problems to display.        Brief Hospital Course to date:  Buster Velasco is a 57 y.o. male with hx of prior osteomyelitis s/p left BKA, s/p right transmetatarsal amputation, left hand 3rd metacarpal resection (April 2022 at OSH, had 6 weeks of IV antibiotics for MRSA bacteremia), remote hx of IVDA, more recent hx of polysubstance abuse (meth), and DVT s/p IVC filter who presents with right foot wounds. S/p right BKA on 12/2/22 with Dr. Peguero. His blood cultures grew MRSA. ID following and recommend at least 6 weeks of IV ATBx.     This patient's problems and plans were partially entered by my partner and updated as appropriate by me 12/12/22.    Sepsis secondary to acute right foot non-healing wound and likely osteomyelitis, resolved  MRSA bacteremia  - s/p right BKA 12/2/22 with Dr. Peguero  - ID following, currently on Daptomycin monotherapy  - Blood cultures with coag negative Staph and MRSA, have discussed with Dr. George, he will require IV antibiotics at discharge for at least 6 weeks but final duration TBD   - Repeat blood cultures NGTD  - TTE no acute findings, JERALD 12/9/22 negative  - Probably not a good  candidate for PICC line/home IV antibiotics given hx of substance abuse, current plan is discharge to rehab       Recurrent hypoglycemia in setting of DMII, resolved  - HbA1c 6.9%  - High dose Tresiba qAM before arrival which has been held  - Glucose was trending back up--add levemir 10un qhs. +low dose SSI  - Glucose now stable overall-     Chronic pain on opioids   Previous IVDA/substance abuse  - Continue home methadone 10 mg QID  - Morphine for breakthrough post-op pain - discontinued 12/8/22 as he is not using it  - Patient denied hx of IVDA to me but in [facility] admission notes from April 2022 at [facility], they documented prior IVDA/substance abuse, most recently with meth in [facility] from 4/2/22; he has told ID that his last IVDA was 17 years ago     Reported hx of Afib   - Patient denies any hx of Afib. Not previously on anticoagulation and given murky history will not start.   - On Metoprolol at home which is continued  - No evidence of Afib on telemetry, discontinued telemetry monitoring 12/10/22      Chronic vocal cord paralysis s/p PEG  Dysphagia  - SLP evaluation: regular diet but with nectar thick liquids recommended, patient initially wanted thin liquids and understood risk of aspiration, however currently has been drinking the nectar thick liquids; could change to pure comfort diet if he absolutely refuses nectar thick  - SLP should continue to follow for possible advancement of diet recommendations  - Unclear where/when PEG was placed, will eventually need it removed but defer to outpatient setting as it was not placed here; routine PEG care/flushing per nursing as needed     Hypotension with hx of HTN--resolved     HLD   - Continue statin     DVT s/p IVC filter   - Placed in spring 2022 per patient. Defer to PCP for further assessment and time of removal.     Hx of seizure in setting of meth use   - Not on AED's given meth trigger     BPH   - Continue Flomax       Expected Discharge Location  and Transportation: rehab  Expected Discharge Date: 12/13/22 anytime rehab placement is found       DVT prophylaxis:  Mechanical DVT prophylaxis orders are present.     AM-PAC 6 Clicks Score (PT): 10 (12/12/22 1117)    CODE STATUS:   Code Status and Medical Interventions:   Ordered at: 12/01/22 0035     Code Status (Patient has no pulse and is not breathing):    CPR (Attempt to Resuscitate)     Medical Interventions (Patient has pulse or is breathing):    Full Support       Ruth Ratliff MD  12/12/22                Electronically signed by Ruth Ratliff MD at 12/12/22 1437     Zeke George MD at 12/12/22 0843          [facility] Progress Note        Antibiotics:  Anti-Infectives (From admission, onward)    Ordered     Dose/Rate Route Frequency Start Stop    12/07/22 0603  DAPTOmycin (CUBICIN) 800 mg in sodium chloride 0.9 % 50 mL IVPB        Ordering Provider: Zeke George MD    8 mg/kg × 100 kg  100 mL/hr over 30 Minutes Intravenous Every 24 Hours 12/07/22 1000 12/15/22 0959    11/30/22 2219  vancomycin 2000 mg/500 mL 0.9% NS IVPB ([facility])        Ordering Provider: Reggie Batres MD    20 mg/kg × 98.9 kg  over 2 Hours Intravenous Once 11/30/22 2221 12/01/22 0315    11/30/22 2219  piperacillin-tazobactam (ZOSYN) 3.375 g in iso-osmotic dextrose 50 ml (premix)        Ordering Provider: Reggie Batres MD    3.375 g Intravenous Once 11/30/22 2221 11/30/22 2335          CC: foot infection    HPI:    Patient is a 57 y.o.  Yr old male with history of prior lower extremity infection/amputations done in [facility].  He has a history of left BKA years ago.  More recent right transmetatarsal amputation but specific dates unclear and unclear who the surgeon was.  Patient reports prior history of MRSA multifocal involving his extremities including left hand for which she required surgery and long course of antibiotics, again specifics unclear but he reports things healed/improved and has not  "been an issue at the hand.  He has history DVT/IVC filter, diabetes and other comorbidities as below.  He reports a chronic right lateral foot wound present for months but apparently not precipitated by any specific event or trauma.  He is admitted to the hospital on November 30 with deterioration at the foot including redness/swelling    **patient had reported remote drug abuse (initially to me reported as IV drug abuse but then later he reported not injection use and I am unable to corroborate otherwise at prsent; +drug screen earlier this year per d/w Dr Chau for Meth at [facility] and reported by them to be IVDA/substance abuse),   chronic methadone. He reported to me this was 17 years ago and therefore some inconsistencies    ** patient reports MRSA blood stream infection treated in [place] spring of 2022, 6 weeks of vancomycin by his report.  Otherwise data deficit.  Try to retrieve information     12/2/22 Dr Peguero did surgery  \"Procedure(s): Mid level right below-knee amputation and primary closure \"    12/6 with MRSA in blood culture;  TTE done but limited per cardiology    12/9/22   JERALD no vegetation per cardiology    12/12/22 no new focal pain or distress per nursing staff. doing okay overall per him.  Case management considering options.   postop pain to the right LE which is controlled/dull at present, constant, nonradiating, worse with palpation, generally better with pain meds and 2  out of 10 in severity.  Not progressive     No headache photophobia or neck stiffness.  No shortness of breath cough or hemoptysis.  No nausea vomiting diarrhea or abdominal pain.  No dysuria hematuria or pyuria.  No other new skin rash       ROS:      12/12/22 No f/c/s. No n/v/d. No rash. No new ADR to Abx.     Constitutional-- No Fever, chills or sweats.  Appetite good, and no malaise. No fatigue.  Heent--chronic hoarse voice.  No new vision, hearing or throat complaints.  No epistaxis or oral " "sores.   No flashers, floaters or eye pain.   No headache, photophobia or neck stiffness.  Chronic PEG tube  CV-- No chest pain, palpitation or syncope  Resp-- No SOB/cough/Hemoptysis  GI- No nausea, vomiting, or diarrhea.  No hematochezia, melena, or hematemesis. Denies jaundice or chronic liver disease.  -- No dysuria, hematuria, or flank pain.  Denies hesitancy, urgency or flank pain.  Lymph- no swollen lymph nodes in neck/axilla or groin.   Heme- No active bruising or bleeding; no Hx of DVT or PE.  MS-- no swelling or pain in the bones or joints of arms/legs.  No new back pain.  Neuro-- No acute focal weakness or numbness in the arms or legs.  No seizures.     Full 12 point review of systems reviewed and negative otherwise for acute complaints, except for above      PE:   /87 (BP Location: Left arm, Patient Position: Lying)   Pulse 64   Temp 98.2 °F (36.8 °C) (Oral)   Resp 16   Ht 193 cm (75.98\")   Wt 100 kg (220 lb 7.4 oz)   SpO2 94%   BMI 26.85 kg/m²          GENERAL: awake;  in no acute distress.  chronic Hoarse voice noted and chronic per him  HEENT: Normocephalic, atraumatic.   No conjunctival injection. No icterus. Oropharynx   without evidence of thrush or exudate. No evidence of peridontal disease.    NECK: Supple without nuchal rigidity. No mass.   HEART: RRR; No murmur, rubs, gallops.   LUNGS: Diminished at bases but otherwise clear to auscultation bilaterally without wheezing, rales, rhonchi. Normal respiratory effort. Nonlabored. No dullness.  ABDOMEN: Soft, nontender, nondistended. Positive bowel sounds. No rebound or guarding. NO mass or HSM.  PEG tube noted  EXT:  No cyanosis, clubbing;No cord.   MSK: FROM without joint effusions noted arms/legs.    SKIN: Warm and dry without cutaneous eruptions on Inspection/palpation.    NEURO: awake; moves all 4 extremities     Right lower extremity surgical site noted;  No new redness;  no discrete mass bulge or fluctuance.  No crepitus or " bulla.       No peripheral stigmata/phenomenon of endocarditis     IV without obvious redness or drainage     Left BKA site with no redness induration or warmth.  No discrete mass bulge or fluctuance.    Laboratory Data    Results from last 7 days   Lab Units 12/12/22  0353 12/07/22  0436   WBC 10*3/mm3 7.83 6.24   HEMOGLOBIN g/dL 9.4* 9.2*   HEMATOCRIT % 31.5* 30.1*   PLATELETS 10*3/mm3 355 388     Results from last 7 days   Lab Units 12/12/22  0353   SODIUM mmol/L 139   POTASSIUM mmol/L 4.2   CHLORIDE mmol/L 103   CO2 mmol/L 25.0   BUN mg/dL 15   CREATININE mg/dL 0.56*   GLUCOSE mg/dL 189*   CALCIUM mg/dL 9.6                   Estimated Creatinine Clearance: 203.4 mL/min (A) (by C-G formula based on SCr of 0.56 mg/dL (L)).      Microbiology:      Radiology:  Imaging Results (Last 72 Hours)     ** No results found for the last 72 hours. **            Impression:     --Acute right foot/ankle with multifocal ulceration, cellulitis/wound infection and probable osteomyelitis with probe to bone at the lateral foot and abnormal MRI.  Other comorbidities as outlined above and high risk for further serious morbidity and other serious sequelae including persistent/recurrent or nonhealing wounds, persistent/progressive or recurrent infection and risk for further functional/limb loss/amputation.  Surgery following to help define timing/option of threshold for any future surgical intervention .      **Surgery 12/2 with BKA    --MRSA bacteremia from November 30 (daptomycin sensitive).  Presumed from foot but also risk for endovascular focus particularly with  History of prior MRSA bloodstream infection.  Transthoracic echocardiogram limited per cardiology.  JERALD no vegetation per cardiology; follow-up blood cultures negative so far from December 6    **No new focal symptoms to suggest new metastatic foci of involvement as of December 11    --History MRSA with bacteremia by his report in spring/summer 2022 and treated with 6  [place] of vancomycin [place].  Specifics unclear and trying to retrieve information    --coag-negative staph in blood culture likely contaminant     --Chronic vocal cord paralysis per notes with chronic hoarseness and indwelling PEG tube.  Medicine team to clarify     --History left BKA.     --Diabetes.  You need to tightly control blood sugar to give best chance for healing     --History of DVT with IVC filter    --history of remote drug abuse Per his reports to me; he denies injection drug abuse for 17 years by his report to me although as above, medicine team has find records indicating more recent abuse in 2022 [facility] [place] [facility]     PLAN:      -- Daptomycin  IV potentially 6 weeks but final duration to depend on clinical course of study results and response to therapy     -- Check/review labs cultures and scans     -- Partial history per nursing staff     -- Discussed with microbiology and family     --d/w Dr Peguero and Dr Bolaños      -- Highly complex set of issues with high risk for further serious morbidity and other serious sequela     --JERALD noted    --  continue to consider options     **Copied text in this note has been reviewed and is accurate as of 12/12/22.     Zeek George MD  12/12/2022        Electronically signed by Zeke George MD at 12/12/22 5099     John Peguero MD at 12/12/22 0515          Cardiothoracic Surgery Progress Note      POD #: 10-right below-knee amputation     LOS: 12 days      Subjective: Awake and alert    Objective:  Vital Signs vital signs below noted T-max past 24 hours 98.5 °F  Temp:  [98 °F (36.7 °C)-98.5 °F (36.9 °C)] 98 °F (36.7 °C)  Heart Rate:  [68-84] 68  Resp:  [16] 16  BP: ()/(69-93) 106/72    Physical Exam:   General Appearance: Oriented x3   Lungs:   Heart:   Skin:   Incision: Amputation site dressing change.  Sutures intact skin margin viable and skin flaps are viable.     Results:  Results from  last 7 days   Lab Units 22  0353   WBC 10*3/mm3 7.83   HEMOGLOBIN g/dL 9.4*   HEMATOCRIT % 31.5*   PLATELETS 10*3/mm3 355     Results from last 7 days   Lab Units 22  0353   SODIUM mmol/L 139   POTASSIUM mmol/L 4.2   CHLORIDE mmol/L 103   CO2 mmol/L 25.0   BUN mg/dL 15   CREATININE mg/dL 0.56*   GLUCOSE mg/dL 189*   CALCIUM mg/dL 9.6         Assessment: #1Postop day 10 right below-knee amputation which is healing well at this time  2.  Status post remote left below-knee amputation for diabetic neuropathy/gangrene  3.  Diabetes mellitus with neuropathy      Plan: Continue daily dressing change amputation site.  Medical manage per hospitalist.  Antibiotics per infectious disease.      John Peguero MD - 22 - 05:29 EST        Electronically signed by John Peguero MD at 22 0530     Carmen Bolaños MD at 22 1059              [facility]  PROGRESS NOTE    Patient Name: Buster Velasco  : 1965  MRN: [id number]    Date of Admission: 2022  Primary Care Physician: Ludivina Bowers MD    Subjective   Subjective     CC:  F/U s/p right BKA    HPI:  Patient seen this morning. No complaints. Rested well overnight.    ROS:  Gen-no fevers, no chills  CV-no chest pain, no palpitations  Resp-no cough, no dyspnea  GI-no N/V/D, no abd pain    All other systems reviewed and negative except any additional pertinent positives and negatives as discussed in HPI.       Objective   Objective     Vital Signs:   Temp:  [97.8 °F (36.6 °C)-98.2 °F (36.8 °C)] 98.2 °F (36.8 °C)  Heart Rate:  [60-84] 84  Resp:  [16-18] 16  BP: (107-120)/(74-81) 120/81     Physical Exam:  Gen-no acute distress  HENT-NCAT, mucous membranes moist  CV-RRR, S1 S2 normal, no m/r/g  Resp-CTAB, no wheezes or rales  Abd-soft, NT, ND, +BS  Ext-left BKA present, new right BKA with dressing intact  Neuro-A&Ox3, no focal deficits  Skin-no rashes  Psych-appropriate mood  No change from  yesterday      Results Reviewed:  LAB RESULTS:      Lab 12/07/22  0436 12/04/22  1149   WBC 6.24 6.80   HEMOGLOBIN 9.2* 9.7*   HEMATOCRIT 30.1* 31.6*   PLATELETS 388 420   MCV 85.0 84.7         Lab 12/07/22  0436 12/04/22  1149   SODIUM 136 136   POTASSIUM 4.3 4.3   CHLORIDE 103 101   CO2 24.0 25.0   ANION GAP 9.0 10.0   BUN 25* 16   CREATININE 0.89 0.90   EGFR 99.3 99.0   GLUCOSE 181* 118*   CALCIUM 9.3 9.1                         Brief Urine Lab Results  (Last result in the past 365 days)      Color   Clarity   Blood   Leuk Est   Nitrite   Protein   CREAT   Urine HCG        08/20/22 0108 Yellow   Clear   Negative   Negative     Negative                 Microbiology Results Abnormal     Procedure Component Value - Date/Time    Blood Culture - Blood, Wrist, Left [[id number]]  (Normal) Collected: 12/06/22 1402    Lab Status: Preliminary result Specimen: Blood from Wrist, Left Updated: 12/10/22 1545     Blood Culture No growth at 4 days    Blood Culture - Blood, Arm, Left [[id number]]  (Normal) Collected: 12/06/22 1402    Lab Status: Preliminary result Specimen: Blood from Arm, Left Updated: 12/10/22 1545     Blood Culture No growth at 4 days          Adult Transesophageal Echo (JERALD) W/ Cont if Necessary Per Protocol    Result Date: 12/9/2022  •  Left ventricular systolic function is normal. Estimated left ventricular EF = 55% •  The aortic valve is structurally normal with no stenosis present. Trace aortic valve regurgitation is present. There is no evidence of an aortic valve mass is present. •  There is mild, anterior mitral leaflet thickening present. No evidence of a mitral valve mass is present. Trace mitral valve regurgitation is present. No significant mitral valve stenosis is present •  The tricuspid valve is normal in structure. There is no evidence of a mass on the tricuspid valve. Mild tricuspid valve regurgitation is present. •  There is mild thickening of the pulmonic valve. There is trace pulmonic valve  regurgitation present. There is no pulmonic valve stenosis present.       Results for orders placed during the hospital encounter of 11/30/22    Adult Transesophageal Echo (JERALD) W/ Cont if Necessary Per Protocol    Interpretation Summary  •  Left ventricular systolic function is normal. Estimated left ventricular EF = 55%  •  The aortic valve is structurally normal with no stenosis present. Trace aortic valve regurgitation is present. There is no evidence of an aortic valve mass is present.  •  There is mild, anterior mitral leaflet thickening present. No evidence of a mitral valve mass is present. Trace mitral valve regurgitation is present. No significant mitral valve stenosis is present  •  The tricuspid valve is normal in structure. There is no evidence of a mass on the tricuspid valve. Mild tricuspid valve regurgitation is present.  •  There is mild thickening of the pulmonic valve. There is trace pulmonic valve regurgitation present. There is no pulmonic valve stenosis present.      I have reviewed the medications:  Scheduled Meds:atorvastatin, 20 mg, Per PEG Tube, Nightly  DAPTOmycin, 8 mg/kg, Intravenous, Q24H  insulin lispro, 0-7 Units, Subcutaneous, TID AC  methadone, 10 mg, Oral, Q6H  metoprolol tartrate, 25 mg, Per PEG Tube, Q12H  senna-docusate sodium, 2 tablet, Oral, BID  sodium chloride, 10 mL, Intravenous, Q12H  sodium chloride, 10 mL, Intravenous, Q12H      Continuous Infusions:lactated ringers, 9 mL/hr  ropivacaine,       PRN Meds:.•  acetaminophen  •  senna-docusate sodium **AND** polyethylene glycol **AND** bisacodyl **AND** bisacodyl  •  dextrose  •  dextrose  •  glucagon (human recombinant)  •  hydrOXYzine  •  melatonin  •  ondansetron **OR** ondansetron  •  sennosides-docusate  •  sodium chloride  •  sodium chloride  •  sodium chloride    Assessment & Plan   Assessment & Plan     Active Hospital Problems    Diagnosis  POA   • **Right foot infection [L08.9]  Yes   • MRSA bacteremia [R78.81,  B95.62]  Unknown   • Sepsis (HCC) [A41.9]  Yes   • Hypoglycemia [E16.2]  Yes   • Anemia [D64.9]  Yes   • Hyponatremia [E87.1]  Yes   • Hypoalbuminemia [E88.09]  Yes   • Essential hypertension [I10]  Yes   • Hyperlipidemia [E78.5]  Yes   • Paroxysmal atrial fibrillation (HCC) [I48.0]  Yes      Resolved Hospital Problems   No resolved problems to display.        Brief Hospital Course to date:  Buster Velasco is a 57 y.o. male with hx of prior osteomyelitis s/p left BKA, s/p right transmetatarsal amputation, left hand 3rd metacarpal resection (April 2022 at OSH, had 6 weeks of IV antibiotics for MRSA bacteremia), remote hx of IVDA, more recent hx of polysubstance abuse (meth), and DVT s/p IVC filter who presents with right foot wounds. S/p right BKA on 12/2/22 with Dr. Peguero. His blood cultures grew MRSA. ID following and recommend at least 6 weeks of IV ATBx.     This patient's problems and plans were partially entered by my partner and updated as appropriate by me 12/11/22.    Sepsis secondary to acute right foot non-healing wound and likely osteomyelitis, resolved  MRSA bacteremia  - s/p right BKA 12/2/22 with Dr. Peguero  - ID following, currently on Daptomycin monotherapy  - Blood cultures with coag negative Staph and MRSA, have discussed with Dr. George, he will require IV antibiotics at discharge for at least 6 weeks but final duration TBD   - Repeat blood cultures NGTD  - TTE no acute findings, JERALD 12/9/22 negative  - Probably not a good candidate for PICC line/home IV antibiotics given hx of substance abuse, current plan is discharge to rehab  - Check labs in AM     Recurrent hypoglycemia in setting of DMII, resolved  - HbA1c 6.9%  - High dose Tresiba qAM before arrival which has been held  - Briefly required D5, now discontinued  - Glucose was trending back up, added back low dose SSI  - Glucose now stable overall     Chronic pain on opioids   Previous IVDA/substance abuse  - Continue home methadone  10 mg QID  - Morphine for breakthrough post-op pain - discontinued 12/8/22 as he is not using it  - Patient denied hx of IVDA to me but in Care Everywhere admission notes from April 2022 at [place], they documented prior IVDA/substance abuse, most recently with meth in [facility] from 4/2/22; he has told ID that his last IVDA was 17 years ago     Reported hx of Afib   - Patient denies any hx of Afib. Not previously on anticoagulation and given murky history will not start.   - On Metoprolol at home which is continued  - No evidence of Afib on telemetry, discontinued telemetry monitoring 12/10/22      Chronic vocal cord paralysis s/p PEG  Dysphagia  - SLP evaluation: regular diet but with nectar thick liquids recommended, patient initially wanted thin liquids and understood risk of aspiration, however currently has been drinking the nectar thick liquids; could change to pure comfort diet if he absolutely refuses nectar thick  - SLP should continue to follow for possible advancement of diet recommendations  - Unclear where/when PEG was placed, will eventually need it removed but defer to outpatient setting as it was not placed here; routine PEG care/flushing per nursing as needed     Hypotension with hx of HTN  - Hold parameters with metoprolol  - Hypotension resolved     HLD   - Continue statin     DVT s/p IVC filter   - Placed in spring 2022 per patient. Defer to PCP for further assessment and time of removal.     Hx of seizure in setting of meth use   - Not on AED's given meth trigger     BPH   - Continue Flomax       Expected Discharge Location and Transportation: rehab  Expected Discharge Date: 12/12/22 anytime rehab placement is found       DVT prophylaxis:  Mechanical DVT prophylaxis orders are present.     AM-PAC 6 Clicks Score (PT): 9 (12/08/22 0840)    CODE STATUS:   Code Status and Medical Interventions:   Ordered at: 12/01/22 0035     Code Status (Patient has no pulse and is not breathing):    CPR (Attempt  to Resuscitate)     Medical Interventions (Patient has pulse or is breathing):    Full Support       Carmen Bolaños MD  12/11/22                Electronically signed by Carmen Bolaños MD at 12/11/22 1108     Zeke George MD at 12/11/22 0913          [facility] Progress Note        Antibiotics:  Anti-Infectives (From admission, onward)    Ordered     Dose/Rate Route Frequency Start Stop    12/07/22 0603  DAPTOmycin (CUBICIN) 800 mg in sodium chloride 0.9 % 50 mL IVPB        Ordering Provider: Zeke George MD    8 mg/kg × 100 kg  100 mL/hr over 30 Minutes Intravenous Every 24 Hours 12/07/22 1000 12/15/22 0959    11/30/22 2219  vancomycin 2000 mg/500 mL 0.9% NS IVPB ([facility])        Ordering Provider: Reggie Batres MD    20 mg/kg × 98.9 kg  over 2 Hours Intravenous Once 11/30/22 2221 12/01/22 0315    11/30/22 2219  piperacillin-tazobactam (ZOSYN) 3.375 g in iso-osmotic dextrose 50 ml (premix)        Ordering Provider: Reggie Batres MD    3.375 g Intravenous Once 11/30/22 2221 11/30/22 2335          CC: foot infection    HPI:    Patient is a 57 y.o.  Yr old male with history of prior lower extremity infection/amputations done in [facility].  He has a history of left BKA years ago.  More recent right transmetatarsal amputation but specific dates unclear and unclear who the surgeon was.  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  He has history DVT/IVC filter, diabetes and other comorbidities as below.  He reports a chronic right lateral foot wound present for months but apparently not precipitated by any specific event or trauma.  He is admitted to the hospital on November 30 with deterioration at the foot including redness/swelling    **patient had reported remote drug abuse (initially  "to me reported as IV drug abuse but then later he reported not injection use and [facility] to [facility] +drug screen earlier this year per d/w Dr Chau for Meth at [facility]),   chronic methadone.    ** patient reports MRSA blood stream infection treated in [place] spring of 2022, 6 weeks of vancomycin by his report.  Otherwise data deficit.  Try to retrieve information     12/2/22 Dr Peguero did surgery  \"Procedure(s): Mid level right below-knee amputation and primary closure \"    12/6 with MRSA in blood culture;  TTE done but limited per cardiology    12/9/22   JERALD no vegetation per cardiology    12/11/22 doing okay overall per him.  Case management considering options.No new focal pain;  postop pain to the right LE which is controlled/dull at present, constant, nonradiating, worse with palpation, generally better with pain meds and 2  out of 10 in severity.  Not progressive     No headache photophobia or neck stiffness.  No shortness of breath cough or hemoptysis.  No nausea vomiting diarrhea or abdominal pain.  No dysuria hematuria or pyuria.  No other new skin rash       ROS:      12/11/22 No f/c/s. No n/v/d. No rash. No new ADR to Abx.     Constitutional-- No Fever, chills or sweats.  Appetite good, and no malaise. No fatigue.  Heent--chronic hoarse voice.  No new vision, hearing or throat complaints.  No epistaxis or oral sores.   No flashers, floaters or eye pain.   No headache, photophobia or neck stiffness.  Chronic PEG tube  CV-- No chest pain, palpitation or syncope  Resp-- No SOB/cough/Hemoptysis  GI- No nausea, vomiting, or diarrhea.  No hematochezia, melena, or hematemesis. Denies jaundice or chronic liver disease.  -- No dysuria, hematuria, or flank pain.  Denies hesitancy, urgency or flank pain.  Lymph- no swollen lymph nodes in neck/axilla or groin.   Heme- No active bruising or bleeding; no Hx of DVT or PE.  MS-- no swelling or pain in the bones or " "joints of arms/legs.  No new back pain.  Neuro-- No acute focal weakness or numbness in the arms or legs.  No seizures.     Full 12 point review of systems reviewed and negative otherwise for acute complaints, except for above      PE:   /81 (BP Location: Left arm, Patient Position: Lying)   Pulse 84   Temp 98.2 °F (36.8 °C) (Oral)   Resp 16   Ht 193 cm (75.98\")   Wt 100 kg (220 lb 7.4 oz)   SpO2 95%   BMI 26.85 kg/m²          GENERAL: awake;  in no acute distress.  chronic Hoarse voice noted and chronic per him  HEENT: Normocephalic, atraumatic.   No conjunctival injection. No icterus. Oropharynx   without evidence of thrush or exudate. No evidence of peridontal disease.    NECK: Supple without nuchal rigidity. No mass.   HEART: RRR; No murmur, rubs, gallops.   LUNGS: Diminished at bases but otherwise clear to auscultation bilaterally without wheezing, rales, rhonchi. Normal respiratory effort. Nonlabored. No dullness.  ABDOMEN: Soft, nontender, nondistended. Positive bowel sounds. No rebound or guarding. NO mass or HSM.  PEG tube noted  EXT:  No cyanosis, clubbing;No cord.   MSK: FROM without joint effusions noted arms/legs.    SKIN: Warm and dry without cutaneous eruptions on Inspection/palpation.    NEURO: awake; moves all 4 extremities     Right lower extremity surgical site noted;  No new redness;  no discrete mass bulge or fluctuance.  No crepitus or bulla.       No peripheral stigmata/phenomenon of endocarditis     IV without obvious redness or drainage     Left BKA site with no redness induration or warmth.  No discrete mass bulge or fluctuance.    Laboratory Data    Results from last 7 days   Lab Units 12/07/22  0436 12/04/22  1149   WBC 10*3/mm3 6.24 6.80   HEMOGLOBIN g/dL 9.2* 9.7*   HEMATOCRIT % 30.1* 31.6*   PLATELETS 10*3/mm3 388 420     Results from last 7 days   Lab Units 12/07/22  0436   SODIUM mmol/L 136   POTASSIUM mmol/L 4.3   CHLORIDE mmol/L 103   CO2 mmol/L 24.0   BUN mg/dL 25* "   CREATININE mg/dL 0.89   GLUCOSE mg/dL 181*   CALCIUM mg/dL 9.3                   Estimated Creatinine Clearance: 128 mL/min (by C-G formula based on SCr of 0.89 mg/dL).      Microbiology:      Radiology:  Imaging Results (Last 72 Hours)     ** No results found for the last 72 hours. **            Impression:     --Acute right foot/ankle with multifocal ulceration, cellulitis/wound infection and probable osteomyelitis with probe to bone at the lateral foot and abnormal MRI.  Other comorbidities as outlined above and high risk for further serious morbidity and other serious sequelae including persistent/recurrent or nonhealing wounds, persistent/progressive or recurrent infection and risk for further functional/limb loss/amputation.  Surgery following to help define timing/option of threshold for any future surgical intervention .      **Surgery 12/2 with BKA    --MRSA bacteremia from November 30 (daptomycin sensitive).  Presumed from foot but also risk for endovascular focus particularly with  History of prior MRSA bloodstream infection.  Transthoracic echocardiogram limited per cardiology.  JERALD no vegetation per cardiology; follow-up blood cultures negative so far from December 6    **No new focal symptoms to suggest new metastatic foci of involvement as of December 11    --History MRSA with bacteremia by his report in spring/summer 2022 and treated with 6 weeks of vancomycin in [place].  Specifics unclear and trying to retrieve information    --coag-negative staph in blood culture likely contaminant     --Chronic vocal cord paralysis per notes with chronic hoarseness and indwelling PEG tube.  Medicine team to clarify     --History left BKA.     --Diabetes.  You need to tightly control blood sugar to give best chance for healing     --History of DVT with IVC filter    --history of remote injection drug abuse; he denies injection drug abuse for 17 years by his report     PLAN:      -- Daptomycin  IV  potentially 6 weeks but final duration to depend on clinical course of study results and response to therapy     -- Check/review labs cultures and scans     -- Partial history per nursing staff     -- Discussed with microbiology and family     --d/w Dr Peguero      -- Highly complex set of issues with high risk for further serious morbidity and other serious sequela     --JERALD noted    --  continue to consider option     **Copied text in this note has been reviewed and is accurate as of 12/11/22.     Zeke George MD  12/11/2022        Electronically signed by Zeke George MD at 12/11/22 0915     John Peguero MD at 12/11/22 0558          Cardiothoracic Surgery Progress Note      POD #: 9-right below-knee amputation     LOS: 11 days      Subjective: Awake and seems alert    Objective:  Vital Signs vital signs below noted T-max past 24 hours 98.2 °F  Temp:  [97.7 °F (36.5 °C)-98.2 °F (36.8 °C)] 97.8 °F (36.6 °C)  Heart Rate:  [60-75] 67  Resp:  [16-18] 16  BP: (107-119)/(74-80) 119/78    Physical Exam:   General Appearance: Oriented   Lungs:   Heart:   Skin:   Incision: Amputation dressing change.  Sutures intact skin flaps are viable and skin margins are viable.     Results:  Results from last 7 days   Lab Units 12/07/22  0436   WBC 10*3/mm3 6.24   HEMOGLOBIN g/dL 9.2*   HEMATOCRIT % 30.1*   PLATELETS 10*3/mm3 388     Results from last 7 days   Lab Units 12/07/22  0436   SODIUM mmol/L 136   POTASSIUM mmol/L 4.3   CHLORIDE mmol/L 103   CO2 mmol/L 24.0   BUN mg/dL 25*   CREATININE mg/dL 0.89   GLUCOSE mg/dL 181*   CALCIUM mg/dL 9.3         Assessment: #1.  Postop day 9 right below-knee amputation is healing well at this time  2.  Status post remote left below-knee amputation for diabetic neuropathy/ulceration/gangrene  3.  Diabetes mellitus with neuropathy      Plan: Continue daily dressing change amputation site.  Medical manage per hospitalist.  Antibiotics per infectious disease.      John  GENE Peguero MD - 12/11/22 - 05:58 EST        Electronically signed by John Peguero MD at 12/11/22 0559       Consult Notes (last 72 hours)  Notes from 12/10/22 1421 through 12/13/22 1421   No notes of this type exist for this encounter.

## 2022-12-13 NOTE — PROGRESS NOTES
Cardiothoracic Surgery Progress Note      POD #: 11-right below-knee amputation     LOS: 13 days      Subjective: Awake and seems alert    Objective:  Vital Signs vital signs below noted T-max past 24 hours 90.2 °F  Temp:  [97.8 °F (36.6 °C)-98.2 °F (36.8 °C)] 97.9 °F (36.6 °C)  Heart Rate:  [60-77] 70  Resp:  [16-17] 16  BP: (119-145)/(81-90) 119/81    Physical Exam:   General Appearance: Oriented x3   Lungs:   Heart:   Skin:   Incision: Amputation site dressing change.  Suture intact skin margin viable and skin flaps are viable     Results:  Results from last 7 days   Lab Units 12/12/22  0353   WBC 10*3/mm3 7.83   HEMOGLOBIN g/dL 9.4*   HEMATOCRIT % 31.5*   PLATELETS 10*3/mm3 355     Results from last 7 days   Lab Units 12/12/22  0353   SODIUM mmol/L 139   POTASSIUM mmol/L 4.2   CHLORIDE mmol/L 103   CO2 mmol/L 25.0   BUN mg/dL 15   CREATININE mg/dL 0.56*   GLUCOSE mg/dL 189*   CALCIUM mg/dL 9.6         Assessment: #1 postop day 11 right below-knee amputation healing at this time  2 status post remote left below-knee amputation for diabetic neuropathy/gangrene  3 diabetes mellitus and neuropathy.      Plan: Continue daily dressing changes to the amputation site.  Medical manage per hospitalist.  Antibiotics per infectious disease.      John Peguero MD - 12/13/22 - 06:12 EST

## 2022-12-13 NOTE — PROGRESS NOTES
[facility] Medicine Services  PROGRESS NOTE    Patient Name: Buster Velasco  : 1965  MRN: [id number]    Date of Admission: 2022  Primary Care Physician: Ludivina Bowers MD    Subjective   Subjective     CC:  F/U s/p right BKA    HPI:  No new issues overnight  States pain meds working, currently rates pain 7/10    ROS:  Gen- No fevers, chills  CV- No chest pain, palpitations  Resp- No cough, dyspnea  GI- No N/V/D, abd pain        Objective   Objective     Vital Signs:   Temp:  [97.8 °F (36.6 °C)-98.2 °F (36.8 °C)] 98 °F (36.7 °C)  Heart Rate:  [65-77] 70  Resp:  [16-17] 17  BP: ()/(67-90) 118/79     Physical Exam:  Constitutional: No acute distress, awake, alert  HENT: NCAT, mucous membranes moist  Respiratory: Clear to auscultation bilaterally, respiratory effort normal   Cardiovascular: RRR, no murmurs, rubs, or gallops  Gastrointestinal: Positive bowel sounds, soft, nontender, nondistended  Musculoskeletal: No lower ext edema  Psychiatric: Flat affect, cooperative  Neurologic: Oriented x 3, [place], speech clear  Skin: No rashes noted.  Left stump intact. Right stump c/d/i      Results Reviewed:  LAB RESULTS:      Lab 22  0353 22  0436   WBC 7.83 6.24   HEMOGLOBIN 9.4* 9.2*   HEMATOCRIT 31.5* 30.1*   PLATELETS 355 388   MCV 86.3 85.0         Lab 22  0353 22  0436   SODIUM 139 136   POTASSIUM 4.2 4.3   CHLORIDE 103 103   CO2 25.0 24.0   ANION GAP 11.0 9.0   BUN 15 25*   CREATININE 0.56* 0.89   EGFR 114.3 99.3   GLUCOSE 189* 181*   CALCIUM 9.6 9.3         Brief Urine Lab Results  (Last result in the past 365 days)      Color   Clarity   Blood   Leuk Est   Nitrite   Protein   CREAT   Urine HCG        22 0108 Yellow   Clear   Negative   Negative     Negative                 Microbiology Results Abnormal     Procedure Component Value - Date/Time    Blood Culture - Blood, Wrist, Left [[id number]]  (Normal) Collected: 22 1402    Lab  Status: Final result Specimen: Blood from Wrist, Left Updated: 12/11/22 1545     Blood Culture No growth at 5 days    Blood Culture - Blood, Arm, Left [[id number]]  (Normal) Collected: 12/06/22 1402    Lab Status: Final result Specimen: Blood from Arm, Left Updated: 12/11/22 1545     Blood Culture No growth at 5 days          No radiology results from the last 24 hrs    Results for orders placed during the hospital encounter of 11/30/22    Adult Transesophageal Echo (JERALD) W/ Cont if Necessary Per Protocol    Interpretation Summary  •  Left ventricular systolic function is normal. Estimated left ventricular EF = 55%  •  The aortic valve is structurally normal with no stenosis present. Trace aortic valve regurgitation is present. There is no evidence of an aortic valve mass is present.  •  There is mild, anterior mitral leaflet thickening present. No evidence of a mitral valve mass is present. Trace mitral valve regurgitation is present. No significant mitral valve stenosis is present  •  The tricuspid valve is normal in structure. There is no evidence of a mass on the tricuspid valve. Mild tricuspid valve regurgitation is present.  •  There is mild thickening of the pulmonic valve. There is trace pulmonic valve regurgitation present. There is no pulmonic valve stenosis present.      I have reviewed the medications:  Scheduled Meds:atorvastatin, 20 mg, Oral, Nightly  DAPTOmycin, 8 mg/kg, Intravenous, Q24H  insulin detemir, 10 Units, Subcutaneous, Nightly  insulin lispro, 0-7 Units, Subcutaneous, TID AC  methadone, 10 mg, Oral, Q6H  metoprolol tartrate, 25 mg, Oral, Q12H  senna-docusate sodium, 2 tablet, Oral, BID  sodium chloride, 10 mL, Intravenous, Q12H  sodium chloride, 10 mL, Intravenous, Q12H      Continuous Infusions:ropivacaine,       PRN Meds:.•  acetaminophen  •  senna-docusate sodium **AND** polyethylene glycol **AND** bisacodyl **AND** bisacodyl  •  dextrose  •  dextrose  •  glucagon (human recombinant)  •   hydrOXYzine  •  melatonin  •  ondansetron **OR** ondansetron  •  sennosides-docusate  •  sodium chloride  •  sodium chloride  •  sodium chloride    Assessment & Plan   Assessment & Plan     Active Hospital Problems    Diagnosis  POA   • **Right foot infection [L08.9]  Yes   • MRSA bacteremia [R78.81, B95.62]  Unknown   • Sepsis (HCC) [A41.9]  Yes   • Hypoglycemia [E16.2]  Yes   • Anemia [D64.9]  Yes   • Hyponatremia [E87.1]  Yes   • Hypoalbuminemia [E88.09]  Yes   • Essential hypertension [I10]  Yes   • Hyperlipidemia [E78.5]  Yes   • Paroxysmal atrial fibrillation (HCC) [I48.0]  Yes      Resolved Hospital Problems   No resolved problems to display.        Brief Hospital Course to date:  Buster Velasco is a 57 y.o. male with hx of prior osteomyelitis s/p left BKA, s/p right transmetatarsal amputation, left hand 3rd metacarpal resection (April 2022 at OSH, had 6 weeks of IV antibiotics for MRSA bacteremia), remote hx of IVDA, more recent hx of polysubstance abuse (meth), and DVT s/p IVC filter who presents with right foot wounds. S/p right BKA on 12/2/22 with Dr. Peguero. His blood cultures grew MRSA. ID following and recommend at least 6 weeks of IV ATBx.     This patient's problems and plans were partially entered by my partner and updated as appropriate by me 12/13/22.    Sepsis secondary to acute right foot non-healing wound and likely osteomyelitis, resolved  MRSA bacteremia  - s/p right BKA 12/2/22 with Dr. Peguero  - ID following, currently on Daptomycin monotherapy  - Blood cultures with coag negative Staph and MRSA, have discussed with Dr. George, he will require IV antibiotics at discharge for at least 6 weeks but final duration TBD   - Repeat blood cultures NGTD x5d  - TTE no acute findings, JERALD 12/9/22 negative  - Probably not a good candidate for PICC line/home IV antibiotics given hx of substance abuse, current plan is discharge to rehab     Recurrent hypoglycemia in setting of DMII, resolved  -  HbA1c 6.9%  - High dose Tresiba qAM before arrival which has been held  - also on metformin at home  - continue levemir 10u qhs.+low dose SSI, add mealtime humalog  Component      Latest Ref Rng & Units 12/12/2022 12/12/2022 12/13/2022 12/13/2022           4:10 PM  8:19 PM  7:23 AM 11:31 AM   Glucose      70 - 130 mg/dL 199 (H) 196 (H) 154 (H) 199 (H)        Chronic pain on opioids   Previous IVDA/substance abuse  - Continue home methadone 10 mg QID  - Morphine for breakthrough post-op pain - discontinued 12/8/22 as he is not using it  - Patient denied hx of IVDA to me but in Care Everywhere admission notes from April 2022 at [place], they documented prior IVDA/substance abuse, most recently with meth in [facility] from 4/2/22; he has told ID that his last IVDA was 17 years ago     Reported hx of Afib   - Patient denies any hx of Afib. Not previously on anticoagulation and given murky history will not start.   - On Metoprolol at home which is continued  - No evidence of Afib on telemetry, discontinued telemetry monitoring 12/10/22      Chronic vocal cord paralysis s/p PEG  Dysphagia  - SLP evaluation: regular diet but with nectar thick liquids recommended, patient initially wanted thin liquids and understood risk of aspiration, however currently has been drinking the nectar thick liquids; could change to pure comfort diet if he absolutely refuses nectar thick  - SLP should continue to follow for possible advancement of diet recommendations  - Unclear where/when PEG was placed, will eventually need it removed but defer to outpatient setting as it was not placed here; routine PEG care/flushing per nursing as needed     Hypotension with hx of HTN--resolved  - BP stable  - restart losartan/bumex as needed    HLD   - Continue statin     DVT s/p IVC filter   - Placed in spring 2022 per patient. Defer to PCP for further assessment and time of removal.     Hx of seizure in setting of meth use   - Not on AED's given meth  trigger     BPH   - restart home Flomax       Expected Discharge Location and Transportation: rehab  Expected Discharge Date: 12/14/22 anytime rehab placement is found.  Pt refuses to go to [facility] due to prior bad experience      DVT prophylaxis:  Mechanical DVT prophylaxis orders are present.     AM-PAC 6 Clicks Score (PT): 10 (12/13/22 0822)    CODE STATUS:   Code Status and Medical Interventions:   Ordered at: 12/01/22 0035     Code Status (Patient has no pulse and is not breathing):    CPR (Attempt to Resuscitate)     Medical Interventions (Patient has pulse or is breathing):    Full Support       Mona Ferreira, PITER  12/13/22

## 2022-12-14 LAB
GLUCOSE BLDC GLUCOMTR-MCNC: 140 MG/DL (ref 70–130)
GLUCOSE BLDC GLUCOMTR-MCNC: 160 MG/DL (ref 70–130)
GLUCOSE BLDC GLUCOMTR-MCNC: 170 MG/DL (ref 70–130)
GLUCOSE BLDC GLUCOMTR-MCNC: 205 MG/DL (ref 70–130)

## 2022-12-14 PROCEDURE — 97110 THERAPEUTIC EXERCISES: CPT

## 2022-12-14 PROCEDURE — 99024 POSTOP FOLLOW-UP VISIT: CPT | Performed by: THORACIC SURGERY (CARDIOTHORACIC VASCULAR SURGERY)

## 2022-12-14 PROCEDURE — 63710000001 INSULIN DETEMIR PER 5 UNITS: Performed by: PEDIATRICS

## 2022-12-14 PROCEDURE — 25010000002 DAPTOMYCIN PER 1 MG: Performed by: INTERNAL MEDICINE

## 2022-12-14 PROCEDURE — 82962 GLUCOSE BLOOD TEST: CPT

## 2022-12-14 PROCEDURE — 97535 SELF CARE MNGMENT TRAINING: CPT

## 2022-12-14 PROCEDURE — 99232 SBSQ HOSP IP/OBS MODERATE 35: CPT | Performed by: NURSE PRACTITIONER

## 2022-12-14 PROCEDURE — 63710000001 INSULIN LISPRO (HUMAN) PER 5 UNITS: Performed by: HOSPITALIST

## 2022-12-14 PROCEDURE — 63710000001 INSULIN LISPRO (HUMAN) PER 5 UNITS: Performed by: NURSE PRACTITIONER

## 2022-12-14 RX ADMIN — TAMSULOSIN HYDROCHLORIDE 0.4 MG: 0.4 CAPSULE ORAL at 20:39

## 2022-12-14 RX ADMIN — INSULIN DETEMIR 10 UNITS: 100 INJECTION, SOLUTION SUBCUTANEOUS at 20:39

## 2022-12-14 RX ADMIN — Medication 10 ML: at 20:40

## 2022-12-14 RX ADMIN — METHADONE HYDROCHLORIDE 10 MG: 10 TABLET ORAL at 20:39

## 2022-12-14 RX ADMIN — ATORVASTATIN CALCIUM 20 MG: 20 TABLET, FILM COATED ORAL at 20:40

## 2022-12-14 RX ADMIN — METHADONE HYDROCHLORIDE 10 MG: 10 TABLET ORAL at 03:03

## 2022-12-14 RX ADMIN — Medication 10 ML: at 09:50

## 2022-12-14 RX ADMIN — DAPTOMYCIN 800 MG: 500 INJECTION, POWDER, LYOPHILIZED, FOR SOLUTION INTRAVENOUS at 09:50

## 2022-12-14 RX ADMIN — HYDROXYZINE HYDROCHLORIDE 50 MG: 25 TABLET ORAL at 20:39

## 2022-12-14 RX ADMIN — INSULIN LISPRO 2 UNITS: 100 INJECTION, SOLUTION INTRAVENOUS; SUBCUTANEOUS at 11:48

## 2022-12-14 RX ADMIN — METOPROLOL TARTRATE 25 MG: 25 TABLET, FILM COATED ORAL at 20:39

## 2022-12-14 RX ADMIN — METHADONE HYDROCHLORIDE 10 MG: 10 TABLET ORAL at 08:21

## 2022-12-14 RX ADMIN — ACETAMINOPHEN 650 MG: 325 TABLET ORAL at 00:19

## 2022-12-14 RX ADMIN — INSULIN LISPRO 2 UNITS: 100 INJECTION, SOLUTION INTRAVENOUS; SUBCUTANEOUS at 17:36

## 2022-12-14 RX ADMIN — ACETAMINOPHEN 650 MG: 325 TABLET ORAL at 20:39

## 2022-12-14 RX ADMIN — SENNOSIDES AND DOCUSATE SODIUM 2 TABLET: 50; 8.6 TABLET ORAL at 08:21

## 2022-12-14 RX ADMIN — Medication 10 ML: at 08:21

## 2022-12-14 RX ADMIN — METHADONE HYDROCHLORIDE 10 MG: 10 TABLET ORAL at 14:06

## 2022-12-14 RX ADMIN — INSULIN LISPRO 3 UNITS: 100 INJECTION, SOLUTION INTRAVENOUS; SUBCUTANEOUS at 11:48

## 2022-12-14 RX ADMIN — INSULIN LISPRO 2 UNITS: 100 INJECTION, SOLUTION INTRAVENOUS; SUBCUTANEOUS at 08:21

## 2022-12-14 NOTE — PROGRESS NOTES
[facility] Medicine Services  PROGRESS NOTE    Patient Name: Buster Velasco  : 1965  MRN: [id number]    Date of Admission: 2022  Primary Care Physician: Ludivina Bowers MD    Subjective   Subjective     CC:  F/U s/p right BKA    HPI:  Resting comfortably in bed this morning. Pain well controlled. Tolerating IV antibiotics without difficulty. Awaiting work on rehab referrals.     ROS:  Gen- No fevers, chills  CV- No chest pain, palpitations  Resp- No cough, dyspnea  GI- No N/V/D, abd pain            Objective   Objective     Vital Signs:   Temp:  [97.8 °F (36.6 °C)-98.2 °F (36.8 °C)] 98 °F (36.7 °C)  Heart Rate:  [65-93] 65  Resp:  [16-18] 18  BP: ()/(55-83) 84/55     Physical Exam:  Constitutional: No acute distress, awake, alert, upright in bed, no family at bedside.   HENT: NCAT, mucous membranes moist  Respiratory: Clear to auscultation bilaterally, respiratory effort normal on room air  Cardiovascular: RRR, no murmurs, rubs, or gallops  Gastrointestinal: Positive bowel sounds, soft, nontender, nondistended  Musculoskeletal: No lower ext edema  Psychiatric: Appropriate affect, cooperative  Neurologic: Oriented x 3, [place], speech clear  Skin: No rashes noted.  Left stump intact. Right stump c/d/i- no drainage noted       Results Reviewed:  LAB RESULTS:      Lab 22  0353   WBC 7.83   HEMOGLOBIN 9.4*   HEMATOCRIT 31.5*   PLATELETS 355   MCV 86.3         Lab 22  0353   SODIUM 139   POTASSIUM 4.2   CHLORIDE 103   CO2 25.0   ANION GAP 11.0   BUN 15   CREATININE 0.56*   EGFR 114.3   GLUCOSE 189*   CALCIUM 9.6         Brief Urine Lab Results  (Last result in the past 365 days)      Color   Clarity   Blood   Leuk Est   Nitrite   Protein   CREAT   Urine HCG        22 0108 Yellow   Clear   Negative   Negative     Negative                 Microbiology Results Abnormal     Procedure Component Value - Date/Time    Blood Culture - Blood, Wrist, Left  [[id number]]  (Normal) Collected: 12/06/22 1402    Lab Status: Final result Specimen: Blood from Wrist, Left Updated: 12/11/22 1545     Blood Culture No growth at 5 days    Blood Culture - Blood, Arm, Left [[id number]]  (Normal) Collected: 12/06/22 1402    Lab Status: Final result Specimen: Blood from Arm, Left Updated: 12/11/22 1545     Blood Culture No growth at 5 days          No radiology results from the last 24 hrs    Results for orders placed during the hospital encounter of 11/30/22    Adult Transesophageal Echo (JERALD) W/ Cont if Necessary Per Protocol    Interpretation Summary  •  Left ventricular systolic function is normal. Estimated left ventricular EF = 55%  •  The aortic valve is structurally normal with no stenosis present. Trace aortic valve regurgitation is present. There is no evidence of an aortic valve mass is present.  •  There is mild, anterior mitral leaflet thickening present. No evidence of a mitral valve mass is present. Trace mitral valve regurgitation is present. No significant mitral valve stenosis is present  •  The tricuspid valve is normal in structure. There is no evidence of a mass on the tricuspid valve. Mild tricuspid valve regurgitation is present.  •  There is mild thickening of the pulmonic valve. There is trace pulmonic valve regurgitation present. There is no pulmonic valve stenosis present.      I have reviewed the medications:  Scheduled Meds:atorvastatin, 20 mg, Oral, Nightly  DAPTOmycin, 8 mg/kg, Intravenous, Q24H  insulin detemir, 10 Units, Subcutaneous, Nightly  insulin lispro, 0-7 Units, Subcutaneous, TID AC  Insulin Lispro, 2 Units, Subcutaneous, TID With Meals  methadone, 10 mg, Oral, Q6H  metoprolol tartrate, 25 mg, Oral, Q12H  senna-docusate sodium, 2 tablet, Oral, BID  sodium chloride, 10 mL, Intravenous, Q12H  sodium chloride, 10 mL, Intravenous, Q12H  tamsulosin, 0.4 mg, Oral, Nightly      Continuous Infusions:ropivacaine,       PRN Meds:.•  acetaminophen  •   senna-docusate sodium **AND** polyethylene glycol **AND** bisacodyl **AND** bisacodyl  •  dextrose  •  dextrose  •  glucagon (human recombinant)  •  hydrOXYzine  •  melatonin  •  ondansetron **OR** ondansetron  •  sennosides-docusate  •  sodium chloride  •  sodium chloride  •  sodium chloride    Assessment & Plan   Assessment & Plan     Active Hospital Problems    Diagnosis  POA   • **Right foot infection [L08.9]  Yes   • MRSA bacteremia [R78.81, B95.62]  Unknown   • Sepsis (HCC) [A41.9]  Yes   • Hypoglycemia [E16.2]  Yes   • Anemia [D64.9]  Yes   • Hyponatremia [E87.1]  Yes   • Hypoalbuminemia [E88.09]  Yes   • Essential hypertension [I10]  Yes   • Hyperlipidemia [E78.5]  Yes   • Paroxysmal atrial fibrillation (HCC) [I48.0]  Yes      Resolved Hospital Problems   No resolved problems to display.        Brief Hospital Course to date:  Buster Velasco is a 57 y.o. male with hx of prior osteomyelitis s/p left BKA, s/p right transmetatarsal amputation, left hand 3rd metacarpal resection (April 2022 at OSH, had 6 weeks of IV antibiotics for MRSA bacteremia), remote hx of IVDA, more recent hx of polysubstance abuse (meth), and DVT s/p IVC filter who presents with right foot wounds. S/p right BKA on 12/2/22 with Dr. Peguero. His blood cultures grew MRSA. ID following and recommend at least 6 weeks of IV ATBx.     This patient's problems and plans were partially entered by my partner and updated as appropriate by me 12/14/22.    Sepsis secondary to acute right foot non-healing wound and likely osteomyelitis, resolved  MRSA bacteremia  - s/p right BKA 12/2/22 with Dr. Peguero  - ID following, currently on Daptomycin monotherapy  - Blood cultures with coag negative Staph and MRSA, have discussed with Dr. George, he will require IV antibiotics at discharge for at least 6 weeks but final duration TBD   - Repeat blood cultures NGTD x5d  - TTE no acute findings, JERALD 12/9/22 negative  - Probably not a good candidate for  PICC line/home IV antibiotics given hx of substance abuse, current plan is discharge to rehab. Awaiting rehab referrals.      Recurrent hypoglycemia in setting of DMII, resolved  - HbA1c 6.9%  - High dose Tresiba qAM before arrival which has been held  - also on metformin at home  - continue levemir 10u qhs.+low dose SSI, mealtime humalog       Chronic pain on opioids   Previous IVDA/substance abuse  - Continue home methadone 10 mg QID  - Morphine for breakthrough post-op pain - discontinued 12/8/22 as he is not using it  - Patient denied hx of IVDA to me but in Care Everywhere admission notes from April 2022 at [place], they documented prior IVDA/substance abuse, most recently with meth in [facility] from 4/2/22; he has told ID that his last IVDA was 17 years ago     Reported hx of Afib   - Patient denies any hx of Afib. Not previously on anticoagulation and given murky history will not start.   - On Metoprolol at home which is continued  - No evidence of Afib on telemetry, discontinued telemetry monitoring 12/10/22      Chronic vocal cord paralysis s/p PEG  Dysphagia  - SLP evaluation: regular diet but with nectar thick liquids recommended, patient initially wanted thin liquids and understood risk of aspiration, however currently has been drinking the nectar thick liquids; could change to pure comfort diet if he absolutely refuses nectar thick  - SLP should continue to follow for possible advancement of diet recommendations  - Unclear where/when PEG was placed, will eventually need it removed but defer to outpatient setting as it was not placed here; routine PEG care/flushing per nursing as needed     Hypotension with hx of HTN--resolved  - BP stable  - restart losartan/bumex as needed-hasn't required it thus far.     HLD   - Continue statin     DVT s/p IVC filter   - Placed in spring 2022 per patient. Defer to PCP for further assessment and time of removal.      Hx of seizure in setting of meth use   - Not on  AED's given meth trigger     BPH   - restart home Flomax       Expected Discharge Location and Transportation: rehab  Expected Discharge Date: 12/15/22 anytime rehab placement is found.  Pt refuses to go to [facility] due to prior bad experience      DVT prophylaxis:  Mechanical DVT prophylaxis orders are present.     AM-PAC 6 Clicks Score (PT): 10 (12/14/22 0800)    CODE STATUS:   Code Status and Medical Interventions:   Ordered at: 12/01/22 0035     Code Status (Patient has no pulse and is not breathing):    CPR (Attempt to Resuscitate)     Medical Interventions (Patient has pulse or is breathing):    Full Support       Yesenia Rivera, PITER  12/14/22

## 2022-12-14 NOTE — PLAN OF CARE
Goal Outcome Evaluation:  Plan of Care Reviewed With: patient        Progress: improving       VS WDL, RA, dressing CDI, slept well between nursing rounds, PICC CDI, no complaints of pain overnight

## 2022-12-14 NOTE — PLAN OF CARE
Goal Outcome Evaluation:  Plan of Care Reviewed With: patient        Progress: improving  Outcome Evaluation: Alert, oriented x 4, and pleasant. VSS. No changes noted. Waiting palcement.

## 2022-12-14 NOTE — THERAPY TREATMENT NOTE
Patient Name: Buster Velasco  : 1965    MRN: [id number]                              Today's Date: 2022       Admit Date: 2022    Visit Dx:     ICD-10-CM ICD-9-CM   1. Right foot infection  L08.9 686.9   2. Hypoglycemia  E16.2 251.2   3. Decubitus ulcer of coccygeal region, unspecified ulcer stage  L89.159 707.03     707.20   4. Pharyngeal dysphagia  R13.13 787.23     Patient Active Problem List   Diagnosis   • Right foot infection   • Hypoglycemia   • Anemia   • Hyponatremia   • Hypoalbuminemia   • Essential hypertension   • Hyperlipidemia   • Paroxysmal atrial fibrillation (HCC)   • Sepsis (HCC)   • MRSA bacteremia     Past Medical History:   Diagnosis Date   • Diabetes (HCC)    • DVT (deep venous thrombosis) (HCC)    • Hypertension    • Mood disorder (HCC)      Past Surgical History:   Procedure Laterality Date   • BELOW KNEE AMPUTATION Left    • BELOW KNEE AMPUTATION Right 2022    Procedure: AMPUTATION BELOW KNEE RIGHT;  Surgeon: John Peguero MD;  Location: [facility];  Service: Vascular;  Laterality: Right;   • TRANS METATARSAL AMPUTATION Right       General Information     Row Name 22 1428          OT Time and Intention    Document Type therapy note (daily note)  -TB     Mode of Treatment occupational therapy;individual therapy  -TB     Row Name 22 1428          General Information    Patient Profile Reviewed yes  -TB     Existing Precautions/Restrictions fall;other (see comments)  B BKA, B shoulder replacements with limited AROM, L hand middle finger removed limiting functional grasp  -TB     Barriers to Rehab medically complex;previous functional deficit;physical barrier  -TB     Row Name 22 1428          Occupational Profile    Reason for Services/Referral (Occupational Profile) Occupational decline  -TB     Row Name 22 1428          Cognition    Orientation Status (Cognition) oriented x 4  -TB     Row Name 22 1428          Safety Issues,  Functional Mobility    Safety Issues Affecting Function (Mobility) --  Pt has good insight into current funcational deficits and need for assist with mobility and self-care  -TB     Impairments Affecting Function (Mobility) balance;endurance/activity tolerance;pain;strength;postural/trunk control;range of motion (ROM);grasp  -TB           User Key  (r) = Recorded By, (t) = Taken By, (c) = Cosigned By    Initials Name Provider Type    TB Joseline Lemon OT Occupational Therapist                 Mobility/ADL's     Row Name 12/14/22 1433          Bed Mobility    Bed Mobility rolling left;rolling right  -TB     Rolling Left [place] (Bed Mobility) minimum assist (75% patient effort)  -TB     Rolling Right [place] (Bed Mobility) minimum assist (75% patient effort)  -TB     Comment, (Bed Mobility) Repositioned for comfort and activity  -TB     Row Name 12/14/22 1433          Activities of Daily Living    BADL Assessment/Intervention upper body dressing  -TB     Row Name 12/14/22 1433          Mobility    Extremity Weight-bearing Status right lower extremity  -TB     Left Lower Extremity (Weight-bearing Status) --  BKA 2006  -TB     Right Lower Extremity (Weight-bearing Status) non weight-bearing (NWB)  BKA POD #12  -TB     Row Name 12/14/22 1433          Upper Body Dressing Assessment/Training    [place] Level (Upper Body Dressing) doff;don;pajama/robe;set up  -TB     Position (Upper Body Dressing) sitting up in bed  -TB     Comment, (Upper Body Dressing) Assist to doff soiled gown and don clean  -TB           User Key  (r) = Recorded By, (t) = Taken By, (c) = Cosigned By    Initials Name Provider Type    TB Joseline Lemon OT Occupational Therapist               Obj/Interventions     Row Name 12/14/22 1436          Shoulder (Therapeutic Exercise)    Shoulder (Therapeutic Exercise) AROM (active range of motion)  -TB     Shoulder AROM (Therapeutic Exercise)  bilateral;flexion;extension;aBduction;aDduction;scapular retraction;10 repetitions;3 sets  -TB     Row Name 12/14/22 1436          Motor Skills    Therapeutic Exercise shoulder;elbow/forearm  Education reinforced for benefits of therapy, role of OT, and HEP to support function. Pt completed UB ther ex as noted below and 3x10 reps glute sets  -TB           User Key  (r) = Recorded By, (t) = Taken By, (c) = Cosigned By    Initials Name Provider Type    TB Joseline Lemon OT Occupational Therapist               Goals/Plan    No documentation.                Clinical Impression     Row Name 12/14/22 1437          Pain Assessment    Pain Intervention(s) Ambulation/increased activity;Repositioned  -TB     Additional Documentation Pain Scale: FACES Pre/Post-Treatment (Group)  -TB     Row Name 12/14/22 1437          Pain Scale: FACES Pre/Post-Treatment    Pain: FACES Scale, Pretreatment 2-->hurts little bit  -TB     Posttreatment Pain Rating 2-->hurts little bit  -TB     Pain Location - Side/Orientation Right  -TB     Pain Location - residual limb  -TB     Row Name 12/14/22 1437          Plan of Care Review    Plan of Care Reviewed With patient  -TB     Outcome Evaluation Pt participates well in therapy. Min A to reposition in bed for comfort and activity. BUE ther ex completed to support self-care. Glute sets compelted to support transfer training. Set-up simple UB ADL task. OT will continue to follow IP. Recommend IRF at d/c for best outcome.  -TB     Row Name 12/14/22 1437          Therapy Plan Review/Discharge Plan (OT)    Anticipated Discharge Disposition (OT) inpatient rehabilitation facility  -     Row Name 12/14/22 1437          Vital Signs    Pre Systolic BP Rehab --  RN cleared OT  -TB     O2 Delivery Pre Treatment room air  -TB     Pre Patient Position Supine  -TB     Post Patient Position Sitting  -TB     Row Name 12/14/22 1437          Positioning and Restraints    Pre-Treatment Position in bed  -TB      Post Treatment Position bed  -TB     In Bed notified nsg;fowlers;call light within reach;encouraged to call for assist;exit alarm on;RLE elevated  -TB           User Key  (r) = Recorded By, (t) = Taken By, (c) = Cosigned By    Initials Name Provider Type    Joseline Abdi OT Occupational Therapist               Outcome Measures     Row Name 12/14/22 1441          How much help from another is currently needed...    Putting on and taking off regular lower body clothing? 1  -TB     Bathing (including washing, rinsing, and drying) 2  -TB     Toileting (which includes using toilet bed pan or urinal) 2  -TB     Putting on and taking off regular upper body clothing 2  -TB     Taking care of personal grooming (such as brushing teeth) 3  -TB     Eating meals 4  -TB     AM-PAC 6 Clicks Score (OT) 14  -TB     Row Name 12/14/22 0800          How much help from another person do you currently need...    Turning from your back to your side while in flat bed without using bedrails? 3  -AC     Moving from lying on back to sitting on the side of a flat bed without bedrails? 3  -AC     Moving to and from a bed to a chair (including a wheelchair)? 1  -AC     Standing up from a chair using your arms (e.g., wheelchair, bedside chair)? 1  -AC     Climbing 3-5 steps with a railing? 1  -AC     To walk in hospital room? 1  -AC     AM-PAC 6 Clicks Score (PT) 10  -AC     Highest level of mobility 4 --> Transferred to chair/commode  -AC     Row Name 12/14/22 1441          Functional Assessment    Outcome Measure Options AM-PAC 6 Clicks Daily Activity (OT)  -TB           User Key  (r) = Recorded By, (t) = Taken By, (c) = Cosigned By    Initials Name Provider Type    Joseline Abdi OT Occupational Therapist    Deb Jean-Baptiste RN Registered Nurse                Occupational Therapy Education     Title: PT OT SLP Therapies (Done)     Topic: Occupational Therapy (Done)     Point: ADL training (Done)     Description:    Instruct learner(s) on proper safety adaptation and remediation techniques during self care or transfers.   Instruct in proper use of assistive devices.              Learning Progress Summary           Patient Acceptance, E,D, VU,DU,NR by TB at 12/14/2022 1441    Acceptance, E, VU by MR at 12/11/2022 1448    Eager, E, VU,DU by SC at 12/7/2022 1115    Comment: REVIEWED HEP    Acceptance, E, VU by MR at 12/7/2022 1110                   Point: Home exercise program (Done)     Description:   Instruct learner(s) on appropriate technique for monitoring, assisting and/or progressing therapeutic exercises/activities.              Learning Progress Summary           Patient Acceptance, E,D, VU,DU,NR by TB at 12/14/2022 1441    Acceptance, E, VU by MR at 12/11/2022 1448    Eager, E, VU,DU by SC at 12/7/2022 1115    Comment: REVIEWED HEP    Acceptance, E, VU by MR at 12/7/2022 1110                   Point: Precautions (Done)     Description:   Instruct learner(s) on prescribed precautions during self-care and functional transfers.              Learning Progress Summary           Patient Acceptance, E,D, VU,DU,NR by TB at 12/14/2022 1441    Acceptance, E, VU by MR at 12/11/2022 1448    Eager, E, VU,DU by SC at 12/7/2022 1115    Comment: REVIEWED HEP    Acceptance, E, VU by MR at 12/7/2022 1110                   Point: Body mechanics (Done)     Description:   Instruct learner(s) on proper positioning and spine alignment during self-care, functional mobility activities and/or exercises.              Learning Progress Summary           Patient Acceptance, E, VU by MR at 12/11/2022 1448    Eager, E, VU,DU by SC at 12/7/2022 1115    Comment: REVIEWED HEP    Acceptance, E, VU by MR at 12/7/2022 1110                               User Key     Initials Effective Dates Name Provider Type Discipline    SC 06/16/21 -  Shamika Patel, PT Physical Therapist PT     06/16/21 -  Joseline Lemon, OT Occupational Therapist OT    MR  09/22/22 -  Marilou Lennon OT Occupational Therapist OT              OT Recommendation and Plan     Plan of Care Review  Plan of Care Reviewed With: patient  Outcome Evaluation: Pt participates well in therapy. Min A to reposition in bed for comfort and activity. BUE ther ex completed to support self-care. Glute sets compelted to support transfer training. Set-up simple UB ADL task. OT will continue to follow IP. Recommend IRF at d/c for best outcome.     Time Calculation:    Time Calculation- OT     Row Name 12/14/22 1346             Time Calculation- OT    OT Start Time 1346  -TB      OT Received On 12/14/22  -TB      OT Goal Re-Cert Due Date 12/17/22  -TB         Timed Charges    90012 - OT Therapeutic Exercise Minutes 14  -TB      25055 - OT Self Care/Mgmt Minutes 10  -TB         Total Minutes    Timed Charges Total Minutes 24  -TB       Total Minutes 24  -TB            User Key  (r) = Recorded By, (t) = Taken By, (c) = Cosigned By    Initials Name Provider Type    TB Joseline Lemon OT Occupational Therapist              Therapy Charges for Today     Code Description Service Date Service Provider Modifiers Qty    [id number]  OT THER PROC EA 15 MIN 12/14/2022 Joseline Lemon OT GO 1    [id number] HC OT SELF CARE/MGMT/TRAIN EA 15 MIN 12/14/2022 Joseline Lemon OT GO 1               Joseline Lemon OT  12/14/2022

## 2022-12-14 NOTE — DISCHARGE PLACEMENT REQUEST
"Isidro Velasco (57 y.o. Male)     Date of Birth   1965    Social Security Number       Address   [address] [facility] [address] [address]    Home Phone   [phone]    MRN   [id number]       Confucianism   [employer]    Marital Status                               Admission Date   22    Admission Type   Emergency    Admitting Provider   Ruth Ratliff MD    Attending Provider   Ruth Ratliff MD    Department, Room/Bed   [facility] 3G, S366/1       Discharge Date       Discharge Disposition       Discharge Destination                               Attending Provider: Ruth Ratliff MD    Allergies: Gabapentin, Lyrica [Pregabalin]    Isolation: None   Infection: MRSA (22)   Code Status: CPR    Ht: 193 cm (75.98\")   Wt: 100 kg (220 lb 7.4 oz)    Admission Cmt: None   Principal Problem: Right foot infection [L08.9]                 Active Insurance as of 2022     Primary Coverage     Payor Plan Insurance Group Employer/Plan Group    [place]      Payor Plan Address Payor Plan Phone Number Payor Plan Fax Number Effective Dates    [address] [phone]  2022 - 2022    [facility] [id number]       Subscriber Name Subscriber Birth Date Member ID       ISIDRO VELASCO 1965 [id number]                 Emergency Contacts      (Rel.) Home Phone Work Phone Mobile Phone    NELSY VELASCO (Spouse) [phone] -- [phone]               History & Physical      Tracy Oliveira DO at 22 [facility]              [facility] Services  HISTORY AND PHYSICAL    Patient Name: Isidro Velasco  : 1965  MRN: [id number]  Primary Care Physician: Ludivina Bowers MD  Date of admission: 2022    Subjective    Subjective     Chief Complaint:  Ulcer on foot    HPI:  Isidro Velasco is a 57 y.o. 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 He states that these have been present for a few months, but have gotten worse. He does admit to some fatigue and chills, but denies fever, chest pain, SOB, nausea or vomiting. He denies any recent antibiotic use.           Review of Systems   Constitutional: Positive for chills and fatigue. Negative for fever and unexpected weight change.   HENT: Positive for voice change (chronic hoarseness). Negative for sinus pressure, sore throat and trouble swallowing.    Eyes: Negative for photophobia and visual disturbance.   Respiratory: Negative for cough, shortness of breath and wheezing.    Cardiovascular: Negative for chest pain and palpitations.   Gastrointestinal: Negative for abdominal pain, diarrhea, nausea and vomiting.   Genitourinary: Negative for dysuria and hematuria.   Musculoskeletal: Positive for arthralgias and myalgias.   Skin: Positive for color change and wound.   Neurological: Negative for tremors, syncope, speech difficulty and weakness.   Psychiatric/Behavioral: Negative for confusion. The patient is not nervous/anxious.       All other systems reviewed and are negative.     Personal History     Past Medical History:   Diagnosis Date   • Diabetes (HCC)    • DVT (deep venous thrombosis) (HCC)    • Hypertension    • Mood disorder (HCC)              Past Surgical History:   Procedure Laterality Date   • BELOW KNEE AMPUTATION Left    • TRANS METATARSAL AMPUTATION Right        Family History:  family history includes Diabetes in his maternal uncle; Diabetes (age of onset: 65) in his mother; Heart attack in his maternal grandfather; Stroke (age of onset: 74) in his mother. Otherwise  pertinent FHx was reviewed and unremarkable.     Social History:  reports that he has never smoked. He has never used smokeless tobacco. He reports that he does not drink alcohol.  Social History     Social History Narrative   • Not on file       Medications:  Insulin Degludec, bumetanide, dapagliflozin Propanediol, ferrous sulfate, fluticasone, hydrOXYzine, losartan, metFORMIN, methadone, metoprolol tartrate, simvastatin, and tamsulosin    Allergies   Allergen Reactions   • Gabapentin Rash   • Lyrica [Pregabalin] Rash       Objective    Objective     Vital Signs:   Temp:  [98 °F (36.7 °C)] 98 °F (36.7 °C)  Heart Rate:  [] 106  Resp:  [16-20] 16  BP: (102-108)/(58-65) 102/58    Physical Exam  Constitutional:       General: He is not in acute distress.     Appearance: Normal appearance.   HENT:      Head: Atraumatic.      Right Ear: External ear normal.      Left Ear: External ear normal.      Nose: Nose normal.      Mouth/Throat:      Comments: Chronic hoarseness when speaking.   Eyes:      Extraocular Movements: Extraocular movements intact.      Conjunctiva/sclera: Conjunctivae normal.      Pupils: Pupils are equal, round, and reactive to light.   Cardiovascular:      Rate and Rhythm: Regular rhythm. Tachycardia present.      Heart sounds: Normal heart sounds. No murmur heard.  Pulmonary:      Effort: Pulmonary effort is normal. No respiratory distress.      Breath sounds: Normal breath sounds. No wheezing, rhonchi or rales.   Abdominal:      General: Bowel sounds are normal. There is no distension.      Tenderness: There is no abdominal tenderness. There is no guarding or rebound.      Comments: PEG tube in place. No evidence of erythema.    Musculoskeletal:      Cervical back: No rigidity.      Comments: Hx left BKA. Hx right transmetatarsal amputation.   Skin:     General: Skin is warm and dry.      Coloration: Skin is not jaundiced.      Findings: Erythema and lesion present. No rash.      Comments: 2  Large ulcerations on right lateral foot. Area of surrounding erythema.    Neurological:      General: No focal deficit present.      Mental Status: He is alert and oriented to person, place, and time.   Psychiatric:         Attention and Perception: Attention normal.         Mood and Affect: Mood normal.         Behavior: Behavior normal.         Thought Content: Thought content normal.          Result Review:  I have personally reviewed the results from the time of this admission to 12/1/2022 01:39 EST and agree with these findings:  [x]  Laboratory list / accordion  [x]  Microbiology  []  Radiology  [x]  EKG/Telemetry   []  Cardiology/Vascular   []  Pathology  []  Old records  []  Other:    LAB RESULTS:      Lab 12/01/22  0053 11/30/22 1905   WBC  --  13.92*   HEMOGLOBIN  --  11.1*   HEMATOCRIT  --  35.5*   PLATELETS  --  532*   NEUTROS ABS  --  11.44*   IMMATURE GRANS (ABS)  --  0.14*   LYMPHS ABS  --  1.64   MONOS ABS  --  0.64   EOS ABS  --  0.03   MCV  --  84.5   PROCALCITONIN  --  0.17   LACTATE 2.0 2.1*         Lab 11/30/22 1905   SODIUM 134*   POTASSIUM 4.1   CHLORIDE 100   CO2 21.0*   ANION GAP 13.0   BUN 18   CREATININE 1.29*   EGFR 64.3   GLUCOSE 54*   CALCIUM 9.1         Lab 11/30/22 1905   TOTAL PROTEIN 7.6   ALBUMIN 3.00*   GLOBULIN 4.6   ALT (SGPT) 12   AST (SGOT) 19   BILIRUBIN 0.2   ALK PHOS 85   LIPASE 14                 Lab 11/30/22 1905   IRON 18*   IRON SATURATION 6*   TIBC 277*   TRANSFERRIN 186*   FERRITIN 317.40         Brief Urine Lab Results  (Last result in the past 365 days)      Color   Clarity   Blood   Leuk Est   Nitrite   Protein   CREAT   Urine HCG        08/20/22 0108 Yellow   Clear   Negative   Negative     Negative               Microbiology Results (last 10 days)     ** No results found for the last 240 hours. **          XR Chest 1 View    Result Date: 12/1/2022  EXAMINATION: Chest one view. DATE: 11/30/2022. COMPARISON: None available. CLINICAL HISTORY: Infection.  FINDINGS: The lungs and pleural spaces are clear. The cardiomediastinal silhouette and the pulmonary vessels are within normal limits.     Impression: No acute cardiopulmonary process. Electronically signed by:  Pierre Baron D.O.  11/30/2022 10:15 PM Mountain Time          Assessment & Plan   Assessment & Plan       Right foot infection    Hypoglycemia    Sepsis (HCC)    Essential hypertension    Hyperlipidemia    Paroxysmal atrial fibrillation (HCC)    Anemia    Hyponatremia    Hypoalbuminemia      Buster Velasco is a 57 y.o. 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   Sepsis: infectious source, tachycardia, borderline BP, leukocytosis, elevated lactate.  - Check CRP, sed rate  -Blood cultures pending  -Given hx of MRSA osteomyelitis as well as multiple amputations, will need to start IV Vancomycin tonight.   -MRI right foot tonight to investigate for possible osteomyelitis.  -Wound care for the am  -Infectious Disease consult for the am  -Dr. Peguero to see in the am. May ultimately need right BKA.  -IV fluids    Decubitus ulcer  -Wound care to see.   -Bedrest. Turn q2    Chronic Vocal Cord Paralysis  -NPO tonight.   -Has PEG tube in place, but reportedly is requesting to be taken out?  -SLP to see in the am.     Hypoglycemia  T2DM  -Blood glucose 45 on arrival, persistent with rechecks.   -Point-of-care glucose check every hour  - Start D5W at 75 cc/hour  - Patient on Tresiba 75 units every morning, hold  - We will start SSI when needed  -Check A1C  -Repeat bmp in the am    HTN  HLD  -Hold  home BP meds for now d/t borderline hypotension  -Continue statin    Parox Atrial Fib  -Noted during admission to  back in July.   -EKG pending  -Not currently on anticoagulation    Anemia  -Hgb 11.1, Hct 35.5  -Appears chronic. Actually has improved since prior results.  -Anemia studies   -Repeat cbc in the am    Hyponatremia  -Na+ 134. Likely 2ry to poor PO intake.   -Check Serum Osm, Urine Osm, and Urine Na+  -IV fluids tonight  -Repeat bmp in the am    Hypoalbuminemia  -Alb 3.0  -Nutrition consult for the am    History of DVT  - S/p IVC filter      DVT prophylaxis:  Hep subQ    CODE STATUS:  Full Code  Code Status (Patient has no pulse and is not breathing): CPR (Attempt to Resuscitate)  Medical Interventions (Patient has pulse or is breathing): Full Support      This note has been completed as part of a split-shared workflow.     Signature: Electronically signed by Cecily Molina PA-C, 11/30/22, 11:33 PM EST      Attending   Admission Attestation       I have performed an independent face-to-face diagnostic evaluation including performing an independent physical examination as documented here.  The documented plan of care above was reviewed and developed with the advanced practice clinician (APC).      Brief Summary Statement:   Buster Velasco is a 57 y.o. male with a PMH significant for insulin-dependent diabetes mellitus type 2, DVT s/p IVC filter, atrial fibrillation not on anticoagulation, HTN, HLD, history of osteomyelitis s/p left BKA, s/p right transmetatarsal amputation, MRSA osteomyelitis s/p third metacarpal resection who comes to the ED due to worsening foot infection.  Patient reports a 3-month history of an open wound on his right foot.  Over the past several weeks the wound has become more erythematous, painful with drainage.  He reports fatigue, chills.  He denies fever, vomiting.  He reports diarrhea which is currently resolved.  He is not following with wound care  outpatient and has not been on any recent antibiotics.    Remainder of detailed HPI is as noted by APC and has been reviewed and/or edited by me for completeness.    Attending Physical Exam:  Temp:  [98 °F (36.7 °C)-98.6 °F (37 °C)] 98.6 °F (37 °C)  Heart Rate:  [] 89  Resp:  [16-20] 16  BP: (102-112)/(58-76) 112/74    Constitutional: Awake, alert  Eyes: PERRLA, sclerae anicteric, no conjunctival injection  HENT: NCAT, mucous membranes moist  Neck: Supple, no thyromegaly, no lymphadenopathy, trachea midline  Respiratory: Clear to auscultation bilaterally, nonlabored respirations   Cardiovascular: RRR, no murmurs, rubs, or gallops, palpable radial pulses bilaterally  Gastrointestinal: Positive bowel sounds, soft, nontender, nondistended  Musculoskeletal: Left BKA, metatarsal resection to right foot, foot bandaged  Psychiatric: Appropriate affect, cooperative  Neurologic: Oriented x 3, strength symmetric in all extremities, Cranial Nerves grossly intact to confrontation, speech clear  Skin: Bandage dry and intact to right foot      Brief Assessment/Plan :  See detailed assessment and plan developed with APC which I have reviewed and/or edited for completeness.    Tracy Oliveira DO  12/01/22                        Electronically signed by Tracy Oliveira DO at 12/01/22 0347          Operative/Procedure Notes (all)      John Peguero MD at 12/02/22 1040  Version 1 of 1       OP NOTE    Patient Name:  Buster Velasco    Date of Surgery: December 2, 2022      Pre-op Diagnosis: Osteomyelitis of the right foot status post prior transmetatarsal amputation of all toes right foot approximately 6 months ago secondary to diabetic neuropathy/peripheral vascular disease    Post-op Diagnosis: Same    Surgeon(s):  John Peguero MD      Assistant(s): Assistant: Erlin Arzola PA  Assistant: Erlin Arzola PA  was responsible for performing the following activities: Retraction, Suction, Irrigation, Suturing,  Closing and Placing Dressing and their skilled assistance was necessary for the success of this case.    Anesthesia: Right adductor canal single shot nerve block under ultrasound guidance per anesthesia followed by right popliteal nerve block catheter placement under ultrasound guidance by anesthesia followed by intravenous induction of general endotracheal anesthesia    Indications: Patient's longstanding diabetic with severe diabetic neuropathy and severe peripheral disease he is status post remote left below-knee amputation for diabetic neuropathy and peripheral vascular disease.  He not-too-distant past had a right transmetatarsal amputation of all the toes of the right foot performed at a hospital in [facility].  He is now developed osteomyelitis of the residual portion of the right fifth metatarsal bone along with ulceration and osteomyelitis of the lateral malleolus/fibular head.  He is now for right below-knee amputation.  He understood the need for procedure and the risk involved include: Bleeding, stroke, heart attack, possible  death, possible postop phantom pain possible need for amputation a higher level in the future due to infection and/or ischemia at the amputation site.  He understood and agreed for me proceed ahead    Procedure(s): Mid level right below-knee amputation and primary closure due to    Patient underwent a single shot right adductor canal nerve block by anesthesia in the preop area under ultrasound guidance followed by right popliteal nerve block catheter placement under ultrasound guidance in the preoperative anesthesia was taken the operating suite and underwent intravenous induction of general endotracheal anesthesia difficulty.  Timeout was called to verify the procedure be done right below-knee amputation and the patient had placement of a pneumatic tourniquet in the upper thigh area and then had prepping of the right lower extremity clean the right foot and the right lower  extremity circumferentially with ChloraPrep up to the knee level and then sterilely draped.  The leg was held in a high extension to drain the venous blood from the right lower extremity.  This was done for 5 minutes and then the pneumatic tourniquet cuff was placed to 230 mmHg.  The anterior skin flap was made approxi-4 fingerbreadths below the tibial tuberosity and carried to the midline medial medially and laterally.  Anterior flap was developed down to the anterior tibial compartment with a tibial anterior tibial artery and leg were then defined ligated proximal and distally with 2-0 silk suture followed by a transfixion stitch of 2 oh proximally and divided.  Laterally in the calf the fibula was identified dissected free from the surrounding musculature and then periosteal elevator was used for the elevated    Approxi-2 cm above the anterior skin flap line where it was then transected with the double-action bone cutter.  Posterior skin flap was made through the midline medial laterally from the calf down toward the ankle for approxi-15 cm and then these 2 skin incisions were met posteriorly at the ankle level.  The tibia was then transected at the anterior skin flap line with the bone power saw.  The amputation knife knife was then placed jus under the fibula and carried down through the media and l lateral calf incisions of the posterior flap down to the ankle level and the specimen was moved from the  operative field.  The posterior tibial artery and the peroneal artery identified in the posterior flap at the anterior skin flap line and were clamped and ligated with 2-0 silk suture followed by transition stitch of 2 oh.  After these 2 arteries have been ligated the pneumatic cuff was deflated.  The tourniquet time was approximately 19 minutes.  Further hemostasis was obtained with Bovie cautery to the muscle bundles were needed.  Within the posterior flap the sciatic nerve was identified and brought to lie on  extension and ligated with 0 chromic catgut suture at the anterior skin flap line and then cut sharply with a knife blade distal to this chromic suture and was allowed to retract into the depth of wound.  Using blunt dissection the soleus was dissected free from the gastrocnemius fascia of the posterior flap and then was transected with the Bovie cautery at the anterior skin flap line.  Further hemostasis controlled with the Bovie cautery were needed.  The open amputation stump was then irrigated with 4 and 50 mL of Irrisept orthopedic antibiotic solution.  The anterior border of the tibia was then beveled with the bone power saw and rasp for smoothness.  Through a separate stab laterally in the calf a 15 Burkinan Serge-Obregon drain was placed and brought along the depth of the open surgical wound and cut the appropriate length.  It was anchored to the skin at the exit site with 2-0 silk suture.  After assurance of hemostasis the gastrocnemius fascia was cut to proper length and sutured to the anterior border of the periosteum of the tibia and medial and lateral to the muscle fascia with interrupted 0 Vicryl sutures.  The posterior skin flap was cut to proper length and sutured to the anterior skin flap with a cutaneous interrupted 2-0 Vicryl's skin was reapproximated with interrupted simple 2-0 nylon sutures.  Incision was covered with a 5 x 9 Xeroform gauze followed by 4 x 4 fluffs Kerlix wrap and a #8 Spandage tube that dressing to hold the Kerlix in place.  The Serge-Obregon drain was attached to bulb vacuum reservoir was then placed in a Velcro pouch which was then anchored to the Spandage tube net dressing.  Patient was awakened from the general endotracheal anesthesia and taken the recovery area with stable vital signs.  Sponge and needle count were correct x2.    Estimated Blood Loss: Approximately 50 mL    Findings: Very calcified tibial vessels.  There appeared to be adequate muscle and skin bleeding for  healing of this amputation stump to occur.  Total tourniquet time was 19 minutes  Complications: No immediate complications occurred      John Peguero MD     Date: 2022 Time: 12:08 EST    Electronically signed by John Peguero MD at 22 1220          Physical Therapy Notes (most recent note)      Shamika Patel, PT at 22 1047  Version 1 of 1         Patient Name: Buster Velasco  : 1965    MRN: [id number]                              Today's Date: 2022       Admit Date: 2022    Visit Dx:     ICD-10-CM ICD-9-CM   1. Right foot infection  L08.9 686.9   2. Hypoglycemia  E16.2 251.2   3. Decubitus ulcer of coccygeal region, unspecified ulcer stage  L89.159 707.03     707.20   4. Pharyngeal dysphagia  R13.13 787.23     Patient Active Problem List   Diagnosis   • Right foot infection   • Hypoglycemia   • Anemia   • Hyponatremia   • Hypoalbuminemia   • Essential hypertension   • Hyperlipidemia   • Paroxysmal atrial fibrillation (HCC)   • Sepsis (HCC)   • MRSA bacteremia     Past Medical History:   Diagnosis Date   • Diabetes (HCC)    • DVT (deep venous thrombosis) (HCC)    • Hypertension    • Mood disorder (HCC)      Past Surgical History:   Procedure Laterality Date   • BELOW KNEE AMPUTATION Left    • BELOW KNEE AMPUTATION Right 2022    Procedure: AMPUTATION BELOW KNEE RIGHT;  Surgeon: John Peguero MD;  Location: [facility];  Service: Vascular;  Laterality: Right;   • TRANS METATARSAL AMPUTATION Right       General Information     Row Name 22 1103          Physical Therapy Time and Intention    Document Type therapy note (daily note)  -SC     Mode of Treatment physical therapy  -SC     Row Name 22 1103          General Information    Patient Profile Reviewed yes  -SC     Existing Precautions/Restrictions other (see comments)  B BKA  -SC     Row Name 22 1103          Cognition    Orientation Status (Cognition) oriented x 4  -SC     Row Name 22  "1103          Safety Issues, Functional Mobility    Impairments Affecting Function (Mobility) balance;endurance/activity tolerance;strength;pain;other (see comments)  bottom is sore  -SC     Comment, Safety Issues/Impairments (Mobility) alert following commands  -SC           User Key  (r) = Recorded By, (t) = Taken By, (c) = Cosigned By    Initials Name Provider Type    SC Shamika Patel PT Physical Therapist               Mobility     Row Name 12/12/22 1110          Bed Mobility    Bed Mobility rolling left;rolling right;scooting/bridging;sit-supine;supine-sit  -SC     Rolling Left Butler (Bed Mobility) 1 person assist;1 person to manage equipment;nonverbal cues (demo/gesture)  -SC     Rolling Right Butler (Bed Mobility) 1 person assist;minimum assist (75% patient effort);verbal cues  -SC     Scooting/Bridging Butler (Bed Mobility) verbal cues;standby assist  -SC     Supine-Sit Butler (Bed Mobility) 1 person assist;minimum assist (75% patient effort);verbal cues  -SC     Sit-Supine Butler (Bed Mobility) 1 person assist;minimum assist (75% patient effort);verbal cues  -SC     Comment, (Bed Mobility) worked on bed mobility- rolling to change sheets-cues for reaching for bars. Some assist needed to roll more. Supine to sit and back with cue for bed rail. Still required assist with L UE support. Able to scoot fwd/backwards using upper extremities. Took time to work on sitting blance on EOB. No LOB notednon ambulatory  -SC     Row Name 12/12/22 1110          Transfers    Comment, (Transfers) Patient refusing getting oob stating his butt hurts. I explained that we have a waffle cushion we can use. Stated \"I'll think about it\"  -SC     Row Name 12/12/22 1110          Bed-Chair Transfer    Comment, (Bed-Chair Transfer) deferred  -SC     Row Name 12/12/22 1110          Gait/Stairs (Locomotion)    Comment, (Gait/Stairs) non ambulaory for one year  -[facility] Name 12/12/22 1110          " Mobility    Extremity Weight-bearing Status right lower extremity  -SC     Right Lower Extremity (Weight-bearing Status) non weight-bearing (NWB)  -SC           User Key  (r) = Recorded By, (t) = Taken By, (c) = Cosigned By    Initials Name Provider Type    SC Shamika Patel PT Physical Therapist               Obj/Interventions     [facility] Name 12/12/22 1115          Motor Skills    Therapeutic Exercise hip;knee  -SC     Row Name 12/12/22 1115          Hip (Therapeutic Exercise)    Hip (Therapeutic Exercise) AROM (active range of motion)  -SC     Hip AROM (Therapeutic Exercise) bilateral;flexion;extension;supine;10 repetitions  -SC     Row Name 12/12/22 1115          Knee (Therapeutic Exercise)    Knee (Therapeutic Exercise) AROM (active range of motion)  -SC     Knee AROM (Therapeutic Exercise) bilateral;flexion;extension;sitting;standing;supine;10 repetitions  -SC     Row Name 12/12/22 1115          Balance    Static Sitting Balance standby assist;1-person assist  -SC     Dynamic Sitting Balance standby assist;1-person assist  -SC     Position, Sitting Balance supported  -SC     Comment, Balance no LOB able to scoot using UEs  -SC           User Key  (r) = Recorded By, (t) = Taken By, (c) = Cosigned By    Initials Name Provider Type    SC Shamika Patel, PT Physical Therapist               Goals/Plan    No documentation.                Clinical Impression     Row Name 12/12/22 1116          Pain    Additional Documentation Pain Scale: FACES Pre/Post-Treatment (Group)  -SC     Row Name 12/12/22 1116          Pain Scale: FACES Pre/Post-Treatment    Pain: FACES Scale, Pretreatment 2-->hurts little bit  -SC     Posttreatment Pain Rating 2-->hurts little bit  -SC     Pain Location - Side/Orientation Right  -SC     Pain Location - residual limb  -[facility] Name 12/12/22 1116          Plan of Care Review    Plan of Care Reviewed With patient  -SC     Progress improving  -SC     Outcome Evaluation Patient demonstrated  improving pain control and overall improving bed mobility. He has not yet been in w/c and I have encouraged him to try a short session.  -SC     Row Name 12/12/22 1116          Therapy Assessment/Plan (PT)    Rehab Potential (PT) good, to achieve stated therapy goals  -SC     Criteria for Skilled Interventions Met (PT) yes;meets criteria;skilled treatment is necessary  -SC     Therapy Frequency (PT) daily  -SC     Row Name 12/12/22 1116          Positioning and Restraints    Pre-Treatment Position in bed  -SC     Post Treatment Position bed  -SC     In Bed notified nsg;supine;call light within reach;exit alarm on  -SC           User Key  (r) = Recorded By, (t) = Taken By, (c) = Cosigned By    Initials Name Provider Type    SC Shamika Patel PT Physical Therapist               Outcome Measures     Row Name 12/12/22 1117          How much help from another person do you currently need...    Turning from your back to your side while in flat bed without using bedrails? 3  -SC     Moving from lying on back to sitting on the side of a flat bed without bedrails? 3  -SC     Moving to and from a bed to a chair (including a wheelchair)? 1  -SC     Standing up from a chair using your arms (e.g., wheelchair, bedside chair)? 1  -SC     Climbing 3-5 steps with a railing? 1  -SC     To walk in hospital room? 1  -SC     AM-PAC 6 Clicks Score (PT) 10  -SC     Highest level of mobility 4 --> Transferred to chair/commode  -SC     Row Name 12/12/22 1117          Functional Assessment    Outcome Measure Options AM-PAC 6 Clicks Basic Mobility (PT)  -SC           User Key  (r) = Recorded By, (t) = Taken By, (c) = Cosigned By    Initials Name Provider Type    SC Shamika Patel, PT Physical Therapist                             Physical Therapy Education     Title: PT OT SLP Therapies (Done)     Topic: Physical Therapy (Done)     Point: Mobility training (Done)     Learning Progress Summary           Patient KRIS Martinez VU by SC at  12/12/2022 1121    Comment: reviewed benefits of activity    Juan, E, VU,DU by SC at 12/7/2022 1115    Comment: REVIEWED HEP                   Point: Home exercise program (Done)     Learning Progress Summary           Patient KRIS Martinez VU by SC at 12/12/2022 1121    Comment: reviewed benefits of activity    Juan, KRIS, VU,DU by SC at 12/7/2022 1115    Comment: REVIEWED HEP                   Point: Body mechanics (Done)     Learning Progress Summary           Patient KRIS Martinez VU by SC at 12/12/2022 1121    Comment: reviewed benefits of activity    Juan, E, VU,DU by SC at 12/7/2022 1115    Comment: REVIEWED HEP                   Point: Precautions (Done)     Learning Progress Summary           Patient KRIS Martinez VU by SC at 12/12/2022 1121    Comment: reviewed benefits of activity    KRIS Martinez, VU,DU by SC at 12/7/2022 1115    Comment: REVIEWED HEP                               User Key     Initials Effective Dates Name Provider Type Discipline    SC 06/16/21 -  Shamika Patel PT Physical Therapist PT              PT Recommendation and Plan     Plan of Care Reviewed With: patient  Progress: improving  Outcome Evaluation: Patient demonstrated improving pain control and overall improving bed mobility. He has not yet been in w/c and I have encouraged him to try a short session.     Time Calculation:    PT Charges     Row Name 12/12/22 1047             Time Calculation    Start Time 1047  -SC      PT Received On 12/12/22  -SC      PT Goal Re-Cert Due Date 12/17/22  -SC         Time Calculation- PT    Total Timed Code Minutes- PT 15 minute(s)  -SC         Timed Charges    68588 - PT Therapeutic Exercise Minutes 10  -SC      28673 - PT Therapeutic Activity Minutes 5  -SC         Total Minutes    Timed Charges Total Minutes 15  -SC       Total Minutes 15  -SC            User Key  (r) = Recorded By, (t) = Taken By, (c) = Cosigned By    Initials Name Provider Type    SC Shamika Patel PT Physical Therapist               Therapy Charges for Today     Code Description Service Date Service Provider Modifiers Qty    22421183959  PT THER PROC EA 15 MIN 2022 Shamika Patel, PT GP 1    66959046290 HC PT THER SUPP EA 15 MIN 2022 Sahmika Patel PT GP 1          PT G-Codes  Outcome Measure Options: AM-PAC 6 Clicks Basic Mobility (PT)  AM-PAC 6 Clicks Score (PT): 10  AM-PAC 6 Clicks Score (OT): 13  PT Discharge Summary  Anticipated Discharge Disposition (PT): inpatient rehabilitation facility    Shamika Patel PT  2022      Electronically signed by Shamika Patel PT at 22 1122          Occupational Therapy Notes (most recent note)      Marilou Lennon, OT at 22 1349          Patient Name: Buster Velasco  : 1965    MRN: [id number]                              Today's Date: 2022       Admit Date: 2022    Visit Dx:     ICD-10-CM ICD-9-CM   1. Right foot infection  L08.9 686.9   2. Hypoglycemia  E16.2 251.2   3. Decubitus ulcer of coccygeal region, unspecified ulcer stage  L89.159 707.03     707.20   4. Pharyngeal dysphagia  R13.13 787.23     Patient Active Problem List   Diagnosis   • Right foot infection   • Hypoglycemia   • Anemia   • Hyponatremia   • Hypoalbuminemia   • Essential hypertension   • Hyperlipidemia   • Paroxysmal atrial fibrillation (HCC)   • Sepsis (HCC)   • MRSA bacteremia     Past Medical History:   Diagnosis Date   • Diabetes (HCC)    • DVT (deep venous thrombosis) (HCC)    • Hypertension    • Mood disorder (HCC)      Past Surgical History:   Procedure Laterality Date   • BELOW KNEE AMPUTATION Left    • BELOW KNEE AMPUTATION Right 2022    Procedure: AMPUTATION BELOW KNEE RIGHT;  Surgeon: John Peguero MD;  Location: On license of [facility];  Service: Vascular;  Laterality: Right;   • TRANS METATARSAL AMPUTATION Right       General Information     Row Name 22 1437          OT Time and Intention    Document Type therapy note (daily note)  -     Mode of  Treatment occupational therapy  -MR     Row Name 12/11/22 1437          General Information    Patient Profile Reviewed yes  -MR     Existing Precautions/Restrictions fall;other (see comments)  B BKA  -MR     Barriers to Rehab medically complex;previous functional deficit;physical barrier  -MR     Row Name 12/11/22 1437          Cognition    Orientation Status (Cognition) oriented x 4  -MR     Row Name 12/11/22 1437          Safety Issues, Functional Mobility    Safety Issues Affecting Function (Mobility) awareness of need for assistance;insight into deficits/self-awareness;problem-solving;safety precaution awareness;safety precautions follow-through/compliance;sequencing abilities  -MR     Impairments Affecting Function (Mobility) balance;endurance/activity tolerance;strength;pain  -MR           User Key  (r) = Recorded By, (t) = Taken By, (c) = Cosigned By    Initials Name Provider Type    MR Marilou Lennon, OT Occupational Therapist                 Mobility/ADL's     Row Name 12/11/22 1439          Bed Mobility    Bed Mobility supine-sit;scooting/bridging  -MR     Scooting/Bridging [place] (Bed Mobility) contact guard;nonverbal cues (demo/gesture);verbal cues  -MR     Supine-Sit [place] (Bed Mobility) moderate assist (50% patient effort);verbal cues;nonverbal cues (demo/gesture)  -MR     Assistive Device (Bed Mobility) head of bed elevated  -MR     Comment, (Bed Mobility) Pt requiring ModA for supine to sit d/t positioning in bed.  -MR     Row Name 12/11/22 1439          Transfers    Comment, (Transfers) Deferred transfer to chair d/t sacral wounds.  -MR     Row Name 12/11/22 1439          Activities of Daily Living    BADL Assessment/Intervention upper body dressing  -MR     Row Name 12/11/22 1439          Mobility    Extremity Weight-bearing Status right lower extremity  -MR     Right Lower Extremity (Weight-bearing Status) non weight-bearing (NWB)   -MR     Row Name 12/11/22 1439          Upper Body  Dressing Assessment/Training    [place] Level (Upper Body Dressing) other (see comments);minimum assist (75% patient effort)  adjusting hospital gown  -MR     Position (Upper Body Dressing) edge of bed sitting  -MR           User Key  (r) = Recorded By, (t) = Taken By, (c) = Cosigned By    Initials Name Provider Type    Marilou Mcfarland OT Occupational Therapist               Obj/Interventions     Row Name 12/11/22 1441          Shoulder (Therapeutic Exercise)    Shoulder (Therapeutic Exercise) AROM (active range of motion)  -MR     Shoulder AROM (Therapeutic Exercise) bilateral;flexion;extension;aBduction;aDduction;sitting;10 repetitions  -MR     Row Name 12/11/22 1441          Elbow/Forearm (Therapeutic Exercise)    Elbow/Forearm (Therapeutic Exercise) AROM (active range of motion)  -MR     Elbow/Forearm AROM (Therapeutic Exercise) bilateral;flexion;extension;10 repetitions  -MR     Row Name 12/11/22 1441          Motor Skills    Therapeutic Exercise shoulder;elbow/forearm;other (see comments)  Core engagement activity completed while at EOB reaching out of GENNY across midline.  -MR     Row Name 12/11/22 1441          Balance    Balance Assessment sitting static balance;sitting dynamic balance  -MR     Static Sitting Balance standby assist  -MR     Dynamic Sitting Balance contact guard  -MR     Position, Sitting Balance unsupported;sitting edge of bed  -MR     Balance Interventions sitting;static;dynamic  -MR     Comment, Balance No LOB noted w/ seated dynamic reaching activity.  -MR           User Key  (r) = Recorded By, (t) = Taken By, (c) = Cosigned By    Initials Name Provider Type     Marilou Lennon, LUCAS Occupational Therapist               Goals/Plan    No documentation.                Clinical Impression     Row Name 12/11/22 1444          Pain Assessment    Pretreatment Pain Rating 5/10  -MR     Posttreatment Pain Rating 5/10  -MR     Pain Location - Side/Orientation Right  -MR     Pain Location  lower  -MR     Pain Location - residual limb  -MR     Pre/Posttreatment Pain Comment Pt reporting pain is tolerable.  -MR     Pain Intervention(s) Ambulation/increased activity;Repositioned  -MR     Row Name 12/11/22 1444          Plan of Care Review    Plan of Care Reviewed With patient  -MR     Progress improving  -MR     Outcome Evaluation Pt completed bed mobility w/ ModA for supine to sit d/t positioning in bed. Good effort noted w/ BUE and core engagement challenging balance and COG. Pt deferred OOB d/t skin integrity on buttocks. Pt agreeable to progress to sliding board transfers during next session. Continue to recommend inpatient rehab at d/c.  -MR     Row Name 12/11/22 1444          Therapy Plan Review/Discharge Plan (OT)    Anticipated Discharge Disposition (OT) inpatient rehabilitation facility  -MR     Row Name 12/11/22 1444          Vital Signs    Pre Systolic BP Rehab --  VSS  -MR     O2 Delivery Pre Treatment room air  -MR     O2 Delivery Intra Treatment room air  -MR     O2 Delivery Post Treatment room air  -MR     Pre Patient Position Supine  -MR     Intra Patient Position Sitting  -MR     Post Patient Position Sitting  -MR     Row Name 12/11/22 1444          Positioning and Restraints    Pre-Treatment Position in bed  -MR     Post Treatment Position bed  -MR     In Bed sitting EOB;with PT  -MR           User Key  (r) = Recorded By, (t) = Taken By, (c) = Cosigned By    Initials Name Provider Type    Marilou Mcfarland, OT Occupational Therapist               Outcome Measures     Row Name 12/11/22 1444          How much help from another is currently needed...    Putting on and taking off regular lower body clothing? 1  -MR     Bathing (including washing, rinsing, and drying) 1  -MR     Toileting (which includes using toilet bed pan or urinal) 2  -MR     Putting on and taking off regular upper body clothing 2  -MR     Taking care of personal grooming (such as brushing teeth) 3  -MR     Eating meals  4  -MR     AM-PAC 6 Clicks Score (OT) 13  -MR     Row Name 12/11/22 1437          How much help from another person do you currently need...    Turning from your back to your side while in flat bed without using bedrails? 3  -ES     Moving from lying on back to sitting on the side of a flat bed without bedrails? 3  -ES     Moving to and from a bed to a chair (including a wheelchair)? 1  -ES     Standing up from a chair using your arms (e.g., wheelchair, bedside chair)? 1  -ES     Climbing 3-5 steps with a railing? 1  -ES     To walk in hospital room? 1  -ES     AM-PAC 6 Clicks Score (PT) 10  -ES     Highest level of mobility 4 --> Transferred to chair/commode  -ES     Row Name 12/11/22 1447 12/11/22 1437       Functional Assessment    Outcome Measure Options AM-PAC 6 Clicks Daily Activity (OT)  -MR AM-PAC 6 Clicks Basic Mobility (PT)  -ES          User Key  (r) = Recorded By, (t) = Taken By, (c) = Cosigned By    Initials Name Provider Type    MR Jose Lennonelle, OT Occupational Therapist    ES Jacqueline Ortiz, PT Physical Therapist                Occupational Therapy Education     Title: PT OT SLP Therapies (Done)     Topic: Occupational Therapy (Done)     Point: ADL training (Done)     Description:   Instruct learner(s) on proper safety adaptation and remediation techniques during self care or transfers.   Instruct in proper use of assistive devices.              Learning Progress Summary           Patient Acceptance, E, VU by MR at 12/11/2022 1448    KRIS Martinez VU, DU by SC at 12/7/2022 1115    Comment: REVIEWED HEP    Acceptance, E, VU by MR at 12/7/2022 1110                   Point: Home exercise program (Done)     Description:   Instruct learner(s) on appropriate technique for monitoring, assisting and/or progressing therapeutic exercises/activities.              Learning Progress Summary           Patient Acceptance, E, VU by MR at 12/11/2022 1448    KRIS Martinez VU, DU by SC at 12/7/2022 1115    Comment: REVIEWED  HEP    Acceptance, E, VU by MR at 12/7/2022 1110                   Point: Precautions (Done)     Description:   Instruct learner(s) on prescribed precautions during self-care and functional transfers.              Learning Progress Summary           Patient Acceptance, E, VU by MR at 12/11/2022 1448    Eager, E, VU,DU by SC at 12/7/2022 1115    Comment: REVIEWED HEP    Acceptance, E, VU by MR at 12/7/2022 1110                   Point: Body mechanics (Done)     Description:   Instruct learner(s) on proper positioning and spine alignment during self-care, functional mobility activities and/or exercises.              Learning Progress Summary           Patient Acceptance, E, VU by MR at 12/11/2022 1448    Eager, E, VU,DU by SC at 12/7/2022 1115    Comment: REVIEWED HEP    Acceptance, E, VU by MR at 12/7/2022 1110                               User Key     Initials Effective Dates Name Provider Type Discipline    SC 06/16/21 -  Shamika Patel PT Physical Therapist PT     09/22/22 -  Marilou Lennon OT Occupational Therapist OT              OT Recommendation and Plan  Planned Therapy Interventions (OT): activity tolerance training, adaptive equipment training, BADL retraining, functional balance retraining, IADL retraining, occupation/activity based interventions, patient/caregiver education/training, transfer/mobility retraining, strengthening exercise, ROM/therapeutic exercise  Therapy Frequency (OT): daily  Plan of Care Review  Plan of Care Reviewed With: patient  Progress: improving  Outcome Evaluation: Pt completed bed mobility w/ ModA for supine to sit d/t positioning in bed. Good effort noted w/ BUE and core engagement challenging balance and COG. Pt deferred OOB d/t skin integrity on buttocks. Pt agreeable to progress to sliding board transfers during next session. Continue to recommend inpatient rehab at d/c.     Time Calculation:    Time Calculation- OT     Row Name 12/11/22 1446             Time  Calculation- OT    OT Start Time 1349  -MR      OT Received On 12/11/22  -MR      OT Goal Re-Cert Due Date 12/17/22  -MR         Timed Charges    66630 - OT Therapeutic Exercise Minutes 5  -MR      08274 - OT Therapeutic Activity Minutes 5  -MR      13850 - OT Self Care/Mgmt Minutes 2  -MR         Total Minutes    Timed Charges Total Minutes 12  -MR       Total Minutes 12  -MR            User Key  (r) = Recorded By, (t) = Taken By, (c) = Cosigned By    Initials Name Provider Type    Marilou Mcfarland OT Occupational Therapist              Therapy Charges for Today     Code Description Service Date Service Provider Modifiers Qty    76806242883 HC OT THER PROC EA 15 MIN 12/11/2022 Marilou Lennon OT GO 1               Marilou Lennon OT  12/11/2022    Electronically signed by Marilou Lennon OT at 12/11/22 2239

## 2022-12-14 NOTE — PLAN OF CARE
Problem: Adult Inpatient Plan of Care  Goal: Plan of Care Review  Recent Flowsheet Documentation  Taken 12/14/2022 1437 by Joseline Lemon OT  Plan of Care Reviewed With: patient  Outcome Evaluation: Pt participates well in therapy. Min A to reposition in bed for comfort and activity. BUE ther ex completed to support self-care. Glute sets compelted to support transfer training. Set-up simple UB ADL task. OT will continue to follow IP. Recommend IRF at d/c for best outcome.

## 2022-12-14 NOTE — PROGRESS NOTES
[facility] Progress Note        Antibiotics:  Anti-Infectives (From admission, onward)    Ordered     Dose/Rate Route Frequency Start Stop    12/07/22 0603  DAPTOmycin (CUBICIN) 800 mg in sodium chloride 0.9 % 50 mL IVPB        Ordering Provider: Zeke George MD    8 mg/kg × 100 kg  100 mL/hr over 30 Minutes Intravenous Every 24 Hours 12/07/22 1000 12/29/22 0959    11/30/22 2219  vancomycin 2000 mg/500 mL 0.9% NS IVPB ([facility])        Ordering Provider: Reggie Batres MD    20 mg/kg × 98.9 kg  over 2 Hours Intravenous Once 11/30/22 2221 12/01/22 0315    11/30/22 2219  piperacillin-tazobactam (ZOSYN) 3.375 g in iso-osmotic dextrose 50 ml (premix)        Ordering Provider: Reggie Batres MD    3.375 g Intravenous Once 11/30/22 2221 11/30/22 2335          CC: foot infection    HPI:    Patient is a 57 y.o.  Yr old male with history of prior lower extremity infection/amputations done in [facility].  He has a history of left BKA years ago.  More recent right transmetatarsal amputation but specific dates unclear and unclear who the surgeon was.  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  He has history DVT/IVC filter, diabetes and other comorbidities as below.  He reports a chronic right lateral foot wound present for months but apparently not precipitated by any specific event or trauma.  He is admitted to the hospital on November 30 with deterioration at the foot including redness/swelling    **patient had reported remote drug abuse (initially to me reported as IV drug abuse but then later he reported not injection use and I am unable to corroborate otherwise at prsent; +drug screen earlier this year per d/w Dr Chau for Meth at [facility] and reported by them to be IVDA/substance abuse),   " chronic methadone. He reported to me this was 17 years ago and therefore some inconsistencies    ** patient reports MRSA blood stream infection treated in [place] spring of 2022, 6 weeks of vancomycin by his report.  Otherwise data deficit.  Try to retrieve information     12/2/22 Dr Peguero did surgery  \"Procedure(s): Mid level right below-knee amputation and primary closure \"    12/6 with MRSA in blood culture;  TTE done but limited per cardiology    12/9/22   JERALD no vegetation per cardiology    12/13/22 on room air with no new focal symptoms. no new   distress per nursing staff.   Case management considering options.   postop pain to the right LE which is controlled/dull at present, constant, nonradiating, worse with palpation, generally better with pain meds and 2  out of 10 in severity.  Not progressive     No headache photophobia or neck stiffness.  No shortness of breath cough or hemoptysis.  No nausea vomiting diarrhea or abdominal pain.  No dysuria hematuria or pyuria.  No other new skin rash       ROS:      12/13/22 No f/c/s. No n/v/d. No rash. No new ADR to Abx.     Constitutional-- No Fever, chills or sweats.  Appetite good, and no malaise. No fatigue.  Heent--chronic hoarse voice.  No new vision, hearing or throat complaints.  No epistaxis or oral sores.   No flashers, floaters or eye pain.   No headache, photophobia or neck stiffness.  Chronic PEG tube  CV-- No chest pain, palpitation or syncope  Resp-- No SOB/cough/Hemoptysis  GI- No nausea, vomiting, or diarrhea.  No hematochezia, melena, or hematemesis. Denies jaundice or chronic liver disease.  -- No dysuria, hematuria, or flank pain.  Denies hesitancy, urgency or flank pain.  Lymph- no swollen lymph nodes in neck/axilla or groin.   Heme- No active bruising or bleeding; no Hx of DVT or PE.  MS-- no swelling or pain in the bones or joints of arms/legs.  No new back pain.  Neuro-- No acute focal weakness or numbness in the arms or legs.  " "No seizures.     Full 12 point review of systems reviewed and negative otherwise for acute complaints, except for above      PE:   BP (!) 84/55   Pulse 65   Temp 98 °F (36.7 °C) (Oral)   Resp 18   Ht 193 cm (75.98\")   Wt 100 kg (220 lb 7.4 oz)   SpO2 94%   BMI 26.85 kg/m²          GENERAL: awake;  in no acute distress.  chronic Hoarse voice noted and chronic per him; room air  HEENT: Normocephalic, atraumatic.   No conjunctival injection. No icterus. Oropharynx   without evidence of thrush or exudate. No evidence of peridontal disease.    NECK: Supple without nuchal rigidity. No mass.   HEART: RRR; No murmur, rubs, gallops.   LUNGS: Diminished at bases but otherwise clear to auscultation bilaterally without wheezing, rales, rhonchi. Normal respiratory effort. Nonlabored. No dullness.  ABDOMEN: Soft, nontender, nondistended. Positive bowel sounds. No rebound or guarding. NO mass or HSM.  PEG tube noted  EXT:  No cyanosis, clubbing;No cord.   MSK: FROM without joint effusions noted arms/legs.    SKIN: Warm and dry without cutaneous eruptions on Inspection/palpation.    NEURO: awake; moves all 4 extremities     Right lower extremity surgical site noted;  No new redness;  no discrete mass bulge or fluctuance.  No crepitus or bulla.       No peripheral stigmata/phenomenon of endocarditis     IV without obvious redness or drainage     Left BKA site with no redness induration or warmth.  No discrete mass bulge or fluctuance.    Laboratory Data    Results from last 7 days   Lab Units 12/12/22  0353   WBC 10*3/mm3 7.83   HEMOGLOBIN g/dL 9.4*   HEMATOCRIT % 31.5*   PLATELETS 10*3/mm3 355     Results from last 7 days   Lab Units 12/12/22  0353   SODIUM mmol/L 139   POTASSIUM mmol/L 4.2   CHLORIDE mmol/L 103   CO2 mmol/L 25.0   BUN mg/dL 15   CREATININE mg/dL 0.56*   GLUCOSE mg/dL 189*   CALCIUM mg/dL 9.6                   Estimated Creatinine Clearance: 203.4 mL/min (A) (by C-G formula based on SCr of 0.56 mg/dL " (L)).      Microbiology:      Radiology:  Imaging Results (Last 72 Hours)     ** No results found for the last 72 hours. **            Impression:     --Acute right foot/ankle with multifocal ulceration, cellulitis/wound infection and probable osteomyelitis with probe to bone at the lateral foot and abnormal MRI.  Other comorbidities as outlined above and high risk for further serious morbidity and other serious sequelae including persistent/recurrent or nonhealing wounds, persistent/progressive or recurrent infection and risk for further functional/limb loss/amputation.  Surgery following to help define timing/option of threshold for any future surgical intervention .      **Surgery 12/2 with BKA    --MRSA bacteremia from November 30 (daptomycin sensitive).  Presumed from foot but also risk for endovascular focus particularly with  History of prior MRSA bloodstream infection.  Transthoracic echocardiogram limited per cardiology.  JERALD no vegetation per cardiology; follow-up blood cultures negative so far from December 6    **No new focal symptoms to suggest new metastatic foci of involvement as of December 13    --History MRSA with bacteremia by his report in spring/summer 2022 and treated with 6 weeks of vancomycin in [place].  Specifics unclear and trying to retrieve information    --coag-negative staph in blood culture likely contaminant     --Chronic vocal cord paralysis per notes with chronic hoarseness and indwelling PEG tube.  Medicine team to clarify     --History left BKA.     --Diabetes.  You need to tightly control blood sugar to give best chance for healing     --History of DVT with IVC filter    --history of remote drug abuse Per his reports to me; he denies injection drug abuse for 17 years by his report to me although as above, medicine team has find records indicating more recent abuse in 2022 [facility] [place] [facility]     PLAN:      -- Daptomycin  IV potentially 4-6 weeks  but final duration to depend on clinical course of study results and response to therapy     -- Check/review labs cultures and scans     -- Partial history per nursing staff     -- Discussed with microbiology and family     --d/w Dr Peguero       -- Highly complex set of issues with high risk for further serious morbidity and other serious sequela     --JERALD noted    --  continue to consider options; anticipate rehab facility     **Copied text in this note has been reviewed and is accurate as of 12/14/22.     Zeke George MD  12/14/2022    12/15/22 09:00 addendum to reflect HPI/ROS for December 14 visit as follows:    12/14/22 nursing reports no new issues with antibiotics or if his extremities/surgical site.  no new focal pain. no new distress per nursing staff.   Case management considering options.   postop pain to the right LE which is controlled/dull at present, constant, nonradiating, worse with palpation, generally better with pain meds and 2  out of 10 in severity.  Not progressive     No headache photophobia or neck stiffness.  No shortness of breath cough or hemoptysis.  No nausea vomiting diarrhea or abdominal pain.  No dysuria hematuria or pyuria.  No other new skin rash        ROS:       12/14/22 No f/c/s. No n/v/d. No rash. No new ADR to Abx.      Constitutional-- No Fever, chills or sweats.  Appetite good, and no malaise. No fatigue.  Heent--chronic hoarse voice.  No new vision, hearing or throat complaints.  No epistaxis or oral sores.   No flashers, floaters or eye pain.   No headache, photophobia or neck stiffness.  Chronic PEG tube  CV-- No chest pain, palpitation or syncope  Resp-- No SOB/cough/Hemoptysis  GI- No nausea, vomiting, or diarrhea.  No hematochezia, melena, or hematemesis. Denies jaundice or chronic liver disease.  -- No dysuria, hematuria, or flank pain.  Denies hesitancy, urgency or flank pain.  Lymph- no swollen lymph nodes in neck/axilla or groin.   Heme- No  active bruising or bleeding; no Hx of DVT or PE.  MS-- no swelling or pain in the bones or joints of arms/legs.  No new back pain.  Neuro-- No acute focal weakness or numbness in the arms or legs.  No seizures.     Full 12 point review of systems reviewed and negative otherwise for acute complaints, except for above

## 2022-12-14 NOTE — PROGRESS NOTES
Cardiothoracic Surgery Progress Note      POD #: 12-right below-knee amputation     LOS: 14 days      Subjective: Awake and alert    Objective:  Vital Signs vital signs below noted T-max past 24 hours 98.2 °F  Temp:  [97.8 °F (36.6 °C)-98.2 °F (36.8 °C)] 98 °F (36.7 °C)  Heart Rate:  [65-93] 65  Resp:  [16-17] 16  BP: ()/(64-80) 104/64    Physical Exam:   General Appearance: Oriented x3   Lungs:   Heart:   Skin:   Incision: Amputation site dressing change.  Sutures intact skin margin viable skin flaps are viable     Results:  Results from last 7 days   Lab Units 12/12/22  0353   WBC 10*3/mm3 7.83   HEMOGLOBIN g/dL 9.4*   HEMATOCRIT % 31.5*   PLATELETS 10*3/mm3 355     Results from last 7 days   Lab Units 12/12/22  0353   SODIUM mmol/L 139   POTASSIUM mmol/L 4.2   CHLORIDE mmol/L 103   CO2 mmol/L 25.0   BUN mg/dL 15   CREATININE mg/dL 0.56*   GLUCOSE mg/dL 189*   CALCIUM mg/dL 9.6         Assessment: #1 postop day 12 right below-knee amputation healing  2 status post remote left below-knee amputation for diabetic neuropathy and gangrene  3 diabetes mellitus with neuropathy      Plan:  Continue daily dressing change amputation site.  Medical manage per hospitalist.  Antibiotics per infectious disease.    John Peguero MD - 12/14/22 - 06:04 EST

## 2022-12-15 LAB
ALBUMIN SERPL-MCNC: 3 G/DL (ref 3.5–5.2)
ALBUMIN/GLOB SERPL: 0.8 G/DL
ALP SERPL-CCNC: 79 U/L (ref 39–117)
ALT SERPL W P-5'-P-CCNC: 20 U/L (ref 1–41)
ANION GAP SERPL CALCULATED.3IONS-SCNC: 12 MMOL/L (ref 5–15)
AST SERPL-CCNC: 17 U/L (ref 1–40)
BILIRUB SERPL-MCNC: 0.2 MG/DL (ref 0–1.2)
BUN SERPL-MCNC: 13 MG/DL (ref 6–20)
BUN/CREAT SERPL: 20 (ref 7–25)
CALCIUM SPEC-SCNC: 9.6 MG/DL (ref 8.6–10.5)
CHLORIDE SERPL-SCNC: 103 MMOL/L (ref 98–107)
CK SERPL-CCNC: 40 U/L (ref 20–200)
CO2 SERPL-SCNC: 24 MMOL/L (ref 22–29)
CREAT SERPL-MCNC: 0.65 MG/DL (ref 0.76–1.27)
DEPRECATED RDW RBC AUTO: 52.1 FL (ref 37–54)
EGFRCR SERPLBLD CKD-EPI 2021: 109.2 ML/MIN/1.73
ERYTHROCYTE [DISTWIDTH] IN BLOOD BY AUTOMATED COUNT: 16.4 % (ref 12.3–15.4)
GLOBULIN UR ELPH-MCNC: 3.7 GM/DL
GLUCOSE BLDC GLUCOMTR-MCNC: 123 MG/DL (ref 70–130)
GLUCOSE BLDC GLUCOMTR-MCNC: 215 MG/DL (ref 70–130)
GLUCOSE BLDC GLUCOMTR-MCNC: 253 MG/DL (ref 70–130)
GLUCOSE BLDC GLUCOMTR-MCNC: 272 MG/DL (ref 70–130)
GLUCOSE SERPL-MCNC: 118 MG/DL (ref 65–99)
HCT VFR BLD AUTO: 30.3 % (ref 37.5–51)
HGB BLD-MCNC: 9.3 G/DL (ref 13–17.7)
MCH RBC QN AUTO: 26.2 PG (ref 26.6–33)
MCHC RBC AUTO-ENTMCNC: 30.7 G/DL (ref 31.5–35.7)
MCV RBC AUTO: 85.4 FL (ref 79–97)
PLATELET # BLD AUTO: 379 10*3/MM3 (ref 140–450)
PMV BLD AUTO: 10.4 FL (ref 6–12)
POTASSIUM SERPL-SCNC: 4 MMOL/L (ref 3.5–5.2)
PROT SERPL-MCNC: 6.7 G/DL (ref 6–8.5)
RBC # BLD AUTO: 3.55 10*6/MM3 (ref 4.14–5.8)
SODIUM SERPL-SCNC: 139 MMOL/L (ref 136–145)
WBC NRBC COR # BLD: 6.86 10*3/MM3 (ref 3.4–10.8)

## 2022-12-15 PROCEDURE — 82550 ASSAY OF CK (CPK): CPT | Performed by: INTERNAL MEDICINE

## 2022-12-15 PROCEDURE — 80053 COMPREHEN METABOLIC PANEL: CPT | Performed by: INTERNAL MEDICINE

## 2022-12-15 PROCEDURE — 99024 POSTOP FOLLOW-UP VISIT: CPT | Performed by: THORACIC SURGERY (CARDIOTHORACIC VASCULAR SURGERY)

## 2022-12-15 PROCEDURE — 63710000001 INSULIN LISPRO (HUMAN) PER 5 UNITS: Performed by: HOSPITALIST

## 2022-12-15 PROCEDURE — 97530 THERAPEUTIC ACTIVITIES: CPT

## 2022-12-15 PROCEDURE — 82962 GLUCOSE BLOOD TEST: CPT

## 2022-12-15 PROCEDURE — 85027 COMPLETE CBC AUTOMATED: CPT | Performed by: INTERNAL MEDICINE

## 2022-12-15 PROCEDURE — 99232 SBSQ HOSP IP/OBS MODERATE 35: CPT | Performed by: NURSE PRACTITIONER

## 2022-12-15 PROCEDURE — 25010000002 DAPTOMYCIN PER 1 MG: Performed by: INTERNAL MEDICINE

## 2022-12-15 PROCEDURE — 97110 THERAPEUTIC EXERCISES: CPT

## 2022-12-15 PROCEDURE — 63710000001 INSULIN DETEMIR PER 5 UNITS: Performed by: PEDIATRICS

## 2022-12-15 PROCEDURE — 63710000001 INSULIN LISPRO (HUMAN) PER 5 UNITS: Performed by: NURSE PRACTITIONER

## 2022-12-15 RX ADMIN — ATORVASTATIN CALCIUM 20 MG: 20 TABLET, FILM COATED ORAL at 20:06

## 2022-12-15 RX ADMIN — SENNOSIDES AND DOCUSATE SODIUM 2 TABLET: 50; 8.6 TABLET ORAL at 20:06

## 2022-12-15 RX ADMIN — DAPTOMYCIN 800 MG: 500 INJECTION, POWDER, LYOPHILIZED, FOR SOLUTION INTRAVENOUS at 09:40

## 2022-12-15 RX ADMIN — INSULIN LISPRO 3 UNITS: 100 INJECTION, SOLUTION INTRAVENOUS; SUBCUTANEOUS at 17:16

## 2022-12-15 RX ADMIN — INSULIN LISPRO 4 UNITS: 100 INJECTION, SOLUTION INTRAVENOUS; SUBCUTANEOUS at 12:20

## 2022-12-15 RX ADMIN — Medication 10 ML: at 08:33

## 2022-12-15 RX ADMIN — INSULIN LISPRO 2 UNITS: 100 INJECTION, SOLUTION INTRAVENOUS; SUBCUTANEOUS at 12:19

## 2022-12-15 RX ADMIN — METHADONE HYDROCHLORIDE 10 MG: 10 TABLET ORAL at 20:07

## 2022-12-15 RX ADMIN — METHADONE HYDROCHLORIDE 10 MG: 10 TABLET ORAL at 01:55

## 2022-12-15 RX ADMIN — ACETAMINOPHEN 650 MG: 325 TABLET ORAL at 20:07

## 2022-12-15 RX ADMIN — METOPROLOL TARTRATE 25 MG: 25 TABLET, FILM COATED ORAL at 20:07

## 2022-12-15 RX ADMIN — SENNOSIDES AND DOCUSATE SODIUM 2 TABLET: 50; 8.6 TABLET ORAL at 08:32

## 2022-12-15 RX ADMIN — INSULIN LISPRO 2 UNITS: 100 INJECTION, SOLUTION INTRAVENOUS; SUBCUTANEOUS at 17:16

## 2022-12-15 RX ADMIN — Medication 10 ML: at 20:07

## 2022-12-15 RX ADMIN — TAMSULOSIN HYDROCHLORIDE 0.4 MG: 0.4 CAPSULE ORAL at 20:06

## 2022-12-15 RX ADMIN — METHADONE HYDROCHLORIDE 10 MG: 10 TABLET ORAL at 08:32

## 2022-12-15 RX ADMIN — METHADONE HYDROCHLORIDE 10 MG: 10 TABLET ORAL at 14:50

## 2022-12-15 RX ADMIN — Medication 5 MG: at 20:06

## 2022-12-15 RX ADMIN — INSULIN DETEMIR 10 UNITS: 100 INJECTION, SOLUTION SUBCUTANEOUS at 20:09

## 2022-12-15 RX ADMIN — METOPROLOL TARTRATE 25 MG: 25 TABLET, FILM COATED ORAL at 08:32

## 2022-12-15 RX ADMIN — INSULIN LISPRO 2 UNITS: 100 INJECTION, SOLUTION INTRAVENOUS; SUBCUTANEOUS at 08:32

## 2022-12-15 NOTE — CASE MANAGEMENT/SOCIAL WORK
Continued Stay Note  [facility]     Patient Name: Buster Velasco  MRN: [id number]  Today's Date: 12/15/2022    Admit Date: 11/30/2022    Plan: Rehab   Discharge Plan     Row Name 12/15/22 1039       Plan    Plan Comments Pt has requested a referral to [facility] in [place]. This was given to johnny. CM has also discussed with pt that he needs to actively participate with therapy to be considered for any acute rehabs. Pt reported understanding.               Discharge Codes    No documentation.               Expected Discharge Date and Time     Expected Discharge Date Expected Discharge Time    Dec 19, 2022             Shiloh Grey RN

## 2022-12-15 NOTE — PLAN OF CARE
Goal Outcome Evaluation:  Plan of Care Reviewed With: patient           Outcome Evaluation: Pt continues with decreased activity tolerance, poor functional strength, and pain limiting mobility. Pt performed bed mobility mod A requiring assistance w/ RLE and trunk management. Pt declined transfers to his recliner and wheelchair secondary to buttocks pain. Pt noted tight R hamstrings and was educated on importance of elevating his RLE with his knee in full extension. Continue PT POC.

## 2022-12-15 NOTE — PROGRESS NOTES
Cardiothoracic Surgery Progress Note      POD #: 13-right below-knee amputation     LOS: 15 days      Subjective: Awake and alert    Objective:  Vital Signs vital signs below noted T-max past 24 hours 98.6 °F  Temp:  [98 °F (36.7 °C)-98.6 °F (37 °C)] 98.2 °F (36.8 °C)  Heart Rate:  [71-91] 71  Resp:  [16-18] 16  BP: ()/(55-83) 119/75    Physical Exam:   General Appearance: Awake and alert   Lungs:   Heart:   Skin:   Incision: Amputation site dressing change.  Sutures intact skin margin viable skin flaps are viable.     Results:  Results from last 7 days   Lab Units 12/12/22  0353   WBC 10*3/mm3 7.83   HEMOGLOBIN g/dL 9.4*   HEMATOCRIT % 31.5*   PLATELETS 10*3/mm3 355     Results from last 7 days   Lab Units 12/12/22  0353   SODIUM mmol/L 139   POTASSIUM mmol/L 4.2   CHLORIDE mmol/L 103   CO2 mmol/L 25.0   BUN mg/dL 15   CREATININE mg/dL 0.56*   GLUCOSE mg/dL 189*   CALCIUM mg/dL 9.6         Assessment: #1 postop day 13 right below-knee amputation is healing well  2.  Status post remote left below-knee amputation for diabetic neuropathy/gangrene  3.  Diabetes mellitus and neuropathy      Plan: Continue daily dressing change amputation site.  Medical manage per hospitalist.  Antibiotics per infectious disease.      John Peguero MD - 12/15/22 - 05:48 EST

## 2022-12-15 NOTE — PROGRESS NOTES
[facility]  PROGRESS NOTE    Patient Name: Buster Velasco  : 1965  MRN: [id number]    Date of Admission: 2022  Primary Care Physician: Ludivina Bowers MD    Subjective   Subjective     CC:  F/U s/p right BKA    HPI:    Upright in bed this morning with family at bedside.  No overnight issues.  Hopeful that he will get a bed at [facility] in [place] because it would be closer for his wife to travel to come see him.       ROS:  Gen- No fevers, chills  CV- No chest pain, palpitations  Resp- No cough, dyspnea  GI- No N/V/D, abd pain      Objective   Objective     Vital Signs:   Temp:  [98 °F (36.7 °C)-98.6 °F (37 °C)] 98 °F (36.7 °C)  Heart Rate:  [70-91] 75  Resp:  [16-17] 16  BP: (113-149)/(75-90) 149/89     Physical Exam:  Constitutional: No acute distress, awake, alert, upright in bed, family at bedside.   HENT: NCAT, mucous membranes moist  Respiratory: Clear to auscultation bilaterally, respiratory effort normal on room air  Cardiovascular: RRR, no murmurs, rubs, or gallops  Gastrointestinal: Positive bowel sounds, soft, nontender, nondistended  Musculoskeletal: No lower ext edema  Psychiatric: Appropriate affect, cooperative  Neurologic: Oriented x 3, [place], speech clear  Skin: No rashes noted.  Left stump intact. Right stump c/d/i- no drainage noted   No significant change in exam from       Results Reviewed:  LAB RESULTS:      Lab 12/15/22  0419 22  0353   WBC 6.86 7.83   HEMOGLOBIN 9.3* 9.4*   HEMATOCRIT 30.3* 31.5*   PLATELETS 379 355   MCV 85.4 86.3         Lab 12/15/22  0419 22  0353   SODIUM 139 139   POTASSIUM 4.0 4.2   CHLORIDE 103 103   CO2 24.0 25.0   ANION GAP 12.0 11.0   BUN 13 15   CREATININE 0.65* 0.56*   EGFR 109.2 114.3   GLUCOSE 118* 189*   CALCIUM 9.6 9.6         Brief Urine Lab Results  (Last result in the past 365 days)      Color   Clarity   Blood   Leuk Est   Nitrite   Protein   CREAT   Urine HCG         08/20/22 0108 Yellow   Clear   Negative   Negative     Negative                 Microbiology Results Abnormal     Procedure Component Value - Date/Time    Blood Culture - Blood, Wrist, Left [[id number]]  (Normal) Collected: 12/06/22 1402    Lab Status: Final result Specimen: Blood from Wrist, Left Updated: 12/11/22 1545     Blood Culture No growth at 5 days    Blood Culture - Blood, Arm, Left [[id number]]  (Normal) Collected: 12/06/22 1402    Lab Status: Final result Specimen: Blood from Arm, Left Updated: 12/11/22 1545     Blood Culture No growth at 5 days          No radiology results from the last 24 hrs    Results for orders placed during the hospital encounter of 11/30/22    Adult Transesophageal Echo (JERALD) W/ Cont if Necessary Per Protocol    Interpretation Summary  •  Left ventricular systolic function is normal. Estimated left ventricular EF = 55%  •  The aortic valve is structurally normal with no stenosis present. Trace aortic valve regurgitation is present. There is no evidence of an aortic valve mass is present.  •  There is mild, anterior mitral leaflet thickening present. No evidence of a mitral valve mass is present. Trace mitral valve regurgitation is present. No significant mitral valve stenosis is present  •  The tricuspid valve is normal in structure. There is no evidence of a mass on the tricuspid valve. Mild tricuspid valve regurgitation is present.  •  There is mild thickening of the pulmonic valve. There is trace pulmonic valve regurgitation present. There is no pulmonic valve stenosis present.      I have reviewed the medications:  Scheduled Meds:atorvastatin, 20 mg, Oral, Nightly  DAPTOmycin, 8 mg/kg, Intravenous, Q24H  insulin detemir, 10 Units, Subcutaneous, Nightly  insulin lispro, 0-7 Units, Subcutaneous, TID AC  Insulin Lispro, 2 Units, Subcutaneous, TID With Meals  methadone, 10 mg, Oral, Q6H  metoprolol tartrate, 25 mg, Oral, Q12H  senna-docusate sodium, 2 tablet, Oral, BID  sodium  chloride, 10 mL, Intravenous, Q12H  sodium chloride, 10 mL, Intravenous, Q12H  tamsulosin, 0.4 mg, Oral, Nightly      Continuous Infusions:ropivacaine,       PRN Meds:.•  acetaminophen  •  senna-docusate sodium **AND** polyethylene glycol **AND** bisacodyl **AND** bisacodyl  •  dextrose  •  dextrose  •  glucagon (human recombinant)  •  hydrOXYzine  •  melatonin  •  ondansetron **OR** ondansetron  •  sennosides-docusate  •  sodium chloride  •  sodium chloride  •  sodium chloride    Assessment & Plan   Assessment & Plan     Active Hospital Problems    Diagnosis  POA   • **Right foot infection [L08.9]  Yes   • MRSA bacteremia [R78.81, B95.62]  Unknown   • Sepsis (HCC) [A41.9]  Yes   • Hypoglycemia [E16.2]  Yes   • Anemia [D64.9]  Yes   • Hyponatremia [E87.1]  Yes   • Hypoalbuminemia [E88.09]  Yes   • Essential hypertension [I10]  Yes   • Hyperlipidemia [E78.5]  Yes   • Paroxysmal atrial fibrillation (HCC) [I48.0]  Yes      Resolved Hospital Problems   No resolved problems to display.        Brief Hospital Course to date:  Buster Velasco is a 57 y.o. male with hx of prior osteomyelitis s/p left BKA, s/p right transmetatarsal amputation, left hand 3rd metacarpal resection (April 2022 at OSH, had 6 weeks of IV antibiotics for MRSA bacteremia), remote hx of IVDA, more recent hx of polysubstance abuse (meth), and DVT s/p IVC filter who presents with right foot wounds. S/p right BKA on 12/2/22 with Dr. Peguero. His blood cultures grew MRSA. ID following and recommend at least 6 weeks of IV ATBx.     This patient's problems and plans were partially entered by my partner and updated as appropriate by me 12/15/22.    Sepsis secondary to acute right foot non-healing wound and likely osteomyelitis, resolved  MRSA bacteremia  - s/p right BKA 12/2/22 with Dr. Peguero  - ID following, currently on Daptomycin monotherapy  - Blood cultures with coag negative Staph and MRSA, have discussed with Dr. George, he will require IV  antibiotics at discharge for at least 6 weeks but final duration TBD   - Repeat blood cultures NGTD x5d  - TTE no acute findings, JERALD 12/9/22 negative  - Probably not a good candidate for PICC line/home IV antibiotics given hx of substance abuse, current plan is discharge to rehab.Referral has been placed to [facility] in [place].      Recurrent hypoglycemia in setting of DMII, resolved  - HbA1c 6.9%  - High dose Tresiba qAM before arrival which has been held  - also on metformin at home  - continue levemir 10u qhs.+low dose SSI, mealtime humalog       Chronic pain on opioids   Previous IVDA/substance abuse  - Continue home methadone 10 mg QID  - Morphine for breakthrough post-op pain - discontinued 12/8/22 as he is not using it  - Patient denied hx of IVDA to me but in [facility] admission notes from April 2022 at [facility], they documented prior IVDA/substance abuse, most recently with meth in [facility] from 4/2/22; he has told ID that his last IVDA was 17 years ago     Reported hx of Afib   - Patient denies any hx of Afib. Not previously on anticoagulation and given murky history will not start.   - On Metoprolol at home which is continued  - No evidence of Afib on telemetry, discontinued telemetry monitoring 12/10/22      Chronic vocal cord paralysis s/p PEG  Dysphagia  - SLP evaluation: regular diet but with nectar thick liquids recommended, patient initially wanted thin liquids and understood risk of aspiration, however currently has been drinking the nectar thick liquids; could change to pure comfort diet if he absolutely refuses nectar thick  - SLP should continue to follow for possible advancement of diet recommendations  - Unclear where/when PEG was placed, will eventually need it removed but defer to outpatient setting as it was not placed here; routine PEG care/flushing per nursing as needed     Hypotension with hx of HTN--resolved  - BP stable  - restart losartan/bumex as needed-hasn't required it thus  far.     HLD   - Continue statin     DVT s/p IVC filter   - Placed in spring 2022 per patient. Defer to PCP for further assessment and time of removal.      Hx of seizure in setting of meth use   - Not on AED's given meth trigger     BPH   - restarted home Flomax       Expected Discharge Location and Transportation: rehab  Expected Discharge Date: 12/16/22 anytime rehab placement is found.  Pt refuses to go to [facility] due to prior bad experience      DVT prophylaxis:  Mechanical DVT prophylaxis orders are present.     AM-PAC 6 Clicks Score (PT): 10 (12/15/22 1000)    CODE STATUS:   Code Status and Medical Interventions:   Ordered at: 12/01/22 0035     Code Status (Patient has no pulse and is not breathing):    CPR (Attempt to Resuscitate)     Medical Interventions (Patient has pulse or is breathing):    Full Support       Yesenia Rivera, PITER  12/15/22

## 2022-12-15 NOTE — THERAPY TREATMENT NOTE
Patient Name: Buster Velasco  : 1965    MRN: [id number]                              Today's Date: 12/15/2022       Admit Date: 2022    Visit Dx:     ICD-10-CM ICD-9-CM   1. Right foot infection  L08.9 686.9   2. Hypoglycemia  E16.2 251.2   3. Decubitus ulcer of coccygeal region, unspecified ulcer stage  L89.159 707.03     707.20   4. Pharyngeal dysphagia  R13.13 787.23     Patient Active Problem List   Diagnosis   • Right foot infection   • Hypoglycemia   • Anemia   • Hyponatremia   • Hypoalbuminemia   • Essential hypertension   • Hyperlipidemia   • Paroxysmal atrial fibrillation (HCC)   • Sepsis (HCC)   • MRSA bacteremia     Past Medical History:   Diagnosis Date   • Diabetes (HCC)    • DVT (deep venous thrombosis) (HCC)    • Hypertension    • Mood disorder (HCC)      Past Surgical History:   Procedure Laterality Date   • BELOW KNEE AMPUTATION Left    • BELOW KNEE AMPUTATION Right 2022    Procedure: AMPUTATION BELOW KNEE RIGHT;  Surgeon: John Peguero MD;  Location: [facility];  Service: Vascular;  Laterality: Right;   • TRANS METATARSAL AMPUTATION Right       General Information     Row Name 12/15/22 140          Physical Therapy Time and Intention    Document Type therapy note (daily note)  -FW     Mode of Treatment physical therapy  -FW     Row Name 12/15/22 [facility]          General Information    Patient Profile Reviewed yes  -FW     Existing Precautions/Restrictions fall;other (see comments)  B BKA, B shoulder replacements with limited AROM, L hand middle finger removed limiting functional grasp  -FW     Row Name 12/15/22 140          Cognition    Orientation Status (Cognition) oriented x 4  -FW     Row Name 12/15/22 140          Safety Issues, Functional Mobility    Impairments Affecting Function (Mobility) balance;endurance/activity tolerance;pain;strength;postural/trunk control;range of motion (ROM);grasp  -FW           User Key  (r) = Recorded By, (t) = Taken By, (c) =  Cosigned By    Initials Name Provider Type    FW Margarito Ellison PT Physical Therapist               Mobility     Row Name 12/15/22 1401          Bed Mobility    Bed Mobility rolling left;rolling right;supine-sit;sit-supine  -FW     Rolling Left [place] (Bed Mobility) minimum assist (75% patient effort)  -FW     Rolling Right [place] (Bed Mobility) minimum assist (75% patient effort)  -FW     Scooting/Bridging [place] (Bed Mobility) verbal cues;standby assist  -FW     Supine-Sit [place] (Bed Mobility) moderate assist (50% patient effort);verbal cues;nonverbal cues (demo/gesture)  -FW     Sit-Supine [place] (Bed Mobility) minimum assist (75% patient effort);verbal cues  -FW     Assistive Device (Bed Mobility) head of bed elevated  -FW     Row Name 12/15/22 1401          Transfers    Comment, (Transfers) pt declined transfers to  and recliner secondary to buttocks wound/pain  -FW     Row Name 12/15/22 1401          Mobility    Extremity Weight-bearing Status right lower extremity  -FW     Right Lower Extremity (Weight-bearing Status) non weight-bearing (NWB)  -FW           User Key  (r) = Recorded By, (t) = Taken By, (c) = Cosigned By    Initials Name Provider Type     Margarito Ellison PT Physical Therapist               Obj/Interventions     Row Name 12/15/22 1402          Motor Skills    Therapeutic Exercise shoulder;hip;knee  Therex included 15 reps of: tricep push ups, scap retraction, LAQ, quad sets, hip abd/add, SLR, gluteal sets  -     Row Name 12/15/22 1402          Balance    Balance Assessment sitting static balance;sitting dynamic balance  -FW     Static Sitting Balance standby assist  -FW     Dynamic Sitting Balance standby assist  -FW     Position, Sitting Balance sitting edge of bed  -FW           User Key  (r) = Recorded By, (t) = Taken By, (c) = Cosigned By    Initials Name Provider Type     Margarito Ellison PT Physical Therapist               Goals/Plan    No  documentation.                Clinical Impression     Row Name 12/15/22 1404          Pain    Pretreatment Pain Rating 5/10  -FW     Posttreatment Pain Rating 5/10  -FW     Pain Location - Side/Orientation Right  -FW     Pain Location lower  -FW     Pain Location - extremity;buttock  -FW     Pre/Posttreatment Pain Comment RLE and buttocks pain  -FW     Pain Intervention(s) Repositioned;Ambulation/increased activity  -FW     Row Name 12/15/22 1400          Plan of Care Review    Plan of Care Reviewed With patient  -FW     Outcome Evaluation Pt continues with decreased activity tolerance, poor functional strength, and pain limiting mobility. Pt performed bed mobility mod A requiring assistance w/ RLE and trunk management. Pt declined transfers to his recliner and wheelchair secondary to buttocks pain. Pt noted tight R hamstrings and was educated on importance of elevating his RLE with his knee in full extension. Continue PT POC.  -FW     Row Name 12/15/22 1400          Vital Signs    Pre Systolic BP Rehab --  vss  -FW     O2 Delivery Pre Treatment room air  -FW     O2 Delivery Intra Treatment room air  -FW     O2 Delivery Post Treatment room air  -FW     Pre Patient Position Supine  -FW     Intra Patient Position Sitting  -FW     Post Patient Position Supine  -FW     Row Name 12/15/22 1402          Positioning and Restraints    Pre-Treatment Position in bed  -FW     Post Treatment Position bed  -FW     In Bed supine;call light within reach;encouraged to call for assist;exit alarm on  -FW           User Key  (r) = Recorded By, (t) = Taken By, (c) = Cosigned By    Initials Name Provider Type    FW Margarito Ellison, PT Physical Therapist               Outcome Measures     Row Name 12/15/22 1409 12/15/22 1000       How much help from another person do you currently need...    Turning from your back to your side while in flat bed without using bedrails? 3  -FW 3  -MB    Moving from lying on back to sitting on the side of  a flat bed without bedrails? 3  -FW 3  -MB    Moving to and from a bed to a chair (including a wheelchair)? 2  -FW 1  -MB    Standing up from a chair using your arms (e.g., wheelchair, bedside chair)? 1  -FW 1  -MB    Climbing 3-5 steps with a railing? 1  -FW 1  -MB    To walk in hospital room? 1  -FW 1  -MB    AM-PAC 6 Clicks Score (PT) 11  -FW 10  -MB    Highest level of mobility 4 --> Transferred to chair/commode  -FW 4 --> Transferred to chair/commode  -MB    Row Name 12/15/22 1409          Functional Assessment    Outcome Measure Options AM-PAC 6 Clicks Basic Mobility (PT)  -           User Key  (r) = Recorded By, (t) = Taken By, (c) = Cosigned By    Initials Name Provider Type    FW Margarito Ellison, PT Physical Therapist    Graciela Hardy RN Registered Nurse                             Physical Therapy Education     Title: PT OT SLP Therapies (Done)     Topic: Physical Therapy (Done)     Point: Mobility training (Done)     Learning Progress Summary           Patient Acceptance, E, VU by  at 12/15/2022 1410    Comment: educated on importance of knee extension at rest to prevent taut hamstrings    Eager, E, VU by SC at 12/12/2022 1121    Comment: reviewed benefits of activity    Eager, E, VU,DU by SC at 12/7/2022 1115    Comment: REVIEWED HEP                   Point: Home exercise program (Done)     Learning Progress Summary           Patient Acceptance, E, VU by  at 12/15/2022 1410    Comment: educated on importance of knee extension at rest to prevent taut hamstrings    Eager, E, VU by SC at 12/12/2022 1121    Comment: reviewed benefits of activity    Eager, E, VU,DU by SC at 12/7/2022 1115    Comment: REVIEWED HEP                   Point: Body mechanics (Done)     Learning Progress Summary           Patient Acceptance, E, VU by  at 12/15/2022 1410    Comment: educated on importance of knee extension at rest to prevent taut hamstrings    Eager, E, VU by SC at 12/12/2022 1121    Comment:  reviewed benefits of activity    KRIS Martinez VU, DU by SC at 12/7/2022 1115    Comment: REVIEWED HEP                   Point: Precautions (Done)     Learning Progress Summary           Patient KRIS Espinal VU by  at 12/15/2022 1410    Comment: educated on importance of knee extension at rest to prevent taut hamstrings    KRIS Martinez VU by SC at 12/12/2022 1121    Comment: reviewed benefits of activity    KRIS Martinez VU, DU by SC at 12/7/2022 1115    Comment: REVIEWED HEP                               User Key     Initials Effective Dates Name Provider Type Discipline    SC 06/16/21 -  Shamika Patel PT Physical Therapist PT     05/05/22 -  Margarito Ellison PT Physical Therapist PT              PT Recommendation and Plan     Plan of Care Reviewed With: patient  Outcome Evaluation: Pt continues with decreased activity tolerance, poor functional strength, and pain limiting mobility. Pt performed bed mobility mod A requiring assistance w/ RLE and trunk management. Pt declined transfers to his recliner and wheelchair secondary to buttocks pain. Pt noted tight R hamstrings and was educated on importance of elevating his RLE with his knee in full extension. Continue PT POC.     Time Calculation:    PT Charges     Row Name 12/15/22 1411             Time Calculation    Start Time 1333  -FW      PT Received On 12/15/22  -FW      PT Goal Re-Cert Due Date 12/17/22  -FW         Timed Charges    01241 - PT Therapeutic Exercise Minutes 12  -FW      38156 - PT Therapeutic Activity Minutes 15  -FW         Total Minutes    Timed Charges Total Minutes 27  -FW       Total Minutes 27  -FW            User Key  (r) = Recorded By, (t) = Taken By, (c) = Cosigned By    Initials Name Provider Type     Margarito Ellison PT Physical Therapist              Therapy Charges for Today     Code Description Service Date Service Provider Modifiers Qty    52382559605 HC PT THER PROC EA 15 MIN 12/15/2022 Margarito Ellison PT GP 1    [id number]  HC PT THERAPEUTIC ACT EA 15 MIN 12/15/2022 Margarito Ellison, ALVIN GP 1          PT G-Codes  Outcome Measure Options: AM-PAC 6 Clicks Basic Mobility (PT)  AM-PAC 6 Clicks Score (PT): 11  AM-PAC 6 Clicks Score (OT): 14  PT Discharge Summary  Anticipated Discharge Disposition (PT): inpatient rehabilitation facility    Margarito Ellison PT  12/15/2022

## 2022-12-15 NOTE — SIGNIFICANT NOTE
12/15/22 1339   SLP Deferred Reason   SLP Deferred Reason Patient unavailable for treatment  (Pt unavailable and with PT at time SLP attempted tx. SLP to follow up on 12/16/22.)

## 2022-12-15 NOTE — PLAN OF CARE
Goal Outcome Evaluation:  Plan of Care Reviewed With: patient        Progress: improving  Outcome Evaluation: patient not complaining of pain at this time. Patient in bed with call light within reach. Bed alarm on.

## 2022-12-15 NOTE — PROGRESS NOTES
[facility] Progress Note        Antibiotics:  Anti-Infectives (From admission, onward)    Ordered     Dose/Rate Route Frequency Start Stop    12/07/22 0603  DAPTOmycin (CUBICIN) 800 mg in sodium chloride 0.9 % 50 mL IVPB        Ordering Provider: Zeke George MD    8 mg/kg × 100 kg  100 mL/hr over 30 Minutes Intravenous Every 24 Hours 12/07/22 1000 12/29/22 0959    11/30/22 2219  vancomycin 2000 mg/500 mL 0.9% NS IVPB ([facility])        Ordering Provider: Reggie Batres MD    20 mg/kg × 98.9 kg  over 2 Hours Intravenous Once 11/30/22 2221 12/01/22 0315    11/30/22 2219  piperacillin-tazobactam (ZOSYN) 3.375 g in iso-osmotic dextrose 50 ml (premix)        Ordering Provider: Reggie Batres MD    3.375 g Intravenous Once 11/30/22 2221 11/30/22 2335          CC: foot infection    HPI:    Patient is a 57 y.o.  Yr old male with history of prior lower extremity infection/amputations done in [facility].  He has a history of left BKA years ago.  More recent right transmetatarsal amputation but specific dates unclear and unclear who the surgeon was.  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  He has history DVT/IVC filter, diabetes and other comorbidities as below.  He reports a chronic right lateral foot wound present for months but apparently not precipitated by any specific event or trauma.  He is admitted to the hospital on November 30 with deterioration at the foot including redness/swelling    **patient had reported remote drug abuse (initially to me reported as IV drug abuse but then later he reported not injection use and I am unable to corroborate otherwise at prsent; +drug screen earlier this year per d/w Dr Chau for Meth at [facility] and reported by them to be IVDA/substance abuse),   " chronic methadone. He reported to me this was 17 years ago and therefore some inconsistencies    ** patient reports MRSA blood stream infection treated in [place] spring of 2022, 6 weeks of vancomycin by his report.  Otherwise data deficit.  Try to retrieve information     12/2/22 Dr Peguero did surgery  \"Procedure(s): Mid level right below-knee amputation and primary closure \"    12/6 with MRSA in blood culture;  TTE done but limited per cardiology    12/9/22   JERALD no vegetation per cardiology    12/15/22 he denies any new adverse reaction to antibiotics.  Doing well with no new focal pain. Case management considering options.   postop pain to the right LE which is controlled/dull at present, constant, nonradiating, worse with palpation, generally better with pain meds and 2  out of 10 in severity.  Not progressive     No headache photophobia or neck stiffness.  No shortness of breath cough or hemoptysis.  No nausea vomiting diarrhea or abdominal pain.  No dysuria hematuria or pyuria.  No other new skin rash       ROS:      12/15/22 No f/c/s. No n/v/d. No rash.      Constitutional-- No Fever, chills or sweats.  Appetite good, and no malaise. No fatigue.  Heent--chronic hoarse voice.  No new vision, hearing or throat complaints.  No epistaxis or oral sores.   No flashers, floaters or eye pain.   No headache, photophobia or neck stiffness.  Chronic PEG tube  CV-- No chest pain, palpitation or syncope  Resp-- No SOB/cough/Hemoptysis  GI- No nausea, vomiting, or diarrhea.  No hematochezia, melena, or hematemesis. Denies jaundice or chronic liver disease.  -- No dysuria, hematuria, or flank pain.  Denies hesitancy, urgency or flank pain.  Lymph- no swollen lymph nodes in neck/axilla or groin.   Heme- No active bruising or bleeding; no Hx of DVT or PE.  MS-- no swelling or pain in the bones or joints of arms/legs.  No new back pain.  Neuro-- No acute focal weakness or numbness in the arms or legs.  No " "seizures.     Full 12 point review of systems reviewed and negative otherwise for acute complaints, except for above      PE:   /90 (BP Location: Left arm, Patient Position: Lying)   Pulse 70   Temp 98.3 °F (36.8 °C) (Oral)   Resp 16   Ht 193 cm (75.98\")   Wt 100 kg (220 lb 7.4 oz)   SpO2 95%   BMI 26.85 kg/m²          GENERAL: awake;  in no acute distress.  chronic Hoarse voice noted and chronic per him   HEENT: Normocephalic, atraumatic.   No conjunctival injection. No icterus. Oropharynx   without evidence of thrush or exudate. No evidence of peridontal disease.     HEART: RRR; No murmur, rubs, gallops.   LUNGS: Diminished at bases but otherwise clear to auscultation bilaterally without wheezing, rales, rhonchi. Normal respiratory effort. Nonlabored. No dullness.  ABDOMEN: Soft, nontender, nondistended. Positive bowel sounds. No rebound or guarding. NO mass or HSM.  PEG tube noted  EXT:  No cyanosis, clubbing;No cord.   MSK: FROM without joint effusions noted arms/legs.    SKIN: Warm and dry without cutaneous eruptions on Inspection/palpation.    NEURO: awake; moves all 4 extremities     Right lower extremity surgical site noted;  No new redness;  no discrete mass bulge or fluctuance.  No crepitus or bulla.       No peripheral stigmata/phenomenon of endocarditis     IV without obvious redness or drainage     Left BKA site with no redness induration or warmth.  No discrete mass bulge or fluctuance.    Laboratory Data    Results from last 7 days   Lab Units 12/15/22  0419 12/12/22  0353   WBC 10*3/mm3 6.86 7.83   HEMOGLOBIN g/dL 9.3* 9.4*   HEMATOCRIT % 30.3* 31.5*   PLATELETS 10*3/mm3 379 355     Results from last 7 days   Lab Units 12/15/22  0419   SODIUM mmol/L 139   POTASSIUM mmol/L 4.0   CHLORIDE mmol/L 103   CO2 mmol/L 24.0   BUN mg/dL 13   CREATININE mg/dL 0.65*   GLUCOSE mg/dL 118*   CALCIUM mg/dL 9.6     Results from last 7 days   Lab Units 12/15/22  0419   ALK PHOS U/L 79   BILIRUBIN mg/dL " 0.2   ALT (SGPT) U/L 20   AST (SGOT) U/L 17               Estimated Creatinine Clearance: 175.2 mL/min (A) (by C-G formula based on SCr of 0.65 mg/dL (L)).      Microbiology:      Radiology:  Imaging Results (Last 72 Hours)     ** No results found for the last 72 hours. **            Impression:     --Acute right foot/ankle with multifocal ulceration, cellulitis/wound infection and probable osteomyelitis with probe to bone at the lateral foot and abnormal MRI.  Other comorbidities as outlined above and high risk for further serious morbidity and other serious sequelae including persistent/recurrent or nonhealing wounds, persistent/progressive or recurrent infection and risk for further functional/limb loss/amputation.  Surgery following to help define timing/option of threshold for any future surgical intervention .      **Surgery 12/2 with BKA    --MRSA bacteremia from November 30 (daptomycin sensitive).  Presumed from foot but also risk for endovascular focus particularly with  History of prior MRSA bloodstream infection.  Transthoracic echocardiogram limited per cardiology.  JERALD no vegetation per cardiology; follow-up blood cultures negative so far from December 6    **No new focal symptoms to suggest new metastatic foci of involvement as of December 15    --History MRSA with bacteremia by his report in spring/summer 2022 and treated with 6 weeks of vancomycin in [place].  Specifics unclear and trying to retrieve information    --coag-negative staph in blood culture likely contaminant     --Chronic vocal cord paralysis per notes with chronic hoarseness and indwelling PEG tube.  Medicine team to clarify     --History left BKA.     --Diabetes.  You need to tightly control blood sugar to give best chance for healing     --History of DVT with IVC filter    --history of remote drug abuse Per his reports to me; he denies injection drug abuse for 17 years by his report to me although as above, medicine team  has find records indicating more recent abuse in 2022 [facility] [place] [facility]     PLAN:      -- Daptomycin  IV potentially 4-6 weeks but final duration to depend on clinical course of study results and response to therapy     -- Check/review labs cultures and scans     -- Partial history per nursing staff     -- Discussed with microbiology and family     --d/w Dr Peguero       -- Highly complex set of issues with high risk for further serious morbidity and other serious sequela     --JERALD noted    --  continue to consider options      **Copied text in this note has been reviewed and is accurate as of 12/15/22.     Zeke George MD  12/15/2022

## 2022-12-15 NOTE — PLAN OF CARE
Goal Outcome Evaluation:  Plan of Care Reviewed With: patient        Progress: improving       No changes overnight, pt slept well between nursing rounds, specialty bed in place and freq turning, weight shift q2-3 hrs, pain controlled with PO meds, RA, VSS, dressing to RLE CDI

## 2022-12-15 NOTE — DISCHARGE PLACEMENT REQUEST
"Isidro Velasco (57 y.o. Male)       Pt is in room S366    Shiloh Grey MENDEZ   [phone]          Date of Birth   1965    Social Security Number       Address   [address] [address]    Home Phone   [phone]    MRN   [id number]       Zoroastrianism   Catholic    Marital Status                               Admission Date   11/30/22    Admission Type   Emergency    Admitting Provider   Ruth Ratliff MD    Attending Provider   Ruth Ratliff MD    Department, Room/Bed   [facility] 3G, S366/1       Discharge Date       Discharge Disposition       Discharge Destination                               Attending Provider: Ruth Ratliff MD    Allergies: Gabapentin, Lyrica [Pregabalin]    Isolation: None   Infection: MRSA (12/06/22)   Code Status: CPR    Ht: 193 cm (75.98\")   Wt: 100 kg (220 lb 7.4 oz)    Admission Cmt: None   Principal Problem: Right foot infection [L08.9]                 Active Insurance as of 11/30/2022     Primary Coverage     Payor Plan Insurance Group Employer/Plan Group    [place]      Payor Plan Address Payor Plan Phone Number Payor Plan Fax Number Effective Dates    [address] [phone]  11/30/2022 - 11/30/2022    [facility] [id number]       Subscriber Name Subscriber Birth Date Member ID       ISIDRO VELASCO 1965 [id number]                 Emergency Contacts      (Rel.) Home Phone Work Phone Mobile Phone    NELSY VELASCO (Spouse) [phone] -- [phone]            Insurance Information                [place] Phone: [phone]    Subscriber: Isidro Velasco Zeke Subscriber#: [id number]    Group#: -- Precert#: [id number]             History & Physical      Tracy Oliveira DO at 11/30/22 2333              Psychiatric Medicine Services  HISTORY AND PHYSICAL    Patient Name: Isidro Alanis " Krista  : 1965  MRN: [id number]  Primary Care Physician: Ludivina Bowers MD  Date of admission: 2022    Subjective    Subjective     Chief Complaint:  Ulcer on foot    HPI:  Buster Velasco is a 57 y.o. 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 He states that these have been present for a few months, but have gotten worse. He does admit to some fatigue and chills, but denies fever, chest pain, SOB, nausea or vomiting. He denies any recent antibiotic use.           Review of Systems   Constitutional: Positive for chills and fatigue. Negative for fever and unexpected weight change.   HENT: Positive for voice change (chronic hoarseness). Negative for sinus pressure, sore throat and trouble swallowing.    Eyes: Negative for photophobia and visual disturbance.   Respiratory: Negative for cough, shortness of breath and wheezing.    Cardiovascular: Negative for chest pain and palpitations.   Gastrointestinal: Negative for abdominal pain, diarrhea, nausea and vomiting.   Genitourinary: Negative for dysuria and hematuria.   Musculoskeletal: Positive for arthralgias and myalgias.   Skin: Positive for color change and wound.   Neurological: Negative for tremors, syncope, speech difficulty and weakness.   Psychiatric/Behavioral: Negative for confusion. The patient is not nervous/anxious.       All other systems reviewed and are negative.     Personal History     Past Medical History:   Diagnosis Date   • Diabetes (HCC)    • DVT (deep venous thrombosis) (HCC)    • Hypertension    • Mood disorder (HCC)              Past Surgical History:   Procedure Laterality Date   • BELOW KNEE AMPUTATION Left     • TRANS METATARSAL AMPUTATION Right        Family History:  family history includes Diabetes in his maternal uncle; Diabetes (age of onset: 65) in his mother; Heart attack in his maternal grandfather; Stroke (age of onset: 74) in his mother. Otherwise pertinent FHx was reviewed and unremarkable.     Social History:  reports that he has never smoked. He has never used smokeless tobacco. He reports that he does not drink alcohol.  Social History     Social History Narrative   • Not on file       Medications:  Insulin Degludec, bumetanide, dapagliflozin Propanediol, ferrous sulfate, fluticasone, hydrOXYzine, losartan, metFORMIN, methadone, metoprolol tartrate, simvastatin, and tamsulosin    Allergies   Allergen Reactions   • Gabapentin Rash   • Lyrica [Pregabalin] Rash       Objective    Objective     Vital Signs:   Temp:  [98 °F (36.7 °C)] 98 °F (36.7 °C)  Heart Rate:  [] 106  Resp:  [16-20] 16  BP: (102-108)/(58-65) 102/58    Physical Exam  Constitutional:       General: He is not in acute distress.     Appearance: Normal appearance.   HENT:      Head: Atraumatic.      Right Ear: External ear normal.      Left Ear: External ear normal.      Nose: Nose normal.      Mouth/Throat:      Comments: Chronic hoarseness when speaking.   Eyes:      Extraocular Movements: Extraocular movements intact.      Conjunctiva/sclera: Conjunctivae normal.      Pupils: Pupils are equal, round, and reactive to light.   Cardiovascular:      Rate and Rhythm: Regular rhythm. Tachycardia present.      Heart sounds: Normal heart sounds. No murmur heard.  Pulmonary:      Effort: Pulmonary effort is normal. No respiratory distress.      Breath sounds: Normal breath sounds. No wheezing, rhonchi or rales.   Abdominal:      General: Bowel sounds are normal. There is no distension.      Tenderness: There is no abdominal tenderness. There is no guarding or rebound.      Comments: PEG tube in place. No evidence of erythema.    Musculoskeletal:       Cervical back: No rigidity.      Comments: Hx left BKA. Hx right transmetatarsal amputation.   Skin:     General: Skin is warm and dry.      Coloration: Skin is not jaundiced.      Findings: Erythema and lesion present. No rash.      Comments: 2 Large ulcerations on right lateral foot. Area of surrounding erythema.    Neurological:      General: No focal deficit present.      Mental Status: He is alert and oriented to person, place, and time.   Psychiatric:         Attention and Perception: Attention normal.         Mood and Affect: Mood normal.         Behavior: Behavior normal.         Thought Content: Thought content normal.          Result Review:  I have personally reviewed the results from the time of this admission to 12/1/2022 01:39 EST and agree with these findings:  [x]  Laboratory list / accordion  [x]  Microbiology  []  Radiology  [x]  EKG/Telemetry   []  Cardiology/Vascular   []  Pathology  []  Old records  []  Other:    LAB RESULTS:      Lab 12/01/22  0053 11/30/22 1905   WBC  --  13.92*   HEMOGLOBIN  --  11.1*   HEMATOCRIT  --  35.5*   PLATELETS  --  532*   NEUTROS ABS  --  11.44*   IMMATURE GRANS (ABS)  --  0.14*   LYMPHS ABS  --  1.64   MONOS ABS  --  0.64   EOS ABS  --  0.03   MCV  --  84.5   PROCALCITONIN  --  0.17   LACTATE 2.0 2.1*         Lab 11/30/22 1905   SODIUM 134*   POTASSIUM 4.1   CHLORIDE 100   CO2 21.0*   ANION GAP 13.0   BUN 18   CREATININE 1.29*   EGFR 64.3   GLUCOSE 54*   CALCIUM 9.1         Lab 11/30/22 1905   TOTAL PROTEIN 7.6   ALBUMIN 3.00*   GLOBULIN 4.6   ALT (SGPT) 12   AST (SGOT) 19   BILIRUBIN 0.2   ALK PHOS 85   LIPASE 14                 Lab 11/30/22 1905   IRON 18*   IRON SATURATION 6*   TIBC 277*   TRANSFERRIN 186*   FERRITIN 317.40         Brief Urine Lab Results  (Last result in the past 365 days)      Color   Clarity   Blood   Leuk Est   Nitrite   Protein   CREAT   Urine HCG        08/20/22 0108 Yellow   Clear   Negative   Negative     Negative                Microbiology Results (last 10 days)     ** No results found for the last 240 hours. **          XR Chest 1 View    Result Date: 12/1/2022  EXAMINATION: Chest one view. DATE: 11/30/2022. COMPARISON: None available. CLINICAL HISTORY: Infection. FINDINGS: The lungs and pleural spaces are clear. The cardiomediastinal silhouette and the pulmonary vessels are within normal limits.     Impression: No acute cardiopulmonary process. Electronically signed by:  Pierre Baron D.O.  11/30/2022 10:15 PM Mountain Time          Assessment & Plan   Assessment & Plan       Right foot infection    Hypoglycemia    Sepsis (HCC)    Essential hypertension    Hyperlipidemia    Paroxysmal atrial fibrillation (HCC)    Anemia    Hyponatremia    Hypoalbuminemia      Buster Velasco is a 57 y.o. 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   Sepsis: infectious source, tachycardia, borderline BP, leukocytosis, elevated lactate.  - Check CRP, sed rate  -Blood cultures pending  -Given hx of MRSA osteomyelitis as well as multiple amputations, will need to start IV Vancomycin tonight.   -MRI right foot tonight to investigate for possible osteomyelitis.  -Wound care for the am  -Infectious Disease consult for the am  -Dr. Peguero to see in the am. May ultimately need right BKA.  -IV fluids    Decubitus ulcer  -Wound care to see.   -Bedrest. Turn q2    Chronic Vocal Cord Paralysis  -NPO tonight.   -Has PEG tube in place, but reportedly is requesting to be taken out?  -SLP to see in the am.     Hypoglycemia  T2DM  -Blood glucose 45  on arrival, persistent with rechecks.   -Point-of-care glucose check every hour  - Start D5W at 75 cc/hour  - Patient on Tresiba 75 units every morning, hold  - We will start SSI when needed  -Check A1C  -Repeat bmp in the am    HTN  HLD  -Hold home BP meds for now d/t borderline hypotension  -Continue statin    Parox Atrial Fib  -Noted during admission to  back in July.   -EKG pending  -Not currently on anticoagulation    Anemia  -Hgb 11.1, Hct 35.5  -Appears chronic. Actually has improved since prior results.  -Anemia studies   -Repeat cbc in the am    Hyponatremia  -Na+ 134. Likely 2ry to poor PO intake.   -Check Serum Osm, Urine Osm, and Urine Na+  -IV fluids tonight  -Repeat bmp in the am    Hypoalbuminemia  -Alb 3.0  -Nutrition consult for the am    History of DVT  - S/p IVC filter      DVT prophylaxis:  Hep subQ    CODE STATUS:  Full Code  Code Status (Patient has no pulse and is not breathing): CPR (Attempt to Resuscitate)  Medical Interventions (Patient has pulse or is breathing): Full Support      This note has been completed as part of a split-shared workflow.     Signature: Electronically signed by Cecily Molina PA-C, 11/30/22, 11:33 PM EST      Attending   Admission Attestation       I have performed an independent face-to-face diagnostic evaluation including performing an independent physical examination as documented here.  The documented plan of care above was reviewed and developed with the advanced practice clinician (APC).      Brief Summary Statement:   Buster Velasco is a 57 y.o. male with a PMH significant for insulin-dependent diabetes mellitus type 2, DVT s/p IVC filter, atrial fibrillation not on anticoagulation, HTN, HLD, history of osteomyelitis s/p left BKA, s/p right transmetatarsal amputation, MRSA osteomyelitis s/p third metacarpal resection who comes to the ED due to worsening foot infection.  Patient reports a 3-month history of an open wound on his right foot.   Over the past several weeks the wound has become more erythematous, painful with drainage.  He reports fatigue, chills.  He denies fever, vomiting.  He reports diarrhea which is currently resolved.  He is not following with wound care outpatient and has not been on any recent antibiotics.    Remainder of detailed HPI is as noted by APC and has been reviewed and/or edited by me for completeness.    Attending Physical Exam:  Temp:  [98 °F (36.7 °C)-98.6 °F (37 °C)] 98.6 °F (37 °C)  Heart Rate:  [] 89  Resp:  [16-20] 16  BP: (102-112)/(58-76) 112/74    Constitutional: Awake, alert  Eyes: PERRLA, sclerae anicteric, no conjunctival injection  HENT: NCAT, mucous membranes moist  Neck: Supple, no thyromegaly, no lymphadenopathy, trachea midline  Respiratory: Clear to auscultation bilaterally, nonlabored respirations   Cardiovascular: RRR, no murmurs, rubs, or gallops, palpable radial pulses bilaterally  Gastrointestinal: Positive bowel sounds, soft, nontender, nondistended  Musculoskeletal: Left BKA, metatarsal resection to right foot, foot bandaged  Psychiatric: Appropriate affect, cooperative  Neurologic: Oriented x 3, strength symmetric in all extremities, Cranial Nerves grossly intact to confrontation, speech clear  Skin: Bandage dry and intact to right foot      Brief Assessment/Plan :  See detailed assessment and plan developed with APC which I have reviewed and/or edited for completeness.    Tracy Oliveira DO  12/01/22                        Electronically signed by Tracy Oliveira DO at 12/01/22 8607         Current Facility-Administered Medications   Medication Dose Route Frequency Provider Last Rate Last Admin   • acetaminophen (TYLENOL) tablet 650 mg  650 mg Oral Q4H PRN Ruth Ratliff MD   650 mg at 12/14/22 2039   • atorvastatin (LIPITOR) tablet 20 mg  20 mg Oral Nightly Ruth Ratliff MD   20 mg at 12/14/22 2040   • sennosides-docusate (PERICOLACE) 8.6-50 MG per tablet 2 tablet  2  tablet Oral BID Ernestina Chin MD   2 tablet at 12/15/22 0832    And   • polyethylene glycol (MIRALAX) packet 17 g  17 g Oral Daily PRN Ernestina Chin MD        And   • bisacodyl (DULCOLAX) EC tablet 5 mg  5 mg Oral Daily PRN Ernestina Chin MD        And   • bisacodyl (DULCOLAX) suppository 10 mg  10 mg Rectal Daily PRN Ernestina Chin MD       • DAPTOmycin (CUBICIN) 800 mg in sodium chloride 0.9 % 50 mL IVPB  8 mg/kg Intravenous Q24H Zeke George  mL/hr at 12/15/22 0940 800 mg at 12/15/22 0940   • dextrose (D50W) (25 g/50 mL) IV injection 25 g  25 g Intravenous Q15 Min PRN Erlin Arzola PA   25 g at 12/02/22 0737   • dextrose (GLUTOSE) oral gel 15 g  15 g Per PEG Tube Q15 Min PRN Erlin Arzola PA       • glucagon (human recombinant) (GLUCAGEN DIAGNOSTIC) injection 1 mg  1 mg Intramuscular Q15 Min PRN Erlin Arzola PA       • hydrOXYzine (ATARAX) tablet 50 mg  50 mg Oral Nightly PRN Ruth Ratliff MD   50 mg at 12/14/22 2039   • insulin detemir (LEVEMIR) injection 10 Units  10 Units Subcutaneous Nightly Ruth Ratliff MD   10 Units at 12/14/22 2039   • Insulin Lispro (humaLOG) injection 0-7 Units  0-7 Units Subcutaneous TID AC Carmen Bolaños MD   2 Units at 12/14/22 1736   • Insulin Lispro (humaLOG) injection 2 Units  2 Units Subcutaneous TID With Meals Mona Ferreira APRN   2 Units at 12/15/22 0832   • melatonin tablet 5 mg  5 mg Oral Nightly PRN Ruth Ratliff MD       • methadone (DOLOPHINE) tablet 10 mg  10 mg Oral Q6H Justino Gee MD   10 mg at 12/15/22 0832   • metoprolol tartrate (LOPRESSOR) tablet 25 mg  25 mg Oral Q12H Ruth Ratliff MD   25 mg at 12/15/22 0832   • ondansetron (ZOFRAN) tablet 4 mg  4 mg Oral Q6H PRN Ruth Ratliff MD        Or   • ondansetron (ZOFRAN) injection 4 mg  4 mg Intravenous Q6H PRN Ruth Ratliff MD       • ropivacaine (NAROPIN) 0.2 % infusion (INFUSYSTEM)   Peripheral Nerve Continuous Erlin Arzola  PA   1,000 mg at 12/02/22 1317   • sennosides-docusate (PERICOLACE) 8.6-50 MG per tablet 2 tablet  2 tablet Oral BID PRN Ruth Ratliff MD       • sodium chloride 0.9 % flush 10 mL  10 mL Intravenous PRN Locklar, Erlin C, PA       • sodium chloride 0.9 % flush 10 mL  10 mL Intravenous Q12H Locklar, Erlin C, PA   10 mL at 12/14/22 0950   • sodium chloride 0.9 % flush 10 mL  10 mL Intravenous PRN Locklar, Erlin C, PA       • sodium chloride 0.9 % flush 10 mL  10 mL Intravenous PRN Locklar, Erlin C, PA   10 mL at 12/10/22 1146   • sodium chloride 0.9 % flush 10 mL  10 mL Intravenous Q12H Locklar, Erlin C, PA   10 mL at 12/15/22 0833   • tamsulosin (FLOMAX) 24 hr capsule 0.4 mg  0.4 mg Oral Nightly Mona Ferreira APRN   0.4 mg at 12/14/22 2039        Physician Progress Notes (last 24 hours)      Zeke George MD at 12/15/22 0858          [facility] Progress Note        Antibiotics:  Anti-Infectives (From admission, onward)    Ordered     Dose/Rate Route Frequency Start Stop    12/07/22 0603  DAPTOmycin (CUBICIN) 800 mg in sodium chloride 0.9 % 50 mL IVPB        Ordering Provider: Zeke George MD    8 mg/kg × 100 kg  100 mL/hr over 30 Minutes Intravenous Every 24 Hours 12/07/22 1000 12/29/22 0959    11/30/22 2219  vancomycin 2000 mg/500 mL 0.9% NS IVPB ([facility])        Ordering Provider: Regige Batres MD    20 mg/kg × 98.9 kg  over 2 Hours Intravenous Once 11/30/22 2221 12/01/22 0315    11/30/22 2219  piperacillin-tazobactam (ZOSYN) 3.375 g in iso-osmotic dextrose 50 ml (premix)        Ordering Provider: Reggie Batres MD    3.375 g Intravenous Once 11/30/22 2221 11/30/22 2335          CC: foot infection    HPI:    Patient is a 57 y.o.  Yr old male with history of prior lower extremity infection/amputations done in [facility].  He has a history of left BKA years ago.  More recent right transmetatarsal amputation but specific dates unclear and unclear who the surgeon was.  Patient reports prior history  "of MRSA multifocal involving his extremities including left hand for which she required surgery and long course of antibiotics, again specifics unclear but he reports things healed/improved and has not been an issue at the hand.  He has history DVT/IVC filter, diabetes and other comorbidities as below.  He reports a chronic right lateral foot wound present for months but apparently not precipitated by any specific event or trauma.  He is admitted to the hospital on November 30 with deterioration at the foot including redness/swelling    **patient had reported remote drug abuse (initially to me reported as IV drug abuse but then later he reported not injection use and I am unable to corroborate otherwise at prsent; +drug screen earlier this year per d/w Dr Chau for Meth [facility] and reported by them to be IVDA/substance abuse),   chronic methadone. He reported to me this was 17 years ago and therefore some inconsistencies    ** patient reports MRSA blood stream infection treated in [place] spring of 2022, 6 weeks of vancomycin by his report.  Otherwise data deficit.  Try to retrieve information     12/2/22 Dr Peguero did surgery  \"Procedure(s): Mid level right below-knee amputation and primary closure \"    12/6 with MRSA in blood culture;  TTE done but limited per cardiology    12/9/22   JERALD no vegetation per cardiology    12/15/22 he denies any new adverse reaction to antibiotics.  Doing well with no new focal pain. Case management considering options.   postop pain to the right LE which is controlled/dull at present, constant, nonradiating, worse with palpation, generally better with pain meds and 2  out of 10 in severity.  Not progressive     No headache photophobia or neck stiffness.  No shortness of breath cough or hemoptysis.  No nausea vomiting diarrhea or abdominal pain.  No dysuria hematuria or pyuria.  No other new skin rash       ROS:      12/15/22 No f/c/s. No n/v/d. No rash.  " "    Constitutional-- No Fever, chills or sweats.  Appetite good, and no malaise. No fatigue.  Heent--chronic hoarse voice.  No new vision, hearing or throat complaints.  No epistaxis or oral sores.   No flashers, floaters or eye pain.   No headache, photophobia or neck stiffness.  Chronic PEG tube  CV-- No chest pain, palpitation or syncope  Resp-- No SOB/cough/Hemoptysis  GI- No nausea, vomiting, or diarrhea.  No hematochezia, melena, or hematemesis. Denies jaundice or chronic liver disease.  -- No dysuria, hematuria, or flank pain.  Denies hesitancy, urgency or flank pain.  Lymph- no swollen lymph nodes in neck/axilla or groin.   Heme- No active bruising or bleeding; no Hx of DVT or PE.  MS-- no swelling or pain in the bones or joints of arms/legs.  No new back pain.  Neuro-- No acute focal weakness or numbness in the arms or legs.  No seizures.     Full 12 point review of systems reviewed and negative otherwise for acute complaints, except for above      PE:   /90 (BP Location: Left arm, Patient Position: Lying)   Pulse 70   Temp 98.3 °F (36.8 °C) (Oral)   Resp 16   Ht 193 cm (75.98\")   Wt 100 kg (220 lb 7.4 oz)   SpO2 95%   BMI 26.85 kg/m²          GENERAL: awake;  in no acute distress.  chronic Hoarse voice noted and chronic per him   HEENT: Normocephalic, atraumatic.   No conjunctival injection. No icterus. Oropharynx   without evidence of thrush or exudate. No evidence of peridontal disease.     HEART: RRR; No murmur, rubs, gallops.   LUNGS: Diminished at bases but otherwise clear to auscultation bilaterally without wheezing, rales, rhonchi. Normal respiratory effort. Nonlabored. No dullness.  ABDOMEN: Soft, nontender, nondistended. Positive bowel sounds. No rebound or guarding. NO mass or HSM.  PEG tube noted  EXT:  No cyanosis, clubbing;No cord.   MSK: FROM without joint effusions noted arms/legs.    SKIN: Warm and dry without cutaneous eruptions on Inspection/palpation.    NEURO: awake; " moves all 4 extremities     Right lower extremity surgical site noted;  No new redness;  no discrete mass bulge or fluctuance.  No crepitus or bulla.       No peripheral stigmata/phenomenon of endocarditis     IV without obvious redness or drainage     Left BKA site with no redness induration or warmth.  No discrete mass bulge or fluctuance.    Laboratory Data    Results from last 7 days   Lab Units 12/15/22  0419 12/12/22  0353   WBC 10*3/mm3 6.86 7.83   HEMOGLOBIN g/dL 9.3* 9.4*   HEMATOCRIT % 30.3* 31.5*   PLATELETS 10*3/mm3 379 355     Results from last 7 days   Lab Units 12/15/22  0419   SODIUM mmol/L 139   POTASSIUM mmol/L 4.0   CHLORIDE mmol/L 103   CO2 mmol/L 24.0   BUN mg/dL 13   CREATININE mg/dL 0.65*   GLUCOSE mg/dL 118*   CALCIUM mg/dL 9.6     Results from last 7 days   Lab Units 12/15/22  0419   ALK PHOS U/L 79   BILIRUBIN mg/dL 0.2   ALT (SGPT) U/L 20   AST (SGOT) U/L 17               Estimated Creatinine Clearance: 175.2 mL/min (A) (by C-G formula based on SCr of 0.65 mg/dL (L)).      Microbiology:      Radiology:  Imaging Results (Last 72 Hours)     ** No results found for the last 72 hours. **            Impression:     --Acute right foot/ankle with multifocal ulceration, cellulitis/wound infection and probable osteomyelitis with probe to bone at the lateral foot and abnormal MRI.  Other comorbidities as outlined above and high risk for further serious morbidity and other serious sequelae including persistent/recurrent or nonhealing wounds, persistent/progressive or recurrent infection and risk for further functional/limb loss/amputation.  Surgery following to help define timing/option of threshold for any future surgical intervention .      **Surgery 12/2 with BKA    --MRSA bacteremia from November 30 (daptomycin sensitive).  Presumed from foot but also risk for endovascular focus particularly with  History of prior MRSA bloodstream infection.  Transthoracic echocardiogram limited per cardiology.   JERALD no vegetation per cardiology; follow-up blood cultures negative so far from December 6    **No new focal symptoms to suggest new metastatic foci of involvement as of December 15    --History MRSA with bacteremia by his report in spring/summer 2022 and treated with 6 weeks of vancomycin in [place].  Specifics unclear and trying to retrieve information    --coag-negative staph in blood culture likely contaminant     --Chronic vocal cord paralysis per notes with chronic hoarseness and indwelling PEG tube.  Medicine team to clarify     --History left BKA.     --Diabetes.  You need to tightly control blood sugar to give best chance for healing     --History of DVT with IVC filter    --history of remote drug abuse Per his reports to me; he denies injection drug abuse for 17 years by his report to me although as above, medicine team has find records indicating more recent abuse in 2022 [facility]     PLAN:      -- Daptomycin  IV potentially 4-6 weeks but final duration to depend on clinical course of study results and response to therapy     -- Check/review labs cultures and scans     -- Partial history per nursing staff     -- Discussed with microbiology and family     --d/w Dr Peguero       -- Highly complex set of issues with high risk for further serious morbidity and other serious sequela     --JERALD noted    --  continue to consider options      **Copied text in this note has been reviewed and is accurate as of 12/15/22.     Zeke George MD  12/15/2022        Electronically signed by Zeke George MD at 12/15/22 0859     John Peguero MD at 12/15/22 5661          Cardiothoracic Surgery Progress Note      POD #: 13-right below-knee amputation     LOS: 15 days      Subjective: Awake and alert    Objective:  Vital Signs vital signs below noted T-max past 24 hours 98.6 °F  Temp:  [98 °F (36.7 °C)-98.6 °F (37 °C)] 98.2 °F (36.8 °C)  Heart Rate:  [71-91]  71  Resp:  [16-18] 16  BP: ()/(55-83) 119/75    Physical Exam:   General Appearance: Awake and alert   Lungs:   Heart:   Skin:   Incision: Amputation site dressing change.  Sutures intact skin margin viable skin flaps are viable.     Results:  Results from last 7 days   Lab Units 22  0353   WBC 10*3/mm3 7.83   HEMOGLOBIN g/dL 9.4*   HEMATOCRIT % 31.5*   PLATELETS 10*3/mm3 355     Results from last 7 days   Lab Units 22  0353   SODIUM mmol/L 139   POTASSIUM mmol/L 4.2   CHLORIDE mmol/L 103   CO2 mmol/L 25.0   BUN mg/dL 15   CREATININE mg/dL 0.56*   GLUCOSE mg/dL 189*   CALCIUM mg/dL 9.6         Assessment: #1 postop day 13 right below-knee amputation is healing well  2.  Status post remote left below-knee amputation for diabetic neuropathy/gangrene  3.  Diabetes mellitus and neuropathy      Plan: Continue daily dressing change amputation site.  Medical manage per hospitalist.  Antibiotics per infectious disease.      John Peguero MD - 12/15/22 - 05:48 EST        Electronically signed by John Peguero MD at 12/15/22 0548     Yesenia Rivera APRN at 22 1247              [facility] Medicine Services  PROGRESS NOTE    Patient Name: Buster Velasco  : 1965  MRN: [id number]    Date of Admission: 2022  Primary Care Physician: Ludivina Bowers MD    Subjective   Subjective     CC:  F/U s/p right BKA    HPI:  Resting comfortably in bed this morning. Pain well controlled. Tolerating IV antibiotics without difficulty. Awaiting work on rehab referrals.     ROS:  Gen- No fevers, chills  CV- No chest pain, palpitations  Resp- No cough, dyspnea  GI- No N/V/D, abd pain            Objective   Objective     Vital Signs:   Temp:  [97.8 °F (36.6 °C)-98.2 °F (36.8 °C)] 98 °F (36.7 °C)  Heart Rate:  [65-93] 65  Resp:  [16-18] 18  BP: ()/(55-83) 84/55     Physical Exam:  Constitutional: No acute distress, awake, alert, upright in bed, no family at bedside.   HENT:  NCAT, mucous membranes moist  Respiratory: Clear to auscultation bilaterally, respiratory effort normal on room air  Cardiovascular: RRR, no murmurs, rubs, or gallops  Gastrointestinal: Positive bowel sounds, soft, nontender, nondistended  Musculoskeletal: No lower ext edema  Psychiatric: Appropriate affect, cooperative  Neurologic: Oriented x 3, [place], speech clear  Skin: No rashes noted.  Left stump intact. Right stump c/d/i- no drainage noted       Results Reviewed:  LAB RESULTS:      Lab 12/12/22  0353   WBC 7.83   HEMOGLOBIN 9.4*   HEMATOCRIT 31.5*   PLATELETS 355   MCV 86.3         Lab 12/12/22  0353   SODIUM 139   POTASSIUM 4.2   CHLORIDE 103   CO2 25.0   ANION GAP 11.0   BUN 15   CREATININE 0.56*   EGFR 114.3   GLUCOSE 189*   CALCIUM 9.6         Brief Urine Lab Results  (Last result in the past 365 days)      Color   Clarity   Blood   Leuk Est   Nitrite   Protein   CREAT   Urine HCG        08/20/22 0108 Yellow   Clear   Negative   Negative     Negative                 Microbiology Results Abnormal     Procedure Component Value - Date/Time    Blood Culture - Blood, Wrist, Left [[id number]]  (Normal) Collected: 12/06/22 1402    Lab Status: Final result Specimen: Blood from Wrist, Left Updated: 12/11/22 1545     Blood Culture No growth at 5 days    Blood Culture - Blood, Arm, Left [[id number]]  (Normal) Collected: 12/06/22 1402    Lab Status: Final result Specimen: Blood from Arm, Left Updated: 12/11/22 1545     Blood Culture No growth at 5 days          No radiology results from the last 24 hrs    Results for orders placed during the hospital encounter of 11/30/22    Adult Transesophageal Echo (JERALD) W/ Cont if Necessary Per Protocol    Interpretation Summary  •  Left ventricular systolic function is normal. Estimated left ventricular EF = 55%  •  The aortic valve is structurally normal with no stenosis present. Trace aortic valve regurgitation is present. There is no evidence of an aortic valve mass is present.  •   There is mild, anterior mitral leaflet thickening present. No evidence of a mitral valve mass is present. Trace mitral valve regurgitation is present. No significant mitral valve stenosis is present  •  The tricuspid valve is normal in structure. There is no evidence of a mass on the tricuspid valve. Mild tricuspid valve regurgitation is present.  •  There is mild thickening of the pulmonic valve. There is trace pulmonic valve regurgitation present. There is no pulmonic valve stenosis present.      I have reviewed the medications:  Scheduled Meds:atorvastatin, 20 mg, Oral, Nightly  DAPTOmycin, 8 mg/kg, Intravenous, Q24H  insulin detemir, 10 Units, Subcutaneous, Nightly  insulin lispro, 0-7 Units, Subcutaneous, TID AC  Insulin Lispro, 2 Units, Subcutaneous, TID With Meals  methadone, 10 mg, Oral, Q6H  metoprolol tartrate, 25 mg, Oral, Q12H  senna-docusate sodium, 2 tablet, Oral, BID  sodium chloride, 10 mL, Intravenous, Q12H  sodium chloride, 10 mL, Intravenous, Q12H  tamsulosin, 0.4 mg, Oral, Nightly      Continuous Infusions:ropivacaine,       PRN Meds:.•  acetaminophen  •  senna-docusate sodium **AND** polyethylene glycol **AND** bisacodyl **AND** bisacodyl  •  dextrose  •  dextrose  •  glucagon (human recombinant)  •  hydrOXYzine  •  melatonin  •  ondansetron **OR** ondansetron  •  sennosides-docusate  •  sodium chloride  •  sodium chloride  •  sodium chloride    Assessment & Plan   Assessment & Plan     Active Hospital Problems    Diagnosis  POA   • **Right foot infection [L08.9]  Yes   • MRSA bacteremia [R78.81, B95.62]  Unknown   • Sepsis (HCC) [A41.9]  Yes   • Hypoglycemia [E16.2]  Yes   • Anemia [D64.9]  Yes   • Hyponatremia [E87.1]  Yes   • Hypoalbuminemia [E88.09]  Yes   • Essential hypertension [I10]  Yes   • Hyperlipidemia [E78.5]  Yes   • Paroxysmal atrial fibrillation (HCC) [I48.0]  Yes      Resolved Hospital Problems   No resolved problems to display.        Brief Hospital Course to date:  Buster  Zeke Velasco is a 57 y.o. male with hx of prior osteomyelitis s/p left BKA, s/p right transmetatarsal amputation, left hand 3rd metacarpal resection (April 2022 at OSH, had 6 weeks of IV antibiotics for MRSA bacteremia), remote hx of IVDA, more recent hx of polysubstance abuse (meth), and DVT s/p IVC filter who presents with right foot wounds. S/p right BKA on 12/2/22 with Dr. Peguero. His blood cultures grew MRSA. ID following and recommend at least 6 weeks of IV ATBx.     This patient's problems and plans were partially entered by my partner and updated as appropriate by me 12/14/22.    Sepsis secondary to acute right foot non-healing wound and likely osteomyelitis, resolved  MRSA bacteremia  - s/p right BKA 12/2/22 with Dr. Peguero  - ID following, currently on Daptomycin monotherapy  - Blood cultures with coag negative Staph and MRSA, have discussed with Dr. George, he will require IV antibiotics at discharge for at least 6 weeks but final duration TBD   - Repeat blood cultures NGTD x5d  - TTE no acute findings, JERALD 12/9/22 negative  - Probably not a good candidate for PICC line/home IV antibiotics given hx of substance abuse, current plan is discharge to rehab. Awaiting rehab referrals.      Recurrent hypoglycemia in setting of DMII, resolved  - HbA1c 6.9%  - High dose Tresiba qAM before arrival which has been held  - also on metformin at home  - continue levemir 10u qhs.+low dose SSI, mealtime humalog       Chronic pain on opioids   Previous IVDA/substance abuse  - Continue home methadone 10 mg QID  - Morphine for breakthrough post-op pain - discontinued 12/8/22 as he is not using it  - Patient denied hx of IVDA to me but in Care Everywhere admission notes from April 2022 at [place], they documented prior IVDA/substance abuse, most recently with meth in [facility] from 4/2/22; he has told ID that his last IVDA was 17 years ago     Reported hx of Afib   - Patient denies any hx of Afib. Not previously on  anticoagulation and given murky history will not start.   - On Metoprolol at home which is continued  - No evidence of Afib on telemetry, discontinued telemetry monitoring 12/10/22      Chronic vocal cord paralysis s/p PEG  Dysphagia  - SLP evaluation: regular diet but with nectar thick liquids recommended, patient initially wanted thin liquids and understood risk of aspiration, however currently has been drinking the nectar thick liquids; could change to pure comfort diet if he absolutely refuses nectar thick  - SLP should continue to follow for possible advancement of diet recommendations  - Unclear where/when PEG was placed, will eventually need it removed but defer to outpatient setting as it was not placed here; routine PEG care/flushing per nursing as needed     Hypotension with hx of HTN--resolved  - BP stable  - restart losartan/bumex as needed-hasn't required it thus far.     HLD   - Continue statin     DVT s/p IVC filter   - Placed in spring 2022 per patient. Defer to PCP for further assessment and time of removal.      Hx of seizure in setting of meth use   - Not on AED's given meth trigger     BPH   - restart home Flomax       Expected Discharge Location and Transportation: rehab  Expected Discharge Date: 12/15/22 anytime rehab placement is found.  Pt refuses to go to [facility] due to prior bad experience      DVT prophylaxis:  Mechanical DVT prophylaxis orders are present.     AM-PAC 6 Clicks Score (PT): 10 (12/14/22 0800)    CODE STATUS:   Code Status and Medical Interventions:   Ordered at: 12/01/22 0035     Code Status (Patient has no pulse and is not breathing):    CPR (Attempt to Resuscitate)     Medical Interventions (Patient has pulse or is breathing):    Full Support       PITER Carl  12/14/22                Electronically signed by Yesenia Rivera APRN at 12/14/22 1250       Consult Notes (last 48 hours)  Notes from 12/13/22 1152 through 12/15/22 1152   No notes of this type exist for  this encounter.       Physical Therapy Notes (last 24 hours)  Notes from 22 1152 through 12/15/22 1152   No notes exist for this encounter.            Occupational Therapy Notes (last 24 hours)      Joseline Lemon, OT at 22 1346          Patient Name: Buster Velasco  : 1965    MRN: [id number]                              Today's Date: 2022       Admit Date: 2022    Visit Dx:     ICD-10-CM ICD-9-CM   1. Right foot infection  L08.9 686.9   2. Hypoglycemia  E16.2 251.2   3. Decubitus ulcer of coccygeal region, unspecified ulcer stage  L89.159 707.03     707.20   4. Pharyngeal dysphagia  R13.13 787.23     Patient Active Problem List   Diagnosis   • Right foot infection   • Hypoglycemia   • Anemia   • Hyponatremia   • Hypoalbuminemia   • Essential hypertension   • Hyperlipidemia   • Paroxysmal atrial fibrillation (HCC)   • Sepsis (HCC)   • MRSA bacteremia     Past Medical History:   Diagnosis Date   • Diabetes (HCC)    • DVT (deep venous thrombosis) (HCC)    • Hypertension    • Mood disorder (HCC)      Past Surgical History:   Procedure Laterality Date   • BELOW KNEE AMPUTATION Left    • BELOW KNEE AMPUTATION Right 2022    Procedure: AMPUTATION BELOW KNEE RIGHT;  Surgeon: John Peguero MD;  Location: [facility];  Service: Vascular;  Laterality: Right;   • TRANS METATARSAL AMPUTATION Right       General Information     Row Name 22 1428          OT Time and Intention    Document Type therapy note (daily note)  -TB     Mode of Treatment occupational therapy;individual therapy  -TB     Row Name 22 1422          General Information    Patient Profile Reviewed yes  -TB     Existing Precautions/Restrictions fall;other (see comments)  B BKA, B shoulder replacements with limited AROM, L hand middle finger removed limiting functional grasp  -TB     Barriers to Rehab medically complex;previous functional deficit;physical barrier  -TB     Row Name 22 1421           Occupational Profile    Reason for Services/Referral (Occupational Profile) Occupational decline  -TB     Row Name 12/14/22 1428          Cognition    Orientation Status (Cognition) oriented x 4  -TB     Row Name 12/14/22 1428          Safety Issues, Functional Mobility    Safety Issues Affecting Function (Mobility) --  Pt has good insight into current funcational deficits and need for assist with mobility and self-care  -TB     Impairments Affecting Function (Mobility) balance;endurance/activity tolerance;pain;strength;postural/trunk control;range of motion (ROM);grasp  -TB           User Key  (r) = Recorded By, (t) = Taken By, (c) = Cosigned By    Initials Name Provider Type    Joseline Abdi OT Occupational Therapist                 Mobility/ADL's     Row Name 12/14/22 1433          Bed Mobility    Bed Mobility rolling left;rolling right  -TB     Rolling Left [place] (Bed Mobility) minimum assist (75% patient effort)  -TB     Rolling Right [place] (Bed Mobility) minimum assist (75% patient effort)  -TB     Comment, (Bed Mobility) Repositioned for comfort and activity  -TB     Row Name 12/14/22 1433          Activities of Daily Living    BADL Assessment/Intervention upper body dressing  -TB     Row Name 12/14/22 1433          Mobility    Extremity Weight-bearing Status right lower extremity  -TB     Left Lower Extremity (Weight-bearing Status) --  BKA 2006  -TB     Right Lower Extremity (Weight-bearing Status) non weight-bearing (NWB)  BKA POD #12  -TB     Row Name 12/14/22 1433          Upper Body Dressing Assessment/Training    [place] Level (Upper Body Dressing) doff;don;pajama/robe;set up  -TB     Position (Upper Body Dressing) sitting up in bed  -TB     Comment, (Upper Body Dressing) Assist to doff soiled gown and don clean  -TB           User Key  (r) = Recorded By, (t) = Taken By, (c) = Cosigned By    Initials Name Provider Type    TB Joseline Lemon OT Occupational  Therapist               Obj/Interventions     Row Name 12/14/22 1436          Shoulder (Therapeutic Exercise)    Shoulder (Therapeutic Exercise) AROM (active range of motion)  -     Shoulder AROM (Therapeutic Exercise) bilateral;flexion;extension;aBduction;aDduction;scapular retraction;10 repetitions;3 sets  -     Row Name 12/14/22 1436          Motor Skills    Therapeutic Exercise shoulder;elbow/forearm  Education reinforced for benefits of therapy, role of OT, and HEP to support function. Pt completed UB ther ex as noted below and 3x10 reps glute sets  -           User Key  (r) = Recorded By, (t) = Taken By, (c) = Cosigned By    Initials Name Provider Type    TB Joseline Lemon, LUCAS Occupational Therapist               Goals/Plan    No documentation.                Clinical Impression     Row Name 12/14/22 1437          Pain Assessment    Pain Intervention(s) Ambulation/increased activity;Repositioned  -TB     Additional Documentation Pain Scale: FACES Pre/Post-Treatment (Group)  -     Row Name 12/14/22 1437          Pain Scale: FACES Pre/Post-Treatment    Pain: FACES Scale, Pretreatment 2-->hurts little bit  -TB     Posttreatment Pain Rating 2-->hurts little bit  -TB     Pain Location - Side/Orientation Right  -TB     Pain Location - residual limb  -TB     Row Name 12/14/22 1437          Plan of Care Review    Plan of Care Reviewed With patient  -TB     Outcome Evaluation Pt participates well in therapy. Min A to reposition in bed for comfort and activity. BUE ther ex completed to support self-care. Glute sets compelted to support transfer training. Set-up simple UB ADL task. OT will continue to follow IP. Recommend IRF at d/c for best outcome.  -TB     Row Name 12/14/22 1437          Therapy Plan Review/Discharge Plan (OT)    Anticipated Discharge Disposition (OT) inpatient rehabilitation facility  -     Row Name 12/14/22 1437          Vital Signs    Pre Systolic BP Rehab --  RN cleared OT  -TB      O2 Delivery Pre Treatment room air  -TB     Pre Patient Position Supine  -TB     Post Patient Position Sitting  -TB     Row Name 12/14/22 1437          Positioning and Restraints    Pre-Treatment Position in bed  -TB     Post Treatment Position bed  -TB     In Bed notified nsg;fowlers;call light within reach;encouraged to call for assist;exit alarm on;RLE elevated  -TB           User Key  (r) = Recorded By, (t) = Taken By, (c) = Cosigned By    Initials Name Provider Type    Joseline Abdi OT Occupational Therapist               Outcome Measures     Row Name 12/14/22 1441          How much help from another is currently needed...    Putting on and taking off regular lower body clothing? 1  -TB     Bathing (including washing, rinsing, and drying) 2  -TB     Toileting (which includes using toilet bed pan or urinal) 2  -TB     Putting on and taking off regular upper body clothing 2  -TB     Taking care of personal grooming (such as brushing teeth) 3  -TB     Eating meals 4  -TB     AM-PAC 6 Clicks Score (OT) 14  -TB     Row Name 12/14/22 0800          How much help from another person do you currently need...    Turning from your back to your side while in flat bed without using bedrails? 3  -AC     Moving from lying on back to sitting on the side of a flat bed without bedrails? 3  -AC     Moving to and from a bed to a chair (including a wheelchair)? 1  -AC     Standing up from a chair using your arms (e.g., wheelchair, bedside chair)? 1  -AC     Climbing 3-5 steps with a railing? 1  -AC     To walk in hospital room? 1  -AC     AM-PAC 6 Clicks Score (PT) 10  -AC     Highest level of mobility 4 --> Transferred to chair/commode  -AC     Row Name 12/14/22 1441          Functional Assessment    Outcome Measure Options AM-PAC 6 Clicks Daily Activity (OT)  -TB           User Key  (r) = Recorded By, (t) = Taken By, (c) = Cosigned By    Initials Name Provider Type    Joseline Abdi OT Occupational  Therapist    Deb Jean-Baptiste RN Registered Nurse                Occupational Therapy Education     Title: PT OT SLP Therapies (Done)     Topic: Occupational Therapy (Done)     Point: ADL training (Done)     Description:   Instruct learner(s) on proper safety adaptation and remediation techniques during self care or transfers.   Instruct in proper use of assistive devices.              Learning Progress Summary           Patient Acceptance, E,D, VU,DU,NR by TB at 12/14/2022 1441    Acceptance, E, VU by MR at 12/11/2022 1448    Eager, E, VU,DU by SC at 12/7/2022 1115    Comment: REVIEWED HEP    Acceptance, E, VU by MR at 12/7/2022 1110                   Point: Home exercise program (Done)     Description:   Instruct learner(s) on appropriate technique for monitoring, assisting and/or progressing therapeutic exercises/activities.              Learning Progress Summary           Patient Acceptance, E,D, VU,DU,NR by TB at 12/14/2022 1441    Acceptance, E, VU by MR at 12/11/2022 1448    Eager, E, VU,DU by SC at 12/7/2022 1115    Comment: REVIEWED HEP    Acceptance, E, VU by MR at 12/7/2022 1110                   Point: Precautions (Done)     Description:   Instruct learner(s) on prescribed precautions during self-care and functional transfers.              Learning Progress Summary           Patient Acceptance, E,D, VU,DU,NR by TB at 12/14/2022 1441    Acceptance, E, VU by MR at 12/11/2022 1448    Eager, E, VU,DU by SC at 12/7/2022 1115    Comment: REVIEWED HEP    Acceptance, E, VU by MR at 12/7/2022 1110                   Point: Body mechanics (Done)     Description:   Instruct learner(s) on proper positioning and spine alignment during self-care, functional mobility activities and/or exercises.              Learning Progress Summary           Patient Acceptance, E, VU by MR at 12/11/2022 1448    Eager, E, VU,DU by SC at 12/7/2022 1115    Comment: REVIEWED HEP    Acceptance, E, VU by MR at 12/7/2022 1110                                User Key     Initials Effective Dates Name Provider Type Discipline    SC 06/16/21 -  Shamika Patel, PT Physical Therapist PT    TB 06/16/21 -  Joseline Lemon OT Occupational Therapist OT    MR 09/22/22 -  Marilou Lennon OT Occupational Therapist OT              OT Recommendation and Plan     Plan of Care Review  Plan of Care Reviewed With: patient  Outcome Evaluation: Pt participates well in therapy. Min A to reposition in bed for comfort and activity. BUE ther ex completed to support self-care. Glute sets compelted to support transfer training. Set-up simple UB ADL task. OT will continue to follow IP. Recommend IRF at d/c for best outcome.     Time Calculation:    Time Calculation- OT     Row Name 12/14/22 1346             Time Calculation- OT    OT Start Time 1346  -TB      OT Received On 12/14/22  -TB      OT Goal Re-Cert Due Date 12/17/22  -TB         Timed Charges    35520 - OT Therapeutic Exercise Minutes 14  -TB      79298 - OT Self Care/Mgmt Minutes 10  -TB         Total Minutes    Timed Charges Total Minutes 24  -TB       Total Minutes 24  -TB            User Key  (r) = Recorded By, (t) = Taken By, (c) = Cosigned By    Initials Name Provider Type    TB Joseline Lemon, OT Occupational Therapist              Therapy Charges for Today     Code Description Service Date Service Provider Modifiers Qty    33463178581 HC OT THER PROC EA 15 MIN 12/14/2022 Joseline Lemon OT GO 1    41392387551 HC OT SELF CARE/MGMT/TRAIN EA 15 MIN 12/14/2022 Joseline Lemon OT GO 1               Joseline Lemon OT  12/14/2022    Electronically signed by Joseline Lemon OT at 12/14/22 1443     Joseline Lemon OT at 12/14/22 1346          Problem: Adult Inpatient Plan of Care  Goal: Plan of Care Review  Recent Flowsheet Documentation  Taken 12/14/2022 1437 by Joseline Lemon OT  Plan of Care Reviewed With: patient  Outcome Evaluation: Pt participates well  in therapy. Min A to reposition in bed for comfort and activity. BUE ther ex completed to support self-care. Glute sets compelted to support transfer training. Set-up simple UB ADL task. OT will continue to follow IP. Recommend IRF at d/c for best outcome.       Electronically signed by Joseline Lemon OT at 12/14/22 9999

## 2022-12-16 LAB
GLUCOSE BLDC GLUCOMTR-MCNC: 147 MG/DL (ref 70–130)
GLUCOSE BLDC GLUCOMTR-MCNC: 158 MG/DL (ref 70–130)
GLUCOSE BLDC GLUCOMTR-MCNC: 161 MG/DL (ref 70–130)
GLUCOSE BLDC GLUCOMTR-MCNC: 243 MG/DL (ref 70–130)

## 2022-12-16 PROCEDURE — 99232 SBSQ HOSP IP/OBS MODERATE 35: CPT | Performed by: NURSE PRACTITIONER

## 2022-12-16 PROCEDURE — 82962 GLUCOSE BLOOD TEST: CPT

## 2022-12-16 PROCEDURE — 99024 POSTOP FOLLOW-UP VISIT: CPT | Performed by: THORACIC SURGERY (CARDIOTHORACIC VASCULAR SURGERY)

## 2022-12-16 PROCEDURE — 63710000001 INSULIN LISPRO (HUMAN) PER 5 UNITS: Performed by: HOSPITALIST

## 2022-12-16 PROCEDURE — 63710000001 INSULIN DETEMIR PER 5 UNITS: Performed by: PEDIATRICS

## 2022-12-16 PROCEDURE — 25010000002 DAPTOMYCIN PER 1 MG: Performed by: INTERNAL MEDICINE

## 2022-12-16 PROCEDURE — 63710000001 INSULIN LISPRO (HUMAN) PER 5 UNITS: Performed by: NURSE PRACTITIONER

## 2022-12-16 RX ORDER — CALCIUM CARBONATE 200(500)MG
2 TABLET,CHEWABLE ORAL 3 TIMES DAILY PRN
Status: DISCONTINUED | OUTPATIENT
Start: 2022-12-16 | End: 2023-01-06 | Stop reason: HOSPADM

## 2022-12-16 RX ADMIN — HYDROXYZINE HYDROCHLORIDE 50 MG: 25 TABLET ORAL at 20:35

## 2022-12-16 RX ADMIN — METOPROLOL TARTRATE 25 MG: 25 TABLET, FILM COATED ORAL at 09:13

## 2022-12-16 RX ADMIN — ATORVASTATIN CALCIUM 20 MG: 20 TABLET, FILM COATED ORAL at 20:30

## 2022-12-16 RX ADMIN — DAPTOMYCIN 800 MG: 500 INJECTION, POWDER, LYOPHILIZED, FOR SOLUTION INTRAVENOUS at 09:13

## 2022-12-16 RX ADMIN — INSULIN LISPRO 2 UNITS: 100 INJECTION, SOLUTION INTRAVENOUS; SUBCUTANEOUS at 12:21

## 2022-12-16 RX ADMIN — INSULIN DETEMIR 10 UNITS: 100 INJECTION, SOLUTION SUBCUTANEOUS at 20:30

## 2022-12-16 RX ADMIN — INSULIN LISPRO 2 UNITS: 100 INJECTION, SOLUTION INTRAVENOUS; SUBCUTANEOUS at 09:13

## 2022-12-16 RX ADMIN — Medication 10 ML: at 09:13

## 2022-12-16 RX ADMIN — INSULIN LISPRO 2 UNITS: 100 INJECTION, SOLUTION INTRAVENOUS; SUBCUTANEOUS at 16:37

## 2022-12-16 RX ADMIN — METHADONE HYDROCHLORIDE 10 MG: 10 TABLET ORAL at 13:27

## 2022-12-16 RX ADMIN — METHADONE HYDROCHLORIDE 10 MG: 10 TABLET ORAL at 20:30

## 2022-12-16 RX ADMIN — INSULIN LISPRO 2 UNITS: 100 INJECTION, SOLUTION INTRAVENOUS; SUBCUTANEOUS at 09:14

## 2022-12-16 RX ADMIN — METOPROLOL TARTRATE 25 MG: 25 TABLET, FILM COATED ORAL at 20:30

## 2022-12-16 RX ADMIN — METHADONE HYDROCHLORIDE 10 MG: 10 TABLET ORAL at 02:40

## 2022-12-16 RX ADMIN — TAMSULOSIN HYDROCHLORIDE 0.4 MG: 0.4 CAPSULE ORAL at 20:30

## 2022-12-16 RX ADMIN — METHADONE HYDROCHLORIDE 10 MG: 10 TABLET ORAL at 09:13

## 2022-12-16 RX ADMIN — SENNOSIDES AND DOCUSATE SODIUM 2 TABLET: 50; 8.6 TABLET ORAL at 20:29

## 2022-12-16 NOTE — PLAN OF CARE
Goal Outcome Evaluation:  Plan of Care Reviewed With: patient        Progress: improving   Pt aox4, vss, room air, dressing to RLE CDI, no c/o pain, repositioned q2h, IS encouraged, voids spontaneously, plan is rehab at [facility] following.

## 2022-12-16 NOTE — PROGRESS NOTES
[facility] Progress Note        Antibiotics:  Anti-Infectives (From admission, onward)    Ordered     Dose/Rate Route Frequency Start Stop    12/07/22 0603  DAPTOmycin (CUBICIN) 800 mg in sodium chloride 0.9 % 50 mL IVPB        Ordering Provider: Zeke George MD    8 mg/kg × 100 kg  100 mL/hr over 30 Minutes Intravenous Every 24 Hours 12/07/22 1000 12/29/22 0959    11/30/22 2219  vancomycin 2000 mg/500 mL 0.9% NS IVPB ([facility])        Ordering Provider: Reggie Batres MD    20 mg/kg × 98.9 kg  over 2 Hours Intravenous Once 11/30/22 2221 12/01/22 0315    11/30/22 2219  piperacillin-tazobactam (ZOSYN) 3.375 g in iso-osmotic dextrose 50 ml (premix)        Ordering Provider: Reggie Batres MD    3.375 g Intravenous Once 11/30/22 2221 11/30/22 2335          CC: foot infection    HPI:    Patient is a 57 y.o.  Yr old male with history of prior lower extremity infection/amputations done in [facility].  He has a history of left BKA years ago.  More recent right transmetatarsal amputation but specific dates unclear and unclear who the surgeon was.  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  He has history DVT/IVC filter, diabetes and other comorbidities as below.  He reports a chronic right lateral foot wound present for months but apparently not precipitated by any specific event or trauma.  He is admitted to the hospital on November 30 with deterioration at the foot including redness/swelling    **patient had reported remote drug abuse (initially to me reported as IV drug abuse but then later he reported not injection use and I am unable to corroborate otherwise at prsent; +drug screen earlier this year per d/w Dr Chau for Meth at [facility] and reported by them to be IVDA/substance abuse),   " chronic methadone. He reported to me this was 17 years ago and therefore some inconsistencies    ** patient reports MRSA blood stream infection treated in [place] spring of 2022, 6 weeks of vancomycin by his report.  Otherwise data deficit.  Try to retrieve information     12/2/22 Dr Peguero did surgery  \"Procedure(s): Mid level right below-knee amputation and primary closure \"    12/6 with MRSA in blood culture;  TTE done but limited per cardiology    12/9/22   JERALD no vegetation per cardiology    12/16/22 seen Rowena and sleepy.  Nursing reports no new distress;  Case management considering options.   postop pain to the right LE which is controlled/dull at present, constant, nonradiating, worse with palpation, generally better with pain meds and 2  out of 10 in severity.  Not progressive     No headache photophobia or neck stiffness.  No shortness of breath cough or hemoptysis.  No nausea vomiting diarrhea or abdominal pain.  No dysuria hematuria or pyuria.  No other new skin rash       ROS:      12/16/22 No f/c/s. No n/v/d. No rash.      Constitutional-- No Fever, chills or sweats.  Appetite good, and no malaise. No fatigue.  Heent--chronic hoarse voice.  No new vision, hearing or throat complaints.  No epistaxis or oral sores.   No flashers, floaters or eye pain.   No headache, photophobia or neck stiffness.  Chronic PEG tube  CV-- No chest pain, palpitation or syncope  Resp-- No SOB/cough/Hemoptysis  GI- No nausea, vomiting, or diarrhea.  No hematochezia, melena, or hematemesis. Denies jaundice or chronic liver disease.  -- No dysuria, hematuria, or flank pain.  Denies hesitancy, urgency or flank pain.  Lymph- no swollen lymph nodes in neck/axilla or groin.   Heme- No active bruising or bleeding; no Hx of DVT or PE.  MS-- no swelling or pain in the bones or joints of arms/legs.  No new back pain.  Neuro-- No acute focal weakness or numbness in the arms or legs.  No seizures.     Full 12 point review of " "systems reviewed and negative otherwise for acute complaints, except for above      PE:   /85 (BP Location: Left arm, Patient Position: Lying)   Pulse 65   Temp 98 °F (36.7 °C) (Oral)   Resp 16   Ht 193 cm (75.98\")   Wt 100 kg (220 lb 7.4 oz)   SpO2 93%   BMI 26.85 kg/m²          GENERAL:  Sleepy;  in no acute distress.  chronic Hoarse voice noted and chronic per him   HEENT: Normocephalic, atraumatic.   No conjunctival injection. No icterus. Oropharynx   without evidence of thrush or exudate. No evidence of peridontal disease.     HEART: RRR; No murmur, rubs, gallops.   LUNGS: Diminished at bases but otherwise clear to auscultation bilaterally without wheezing, rales, rhonchi. Normal respiratory effort. Nonlabored. No dullness.  ABDOMEN: Soft, nontender, nondistended. Positive bowel sounds. No rebound or guarding. NO mass or HSM.  PEG tube noted  EXT:  No cyanosis, clubbing;No cord.   MSK: FROM without joint effusions noted arms/legs.    SKIN: Warm and dry without cutaneous eruptions on Inspection/palpation.    NEURO: sleepy     Right lower extremity surgical site noted;  No new redness;  no discrete mass bulge or fluctuance.  No crepitus or bulla.       No peripheral stigmata/phenomenon of endocarditis     IV without obvious redness or drainage     Left BKA site with no redness induration or warmth.  No discrete mass bulge or fluctuance.    Laboratory Data    Results from last 7 days   Lab Units 12/15/22  0419 12/12/22  0353   WBC 10*3/mm3 6.86 7.83   HEMOGLOBIN g/dL 9.3* 9.4*   HEMATOCRIT % 30.3* 31.5*   PLATELETS 10*3/mm3 379 355     Results from last 7 days   Lab Units 12/15/22  0419   SODIUM mmol/L 139   POTASSIUM mmol/L 4.0   CHLORIDE mmol/L 103   CO2 mmol/L 24.0   BUN mg/dL 13   CREATININE mg/dL 0.65*   GLUCOSE mg/dL 118*   CALCIUM mg/dL 9.6     Results from last 7 days   Lab Units 12/15/22  0419   ALK PHOS U/L 79   BILIRUBIN mg/dL 0.2   ALT (SGPT) U/L 20   AST (SGOT) U/L 17           "     Estimated Creatinine Clearance: 175.2 mL/min (A) (by C-G formula based on SCr of 0.65 mg/dL (L)).      Microbiology:      Radiology:  Imaging Results (Last 72 Hours)     ** No results found for the last 72 hours. **            Impression:     --Acute right foot/ankle with multifocal ulceration, cellulitis/wound infection and probable osteomyelitis with probe to bone at the lateral foot and abnormal MRI.  Other comorbidities as outlined above and high risk for further serious morbidity and other serious sequelae including persistent/recurrent or nonhealing wounds, persistent/progressive or recurrent infection and risk for further functional/limb loss/amputation.  Surgery following to help define timing/option of threshold for any future surgical intervention .      **Surgery 12/2 with BKA    --MRSA bacteremia from November 30 (daptomycin sensitive).  Presumed from foot but also risk for endovascular focus particularly with  History of prior MRSA bloodstream infection.  Transthoracic echocardiogram limited per cardiology.  JERALD no vegetation per cardiology; follow-up blood cultures negative so far from December 6    **No  new metastatic foci of involvement as of December 16    --History MRSA with bacteremia by his report in spring/summer 2022 and treated with 6 weeks of vancomycin in [place].  Specifics unclear and trying to retrieve information    --coag-negative staph in blood culture likely contaminant     --Chronic vocal cord paralysis per notes with chronic hoarseness and indwelling PEG tube.  Medicine team to clarify     --History left BKA.     --Diabetes.  You need to tightly control blood sugar to give best chance for healing     --History of DVT with IVC filter    --history of remote drug abuse Per his reports to me; he denies injection drug abuse for 17 years by his report to me although as above, medicine team has find records indicating more recent abuse in 2022 [facility],  [facility]     PLAN:      -- Daptomycin  IV potentially 4-6 weeks but final duration to depend on clinical course of study results and response to therapy     -- Check/review labs cultures and scans     -- Partial history per nursing staff     -- Discussed with microbiology and family     --d/w Dr Peguero       -- Highly complex set of issues with high risk for further serious morbidity and other serious sequela     --JERALD noted    --  continue to consider options ; apparently limited     **Copied text in this note has been reviewed and is accurate as of 12/16/22.     Zeke George MD  12/16/2022

## 2022-12-16 NOTE — PLAN OF CARE
Problem: Adult Inpatient Plan of Care  Goal: Absence of Hospital-Acquired Illness or Injury  Intervention: Prevent Infection  Recent Flowsheet Documentation  Taken 12/16/2022 0415 by Brandy Frias RN  Infection Prevention: rest/sleep promoted  Taken 12/16/2022 0240 by Brandy Frias RN  Infection Prevention: rest/sleep promoted  Taken 12/16/2022 0000 by Brandy Frias RN  Infection Prevention: rest/sleep promoted  Taken 12/15/2022 2200 by Brandy Frias RN  Infection Prevention: rest/sleep promoted  Taken 12/15/2022 2007 by Brandy Frais RN  Infection Prevention: rest/sleep promoted     Problem: Adult Inpatient Plan of Care  Goal: Optimal Comfort and Wellbeing  Intervention: Provide Person-Centered Care  Recent Flowsheet Documentation  Taken 12/15/2022 2007 by Brandy Frias RN  Trust Relationship/Rapport:   care explained   choices provided   thoughts/feelings acknowledged   Goal Outcome Evaluation:   Pt A&OX4 with VSS and pain controled on methadone. He appears to have rested well most of the shift. Will CMT and provide care.

## 2022-12-16 NOTE — PROGRESS NOTES
[facility] Medicine Services  PROGRESS NOTE    Patient Name: Buster Velasco  : 1965  MRN: [id number]    Date of Admission: 2022  Primary Care Physician: Ludivina Bowers MD    Subjective   Subjective     CC:  F/U s/p right BKA    HPI:    Resting in bed this morning.  Complaining of significant heart burn this morning. Requesting something for that.        ROS:  Gen- No fevers, chills  CV- No chest pain, palpitations  Resp- No cough, dyspnea  GI- No N/V/D, abd pain          Objective   Objective     Vital Signs:   Temp:  [97.8 °F (36.6 °C)-98.1 °F (36.7 °C)] 98.1 °F (36.7 °C)  Heart Rate:  [64-79] 66  Resp:  [16] 16  BP: ()/(70-85) 92/79     Physical Exam:  Constitutional: No acute distress, awake, alert, upright in bed, family at bedside.   HENT: NCAT, mucous membranes moist  Respiratory: Clear to auscultation bilaterally, respiratory effort normal on room air  Cardiovascular: RRR, no murmurs, rubs, or gallops  Gastrointestinal: Positive bowel sounds, soft, nontender, nondistended  Musculoskeletal: No lower ext edema  Psychiatric: Appropriate affect, cooperative  Neurologic: Oriented x 3, [place], speech clear  Skin: No rashes noted.  Left stump intact. Right stump c/d/i- no drainage noted   No significant change in exam from 12/15      Results Reviewed:  LAB RESULTS:      Lab 12/15/22  0419 22  0353   WBC 6.86 7.83   HEMOGLOBIN 9.3* 9.4*   HEMATOCRIT 30.3* 31.5*   PLATELETS 379 355   MCV 85.4 86.3         Lab 12/15/22  0419 22  0353   SODIUM 139 139   POTASSIUM 4.0 4.2   CHLORIDE 103 103   CO2 24.0 25.0   ANION GAP 12.0 11.0   BUN 13 15   CREATININE 0.65* 0.56*   EGFR 109.2 114.3   GLUCOSE 118* 189*   CALCIUM 9.6 9.6         Brief Urine Lab Results  (Last result in the past 365 days)      Color   Clarity   Blood   Leuk Est   Nitrite   Protein   CREAT   Urine HCG        22 0108 Yellow   Clear   Negative   Negative     Negative                  Microbiology Results Abnormal     Procedure Component Value - Date/Time    Blood Culture - Blood, Wrist, Left [[id number]]  (Normal) Collected: 12/06/22 1402    Lab Status: Final result Specimen: Blood from Wrist, Left Updated: 12/11/22 1545     Blood Culture No growth at 5 days    Blood Culture - Blood, Arm, Left [[id number]]  (Normal) Collected: 12/06/22 1402    Lab Status: Final result Specimen: Blood from Arm, Left Updated: 12/11/22 1545     Blood Culture No growth at 5 days          No radiology results from the last 24 hrs    Results for orders placed during the hospital encounter of 11/30/22    Adult Transesophageal Echo (JERALD) W/ Cont if Necessary Per Protocol    Interpretation Summary  •  Left ventricular systolic function is normal. Estimated left ventricular EF = 55%  •  The aortic valve is structurally normal with no stenosis present. Trace aortic valve regurgitation is present. There is no evidence of an aortic valve mass is present.  •  There is mild, anterior mitral leaflet thickening present. No evidence of a mitral valve mass is present. Trace mitral valve regurgitation is present. No significant mitral valve stenosis is present  •  The tricuspid valve is normal in structure. There is no evidence of a mass on the tricuspid valve. Mild tricuspid valve regurgitation is present.  •  There is mild thickening of the pulmonic valve. There is trace pulmonic valve regurgitation present. There is no pulmonic valve stenosis present.      I have reviewed the medications:  Scheduled Meds:atorvastatin, 20 mg, Oral, Nightly  DAPTOmycin, 8 mg/kg, Intravenous, Q24H  insulin detemir, 10 Units, Subcutaneous, Nightly  insulin lispro, 0-7 Units, Subcutaneous, TID AC  Insulin Lispro, 2 Units, Subcutaneous, TID With Meals  methadone, 10 mg, Oral, Q6H  metoprolol tartrate, 25 mg, Oral, Q12H  senna-docusate sodium, 2 tablet, Oral, BID  sodium chloride, 10 mL, Intravenous, Q12H  sodium chloride, 10 mL, Intravenous,  Q12H  tamsulosin, 0.4 mg, Oral, Nightly      Continuous Infusions:ropivacaine,       PRN Meds:.•  acetaminophen  •  senna-docusate sodium **AND** polyethylene glycol **AND** bisacodyl **AND** bisacodyl  •  calcium carbonate  •  dextrose  •  dextrose  •  glucagon (human recombinant)  •  hydrOXYzine  •  magnesium hydroxide  •  melatonin  •  ondansetron **OR** ondansetron  •  sennosides-docusate  •  sodium chloride  •  sodium chloride  •  sodium chloride    Assessment & Plan   Assessment & Plan     Active Hospital Problems    Diagnosis  POA   • **Right foot infection [L08.9]  Yes   • MRSA bacteremia [R78.81, B95.62]  Unknown   • Sepsis (HCC) [A41.9]  Yes   • Hypoglycemia [E16.2]  Yes   • Anemia [D64.9]  Yes   • Hyponatremia [E87.1]  Yes   • Hypoalbuminemia [E88.09]  Yes   • Essential hypertension [I10]  Yes   • Hyperlipidemia [E78.5]  Yes   • Paroxysmal atrial fibrillation (HCC) [I48.0]  Yes      Resolved Hospital Problems   No resolved problems to display.        Brief Hospital Course to date:  Buster Velasco is a 57 y.o. male with hx of prior osteomyelitis s/p left BKA, s/p right transmetatarsal amputation, left hand 3rd metacarpal resection (April 2022 at OSH, had 6 weeks of IV antibiotics for MRSA bacteremia), remote hx of IVDA, more recent hx of polysubstance abuse (meth), and DVT s/p IVC filter who presents with right foot wounds. S/p right BKA on 12/2/22 with Dr. Peguero. His blood cultures grew MRSA. ID following and recommend at least 6 weeks of IV ATBx.     This patient's problems and plans were partially entered by my partner and updated as appropriate by me 12/16/22.    Sepsis secondary to acute right foot non-healing wound and likely osteomyelitis, resolved  MRSA bacteremia  - s/p right BKA 12/2/22 with Dr. Peguero  - ID following, currently on Daptomycin monotherapy  - Blood cultures with coag negative Staph and MRSA, have discussed with Dr. George, he will require IV antibiotics at discharge for  at least 6 weeks but final duration TBD   - Repeat blood cultures NGTD x5d  - TTE no acute findings, JERALD 12/9/22 negative  - Probably not a good candidate for PICC line/home IV antibiotics given hx of substance abuse, current plan is discharge to rehab.Referral has been placed to [facility] in [place].      Recurrent hypoglycemia in setting of DMII, resolved  - HbA1c 6.9%  - High dose Tresiba qAM before arrival which has been held  - also on metformin at home  - continue levemir 10u qhs.+low dose SSI, mealtime humalog    Heartburn  -will order milk of magnesia, tums.       Chronic pain on opioids   Previous IVDA/substance abuse  - Continue home methadone 10 mg QID  - Morphine for breakthrough post-op pain - discontinued 12/8/22 as he is not using it  - Patient denied hx of IVDA to me but in Care Everywhere admission notes from April 2022 at [facility], they documented prior IVDA/substance abuse, most recently with meth in [facility] from 4/2/22; he has told ID that his last IVDA was 17 years ago     Reported hx of Afib   - Patient denies any hx of Afib. Not previously on anticoagulation and given murky history will not start.   - On Metoprolol at home which is continued  - No evidence of Afib on telemetry, discontinued telemetry monitoring 12/10/22      Chronic vocal cord paralysis s/p PEG  Dysphagia  - SLP evaluation: regular diet but with nectar thick liquids recommended, patient initially wanted thin liquids and understood risk of aspiration, however currently has been drinking the nectar thick liquids; could change to pure comfort diet if he absolutely refuses nectar thick  - SLP should continue to follow for possible advancement of diet recommendations  - Unclear where/when PEG was placed, will eventually need it removed but defer to outpatient setting as it was not placed here; routine PEG care/flushing per nursing as needed     Hypotension with hx of HTN--resolved  - BP stable  - restart losartan/bumex as  needed-hasn't required it thus far.     HLD   - Continue statin     DVT s/p IVC filter   - Placed in spring 2022 per patient. Defer to PCP for further assessment and time of removal.      Hx of seizure in setting of meth use   - Not on AED's given meth trigger     BPH   - restarted home Flomax       Expected Discharge Location and Transportation: rehab  Expected Discharge Date: 12/17/22 anytime rehab placement is found.  Pt refuses to go to [facility] due to prior bad experience      DVT prophylaxis:  Mechanical DVT prophylaxis orders are present.     AM-PAC 6 Clicks Score (PT): 11 (12/15/22 1405)    CODE STATUS:   Code Status and Medical Interventions:   Ordered at: 12/01/22 0035     Code Status (Patient has no pulse and is not breathing):    CPR (Attempt to Resuscitate)     Medical Interventions (Patient has pulse or is breathing):    Full Support       Yesenia Rivera, PITER  12/16/22

## 2022-12-16 NOTE — CASE MANAGEMENT/SOCIAL WORK
"Continued Stay Note  [facility]     Patient Name: Buster Velasco  MRN: [id number]  Today's Date: 12/16/2022    Admit Date: 11/30/2022    Plan: [facility]   Discharge Plan     Row Name 12/16/22 1513       Plan    Plan [facility]    Patient/Family in Agreement with Plan yes    Plan Comments Kristy, admissions liaison with [facility], has called to inform me that Mr. Velasco needs to get out of bed with therapy prior to them being able to offer him a bed. I spoke with Mr. Velasco at the bedside and he states that he will \"try\". Case management will continue to follow.    Final Discharge Disposition Code 62 - inpatient rehab facility               Discharge Codes    No documentation.               Expected Discharge Date and Time     Expected Discharge Date Expected Discharge Time    Dec 17, 2022             Alan Downing RN    "

## 2022-12-16 NOTE — PROGRESS NOTES
Cardiothoracic Surgery Progress Note      POD #: 14-right below-knee amputation     LOS: 16 days      Subjective: Awake and alert    Objective:  Vital Signs vital signs below noted T-max past 24 hours 98.3 °F  Temp:  [97.8 °F (36.6 °C)-98.3 °F (36.8 °C)] 98.1 °F (36.7 °C)  Heart Rate:  [64-79] 64  Resp:  [16] 16  BP: (104-149)/(70-90) 107/73    Physical Exam:   General Appearance: Oriented x3   Lungs:   Heart:   Skin:   Incision: Amputation site dressing change.  Suture intact skin margin viable skin flaps are viable.     Results:  Results from last 7 days   Lab Units 12/15/22  0419   WBC 10*3/mm3 6.86   HEMOGLOBIN g/dL 9.3*   HEMATOCRIT % 30.3*   PLATELETS 10*3/mm3 379     Results from last 7 days   Lab Units 12/15/22  0419   SODIUM mmol/L 139   POTASSIUM mmol/L 4.0   CHLORIDE mmol/L 103   CO2 mmol/L 24.0   BUN mg/dL 13   CREATININE mg/dL 0.65*   GLUCOSE mg/dL 118*   CALCIUM mg/dL 9.6         Assessment: #1 postop day 14 right below-knee amputation healing well  2 status post remote left below-knee amputation for diabetic neuropathy  3.  Diabetes mellitus and neuropathy      Plan: Continue to address change amputation site.  Medical manage per hospitalist antibiotics per infectious disease.      John Peguero MD - 12/16/22 - 06:00 EST

## 2022-12-17 LAB
GLUCOSE BLDC GLUCOMTR-MCNC: 137 MG/DL (ref 70–130)
GLUCOSE BLDC GLUCOMTR-MCNC: 145 MG/DL (ref 70–130)
GLUCOSE BLDC GLUCOMTR-MCNC: 163 MG/DL (ref 70–130)
GLUCOSE BLDC GLUCOMTR-MCNC: 203 MG/DL (ref 70–130)

## 2022-12-17 PROCEDURE — 82962 GLUCOSE BLOOD TEST: CPT

## 2022-12-17 PROCEDURE — 63710000001 INSULIN LISPRO (HUMAN) PER 5 UNITS: Performed by: NURSE PRACTITIONER

## 2022-12-17 PROCEDURE — 63710000001 INSULIN LISPRO (HUMAN) PER 5 UNITS: Performed by: HOSPITALIST

## 2022-12-17 PROCEDURE — 99232 SBSQ HOSP IP/OBS MODERATE 35: CPT | Performed by: NURSE PRACTITIONER

## 2022-12-17 PROCEDURE — 25010000002 DAPTOMYCIN PER 1 MG: Performed by: INTERNAL MEDICINE

## 2022-12-17 PROCEDURE — 63710000001 INSULIN DETEMIR PER 5 UNITS: Performed by: PEDIATRICS

## 2022-12-17 PROCEDURE — 99024 POSTOP FOLLOW-UP VISIT: CPT | Performed by: THORACIC SURGERY (CARDIOTHORACIC VASCULAR SURGERY)

## 2022-12-17 RX ADMIN — METOPROLOL TARTRATE 25 MG: 25 TABLET, FILM COATED ORAL at 19:30

## 2022-12-17 RX ADMIN — INSULIN LISPRO 2 UNITS: 100 INJECTION, SOLUTION INTRAVENOUS; SUBCUTANEOUS at 08:37

## 2022-12-17 RX ADMIN — ATORVASTATIN CALCIUM 20 MG: 20 TABLET, FILM COATED ORAL at 19:30

## 2022-12-17 RX ADMIN — HYDROXYZINE HYDROCHLORIDE 50 MG: 25 TABLET ORAL at 21:44

## 2022-12-17 RX ADMIN — INSULIN LISPRO 2 UNITS: 100 INJECTION, SOLUTION INTRAVENOUS; SUBCUTANEOUS at 13:18

## 2022-12-17 RX ADMIN — Medication 10 ML: at 08:45

## 2022-12-17 RX ADMIN — Medication 10 ML: at 19:31

## 2022-12-17 RX ADMIN — INSULIN DETEMIR 10 UNITS: 100 INJECTION, SOLUTION SUBCUTANEOUS at 19:36

## 2022-12-17 RX ADMIN — METOPROLOL TARTRATE 25 MG: 25 TABLET, FILM COATED ORAL at 08:36

## 2022-12-17 RX ADMIN — TAMSULOSIN HYDROCHLORIDE 0.4 MG: 0.4 CAPSULE ORAL at 19:30

## 2022-12-17 RX ADMIN — METHADONE HYDROCHLORIDE 10 MG: 10 TABLET ORAL at 19:29

## 2022-12-17 RX ADMIN — DAPTOMYCIN 800 MG: 500 INJECTION, POWDER, LYOPHILIZED, FOR SOLUTION INTRAVENOUS at 08:43

## 2022-12-17 RX ADMIN — METHADONE HYDROCHLORIDE 10 MG: 10 TABLET ORAL at 01:52

## 2022-12-17 RX ADMIN — METHADONE HYDROCHLORIDE 10 MG: 10 TABLET ORAL at 08:37

## 2022-12-17 RX ADMIN — SENNOSIDES AND DOCUSATE SODIUM 2 TABLET: 50; 8.6 TABLET ORAL at 19:30

## 2022-12-17 RX ADMIN — METHADONE HYDROCHLORIDE 10 MG: 10 TABLET ORAL at 13:18

## 2022-12-17 RX ADMIN — INSULIN LISPRO 2 UNITS: 100 INJECTION, SOLUTION INTRAVENOUS; SUBCUTANEOUS at 17:17

## 2022-12-17 NOTE — PROGRESS NOTES
[facility] Progress Note        Antibiotics:  Anti-Infectives (From admission, onward)    Ordered     Dose/Rate Route Frequency Start Stop    12/07/22 0603  DAPTOmycin (CUBICIN) 800 mg in sodium chloride 0.9 % 50 mL IVPB        Ordering Provider: Zeke George MD    8 mg/kg × 100 kg  100 mL/hr over 30 Minutes Intravenous Every 24 Hours 12/07/22 1000 12/29/22 0959    11/30/22 2219  vancomycin 2000 mg/500 mL 0.9% NS IVPB ([facility])        Ordering Provider: Reggie Batres MD    20 mg/kg × 98.9 kg  over 2 Hours Intravenous Once 11/30/22 2221 12/01/22 0315    11/30/22 2219  piperacillin-tazobactam (ZOSYN) 3.375 g in iso-osmotic dextrose 50 ml (premix)        Ordering Provider: Reggie Batres MD    3.375 g Intravenous Once 11/30/22 2221 11/30/22 2335          CC: foot infection    HPI:    Patient is a 57 y.o.  Yr old male with history of prior lower extremity infection/amputations done in [facility].  He has a history of left BKA years ago.  More recent right transmetatarsal amputation but specific dates unclear and unclear who the surgeon was.  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  He has history DVT/IVC filter, diabetes and other comorbidities as below.  He reports a chronic right lateral foot wound present for months but apparently not precipitated by any specific event or trauma.  He is admitted to the hospital on November 30 with deterioration at the foot including redness/swelling    **patient had reported remote drug abuse (initially to me reported as IV drug abuse but then later he reported not injection use and I am unable to corroborate otherwise at prsent; +drug screen earlier this year per d/w Dr Chau for Meth at [facility] and reported by them to be IVDA/substance abuse),   " chronic methadone. He reported to me this was 17 years ago and therefore some inconsistencies    ** patient reports MRSA blood stream infection treated in [place] spring of 2022, 6 weeks of vancomycin by his report.  Otherwise data deficit.  Try to retrieve information     12/2/22 Dr Peguero did surgery  \"Procedure(s): Mid level right below-knee amputation and primary closure \"    12/6 with MRSA in blood culture;  TTE done but limited per cardiology    12/9/22   JERALD no vegetation per cardiology    12/17/22  Discussed with Dr. Peguero and no new suppurative change at surgical site.seen early and sleepy.  Nursing reports no new distress;  Case management considering options.   postop pain to the right LE which is controlled/dull at present, constant, nonradiating, worse with palpation, generally better with pain meds and 2  out of 10 in severity.  Not progressive     No headache photophobia or neck stiffness.  No shortness of breath cough or hemoptysis.  No nausea vomiting diarrhea or abdominal pain.  No dysuria hematuria or pyuria.  No other new skin rash       ROS:      12/17/22 per nursing,No f/c/s. No n/v/d. No rash.      Constitutional-- No Fever, chills or sweats.  Appetite good, and no malaise. No fatigue.  Heent--chronic hoarse voice.  No new vision, hearing or throat complaints.  No epistaxis or oral sores.   No flashers, floaters or eye pain.   No headache, photophobia or neck stiffness.  Chronic PEG tube  CV-- No chest pain, palpitation or syncope  Resp-- No SOB/cough/Hemoptysis  GI- No nausea, vomiting, or diarrhea.  No hematochezia, melena, or hematemesis. Denies jaundice or chronic liver disease.  -- No dysuria, hematuria, or flank pain.  Denies hesitancy, urgency or flank pain.  Lymph- no swollen lymph nodes in neck/axilla or groin.   Heme- No active bruising or bleeding; no Hx of DVT or PE.  MS-- no swelling or pain in the bones or joints of arms/legs.  No new back pain.  Neuro-- No acute " "focal weakness or numbness in the arms or legs.  No seizures.     Full 12 point review of systems reviewed and negative otherwise for acute complaints, except for above      PE: nursing/chaperone present  /79   Pulse 61   Temp 98.1 °F (36.7 °C)   Resp 18   Ht 193 cm (75.98\")   Wt 100 kg (220 lb 7.4 oz)   SpO2 96%   BMI 26.85 kg/m²          GENERAL:  Sleepy;  in no acute distress.  chronic Hoarse voice noted and chronic per him ; room air  HEENT: Normocephalic, atraumatic.   No conjunctival injection. No icterus. Oropharynx   without evidence of thrush or exudate. No evidence of peridontal disease.     HEART: RRR; No murmur, rubs, gallops.   LUNGS: Diminished at bases but otherwise clear to auscultation bilaterally without wheezing, rales, rhonchi. Normal respiratory effort. Nonlabored. No dullness.  ABDOMEN: Soft, nontender, nondistended. Positive bowel sounds. No rebound or guarding. NO mass or HSM.  PEG tube noted  EXT:  No cyanosis, clubbing;No cord.   MSK: FROM without joint effusions noted arms/legs.    SKIN: Warm and dry without cutaneous eruptions on Inspection/palpation.    NEURO: sleepy     Right lower extremity surgical site noted;  No new redness;  no discrete mass bulge or fluctuance.  No crepitus or bulla.       No peripheral stigmata/phenomenon of endocarditis     IV without obvious redness or drainage     Left BKA site with no redness induration or warmth.  No discrete mass bulge or fluctuance.    Laboratory Data    Results from last 7 days   Lab Units 12/15/22  0419 12/12/22  0353   WBC 10*3/mm3 6.86 7.83   HEMOGLOBIN g/dL 9.3* 9.4*   HEMATOCRIT % 30.3* 31.5*   PLATELETS 10*3/mm3 379 355     Results from last 7 days   Lab Units 12/15/22  0419   SODIUM mmol/L 139   POTASSIUM mmol/L 4.0   CHLORIDE mmol/L 103   CO2 mmol/L 24.0   BUN mg/dL 13   CREATININE mg/dL 0.65*   GLUCOSE mg/dL 118*   CALCIUM mg/dL 9.6     Results from last 7 days   Lab Units 12/15/22  0419   ALK PHOS U/L 79   BILIRUBIN " mg/dL 0.2   ALT (SGPT) U/L 20   AST (SGOT) U/L 17               Estimated Creatinine Clearance: 175.2 mL/min (A) (by C-G formula based on SCr of 0.65 mg/dL (L)).      Microbiology:      Radiology:  Imaging Results (Last 72 Hours)     ** No results found for the last 72 hours. **            Impression:     --Acute right foot/ankle with multifocal ulceration, cellulitis/wound infection and probable osteomyelitis with probe to bone at the lateral foot and abnormal MRI.  Other comorbidities as outlined above and high risk for further serious morbidity and other serious sequelae including persistent/recurrent or nonhealing wounds, persistent/progressive or recurrent infection and risk for further functional/limb loss/amputation.  Surgery following to help define timing/option of threshold for any future surgical intervention .      **Surgery 12/2 with BKA; discussed with Dr. Peguero    --MRSA bacteremia from November 30 (daptomycin sensitive).  Presumed from foot but also risk for endovascular focus particularly with  History of prior MRSA bloodstream infection.  Transthoracic echocardiogram limited per cardiology.  JERALD no vegetation per cardiology; follow-up blood cultures negative so far from December 6    **No  new metastatic foci of involvement as of December 17    --History MRSA with bacteremia by his report in spring/summer 2022 and treated with 6 weeks of vancomycin in [place].  Specifics unclear and trying to retrieve information    --coag-negative staph in blood culture likely contaminant     --Chronic vocal cord paralysis per notes with chronic hoarseness and indwelling PEG tube.  Medicine team to clarify     --History left BKA.     --Diabetes.  You need to tightly control blood sugar to give best chance for healing     --History of DVT with IVC filter    --history of remote drug abuse Per his reports to me; he denies injection drug abuse for 17 years by his report to me although as above, medicine team  has find records indicating more recent abuse in 2022 [facility] [place] [facility]     PLAN:      -- Daptomycin  IV potentially 4-6 weeks but final duration to depend on clinical course of study results and response to therapy     -- Check/review labs cultures and scans     -- Partial history per nursing staff     -- Discussed with microbiology and family     --d/w Dr Peguero; he sees no new suppurative change either       -- Highly complex set of issues with high risk for further serious morbidity and other serious sequela     --JERALD noted    --  continue to consider options      **Copied text in this note has been reviewed and is accurate as of 12/17/22.     Zeke George MD  12/17/2022

## 2022-12-17 NOTE — PROGRESS NOTES
Cardiothoracic Surgery Progress Note      POD #: 15-right below-knee amputation     LOS: 17 days      Subjective: Awake and alert    Objective:  Vital Signs vital signs below noted T-max past 24 hours 98.2 °F  Temp:  [97.9 °F (36.6 °C)-98.2 °F (36.8 °C)] 98.2 °F (36.8 °C)  Heart Rate:  [65-72] 69  Resp:  [16-18] 18  BP: ()/(79-98) 141/98    Physical Exam:   General Appearance: Seems oriented x3   Lungs:   Heart:   Skin:   Incision: Amputation site dressing change.  Suture intact skin margin viable skin flaps are viable.     Results:  Results from last 7 days   Lab Units 12/15/22  0419   WBC 10*3/mm3 6.86   HEMOGLOBIN g/dL 9.3*   HEMATOCRIT % 30.3*   PLATELETS 10*3/mm3 379     Results from last 7 days   Lab Units 12/15/22  0419   SODIUM mmol/L 139   POTASSIUM mmol/L 4.0   CHLORIDE mmol/L 103   CO2 mmol/L 24.0   BUN mg/dL 13   CREATININE mg/dL 0.65*   GLUCOSE mg/dL 118*   CALCIUM mg/dL 9.6         Assessment: #1.  Postop day 15 right below-knee amputation which is healing well  2.  Status post remote left below-knee amputation for diabetic neuropathy/gangrene  3.  Diabetic mellitus with neuropathy      Plan: Continue to dress amputation stump daily.  Medical manage per hospitalist.  Per antibiotics per infectious disease.      John Peguero MD - 12/17/22 - 06:15 EST

## 2022-12-17 NOTE — PROGRESS NOTES
[facility] Medicine Services  PROGRESS NOTE    Patient Name: Buster Velasco  : 1965  MRN: [id number]    Date of Admission: 2022  Primary Care Physician: Ludivina Bowers MD    Subjective   Subjective     CC:  F/U s/p right BKA    HPI:    Resting in bed this morning.  Heartburn improved. No other issues.  Awaiting word from rehab.     ROS:  Gen- No fevers, chills  CV- No chest pain, palpitations  Resp- No cough, dyspnea  GI- No N/V/D, abd pain      Objective   Objective     Vital Signs:   Temp:  [97.9 °F (36.6 °C)-98.2 °F (36.8 °C)] 98.1 °F (36.7 °C)  Heart Rate:  [61-72] 61  Resp:  [16-18] 18  BP: ()/(79-98) 116/79     Physical Exam:  Constitutional: No acute distress, awake, alert, upright in bed, no family at bedside.   HENT: NCAT, mucous membranes moist  Respiratory: Clear to auscultation bilaterally, respiratory effort normal on room air  Cardiovascular: RRR, no murmurs, rubs, or gallops  Gastrointestinal: Positive bowel sounds, soft, nontender, nondistended  Musculoskeletal: No lower ext edema  Psychiatric: Appropriate affect, cooperative  Neurologic: Oriented x 3, [place], speech clear  Skin: No rashes noted.  Left stump intact. Right stump c/d/i- no drainage noted   No significant change in exam from       Results Reviewed:  LAB RESULTS:      Lab 12/15/22  0419 22  0353   WBC 6.86 7.83   HEMOGLOBIN 9.3* 9.4*   HEMATOCRIT 30.3* 31.5*   PLATELETS 379 355   MCV 85.4 86.3         Lab 12/15/22  0419 22  0353   SODIUM 139 139   POTASSIUM 4.0 4.2   CHLORIDE 103 103   CO2 24.0 25.0   ANION GAP 12.0 11.0   BUN 13 15   CREATININE 0.65* 0.56*   EGFR 109.2 114.3   GLUCOSE 118* 189*   CALCIUM 9.6 9.6         Brief Urine Lab Results  (Last result in the past 365 days)      Color   Clarity   Blood   Leuk Est   Nitrite   Protein   CREAT   Urine HCG        22 0108 Yellow   Clear   Negative   Negative     Negative                 Microbiology Results  Abnormal     Procedure Component Value - Date/Time    Blood Culture - Blood, Wrist, Left [[id number]]  (Normal) Collected: 12/06/22 1402    Lab Status: Final result Specimen: Blood from Wrist, Left Updated: 12/11/22 1545     Blood Culture No growth at 5 days    Blood Culture - Blood, Arm, Left [[id number]]  (Normal) Collected: 12/06/22 1402    Lab Status: Final result Specimen: Blood from Arm, Left Updated: 12/11/22 1545     Blood Culture No growth at 5 days          No radiology results from the last 24 hrs    Results for orders placed during the hospital encounter of 11/30/22    Adult Transesophageal Echo (JERALD) W/ Cont if Necessary Per Protocol    Interpretation Summary  •  Left ventricular systolic function is normal. Estimated left ventricular EF = 55%  •  The aortic valve is structurally normal with no stenosis present. Trace aortic valve regurgitation is present. There is no evidence of an aortic valve mass is present.  •  There is mild, anterior mitral leaflet thickening present. No evidence of a mitral valve mass is present. Trace mitral valve regurgitation is present. No significant mitral valve stenosis is present  •  The tricuspid valve is normal in structure. There is no evidence of a mass on the tricuspid valve. Mild tricuspid valve regurgitation is present.  •  There is mild thickening of the pulmonic valve. There is trace pulmonic valve regurgitation present. There is no pulmonic valve stenosis present.      I have reviewed the medications:  Scheduled Meds:atorvastatin, 20 mg, Oral, Nightly  DAPTOmycin, 8 mg/kg, Intravenous, Q24H  insulin detemir, 10 Units, Subcutaneous, Nightly  insulin lispro, 0-7 Units, Subcutaneous, TID AC  Insulin Lispro, 2 Units, Subcutaneous, TID With Meals  methadone, 10 mg, Oral, Q6H  metoprolol tartrate, 25 mg, Oral, Q12H  senna-docusate sodium, 2 tablet, Oral, BID  sodium chloride, 10 mL, Intravenous, Q12H  sodium chloride, 10 mL, Intravenous, Q12H  tamsulosin, 0.4 mg, Oral,  Nightly      Continuous Infusions:ropivacaine,       PRN Meds:.•  acetaminophen  •  senna-docusate sodium **AND** polyethylene glycol **AND** bisacodyl **AND** bisacodyl  •  calcium carbonate  •  dextrose  •  dextrose  •  glucagon (human recombinant)  •  hydrOXYzine  •  magnesium hydroxide  •  melatonin  •  ondansetron **OR** ondansetron  •  sennosides-docusate  •  sodium chloride  •  sodium chloride  •  sodium chloride    Assessment & Plan   Assessment & Plan     Active Hospital Problems    Diagnosis  POA   • **Right foot infection [L08.9]  Yes   • MRSA bacteremia [R78.81, B95.62]  Unknown   • Sepsis (HCC) [A41.9]  Yes   • Hypoglycemia [E16.2]  Yes   • Anemia [D64.9]  Yes   • Hyponatremia [E87.1]  Yes   • Hypoalbuminemia [E88.09]  Yes   • Essential hypertension [I10]  Yes   • Hyperlipidemia [E78.5]  Yes   • Paroxysmal atrial fibrillation (HCC) [I48.0]  Yes      Resolved Hospital Problems   No resolved problems to display.        Brief Hospital Course to date:  Buster Velasco is a 57 y.o. male with hx of prior osteomyelitis s/p left BKA, s/p right transmetatarsal amputation, left hand 3rd metacarpal resection (April 2022 at OSH, had 6 weeks of IV antibiotics for MRSA bacteremia), remote hx of IVDA, more recent hx of polysubstance abuse (meth), and DVT s/p IVC filter who presents with right foot wounds. S/p right BKA on 12/2/22 with Dr. Peguero. His blood cultures grew MRSA. ID following and recommend at least 6 weeks of IV ATBx.     This patient's problems and plans were partially entered by my partner and updated as appropriate by me 12/17/22.    Sepsis secondary to acute right foot non-healing wound and likely osteomyelitis, resolved  MRSA bacteremia  - s/p right BKA 12/2/22 with Dr. Peguero  - ID following, currently on Daptomycin monotherapy  - Blood cultures with coag negative Staph and MRSA, have discussed with Dr. George, he will require IV antibiotics at discharge for at least 6 weeks but final  duration TBD   - Repeat blood cultures NGTD x5d  - TTE no acute findings, JERALD 12/9/22 negative  - Probably not a good candidate for PICC line/home IV antibiotics given hx of substance abuse, current plan is discharge to rehab.Referral has been placed to [facility] in [place].      Recurrent hypoglycemia in setting of DMII, resolved  - HbA1c 6.9%  - High dose Tresiba qAM before arrival which has been held  - also on metformin at home  - continue levemir 10u qhs.+low dose SSI, mealtime humalog    Heartburn  -PRN milk of magnesia, tums.  -improved.      Chronic pain on opioids   Previous IVDA/substance abuse  - Continue home methadone 10 mg QID  - Morphine for breakthrough post-op pain - discontinued 12/8/22 as he is not using it  - Patient denied hx of IVDA to me but in Care Everywhere admission notes from April 2022 at [place], they documented prior IVDA/substance abuse, most recently with meth in [facility] from 4/2/22; he has told ID that his last IVDA was 17 years ago     Reported hx of Afib   - Patient denies any hx of Afib. Not previously on anticoagulation and given murky history will not start.   - On Metoprolol at home which is continued  - No evidence of Afib on telemetry, discontinued telemetry monitoring 12/10/22      Chronic vocal cord paralysis s/p PEG  Dysphagia  - SLP evaluation: regular diet but with nectar thick liquids recommended, patient initially wanted thin liquids and understood risk of aspiration, however currently has been drinking the nectar thick liquids; could change to pure comfort diet if he absolutely refuses nectar thick  - SLP should continue to follow for possible advancement of diet recommendations  - Unclear where/when PEG was placed, will eventually need it removed but defer to outpatient setting as it was not placed here; routine PEG care/flushing per nursing as needed     Hypotension with hx of HTN--resolved  - BP stable  - restart losartan/bumex as needed-hasn't required it thus  far.     HLD   - Continue statin     DVT s/p IVC filter   - Placed in spring 2022 per patient. Defer to PCP for further assessment and time of removal.      Hx of seizure in setting of meth use   - Not on AED's given meth trigger     BPH   - restarted home Flomax       Expected Discharge Location and Transportation: rehab  Expected Discharge Date: 12/18/22 anytime rehab placement is found.  Pt refuses to go to [facility] due to prior bad experience      DVT prophylaxis:  Mechanical DVT prophylaxis orders are present.     AM-PAC 6 Clicks Score (PT): 9 (12/16/22 0800)    CODE STATUS:   Code Status and Medical Interventions:   Ordered at: 12/01/22 0035     Code Status (Patient has no pulse and is not breathing):    CPR (Attempt to Resuscitate)     Medical Interventions (Patient has pulse or is breathing):    Full Support       PITER Carl  12/17/22

## 2022-12-18 LAB
GLUCOSE BLDC GLUCOMTR-MCNC: 145 MG/DL (ref 70–130)
GLUCOSE BLDC GLUCOMTR-MCNC: 157 MG/DL (ref 70–130)
GLUCOSE BLDC GLUCOMTR-MCNC: 158 MG/DL (ref 70–130)
GLUCOSE BLDC GLUCOMTR-MCNC: 216 MG/DL (ref 70–130)

## 2022-12-18 PROCEDURE — 63710000001 INSULIN LISPRO (HUMAN) PER 5 UNITS: Performed by: NURSE PRACTITIONER

## 2022-12-18 PROCEDURE — 63710000001 INSULIN LISPRO (HUMAN) PER 5 UNITS: Performed by: HOSPITALIST

## 2022-12-18 PROCEDURE — 82962 GLUCOSE BLOOD TEST: CPT

## 2022-12-18 PROCEDURE — 92610 EVALUATE SWALLOWING FUNCTION: CPT

## 2022-12-18 PROCEDURE — 99232 SBSQ HOSP IP/OBS MODERATE 35: CPT | Performed by: INTERNAL MEDICINE

## 2022-12-18 PROCEDURE — 99024 POSTOP FOLLOW-UP VISIT: CPT | Performed by: THORACIC SURGERY (CARDIOTHORACIC VASCULAR SURGERY)

## 2022-12-18 PROCEDURE — 25010000002 DAPTOMYCIN PER 1 MG: Performed by: INTERNAL MEDICINE

## 2022-12-18 PROCEDURE — 63710000001 INSULIN DETEMIR PER 5 UNITS: Performed by: PEDIATRICS

## 2022-12-18 RX ADMIN — INSULIN DETEMIR 10 UNITS: 100 INJECTION, SOLUTION SUBCUTANEOUS at 20:08

## 2022-12-18 RX ADMIN — METHADONE HYDROCHLORIDE 10 MG: 10 TABLET ORAL at 09:03

## 2022-12-18 RX ADMIN — ATORVASTATIN CALCIUM 20 MG: 20 TABLET, FILM COATED ORAL at 20:08

## 2022-12-18 RX ADMIN — DAPTOMYCIN 800 MG: 500 INJECTION, POWDER, LYOPHILIZED, FOR SOLUTION INTRAVENOUS at 09:09

## 2022-12-18 RX ADMIN — METOPROLOL TARTRATE 25 MG: 25 TABLET, FILM COATED ORAL at 20:08

## 2022-12-18 RX ADMIN — Medication 10 ML: at 20:09

## 2022-12-18 RX ADMIN — METHADONE HYDROCHLORIDE 10 MG: 10 TABLET ORAL at 20:08

## 2022-12-18 RX ADMIN — Medication 10 ML: at 09:04

## 2022-12-18 RX ADMIN — INSULIN LISPRO 2 UNITS: 100 INJECTION, SOLUTION INTRAVENOUS; SUBCUTANEOUS at 12:15

## 2022-12-18 RX ADMIN — HYDROXYZINE HYDROCHLORIDE 50 MG: 25 TABLET ORAL at 20:15

## 2022-12-18 RX ADMIN — INSULIN LISPRO 2 UNITS: 100 INJECTION, SOLUTION INTRAVENOUS; SUBCUTANEOUS at 09:04

## 2022-12-18 RX ADMIN — METHADONE HYDROCHLORIDE 10 MG: 10 TABLET ORAL at 01:20

## 2022-12-18 RX ADMIN — METOPROLOL TARTRATE 25 MG: 25 TABLET, FILM COATED ORAL at 09:05

## 2022-12-18 RX ADMIN — TAMSULOSIN HYDROCHLORIDE 0.4 MG: 0.4 CAPSULE ORAL at 20:08

## 2022-12-18 RX ADMIN — INSULIN LISPRO 2 UNITS: 100 INJECTION, SOLUTION INTRAVENOUS; SUBCUTANEOUS at 17:17

## 2022-12-18 RX ADMIN — METHADONE HYDROCHLORIDE 10 MG: 10 TABLET ORAL at 14:25

## 2022-12-18 RX ADMIN — INSULIN LISPRO 2 UNITS: 100 INJECTION, SOLUTION INTRAVENOUS; SUBCUTANEOUS at 09:03

## 2022-12-18 NOTE — PLAN OF CARE
Goal Outcome Evaluation:  Plan of Care Reviewed With: patient      SLP re-evaluation completed. Will continue to address dysphagia via MBS 12/19. Please see note for further details and recommendations.

## 2022-12-18 NOTE — PROGRESS NOTES
[facility] Medicine Services  PROGRESS NOTE    Patient Name: Buster Velasco  : 1965  MRN: [id number]    Date of Admission: 2022  Primary Care Physician: Ludivina Bowers MD    Subjective   Subjective     CC:  F/U s/p right BKA    HPI:    Denies stump pain today.  Eating well.    ROS:  Gen- No fevers, chills  CV- No chest pain, palpitations  Resp- No cough, dyspnea  GI- No N/V/D, abd pain        Objective   Objective     Vital Signs:   Temp:  [97.9 °F (36.6 °C)-98.4 °F (36.9 °C)] 98 °F (36.7 °C)  Heart Rate:  [59-69] 59  Resp:  [16] 16  BP: (115-135)/(69-83) 130/83  Flow (L/min):  [2] 2     Physical Exam:  Constitutional - no acute distress, nontoxic, in bed, lying flat  HEENT-NCAT, mucous membranes moist  CV-RRR  Resp-grossly clear bilaterally  Abd-soft, nontender, nondistended, normoactive bowel sounds  Neuro-alert and oriented, speech clear, moves all extremities   Psych-normal affect   Skin- No rash on exposed UE or LE bilaterally        Results Reviewed:  LAB RESULTS:      Lab 12/15/22  0419 22  0353   WBC 6.86 7.83   HEMOGLOBIN 9.3* 9.4*   HEMATOCRIT 30.3* 31.5*   PLATELETS 379 355   MCV 85.4 86.3         Lab 12/15/22  0419 22  0353   SODIUM 139 139   POTASSIUM 4.0 4.2   CHLORIDE 103 103   CO2 24.0 25.0   ANION GAP 12.0 11.0   BUN 13 15   CREATININE 0.65* 0.56*   EGFR 109.2 114.3   GLUCOSE 118* 189*   CALCIUM 9.6 9.6         Brief Urine Lab Results  (Last result in the past 365 days)      Color   Clarity   Blood   Leuk Est   Nitrite   Protein   CREAT   Urine HCG        22 0108 Yellow   Clear   Negative   Negative     Negative                 Microbiology Results Abnormal     Procedure Component Value - Date/Time    Blood Culture - Blood, Wrist, Left [[id number]]  (Normal) Collected: 22 1402    Lab Status: Final result Specimen: Blood from Wrist, Left Updated: 22 9225     Blood Culture No growth at 5 days    Blood Culture - Blood,  Arm, Left [[id number]]  (Normal) Collected: 12/06/22 1402    Lab Status: Final result Specimen: Blood from Arm, Left Updated: 12/11/22 6879     Blood Culture No growth at 5 days          No radiology results from the last 24 hrs    Results for orders placed during the hospital encounter of 11/30/22    Adult Transesophageal Echo (JERALD) W/ Cont if Necessary Per Protocol    Interpretation Summary  •  Left ventricular systolic function is normal. Estimated left ventricular EF = 55%  •  The aortic valve is structurally normal with no stenosis present. Trace aortic valve regurgitation is present. There is no evidence of an aortic valve mass is present.  •  There is mild, anterior mitral leaflet thickening present. No evidence of a mitral valve mass is present. Trace mitral valve regurgitation is present. No significant mitral valve stenosis is present  •  The tricuspid valve is normal in structure. There is no evidence of a mass on the tricuspid valve. Mild tricuspid valve regurgitation is present.  •  There is mild thickening of the pulmonic valve. There is trace pulmonic valve regurgitation present. There is no pulmonic valve stenosis present.      I have reviewed the medications:  Scheduled Meds:atorvastatin, 20 mg, Oral, Nightly  DAPTOmycin, 8 mg/kg, Intravenous, Q24H  insulin detemir, 10 Units, Subcutaneous, Nightly  insulin lispro, 0-7 Units, Subcutaneous, TID AC  Insulin Lispro, 2 Units, Subcutaneous, TID With Meals  methadone, 10 mg, Oral, Q6H  metoprolol tartrate, 25 mg, Oral, Q12H  senna-docusate sodium, 2 tablet, Oral, BID  sodium chloride, 10 mL, Intravenous, Q12H  sodium chloride, 10 mL, Intravenous, Q12H  tamsulosin, 0.4 mg, Oral, Nightly      Continuous Infusions:ropivacaine,       PRN Meds:.•  acetaminophen  •  senna-docusate sodium **AND** polyethylene glycol **AND** bisacodyl **AND** bisacodyl  •  calcium carbonate  •  dextrose  •  dextrose  •  glucagon (human recombinant)  •  hydrOXYzine  •  magnesium  hydroxide  •  melatonin  •  ondansetron **OR** ondansetron  •  sennosides-docusate  •  sodium chloride  •  sodium chloride  •  sodium chloride    Assessment & Plan   Assessment & Plan     Active Hospital Problems    Diagnosis  POA   • **Right foot infection [L08.9]  Yes   • MRSA bacteremia [R78.81, B95.62]  Unknown   • Sepsis (HCC) [A41.9]  Yes   • Hypoglycemia [E16.2]  Yes   • Anemia [D64.9]  Yes   • Hyponatremia [E87.1]  Yes   • Hypoalbuminemia [E88.09]  Yes   • Essential hypertension [I10]  Yes   • Hyperlipidemia [E78.5]  Yes   • Paroxysmal atrial fibrillation (HCC) [I48.0]  Yes      Resolved Hospital Problems   No resolved problems to display.        Brief Hospital Course to date:  Buster Velasco is a 57 y.o. male with hx of prior osteomyelitis s/p left BKA, s/p right transmetatarsal amputation, left hand 3rd metacarpal resection (April 2022 at OSH, had 6 weeks of IV antibiotics for MRSA bacteremia), remote hx of IVDA, more recent hx of polysubstance abuse (meth), and DVT s/p IVC filter who presents with right foot wounds. S/p right BKA on 12/2/22 with Dr. Peguero. His blood cultures grew MRSA. ID following and recommend at least 6 weeks of IV ATBx.     This patient's problems and plans were partially entered by my partner and updated as appropriate by me 12/18/22.    Sepsis secondary to acute right foot non-healing wound and likely osteomyelitis, resolved  MRSA bacteremia  - s/p right BKA 12/2/22 with Dr. Peguero  - ID following, currently on Daptomycin monotherapy  - Blood cultures with coag negative Staph and MRSA, have discussed with Dr. George, he will require IV antibiotics at discharge for at least 6 weeks but final duration TBD   - Repeat blood cultures NGTD x5d  - TTE no acute findings, JERALD 12/9/22 negative     Recurrent hypoglycemia in setting of DMII, resolved  - HbA1c 6.9%  - High dose Tresiba qAM before arrival which has been held  - also on metformin at home  - continue levemir 10u qhs.+low  dose SSI, mealtime humalog    Heartburn  -PRN milk of magnesia, tums.  -improved.      Chronic pain on opioids   Previous IVDA/substance abuse  - Continue home methadone 10 mg QID     Reported hx of Afib   - Patient denies any hx of Afib. Not previously on anticoagulation and given murky history will not start.   - On Metoprolol at home which is continued  - No evidence of Afib on telemetry     Chronic vocal cord paralysis s/p PEG  Dysphagia  - SLP evaluation: regular diet but with nectar thick liquids recommended, patient initially wanted thin liquids and understood risk of aspiration, however currently has been drinking the nectar thick liquids; could change to pure comfort diet if he absolutely refuses nectar thick  - SLP should continue to follow for possible advancement of diet recommendations  - Unclear where/when PEG was placed, will eventually need it removed but defer to outpatient setting as it was not placed here; routine PEG care/flushing per nursing as needed     Hypotension with hx of HTN--resolved  - BP stable  - restart losartan/bumex as needed-hasn't required it thus far.     HLD   - Continue statin     DVT s/p IVC filter   - Placed in spring 2022 per patient. Defer to PCP for further assessment and time of removal.      Hx of seizure in setting of meth use      BPH   - restarted home Flomax       Expected Discharge Location and Transportation: rehab  Expected Discharge Date: 12/19/22 anytime rehab placement is found      DVT prophylaxis:  Mechanical DVT prophylaxis orders are present.     AM-PAC 6 Clicks Score (PT): 9 (12/16/22 0800)    CODE STATUS:   Code Status and Medical Interventions:   Ordered at: 12/01/22 0035     Code Status (Patient has no pulse and is not breathing):    CPR (Attempt to Resuscitate)     Medical Interventions (Patient has pulse or is breathing):    Full Support       Nacho Krishna MD  12/18/22

## 2022-12-18 NOTE — PLAN OF CARE
Goal Outcome Evaluation:  Plan of Care Reviewed With: patient        Progress: improving   Pt aox4, vss, room air, dressing to RLE cdi, voiding well, no c/o pain, repositioned q2h, IS encouraged, plan is rehab at [facility] following, no changes this shift

## 2022-12-18 NOTE — THERAPY EVALUATION
Acute Care - Speech Language Pathology   Swallow Re-Evaluation [facility]   Clinical Swallow Evaluation       Patient Name: Buster Velasco  : 1965  MRN: [id number]  Today's Date: 2022               Admit Date: 2022    Visit Dx:     ICD-10-CM ICD-9-CM   1. Right foot infection  L08.9 686.9   2. Hypoglycemia  E16.2 251.2   3. Decubitus ulcer of coccygeal region, unspecified ulcer stage  L89.159 707.03     707.20   4. Pharyngeal dysphagia  R13.13 787.23     Patient Active Problem List   Diagnosis   • Right foot infection   • Hypoglycemia   • Anemia   • Hyponatremia   • Hypoalbuminemia   • Essential hypertension   • Hyperlipidemia   • Paroxysmal atrial fibrillation (HCC)   • Sepsis (HCC)   • MRSA bacteremia     Past Medical History:   Diagnosis Date   • Diabetes (HCC)    • DVT (deep venous thrombosis) (HCC)    • Hypertension    • Mood disorder (HCC)      Past Surgical History:   Procedure Laterality Date   • BELOW KNEE AMPUTATION Left    • BELOW KNEE AMPUTATION Right 2022    Procedure: AMPUTATION BELOW KNEE RIGHT;  Surgeon: John Peguero MD;  Location: [facility];  Service: Vascular;  Laterality: Right;   • TRANS METATARSAL AMPUTATION Right        SLP Recommendation and Plan  SLP Swallowing Diagnosis: mild-moderate, pharyngeal dysphagia (22 110)  SLP Diet Recommendation: regular textures, no mixed consistencies, nectar thick liquids, ice chips between meals after oral care, with supervision (22 110)  Recommended Precautions and Strategies: no straw, upright posture during/after eating, small bites of food and sips of liquid, general aspiration precautions, other (see comments) (22 110)  SLP Rec. for Method of Medication Administration: meds whole, meds crushed, with puree, as tolerated (22 110)     Monitor for Signs of Aspiration: notify SLP if any concerns (22 110)  Recommended Diagnostics: VFSS (MBS) (22 110)  Swallow Criteria for Skilled  Therapeutic Interventions Met: demonstrates skilled criteria (12/18/22 1105)  Anticipated Discharge Disposition (SLP): anticipate therapy at next level of care (12/18/22 1105)  Rehab Potential/Prognosis, Swallowing: good, to achieve stated therapy goals (12/18/22 1105)  Therapy Frequency (Swallow): 5 days per week (12/18/22 1105)  Predicted Duration Therapy Intervention (Days): until discharge (12/18/22 1105)                                        Plan of Care Reviewed With: patient      SWALLOW EVALUATION (last 72 hours)     SLP Adult Swallow Evaluation     Row Name 12/18/22 1105                   Rehab Evaluation    Document Type re-evaluation  -KL        Subjective Information no complaints  -KL        Patient Observations alert;cooperative;agree to therapy  -KL        Patient/Family/Caregiver Comments/Observations none  -KL        Patient Effort good  -KL        Symptoms Noted During/After Treatment none  -KL           General Information    Patient Profile Reviewed yes  -KL        Pertinent History Of Current Problem See prev. evaluation. ST received new consult to re-assess for possible dietary upgrade.  -KL        Current Method of Nutrition regular textures;nectar/syrup-thick liquids  -KL        Precautions/Limitations, Vision WFL  -KL        Precautions/Limitations, Hearing WFL  -KL        Prior Level of Function-Communication WFL  -KL        Prior Level of Function-Swallowing other (see comments)  See prev. hx  -KL        Plans/Goals Discussed with patient  -KL        Barriers to Rehab previous functional deficit  -KL        Patient's Goals for Discharge patient did not state  -KL           Pain    Additional Documentation Pain Scale: FACES Pre/Post-Treatment (Group)  -KL           Pain Scale: FACES Pre/Post-Treatment    Pain: FACES Scale, Pretreatment 0-->no hurt  -KL        Posttreatment Pain Rating 0-->no hurt  -KL           Oral Motor Structure and Function    Dentition Assessment natural, present and  adequate  -KL        Secretion Management WNL/WFL  -KL        Mucosal Quality moist, healthy  -           Oral Musculature and Cranial Nerve Assessment    Oral Motor General Assessment vocal impairment  -        Vocal Impairment, Detail. Cranial Nerve X (Vagus) vocal quality abnormality (see comments)  -        Oral Motor, Comment Hoarse VQ  -KL           General Eating/Swallowing Observations    Respiratory Support Currently in Use room air  -        Eating/Swallowing Skills self-fed  -        Positioning During Eating upright in bed  -        Utensils Used spoon;cup;straw  -        Consistencies Trialed regular textures;pureed;ice chips;thin liquids;nectar/syrup-thick liquids  -           Clinical Swallow Eval    Oral Prep Phase WFL  -KL        Oral Transit WFL  -KL        Oral Residue WFL  -KL        Pharyngeal Phase suspected pharyngeal impairment  -        Esophageal Phase unremarkable  -        Clinical Swallow Evaluation Summary Re-CSE completed. Pt with no overt clinical s/sx of aspiration with any trial given. Pt has history of silent aspiration of thin liquids on MBS 12/1 this adm. Recommend pt remain on current regular diet with nectar thick liquids. Recommend [facility] tomorrow to further assess oropharyngeal swallow and r/o silent aspiration. Pt states he has been completing dysphagia exercises.  -           Pharyngeal Phase Concerns    Pharyngeal Phase Concerns other (see comments)  hx silent aspiration  -           SLP Evaluation Clinical Impression    SLP Swallowing Diagnosis mild-moderate;pharyngeal dysphagia  -        Functional Impact risk of aspiration/pneumonia  -        Rehab Potential/Prognosis, Swallowing good, to achieve stated therapy goals  -        Swallow Criteria for Skilled Therapeutic Interventions Met demonstrates skilled criteria  -           Recommendations    Therapy Frequency (Swallow) 5 days per week  -        Predicted Duration Therapy Intervention  (Days) until discharge  -        SLP Diet Recommendation regular textures;no mixed consistencies;nectar thick liquids;ice chips between meals after oral care, with supervision  -        Recommended Diagnostics VFSS (MBS)  -        Recommended Precautions and Strategies no straw;upright posture during/after eating;small bites of food and sips of liquid;general aspiration precautions;other (see comments)  -        Oral Care Recommendations Oral Care BID/PRN;Suction toothbrush  -        SLP Rec. for Method of Medication Administration meds whole;meds crushed;with puree;as tolerated  -        Monitor for Signs of Aspiration notify SLP if any concerns  -        Anticipated Discharge Disposition (SLP) anticipate therapy at next level of care  -              User Key  (r) = Recorded By, (t) = Taken By, (c) = Cosigned By    Initials Name Effective Dates    Toma Ramon, MS CCC-SLP 06/15/21 -                 EDUCATION  The patient has been educated in the following areas:   Dysphagia (Swallowing Impairment).        SLP GOALS     Row Name 12/18/22 1105             (LTG) Patient will demonstrate functional swallow for    Diet Texture (Demonstrate functional swallow) regular textures  -      Liquid viscosity (Demonstrate functional swallow) nectar/ mildly thick liquids  -      [place] (Demonstrate functional swallow) independently (over 90% accuracy)  -      Time Frame (Demonstrate functional swallow) by discharge  -         (STG) Patient will tolerate trials of    Consistencies Trialed (Tolerate trials) regular textures;nectar/ mildly thick liquids  -      Desired Outcome (Tolerate trials) without signs/symptoms of aspiration;without signs of distress  -      [place] (Tolerate trials) independently (over 90% accuracy)  -      Time Frame (Tolerate trials) by discharge  -         (STG) Patient will tolerate therapeutic trials of    Consistencies Trialed (Tolerate therapeutic trials)  thin liquids  -KL      Desired Outcome (Tolerate therapeutic trials) without signs/symptoms of aspiration;without signs of distress  -KL      [place] (Tolerate therapeutic trials) with minimal cues (75-90% accuracy)  -KL      Time Frame (Tolerate therapeutic trials) 1 week  -KL         (STG) Pharyngeal Strengthening Exercise Goal 1 (SLP)    Activity (Pharyngeal Strengthening Goal 1, SLP) increase superior movement of the hyolaryngeal complex;increase anterior movement of the hyolaryngeal complex;increase epiglottic inversion and retroflexion;increase closure at entrance to airway/closure of airway at glottis;increase squeeze/positive pressure generation;increase tongue base retraction  -KL      Increase Superior Movement of the Hyolaryngeal Complex effortful pitch glide (falsetto + pharyngeal squeeze)  -KL      Increase Anterior Movement of the Hyolaryngeal Complex chin tuck against resistance (CTAR)  -KL      Increase Epiglottic Inversion and Retroflexion hard effortful swallow  -KL      Increase Closure at Entrance to Airway/Closure of Airway at Glottis supraglottic swallow  -KL      Increase Squeeze/Positive Pressure Generation hard effortful swallow  -KL      Increase Tongue Base Retraction janine  -KL      [place]/Accuracy (Pharyngeal Strengthening Goal 1, SLP) with minimal cues (75-90% accuracy)  -KL      Time Frame (Pharyngeal Strengthening Goal 1, SLP) short term goal (STG)  -KL         (STG) Swallow Management Recall Goal 1 (SLP)    Activity (Swallow Management Recall Goal 1, SLP) independent recall of;compensatory swallow strategies/techniques;safe diet/liquid level  -KL      [place]/Accuracy (Swallow Management Recall Goal 1, SLP) independently (over 90% accuracy)  -KL      Time Frame (Swallow Management Recall Goal 1, SLP) short term goal (STG)  -KL         (STG) Swallow Compensatory Strategies Goal 1 (SLP)    Activity (Swallow Compensatory Strategies/Techniques Goal 1, SLP) compensatory  strategies;small cup sips;during p.o. trials;during meal intake;other (see comments)  -      [place]/Accuracy (Swallow Compensatory Strategies/Techniques Goal 1, SLP) independently (over 90% accuracy)  -      Time Frame (Swallow Compensatory Strategies/Techniques Goal 1, SLP) short term goal (STG)  -KL            User Key  (r) = Recorded By, (t) = Taken By, (c) = Cosigned By    Initials Name Provider Type    Toma Ramon MS CCC-SLP Speech and Language Pathologist                   Time Calculation:    Time Calculation- SLP     Row Name 12/18/22 1233             Time Calculation- SLP    SLP Start Time 1105  -KL      SLP Received On 12/18/22  -KL         Untimed Charges    SLP Eval/Re-eval  ST Eval Oral Pharyng Swallow - 52588  -KL      [id number] Eval Oral Pharyng Swallow Minutes 60  -KL         Total Minutes    Untimed Charges Total Minutes 60  -KL       Total Minutes 60  -KL            User Key  (r) = Recorded By, (t) = Taken By, (c) = Cosigned By    Initials Name Provider Type    Toma Ramon MS CCC-SLP Speech and Language Pathologist                Therapy Charges for Today     Code Description Service Date Service Provider Modifiers Qty    [id number] HC ST EVAL ORAL PHARYNG SWALLOW 4 12/18/2022 Toma Ortega MS CCC-SLP GN 1            Patient was not wearing a face mask and did not exhibit coughing during this therapy encounter.  Procedure performed was not aerosolizing, involved close contact (within 6 feet for at least 15 minutes or longer), and did not involve contact with infectious secretions or specimens.  Therapist used appropriate personal protective equipment including gloves, standard procedure mask and eye protection.  Appropriate PPE was worn during the entire therapy session.  Hand hygiene was completed before and after therapy session.       MS ALIA Arceo  12/18/2022

## 2022-12-18 NOTE — PLAN OF CARE
A&Ox4, mood/affect appropriate. Speech/voice hoarse/clear/spontaneous. Pain adequately controlled with schedule methadone. VSS on RA. Gauze foam dressing to thr right residual limb CDI. Patient rests in bed without sx/si of pain/acute distress. UOP ADQ. Will cont to mx. Call light in reach,

## 2022-12-18 NOTE — PROGRESS NOTES
Cardiothoracic Surgery Progress Note      POD #: 16-right below-knee amputation     LOS: 18 days      Subjective: Awake and alert    Objective:  Vital Signs vital signs below noted T-max past 24 hours 90.6 °F  Temp:  [98.1 °F (36.7 °C)-98.6 °F (37 °C)] 98.4 °F (36.9 °C)  Heart Rate:  [60-71] 67  Resp:  [16] 16  BP: (115-135)/(69-85) 118/82    Physical Exam:   General Appearance: Oriented   Lungs:   Heart:   Skin:   Incision: Amputation site dressing change.  Suture intact skin margin viable skin flaps are viable.     Results:  Results from last 7 days   Lab Units 12/15/22  0419   WBC 10*3/mm3 6.86   HEMOGLOBIN g/dL 9.3*   HEMATOCRIT % 30.3*   PLATELETS 10*3/mm3 379     Results from last 7 days   Lab Units 12/15/22  0419   SODIUM mmol/L 139   POTASSIUM mmol/L 4.0   CHLORIDE mmol/L 103   CO2 mmol/L 24.0   BUN mg/dL 13   CREATININE mg/dL 0.65*   GLUCOSE mg/dL 118*   CALCIUM mg/dL 9.6         Assessment: #1.  Postop day 16 right below-knee amputation healing well  2 status post remote left below-knee amputation with diabetic neuropathy/gangrene  3 diabetes mellitus and neuropathy      Plan: Continue to address change amputation stump.  Medical manage per hospitalist.  Antibiotics per infectious disease.      John Peguero MD - 12/18/22 - 06:08 EST

## 2022-12-18 NOTE — PROGRESS NOTES
[facility] Progress Note        Antibiotics:  Anti-Infectives (From admission, onward)    Ordered     Dose/Rate Route Frequency Start Stop    12/07/22 0603  DAPTOmycin (CUBICIN) 800 mg in sodium chloride 0.9 % 50 mL IVPB        Ordering Provider: Zeke George MD    8 mg/kg × 100 kg  100 mL/hr over 30 Minutes Intravenous Every 24 Hours 12/07/22 1000 12/29/22 0959    11/30/22 2219  vancomycin 2000 mg/500 mL 0.9% NS IVPB ([facility])        Ordering Provider: Reggie Batres MD    20 mg/kg × 98.9 kg  over 2 Hours Intravenous Once 11/30/22 2221 12/01/22 0315    11/30/22 2219  piperacillin-tazobactam (ZOSYN) 3.375 g in iso-osmotic dextrose 50 ml (premix)        Ordering Provider: Reggie Batres MD    3.375 g Intravenous Once 11/30/22 2221 11/30/22 2335          CC: foot infection    HPI:    Patient is a 57 y.o.  Yr old male with history of prior lower extremity infection/amputations done in [facility].  He has a history of left BKA years ago.  More recent right transmetatarsal amputation but specific dates unclear and unclear who the surgeon was.  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  He has history DVT/IVC filter, diabetes and other comorbidities as below.  He reports a chronic right lateral foot wound present for months but apparently not precipitated by any specific event or trauma.  He is admitted to the hospital on November 30 with deterioration at the foot including redness/swelling    **patient had reported remote drug abuse (initially to me reported as IV drug abuse but then later he reported not injection use and I am unable to corroborate otherwise at prsent; +drug screen earlier this year per d/w Dr Chau for Meth at [facility] and reported by them to be IVDA/substance abuse),   " chronic methadone. He reported to me this was 17 years ago and therefore some inconsistencies    ** patient reports MRSA blood stream infection treated in [place] spring of 2022, 6 weeks of vancomycin by his report.  Otherwise data deficit.  Try to retrieve information     12/2/22 Dr Peguero did surgery  \"Procedure(s): Mid level right below-knee amputation and primary closure \"    12/6 with MRSA in blood culture;  TTE done but limited per cardiology    12/9/22   JERALD no vegetation per cardiology    12/18/22   Nursing reports no new focal pain or distress.seen early and sleepy.  postop pain to the right LE which is controlled/dull at present, constant, nonradiating, worse with palpation, generally better with pain meds and 2  out of 10 in severity.  Not progressive     No headache photophobia or neck stiffness.  No shortness of breath cough or hemoptysis.  No nausea vomiting diarrhea or abdominal pain.  No dysuria hematuria or pyuria.  No other new skin rash       ROS:      12/18/22 per nursing,No f/c/s. No n/v/d. No rash.      Constitutional-- No Fever, chills or sweats.  Appetite good, and no malaise. No fatigue.  Heent--chronic hoarse voice.  No new vision, hearing or throat complaints.  No epistaxis or oral sores.   No flashers, floaters or eye pain.   No headache, photophobia or neck stiffness.  Chronic PEG tube  CV-- No chest pain, palpitation or syncope  Resp-- No SOB/cough/Hemoptysis  GI- No nausea, vomiting, or diarrhea.  No hematochezia, melena, or hematemesis. Denies jaundice or chronic liver disease.  -- No dysuria, hematuria, or flank pain.  Denies hesitancy, urgency or flank pain.  Lymph- no swollen lymph nodes in neck/axilla or groin.   Heme- No active bruising or bleeding; no Hx of DVT or PE.  MS-- no swelling or pain in the bones or joints of arms/legs.  No new back pain.  Neuro-- No acute focal weakness or numbness in the arms or legs.  No seizures.     Full 12 point review of systems " "reviewed and negative otherwise for acute complaints, except for above      PE: nursing/chaperone present  /82 (BP Location: Left arm, Patient Position: Lying)   Pulse 59   Temp 97.9 °F (36.6 °C)   Resp 16   Ht 193 cm (75.98\")   Wt 100 kg (220 lb 7.4 oz)   SpO2 96%   BMI 26.85 kg/m²          GENERAL:  awake;  in no acute distress.  chronic Hoarse voice noted and chronic per him   HEENT: Normocephalic, atraumatic.   No conjunctival injection. No icterus. Oropharynx   without evidence of thrush or exudate. No evidence of peridontal disease.     HEART: RRR; No murmur, rubs, gallops.   LUNGS: Diminished at bases but otherwise clear to auscultation bilaterally without wheezing, rales, rhonchi. Normal respiratory effort. Nonlabored. No dullness.  ABDOMEN: Soft, nontender, nondistended. Positive bowel sounds. No rebound or guarding. NO mass or HSM.  PEG tube noted  EXT:  No cyanosis, clubbing;No cord.   MSK: FROM without joint effusions noted arms/legs.    SKIN: Warm and dry without cutaneous eruptions on Inspection/palpation.    NEURO:  awake     Right lower extremity surgical site noted;  No new redness;  no discrete mass bulge or fluctuance.  No crepitus or bulla.       No peripheral stigmata/phenomenon of endocarditis     IV without obvious redness or drainage     Left BKA site with no redness induration or warmth.  No discrete mass bulge or fluctuance.    Laboratory Data    Results from last 7 days   Lab Units 12/15/22  0419 12/12/22  0353   WBC 10*3/mm3 6.86 7.83   HEMOGLOBIN g/dL 9.3* 9.4*   HEMATOCRIT % 30.3* 31.5*   PLATELETS 10*3/mm3 379 355     Results from last 7 days   Lab Units 12/15/22  0419   SODIUM mmol/L 139   POTASSIUM mmol/L 4.0   CHLORIDE mmol/L 103   CO2 mmol/L 24.0   BUN mg/dL 13   CREATININE mg/dL 0.65*   GLUCOSE mg/dL 118*   CALCIUM mg/dL 9.6     Results from last 7 days   Lab Units 12/15/22  0419   ALK PHOS U/L 79   BILIRUBIN mg/dL 0.2   ALT (SGPT) U/L 20   AST (SGOT) U/L 17           "     Estimated Creatinine Clearance: 175.2 mL/min (A) (by C-G formula based on SCr of 0.65 mg/dL (L)).      Microbiology:      Radiology:  Imaging Results (Last 72 Hours)     ** No results found for the last 72 hours. **            Impression:     --Acute right foot/ankle with multifocal ulceration, cellulitis/wound infection and probable osteomyelitis with probe to bone at the lateral foot and abnormal MRI.  Other comorbidities as outlined above and high risk for further serious morbidity and other serious sequelae including persistent/recurrent or nonhealing wounds, persistent/progressive or recurrent infection and risk for further functional/limb loss/amputation.  Surgery following to help define timing/option of threshold for any future surgical intervention .      **Surgery 12/2 with BKA     --MRSA bacteremia from November 30 (daptomycin sensitive).  Presumed from foot but also risk for endovascular focus particularly with  History of prior MRSA bloodstream infection.  Transthoracic echocardiogram limited per cardiology.  JERALD no vegetation per cardiology; follow-up blood cultures negative so far from December 6    **No  new metastatic foci of involvement as of December 17    --History MRSA with bacteremia by his report in spring/summer 2022 and treated with 6 weeks of vancomycin in [place].  Specifics unclear and trying to retrieve information    --coag-negative staph in blood culture likely contaminant     --Chronic vocal cord paralysis per notes with chronic hoarseness and indwelling PEG tube.  Medicine team to clarify     --History left BKA.     --Diabetes.  You need to tightly control blood sugar to give best chance for healing     --History of DVT with IVC filter    --history of remote drug abuse Per his reports to me; he denies injection drug abuse for 17 years by his report to me although as above, medicine team has find records indicating more recent abuse in 2022 [facility],  [facility]     PLAN:      -- Daptomycin  IV  4-6 weeks but final duration to depend on clinical course of study results and response to therapy     -- Check/review labs cultures and scans     -- Partial history per nursing staff     -- Discussed with microbiology and family     --d/w Dr Peguero; he sees no new suppurative change either       -- Highly complex set of issues with high risk for further serious morbidity and other serious sequela     --JERALD noted    --  continue to consider options      **Copied text in this note has been reviewed and is accurate as of 12/18/22.     Zeke George MD  12/18/2022

## 2022-12-19 ENCOUNTER — APPOINTMENT (OUTPATIENT)
Dept: GENERAL RADIOLOGY | Facility: HOSPITAL | Age: 57
DRG: 854 | End: 2022-12-19
Payer: MEDICAID

## 2022-12-19 LAB
GLUCOSE BLDC GLUCOMTR-MCNC: 154 MG/DL (ref 70–130)
GLUCOSE BLDC GLUCOMTR-MCNC: 177 MG/DL (ref 70–130)
GLUCOSE BLDC GLUCOMTR-MCNC: 183 MG/DL (ref 70–130)
GLUCOSE BLDC GLUCOMTR-MCNC: 187 MG/DL (ref 70–130)

## 2022-12-19 PROCEDURE — 92611 MOTION FLUOROSCOPY/SWALLOW: CPT

## 2022-12-19 PROCEDURE — 99232 SBSQ HOSP IP/OBS MODERATE 35: CPT | Performed by: INTERNAL MEDICINE

## 2022-12-19 PROCEDURE — 99024 POSTOP FOLLOW-UP VISIT: CPT | Performed by: THORACIC SURGERY (CARDIOTHORACIC VASCULAR SURGERY)

## 2022-12-19 PROCEDURE — 74230 X-RAY XM SWLNG FUNCJ C+: CPT

## 2022-12-19 PROCEDURE — 63710000001 INSULIN DETEMIR PER 5 UNITS: Performed by: PEDIATRICS

## 2022-12-19 PROCEDURE — 97530 THERAPEUTIC ACTIVITIES: CPT

## 2022-12-19 PROCEDURE — 63710000001 INSULIN LISPRO (HUMAN) PER 5 UNITS: Performed by: HOSPITALIST

## 2022-12-19 PROCEDURE — 25010000002 DAPTOMYCIN PER 1 MG: Performed by: INTERNAL MEDICINE

## 2022-12-19 PROCEDURE — 63710000001 INSULIN LISPRO (HUMAN) PER 5 UNITS: Performed by: NURSE PRACTITIONER

## 2022-12-19 PROCEDURE — 97535 SELF CARE MNGMENT TRAINING: CPT

## 2022-12-19 PROCEDURE — 82962 GLUCOSE BLOOD TEST: CPT

## 2022-12-19 RX ADMIN — INSULIN LISPRO 2 UNITS: 100 INJECTION, SOLUTION INTRAVENOUS; SUBCUTANEOUS at 12:08

## 2022-12-19 RX ADMIN — METHADONE HYDROCHLORIDE 10 MG: 10 TABLET ORAL at 14:34

## 2022-12-19 RX ADMIN — METHADONE HYDROCHLORIDE 10 MG: 10 TABLET ORAL at 19:07

## 2022-12-19 RX ADMIN — DAPTOMYCIN 800 MG: 500 INJECTION, POWDER, LYOPHILIZED, FOR SOLUTION INTRAVENOUS at 10:39

## 2022-12-19 RX ADMIN — INSULIN LISPRO 2 UNITS: 100 INJECTION, SOLUTION INTRAVENOUS; SUBCUTANEOUS at 08:02

## 2022-12-19 RX ADMIN — INSULIN LISPRO 2 UNITS: 100 INJECTION, SOLUTION INTRAVENOUS; SUBCUTANEOUS at 08:03

## 2022-12-19 RX ADMIN — METHADONE HYDROCHLORIDE 10 MG: 10 TABLET ORAL at 02:06

## 2022-12-19 RX ADMIN — INSULIN LISPRO 2 UNITS: 100 INJECTION, SOLUTION INTRAVENOUS; SUBCUTANEOUS at 17:21

## 2022-12-19 RX ADMIN — BARIUM SULFATE 100 ML: 0.81 POWDER, FOR SUSPENSION ORAL at 13:42

## 2022-12-19 RX ADMIN — HYDROXYZINE HYDROCHLORIDE 50 MG: 25 TABLET ORAL at 20:27

## 2022-12-19 RX ADMIN — METOPROLOL TARTRATE 25 MG: 25 TABLET, FILM COATED ORAL at 08:02

## 2022-12-19 RX ADMIN — METOPROLOL TARTRATE 25 MG: 25 TABLET, FILM COATED ORAL at 19:07

## 2022-12-19 RX ADMIN — BARIUM SULFATE 50 ML: 400 SUSPENSION ORAL at 13:41

## 2022-12-19 RX ADMIN — Medication 10 ML: at 08:04

## 2022-12-19 RX ADMIN — METHADONE HYDROCHLORIDE 10 MG: 10 TABLET ORAL at 08:02

## 2022-12-19 RX ADMIN — TAMSULOSIN HYDROCHLORIDE 0.4 MG: 0.4 CAPSULE ORAL at 19:07

## 2022-12-19 RX ADMIN — Medication 10 ML: at 19:08

## 2022-12-19 RX ADMIN — BARIUM SULFATE 20 ML: 400 PASTE ORAL at 13:42

## 2022-12-19 RX ADMIN — ATORVASTATIN CALCIUM 20 MG: 20 TABLET, FILM COATED ORAL at 19:08

## 2022-12-19 RX ADMIN — INSULIN DETEMIR 10 UNITS: 100 INJECTION, SOLUTION SUBCUTANEOUS at 20:27

## 2022-12-19 NOTE — PROGRESS NOTES
[facility] Progress Note        Antibiotics:  Anti-Infectives (From admission, onward)    Ordered     Dose/Rate Route Frequency Start Stop    12/07/22 0603  DAPTOmycin (CUBICIN) 800 mg in sodium chloride 0.9 % 50 mL IVPB        Ordering Provider: Zeke George MD    8 mg/kg × 100 kg  100 mL/hr over 30 Minutes Intravenous Every 24 Hours 12/07/22 1000 12/29/22 0959    11/30/22 2219  vancomycin 2000 mg/500 mL 0.9% NS IVPB ([facility])        Ordering Provider: Reggie Batres MD    20 mg/kg × 98.9 kg  over 2 Hours Intravenous Once 11/30/22 2221 12/01/22 0315    11/30/22 2219  piperacillin-tazobactam (ZOSYN) 3.375 g in iso-osmotic dextrose 50 ml (premix)        Ordering Provider: Reggie Batres MD    3.375 g Intravenous Once 11/30/22 2221 11/30/22 2335          CC: foot infection    HPI:    Patient is a 57 y.o.  Yr old male with history of prior lower extremity infection/amputations done in [facility].  He has a history of left BKA years ago.  More recent right transmetatarsal amputation but specific dates unclear and unclear who the surgeon was.  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  He has history DVT/IVC filter, diabetes and other comorbidities as below.  He reports a chronic right lateral foot wound present for months but apparently not precipitated by any specific event or trauma.  He is admitted to the hospital on November 30 with deterioration at the foot including redness/swelling    **patient had reported remote drug abuse (initially to me reported as IV drug abuse but then later he reported not injection use and I am unable to corroborate otherwise at prsent; +drug screen earlier this year per d/w Dr Chau for Meth at [facility] and reported by them to be IVDA/substance abuse),   " chronic methadone. He reported to me this was 17 years ago and therefore some inconsistencies    ** patient reports MRSA blood stream infection treated in [place] spring of 2022, 6 weeks of vancomycin by his report.  Otherwise data deficit.  Try to retrieve information     12/2/22 Dr Peguero did surgery  \"Procedure(s): Mid level right below-knee amputation and primary closure \"    12/6 with MRSA in blood culture;  TTE done but limited per cardiology    12/9/22   JERALD no vegetation per cardiology    12/19/22   Case management continues to work on disposition options. Nursing reports no new focal pain or distress.  postop pain to the right LE which is controlled/dull at present, constant, nonradiating, worse with palpation, generally better with pain meds and 1-2  out of 10 in severity.  Not progressive     No headache photophobia or neck stiffness.  No shortness of breath cough or hemoptysis.  No nausea vomiting diarrhea or abdominal pain.  No dysuria hematuria or pyuria.  No other new skin rash       ROS:      12/19/22 per nursing,No f/c/s. No n/v/d. No rash.      Constitutional-- No Fever, chills or sweats.  Appetite good, and no malaise. No fatigue.  Heent--chronic hoarse voice.  No new vision, hearing or throat complaints.  No epistaxis or oral sores.   No flashers, floaters or eye pain.   No headache, photophobia or neck stiffness.  Chronic PEG tube  CV-- No chest pain, palpitation or syncope  Resp-- No SOB/cough/Hemoptysis  GI- No nausea, vomiting, or diarrhea.  No hematochezia, melena, or hematemesis. Denies jaundice or chronic liver disease.  -- No dysuria, hematuria, or flank pain.  Denies hesitancy, urgency or flank pain.  Lymph- no swollen lymph nodes in neck/axilla or groin.   Heme- No active bruising or bleeding; no Hx of DVT or PE.  MS-- no swelling or pain in the bones or joints of arms/legs.  No new back pain.  Neuro-- No acute focal weakness or numbness in the arms or legs.  No " "seizures.     Full 12 point review of systems reviewed and negative otherwise for acute complaints, except for above      PE: nursing/chaperone present  /94   Pulse 67   Temp 98.2 °F (36.8 °C) (Oral)   Resp 18   Ht 193 cm (75.98\")   Wt 100 kg (220 lb 7.4 oz)   SpO2 92%   BMI 26.85 kg/m²          GENERAL:  awake;  in no acute distress.  chronic Hoarse voice noted and chronic per him ; room air  HEENT: Normocephalic, atraumatic.   No conjunctival injection. No icterus. Oropharynx   without evidence of thrush or exudate. No evidence of peridontal disease.     HEART: RRR; No murmur, rubs, gallops.   LUNGS: Diminished at bases but otherwise clear to auscultation bilaterally without wheezing, rales, rhonchi. Normal respiratory effort. Nonlabored. No dullness.  ABDOMEN: Soft, nontender, nondistended. Positive bowel sounds. No rebound or guarding. NO mass or HSM.  PEG tube noted  EXT:  No cyanosis, clubbing;No cord.   MSK: FROM without joint effusions noted arms/legs.    SKIN: Warm and dry without cutaneous eruptions on Inspection/palpation.    NEURO:  awake     Right lower extremity surgical site noted;  No new redness;  no discrete mass bulge or fluctuance.  No crepitus or bulla.       No peripheral stigmata/phenomenon of endocarditis     IV without obvious redness or drainage     Left BKA site with no redness induration or warmth.  No discrete mass bulge or fluctuance.    Laboratory Data    Results from last 7 days   Lab Units 12/15/22  0419   WBC 10*3/mm3 6.86   HEMOGLOBIN g/dL 9.3*   HEMATOCRIT % 30.3*   PLATELETS 10*3/mm3 379     Results from last 7 days   Lab Units 12/15/22  0419   SODIUM mmol/L 139   POTASSIUM mmol/L 4.0   CHLORIDE mmol/L 103   CO2 mmol/L 24.0   BUN mg/dL 13   CREATININE mg/dL 0.65*   GLUCOSE mg/dL 118*   CALCIUM mg/dL 9.6     Results from last 7 days   Lab Units 12/15/22  0419   ALK PHOS U/L 79   BILIRUBIN mg/dL 0.2   ALT (SGPT) U/L 20   AST (SGOT) U/L 17               Estimated " Creatinine Clearance: 175.2 mL/min (A) (by C-G formula based on SCr of 0.65 mg/dL (L)).      Microbiology:      Radiology:  Imaging Results (Last 72 Hours)     ** No results found for the last 72 hours. **            Impression:     --Acute right foot/ankle with multifocal ulceration, cellulitis/wound infection and probable osteomyelitis with probe to bone at the lateral foot and abnormal MRI.  Other comorbidities as outlined above and high risk for further serious morbidity and other serious sequelae including persistent/recurrent or nonhealing wounds, persistent/progressive or recurrent infection and risk for further functional/limb loss/amputation.  Surgery following to help define timing/option of threshold for any future surgical intervention .      **Surgery 12/2 with BKA     --MRSA bacteremia from November 30 (daptomycin sensitive).  Presumed from foot but also risk for endovascular focus particularly with  History of prior MRSA bloodstream infection.  Transthoracic echocardiogram limited per cardiology.  JERALD no vegetation per cardiology; follow-up blood cultures negative so far from December 6    **No  new metastatic foci of involvement as of December 19    --History MRSA with bacteremia by his report in spring/summer 2022 and treated with 6 weeks of vancomycin in [place].  Specifics unclear and trying to retrieve information    --coag-negative staph in blood culture likely contaminant     --Chronic vocal cord paralysis per notes with chronic hoarseness and indwelling PEG tube.  Medicine team to clarify     --History left BKA.     --Diabetes.  You need to tightly control blood sugar to give best chance for healing     --History of DVT with IVC filter    --history of remote drug abuse Per his reports to me; he denies injection drug abuse for 17 years by his report to me although as above, medicine team has find records indicating more recent abuse in 2022 [facility] [place] [facility]     PLAN:      -- Daptomycin  IV  4-6 weeks but final duration to depend on clinical course of study results and response to therapy; no new metastatic focus of involvement so far     -- Check/review labs cultures and scans     -- Partial history per nursing staff     -- Discussed with microbiology and family     --d/w Dr Peguero; he sees no new suppurative change either       -- Highly complex set of issues with high risk for further serious morbidity and other serious sequela     --JERALD noted      **Copied text in this note has been reviewed and is accurate as of 12/19/22.     Zeke George MD  12/19/2022

## 2022-12-19 NOTE — MBS/VFSS/FEES
Acute Care - Speech Language Pathology   Swallow Re-Evaluation [facility]   Modified Barium Swallow Study (MBS)     Patient Name: Buster Velasco  : 1965  MRN: [id number]  Today's Date: 2022               Admit Date: 2022    Visit Dx:     ICD-10-CM ICD-9-CM   1. Right foot infection  L08.9 686.9   2. Hypoglycemia  E16.2 251.2   3. Decubitus ulcer of coccygeal region, unspecified ulcer stage  L89.159 707.03     707.20   4. Pharyngeal dysphagia  R13.13 787.23     Patient Active Problem List   Diagnosis   • Right foot infection   • Hypoglycemia   • Anemia   • Hyponatremia   • Hypoalbuminemia   • Essential hypertension   • Hyperlipidemia   • Paroxysmal atrial fibrillation (HCC)   • Sepsis (HCC)   • MRSA bacteremia     Past Medical History:   Diagnosis Date   • Diabetes (HCC)    • DVT (deep venous thrombosis) (HCC)    • Hypertension    • Mood disorder (HCC)      Past Surgical History:   Procedure Laterality Date   • BELOW KNEE AMPUTATION Left    • BELOW KNEE AMPUTATION Right 2022    Procedure: AMPUTATION BELOW KNEE RIGHT;  Surgeon: John Peguero MD;  Location: [facility];  Service: Vascular;  Laterality: Right;   • TRANS METATARSAL AMPUTATION Right        SLP Recommendation and Plan  SLP Swallowing Diagnosis: mild-moderate, pharyngeal dysphagia (22)  SLP Diet Recommendation: regular textures, nectar thick liquids, water between meals after oral care, with supervision, ice chips between meals after oral care, with supervision, other (see comments) (thin H2O between meals must be from spoon or from cup sip + chin tuck, as tolerated) (22)  Recommended Precautions and Strategies: upright posture during/after eating, general aspiration precautions, other (see comments) (straws ok w/ nectar-thick) (22 131)  SLP Rec. for Method of Medication Administration: meds whole, with thick liquids, with puree, as tolerated (22)     Monitor for Signs of  Aspiration: yes, notify SLP if any concerns (12/19/22 1315)     Swallow Criteria for Skilled Therapeutic Interventions Met: demonstrates skilled criteria (12/19/22 1315)  Anticipated Discharge Disposition (SLP): anticipate therapy at next level of care, inpatient rehabilitation facility (12/19/22 1315)  Rehab Potential/Prognosis, Swallowing: good, to achieve stated therapy goals (12/19/22 1315)  Therapy Frequency (Swallow): 5 days per week (12/19/22 1315)  Predicted Duration Therapy Intervention (Days): until discharge (12/19/22 1315)  Demonstrates Need for Referral to Another Service: otolaryngology (ENT) (Reported he has never followed-up w/ ENT to address vocal cord paralysis (acute onset in July following intubation). May be able to provide recommendations/interventions that could improve both voice & swallow function.) (12/19/22 1315)                                     Plan of Care Reviewed With: patient  Progress: improving      SWALLOW EVALUATION (last 72 hours)     SLP Adult Swallow Evaluation     Row Name 12/19/22 1315 12/18/22 1105                Rehab Evaluation    Document Type re-evaluation  -AC re-evaluation  -KL       Subjective Information no complaints  -AC no complaints  -KL       Patient Observations alert;cooperative  -AC alert;cooperative;agree to therapy  -KL       Patient/Family/Caregiver Comments/Observations No family present.  -AC none  -KL       Patient Effort good  -AC good  -KL       Symptoms Noted During/After Treatment -- none  -KL          General Information    Patient Profile Reviewed yes  -AC yes  -KL       Pertinent History Of Current Problem See previous eval.  -AC See prev. evaluation. ST received new consult to re-assess for possible dietary upgrade.  -KL       Current Method of Nutrition regular textures;nectar/syrup-thick liquids  -AC regular textures;nectar/syrup-thick liquids  -KL       Precautions/Limitations, Vision -- WFL  -KL       Precautions/Limitations, Hearing --  WFL  -KL       Prior Level of Function-Communication -- WFL  -       Prior Level of Function-Swallowing -- other (see comments)  See prev. hx  -       Plans/Goals Discussed with patient;agreed upon  - patient  -       Barriers to Rehab previous functional deficit  - previous functional deficit  -       Patient's Goals for Discharge --  return to thin liquids  - patient did not state  -          Pain    Additional Documentation -- Pain Scale: FACES Pre/Post-Treatment (Group)  -          Pain Scale: Numbers Pre/Post-Treatment    Pretreatment Pain Rating 0/10 - no pain  -AC --       Posttreatment Pain Rating 0/10 - no pain  -AC --          Pain Scale: FACES Pre/Post-Treatment    Pain: FACES Scale, Pretreatment -- 0-->no hurt  -       Posttreatment Pain Rating -- 0-->no hurt  -KL          Oral Motor Structure and Function    Dentition Assessment -- natural, present and adequate  -       Secretion Management -- WNL/WFL  -KL       Mucosal Quality -- moist, healthy  -          Oral Musculature and Cranial Nerve Assessment    Oral Motor General Assessment -- vocal impairment  -       Vocal Impairment, Detail. Cranial Nerve X (Vagus) -- vocal quality abnormality (see comments)  -       Oral Motor, Comment -- Hoarse VQ  -          General Eating/Swallowing Observations    Respiratory Support Currently in Use -- room air  -       Eating/Swallowing Skills fed by SLP;self-fed;needed assist;appropriate self-feeding skills observed  - self-fed  -       Positioning During Eating upright 90 degree;upright in chair  - upright in bed  -       Utensils Used -- spoon;cup;straw  -       Consistencies Trialed -- regular textures;pureed;ice chips;thin liquids;nectar/syrup-thick liquids  -          Clinical Swallow Eval    Oral Prep Phase -- WFL  -KL       Oral Transit -- WFL  -KL       Oral Residue -- WFL  -KL       Pharyngeal Phase -- suspected pharyngeal impairment  -       Esophageal Phase  -- unremarkable  -       Clinical Swallow Evaluation Summary -- Re-CSE completed. Pt with no overt clinical s/sx of aspiration with any trial given. Pt has history of silent aspiration of thin liquids on MBS 12/1 this adm. Recommend pt remain on current regular diet with nectar thick liquids. Recommend MBS tomorrow to further assess oropharyngeal swallow and r/o silent aspiration. Pt states he has been completing dysphagia exercises.  -          Pharyngeal Phase Concerns    Pharyngeal Phase Concerns -- other (see comments)  hx silent aspiration  -          MBS/VFSS    Utensils Used spoon;cup;straw  -AC --       Consistencies Trialed thin liquids;nectar/syrup-thick liquids;pudding thick;mixed consistency;regular textures  -AC --          MBS/VFSS Interpretation    Oral Prep Phase WFL  -AC --       Oral Transit Phase impaired  -AC --       Oral Residue WFL  -AC --          Oral Transit Phase    Impaired Oral Transit Phase premature spillage of liquids into pharynx  -AC --       Premature Spillage of Liquids into Pharynx mixed consistency  -AC --       Oral Transit Phase, Comment Did not appear to affect swallow safety.  -AC --          Initiation of Pharyngeal Swallow    Initiation of Pharyngeal Swallow bolus in pyriform sinuses  -AC --       Pharyngeal Phase impaired pharyngeal phase of swallowing  -AC --       Anatomical abnormalities noted anterior cervical c-spine prominence;other (see comments)  may be c/w osteophyte C2/C3  -AC --       Anatomical abnormalities functional impact functional impact on swallowing  affects pharyngeal stripping/clearance, in addition to reduced hyolaryngeal elevation/excursion  -AC --       Penetration Before the Swallow thin liquids;secondary to delayed swallow initiation or mistiming  -AC --       Penetration During the Swallow thin liquids;secondary to delayed swallow initiation or mistiming;secondary to reduced laryngeal elevation;secondary to reduced vestibular closure  -AC  --       Penetration After the Swallow thin liquids;secondary to residue;in pyriform sinuses  -AC --       Depth of Penetration deep  -AC --       Response to Penetration No  -AC --       No spontaneous response to penetration and non-effective laryngeal clearance with cue (see comments);other (see comments)  partial clearance w/ cough--weak  -AC --       Rosenbek's Scale thin:;5--->level 5  -AC --       Pharyngeal Residue all consistencies tested;diffuse within pharynx;secondary to reduced base of tongue retraction;secondary to reduced posterior pharyngeal wall stripping;secondary to reduced laryngeal elevation;secondary to reduced hyolaryngeal excursion;other (see comments)  moderate amount, greatest in vallecula  -AC --       Response to Residue other (see comments)  partial clearance w/ spontaneous/cued swallows  -AC --       Attempted Compensatory Maneuvers bolus size;bolus presentation style;chin tuck;effortful (hard swallow)  -AC --       Response to Attempted Compensatory Maneuvers other (see comments)  Chin tuck prevented penetration/aspiration of thin liquid only in isolation (not effective when thin liquid administered after solids or as liquid wash). Other attempted compensations were not effective.  -AC --       Pharyngeal Phase, Comment Some improvement c/t prior study. No penetration/aspiration appreciated w/ nectar-thick liquid via cup/straw, mixed consistency, or thin liquid (but only when administered via small sip w/ chin tuck and when in isolation prior to pudding/solid trials. At least deep penetration to TVFs w/ thin liquids (via cup sip, via cup/straw sips + chin tuck as liquid wash), w/ aspiration highly suspected though not directly visualized during study.  -AC --          SLP Evaluation Clinical Impression    SLP Swallowing Diagnosis mild-moderate;pharyngeal dysphagia  -AC mild-moderate;pharyngeal dysphagia  -KL       Functional Impact risk of aspiration/pneumonia  -AC risk of  aspiration/pneumonia  -       Rehab Potential/Prognosis, Swallowing good, to achieve stated therapy goals  -AC good, to achieve stated therapy goals  -       Swallow Criteria for Skilled Therapeutic Interventions Met demonstrates skilled criteria  -AC demonstrates skilled criteria  -          Recommendations    Therapy Frequency (Swallow) 5 days per week  -AC 5 days per week  -       Predicted Duration Therapy Intervention (Days) until discharge  -AC until discharge  -       SLP Diet Recommendation regular textures;nectar thick liquids;water between meals after oral care, with supervision;ice chips between meals after oral care, with supervision;other (see comments)  thin H2O between meals must be from spoon or from cup sip + chin tuck, as tolerated  -AC regular textures;no mixed consistencies;nectar thick liquids;ice chips between meals after oral care, with supervision  -       Recommended Diagnostics -- VFSS (MBS)  -       Recommended Precautions and Strategies upright posture during/after eating;general aspiration precautions;other (see comments)  straws ok w/ nectar-thick  -AC no straw;upright posture during/after eating;small bites of food and sips of liquid;general aspiration precautions;other (see comments)  -       Oral Care Recommendations Oral Care BID/PRN  - Oral Care BID/PRN;Suction toothbrush  -       SLP Rec. for Method of Medication Administration meds whole;with thick liquids;with puree;as tolerated  - meds whole;meds crushed;with puree;as tolerated  -       Monitor for Signs of Aspiration yes;notify SLP if any concerns  - notify SLP if any concerns  -       Anticipated Discharge Disposition (SLP) anticipate therapy at next level of care;inpatient rehabilitation facility  - anticipate therapy at next level of care  -       Demonstrates Need for Referral to Another Service otolaryngology (ENT)  Reported he has never followed-up w/ ENT to address vocal cord paralysis  (acute onset in July following intubation). May be able to provide recommendations/interventions that could improve both voice & swallow function.  -AC --             User Key  (r) = Recorded By, (t) = Taken By, (c) = Cosigned By    Initials Name Effective Dates    AC Alma Rosa Tovar, MS CCC-SLP 06/16/21 -     KL Toma Ortega MS CCC-SLP 06/15/21 -                 EDUCATION  The patient has been educated in the following areas:   Dysphagia (Swallowing Impairment) Oral Care/Hydration Modified Diet Instruction.        SLP GOALS     Row Name 12/19/22 1315 12/18/22 1105          (LTG) Patient will demonstrate functional swallow for    Diet Texture (Demonstrate functional swallow) regular textures  -AC regular textures  -KL     Liquid viscosity (Demonstrate functional swallow) thin liquids  -AC nectar/ mildly thick liquids  -KL     [place] (Demonstrate functional swallow) with use of compensatory strategies  -AC independently (over 90% accuracy)  -KL     Time Frame (Demonstrate functional swallow) by discharge  -AC by discharge  -KL     Progress/Outcomes (Demonstrate functional swallow) goal revised this date  -AC --        (STG) Patient will tolerate trials of    Consistencies Trialed (Tolerate trials) regular textures;nectar/ mildly thick liquids  -AC regular textures;nectar/ mildly thick liquids  -KL     Desired Outcome (Tolerate trials) without signs/symptoms of aspiration;without signs of distress  -AC without signs/symptoms of aspiration;without signs of distress  -KL     [place] (Tolerate trials) independently (over 90% accuracy)  -AC independently (over 90% accuracy)  -KL     Time Frame (Tolerate trials) by discharge  -AC by discharge  -KL     Progress/Outcomes (Tolerate trials) goal no longer appropriate  -AC --        (STG) Patient will tolerate therapeutic trials of    Consistencies Trialed (Tolerate therapeutic trials) thin liquids  -AC thin liquids  -KL     Desired Outcome (Tolerate therapeutic  trials) without signs/symptoms of aspiration  -AC without signs/symptoms of aspiration;without signs of distress  -KL     [place] (Tolerate therapeutic trials) with minimal cues (75-90% accuracy)  -AC with minimal cues (75-90% accuracy)  -KL     Time Frame (Tolerate therapeutic trials) by discharge  -AC 1 week  -KL     Progress/Outcomes (Tolerate therapeutic trials) goal ongoing  -AC --        (STG) Pharyngeal Strengthening Exercise Goal 1 (SLP)    Activity (Pharyngeal Strengthening Goal 1, SLP) increase timing  -AC increase superior movement of the hyolaryngeal complex;increase anterior movement of the hyolaryngeal complex;increase epiglottic inversion and retroflexion;increase closure at entrance to airway/closure of airway at glottis;increase squeeze/positive pressure generation;increase tongue base retraction  -KL     Increase Timing prepping - 3 second prep or suck swallow or 3-step swallow  -AC --     Increase Superior Movement of the Hyolaryngeal Complex effortful pitch glide (falsetto + pharyngeal squeeze)  -AC effortful pitch glide (falsetto + pharyngeal squeeze)  -KL     Increase Anterior Movement of the Hyolaryngeal Complex EMST;chin tuck against resistance (CTAR)  -AC chin tuck against resistance (CTAR)  -KL     Increase Epiglottic Inversion and Retroflexion Mendelsohn  -AC hard effortful swallow  -KL     Increase Closure at Entrance to Airway/Closure of Airway at Glottis super-supraglottic swallow  -AC supraglottic swallow  -KL     Increase Squeeze/Positive Pressure Generation janine  -AC hard effortful swallow  -KL     Increase Tongue Base Retraction hard effortful swallow  -AC janine  -KL     [place]/Accuracy (Pharyngeal Strengthening Goal 1, SLP) with minimal cues (75-90% accuracy)  -AC with minimal cues (75-90% accuracy)  -KL     Time Frame (Pharyngeal Strengthening Goal 1, SLP) short term goal (STG)  -AC short term goal (STG)  -KL     Progress/Outcomes (Pharyngeal Strengthening Goal 1,  SLP) goal revised this date  - --        (STG) Swallow Management Recall Goal 1 (SLP)    Activity (Swallow Management Recall Goal 1, SLP) independent recall of;compensatory swallow strategies/techniques;safe diet/liquid level  - independent recall of;compensatory swallow strategies/techniques;safe diet/liquid level  -     [place]/Accuracy (Swallow Management Recall Goal 1, SLP) independently (over 90% accuracy)  -AC independently (over 90% accuracy)  -KL     Time Frame (Swallow Management Recall Goal 1, SLP) short term goal (STG)  - short term goal (STG)  -KL     Progress/Outcomes (Swallow Management Recall Goal 1, SLP) goal no longer appropriate  - --        (STG) Swallow Compensatory Strategies Goal 1 (SLP)    Activity (Swallow Compensatory Strategies/Techniques Goal 1, SLP) compensatory strategies;during p.o. trials;small cup sips;chin tuck posture;other (see comments)  w/ thin liquid in isolation (w/o solids or as liquid wash)  - compensatory strategies;small cup sips;during p.o. trials;during meal intake;other (see comments)  -KL     [place]/Accuracy (Swallow Compensatory Strategies/Techniques Goal 1, SLP) independently (over 90% accuracy)  -AC independently (over 90% accuracy)  -     Time Frame (Swallow Compensatory Strategies/Techniques Goal 1, SLP) short term goal (STG)  - short term goal (STG)  -KL     Progress/Outcomes (Swallow Compensatory Strategies/Techniques Goal 1, SLP) goal revised this date  - --           User Key  (r) = Recorded By, (t) = Taken By, (c) = Cosigned By    Initials Name Provider Type     Alma Rosa Tovar, MS CCC-SLP Speech and Language Pathologist    Toma Ramon MS CCC-SLP Speech and Language Pathologist                   Time Calculation:    Time Calculation- SLP     Row Name 12/19/22 1546             Time Calculation- SLP    SLP Start Time 1315  -      SLP Received On 12/19/22  -         Untimed Charges    83203-IV Motion Fluoro Eval Swallow  Minutes 62  -AC         Total Minutes    Untimed Charges Total Minutes 62  -AC       Total Minutes 62  -AC            User Key  (r) = Recorded By, (t) = Taken By, (c) = Cosigned By    Initials Name Provider Type    AC Alma Rosa Tovar MS CCC-SLP Speech and Language Pathologist                Therapy Charges for Today     Code Description Service Date Service Provider Modifiers Qty    [id number] HC ST MOTION FLUORO EVAL SWALLOW 4 12/19/2022 Alma Rosa Tovar MS CCC-SLP GN 1               Alma Rosa Tovar MS CCC-SLP  12/19/2022

## 2022-12-19 NOTE — THERAPY PROGRESS REPORT/RE-CERT
Patient Name: Buster Velasco  : 1965    MRN: [id number]                              Today's Date: 2022       Admit Date: 2022    Visit Dx:     ICD-10-CM ICD-9-CM   1. Right foot infection  L08.9 686.9   2. Hypoglycemia  E16.2 251.2   3. Decubitus ulcer of coccygeal region, unspecified ulcer stage  L89.159 707.03     707.20   4. Pharyngeal dysphagia  R13.13 787.23     Patient Active Problem List   Diagnosis   • Right foot infection   • Hypoglycemia   • Anemia   • Hyponatremia   • Hypoalbuminemia   • Essential hypertension   • Hyperlipidemia   • Paroxysmal atrial fibrillation (HCC)   • Sepsis (HCC)   • MRSA bacteremia     Past Medical History:   Diagnosis Date   • Diabetes (HCC)    • DVT (deep venous thrombosis) (HCC)    • Hypertension    • Mood disorder (HCC)      Past Surgical History:   Procedure Laterality Date   • BELOW KNEE AMPUTATION Left    • BELOW KNEE AMPUTATION Right 2022    Procedure: AMPUTATION BELOW KNEE RIGHT;  Surgeon: John Peguero MD;  Location: [facility];  Service: Vascular;  Laterality: Right;   • TRANS METATARSAL AMPUTATION Right       General Information     Row Name 22 1031          Physical Therapy Time and Intention    Document Type progress note/recertification  -FW     Mode of Treatment physical therapy;co-treatment;occupational therapy  -FW     Row Name 22 1031          General Information    Patient Profile Reviewed yes  -FW     Existing Precautions/Restrictions fall;other (see comments)  B BKA, B shoulder replacements with limited AROM, L hand middle finger removed limiting functional grasp  -FW     Barriers to Rehab medically complex;previous functional deficit  -FW     Row Name 22 1031          Cognition    Orientation Status (Cognition) oriented x 4  -FW     Row Name 22 1031          Safety Issues, Functional Mobility    Impairments Affecting Function (Mobility) balance;endurance/activity  tolerance;pain;strength;postural/trunk control;range of motion (ROM);grasp  -           User Key  (r) = Recorded By, (t) = Taken By, (c) = Cosigned By    Initials Name Provider Type    Margarito Romero PT Physical Therapist               Mobility     Row Name 12/19/22 1032          Bed Mobility    Bed Mobility rolling left;rolling right;supine-sit  -FW     Rolling Left [place] (Bed Mobility) minimum assist (75% patient effort)  -FW     Rolling Right [place] (Bed Mobility) minimum assist (75% patient effort)  -FW     Supine-Sit [place] (Bed Mobility) moderate assist (50% patient effort);verbal cues;nonverbal cues (demo/gesture)  -     Row Name 12/19/22 1032          Bed-Chair Transfer    Bed-Chair [place] (Transfers) moderate assist (50% patient effort);2 person assist;dependent (less than 25% patient effort)  -     Assistive Device (Bed-Chair Transfers) transfer board;lift device  -     Comment, (Bed-Chair Transfer) vc for hand sequencing- mod A for trunk control and hip management/advancement; chair to bed via mechanical lift  -     Row Name 12/19/22 1032          Sit-Stand Transfer    Sit-Stand [place] (Transfers) unable to assess  -     Row Name 12/19/22 1032          Gait/Stairs (Locomotion)    Comment, (Gait/Stairs) non ambulatory at this time  -     Row Name 12/19/22 1032          Mobility    Extremity Weight-bearing Status right lower extremity  -     Right Lower Extremity (Weight-bearing Status) non weight-bearing (NWB)  -           User Key  (r) = Recorded By, (t) = Taken By, (c) = Cosigned By    Initials Name Provider Type    Margarito Romero PT Physical Therapist               Obj/Interventions     Row Name 12/19/22 1033          Balance    Balance Assessment sitting static balance;sitting dynamic balance  -FW     Static Sitting Balance standby assist  -FW     Dynamic Sitting Balance contact guard;moderate assist  -FW     Position, Sitting Balance  unsupported;sitting in chair;sitting edge of bed;other (see comments)  wheelchair  -FW           User Key  (r) = Recorded By, (t) = Taken By, (c) = Cosigned By    Initials Name Provider Type    Margarito Romero PT Physical Therapist               Goals/Plan     Row Name 12/19/22 1038          Bed Mobility Goal 1 (PT)    Activity/Assistive Device (Bed Mobility Goal 1, PT) bed mobility activities, all  -FW     [place] Level/Cues Needed (Bed Mobility Goal 1, PT) minimum assist (75% or more patient effort)  -FW     Time Frame (Bed Mobility Goal 1, PT) long term goal (LTG);10 days  -FW     Progress/Outcomes (Bed Mobility Goal 1, PT) goal revised this date  -     Row Name 12/19/22 1038          Transfer Goal 1 (PT)    Activity/Assistive Device (Transfer Goal 1, PT) bed-to-chair/chair-to-bed;wheelchair transfer;sliding board  -FW     [place] Level/Cues Needed (Transfer Goal 1, PT) minimum assist (75% or more patient effort)  -FW     Time Frame (Transfer Goal 1, PT) long term goal (LTG);10 days  -FW     Row Name 12/19/22 1038          Therapy Assessment/Plan (PT)    Planned Therapy Interventions (PT) balance training;bed mobility training;gait training;home exercise program;patient/family education;wheelchair management/propulsion training;transfer training;ROM (range of motion);strengthening;orthotic fitting/training;postural re-education  -FW           User Key  (r) = Recorded By, (t) = Taken By, (c) = Cosigned By    Initials Name Provider Type    Margarito Romero PT Physical Therapist               Clinical Impression     Row Name 12/19/22 1034          Pain    Pretreatment Pain Rating 4/10  -FW     Posttreatment Pain Rating 7/10  -FW     Pain Location - buttock  -FW     Pain Intervention(s) Repositioned;Ambulation/increased activity  -     Row Name 12/19/22 1034          Plan of Care Review    Plan of Care Reviewed With patient  -FW     Outcome Evaluation Pt continues with decreased activity  tolerance, balance deficits, and decreased functional strength limiting mobility. Pt performed bed mobility mod Ax1 and transferred from bed to wheelchair via slide board mod A x2. Pt w/ bowel movement during slideboard transfer and was taken back to bed via mechanical lift. Continue PT POC. Recommend dc rehab.  -FW     Row Name 12/19/22 1034          Vital Signs    O2 Delivery Pre Treatment room air  -FW     O2 Delivery Intra Treatment room air  -FW     O2 Delivery Post Treatment room air  -FW     Pre Patient Position Supine  -FW     Intra Patient Position Sitting  -FW     Post Patient Position Supine  -FW     Row Name 12/19/22 1034          Positioning and Restraints    Pre-Treatment Position in bed  -FW     Post Treatment Position bed  -FW     In Bed notified nsg;supine;call light within reach;encouraged to call for assist;exit alarm on;with other staff  -           User Key  (r) = Recorded By, (t) = Taken By, (c) = Cosigned By    Initials Name Provider Type    FW Margarito Ellison, PT Physical Therapist               Outcome Measures     Row Name 12/19/22 1039 12/19/22 0802       How much help from another person do you currently need...    Turning from your back to your side while in flat bed without using bedrails? 3  -FW 3  -TB    Moving from lying on back to sitting on the side of a flat bed without bedrails? 2  -FW 2  -TB    Moving to and from a bed to a chair (including a wheelchair)? 2  -FW 1  -TB    Standing up from a chair using your arms (e.g., wheelchair, bedside chair)? 1  -FW 1  -TB    Climbing 3-5 steps with a railing? 1  -FW 1  -TB    To walk in hospital room? 1  -FW 1  -TB    AM-PAC 6 Clicks Score (PT) 10  -FW 9  -TB    Highest level of mobility 4 --> Transferred to chair/commode  -FW 3 --> Sat at edge of bed  -TB    Row Name 12/19/22 1039          Functional Assessment    Outcome Measure Options AM-PAC 6 Clicks Basic Mobility (PT)  -FW           User Key  (r) = Recorded By, (t) = Taken By,  (c) = Cosigned By    Initials Name Provider Type    TB Eliel Low, RN Registered Nurse    Margarito Romero, ALVIN Physical Therapist                             Physical Therapy Education     Title: PT OT SLP Therapies (Done)     Topic: Physical Therapy (Done)     Point: Mobility training (Done)     Learning Progress Summary           Patient Acceptance, E, VU by  at 12/19/2022 1040    Comment: educated on transfer safety    Acceptance, E, VU by  at 12/15/2022 1410    Comment: educated on importance of knee extension at rest to prevent taut hamstrings    Eager, E, VU by SC at 12/12/2022 1121    Comment: reviewed benefits of activity    Eager, E, VU,DU by SC at 12/7/2022 1115    Comment: REVIEWED HEP                   Point: Home exercise program (Done)     Learning Progress Summary           Patient Acceptance, E, VU by  at 12/19/2022 1040    Comment: educated on transfer safety    Acceptance, E, VU by  at 12/15/2022 1410    Comment: educated on importance of knee extension at rest to prevent taut hamstrings    Eager, E, VU by SC at 12/12/2022 1121    Comment: reviewed benefits of activity    Eager, E, VU,DU by SC at 12/7/2022 1115    Comment: REVIEWED HEP                   Point: Body mechanics (Done)     Learning Progress Summary           Patient Acceptance, E, VU by  at 12/19/2022 1040    Comment: educated on transfer safety    Acceptance, E, VU by  at 12/15/2022 1410    Comment: educated on importance of knee extension at rest to prevent taut hamstrings    Eager, E, VU by SC at 12/12/2022 1121    Comment: reviewed benefits of activity    Eager, E, VU,DU by SC at 12/7/2022 1115    Comment: REVIEWED HEP                   Point: Precautions (Done)     Learning Progress Summary           Patient Acceptance, E, VU by  at 12/19/2022 1040    Comment: educated on transfer safety    Acceptance, E, VU by  at 12/15/2022 1410    Comment: educated on importance of knee extension at rest to prevent  taut hamstrings    KRIS Martinez VU by SC at 12/12/2022 1121    Comment: reviewed benefits of activity    KRIS Martinez VU, DU by SC at 12/7/2022 1115    Comment: REVIEWED HEP                               User Key     Initials Effective Dates Name Provider Type Discipline    SC 06/16/21 -  Shamika Patel PT Physical Therapist PT     05/05/22 -  Margarito Ellison PT Physical Therapist PT              PT Recommendation and Plan  Planned Therapy Interventions (PT): balance training, bed mobility training, gait training, home exercise program, patient/family education, wheelchair management/propulsion training, transfer training, ROM (range of motion), strengthening, orthotic fitting/training, postural re-education  Plan of Care Reviewed With: patient  Outcome Evaluation: Pt continues with decreased activity tolerance, balance deficits, and decreased functional strength limiting mobility. Pt performed bed mobility mod Ax1 and transferred from bed to wheelchair via slide board mod A x2. Pt w/ bowel movement during slideboard transfer and was taken back to bed via mechanical lift. Continue PT POC. Recommend dc rehab.     Time Calculation:    PT Charges     Row Name 12/19/22 1042             Time Calculation    Start Time 0950  -FW      PT Received On 12/19/22  -FW      PT Goal Re-Cert Due Date 12/29/22  -FW         Timed Charges    51737 - PT Therapeutic Activity Minutes 30  -FW         Total Minutes    Timed Charges Total Minutes 30  -FW       Total Minutes 30  -FW            User Key  (r) = Recorded By, (t) = Taken By, (c) = Cosigned By    Initials Name Provider Type     Margarito Ellison PT Physical Therapist              Therapy Charges for Today     Code Description Service Date Service Provider Modifiers Qty    [id number]  PT THERAPEUTIC ACT EA 15 MIN 12/19/2022 Margarito Ellison PT GP 1          PT G-Codes  Outcome Measure Options: AM-PAC 6 Clicks Basic Mobility (PT)  AM-PAC 6 Clicks Score (PT): 10  AM-PAC 6  Clicks Score (OT): 14  PT Discharge Summary  Anticipated Discharge Disposition (PT): inpatient rehabilitation facility    Margarito Ellison, ALVIN  12/19/2022

## 2022-12-19 NOTE — THERAPY PROGRESS REPORT/RE-CERT
Patient Name: Buster Velasco  : 1965    MRN: [id number]                              Today's Date: 2022       Admit Date: 2022    Visit Dx:     ICD-10-CM ICD-9-CM   1. Right foot infection  L08.9 686.9   2. Hypoglycemia  E16.2 251.2   3. Decubitus ulcer of coccygeal region, unspecified ulcer stage  L89.159 707.03     707.20   4. Pharyngeal dysphagia  R13.13 787.23     Patient Active Problem List   Diagnosis   • Right foot infection   • Hypoglycemia   • Anemia   • Hyponatremia   • Hypoalbuminemia   • Essential hypertension   • Hyperlipidemia   • Paroxysmal atrial fibrillation (HCC)   • Sepsis (HCC)   • MRSA bacteremia     Past Medical History:   Diagnosis Date   • Diabetes (HCC)    • DVT (deep venous thrombosis) (HCC)    • Hypertension    • Mood disorder (HCC)      Past Surgical History:   Procedure Laterality Date   • BELOW KNEE AMPUTATION Left    • BELOW KNEE AMPUTATION Right 2022    Procedure: AMPUTATION BELOW KNEE RIGHT;  Surgeon: John Peguero MD;  Location: [facility];  Service: Vascular;  Laterality: Right;   • TRANS METATARSAL AMPUTATION Right       General Information     Row Name 22 1033          OT Time and Intention    Document Type progress note/recertification  -JY     Mode of Treatment occupational therapy  -JY     Row Name 22 1033          General Information    Patient Profile Reviewed yes  -JY     Existing Precautions/Restrictions fall;other (see comments)  B BKA, B shoulder replacements limiting ROM, L hand metacarpal resection limiting functional grasp, chronic vocal cord paralysis, coccyx pressure injury  -JY     Barriers to Rehab medically complex;previous functional deficit  -JY     Row Name 22 1033          Cognition    Orientation Status (Cognition) oriented x 4  -JY     Row Name 22 1033          Safety Issues, Functional Mobility    Impairments Affecting Function (Mobility) balance;endurance/activity  tolerance;pain;strength;postural/trunk control;range of motion (ROM);grasp  -JY     Comment, Safety Issues/Impairments (Mobility) pt alert and able to follow commands, apprehensive about t/f mobility d/t environmental limitations w/c w/c, recliner, height of bed etc and fear of falling with pt reported ill fitting prosthesis for L LE  -JY           User Key  (r) = Recorded By, (t) = Taken By, (c) = Cosigned By    Initials Name Provider Type    Kristy Up OT Occupational Therapist                 Mobility/ADL's     Row Name 12/19/22 1040          Bed Mobility    Bed Mobility rolling left;rolling right;supine-sit  -JY     Rolling Left [place] (Bed Mobility) minimum assist (75% patient effort);verbal cues  -JY     Rolling Right [place] (Bed Mobility) minimum assist (75% patient effort);verbal cues  -JY     Supine-Sit [place] (Bed Mobility) moderate assist (50% patient effort);verbal cues;nonverbal cues (demo/gesture)  -JY     Bed Mobility, Safety Issues decreased use of arms for pushing/pulling;decreased use of legs for bridging/pushing;impaired trunk control for bed mobility  -JY     Assistive Device (Bed Mobility) bed rails;head of bed elevated  -JY     Comment, (Bed Mobility) skilled cues for optimal hand placement and seq to advance residual limbs [place] to EOB and upright trunk into sitting with attempt at pt reaching across midline toward bed rail to facilitate turn and pushing self into sitting with RUE strength, grossly req'd mod A for trunk mgmt, lacks momentum to sit up limited by lack of leverage at LEs  -JY     Row Name 12/19/22 1040          Transfers    Transfers bed-chair transfer  -JY     Comment, (Transfers) pt reported prosthesis for L LE ill fitting and concern for falling off during attempted STS or SPT thus utilized combination of sliding board and mechanical lift for t/f bed > w/c and w/c > bed, respectively; height of bed contributing factor in attempt to use sliding board in  return to bed; pt declined sitting in recliner d/t pain at buttocks/coccyx area  -JY     Row Name 12/19/22 1040          Bed-Chair Transfer    Bed-Chair [place] (Transfers) moderate assist (50% patient effort);2 person assist;verbal cues;nonverbal cues (demo/gesture)  -     Assistive Device (Bed-Chair Transfers) transfer board  -JY     Comment, (Bed-Chair Transfer) skilled cues for optimal placement of w/c, sliding board and pt's hands for use of UEs to elevate self upward and advance to chair: A for posterior and anterior body for hip mgmt and trunk control; pt req'd increased time, effort, cues for motivation and relaxation as pt fearful ; improved confidence with completion; utilized lift for return to bed for safety  -JY     Row Name 12/19/22 1040          Sit-Stand Transfer    Sit-Stand [place] (Transfers) unable to assess  -JY     Row Name 12/19/22 1040          Functional Mobility    Functional Mobility- Ind. Level not tested  -     Functional Mobility- Comment not appropriate at current time  -JY     Row Name 12/19/22 1040          Activities of Daily Living    BADL Assessment/Intervention upper body dressing;grooming  -JY     Row Name 12/19/22 1040          Mobility    Extremity Weight-bearing Status right lower extremity  -     Right Lower Extremity (Weight-bearing Status) non weight-bearing (NWB)   -JY     Row Name 12/19/22 1040          Grooming Assessment/Training    [place] Level (Grooming) wash face, hands;set up  -     Position (Grooming) supported sitting  -JY     Row Name 12/19/22 1040          Upper Body Dressing Assessment/Training    [place] Level (Upper Body Dressing) doff;don;pajama/robe;minimum assist (75% patient effort);verbal cues  -     Position (Upper Body Dressing) sitting up in bed  -     Comment, (Upper Body Dressing) min A for posterior and proximal mgmt of gown, able to thread and unthread with UEs, shoulder ROM limits overhead donning/doffing  -            User Key  (r) = Recorded By, (t) = Taken By, (c) = Cosigned By    Initials Name Provider Type    Kristy Up OT Occupational Therapist               Obj/Interventions     Row Name 12/19/22 1049          Sensory Assessment (Somatosensory)    Sensory Assessment (Somatosensory) bilateral UE;sensation intact  -JY     Bilateral UE Sensory Assessment general sensation;light touch awareness;light touch localization;intact  -JY     Row Name 12/19/22 1049          Range of Motion Comprehensive    General Range of Motion upper extremity range of motion deficits identified  -JY     Comment, General Range of Motion chronic B shoulder ROM deficits d/t B shoulder replacements, ~  degrees B shoulder flexion, otherwise grossly WFL AROM at BUEs, limited grasp at L hand d/t remote metacarpal resection  -JY     Row Name 12/19/22 1049          Strength Comprehensive (MMT)    General Manual Muscle Testing (MMT) Assessment upper extremity strength deficits identified  -JDAYTON     Comment, General Manual Muscle Testing (MMT) Assessment BUE MMS 4/5 based on observation  -JY     Row Name 12/19/22 1049          Balance    Balance Assessment sitting static balance;sitting dynamic balance  -JY     Static Sitting Balance standby assist  -JY     Dynamic Sitting Balance contact guard;moderate assist  -JY     Position, Sitting Balance unsupported;sitting in chair;sitting edge of bed;other (see comments)  sitting in w/c  -JY     Static Standing Balance not tested  -JY     Dynamic Standing Balance not tested  -JY     Balance Interventions sitting;static;dynamic;occupation based/functional task  -JY     Comment, Balance initial lateral lean when at EOB and less supported, improved stability when hip symmetry improved, lacks support distally at BLEs, no overt LOB during supported sitting in w/c or up in bed  -JY           User Key  (r) = Recorded By, (t) = Taken By, (c) = Cosigned By    Initials Name Provider Type    MEMO Canas  LUCAS Wagner Occupational Therapist               Goals/Plan     Row Name 12/19/22 1106          Transfer Goal 1 (OT)    Activity/Assistive Device (Transfer Goal 1, OT) other (see comments)  sliding board t/f to w/c  -JY     [place] Level/Cues Needed (Transfer Goal 1, OT) moderate assist (50-74% patient effort);tactile cues required;verbal cues required  x1  -JY     Time Frame (Transfer Goal 1, OT) long term goal (LTG);by discharge  -JY     Progress/Outcome (Transfer Goal 1, OT) continuing progress toward goal;progress slower than expected;goal ongoing  -JY     Row Name 12/19/22 1106          Dressing Goal 1 (OT)    Activity/Device (Dressing Goal 1, OT) upper body dressing  -JY     [place]/Cues Needed (Dressing Goal 1, OT) contact guard required;verbal cues required  -JY     Time Frame (Dressing Goal 1, OT) long term goal (LTG);by discharge  -JY     Progress/Outcome (Dressing Goal 1, OT) good progress toward goal;goal revised this date  -JY     Row Name 12/19/22 1106          Grooming Goal 1 (OT)    Activity/Device (Grooming Goal 1, OT) grooming skills, all;other (see comments)  sitting EOB  -JY     [place] (Grooming Goal 1, OT) supervision required  -JY     Time Frame (Grooming Goal 1, OT) long term goal (LTG);by discharge  -JY     Progress/Outcome (Grooming Goal 1, OT) progress slower than expected;goal ongoing  -JY     Row Name 12/19/22 1106          Strength Goal 1 (OT)    Strength Goal 1 (OT) Pt to complete seated HEP encompassing BUEs with respect to remote shoulder limitations targeting strength and fxl endurance w/ progressive sets/reps/resistance in order to improve integration in ADLs, related t/fs, mobility  -JY     Time Frame (Strength Goal 1, OT) long term goal (LTG);by discharge  -JY     Progress/Outcome (Strength Goal 1, OT) new goal  -JY     Row Name 12/19/22 1106          Therapy Assessment/Plan (OT)    Planned Therapy Interventions (OT) activity tolerance training;adaptive equipment  "training;BADL retraining;functional balance retraining;occupation/activity based interventions;passive ROM/stretching;patient/caregiver education/training;ROM/therapeutic exercise;strengthening exercise;transfer/mobility retraining  -J           User Key  (r) = Recorded By, (t) = Taken By, (c) = Cosigned By    Initials Name Provider Type    Kristy Up OT Occupational Therapist               Clinical Impression     Row Name 12/19/22 1052          Pain Assessment    Pretreatment Pain Rating 4/10  -JY     Posttreatment Pain Rating 7/10  -JY     Pain Location - Side/Orientation Right  -JY     Pain Location lower  -JY     Pain Location - buttock  -JY     Pre/Posttreatment Pain Comment persistent pain at buttock pain  -JY     Pain Intervention(s) Repositioned;Ambulation/increased activity  -J     Row Name 12/19/22 1059          Plan of Care Review    Plan of Care Reviewed With patient  -JY     Progress improving  -JY     Outcome Evaluation OT re cert completed. Pt continues to present with decreased I in ADLs, related t/fs and mobility limited by decreased fxl endurance, impaired balance, pain, muscle weakness at BUEs, remote ROM deficits at BUEs and fatigue following more dynamic tasks. Pt completed rolling L < > R w/ min A for access to pt posterior body for toileting needs, completed supine > sitting with mod A x 1 largely for trunk control to sit upright, improved sitting balance with improved symmetry at hips. Pt req'd gross mod A x 2 for t/f bed > w/c via sliding board with cues for seq, hand placement, set up. Pt presented with need for toileting A following t/f to w/c and mechanical lift back to bed for toileting purposes. Pt req'd dep care for hygiene. Pt able to assist with d/d gown with min A for posterior and proximal mgmt and SUA for face/hand hygeine. Will progress as able while hospitalized, revised goals as needed for \"just right challenge\". Recommend IRF at d/c when medically ready.  -JY     Row " Name 12/19/22 1055          Therapy Assessment/Plan (OT)    Rehab Potential (OT) good, to achieve stated therapy goals  -JY     Criteria for Skilled Therapeutic Interventions Met (OT) yes;meets criteria;skilled treatment is necessary  -JY     Therapy Frequency (OT) daily  -JY     Row Name 12/19/22 1055          Therapy Plan Review/Discharge Plan (OT)    Anticipated Discharge Disposition (OT) inpatient rehabilitation facility  -J     Row Name 12/19/22 1055          Vital Signs    Pre Systolic BP Rehab 116  -JY     Pre Treatment Diastolic BP 94  -JY     Pretreatment Heart Rate (beats/min) --  no other tele available at time of OT eval, RN approved  -JY     O2 Delivery Pre Treatment room air  -JY     O2 Delivery Intra Treatment room air  -JY     O2 Delivery Post Treatment room air  -JY     Pre Patient Position Supine  -JY     Intra Patient Position Sitting  -JY     Post Patient Position Supine  -JY     Row Name 12/19/22 1055          Positioning and Restraints    Pre-Treatment Position in bed  -JY     Post Treatment Position bed  -JY     In Bed notified nsg;fowlers;call light within reach;encouraged to call for assist;exit alarm on;side rails up x3;RLE elevated;with other staff  -J           User Key  (r) = Recorded By, (t) = Taken By, (c) = Cosigned By    Initials Name Provider Type    Kristy Up, LUCAS Occupational Therapist               Outcome Measures     Row Name 12/19/22 1109          How much help from another is currently needed...    Putting on and taking off regular lower body clothing? 1  -JY     Bathing (including washing, rinsing, and drying) 2  -JY     Toileting (which includes using toilet bed pan or urinal) 1  -JY     Putting on and taking off regular upper body clothing 3  -JY     Taking care of personal grooming (such as brushing teeth) 3  -JY     Eating meals 4  -JY     AM-PAC 6 Clicks Score (OT) 14  -JY     Row Name 12/19/22 1039 12/19/22 0802       How much help from another person do you  currently need...    Turning from your back to your side while in flat bed without using bedrails? 3  -FW 3  -TB    Moving from lying on back to sitting on the side of a flat bed without bedrails? 2  -FW 2  -TB    Moving to and from a bed to a chair (including a wheelchair)? 2  -FW 1  -TB    Standing up from a chair using your arms (e.g., wheelchair, bedside chair)? 1  -FW 1  -TB    Climbing 3-5 steps with a railing? 1  -FW 1  -TB    To walk in hospital room? 1  -FW 1  -TB    AM-PAC 6 Clicks Score (PT) 10  -FW 9  -TB    Highest level of mobility 4 --> Transferred to chair/commode  -FW 3 --> Sat at edge of bed  -TB    Row Name 12/19/22 1109 12/19/22 1039       Functional Assessment    Outcome Measure Options AM-PAC 6 Clicks Daily Activity (OT)  -JY AM-PAC 6 Clicks Basic Mobility (PT)  -          User Key  (r) = Recorded By, (t) = Taken By, (c) = Cosigned By    Initials Name Provider Type    TB Eliel Low, RN Registered Nurse    Kristy Up, OT Occupational Therapist    FW Margarito Ellison, PT Physical Therapist                Occupational Therapy Education     Title: PT OT SLP Therapies (In Progress)     Topic: Occupational Therapy (In Progress)     Point: ADL training (In Progress)     Description:   Instruct learner(s) on proper safety adaptation and remediation techniques during self care or transfers.   Instruct in proper use of assistive devices.              Learning Progress Summary           Patient Acceptance, E,D, NR by MEMO at 12/19/2022 0942    Acceptance, E,D, VU,DU,NR by TB at 12/14/2022 1441    Acceptance, E, VU by MR at 12/11/2022 1448    Eager, E, VU,DU by SC at 12/7/2022 1115    Comment: REVIEWED HEP    Acceptance, E, VU by MR at 12/7/2022 1110                   Point: Home exercise program (Done)     Description:   Instruct learner(s) on appropriate technique for monitoring, assisting and/or progressing therapeutic exercises/activities.              Learning Progress Summary            Patient Acceptance, E,D, VU,DU,NR by TB at 12/14/2022 1441    Acceptance, E, VU by MR at 12/11/2022 1448    Eager, E, VU,DU by SC at 12/7/2022 1115    Comment: REVIEWED HEP    Acceptance, E, VU by MR at 12/7/2022 1110                   Point: Precautions (In Progress)     Description:   Instruct learner(s) on prescribed precautions during self-care and functional transfers.              Learning Progress Summary           Patient Acceptance, E,D, NR by JY at 12/19/2022 0942    Acceptance, E,D, VU,DU,NR by  at 12/14/2022 1441    Acceptance, E, VU by MR at 12/11/2022 1448    Eager, E, VU,DU by SC at 12/7/2022 1115    Comment: REVIEWED HEP    Acceptance, E, VU by MR at 12/7/2022 1110                   Point: Body mechanics (In Progress)     Description:   Instruct learner(s) on proper positioning and spine alignment during self-care, functional mobility activities and/or exercises.              Learning Progress Summary           Patient Acceptance, E,D, NR by JY at 12/19/2022 0942    Acceptance, E, VU by MR at 12/11/2022 1448    Eager, E, VU,DU by SC at 12/7/2022 1115    Comment: REVIEWED HEP    Acceptance, E, VU by MR at 12/7/2022 1110                               User Key     Initials Effective Dates Name Provider Type Discipline    SC 06/16/21 -  Shamika Patel, PT Physical Therapist PT     06/16/21 -  Joseline Lemon, OT Occupational Therapist OT    J 06/16/21 -  Kristy Canas, OT Occupational Therapist OT    MR 09/22/22 -  Marilou Lennon, OT Occupational Therapist OT              OT Recommendation and Plan  Planned Therapy Interventions (OT): activity tolerance training, adaptive equipment training, BADL retraining, functional balance retraining, occupation/activity based interventions, passive ROM/stretching, patient/caregiver education/training, ROM/therapeutic exercise, strengthening exercise, transfer/mobility retraining  Therapy Frequency (OT): daily  Plan of Care Review  Plan of Care  "Reviewed With: patient  Progress: improving  Outcome Evaluation: OT re cert completed. Pt continues to present with decreased I in ADLs, related t/fs and mobility limited by decreased fxl endurance, impaired balance, pain, muscle weakness at BUEs, remote ROM deficits at BUEs and fatigue following more dynamic tasks. Pt completed rolling L < > R w/ min A for access to pt posterior body for toileting needs, completed supine > sitting with mod A x 1 largely for trunk control to sit upright, improved sitting balance with improved symmetry at hips. Pt req'd gross mod A x 2 for t/f bed > w/c via sliding board with cues for seq, hand placement, set up. Pt presented with need for toileting A following t/f to w/c and mechanical lift back to bed for toileting purposes. Pt req'd dep care for hygiene. Pt able to assist with d/d gown with min A for posterior and proximal mgmt and SUA for face/hand hygeine. Will progress as able while hospitalized, revised goals as needed for \"just right challenge\". Recommend IRF at d/c when medically ready.     Time Calculation:    Time Calculation- OT     Row Name 12/19/22 1110             Time Calculation- OT    OT Start Time 0942  -JY      OT Received On 12/19/22  -JY      OT Goal Re-Cert Due Date 12/29/22  -JY         Timed Charges    90016 - OT Therapeutic Activity Minutes 9  -JY      92315 - OT Self Care/Mgmt Minutes 15  -JY         Total Minutes    Timed Charges Total Minutes 24  -JY       Total Minutes 24  -JY            User Key  (r) = Recorded By, (t) = Taken By, (c) = Cosigned By    Initials Name Provider Type    Kristy Up OT Occupational Therapist              Therapy Charges for Today     Code Description Service Date Service Provider Modifiers Qty    00637474309 HC OT THERAPEUTIC ACT EA 15 MIN 12/19/2022 Kristy Canas OT GO 1    60760780465 HC OT SELF CARE/MGMT/TRAIN EA 15 MIN 12/19/2022 Kristy Canas OT GO 1               Kristy Canas OT  12/19/2022  "

## 2022-12-19 NOTE — PLAN OF CARE
Goal Outcome Evaluation:  Plan of Care Reviewed With: patient        Progress: no change     Pt is alert and oriented X 4. VSS. RA.

## 2022-12-19 NOTE — PROGRESS NOTES
[facility] Medicine Services  PROGRESS NOTE    Patient Name: Buster Velasco  : 1965  MRN: [id number]    Date of Admission: 2022  Primary Care Physician: Ludivina Bowers MD    Subjective   Subjective     CC:  F/U s/p right BKA    HPI:    No stump pain today.  Thinks swallow test today went well.  A little hoarse this afternoon, patient says this is not unusual for him.    ROS:  Gen- No fevers, chills  CV- No chest pain, palpitations  Resp- No cough, dyspnea  GI- No N/V/D, abd pain        Objective   Objective     Vital Signs:   Temp:  [98.1 °F (36.7 °C)-98.3 °F (36.8 °C)] 98.1 °F (36.7 °C)  Heart Rate:  [57-69] 62  Resp:  [18] 18  BP: (114-123)/(68-94) 115/68     Physical Exam:  Constitutional - non toxic, in bed, lying flat  HEENT-NCAT, mucous membranes moist  CV-RRR  Pulm-Grossly clear bilaterally  Abd-soft, nontender, nondistended, normoactive bowel sounds  Ext-No lower extremity cyanosis, clubbing or edema bilaterally  Neuro-alert, speech clear (but hoarse)  Psych-normal affect   Skin- No rash on exposed UE or LE bilaterally          Results Reviewed:  LAB RESULTS:      Lab 12/15/22  0419   WBC 6.86   HEMOGLOBIN 9.3*   HEMATOCRIT 30.3*   PLATELETS 379   MCV 85.4         Lab 12/15/22  0419   SODIUM 139   POTASSIUM 4.0   CHLORIDE 103   CO2 24.0   ANION GAP 12.0   BUN 13   CREATININE 0.65*   EGFR 109.2   GLUCOSE 118*   CALCIUM 9.6         Brief Urine Lab Results  (Last result in the past 365 days)      Color   Clarity   Blood   Leuk Est   Nitrite   Protein   CREAT   Urine HCG        22 0108 Yellow   Clear   Negative   Negative     Negative                 Microbiology Results Abnormal     Procedure Component Value - Date/Time    Blood Culture - Blood, Wrist, Left [[id number]]  (Normal) Collected: 22 1402    Lab Status: Final result Specimen: Blood from Wrist, Left Updated: 22 5917     Blood Culture No growth at 5 days    Blood Culture - Blood, Arm,  Left [[id number]]  (Normal) Collected: 12/06/22 1402    Lab Status: Final result Specimen: Blood from Arm, Left Updated: 12/11/22 1207     Blood Culture No growth at 5 days          FL Video Swallow With Speech Single Contrast    Result Date: 12/19/2022  EXAMINATION: FL VIDEO SWALLOW W SPEECH SINGLE-CONTRAST-  INDICATION: Assess oropharyngeal swallow; L08.9-Local infection of the skin and subcutaneous tissue, unspecified; E16.2-Hypoglycemia, unspecified; L89.159-Pressure ulcer of sacral region, unspecified stage; R13.13-Dysphagia, pharyngeal phase  TECHNIQUE: 1 minute and 30 seconds of fluoroscopic time was used for this exam. 13 associated fluoroscopic loops were saved. The patient was evaluated in the seated lateral position while taking a variety of consistencies of barium by mouth under the direction of speech pathology.  COMPARISON: NONE  FINDINGS: 1 minute and 30 seconds of fluoroscopy provided for a modified barium swallow. Please see speech therapy report for full details and recommendations.       Impression: Fluoroscopy provided for a modified barium swallow. Please see speech therapy report for full details and recommendations.          Results for orders placed during the hospital encounter of 11/30/22    Adult Transesophageal Echo (JERALD) W/ Cont if Necessary Per Protocol    Interpretation Summary  •  Left ventricular systolic function is normal. Estimated left ventricular EF = 55%  •  The aortic valve is structurally normal with no stenosis present. Trace aortic valve regurgitation is present. There is no evidence of an aortic valve mass is present.  •  There is mild, anterior mitral leaflet thickening present. No evidence of a mitral valve mass is present. Trace mitral valve regurgitation is present. No significant mitral valve stenosis is present  •  The tricuspid valve is normal in structure. There is no evidence of a mass on the tricuspid valve. Mild tricuspid valve regurgitation is present.  •  There  is mild thickening of the pulmonic valve. There is trace pulmonic valve regurgitation present. There is no pulmonic valve stenosis present.      I have reviewed the medications:  Scheduled Meds:atorvastatin, 20 mg, Oral, Nightly  DAPTOmycin, 8 mg/kg, Intravenous, Q24H  insulin detemir, 10 Units, Subcutaneous, Nightly  insulin lispro, 0-7 Units, Subcutaneous, TID AC  Insulin Lispro, 2 Units, Subcutaneous, TID With Meals  methadone, 10 mg, Oral, Q6H  metoprolol tartrate, 25 mg, Oral, Q12H  senna-docusate sodium, 2 tablet, Oral, BID  sodium chloride, 10 mL, Intravenous, Q12H  sodium chloride, 10 mL, Intravenous, Q12H  tamsulosin, 0.4 mg, Oral, Nightly      Continuous Infusions:   PRN Meds:.•  acetaminophen  •  senna-docusate sodium **AND** polyethylene glycol **AND** bisacodyl **AND** bisacodyl  •  calcium carbonate  •  dextrose  •  dextrose  •  glucagon (human recombinant)  •  hydrOXYzine  •  magnesium hydroxide  •  melatonin  •  ondansetron **OR** ondansetron  •  sennosides-docusate  •  sodium chloride  •  sodium chloride  •  sodium chloride    Assessment & Plan   Assessment & Plan     Active Hospital Problems    Diagnosis  POA   • **Right foot infection [L08.9]  Yes   • MRSA bacteremia [R78.81, B95.62]  Unknown   • Sepsis (HCC) [A41.9]  Yes   • Hypoglycemia [E16.2]  Yes   • Anemia [D64.9]  Yes   • Hyponatremia [E87.1]  Yes   • Hypoalbuminemia [E88.09]  Yes   • Essential hypertension [I10]  Yes   • Hyperlipidemia [E78.5]  Yes   • Paroxysmal atrial fibrillation (HCC) [I48.0]  Yes      Resolved Hospital Problems   No resolved problems to display.        Brief Hospital Course to date:  Buster Velasco is a 57 y.o. male with hx of prior osteomyelitis s/p left BKA, s/p right transmetatarsal amputation, left hand 3rd metacarpal resection (April 2022 at OSH, had 6 weeks of IV antibiotics for MRSA bacteremia), remote hx of IVDA, more recent hx of polysubstance abuse (meth), and DVT s/p IVC filter who presents with  right foot wounds. S/p right BKA on 12/2/22 with Dr. Peguero. His blood cultures grew MRSA. ID following and recommend at least 6 weeks of IV ATBx.      Sepsis secondary to acute right foot non-healing wound and likely osteomyelitis, resolved  MRSA bacteremia  - s/p right BKA 12/2/22 with Dr. Peguero  - ID following, currently on Daptomycin monotherapy  - Blood cultures with coag negative Staph and MRSA, have discussed with Dr. George, he will require IV antibiotics at discharge for at least 6 weeks but final duration TBD   - Repeat blood cultures NGTD x5d  - TTE no acute findings, JERALD 12/9/22 negative     Recurrent hypoglycemia in setting of DMII, resolved  - HbA1c 6.9%  - High dose Tresiba qAM before arrival which has been held  - also on metformin at home  - continue levemir 10u qhs.+low dose SSI, mealtime humalog    Heartburn  -PRN milk of magnesia, tums.  -improved.      Chronic pain on opioids   Previous IVDA/substance abuse  - Continue home methadone 10 mg QID     Reported hx of Afib   - Patient denies any hx of Afib. Not previously on anticoagulation and given murky history will not start.   - On Metoprolol at home which is continued  - No evidence of Afib on telemetry     Chronic vocal cord paralysis s/p PEG  Dysphagia  - SLP evaluation: regular diet but with nectar thick liquids recommended, patient initially wanted thin liquids and understood risk of aspiration, however currently has been drinking the nectar thick liquids; could change to pure comfort diet if he absolutely refuses nectar thick  - Speech re-evaluation today  - Unclear where/when PEG was placed, will eventually need it removed but defer to outpatient setting as it was not placed here; routine PEG care/flushing per nursing as needed     Hypotension with hx of HTN--resolved  - BP stable  - restart losartan/bumex as needed-hasn't required it thus far.     HLD   - Continue statin     DVT s/p IVC filter   - Placed in spring 2022 per patient. Defer  to PCP for further assessment and time of removal.      Hx of seizure in setting of meth use      BPH   - restarted home Flomax       Expected Discharge Location and Transportation: rehab  Expected Discharge Date: 12/20/22 anytime rehab placement is found      DVT prophylaxis:  Mechanical DVT prophylaxis orders are present.     AM-PAC 6 Clicks Score (PT): 10 (12/19/22 1039)    CODE STATUS:   Code Status and Medical Interventions:   Ordered at: 12/01/22 0035     Code Status (Patient has no pulse and is not breathing):    CPR (Attempt to Resuscitate)     Medical Interventions (Patient has pulse or is breathing):    Full Support       Nacho Krishna MD  12/19/22

## 2022-12-19 NOTE — CASE MANAGEMENT/SOCIAL WORK
Continued Stay Note   Kirstie     Patient Name: Buster Velasco  MRN: [id number]  Today's Date: 12/19/2022    Admit Date: 11/30/2022    Plan: Ongoing   Discharge Plan     Row Name 12/19/22 1645       Plan    Plan Ongoing    Patient/Family in Agreement with Plan yes    Plan Comments Several acute inpatient rehab facilities are interested in Mr. Velasco but he needs to continue to work hard with therapy daily and not refuse. Multiple facilities following for medical readiness and therapy improvement. Case management will continue to follow.    Final Discharge Disposition Code 62 - inpatient rehab facility               Discharge Codes    No documentation.               Expected Discharge Date and Time     Expected Discharge Date Expected Discharge Time    Dec 21, 2022             Alan Downing RN

## 2022-12-19 NOTE — PROGRESS NOTES
Cardiothoracic Surgery Progress Note      POD #: 17-right below-knee amputation     LOS: 19 days      Subjective: Awake and alert    Objective:  Vital Signs vital signs below noted T-max past 24 hours 98.4 °F  Temp:  [97.9 °F (36.6 °C)-98.4 °F (36.9 °C)] 98.1 °F (36.7 °C)  Heart Rate:  [57-69] 57  Resp:  [16-18] 18  BP: (114-130)/(69-83) 116/69    Physical Exam:   General Appearance: Oriented x3   Lungs:   Heart:   Skin:   Incision: The amputation site dressing change.  Sutures intact skin margin viable skin flaps are viable.     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  Medical manage hospitalist.  Antibiotics infectious disease.  Rehab okay with me at any time.      John Peguero MD - 12/19/22 - 05:44 EST

## 2022-12-19 NOTE — PLAN OF CARE
Goal Outcome Evaluation:   VSS. Alert and oriented x 4. Right PICC dressing CDI, dressing due to be changed on 12/25/2022. Skin interventions in place. MBS completed, see SLP note. Physical therapy continued to work with patient in bed to chair transfer. Pain controlled with around the clock dosing of methadone. Will continue to monitor.

## 2022-12-19 NOTE — PLAN OF CARE
"Goal Outcome Evaluation:  Plan of Care Reviewed With: patient        Progress: improving  Outcome Evaluation: OT re cert completed. Pt continues to present with decreased I in ADLs, related t/fs and mobility limited by decreased fxl endurance, impaired balance, pain, muscle weakness at BUEs, remote ROM deficits at BUEs and fatigue following more dynamic tasks. Pt completed rolling L < > R w/ min A for access to pt posterior body for toileting needs, completed supine > sitting with mod A x 1 largely for trunk control to sit upright, improved sitting balance with improved symmetry at hips. Pt req'd gross mod A x 2 for t/f bed > w/c via sliding board with cues for seq, hand placement, set up. Pt presented with need for toileting A following t/f to w/c and mechanical lift back to bed for toileting purposes. Pt req'd dep care for hygiene. Pt able to assist with d/d gown with min A for posterior and proximal mgmt and SUA for face/hand hygeine. Will progress as able while hospitalized, revised goals as needed for \"just right challenge\". Recommend IRF at d/c when medically ready.  "

## 2022-12-19 NOTE — PLAN OF CARE
Goal Outcome Evaluation:  Plan of Care Reviewed With: patient           Outcome Evaluation: Pt continues with decreased activity tolerance, balance deficits, and decreased functional strength limiting mobility. Pt performed bed mobility mod Ax1 and transferred from bed to wheelchair via slide board mod A x2. Pt w/ bowel movement during slideboard transfer and was taken back to bed via mechanical lift. Continue PT POC. Recommend dc rehab.

## 2022-12-20 LAB
ALBUMIN SERPL-MCNC: 3.2 G/DL (ref 3.5–5.2)
ALBUMIN/GLOB SERPL: 1 G/DL
ALP SERPL-CCNC: 89 U/L (ref 39–117)
ALT SERPL W P-5'-P-CCNC: 15 U/L (ref 1–41)
ANION GAP SERPL CALCULATED.3IONS-SCNC: 10 MMOL/L (ref 5–15)
AST SERPL-CCNC: 14 U/L (ref 1–40)
BASOPHILS # BLD AUTO: 0.03 10*3/MM3 (ref 0–0.2)
BASOPHILS NFR BLD AUTO: 0.4 % (ref 0–1.5)
BILIRUB SERPL-MCNC: 0.2 MG/DL (ref 0–1.2)
BUN SERPL-MCNC: 17 MG/DL (ref 6–20)
BUN/CREAT SERPL: 23.3 (ref 7–25)
CALCIUM SPEC-SCNC: 9.5 MG/DL (ref 8.6–10.5)
CHLORIDE SERPL-SCNC: 101 MMOL/L (ref 98–107)
CK SERPL-CCNC: 41 U/L (ref 20–200)
CO2 SERPL-SCNC: 27 MMOL/L (ref 22–29)
CREAT SERPL-MCNC: 0.73 MG/DL (ref 0.76–1.27)
DEPRECATED RDW RBC AUTO: 52.6 FL (ref 37–54)
EGFRCR SERPLBLD CKD-EPI 2021: 105.5 ML/MIN/1.73
EOSINOPHIL # BLD AUTO: 0.16 10*3/MM3 (ref 0–0.4)
EOSINOPHIL NFR BLD AUTO: 2.2 % (ref 0.3–6.2)
ERYTHROCYTE [DISTWIDTH] IN BLOOD BY AUTOMATED COUNT: 17 % (ref 12.3–15.4)
GLOBULIN UR ELPH-MCNC: 3.2 GM/DL
GLUCOSE BLDC GLUCOMTR-MCNC: 148 MG/DL (ref 70–130)
GLUCOSE BLDC GLUCOMTR-MCNC: 183 MG/DL (ref 70–130)
GLUCOSE BLDC GLUCOMTR-MCNC: 218 MG/DL (ref 70–130)
GLUCOSE BLDC GLUCOMTR-MCNC: 300 MG/DL (ref 70–130)
GLUCOSE SERPL-MCNC: 162 MG/DL (ref 65–99)
HCT VFR BLD AUTO: 33.4 % (ref 37.5–51)
HGB BLD-MCNC: 10.1 G/DL (ref 13–17.7)
IMM GRANULOCYTES # BLD AUTO: 0.07 10*3/MM3 (ref 0–0.05)
IMM GRANULOCYTES NFR BLD AUTO: 0.9 % (ref 0–0.5)
LYMPHOCYTES # BLD AUTO: 1.9 10*3/MM3 (ref 0.7–3.1)
LYMPHOCYTES NFR BLD AUTO: 25.7 % (ref 19.6–45.3)
MCH RBC QN AUTO: 25.8 PG (ref 26.6–33)
MCHC RBC AUTO-ENTMCNC: 30.2 G/DL (ref 31.5–35.7)
MCV RBC AUTO: 85.2 FL (ref 79–97)
MONOCYTES # BLD AUTO: 0.6 10*3/MM3 (ref 0.1–0.9)
MONOCYTES NFR BLD AUTO: 8.1 % (ref 5–12)
NEUTROPHILS NFR BLD AUTO: 4.62 10*3/MM3 (ref 1.7–7)
NEUTROPHILS NFR BLD AUTO: 62.7 % (ref 42.7–76)
NRBC BLD AUTO-RTO: 0 /100 WBC (ref 0–0.2)
PLAT MORPH BLD: NORMAL
PLATELET # BLD AUTO: 414 10*3/MM3 (ref 140–450)
PMV BLD AUTO: 10.1 FL (ref 6–12)
POTASSIUM SERPL-SCNC: 4.5 MMOL/L (ref 3.5–5.2)
PROT SERPL-MCNC: 6.4 G/DL (ref 6–8.5)
RBC # BLD AUTO: 3.92 10*6/MM3 (ref 4.14–5.8)
RBC MORPH BLD: NORMAL
SODIUM SERPL-SCNC: 138 MMOL/L (ref 136–145)
WBC MORPH BLD: NORMAL
WBC NRBC COR # BLD: 7.38 10*3/MM3 (ref 3.4–10.8)

## 2022-12-20 PROCEDURE — 25010000002 DAPTOMYCIN PER 1 MG: Performed by: INTERNAL MEDICINE

## 2022-12-20 PROCEDURE — 85007 BL SMEAR W/DIFF WBC COUNT: CPT | Performed by: INTERNAL MEDICINE

## 2022-12-20 PROCEDURE — 99231 SBSQ HOSP IP/OBS SF/LOW 25: CPT | Performed by: NURSE PRACTITIONER

## 2022-12-20 PROCEDURE — 63710000001 INSULIN LISPRO (HUMAN) PER 5 UNITS: Performed by: NURSE PRACTITIONER

## 2022-12-20 PROCEDURE — 85025 COMPLETE CBC W/AUTO DIFF WBC: CPT | Performed by: INTERNAL MEDICINE

## 2022-12-20 PROCEDURE — 80053 COMPREHEN METABOLIC PANEL: CPT | Performed by: INTERNAL MEDICINE

## 2022-12-20 PROCEDURE — 99024 POSTOP FOLLOW-UP VISIT: CPT | Performed by: THORACIC SURGERY (CARDIOTHORACIC VASCULAR SURGERY)

## 2022-12-20 PROCEDURE — 82962 GLUCOSE BLOOD TEST: CPT

## 2022-12-20 PROCEDURE — 63710000001 INSULIN DETEMIR PER 5 UNITS: Performed by: PEDIATRICS

## 2022-12-20 PROCEDURE — 82550 ASSAY OF CK (CPK): CPT | Performed by: INTERNAL MEDICINE

## 2022-12-20 PROCEDURE — 63710000001 INSULIN LISPRO (HUMAN) PER 5 UNITS: Performed by: HOSPITALIST

## 2022-12-20 RX ORDER — NICOTINE POLACRILEX 4 MG
15 LOZENGE BUCCAL
Status: DISCONTINUED | OUTPATIENT
Start: 2022-12-20 | End: 2023-01-06 | Stop reason: HOSPADM

## 2022-12-20 RX ADMIN — METOPROLOL TARTRATE 25 MG: 25 TABLET, FILM COATED ORAL at 08:29

## 2022-12-20 RX ADMIN — HYDROXYZINE HYDROCHLORIDE 50 MG: 25 TABLET ORAL at 20:02

## 2022-12-20 RX ADMIN — DAPTOMYCIN 800 MG: 500 INJECTION, POWDER, LYOPHILIZED, FOR SOLUTION INTRAVENOUS at 09:44

## 2022-12-20 RX ADMIN — METHADONE HYDROCHLORIDE 10 MG: 10 TABLET ORAL at 02:24

## 2022-12-20 RX ADMIN — INSULIN LISPRO 5 UNITS: 100 INJECTION, SOLUTION INTRAVENOUS; SUBCUTANEOUS at 11:46

## 2022-12-20 RX ADMIN — METHADONE HYDROCHLORIDE 10 MG: 10 TABLET ORAL at 08:29

## 2022-12-20 RX ADMIN — INSULIN DETEMIR 10 UNITS: 100 INJECTION, SOLUTION SUBCUTANEOUS at 19:59

## 2022-12-20 RX ADMIN — METHADONE HYDROCHLORIDE 10 MG: 10 TABLET ORAL at 19:09

## 2022-12-20 RX ADMIN — INSULIN LISPRO 2 UNITS: 100 INJECTION, SOLUTION INTRAVENOUS; SUBCUTANEOUS at 17:01

## 2022-12-20 RX ADMIN — METOPROLOL TARTRATE 25 MG: 25 TABLET, FILM COATED ORAL at 19:09

## 2022-12-20 RX ADMIN — TAMSULOSIN HYDROCHLORIDE 0.4 MG: 0.4 CAPSULE ORAL at 19:09

## 2022-12-20 RX ADMIN — Medication 10 ML: at 19:08

## 2022-12-20 RX ADMIN — INSULIN LISPRO 2 UNITS: 100 INJECTION, SOLUTION INTRAVENOUS; SUBCUTANEOUS at 11:47

## 2022-12-20 RX ADMIN — METHADONE HYDROCHLORIDE 10 MG: 10 TABLET ORAL at 14:01

## 2022-12-20 RX ADMIN — Medication 10 ML: at 19:11

## 2022-12-20 RX ADMIN — ATORVASTATIN CALCIUM 20 MG: 20 TABLET, FILM COATED ORAL at 19:08

## 2022-12-20 RX ADMIN — INSULIN LISPRO 3 UNITS: 100 INJECTION, SOLUTION INTRAVENOUS; SUBCUTANEOUS at 17:01

## 2022-12-20 RX ADMIN — INSULIN LISPRO 2 UNITS: 100 INJECTION, SOLUTION INTRAVENOUS; SUBCUTANEOUS at 08:29

## 2022-12-20 NOTE — PROGRESS NOTES
[facility] Progress Note        Antibiotics:  Anti-Infectives (From admission, onward)    Ordered     Dose/Rate Route Frequency Start Stop    12/07/22 0603  DAPTOmycin (CUBICIN) 800 mg in sodium chloride 0.9 % 50 mL IVPB        Ordering Provider: Zeke George MD    8 mg/kg × 100 kg  100 mL/hr over 30 Minutes Intravenous Every 24 Hours 12/07/22 1000 12/29/22 0959    11/30/22 2219  vancomycin 2000 mg/500 mL 0.9% NS IVPB ([facility])        Ordering Provider: Reggie Batres MD    20 mg/kg × 98.9 kg  over 2 Hours Intravenous Once 11/30/22 2221 12/01/22 0315    11/30/22 2219  piperacillin-tazobactam (ZOSYN) 3.375 g in iso-osmotic dextrose 50 ml (premix)        Ordering Provider: Reggie Batres MD    3.375 g Intravenous Once 11/30/22 2221 11/30/22 2335          CC: foot infection    HPI:    Patient is a 57 y.o.  Yr old male with history of prior lower extremity infection/amputations done in [facility].  He has a history of left BKA years ago.  More recent right transmetatarsal amputation but specific dates unclear and unclear who the surgeon was.  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  He has history DVT/IVC filter, diabetes and other comorbidities as below.  He reports a chronic right lateral foot wound present for months but apparently not precipitated by any specific event or trauma.  He is admitted to the hospital on November 30 with deterioration at the foot including redness/swelling    **patient had reported remote drug abuse (initially to me reported as IV drug abuse but then later he reported not injection use and I am unable to corroborate otherwise at prsent; +drug screen earlier this year per d/w Dr Chau for Meth at [facility] and reported by them to be IVDA/substance abuse),   " chronic methadone. He reported to me this was 17 years ago and therefore some inconsistencies    ** patient reports MRSA blood stream infection treated in [place] spring of [place]22, 6 weeks of vancomycin by his report.  Otherwise data deficit.  Try to retrieve information     12/2/22 Dr Peguero did surgery  \"Procedure(s): Mid level right below-knee amputation and primary closure \"    12/6 with MRSA in blood culture;  TTE done but limited per cardiology    12/9/22   JERALD no vegetation per cardiology    12/20/22   He denies any new focal pain or issues.  No adverse reaction to antibiotics.  No myalgia or shortness of breath/cough. Nursing reports no new focal pain or distress.  postop pain to the right LE which is controlled/dull at present, constant, nonradiating, worse with palpation, generally better with pain meds and 1-2  out of 10 in severity.  Not progressive     No headache photophobia or neck stiffness.  No shortness of breath cough or hemoptysis.  No nausea vomiting diarrhea or abdominal pain.  No dysuria hematuria or pyuria.  No other new skin rash       ROS:      12/20/22 per nursing,No f/c/s. No n/v/d. No rash.      Constitutional-- No Fever, chills or sweats.  Appetite good, and no malaise. No fatigue.  Heent--chronic hoarse voice.  No new vision, hearing or throat complaints.  No epistaxis or oral sores.   No flashers, floaters or eye pain.   No headache, photophobia or neck stiffness.  Chronic PEG tube  CV-- No chest pain, palpitation or syncope  Resp-- No SOB/cough/Hemoptysis  GI- No nausea, vomiting, or diarrhea.  No hematochezia, melena, or hematemesis. Denies jaundice or chronic liver disease.  -- No dysuria, hematuria, or flank pain.  Denies hesitancy, urgency or flank pain.  Lymph- no swollen lymph nodes in neck/axilla or groin.   Heme- No active bruising or bleeding; no Hx of DVT or PE.  MS-- no swelling or pain in the bones or joints of arms/legs.  No new back pain.  Neuro-- No acute " "focal weakness or numbness in the arms or legs.  No seizures.     Full 12 point review of systems reviewed and negative otherwise for acute complaints, except for above      PE: nursing/chaperone present  /74   Pulse 79   Temp 98 °F (36.7 °C) (Oral)   Resp 18   Ht 193 cm (75.98\")   Wt 100 kg (220 lb 7.4 oz)   SpO2 92%   BMI 26.85 kg/m²          GENERAL:  awake;  in no acute distress.  chronic Hoarse voice noted and chronic per him ; room air  HEENT: Normocephalic, atraumatic.   No conjunctival injection. No icterus. Oropharynx   without evidence of thrush or exudate. No evidence of peridontal disease.     HEART: RRR; No murmur, rubs, gallops.   LUNGS: Diminished at bases but otherwise clear to auscultation bilaterally without wheezing, rales, rhonchi. Normal respiratory effort. Nonlabored. No dullness.  ABDOMEN: Soft, nontender, nondistended. Positive bowel sounds. No rebound or guarding. NO mass or HSM.  PEG tube noted  EXT:  No cyanosis, clubbing;No cord.   MSK: FROM without joint effusions noted arms/legs.    SKIN: Warm and dry without cutaneous eruptions on Inspection/palpation.    NEURO:  awake     Right lower extremity surgical site noted;  No new redness;  no discrete mass bulge or fluctuance.  No crepitus or bulla.       No peripheral stigmata/phenomenon of endocarditis     IV without obvious redness or drainage     Left BKA site with no redness induration or warmth.  No discrete mass bulge or fluctuance.    Laboratory Data    Results from last 7 days   Lab Units 12/20/22  0806 12/15/22  0419   WBC 10*3/mm3 7.38 6.86   HEMOGLOBIN g/dL 10.1* 9.3*   HEMATOCRIT % 33.4* 30.3*   PLATELETS 10*3/mm3 414 379     Results from last 7 days   Lab Units 12/20/22  0806   SODIUM mmol/L 138   POTASSIUM mmol/L 4.5   CHLORIDE mmol/L 101   CO2 mmol/L 27.0   BUN mg/dL 17   CREATININE mg/dL 0.73*   GLUCOSE mg/dL 162*   CALCIUM mg/dL 9.5     Results from last 7 days   Lab Units 12/20/22  0806   ALK PHOS U/L 89 "   BILIRUBIN mg/dL 0.2   ALT (SGPT) U/L 15   AST (SGOT) U/L 14               Estimated Creatinine Clearance: 156 mL/min (A) (by C-G formula based on SCr of 0.73 mg/dL (L)).      Microbiology:      Radiology:  Imaging Results (Last 72 Hours)     Procedure Component Value Units Date/Time    FL Video Swallow With Speech Single Contrast [[id number]] Collected: 12/19/22 1552     Updated: 12/19/22 2136    Narrative:      EXAMINATION: FL VIDEO SWALLOW W SPEECH SINGLE-CONTRAST-     INDICATION: Assess oropharyngeal swallow; L08.9-Local infection of the  skin and subcutaneous tissue, unspecified; E16.2-Hypoglycemia,  unspecified; L89.159-Pressure ulcer of sacral region, unspecified stage;  R13.13-Dysphagia, pharyngeal phase     TECHNIQUE: 1 minute and 30 seconds of fluoroscopic time was used for  this exam. 13 associated fluoroscopic loops were saved. The patient was  evaluated in the seated lateral position while taking a variety of  consistencies of barium by mouth under the direction of speech  pathology.     COMPARISON: NONE     FINDINGS: 1 minute and 30 seconds of fluoroscopy provided for a modified  barium swallow. Please see speech therapy report for full details and  recommendations.          Impression:      Fluoroscopy provided for a modified barium swallow. Please  see speech therapy report for full details and recommendations.         This report was finalized on 12/19/2022 9:33 PM by Dr. Yobany Dickey MD.               Impression:     --Acute right foot/ankle with multifocal ulceration, cellulitis/wound infection and probable osteomyelitis with probe to bone at the lateral foot and abnormal MRI.  Other comorbidities as outlined above and high risk for further serious morbidity and other serious sequelae including persistent/recurrent or nonhealing wounds, persistent/progressive or recurrent infection and risk for further functional/limb loss/amputation.  Surgery following to help define timing/option of threshold for  any future surgical intervention .      **Surgery 12/2 with BKA     --MRSA bacteremia from November 30 (daptomycin sensitive).  Presumed from foot but also risk for endovascular focus particularly with  History of prior MRSA bloodstream infection.  Transthoracic echocardiogram limited per cardiology.  JERALD no vegetation per cardiology; follow-up blood cultures negative so far from December 6    **No  new metastatic foci of involvement as of December 19    --History MRSA with bacteremia by his report in spring/summer 2022 and treated with 6 weeks of vancomycin in [place].  Specifics unclear and trying to retrieve information    --coag-negative staph in blood culture likely contaminant     --Chronic vocal cord paralysis per notes with chronic hoarseness and indwelling PEG tube.  Medicine team to clarify     --History left BKA.     --Diabetes.  You need to tightly control blood sugar to give best chance for healing     --History of DVT with IVC filter    --history of remote drug abuse Per his reports to me; he denies injection drug abuse for 17 years by his report to me although as above, medicine team has find records indicating more recent abuse in 2022 [place] [facility]     PLAN:      -- Daptomycin  IV  4-6 weeks but final duration to depend on clinical course of study results and response to therapy; no new metastatic focus of involvement so far     -- Check/review labs cultures and scans     -- Partial history per nursing staff     -- Discussed with microbiology and family     --d/w Dr Peguero        -- Highly complex set of issues with high risk for further serious morbidity and other serious sequela          **Copied text in this note has been reviewed and is accurate as of 12/20/22.     Zeke George MD  12/20/2022

## 2022-12-20 NOTE — PROGRESS NOTES
Cardiothoracic Surgery Progress Note      POD #: 18-right below-knee amputation     LOS: 20 days      Subjective: Awake and alert    Objective:  Vital Signs vital signs below noted T-max past 24 hours 98.2 °F  Temp:  [97.9 °F (36.6 °C)-98.2 °F (36.8 °C)] 97.9 °F (36.6 °C)  Heart Rate:  [62-82] 68  Resp:  [18] 18  BP: (115-123)/(68-94) 117/72    Physical Exam:   General Appearance: Oriented x3   Lungs:   Heart:   Skin:   Incision: Amputation site dressing change.  Suture intact skin margin viable skin flaps are viable     Results:  Results from last 7 days   Lab Units 12/15/22  0419   WBC 10*3/mm3 6.86   HEMOGLOBIN g/dL 9.3*   HEMATOCRIT % 30.3*   PLATELETS 10*3/mm3 379     Results from last 7 days   Lab Units 12/15/22  0419   SODIUM mmol/L 139   POTASSIUM mmol/L 4.0   CHLORIDE mmol/L 103   CO2 mmol/L 24.0   BUN mg/dL 13   CREATININE mg/dL 0.65*   GLUCOSE mg/dL 118*   CALCIUM mg/dL 9.6         Assessment: #1 postop day 18 right below-knee amputation healing well  2.  Status post remote left below-knee amputation secondary about diabetic gangrene  3 diabetes mellitus and neuropathy      Plan: Continue to address change amputation site.  Medical manage per hospitalist.  Antibiotics per infectious disease.  Rehab per case management      John Peguero MD - 12/20/22 - 06:11 EST

## 2022-12-20 NOTE — CASE MANAGEMENT/SOCIAL WORK
Continued Stay Note  MARLO Thacker     Patient Name: Buster Velasco  MRN: [id number]  Today's Date: 12/20/2022    Admit Date: 11/30/2022    Plan: rehab   Discharge Plan     Row Name 12/20/22 0926       Plan    Plan rehab    Patient/Family in Agreement with Plan yes    Plan Comments I met with Mr Velasco at the bedside. He states that he is anticipating going to [facility] in [place], [place] for rehab. We discussed his need to work with PT and he verbalized understanding. PT is scheduled to work with him today. He denied having any questions or discharge concerns at this time. CM to continue to follow his plan of care.    Final Discharge Disposition Code 03 - skilled nursing facility (SNF)               Discharge Codes    No documentation.               Expected Discharge Date and Time     Expected Discharge Date Expected Discharge Time    Dec 21, 2022             Ansley Zambrano RN

## 2022-12-20 NOTE — PROGRESS NOTES
[facility] Medicine Services  PROGRESS NOTE    Patient Name: Buster Velasco  : 1965  MRN: [id number]    Date of Admission: 2022  Primary Care Physician: Ludivina Bowers MD    Subjective   Subjective     CC:  F/U s/p right BKA    HPI:  Patient resting in bed, alert and oriented x3, pleasant, talkative.  Patient denies pain, nausea, diarrhea.    Copied text in this note has been reviewed and is accurate as of 22.     ROS:  Gen- No fevers, chills  CV- No chest pain, palpitations  Resp- No cough, dyspnea  GI- No N/V/D, abd pain    Objective   Objective     Vital Signs:   Temp:  [97.9 °F (36.6 °C)-98.1 °F (36.7 °C)] 98 °F (36.7 °C)  Heart Rate:  [62-82] 67  Resp:  [18] 18  BP: (103-122)/(68-74) 103/73     Physical Exam:  Constitutional: Awake, alert, NAD  HENT: NCAT, mucous membranes moist  Respiratory: Clear/dim in the bases, nonlabored respirations   Cardiovascular: RRR, no murmurs, rubs, or gallops  Gastrointestinal: Positive bowel sounds, soft, nontender, nondistended PEG tube  Musculoskeletal: Left BKA (), new R BKA with dressing clean dry and intact  Psychiatric: Appropriate affect, cooperative  Neurologic: Oriented x 3, strength symmetric in all extremities, Cranial Nerves grossly intact to confrontation, speech clear  Skin: No rashes      Results Reviewed:  LAB RESULTS:      Lab 22  0806 12/15/22  0419   WBC 7.38 6.86   HEMOGLOBIN 10.1* 9.3*   HEMATOCRIT 33.4* 30.3*   PLATELETS 414 379   NEUTROS ABS 4.62  --    IMMATURE GRANS (ABS) 0.07*  --    LYMPHS ABS 1.90  --    MONOS ABS 0.60  --    EOS ABS 0.16  --    MCV 85.2 85.4         Lab 22  0806 12/15/22  0419   SODIUM 138 139   POTASSIUM 4.5 4.0   CHLORIDE 101 103   CO2 27.0 24.0   ANION GAP 10.0 12.0   BUN 17 13   CREATININE 0.73* 0.65*   EGFR 105.5 109.2   GLUCOSE 162* 118*   CALCIUM 9.5 9.6         Brief Urine Lab Results  (Last result in the past 365 days)      Color   Clarity   Blood    Leuk Est   Nitrite   Protein   CREAT   Urine HCG        08/20/22 0108 Yellow   Clear   Negative   Negative     Negative                 Microbiology Results Abnormal     Procedure Component Value - Date/Time    Blood Culture - Blood, Wrist, Left [[id number]]  (Normal) Collected: 12/06/22 1402    Lab Status: Final result Specimen: Blood from Wrist, Left Updated: 12/11/22 1545     Blood Culture No growth at 5 days    Blood Culture - Blood, Arm, Left [[id number]]  (Normal) Collected: 12/06/22 1402    Lab Status: Final result Specimen: Blood from Arm, Left Updated: 12/11/22 1545     Blood Culture No growth at 5 days          FL Video Swallow With Speech Single Contrast    Result Date: 12/19/2022  EXAMINATION: FL VIDEO SWALLOW W SPEECH SINGLE-CONTRAST-  INDICATION: Assess oropharyngeal swallow; L08.9-Local infection of the skin and subcutaneous tissue, unspecified; E16.2-Hypoglycemia, unspecified; L89.159-Pressure ulcer of sacral region, unspecified stage; R13.13-Dysphagia, pharyngeal phase  TECHNIQUE: 1 minute and 30 seconds of fluoroscopic time was used for this exam. 13 associated fluoroscopic loops were saved. The patient was evaluated in the seated lateral position while taking a variety of consistencies of barium by mouth under the direction of speech pathology.  COMPARISON: NONE  FINDINGS: 1 minute and 30 seconds of fluoroscopy provided for a modified barium swallow. Please see speech therapy report for full details and recommendations.       Impression: Fluoroscopy provided for a modified barium swallow. Please see speech therapy report for full details and recommendations.    This report was finalized on 12/19/2022 9:33 PM by Dr. Yobany Dickey MD.        Results for orders placed during the hospital encounter of 11/30/22    Adult Transesophageal Echo (JERALD) W/ Cont if Necessary Per Protocol    Interpretation Summary  •  Left ventricular systolic function is normal. Estimated left ventricular EF = 55%  •  The aortic  valve is structurally normal with no stenosis present. Trace aortic valve regurgitation is present. There is no evidence of an aortic valve mass is present.  •  There is mild, anterior mitral leaflet thickening present. No evidence of a mitral valve mass is present. Trace mitral valve regurgitation is present. No significant mitral valve stenosis is present  •  The tricuspid valve is normal in structure. There is no evidence of a mass on the tricuspid valve. Mild tricuspid valve regurgitation is present.  •  There is mild thickening of the pulmonic valve. There is trace pulmonic valve regurgitation present. There is no pulmonic valve stenosis present.      I have reviewed the medications:  Scheduled Meds:atorvastatin, 20 mg, Oral, Nightly  DAPTOmycin, 8 mg/kg, Intravenous, Q24H  insulin detemir, 10 Units, Subcutaneous, Nightly  insulin lispro, 0-7 Units, Subcutaneous, TID AC  Insulin Lispro, 2 Units, Subcutaneous, TID With Meals  methadone, 10 mg, Oral, Q6H  metoprolol tartrate, 25 mg, Oral, Q12H  senna-docusate sodium, 2 tablet, Oral, BID  sodium chloride, 10 mL, Intravenous, Q12H  sodium chloride, 10 mL, Intravenous, Q12H  tamsulosin, 0.4 mg, Oral, Nightly      Continuous Infusions:   PRN Meds:.•  acetaminophen  •  senna-docusate sodium **AND** polyethylene glycol **AND** bisacodyl **AND** bisacodyl  •  calcium carbonate  •  dextrose  •  dextrose  •  glucagon (human recombinant)  •  hydrOXYzine  •  magnesium hydroxide  •  melatonin  •  ondansetron **OR** ondansetron  •  sennosides-docusate  •  sodium chloride    Assessment & Plan   Assessment & Plan     Active Hospital Problems    Diagnosis  POA   • **Right foot infection [L08.9]  Yes   • MRSA bacteremia [R78.81, B95.62]  Unknown   • Sepsis (HCC) [A41.9]  Yes   • Hypoglycemia [E16.2]  Yes   • Anemia [D64.9]  Yes   • Hyponatremia [E87.1]  Yes   • Hypoalbuminemia [E88.09]  Yes   • Essential hypertension [I10]  Yes   • Hyperlipidemia [E78.5]  Yes   • Paroxysmal  atrial fibrillation (HCC) [I48.0]  Yes      Resolved Hospital Problems   No resolved problems to display.     Brief Hospital Course to date:  Buster Velasco is a 57 y.o. male with hx of prior osteomyelitis s/p left BKA, s/p right transmetatarsal amputation, left hand 3rd metacarpal resection (April 2022 at OSH, had 6 weeks of IV antibiotics for MRSA bacteremia), remote hx of IVDA, more recent hx of polysubstance abuse (meth), and DVT s/p IVC filter who presents with right foot wounds. S/p right BKA on 12/2/22 with Dr. Peguero. His blood cultures grew MRSA. ID following and recommend at least 6 weeks of IV ATBx.      Sepsis secondary to acute right foot non-healing wound and likely osteomyelitis, resolved  MRSA bacteremia  - s/p right BKA 12/2/22 with Dr. Peguero  - ID following, currently on Daptomycin monotherapy  - Blood cultures with coag negative Staph and MRSA, have discussed with Dr. George, he will require IV antibiotics at discharge for at least 6 weeks but final duration TBD   - Repeat blood cultures NGTD x5d  - TTE no acute findings, JERALD 12/9/22 negative     Recurrent hypoglycemia in setting of DMII, resolved  - HbA1c 6.9%  - High dose Tresiba qAM before arrival which has been held  - also on metformin at home  - continue levemir 10u qhs.+low dose SSI, mealtime humalog    Heartburn  -PRN milk of magnesia, tums.  -improved.      Chronic pain on opioids   Previous IVDA/substance abuse  - Continue home methadone 10 mg QID     Reported hx of Afib   - Patient denies any hx of Afib. Not previously on anticoagulation and given murky history will not start.   - On Metoprolol at home which is continued  - No evidence of Afib on telemetry     Chronic vocal cord paralysis s/p PEG  Dysphagia  - SLP evaluation: regular diet but with nectar thick liquids recommended, patient initially wanted thin liquids and understood risk of aspiration, however currently has been drinking the nectar thick liquids; could change  to pure comfort diet if he absolutely refuses nectar thick  - Speech re-evaluation today  - Unclear where/when PEG was placed, will eventually need it removed but defer to outpatient setting as it was not placed here; routine PEG care/flushing per nursing as needed     Hypotension with hx of HTN--resolved  - BP stable  - restart losartan/bumex as needed-hasn't required it thus far.     HLD   - Continue statin     DVT s/p IVC filter   - Placed in spring 2022 per patient. Defer to PCP for further assessment and time of removal.      Hx of seizure in setting of meth use      BPH   - restarted home Flomax       Expected Discharge Location and Transportation: rehab  Expected Discharge Date: 12/21/22 anytime rehab placement is found      DVT prophylaxis:  Mechanical DVT prophylaxis orders are present.     AM-PAC 6 Clicks Score (PT): 10 (12/20/22 0830)    CODE STATUS:   Code Status and Medical Interventions:   Ordered at: 12/01/22 0035     Code Status (Patient has no pulse and is not breathing):    CPR (Attempt to Resuscitate)     Medical Interventions (Patient has pulse or is breathing):    Full Support       Shanti Branch, APRN  12/20/22

## 2022-12-20 NOTE — PLAN OF CARE
Signed                A&Ox4, mood/affect appropriate. Speech/voice hoarse/clear/spontaneous. Pain adequately controlled with schedule methadone. VSS on RA. dressing to the right residual limb CDI. Blood glucose level remains elevated. Scheduled slow acting insulin administered. Laxatives refused due to the multiple BMs today.Patient rests in bed without sx/si of pain/acute distress. UOP ADQ. Will cont to mx. Call light in reach

## 2022-12-20 NOTE — PLAN OF CARE
Problem: Adult Inpatient Plan of Care  Goal: Plan of Care Review  Outcome: Ongoing, Progressing  Flowsheets (Taken 12/20/2022 1726)  Progress: improving  Plan of Care Reviewed With: patient  Outcome Evaluation: Patient VSS, RA, A&Ox4. C/o pain, scheduled methadone given. Open area on coccyx, red/blanchable, z-guard applied. Q2 turned, specialty bed in place. Voiding well. BM this morning. PICC line intact, abx given. Awaiting rehab placement. Continue to monitor.  Goal: Patient-Specific Goal (Individualized)  Outcome: Ongoing, Progressing  Goal: Absence of Hospital-Acquired Illness or Injury  Outcome: Ongoing, Progressing  Intervention: Identify and Manage Fall Risk  Recent Flowsheet Documentation  Taken 12/20/2022 1610 by Anastasiya Gongora, RN  Safety Promotion/Fall Prevention:   activity supervised   assistive device/personal items within reach   clutter free environment maintained   fall prevention program maintained   gait belt   safety round/check completed   room organization consistent   toileting scheduled   nonskid shoes/slippers when out of bed  Taken 12/20/2022 1400 by Anastasiya Gongora, RN  Safety Promotion/Fall Prevention:   activity supervised   assistive device/personal items within reach   clutter free environment maintained   fall prevention program maintained   gait belt   safety round/check completed   toileting scheduled   room organization consistent   nonskid shoes/slippers when out of bed  Taken 12/20/2022 1200 by Anastasiya Gongora, RN  Safety Promotion/Fall Prevention:   activity supervised   assistive device/personal items within reach   clutter free environment maintained   fall prevention program maintained   gait belt   toileting scheduled   safety round/check completed   room organization consistent   nonskid shoes/slippers when out of bed  Taken 12/20/2022 1030 by Anastasiya Gongora, RN  Safety Promotion/Fall Prevention:   activity supervised   assistive device/personal items within  reach   clutter free environment maintained   fall prevention program maintained   gait belt   toileting scheduled   safety round/check completed   room organization consistent   nonskid shoes/slippers when out of bed  Taken 12/20/2022 0830 by Anastasiya Gongora RN  Safety Promotion/Fall Prevention:   activity supervised   assistive device/personal items within reach   clutter free environment maintained   fall prevention program maintained   gait belt   toileting scheduled   safety round/check completed   room organization consistent   nonskid shoes/slippers when out of bed  Intervention: Prevent Skin Injury  Recent Flowsheet Documentation  Taken 12/20/2022 1610 by Anastasiya Gongora RN  Body Position:   sitting up in bed   tilted   right  Skin Protection:   adhesive use limited   transparent dressing maintained   tubing/devices free from skin contact  Taken 12/20/2022 1400 by Anastasiya Gongora RN  Body Position:   tilted   right   turned  Skin Protection:   adhesive use limited   transparent dressing maintained   tubing/devices free from skin contact  Taken 12/20/2022 1200 by Anastasiya Gongora RN  Body Position: sitting up in bed  Skin Protection:   adhesive use limited   transparent dressing maintained   tubing/devices free from skin contact  Taken 12/20/2022 1030 by Anastasiya oGngora RN  Body Position: supine  Skin Protection:   adhesive use limited   transparent dressing maintained   tubing/devices free from skin contact  Taken 12/20/2022 0830 by Anastasiya Gongora RN  Body Position:   tilted   left   turned  Skin Protection:   adhesive use limited   transparent dressing maintained   tubing/devices free from skin contact   incontinence pads utilized   silicone foam dressing in place   skin sealant/moisture barrier applied  Intervention: Prevent and Manage VTE (Venous Thromboembolism) Risk  Recent Flowsheet Documentation  Taken 12/20/2022 1610 by Anastasiya Gongora RN  VTE Prevention/Management:   bilateral    sequential compression devices off  Taken 12/20/2022 1400 by Anastasiya Gongora RN  VTE Prevention/Management:   bilateral   sequential compression devices off  Taken 12/20/2022 1200 by Anastasiya Gongora RN  VTE Prevention/Management:   bilateral   sequential compression devices off  Taken 12/20/2022 1030 by Anastasiya Gongora RN  VTE Prevention/Management:   bilateral   sequential compression devices off  Taken 12/20/2022 0830 by Anastasiya Gongora RN  VTE Prevention/Management:   bilateral   sequential compression devices off  Intervention: Prevent Infection  Recent Flowsheet Documentation  Taken 12/20/2022 1610 by Anastasiya Gongora RN  Infection Prevention: environmental surveillance performed  Taken 12/20/2022 1400 by Anastasiya Gongora RN  Infection Prevention: environmental surveillance performed  Taken 12/20/2022 1200 by Anastasiya Gongora RN  Infection Prevention: environmental surveillance performed  Taken 12/20/2022 1030 by Anastasiya Gongora RN  Infection Prevention: environmental surveillance performed  Taken 12/20/2022 0830 by Anastasiya Gongora RN  Infection Prevention: environmental surveillance performed  Goal: Optimal Comfort and Wellbeing  Outcome: Ongoing, Progressing  Intervention: Provide Person-Centered Care  Recent Flowsheet Documentation  Taken 12/20/2022 1610 by Anastasiya Gongora RN  Trust Relationship/Rapport:   care explained   choices provided  Taken 12/20/2022 1400 by Anastasiya Gongora RN  Trust Relationship/Rapport:   choices provided   care explained   questions encouraged  Taken 12/20/2022 1200 by Anastasiya Gongora RN  Trust Relationship/Rapport:   care explained   choices provided  Taken 12/20/2022 1030 by Anastasiya Gongora RN  Trust Relationship/Rapport:   care explained   choices provided  Taken 12/20/2022 0830 by Anastasiya Gongora RN  Trust Relationship/Rapport:   choices provided   care explained   questions encouraged   questions answered   reassurance provided    thoughts/feelings acknowledged  Goal: Readiness for Transition of Care  Outcome: Ongoing, Progressing     Problem: Fall Injury Risk  Goal: Absence of Fall and Fall-Related Injury  Outcome: Ongoing, Progressing  Intervention: Identify and Manage Contributors  Recent Flowsheet Documentation  Taken 12/20/2022 0830 by Anastasiya Gongora, RN  Medication Review/Management: medications reviewed  Intervention: Promote Injury-Free Environment  Recent Flowsheet Documentation  Taken 12/20/2022 1610 by Anastasiya Gongora, RN  Safety Promotion/Fall Prevention:   activity supervised   assistive device/personal items within reach   clutter free environment maintained   fall prevention program maintained   gait belt   safety round/check completed   room organization consistent   toileting scheduled   nonskid shoes/slippers when out of bed  Taken 12/20/2022 1400 by Anastasiya Gongora, RN  Safety Promotion/Fall Prevention:   activity supervised   assistive device/personal items within reach   clutter free environment maintained   fall prevention program maintained   gait belt   safety round/check completed   toileting scheduled   room organization consistent   nonskid shoes/slippers when out of bed  Taken 12/20/2022 1200 by Anastasiya Gongora, RN  Safety Promotion/Fall Prevention:   activity supervised   assistive device/personal items within reach   clutter free environment maintained   fall prevention program maintained   gait belt   toileting scheduled   safety round/check completed   room organization consistent   nonskid shoes/slippers when out of bed  Taken 12/20/2022 1030 by Anastasiya Gongora, RN  Safety Promotion/Fall Prevention:   activity supervised   assistive device/personal items within reach   clutter free environment maintained   fall prevention program maintained   gait belt   toileting scheduled   safety round/check completed   room organization consistent   nonskid shoes/slippers when out of bed  Taken 12/20/2022 0830  by Anastasiya Gongora, RN  Safety Promotion/Fall Prevention:   activity supervised   assistive device/personal items within reach   clutter free environment maintained   fall prevention program maintained   gait belt   toileting scheduled   safety round/check completed   room organization consistent   nonskid shoes/slippers when out of bed  Goal: Absence of Fall and Fall-Related Injury  Outcome: Ongoing, Progressing  Intervention: Identify and Manage Contributors  Recent Flowsheet Documentation  Taken 12/20/2022 0830 by Anastasiya Gongora, RN  Medication Review/Management: medications reviewed  Intervention: Promote Injury-Free Environment  Recent Flowsheet Documentation  Taken 12/20/2022 1610 by Anastasiya Gongora, RN  Safety Promotion/Fall Prevention:   activity supervised   assistive device/personal items within reach   clutter free environment maintained   fall prevention program maintained   gait belt   safety round/check completed   room organization consistent   toileting scheduled   nonskid shoes/slippers when out of bed  Taken 12/20/2022 1400 by Anastasiya Gongora, RN  Safety Promotion/Fall Prevention:   activity supervised   assistive device/personal items within reach   clutter free environment maintained   fall prevention program maintained   gait belt   safety round/check completed   toileting scheduled   room organization consistent   nonskid shoes/slippers when out of bed  Taken 12/20/2022 1200 by Anastasiya Gongora, RN  Safety Promotion/Fall Prevention:   activity supervised   assistive device/personal items within reach   clutter free environment maintained   fall prevention program maintained   gait belt   toileting scheduled   safety round/check completed   room organization consistent   nonskid shoes/slippers when out of bed  Taken 12/20/2022 1030 by Anastasiya Gongora, RN  Safety Promotion/Fall Prevention:   activity supervised   assistive device/personal items within reach   clutter free environment  maintained   fall prevention program maintained   gait belt   toileting scheduled   safety round/check completed   room organization consistent   nonskid shoes/slippers when out of bed  Taken 12/20/2022 0830 by Anastasiya Gongora RN  Safety Promotion/Fall Prevention:   activity supervised   assistive device/personal items within reach   clutter free environment maintained   fall prevention program maintained   gait belt   toileting scheduled   safety round/check completed   room organization consistent   nonskid shoes/slippers when out of bed     Problem: Skin Injury Risk Increased  Goal: Skin Health and Integrity  Outcome: Ongoing, Progressing  Intervention: Optimize Skin Protection  Recent Flowsheet Documentation  Taken 12/20/2022 1610 by Anastasiya Gongora, RN  Pressure Reduction Techniques:   frequent weight shift encouraged   heels elevated off bed   positioned off wounds   weight shift assistance provided  Head of Bed (HOB) Positioning: HOB at 60 degrees  Pressure Reduction Devices:   specialty bed utilized   heel offloading device utilized   positioning supports utilized  Skin Protection:   adhesive use limited   transparent dressing maintained   tubing/devices free from skin contact  Taken 12/20/2022 1400 by Anastasiya Gongora RN  Pressure Reduction Techniques:   frequent weight shift encouraged   heels elevated off bed   positioned off wounds   weight shift assistance provided  Head of Bed (HOB) Positioning: HOB at 60 degrees  Pressure Reduction Devices:   specialty bed utilized   positioning supports utilized   heel offloading device utilized  Skin Protection:   adhesive use limited   transparent dressing maintained   tubing/devices free from skin contact  Taken 12/20/2022 1200 by Anastasiya Gongora RN  Pressure Reduction Techniques:   frequent weight shift encouraged   heels elevated off bed   positioned off wounds   weight shift assistance provided  Head of Bed (HOB) Positioning: HOB at 60-90  degrees  Pressure Reduction Devices:   specialty bed utilized   positioning supports utilized   heel offloading device utilized  Skin Protection:   adhesive use limited   transparent dressing maintained   tubing/devices free from skin contact  Taken 12/20/2022 1030 by Anastasiya Gongora RN  Pressure Reduction Techniques:   frequent weight shift encouraged   heels elevated off bed   positioned off wounds   weight shift assistance provided  Head of Bed (HOB) Positioning: HOB at 45 degrees  Pressure Reduction Devices:   specialty bed utilized   positioning supports utilized   heel offloading device utilized  Skin Protection:   adhesive use limited   transparent dressing maintained   tubing/devices free from skin contact  Taken 12/20/2022 0830 by Anastasiya Gongora RN  Pressure Reduction Techniques:   frequent weight shift encouraged   positioned off wounds   weight shift assistance provided   heels elevated off bed  Head of Bed (HOB) Positioning: HOB at 30 degrees  Pressure Reduction Devices:   specialty bed utilized   positioning supports utilized   heel offloading device utilized  Skin Protection:   adhesive use limited   transparent dressing maintained   tubing/devices free from skin contact   incontinence pads utilized   silicone foam dressing in place   skin sealant/moisture barrier applied     Problem: Adjustment to Amputation (Lower Extremity Amputation)  Goal: Optimal Adjustment to Amputation  Outcome: Ongoing, Progressing  Intervention: Promote Patient and Family Adjustment to Amputation  Recent Flowsheet Documentation  Taken 12/20/2022 1610 by Anastasiya Gongora RN  Family/Support System Care: support provided  Taken 12/20/2022 1400 by Anastasiya Gongora RN  Family/Support System Care: support provided  Taken 12/20/2022 1200 by Anastasiya Gongora RN  Family/Support System Care: support provided  Taken 12/20/2022 1030 by Anastasiya Gongora RN  Family/Support System Care: support provided  Taken 12/20/2022 0830  by Anastasiya Gongora, RN  Family/Support System Care:   self-care encouraged   support provided     Problem: BADL (Basic Activities of Daily Living) Impairment (Lower Extremity Amputation)  Goal: Optimal Safe BADL Performance  Outcome: Ongoing, Progressing     Problem: IADL (Instrumental Activities of Daily Living) Impairment (Lower Extremity Amputation)  Goal: Optimal Safe IADL Performance  Outcome: Ongoing, Progressing     Problem: Lower Limb Care (Lower Extremity Amputation)  Goal: Effective Lower Limb Care  Outcome: Ongoing, Progressing     Problem: Mobility Impairment (Lower Extremity Amputation)  Goal: Optimal Mobility [place] and Safety  Outcome: Ongoing, Progressing     Problem: Pain and Hypersensitivity (Lower Extremity Amputation)  Goal: Acceptable Pain/Hypersensitivity Control  Outcome: Ongoing, Progressing     Problem: Prosthetic Use and Management (Lower Extremity Amputation)  Goal: Effective Prosthesis Use and Management  Outcome: Ongoing, Progressing   Goal Outcome Evaluation:  Plan of Care Reviewed With: patient        Progress: improving  Outcome Evaluation: Patient VSS, RA, A&Ox4. C/o pain, scheduled methadone given. Open area on coccyx, red/blanchable, z-guard applied. Q2 turned, specialty bed in place. Voiding well. BM this morning. PICC line intact, abx given. Awaiting rehab placement. Continue to monitor.

## 2022-12-21 LAB
ANION GAP SERPL CALCULATED.3IONS-SCNC: 11 MMOL/L (ref 5–15)
BASOPHILS # BLD AUTO: 0.03 10*3/MM3 (ref 0–0.2)
BASOPHILS NFR BLD AUTO: 0.4 % (ref 0–1.5)
BUN SERPL-MCNC: 15 MG/DL (ref 6–20)
BUN/CREAT SERPL: 25.4 (ref 7–25)
CALCIUM SPEC-SCNC: 10 MG/DL (ref 8.6–10.5)
CHLORIDE SERPL-SCNC: 102 MMOL/L (ref 98–107)
CO2 SERPL-SCNC: 27 MMOL/L (ref 22–29)
CREAT SERPL-MCNC: 0.59 MG/DL (ref 0.76–1.27)
DEPRECATED RDW RBC AUTO: 52.3 FL (ref 37–54)
EGFRCR SERPLBLD CKD-EPI 2021: 112.5 ML/MIN/1.73
EOSINOPHIL # BLD AUTO: 0.15 10*3/MM3 (ref 0–0.4)
EOSINOPHIL NFR BLD AUTO: 2 % (ref 0.3–6.2)
ERYTHROCYTE [DISTWIDTH] IN BLOOD BY AUTOMATED COUNT: 17 % (ref 12.3–15.4)
GLUCOSE BLDC GLUCOMTR-MCNC: 161 MG/DL (ref 70–130)
GLUCOSE BLDC GLUCOMTR-MCNC: 196 MG/DL (ref 70–130)
GLUCOSE BLDC GLUCOMTR-MCNC: 220 MG/DL (ref 70–130)
GLUCOSE BLDC GLUCOMTR-MCNC: 226 MG/DL (ref 70–130)
GLUCOSE SERPL-MCNC: 153 MG/DL (ref 65–99)
HCT VFR BLD AUTO: 33 % (ref 37.5–51)
HGB BLD-MCNC: 10.2 G/DL (ref 13–17.7)
IMM GRANULOCYTES # BLD AUTO: 0.04 10*3/MM3 (ref 0–0.05)
IMM GRANULOCYTES NFR BLD AUTO: 0.5 % (ref 0–0.5)
LYMPHOCYTES # BLD AUTO: 1.68 10*3/MM3 (ref 0.7–3.1)
LYMPHOCYTES NFR BLD AUTO: 22.8 % (ref 19.6–45.3)
MCH RBC QN AUTO: 26.3 PG (ref 26.6–33)
MCHC RBC AUTO-ENTMCNC: 30.9 G/DL (ref 31.5–35.7)
MCV RBC AUTO: 85.1 FL (ref 79–97)
MONOCYTES # BLD AUTO: 0.47 10*3/MM3 (ref 0.1–0.9)
MONOCYTES NFR BLD AUTO: 6.4 % (ref 5–12)
NEUTROPHILS NFR BLD AUTO: 5 10*3/MM3 (ref 1.7–7)
NEUTROPHILS NFR BLD AUTO: 67.9 % (ref 42.7–76)
NRBC BLD AUTO-RTO: 0 /100 WBC (ref 0–0.2)
PLATELET # BLD AUTO: 410 10*3/MM3 (ref 140–450)
PMV BLD AUTO: 10 FL (ref 6–12)
POTASSIUM SERPL-SCNC: 4.1 MMOL/L (ref 3.5–5.2)
RBC # BLD AUTO: 3.88 10*6/MM3 (ref 4.14–5.8)
SODIUM SERPL-SCNC: 140 MMOL/L (ref 136–145)
WBC NRBC COR # BLD: 7.37 10*3/MM3 (ref 3.4–10.8)

## 2022-12-21 PROCEDURE — 80048 BASIC METABOLIC PNL TOTAL CA: CPT | Performed by: NURSE PRACTITIONER

## 2022-12-21 PROCEDURE — 99024 POSTOP FOLLOW-UP VISIT: CPT | Performed by: THORACIC SURGERY (CARDIOTHORACIC VASCULAR SURGERY)

## 2022-12-21 PROCEDURE — 63710000001 INSULIN LISPRO (HUMAN) PER 5 UNITS: Performed by: NURSE PRACTITIONER

## 2022-12-21 PROCEDURE — 82962 GLUCOSE BLOOD TEST: CPT

## 2022-12-21 PROCEDURE — 25010000002 DAPTOMYCIN PER 1 MG: Performed by: INTERNAL MEDICINE

## 2022-12-21 PROCEDURE — 97110 THERAPEUTIC EXERCISES: CPT

## 2022-12-21 PROCEDURE — 97530 THERAPEUTIC ACTIVITIES: CPT

## 2022-12-21 PROCEDURE — 99231 SBSQ HOSP IP/OBS SF/LOW 25: CPT | Performed by: NURSE PRACTITIONER

## 2022-12-21 PROCEDURE — 85025 COMPLETE CBC W/AUTO DIFF WBC: CPT | Performed by: NURSE PRACTITIONER

## 2022-12-21 PROCEDURE — 63710000001 INSULIN LISPRO (HUMAN) PER 5 UNITS: Performed by: HOSPITALIST

## 2022-12-21 PROCEDURE — 63710000001 INSULIN DETEMIR PER 5 UNITS: Performed by: PEDIATRICS

## 2022-12-21 RX ADMIN — DAPTOMYCIN 800 MG: 500 INJECTION, POWDER, LYOPHILIZED, FOR SOLUTION INTRAVENOUS at 09:07

## 2022-12-21 RX ADMIN — INSULIN LISPRO 2 UNITS: 100 INJECTION, SOLUTION INTRAVENOUS; SUBCUTANEOUS at 11:38

## 2022-12-21 RX ADMIN — Medication 10 ML: at 08:55

## 2022-12-21 RX ADMIN — Medication 10 ML: at 19:32

## 2022-12-21 RX ADMIN — INSULIN LISPRO 2 UNITS: 100 INJECTION, SOLUTION INTRAVENOUS; SUBCUTANEOUS at 08:55

## 2022-12-21 RX ADMIN — METHADONE HYDROCHLORIDE 10 MG: 10 TABLET ORAL at 13:51

## 2022-12-21 RX ADMIN — TAMSULOSIN HYDROCHLORIDE 0.4 MG: 0.4 CAPSULE ORAL at 19:31

## 2022-12-21 RX ADMIN — METOPROLOL TARTRATE 25 MG: 25 TABLET, FILM COATED ORAL at 08:55

## 2022-12-21 RX ADMIN — METHADONE HYDROCHLORIDE 10 MG: 10 TABLET ORAL at 01:24

## 2022-12-21 RX ADMIN — ATORVASTATIN CALCIUM 20 MG: 20 TABLET, FILM COATED ORAL at 19:30

## 2022-12-21 RX ADMIN — Medication 10 ML: at 19:33

## 2022-12-21 RX ADMIN — INSULIN LISPRO 2 UNITS: 100 INJECTION, SOLUTION INTRAVENOUS; SUBCUTANEOUS at 16:44

## 2022-12-21 RX ADMIN — INSULIN DETEMIR 10 UNITS: 100 INJECTION, SOLUTION SUBCUTANEOUS at 19:37

## 2022-12-21 RX ADMIN — METOPROLOL TARTRATE 25 MG: 25 TABLET, FILM COATED ORAL at 19:31

## 2022-12-21 RX ADMIN — METHADONE HYDROCHLORIDE 10 MG: 10 TABLET ORAL at 19:31

## 2022-12-21 RX ADMIN — METHADONE HYDROCHLORIDE 10 MG: 10 TABLET ORAL at 08:55

## 2022-12-21 RX ADMIN — INSULIN LISPRO 3 UNITS: 100 INJECTION, SOLUTION INTRAVENOUS; SUBCUTANEOUS at 16:44

## 2022-12-21 RX ADMIN — Medication 10 ML: at 08:56

## 2022-12-21 NOTE — PLAN OF CARE
Goal Outcome Evaluation:  Plan of Care Reviewed With: patient        Progress: improving  Outcome Evaluation: Patient demonstrating improved bed mobility and overall strength. He is willing to exercise and participate in therapeutic activities. His prosthesis is not fitting him well limiting his lateral transfers. His w/c is unsafe to use at this time. He is awating Rehab and will need these issues addressed

## 2022-12-21 NOTE — PROGRESS NOTES
Cardiothoracic Surgery Progress Note      POD #: 19-right below-knee amputation     LOS: 21 days      Subjective: Awake and alert    Objective:  Vital Signs vital signs below noted T-max past 24 hours 98.4 °F  Temp:  [97.8 °F (36.6 °C)-98.4 °F (36.9 °C)] 97.8 °F (36.6 °C)  Heart Rate:  [63-88] 63  Resp:  [17-19] 17  BP: (103-122)/(72-80) 115/73    Physical Exam:   General Appearance:    Lungs:   Heart:   Skin:   Incision: Amputation dressing change.  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  Medical manage per hospitalist.  Antibiotics per infectious disease.  Rehab per case management      John Pegueor MD - 12/21/22 - 05:28 EST

## 2022-12-21 NOTE — PLAN OF CARE
Incontinent in bowel, skin care provided at each episode. VSS on RA. Scheduled methadone effective for adequate pain control. Patient is currently resting in bed without sx/si of pain/acute distress. Multi layer dressings to right stump CDI. Will cont to mx. Call light in reach.

## 2022-12-21 NOTE — PROGRESS NOTES
[facility] Progress Note        Antibiotics:  Anti-Infectives (From admission, onward)    Ordered     Dose/Rate Route Frequency Start Stop    12/07/22 0603  DAPTOmycin (CUBICIN) 800 mg in sodium chloride 0.9 % 50 mL IVPB        Ordering Provider: Zeke George MD    8 mg/kg × 100 kg  100 mL/hr over 30 Minutes Intravenous Every 24 Hours 12/07/22 1000 12/29/22 0959    11/30/22 2219  vancomycin 2000 mg/500 mL 0.9% NS IVPB ([facility])        Ordering Provider: Reggie Batres MD    20 mg/kg × 98.9 kg  over 2 Hours Intravenous Once 11/30/22 2221 12/01/22 0315    11/30/22 2219  piperacillin-tazobactam (ZOSYN) 3.375 g in iso-osmotic dextrose 50 ml (premix)        Ordering Provider: Reggie Batres MD    3.375 g Intravenous Once 11/30/22 2221 11/30/22 2335          CC: foot infection    HPI:    Patient is a 57 y.o.  Yr old male with history of prior lower extremity infection/amputations done in [facility].  He has a history of left BKA years ago.  More recent right transmetatarsal amputation but specific dates unclear and unclear who the surgeon was.  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  He has history DVT/IVC filter, diabetes and other comorbidities as below.  He reports a chronic right lateral foot wound present for months but apparently not precipitated by any specific event or trauma.  He is admitted to the hospital on November 30 with deterioration at the foot including redness/swelling    **patient had reported remote drug abuse (initially to me reported as IV drug abuse but then later he reported not injection use and I am unable to corroborate otherwise at prsent; +drug screen earlier this year per d/w Dr Chau for Meth at [facility] and reported by them to be IVDA/substance abuse),   " chronic methadone. He reported to me this was 17 years ago and therefore some inconsistencies    ** patient reports MRSA blood stream infection treated in [place] spring of [place]22, 6 weeks of vancomycin by his report.  Otherwise data deficit.  Try to retrieve information     12/2/22 Dr Peguero did surgery  \"Procedure(s): Mid level right below-knee amputation and primary closure \"    12/6 with MRSA in blood culture;  TTE done but limited per cardiology    12/9/22   JERALD no vegetation per cardiology    12/21/22   He is hoping for placement in [facility]. He denies any new focal pain or issues.  No adverse reaction to antibiotics.  No myalgia or shortness of breath/cough. Nursing reports no new focal pain or distress.  postop pain to the right LE which is controlled/dull at present, constant, nonradiating, worse with palpation, generally better with pain meds and 1-2  out of 10 in severity.  Not progressive     No headache photophobia or neck stiffness.  No shortness of breath cough or hemoptysis.  No nausea vomiting diarrhea or abdominal pain.  No dysuria hematuria or pyuria.  No other new skin rash       ROS:      12/21/22 per nursing,No f/c/s. No n/v/d. No rash.      Constitutional-- No Fever, chills or sweats.  Appetite good, and no malaise. No fatigue.  Heent--chronic hoarse voice.  No new vision, hearing or throat complaints.  No epistaxis or oral sores.   No flashers, floaters or eye pain.   No headache, photophobia or neck stiffness.  Chronic PEG tube  CV-- No chest pain, palpitation or syncope  Resp-- No SOB/cough/Hemoptysis  GI- No nausea, vomiting, or diarrhea.  No hematochezia, melena, or hematemesis. Denies jaundice or chronic liver disease.  -- No dysuria, hematuria, or flank pain.  Denies hesitancy, urgency or flank pain.  Lymph- no swollen lymph nodes in neck/axilla or groin.   Heme- No active bruising or bleeding; no Hx of DVT or PE.  MS-- no swelling or pain in the bones or joints of " "arms/legs.  No new back pain.  Neuro-- No acute focal weakness or numbness in the arms or legs.  No seizures.     Full 12 point review of systems reviewed and negative otherwise for acute complaints, except for above      PE: nursing/chaperone present  /76   Pulse 74   Temp 98.2 °F (36.8 °C) (Oral)   Resp 18   Ht 193 cm (75.98\")   Wt 100 kg (220 lb 7.4 oz)   SpO2 95%   BMI 26.85 kg/m²          GENERAL:  awake;  in no acute distress.  chronic Hoarse voice noted   HEENT: Normocephalic, atraumatic.   No conjunctival injection. No icterus. Oropharynx   without evidence of thrush or exudate. No evidence of peridontal disease.     HEART: RRR; No murmur, rubs, gallops.   LUNGS: Diminished at bases but otherwise clear to auscultation bilaterally without wheezing, rales, rhonchi. Normal respiratory effort. Nonlabored. No dullness.  ABDOMEN: Soft, nontender, nondistended. Positive bowel sounds. No rebound or guarding. NO mass or HSM.  PEG tube noted  EXT:  No cyanosis, clubbing;No cord.   MSK: FROM without joint effusions noted arms/legs.    SKIN: Warm and dry without cutaneous eruptions on Inspection/palpation.    NEURO:  awake     Right lower extremity surgical site noted;  No new redness;  no discrete mass bulge or fluctuance.  No crepitus or bulla.       No peripheral stigmata/phenomenon of endocarditis     IV without obvious redness or drainage     Left BKA site with no redness induration or warmth.  No discrete mass bulge or fluctuance.    Laboratory Data    Results from last 7 days   Lab Units 12/21/22  0615 12/20/22  0806 12/15/22  0419   WBC 10*3/mm3 7.37 7.38 6.86   HEMOGLOBIN g/dL 10.2* 10.1* 9.3*   HEMATOCRIT % 33.0* 33.4* 30.3*   PLATELETS 10*3/mm3 410 414 379     Results from last 7 days   Lab Units 12/21/22  0615   SODIUM mmol/L 140   POTASSIUM mmol/L 4.1   CHLORIDE mmol/L 102   CO2 mmol/L 27.0   BUN mg/dL 15   CREATININE mg/dL 0.59*   GLUCOSE mg/dL 153*   CALCIUM mg/dL 10.0     Results from last 7 " days   Lab Units 12/20/22  0806   ALK PHOS U/L 89   BILIRUBIN mg/dL 0.2   ALT (SGPT) U/L 15   AST (SGOT) U/L 14               Estimated Creatinine Clearance: 193 mL/min (A) (by C-G formula based on SCr of 0.59 mg/dL (L)).      Microbiology:      Radiology:  Imaging Results (Last 72 Hours)     Procedure Component Value Units Date/Time    FL Video Swallow With Speech Single Contrast [[id number]] Collected: 12/19/22 1552     Updated: 12/19/22 2136    Narrative:      EXAMINATION: FL VIDEO SWALLOW W SPEECH SINGLE-CONTRAST-     INDICATION: Assess oropharyngeal swallow; L08.9-Local infection of the  skin and subcutaneous tissue, unspecified; E16.2-Hypoglycemia,  unspecified; L89.159-Pressure ulcer of sacral region, unspecified stage;  R13.13-Dysphagia, pharyngeal phase     TECHNIQUE: 1 minute and 30 seconds of fluoroscopic time was used for  this exam. 13 associated fluoroscopic loops were saved. The patient was  evaluated in the seated lateral position while taking a variety of  consistencies of barium by mouth under the direction of speech  pathology.     COMPARISON: NONE     FINDINGS: 1 minute and 30 seconds of fluoroscopy provided for a modified  barium swallow. Please see speech therapy report for full details and  recommendations.          Impression:      Fluoroscopy provided for a modified barium swallow. Please  see speech therapy report for full details and recommendations.         This report was finalized on 12/19/2022 9:33 PM by Dr. Yobany Dickey MD.               Impression:     --Acute right foot/ankle with multifocal ulceration, cellulitis/wound infection and probable osteomyelitis with probe to bone at the lateral foot and abnormal MRI.  Other comorbidities as outlined above and high risk for further serious morbidity and other serious sequelae including persistent/recurrent or nonhealing wounds, persistent/progressive or recurrent infection and risk for further functional/limb loss/amputation.  Surgery  following to help define timing/option of threshold for any future surgical intervention .      **Surgery 12/2 with BKA     --MRSA bacteremia from November 30 (daptomycin sensitive).  Presumed from foot but also risk for endovascular focus particularly with  History of prior MRSA bloodstream infection.  Transthoracic echocardiogram limited per cardiology.  JERALD no vegetation per cardiology; follow-up blood cultures negative so far from December 6    **No  new metastatic foci of involvement as of December 21    --History MRSA with bacteremia by his report in spring/summer 2022 and treated with 6 weeks of vancomycin in [place].  Specifics unclear and trying to retrieve information    --coag-negative staph in blood culture likely contaminant     --Chronic vocal cord paralysis per notes with chronic hoarseness and indwelling PEG tube.  Medicine team to clarify     --History left BKA.     --Diabetes.  You need to tightly control blood sugar to give best chance for healing     --History of DVT with IVC filter    --history of remote drug abuse Per his reports to me; he denies injection drug abuse for 17 years by his report to me although as above, medicine team has find records indicating more recent abuse in 2022 [facility] [place] [facility]     PLAN:      -- Daptomycin  IV  4-6 weeks but final duration to depend on clinical course of study results and response to therapy; no new metastatic focus of involvement so far     -- Check/review labs cultures and scans     -- Partial history per nursing staff     -- Discussed with microbiology and family     --d/w Dr Peguero        -- Highly complex set of issues with high risk for further serious morbidity and other serious sequela     --Case management looking into options with potential placement to [facility]; I anticipate IV daptomycin at least until early January.  If he is discharged, he would need follow-up with me one week after discharge          **Copied text in this note has been reviewed and is accurate as of 12/21/22.     Zeke George MD  12/21/2022

## 2022-12-21 NOTE — THERAPY TREATMENT NOTE
Patient Name: Buster Velasco  : 1965    MRN: [id number]                              Today's Date: 2022       Admit Date: 2022    Visit Dx:     ICD-10-CM ICD-9-CM   1. Right foot infection  L08.9 686.9   2. Hypoglycemia  E16.2 251.2   3. Decubitus ulcer of coccygeal region, unspecified ulcer stage  L89.159 707.03     707.20   4. Pharyngeal dysphagia  R13.13 787.23     Patient Active Problem List   Diagnosis   • Right foot infection   • Hypoglycemia   • Anemia   • Hyponatremia   • Hypoalbuminemia   • Essential hypertension   • Hyperlipidemia   • Paroxysmal atrial fibrillation (HCC)   • Sepsis (HCC)   • MRSA bacteremia     Past Medical History:   Diagnosis Date   • Diabetes (HCC)    • DVT (deep venous thrombosis) (HCC)    • Hypertension    • Mood disorder (HCC)      Past Surgical History:   Procedure Laterality Date   • BELOW KNEE AMPUTATION Left    • BELOW KNEE AMPUTATION Right 2022    Procedure: AMPUTATION BELOW KNEE RIGHT;  Surgeon: John Peguero MD;  Location: [facility];  Service: Vascular;  Laterality: Right;   • TRANS METATARSAL AMPUTATION Right       General Information     Row Name 22 [facility]          Physical Therapy Time and Intention    Document Type therapy note (daily note)  -SC     Mode of Treatment physical therapy  -SC     Row Name 22 [facility]          General Information    Patient Profile Reviewed yes  -SC     Existing Precautions/Restrictions --  B bka, Shoulder limitations  -SC     Row Name 22 [facility]          Cognition    Orientation Status (Cognition) oriented x 4  -SC     Row Name 22 [facility]          Safety Issues, Functional Mobility    Impairments Affecting Function (Mobility) balance;endurance/activity tolerance;pain;strength;postural/trunk control;range of motion (ROM);grasp  -SC     Comment, Safety Issues/Impairments (Mobility) alert, following  commands  -SC           User Key  (r) = Recorded By, (t) = Taken By, (c) = Cosigned By    Initials  Name Provider Type    SC Shamika Patel PT Physical Therapist               Mobility     Row Name 12/21/22 1358          Bed Mobility    Bed Mobility supine-sit;scooting/bridging;sit-supine  -SC     Rolling Left Glover (Bed Mobility) minimum assist (75% patient effort);verbal cues;modified independence  -SC     Scooting/Bridging Glover (Bed Mobility) 1 person assist;verbal cues  -SC     Supine-Sit Glover (Bed Mobility) 1 person assist;minimum assist (75% patient effort)  -SC     Sit-Supine Glover (Bed Mobility) modified independence  -SC     Assistive Device (Bed Mobility) head of bed elevated;bed rails  -SC     Comment, (Bed Mobility) Worked on all bed mobility including scooting backwards in long sitting. Also attempted to do 45 deg turns while in long sitting-- patient unable to do without hand rails. Time taken to work on sitting exercises including trunk stability challanges.  -SC     Row Name 12/21/22 1352          Transfers    Comment, (Transfers) oob deferred- no  w/c available  -SC     Row Name 12/21/22 1358          Gait/Stairs (Locomotion)    Comment, (Gait/Stairs) Patient deferrered to be lifted up to recliner. Prosthetic not fitting well enough to safely try sliding board transfers.  -SC     Row Name 12/21/22 1354          Mobility    Extremity Weight-bearing Status right lower extremity  -SC     Right Lower Extremity (Weight-bearing Status) non weight-bearing (NWB)  -SC           User Key  (r) = Recorded By, (t) = Taken By, (c) = Cosigned By    Initials Name Provider Type    SC Shamika Patel PT Physical Therapist               Obj/Interventions     Row Name 12/21/22 1402          Motor Skills    Therapeutic Exercise hip  -SC     Row Name 12/21/22 1402          Hip (Therapeutic Exercise)    Hip AROM (Therapeutic Exercise) bilateral;aDduction;aBduction;supine;20 repititions;internal rotation;external rotation  -SC     Row Name 12/21/22 1402          Knee (Therapeutic  Exercise)    Knee (Therapeutic Exercise) strengthening exercise  -SC     Knee AROM (Therapeutic Exercise) LAQ (long arc quad);SLR (straight leg raise);20 repititions  sitting active hamstring stretches  -SC     Row Name 12/21/22 1406          Balance    Balance Assessment sit to stand dynamic balance;sitting dynamic balance  -SC     Static Sitting Balance independent  -SC     Dynamic Sitting Balance contact guard  -SC     Comment, Balance working on trunk stability challanges/pertubations at all angles. Patient able to sustain moderate chalances without LOB  -SC           User Key  (r) = Recorded By, (t) = Taken By, (c) = Cosigned By    Initials Name Provider Type    SC Shamika Patel, PT Physical Therapist               Goals/Plan    No documentation.                Clinical Impression     [facility] Name 12/21/22 1407          Pain    Additional Documentation Pain Scale: FACES Pre/Post-Treatment (Group)  -SC     Row Name 12/21/22 1409          Pain Scale: FACES Pre/Post-Treatment    Pain: FACES Scale, Pretreatment 0-->no hurt  -SC     Posttreatment Pain Rating 4-->hurts little more  -SC     Pain Location - Side/Orientation Right  -SC     Pain Location - residual limb  -[facility] Name 12/21/22 0235          Plan of Care Review    Plan of Care Reviewed With patient  -SC     Progress improving  -SC     Outcome Evaluation Patient demonstrating improved bed mobility and overall strength. He is willing to exercise and participate in therapeutic activities. His prosthesis is not fitting him well limiting his lateral transfers. His w/c is unsafe to use at this time. He is awating Rehab and will need these issues addressed  -[facility] Name 12/21/22 3960          Therapy Assessment/Plan (PT)    Patient/Family Therapy Goals Statement (PT) rehab  -SC     Rehab Potential (PT) good, to achieve stated therapy goals  -SC     Criteria for Skilled Interventions Met (PT) yes;meets criteria;skilled treatment is necessary  -SC     Therapy  Frequency (PT) daily  -SC     Row Name 12/21/22 1404          Positioning and Restraints    Pre-Treatment Position in bed  -SC     Post Treatment Position chair  -SC     In Bed notified nsg;supine;exit alarm on;call light within reach  -SC           User Key  (r) = Recorded By, (t) = Taken By, (c) = Cosigned By    Initials Name Provider Type    SC Shamika Patel, PT Physical Therapist               Outcome Measures     Row Name 12/21/22 1407 12/21/22 0855       How much help from another person do you currently need...    Turning from your back to your side while in flat bed without using bedrails? 3  -SC 3  -TS    Moving from lying on back to sitting on the side of a flat bed without bedrails? 3  -SC 2  -TS    Moving to and from a bed to a chair (including a wheelchair)? 1  -SC 1  -TS    Standing up from a chair using your arms (e.g., wheelchair, bedside chair)? 1  -SC 1  -TS    Climbing 3-5 steps with a railing? 1  -SC 1  -TS    To walk in hospital room? 1  -SC 1  -TS    AM-PAC 6 Clicks Score (PT) 10  -SC 9  -TS    Highest level of mobility 4 --> Transferred to chair/commode  -SC 3 --> Sat at edge of bed  -    Row Name 12/21/22 1407          Functional Assessment    Outcome Measure Options AM-PAC 6 Clicks Basic Mobility (PT)  -SC           User Key  (r) = Recorded By, (t) = Taken By, (c) = Cosigned By    Initials Name Provider Type    SC Shamika Patel, PT Physical Therapist    TS Anastasiya Gongora, RN Registered Nurse                             Physical Therapy Education     Title: PT OT SLP Therapies (In Progress)     Topic: Physical Therapy (Done)     Point: Mobility training (Done)     Learning Progress Summary           Patient Eager, E, VU by SC at 12/21/2022 1407    Comment: reviewed benefits of getting oob    Acceptance, E, VU by  at 12/19/2022 1040    Comment: educated on transfer safety    Acceptance, E, VU by  at 12/15/2022 1410    Comment: educated on importance of knee extension at rest to  prevent taut hamstrings    Eager, E, VU by SC at 12/12/2022 1121    Comment: reviewed benefits of activity    Eager, E, VU,DU by SC at 12/7/2022 1115    Comment: REVIEWED HEP                   Point: Home exercise program (Done)     Learning Progress Summary           Patient Eager, E, VU by SC at 12/21/2022 1407    Comment: reviewed benefits of getting oob    Acceptance, E, VU by  at 12/19/2022 1040    Comment: educated on transfer safety    Acceptance, E, VU by  at 12/15/2022 1410    Comment: educated on importance of knee extension at rest to prevent taut hamstrings    Eager, E, VU by SC at 12/12/2022 1121    Comment: reviewed benefits of activity    Eager, E, VU,DU by SC at 12/7/2022 1115    Comment: REVIEWED HEP                   Point: Body mechanics (Done)     Learning Progress Summary           Patient Eager, E, VU by SC at 12/21/2022 1407    Comment: reviewed benefits of getting oob    Acceptance, E, VU by  at 12/19/2022 1040    Comment: educated on transfer safety    Acceptance, E, VU by  at 12/15/2022 1410    Comment: educated on importance of knee extension at rest to prevent taut hamstrings    Eager, E, VU by SC at 12/12/2022 1121    Comment: reviewed benefits of activity    Eager, E, VU,DU by SC at 12/7/2022 1115    Comment: REVIEWED HEP                   Point: Precautions (Done)     Learning Progress Summary           Patient Eager, E, VU by SC at 12/21/2022 1407    Comment: reviewed benefits of getting oob    Acceptance, E, VU by  at 12/19/2022 1040    Comment: educated on transfer safety    Acceptance, E, VU by  at 12/15/2022 1410    Comment: educated on importance of knee extension at rest to prevent taut hamstrings    Eager, E, VU by SC at 12/12/2022 1121    Comment: reviewed benefits of activity    Eager, E, VU,DU by SC at 12/7/2022 1115    Comment: REVIEWED HEP                               User Key     Initials Effective Dates Name Provider Type Discipline    SC 06/16/21 -  Amanda,  Shamika GALICIA PT Physical Therapist PT     05/05/22 -  Margarito Ellison PT Physical Therapist PT              PT Recommendation and Plan     Plan of Care Reviewed With: patient  Progress: improving  Outcome Evaluation: Patient demonstrating improved bed mobility and overall strength. He is willing to exercise and participate in therapeutic activities. His prosthesis is not fitting him well limiting his lateral transfers. His w/c is unsafe to use at this time. He is awating Rehab and will need these issues addressed     Time Calculation:    PT Charges     Row Name 12/21/22 1326             Time Calculation    Start Time 1326  -SC      PT Received On 12/21/22  -SC      PT Goal Re-Cert Due Date 12/29/22  -SC         Time Calculation- PT    Total Timed Code Minutes- PT 23 minute(s)  -SC         Timed Charges    99370 - PT Therapeutic Exercise Minutes 13  -SC      31186 - PT Therapeutic Activity Minutes 10  -SC         Total Minutes    Timed Charges Total Minutes 23  -SC       Total Minutes 23  -SC            User Key  (r) = Recorded By, (t) = Taken By, (c) = Cosigned By    Initials Name Provider Type    SC Shamika Patel PT Physical Therapist              Therapy Charges for Today     Code Description Service Date Service Provider Modifiers Qty    09220640590  PT THER PROC EA 15 MIN 12/21/2022 Shamika Patel, PT GP 1    [id number] HC PT THERAPEUTIC ACT EA 15 MIN 12/21/2022 Shamika Patel PT GP 1          PT G-Codes  Outcome Measure Options: AM-PAC 6 Clicks Basic Mobility (PT)  AM-PAC 6 Clicks Score (PT): 10  AM-PAC 6 Clicks Score (OT): 14  PT Discharge Summary  Anticipated Discharge Disposition (PT): inpatient rehabilitation facility    Shamika Patel PT  12/21/2022

## 2022-12-21 NOTE — PAYOR COMM NOTE
"[employer] Auth# [id number] approved 12/1-12/6  Requesting additional days    Utilization Review  Phone [phone]  Fax [phone]    [place] [id number]    Isidro Velasco (57 y.o. Male)     Date of Birth   1965    Social Security Number       Address   [address] [id number]    Home Phone   [phone]    MRN   [id number]       Amish   Lutheran    Marital Status                               Admission Date   11/30/22    Admission Type   Emergency    Admitting Provider   Tianna Musa DO    Attending Provider   Tianna Musa DO    Department, Room/Bed   [facility] 3G, S366/1       Discharge Date       Discharge Disposition       Discharge Destination                               Attending Provider: Tianna Musa DO    Allergies: Gabapentin, Lyrica [Pregabalin]    Isolation: None   Infection: MRSA (12/06/22)   Code Status: CPR    Ht: 193 cm (75.98\")   Wt: 100 kg (220 lb 7.4 oz)    Admission Cmt: None   Principal Problem: Right foot infection [L08.9]                 Active Insurance as of 11/30/2022     Primary Coverage     Payor Plan Insurance Group Employer/Plan Group    [place]      Payor Plan Address Payor Plan Phone Number Payor Plan Fax Number Effective Dates    [address] [phone]  11/30/2022 - 11/30/2022    [facility] [address]       Subscriber Name Subscriber Birth Date Member ID       ISIDRO VELASCO 1965 [id number]                 Emergency Contacts      (Rel.) Home Phone Work Phone Mobile Phone    NELSY VELASCO (Spouse) [phone] -- [phone]               Physician Progress Notes (last 72 hours)      John Peguero MD at 12/21/22 0528          Cardiothoracic Surgery Progress Note      POD #: 19-right below-knee amputation     LOS: 21 days      Subjective: Awake and alert    Objective:  Vital Signs vital " signs below noted T-max past 24 hours 98.4 °F  Temp:  [97.8 °F (36.6 °C)-98.4 °F (36.9 °C)] 97.8 °F (36.6 °C)  Heart Rate:  [63-88] 63  Resp:  [17-19] 17  BP: (103-122)/(72-80) 115/73    Physical Exam:   General Appearance:    Lungs:   Heart:   Skin:   Incision: Amputation dressing change.  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  Medical manage per hospitalist.  Antibiotics per infectious disease.  Rehab per case management      John Peguero MD - 22 - 05:28 EST        Electronically signed by John Peguero MD at 22 0529     Shanti Branch APRN at 22 1455              [facility] Medicine Services  PROGRESS NOTE    Patient Name: Buster Velasco  : 1965  MRN: [id number]    Date of Admission: 2022  Primary Care Physician: Ludivina Bowers MD    Subjective   Subjective     CC:  F/U s/p right BKA    HPI:  Patient resting in bed, alert and oriented x3, pleasant, talkative.  Patient denies pain, nausea, diarrhea.    Copied text in this note has been reviewed and is accurate as of 22.     ROS:  Gen- No fevers, chills  CV- No chest pain, palpitations  Resp- No cough, dyspnea  GI- No N/V/D, abd pain    Objective   Objective     Vital Signs:   Temp:  [97.9 °F (36.6 °C)-98.1 °F (36.7 °C)] 98 °F (36.7 °C)  Heart Rate:   [62-82] 67  Resp:  [18] 18  BP: (103-122)/(68-74) 103/73     Physical Exam:  Constitutional: Awake, alert, NAD  HENT: NCAT, mucous membranes moist  Respiratory: Clear/dim in the bases, nonlabored respirations   Cardiovascular: RRR, no murmurs, rubs, or gallops  Gastrointestinal: Positive bowel sounds, soft, nontender, nondistended PEG tube  Musculoskeletal: Left BKA (2006), new R BKA with dressing clean dry and intact  Psychiatric: Appropriate affect, cooperative  Neurologic: Oriented x 3, strength symmetric in all extremities, Cranial Nerves grossly intact to confrontation, speech clear  Skin: No rashes      Results Reviewed:  LAB RESULTS:      Lab 12/20/22  0806 12/15/22  0419   WBC 7.38 6.86   HEMOGLOBIN 10.1* 9.3*   HEMATOCRIT 33.4* 30.3*   PLATELETS 414 379   NEUTROS ABS 4.62  --    IMMATURE GRANS (ABS) 0.07*  --    LYMPHS ABS 1.90  --    MONOS ABS 0.60  --    EOS ABS 0.16  --    MCV 85.2 85.4         Lab 12/20/22  0806 12/15/22  0419   SODIUM 138 139   POTASSIUM 4.5 4.0   CHLORIDE 101 103   CO2 27.0 24.0   ANION GAP 10.0 12.0   BUN 17 13   CREATININE 0.73* 0.65*   EGFR 105.5 109.2   GLUCOSE 162* 118*   CALCIUM 9.5 9.6         Brief Urine Lab Results  (Last result in the past 365 days)      Color   Clarity   Blood   Leuk Est   Nitrite   Protein   CREAT   Urine HCG        08/20/22 0108 Yellow   Clear   Negative   Negative     Negative                 Microbiology Results Abnormal     Procedure Component Value - Date/Time    Blood Culture - Blood, Wrist, Left [[id number]]  (Normal) Collected: 12/06/22 1402    Lab Status: Final result Specimen: Blood from Wrist, Left Updated: 12/11/22 1545     Blood Culture No growth at 5 days    Blood Culture - Blood, Arm, Left [[id number]]  (Normal) Collected: 12/06/22 1402    Lab Status: Final result Specimen: Blood from Arm, Left Updated: 12/11/22 1545     Blood Culture No growth at 5 days          FL Video Swallow With Speech Single Contrast    Result Date:  12/19/2022  EXAMINATION: FL VIDEO SWALLOW W SPEECH SINGLE-CONTRAST-  INDICATION: Assess oropharyngeal swallow; L08.9-Local infection of the skin and subcutaneous tissue, unspecified; E16.2-Hypoglycemia, unspecified; L89.159-Pressure ulcer of sacral region, unspecified stage; R13.13-Dysphagia, pharyngeal phase  TECHNIQUE: 1 minute and 30 seconds of fluoroscopic time was used for this exam. 13 associated fluoroscopic loops were saved. The patient was evaluated in the seated lateral position while taking a variety of consistencies of barium by mouth under the direction of speech pathology.  COMPARISON: NONE  FINDINGS: 1 minute and 30 seconds of fluoroscopy provided for a modified barium swallow. Please see speech therapy report for full details and recommendations.       Impression: Fluoroscopy provided for a modified barium swallow. Please see speech therapy report for full details and recommendations.    This report was finalized on 12/19/2022 9:33 PM by Dr. Yobany Dickey MD.        Results for orders placed during the hospital encounter of 11/30/22    Adult Transesophageal Echo (JERALD) W/ Cont if Necessary Per Protocol    Interpretation Summary  •  Left ventricular systolic function is normal. Estimated left ventricular EF = 55%  •  The aortic valve is structurally normal with no stenosis present. Trace aortic valve regurgitation is present. There is no evidence of an aortic valve mass is present.  •  There is mild, anterior mitral leaflet thickening present. No evidence of a mitral valve mass is present. Trace mitral valve regurgitation is present. No significant mitral valve stenosis is present  •  The tricuspid valve is normal in structure. There is no evidence of a mass on the tricuspid valve. Mild tricuspid valve regurgitation is present.  •  There is mild thickening of the pulmonic valve. There is trace pulmonic valve regurgitation present. There is no pulmonic valve stenosis present.      I have reviewed the  medications:  Scheduled Meds:atorvastatin, 20 mg, Oral, Nightly  DAPTOmycin, 8 mg/kg, Intravenous, Q24H  insulin detemir, 10 Units, Subcutaneous, Nightly  insulin lispro, 0-7 Units, Subcutaneous, TID AC  Insulin Lispro, 2 Units, Subcutaneous, TID With Meals  methadone, 10 mg, Oral, Q6H  metoprolol tartrate, 25 mg, Oral, Q12H  senna-docusate sodium, 2 tablet, Oral, BID  sodium chloride, 10 mL, Intravenous, Q12H  sodium chloride, 10 mL, Intravenous, Q12H  tamsulosin, 0.4 mg, Oral, Nightly      Continuous Infusions:   PRN Meds:.•  acetaminophen  •  senna-docusate sodium **AND** polyethylene glycol **AND** bisacodyl **AND** bisacodyl  •  calcium carbonate  •  dextrose  •  dextrose  •  glucagon (human recombinant)  •  hydrOXYzine  •  magnesium hydroxide  •  melatonin  •  ondansetron **OR** ondansetron  •  sennosides-docusate  •  sodium chloride    Assessment & Plan   Assessment & Plan     Active Hospital Problems    Diagnosis  POA   • **Right foot infection [L08.9]  Yes   • MRSA bacteremia [R78.81, B95.62]  Unknown   • Sepsis (HCC) [A41.9]  Yes   • Hypoglycemia [E16.2]  Yes   • Anemia [D64.9]  Yes   • Hyponatremia [E87.1]  Yes   • Hypoalbuminemia [E88.09]  Yes   • Essential hypertension [I10]  Yes   • Hyperlipidemia [E78.5]  Yes   • Paroxysmal atrial fibrillation (HCC) [I48.0]  Yes      Resolved Hospital Problems   No resolved problems to display.     Brief Hospital Course to date:  Buster Velasco is a 57 y.o. male with hx of prior osteomyelitis s/p left BKA, s/p right transmetatarsal amputation, left hand 3rd metacarpal resection (April 2022 at OSH, had 6 weeks of IV antibiotics for MRSA bacteremia), remote hx of IVDA, more recent hx of polysubstance abuse (meth), and DVT s/p IVC filter who presents with right foot wounds. S/p right BKA on 12/2/22 with Dr. Peguero. His blood cultures grew MRSA. ID following and recommend at least 6 weeks of IV ATBx.      Sepsis secondary to acute right foot non-healing wound  and likely osteomyelitis, resolved  MRSA bacteremia  - s/p right BKA 12/2/22 with Dr. Peguero  - ID following, currently on Daptomycin monotherapy  - Blood cultures with coag negative Staph and MRSA, have discussed with Dr. George, he will require IV antibiotics at discharge for at least 6 weeks but final duration TBD   - Repeat blood cultures NGTD x5d  - TTE no acute findings, JERALD 12/9/22 negative     Recurrent hypoglycemia in setting of DMII, resolved  - HbA1c 6.9%  - High dose Tresiba qAM before arrival which has been held  - also on metformin at home  - continue levemir 10u qhs.+low dose SSI, mealtime humalog    Heartburn  -PRN milk of magnesia, tums.  -improved.      Chronic pain on opioids   Previous IVDA/substance abuse  - Continue home methadone 10 mg QID     Reported hx of Afib   - Patient denies any hx of Afib. Not previously on anticoagulation and given murky history will not start.   - On Metoprolol at home which is continued  - No evidence of Afib on telemetry     Chronic vocal cord paralysis s/p PEG  Dysphagia  - SLP evaluation: regular diet but with nectar thick liquids recommended, patient initially wanted thin liquids and understood risk of aspiration, however currently has been drinking the nectar thick liquids; could change to pure comfort diet if he absolutely refuses nectar thick  - Speech re-evaluation today  - Unclear where/when PEG was placed, will eventually need it removed but defer to outpatient setting as it was not placed here; routine PEG care/flushing per nursing as needed     Hypotension with hx of HTN--resolved  - BP stable  - restart losartan/bumex as needed-hasn't required it thus far.     HLD   - Continue statin     DVT s/p IVC filter   - Placed in spring 2022 per patient. Defer to PCP for further assessment and time of removal.      Hx of seizure in setting of meth use      BPH   - restarted home Flomax       Expected Discharge Location and Transportation: rehab  Expected  Discharge Date: 12/21/22 anytime rehab placement is found      DVT prophylaxis:  Mechanical DVT prophylaxis orders are present.     AM-PAC 6 Clicks Score (PT): 10 (12/20/22 0830)    CODE STATUS:   Code Status and Medical Interventions:   Ordered at: 12/01/22 0035     Code Status (Patient has no pulse and is not breathing):    CPR (Attempt to Resuscitate)     Medical Interventions (Patient has pulse or is breathing):    Full Support       PITER Moreno  12/20/22                Electronically signed by Shanti Branch APRN at 12/20/22 1501     Zeke George MD at 12/20/22 1040          [facility] Progress Note        Antibiotics:  Anti-Infectives (From admission, onward)    Ordered     Dose/Rate Route Frequency Start Stop    12/07/22 0603  DAPTOmycin (CUBICIN) 800 mg in sodium chloride 0.9 % 50 mL IVPB        Ordering Provider: Zeke George MD    8 mg/kg × 100 kg  100 mL/hr over 30 Minutes Intravenous Every 24 Hours 12/07/22 1000 12/29/22 0959    11/30/22 2219  vancomycin 2000 mg/500 mL 0.9% NS IVPB ([facility])        Ordering Provider: Reggie Batres MD    20 mg/kg × 98.9 kg  over 2 Hours Intravenous Once 11/30/22 2221 12/01/22 0315    11/30/22 2219  piperacillin-tazobactam (ZOSYN) 3.375 g in iso-osmotic dextrose 50 ml (premix)        Ordering Provider: Reggie Batres MD    3.375 g Intravenous Once 11/30/22 2221 11/30/22 2335          CC: foot infection    HPI:    Patient is a 57 y.o.  Yr old male with history of prior lower extremity infection/amputations done in [facility].  He has a history of left BKA years ago.  More recent right transmetatarsal amputation but specific dates unclear and unclear who the surgeon was.  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  He has history  "DVT/IVC filter, diabetes and other comorbidities as below.  He reports a chronic right lateral foot wound present for months but apparently not precipitated by any specific event or trauma.  He is admitted to the hospital on November 30 with deterioration at the foot including redness/swelling    **patient had reported remote drug abuse (initially to me reported as IV drug abuse but then later he reported not injection use and I am unable to corroborate otherwise at prsent; +drug screen earlier this year per d/w Dr Chau for Meth at [facility] and reported by them to be IVDA/substance abuse),   chronic methadone. He reported to me this was 17 years ago and therefore some inconsistencies    ** patient reports MRSA blood stream infection treated in [place] spring of [place]22, 6 weeks of vancomycin by his report.  Otherwise data deficit.  Try to retrieve information     12/2/22 Dr Peguero did surgery  \"Procedure(s): Mid level right below-knee amputation and primary closure \"    12/6 with MRSA in blood culture;  TTE done but limited per cardiology    12/9/22   JERALD no vegetation per cardiology    12/20/22   He denies any new focal pain or issues.  No adverse reaction to antibiotics.  No myalgia or shortness of breath/cough. Nursing reports no new focal pain or distress.  postop pain to the right LE which is controlled/dull at present, constant, nonradiating, worse with palpation, generally better with pain meds and 1-2  out of 10 in severity.  Not progressive     No headache photophobia or neck stiffness.  No shortness of breath cough or hemoptysis.  No nausea vomiting diarrhea or abdominal pain.  No dysuria hematuria or pyuria.  No other new skin rash       ROS:      12/20/22 per nursing,No f/c/s. No n/v/d. No rash.      Constitutional-- No Fever, chills or sweats.  Appetite good, and no malaise. No fatigue.  Heent--chronic hoarse voice.  No new vision, hearing or throat complaints.  No epistaxis or oral " "sores.   No flashers, floaters or eye pain.   No headache, photophobia or neck stiffness.  Chronic PEG tube  CV-- No chest pain, palpitation or syncope  Resp-- No SOB/cough/Hemoptysis  GI- No nausea, vomiting, or diarrhea.  No hematochezia, melena, or hematemesis. Denies jaundice or chronic liver disease.  -- No dysuria, hematuria, or flank pain.  Denies hesitancy, urgency or flank pain.  Lymph- no swollen lymph nodes in neck/axilla or groin.   Heme- No active bruising or bleeding; no Hx of DVT or PE.  MS-- no swelling or pain in the bones or joints of arms/legs.  No new back pain.  Neuro-- No acute focal weakness or numbness in the arms or legs.  No seizures.     Full 12 point review of systems reviewed and negative otherwise for acute complaints, except for above      PE: nursing/chaperone present  /74   Pulse 79   Temp 98 °F (36.7 °C) (Oral)   Resp 18   Ht 193 cm (75.98\")   Wt 100 kg (220 lb 7.4 oz)   SpO2 92%   BMI 26.85 kg/m²          GENERAL:  awake;  in no acute distress.  chronic Hoarse voice noted and chronic per him ; room air  HEENT: Normocephalic, atraumatic.   No conjunctival injection. No icterus. Oropharynx   without evidence of thrush or exudate. No evidence of peridontal disease.     HEART: RRR; No murmur, rubs, gallops.   LUNGS: Diminished at bases but otherwise clear to auscultation bilaterally without wheezing, rales, rhonchi. Normal respiratory effort. Nonlabored. No dullness.  ABDOMEN: Soft, nontender, nondistended. Positive bowel sounds. No rebound or guarding. NO mass or HSM.  PEG tube noted  EXT:  No cyanosis, clubbing;No cord.   MSK: FROM without joint effusions noted arms/legs.    SKIN: Warm and dry without cutaneous eruptions on Inspection/palpation.    NEURO:  awake     Right lower extremity surgical site noted;  No new redness;  no discrete mass bulge or fluctuance.  No crepitus or bulla.       No peripheral stigmata/phenomenon of endocarditis     IV without obvious " redness or drainage     Left BKA site with no redness induration or warmth.  No discrete mass bulge or fluctuance.    Laboratory Data    Results from last 7 days   Lab Units 12/20/22  0806 12/15/22  0419   WBC 10*3/mm3 7.38 6.86   HEMOGLOBIN g/dL 10.1* 9.3*   HEMATOCRIT % 33.4* 30.3*   PLATELETS 10*3/mm3 414 379     Results from last 7 days   Lab Units 12/20/22  0806   SODIUM mmol/L 138   POTASSIUM mmol/L 4.5   CHLORIDE mmol/L 101   CO2 mmol/L 27.0   BUN mg/dL 17   CREATININE mg/dL 0.73*   GLUCOSE mg/dL 162*   CALCIUM mg/dL 9.5     Results from last 7 days   Lab Units 12/20/22  0806   ALK PHOS U/L 89   BILIRUBIN mg/dL 0.2   ALT (SGPT) U/L 15   AST (SGOT) U/L 14               Estimated Creatinine Clearance: 156 mL/min (A) (by C-G formula based on SCr of 0.73 mg/dL (L)).      Microbiology:      Radiology:  Imaging Results (Last 72 Hours)     Procedure Component Value Units Date/Time    FL Video Swallow With Speech Single Contrast [[id number]] Collected: 12/19/22 1552     Updated: 12/19/22 2136    Narrative:      EXAMINATION: FL VIDEO SWALLOW W SPEECH SINGLE-CONTRAST-     INDICATION: Assess oropharyngeal swallow; L08.9-Local infection of the  skin and subcutaneous tissue, unspecified; E16.2-Hypoglycemia,  unspecified; L89.159-Pressure ulcer of sacral region, unspecified stage;  R13.13-Dysphagia, pharyngeal phase     TECHNIQUE: 1 minute and 30 seconds of fluoroscopic time was used for  this exam. 13 associated fluoroscopic loops were saved. The patient was  evaluated in the seated lateral position while taking a variety of  consistencies of barium by mouth under the direction of speech  pathology.     COMPARISON: NONE     FINDINGS: 1 minute and 30 seconds of fluoroscopy provided for a modified  barium swallow. Please see speech therapy report for full details and  recommendations.          Impression:      Fluoroscopy provided for a modified barium swallow. Please  see speech therapy report for full details and  recommendations.         This report was finalized on 12/19/2022 9:33 PM by Dr. Yobany Dickey MD.               Impression:     --Acute right foot/ankle with multifocal ulceration, cellulitis/wound infection and probable osteomyelitis with probe to bone at the lateral foot and abnormal MRI.  Other comorbidities as outlined above and high risk for further serious morbidity and other serious sequelae including persistent/recurrent or nonhealing wounds, persistent/progressive or recurrent infection and risk for further functional/limb loss/amputation.  Surgery following to help define timing/option of threshold for any future surgical intervention .      **Surgery 12/2 with BKA     --MRSA bacteremia from November 30 (daptomycin sensitive).  Presumed from foot but also risk for endovascular focus particularly with  History of prior MRSA bloodstream infection.  Transthoracic echocardiogram limited per cardiology.  JERALD no vegetation per cardiology; follow-up blood cultures negative so far from December 6    **No  new metastatic foci of involvement as of December 19    --History MRSA with bacteremia by his report in spring/summer 2022 and treated with 6 weeks of vancomycin in [place].  Specifics unclear and trying to retrieve information    --coag-negative staph in blood culture likely contaminant     --Chronic vocal cord paralysis per notes with chronic hoarseness and indwelling PEG tube.  Medicine team to clarify     --History left BKA.     --Diabetes.  You need to tightly control blood sugar to give best chance for healing     --History of DVT with IVC filter    --history of remote drug abuse Per his reports to me; he denies injection drug abuse for 17 years by his report to me although as above, medicine team has find records indicating more recent abuse in 2022 [facility] [place] [facility]     PLAN:      -- Daptomycin  IV  4-6 weeks but final duration to depend on clinical course of study results  and response to therapy; no new metastatic focus of involvement so far     -- Check/review labs cultures and scans     -- Partial history per nursing staff     -- Discussed with microbiology and family     --d/w Dr Peguero        -- Highly complex set of issues with high risk for further serious morbidity and other serious sequela          **Copied text in this note has been reviewed and is accurate as of 12/20/22.     Zeke George MD  12/20/2022        Electronically signed by Zeke George MD at 12/20/22 1041     John Peguero MD at 12/20/22 0611          Cardiothoracic Surgery Progress Note      POD #: 18-right below-knee amputation     LOS: 20 days      Subjective: Awake and alert    Objective:  Vital Signs vital signs below noted T-max past 24 hours 98.2 °F  Temp:  [97.9 °F (36.6 °C)-98.2 °F (36.8 °C)] 97.9 °F (36.6 °C)  Heart Rate:  [62-82] 68  Resp:  [18] 18  BP: (115-123)/(68-94) 117/72    Physical Exam:   General Appearance: Oriented x3   Lungs:   Heart:   Skin:   Incision: Amputation site dressing change.  Suture intact skin margin viable skin flaps are viable     Results:  Results from last 7 days   Lab Units 12/15/22  0419   WBC 10*3/mm3 6.86   HEMOGLOBIN g/dL 9.3*   HEMATOCRIT % 30.3*   PLATELETS 10*3/mm3 379     Results from last 7 days   Lab Units 12/15/22  0419   SODIUM mmol/L 139   POTASSIUM mmol/L 4.0   CHLORIDE mmol/L 103   CO2 mmol/L 24.0   BUN mg/dL 13   CREATININE mg/dL 0.65*   GLUCOSE mg/dL 118*   CALCIUM mg/dL 9.6         Assessment: #1 postop day 18 right below-knee amputation healing well  2.  Status post remote left below-knee amputation secondary about diabetic gangrene  3 diabetes mellitus and neuropathy      Plan: Continue to address change amputation site.  Medical manage per hospitalist.  Antibiotics per infectious disease.  Rehab per case management      John Peguero MD - 12/20/22 - 06:11 EST        Electronically signed by John Peguero MD at 12/20/22 0612     Tomi  Nacho PONCE MD at 22 1655              [facility] Medicine Services  PROGRESS NOTE    Patient Name: Buster Velasco  : 1965  MRN: [id number]    Date of Admission: 2022  Primary Care Physician: Ludivina Bowers MD    Subjective   Subjective     CC:  F/U s/p right BKA    HPI:    No stump pain today.  Thinks swallow test today went well.  A little hoarse this afternoon, patient says this is not unusual for him.    ROS:  Gen- No fevers, chills  CV- No chest pain, palpitations  Resp- No cough, dyspnea  GI- No N/V/D, abd pain        Objective   Objective     Vital Signs:   Temp:  [98.1 °F (36.7 °C)-98.3 °F (36.8 °C)] 98.1 °F (36.7 °C)  Heart Rate:  [57-69] 62  Resp:  [18] 18  BP: (114-123)/(68-94) 115/68     Physical Exam:  Constitutional - non toxic, in bed, lying flat  HEENT-NCAT, mucous membranes moist  CV-RRR  Pulm-Grossly clear bilaterally  Abd-soft, nontender, nondistended, normoactive bowel sounds  Ext-No lower extremity cyanosis, clubbing or edema bilaterally  Neuro-alert, speech clear (but hoarse)  Psych-normal affect   Skin- No rash on exposed UE or LE bilaterally          Results Reviewed:  LAB RESULTS:      Lab 12/15/22  0419   WBC 6.86   HEMOGLOBIN 9.3*   HEMATOCRIT 30.3*   PLATELETS 379   MCV 85.4         Lab 12/15/22  0419   SODIUM 139   POTASSIUM 4.0   CHLORIDE 103   CO2 24.0   ANION GAP 12.0   BUN 13   CREATININE 0.65*   EGFR 109.2   GLUCOSE 118*   CALCIUM 9.6         Brief Urine Lab Results  (Last result in the past 365 days)      Color   Clarity   Blood   Leuk Est   Nitrite   Protein   CREAT   Urine HCG        22 0108 Yellow   Clear   Negative   Negative     Negative                 Microbiology Results Abnormal     Procedure Component Value - Date/Time    Blood Culture - Blood, Wrist, Left [[id number]]  (Normal) Collected: 22 1402    Lab Status: Final result Specimen: Blood from Wrist, Left Updated: 22 9082     Blood Culture No growth  at 5 days    Blood Culture - Blood, Arm, Left [[id number]]  (Normal) Collected: 12/06/22 1402    Lab Status: Final result Specimen: Blood from Arm, Left Updated: 12/11/22 4545     Blood Culture No growth at 5 days          FL Video Swallow With Speech Single Contrast    Result Date: 12/19/2022  EXAMINATION: FL VIDEO SWALLOW W SPEECH SINGLE-CONTRAST-  INDICATION: Assess oropharyngeal swallow; L08.9-Local infection of the skin and subcutaneous tissue, unspecified; E16.2-Hypoglycemia, unspecified; L89.159-Pressure ulcer of sacral region, unspecified stage; R13.13-Dysphagia, pharyngeal phase  TECHNIQUE: 1 minute and 30 seconds of fluoroscopic time was used for this exam. 13 associated fluoroscopic loops were saved. The patient was evaluated in the seated lateral position while taking a variety of consistencies of barium by mouth under the direction of speech pathology.  COMPARISON: NONE  FINDINGS: 1 minute and 30 seconds of fluoroscopy provided for a modified barium swallow. Please see speech therapy report for full details and recommendations.       Impression: Fluoroscopy provided for a modified barium swallow. Please see speech therapy report for full details and recommendations.          Results for orders placed during the hospital encounter of 11/30/22    Adult Transesophageal Echo (JERALD) W/ Cont if Necessary Per Protocol    Interpretation Summary  •  Left ventricular systolic function is normal. Estimated left ventricular EF = 55%  •  The aortic valve is structurally normal with no stenosis present. Trace aortic valve regurgitation is present. There is no evidence of an aortic valve mass is present.  •  There is mild, anterior mitral leaflet thickening present. No evidence of a mitral valve mass is present. Trace mitral valve regurgitation is present. No significant mitral valve stenosis is present  •  The tricuspid valve is normal in structure. There is no evidence of a mass on the tricuspid valve. Mild tricuspid  valve regurgitation is present.  •  There is mild thickening of the pulmonic valve. There is trace pulmonic valve regurgitation present. There is no pulmonic valve stenosis present.      I have reviewed the medications:  Scheduled Meds:atorvastatin, 20 mg, Oral, Nightly  DAPTOmycin, 8 mg/kg, Intravenous, Q24H  insulin detemir, 10 Units, Subcutaneous, Nightly  insulin lispro, 0-7 Units, Subcutaneous, TID AC  Insulin Lispro, 2 Units, Subcutaneous, TID With Meals  methadone, 10 mg, Oral, Q6H  metoprolol tartrate, 25 mg, Oral, Q12H  senna-docusate sodium, 2 tablet, Oral, BID  sodium chloride, 10 mL, Intravenous, Q12H  sodium chloride, 10 mL, Intravenous, Q12H  tamsulosin, 0.4 mg, Oral, Nightly      Continuous Infusions:   PRN Meds:.•  acetaminophen  •  senna-docusate sodium **AND** polyethylene glycol **AND** bisacodyl **AND** bisacodyl  •  calcium carbonate  •  dextrose  •  dextrose  •  glucagon (human recombinant)  •  hydrOXYzine  •  magnesium hydroxide  •  melatonin  •  ondansetron **OR** ondansetron  •  sennosides-docusate  •  sodium chloride  •  sodium chloride  •  sodium chloride    Assessment & Plan   Assessment & Plan     Active Hospital Problems    Diagnosis  POA   • **Right foot infection [L08.9]  Yes   • MRSA bacteremia [R78.81, B95.62]  Unknown   • Sepsis (HCC) [A41.9]  Yes   • Hypoglycemia [E16.2]  Yes   • Anemia [D64.9]  Yes   • Hyponatremia [E87.1]  Yes   • Hypoalbuminemia [E88.09]  Yes   • Essential hypertension [I10]  Yes   • Hyperlipidemia [E78.5]  Yes   • Paroxysmal atrial fibrillation (HCC) [I48.0]  Yes      Resolved Hospital Problems   No resolved problems to display.        Brief Hospital Course to date:  Buster Velasco is a 57 y.o. male with hx of prior osteomyelitis s/p left BKA, s/p right transmetatarsal amputation, left hand 3rd metacarpal resection (April 2022 at OSH, had 6 weeks of IV antibiotics for MRSA bacteremia), remote hx of IVDA, more recent hx of polysubstance abuse (meth),  and DVT s/p IVC filter who presents with right foot wounds. S/p right BKA on 12/2/22 with Dr. Peguero. His blood cultures grew MRSA. ID following and recommend at least 6 weeks of IV ATBx.      Sepsis secondary to acute right foot non-healing wound and likely osteomyelitis, resolved  MRSA bacteremia  - s/p right BKA 12/2/22 with Dr. Peguero  - ID following, currently on Daptomycin monotherapy  - Blood cultures with coag negative Staph and MRSA, have discussed with Dr. George, he will require IV antibiotics at discharge for at least 6 weeks but final duration TBD   - Repeat blood cultures NGTD x5d  - TTE no acute findings, JERALD 12/9/22 negative     Recurrent hypoglycemia in setting of DMII, resolved  - HbA1c 6.9%  - High dose Tresiba qAM before arrival which has been held  - also on metformin at home  - continue levemir 10u qhs.+low dose SSI, mealtime humalog    Heartburn  -PRN milk of magnesia, tums.  -improved.      Chronic pain on opioids   Previous IVDA/substance abuse  - Continue home methadone 10 mg QID     Reported hx of Afib   - Patient denies any hx of Afib. Not previously on anticoagulation and given murky history will not start.   - On Metoprolol at home which is continued  - No evidence of Afib on telemetry     Chronic vocal cord paralysis s/p PEG  Dysphagia  - SLP evaluation: regular diet but with nectar thick liquids recommended, patient initially wanted thin liquids and understood risk of aspiration, however currently has been drinking the nectar thick liquids; could change to pure comfort diet if he absolutely refuses nectar thick  - Speech re-evaluation today  - Unclear where/when PEG was placed, will eventually need it removed but defer to outpatient setting as it was not placed here; routine PEG care/flushing per nursing as needed     Hypotension with hx of HTN--resolved  - BP stable  - restart losartan/bumex as needed-hasn't required it thus far.     HLD   - Continue statin     DVT s/p IVC filter   -  Placed in spring 2022 per patient. Defer to PCP for further assessment and time of removal.      Hx of seizure in setting of meth use      BPH   - restarted home Flomax       Expected Discharge Location and Transportation: rehab  Expected Discharge Date: 12/20/22 anytime rehab placement is found      DVT prophylaxis:  Mechanical DVT prophylaxis orders are present.     AM-PAC 6 Clicks Score (PT): 10 (12/19/22 1039)    CODE STATUS:   Code Status and Medical Interventions:   Ordered at: 12/01/22 0035     Code Status (Patient has no pulse and is not breathing):    CPR (Attempt to Resuscitate)     Medical Interventions (Patient has pulse or is breathing):    Full Support       Nacho Krishna MD  12/19/22                Electronically signed by Nacho Krishna MD at 12/19/22 1715     Zeke George MD at 12/19/22 1018          [facility] Progress Note        Antibiotics:  Anti-Infectives (From admission, onward)    Ordered     Dose/Rate Route Frequency Start Stop    12/07/22 0603  DAPTOmycin (CUBICIN) 800 mg in sodium chloride 0.9 % 50 mL IVPB        Ordering Provider: Zeke George MD    8 mg/kg × 100 kg  100 mL/hr over 30 Minutes Intravenous Every 24 Hours 12/07/22 1000 12/29/22 0959    11/30/22 2219  vancomycin 2000 mg/500 mL 0.9% NS IVPB ([facility])        Ordering Provider: Reggie Batres MD    20 mg/kg × 98.9 kg  over 2 Hours Intravenous Once 11/30/22 2221 12/01/22 0315    11/30/22 2219  piperacillin-tazobactam (ZOSYN) 3.375 g in iso-osmotic dextrose 50 ml (premix)        Ordering Provider: Reggie Batres MD    3.375 g Intravenous Once 11/30/22 2221 11/30/22 2335          CC: foot infection    HPI:    Patient is a 57 y.o.  Yr old male with history of prior lower extremity infection/amputations done in [facility].  He has a history of left BKA years ago.  More recent right transmetatarsal amputation but specific dates unclear and unclear who the surgeon was.  Patient reports prior history of MRSA  "multifocal involving his extremities including left hand for which she required surgery and long course of antibiotics, again specifics unclear but he reports things healed/improved and has not been an issue at the hand.  He has history DVT/IVC filter, diabetes and other comorbidities as below.  He reports a chronic right lateral foot wound present for months but apparently not precipitated by any specific event or trauma.  He is admitted to the hospital on November 30 with deterioration at the foot including redness/swelling    **patient had reported remote drug abuse (initially to me reported as IV drug abuse but then later he reported not injection use and I am unable to corroborate otherwise at prsent; +drug screen earlier this year per d/w Dr Chau for Meth at [facility] and reported by them to be IVDA/substance abuse),   chronic methadone. He reported to me this was 17 years ago and therefore some inconsistencies    ** patient reports MRSA blood stream infection treated in [place] spring of 2022, 6 weeks of vancomycin by his report.  Otherwise data deficit.  Try to retrieve information     12/2/22 Dr Peguero did surgery  \"Procedure(s): Mid level right below-knee amputation and primary closure \"    12/6 with MRSA in blood culture;  TTE done but limited per cardiology    12/9/22   JERALD no vegetation per cardiology    12/19/22   Case management continues to work on disposition options. Nursing reports no new focal pain or distress.  postop pain to the right LE which is controlled/dull at present, constant, nonradiating, worse with palpation, generally better with pain meds and 1-2  out of 10 in severity.  Not progressive     No headache photophobia or neck stiffness.  No shortness of breath cough or hemoptysis.  No nausea vomiting diarrhea or abdominal pain.  No dysuria hematuria or pyuria.  No other new skin rash       ROS:      12/19/22 per nursing,No f/c/s. No n/v/d. No rash.  " "    Constitutional-- No Fever, chills or sweats.  Appetite good, and no malaise. No fatigue.  Heent--chronic hoarse voice.  No new vision, hearing or throat complaints.  No epistaxis or oral sores.   No flashers, floaters or eye pain.   No headache, photophobia or neck stiffness.  Chronic PEG tube  CV-- No chest pain, palpitation or syncope  Resp-- No SOB/cough/Hemoptysis  GI- No nausea, vomiting, or diarrhea.  No hematochezia, melena, or hematemesis. Denies jaundice or chronic liver disease.  -- No dysuria, hematuria, or flank pain.  Denies hesitancy, urgency or flank pain.  Lymph- no swollen lymph nodes in neck/axilla or groin.   Heme- No active bruising or bleeding; no Hx of DVT or PE.  MS-- no swelling or pain in the bones or joints of arms/legs.  No new back pain.  Neuro-- No acute focal weakness or numbness in the arms or legs.  No seizures.     Full 12 point review of systems reviewed and negative otherwise for acute complaints, except for above      PE: nursing/chaperone present  /94   Pulse 67   Temp 98.2 °F (36.8 °C) (Oral)   Resp 18   Ht 193 cm (75.98\")   Wt 100 kg (220 lb 7.4 oz)   SpO2 92%   BMI 26.85 kg/m²          GENERAL:  awake;  in no acute distress.  chronic Hoarse voice noted and chronic per him ; room air  HEENT: Normocephalic, atraumatic.   No conjunctival injection. No icterus. Oropharynx   without evidence of thrush or exudate. No evidence of peridontal disease.     HEART: RRR; No murmur, rubs, gallops.   LUNGS: Diminished at bases but otherwise clear to auscultation bilaterally without wheezing, rales, rhonchi. Normal respiratory effort. Nonlabored. No dullness.  ABDOMEN: Soft, nontender, nondistended. Positive bowel sounds. No rebound or guarding. NO mass or HSM.  PEG tube noted  EXT:  No cyanosis, clubbing;No cord.   MSK: FROM without joint effusions noted arms/legs.    SKIN: Warm and dry without cutaneous eruptions on Inspection/palpation.    NEURO:  awake     Right lower " extremity surgical site noted;  No new redness;  no discrete mass bulge or fluctuance.  No crepitus or bulla.       No peripheral stigmata/phenomenon of endocarditis     IV without obvious redness or drainage     Left BKA site with no redness induration or warmth.  No discrete mass bulge or fluctuance.    Laboratory Data    Results from last 7 days   Lab Units 12/15/22  0419   WBC 10*3/mm3 6.86   HEMOGLOBIN g/dL 9.3*   HEMATOCRIT % 30.3*   PLATELETS 10*3/mm3 379     Results from last 7 days   Lab Units 12/15/22  0419   SODIUM mmol/L 139   POTASSIUM mmol/L 4.0   CHLORIDE mmol/L 103   CO2 mmol/L 24.0   BUN mg/dL 13   CREATININE mg/dL 0.65*   GLUCOSE mg/dL 118*   CALCIUM mg/dL 9.6     Results from last 7 days   Lab Units 12/15/22  0419   ALK PHOS U/L 79   BILIRUBIN mg/dL 0.2   ALT (SGPT) U/L 20   AST (SGOT) U/L 17               Estimated Creatinine Clearance: 175.2 mL/min (A) (by C-G formula based on SCr of 0.65 mg/dL (L)).      Microbiology:      Radiology:  Imaging Results (Last 72 Hours)     ** No results found for the last 72 hours. **            Impression:     --Acute right foot/ankle with multifocal ulceration, cellulitis/wound infection and probable osteomyelitis with probe to bone at the lateral foot and abnormal MRI.  Other comorbidities as outlined above and high risk for further serious morbidity and other serious sequelae including persistent/recurrent or nonhealing wounds, persistent/progressive or recurrent infection and risk for further functional/limb loss/amputation.  Surgery following to help define timing/option of threshold for any future surgical intervention .      **Surgery 12/2 with BKA     --MRSA bacteremia from November 30 (daptomycin sensitive).  Presumed from foot but also risk for endovascular focus particularly with  History of prior MRSA bloodstream infection.  Transthoracic echocardiogram limited per cardiology.  JERALD no vegetation per cardiology; follow-up blood cultures negative so far  from December 6    **No  new metastatic foci of involvement as of December 19    --History MRSA with bacteremia by his report in spring/summer 2022 and treated with 6 weeks of vancomycin in [place].  Specifics unclear and trying to retrieve information    --coag-negative staph in blood culture likely contaminant     --Chronic vocal cord paralysis per notes with chronic hoarseness and indwelling PEG tube.  Medicine team to clarify     --History left BKA.     --Diabetes.  You need to tightly control blood sugar to give best chance for healing     --History of DVT with IVC filter    --history of remote drug abuse Per his reports to me; he denies injection drug abuse for 17 years by his report to me although as above, medicine team has find records indicating more recent abuse in 2022 [place] [facility]     PLAN:      -- Daptomycin  IV  4-6 weeks but final duration to depend on clinical course of study results and response to therapy; no new metastatic focus of involvement so far     -- Check/review labs cultures and scans     -- Partial history per nursing staff     -- Discussed with microbiology and family     --d/w Dr Peguero; he sees no new suppurative change either       -- Highly complex set of issues with high risk for further serious morbidity and other serious sequela     --JERALD noted      **Copied text in this note has been reviewed and is accurate as of 12/19/22.     Zeke George MD  12/19/2022        Electronically signed by Zeke George MD at 12/19/22 1011     John Peguero MD at 12/19/22 5237          Cardiothoracic Surgery Progress Note      POD #: 17-right below-knee amputation     LOS: 19 days      Subjective: Awake and alert    Objective:  Vital Signs vital signs below noted T-max past 24 hours 98.4 °F  Temp:  [97.9 °F (36.6 °C)-98.4 °F (36.9 °C)] 98.1 °F (36.7 °C)  Heart Rate:  [57-69] 57  Resp:  [16-18] 18  BP: (114-130)/(69-83) 116/69    Physical  Exam:   General Appearance: Oriented x3   Lungs:   Heart:   Skin:   Incision: The amputation site dressing change.  Sutures intact skin margin viable skin flaps are viable.     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  Medical manage hospitalist.  Antibiotics infectious disease.  Rehab okay with me at any time.      John Peguero MD - 22 - 05:44 EST        Electronically signed by John Peguero MD at 22 0545     Nacho Krishna MD at 22 1627              [facility] Medicine Services  PROGRESS NOTE    Patient Name: Buster Velasco  : 1965  MRN: [id number]    Date of Admission: 2022  Primary Care Physician: Ludivina Bowers MD    Subjective   Subjective     CC:  F/U s/p right BKA    HPI:    Denies stump pain today.  Eating well.    ROS:  Gen- No fevers, chills  CV- No chest pain, palpitations  Resp- No cough, dyspnea  GI- No N/V/D, abd pain        Objective   Objective     Vital Signs:   Temp:  [97.9 °F (36.6 °C)-98.4 °F (36.9 °C)] 98 °F (36.7 °C)  Heart Rate:  [59-69] 59  Resp:  [16] 16  BP: (115-135)/(69-83) 130/83  Flow (L/min):  [2] 2     Physical Exam:  Constitutional - no acute distress, nontoxic, in bed, lying flat  HEENT-NCAT, mucous membranes moist  CV-RRR  Resp-grossly clear bilaterally  Abd-soft, nontender, nondistended, normoactive bowel sounds  Neuro-alert and oriented,  speech clear, moves all extremities   Psych-normal affect   Skin- No rash on exposed UE or LE bilaterally        Results Reviewed:  LAB RESULTS:      Lab 12/15/22  0419 12/12/22  0353   WBC 6.86 7.83   HEMOGLOBIN 9.3* 9.4*   HEMATOCRIT 30.3* 31.5*   PLATELETS 379 355   MCV 85.4 86.3         Lab 12/15/22  0419 12/12/22  0353   SODIUM 139 139   POTASSIUM 4.0 4.2   CHLORIDE 103 103   CO2 24.0 25.0   ANION GAP 12.0 11.0   BUN 13 15   CREATININE 0.65* 0.56*   EGFR 109.2 114.3   GLUCOSE 118* 189*   CALCIUM 9.6 9.6         Brief Urine Lab Results  (Last result in the past 365 days)      Color   Clarity   Blood   Leuk Est   Nitrite   Protein   CREAT   Urine HCG        08/20/22 0108 Yellow   Clear   Negative   Negative     Negative                 Microbiology Results Abnormal     Procedure Component Value - Date/Time    Blood Culture - Blood, Wrist, Left [[id number]]  (Normal) Collected: 12/06/22 1402    Lab Status: Final result Specimen: Blood from Wrist, Left Updated: 12/11/22 1545     Blood Culture No growth at 5 days    Blood Culture - Blood, Arm, Left [[id number]]  (Normal) Collected: 12/06/22 1402    Lab Status: Final result Specimen: Blood from Arm, Left Updated: 12/11/22 1545     Blood Culture No growth at 5 days          No radiology results from the last 24 hrs    Results for orders placed during the hospital encounter of 11/30/22    Adult Transesophageal Echo (JERALD) W/ Cont if Necessary Per Protocol    Interpretation Summary  •  Left ventricular systolic function is normal. Estimated left ventricular EF = 55%  •  The aortic valve is structurally normal with no stenosis present. Trace aortic valve regurgitation is present. There is no evidence of an aortic valve mass is present.  •  There is mild, anterior mitral leaflet thickening present. No evidence of a mitral valve mass is present. Trace mitral valve regurgitation is present. No significant mitral valve stenosis is present  •  The tricuspid valve is normal in  structure. There is no evidence of a mass on the tricuspid valve. Mild tricuspid valve regurgitation is present.  •  There is mild thickening of the pulmonic valve. There is trace pulmonic valve regurgitation present. There is no pulmonic valve stenosis present.      I have reviewed the medications:  Scheduled Meds:atorvastatin, 20 mg, Oral, Nightly  DAPTOmycin, 8 mg/kg, Intravenous, Q24H  insulin detemir, 10 Units, Subcutaneous, Nightly  insulin lispro, 0-7 Units, Subcutaneous, TID AC  Insulin Lispro, 2 Units, Subcutaneous, TID With Meals  methadone, 10 mg, Oral, Q6H  metoprolol tartrate, 25 mg, Oral, Q12H  senna-docusate sodium, 2 tablet, Oral, BID  sodium chloride, 10 mL, Intravenous, Q12H  sodium chloride, 10 mL, Intravenous, Q12H  tamsulosin, 0.4 mg, Oral, Nightly      Continuous Infusions:ropivacaine,       PRN Meds:.•  acetaminophen  •  senna-docusate sodium **AND** polyethylene glycol **AND** bisacodyl **AND** bisacodyl  •  calcium carbonate  •  dextrose  •  dextrose  •  glucagon (human recombinant)  •  hydrOXYzine  •  magnesium hydroxide  •  melatonin  •  ondansetron **OR** ondansetron  •  sennosides-docusate  •  sodium chloride  •  sodium chloride  •  sodium chloride    Assessment & Plan   Assessment & Plan     Active Hospital Problems    Diagnosis  POA   • **Right foot infection [L08.9]  Yes   • MRSA bacteremia [R78.81, B95.62]  Unknown   • Sepsis (HCC) [A41.9]  Yes   • Hypoglycemia [E16.2]  Yes   • Anemia [D64.9]  Yes   • Hyponatremia [E87.1]  Yes   • Hypoalbuminemia [E88.09]  Yes   • Essential hypertension [I10]  Yes   • Hyperlipidemia [E78.5]  Yes   • Paroxysmal atrial fibrillation (HCC) [I48.0]  Yes      Resolved Hospital Problems   No resolved problems to display.        Brief Hospital Course to date:  Buster Velasco is a 57 y.o. male with hx of prior osteomyelitis s/p left BKA, s/p right transmetatarsal amputation, left hand 3rd metacarpal resection (April 2022 at OSH, had 6 weeks of IV  antibiotics for MRSA bacteremia), remote hx of IVDA, more recent hx of polysubstance abuse (meth), and DVT s/p IVC filter who presents with right foot wounds. S/p right BKA on 12/2/22 with Dr. Peguero. His blood cultures grew MRSA. ID following and recommend at least 6 weeks of IV ATBx.     This patient's problems and plans were partially entered by my partner and updated as appropriate by me 12/18/22.    Sepsis secondary to acute right foot non-healing wound and likely osteomyelitis, resolved  MRSA bacteremia  - s/p right BKA 12/2/22 with Dr. Peguero  - ID following, currently on Daptomycin monotherapy  - Blood cultures with coag negative Staph and MRSA, have discussed with Dr. George, he will require IV antibiotics at discharge for at least 6 weeks but final duration TBD   - Repeat blood cultures NGTD x5d  - TTE no acute findings, JERALD 12/9/22 negative     Recurrent hypoglycemia in setting of DMII, resolved  - HbA1c 6.9%  - High dose Tresiba qAM before arrival which has been held  - also on metformin at home  - continue levemir 10u qhs.+low dose SSI, mealtime humalog    Heartburn  -PRN milk of magnesia, tums.  -improved.      Chronic pain on opioids   Previous IVDA/substance abuse  - Continue home methadone 10 mg QID     Reported hx of Afib   - Patient denies any hx of Afib. Not previously on anticoagulation and given murky history will not start.   - On Metoprolol at home which is continued  - No evidence of Afib on telemetry     Chronic vocal cord paralysis s/p PEG  Dysphagia  - SLP evaluation: regular diet but with nectar thick liquids recommended, patient initially wanted thin liquids and understood risk of aspiration, however currently has been drinking the nectar thick liquids; could change to pure comfort diet if he absolutely refuses nectar thick  - SLP should continue to follow for possible advancement of diet recommendations  - Unclear where/when PEG was placed, will eventually need it removed but defer to  outpatient setting as it was not placed here; routine PEG care/flushing per nursing as needed     Hypotension with hx of HTN--resolved  - BP stable  - restart losartan/bumex as needed-hasn't required it thus far.     HLD   - Continue statin     DVT s/p IVC filter   - Placed in spring 2022 per patient. Defer to PCP for further assessment and time of removal.      Hx of seizure in setting of meth use      BPH   - restarted home Flomax       Expected Discharge Location and Transportation: rehab  Expected Discharge Date: 12/19/22 anytime rehab placement is found      DVT prophylaxis:  Mechanical DVT prophylaxis orders are present.     AM-PAC 6 Clicks Score (PT): 9 (12/16/22 0800)    CODE STATUS:   Code Status and Medical Interventions:   Ordered at: 12/01/22 0035     Code Status (Patient has no pulse and is not breathing):    CPR (Attempt to Resuscitate)     Medical Interventions (Patient has pulse or is breathing):    Full Support       Nacho Krishna MD  12/18/22                Electronically signed by Nacho Krishna MD at 12/18/22 1633       Consult Notes (last 72 hours)  Notes from 12/18/22 1007 through 12/21/22 1007   No notes of this type exist for this encounter.

## 2022-12-21 NOTE — PLAN OF CARE
Problem: Adult Inpatient Plan of Care  Goal: Plan of Care Review  Outcome: Ongoing, Progressing  Flowsheets (Taken 12/21/2022 1725)  Progress: improving  Plan of Care Reviewed With: patient  Outcome Evaluation: Patient VSS, RA, A&Ox4. C/o pain, scheduled pain meds given. Q2 turned, specialty bed in place, z-guard applied to bottom. Voiding well. Several BMs this shift. Awaiting rehab placement. Continue to monitor.  Goal: Patient-Specific Goal (Individualized)  Outcome: Ongoing, Progressing  Goal: Absence of Hospital-Acquired Illness or Injury  Outcome: Ongoing, Progressing  Intervention: Identify and Manage Fall Risk  Recent Flowsheet Documentation  Taken 12/21/2022 1600 by Anastasiya Gongora, RN  Safety Promotion/Fall Prevention:   activity supervised   assistive device/personal items within reach   clutter free environment maintained   fall prevention program maintained   gait belt   safety round/check completed   toileting scheduled   room organization consistent   nonskid shoes/slippers when out of bed  Taken 12/21/2022 1400 by Anastasiya Gongora, RN  Safety Promotion/Fall Prevention:   activity supervised   assistive device/personal items within reach   clutter free environment maintained   fall prevention program maintained   gait belt   toileting scheduled   safety round/check completed   room organization consistent   nonskid shoes/slippers when out of bed  Taken 12/21/2022 1200 by Anastasiya Gongora, RN  Safety Promotion/Fall Prevention:   clutter free environment maintained   activity supervised   assistive device/personal items within reach   fall prevention program maintained   gait belt   toileting scheduled   safety round/check completed   room organization consistent   nonskid shoes/slippers when out of bed  Taken 12/21/2022 1044 by Anastasiya Gongora, RN  Safety Promotion/Fall Prevention:   activity supervised   assistive device/personal items within reach   clutter free environment maintained   fall  prevention program maintained   gait belt   toileting scheduled   safety round/check completed   room organization consistent   nonskid shoes/slippers when out of bed  Taken 12/21/2022 0855 by Anastasiya Gongora RN  Safety Promotion/Fall Prevention:   activity supervised   clutter free environment maintained   assistive device/personal items within reach   fall prevention program maintained   gait belt   toileting scheduled   safety round/check completed   room organization consistent   nonskid shoes/slippers when out of bed  Intervention: Prevent Skin Injury  Recent Flowsheet Documentation  Taken 12/21/2022 1600 by Anastasiya Gongora RN  Body Position: sitting up in bed  Skin Protection:   adhesive use limited   incontinence pads utilized   skin sealant/moisture barrier applied   transparent dressing maintained   tubing/devices free from skin contact  Taken 12/21/2022 1400 by Anastasiya Gongora RN  Body Position: supine  Skin Protection:   adhesive use limited   tubing/devices free from skin contact   transparent dressing maintained  Taken 12/21/2022 1200 by Anastasiya Gongora RN  Body Position: sitting up in bed  Skin Protection:   adhesive use limited   transparent dressing maintained   tubing/devices free from skin contact  Taken 12/21/2022 1044 by Anastasiya Gongora RN  Body Position:   tilted   right  Skin Protection:   adhesive use limited   transparent dressing maintained   tubing/devices free from skin contact  Taken 12/21/2022 0855 by Anastasiya Gongora RN  Body Position:   tilted   right  Skin Protection:   adhesive use limited   incontinence pads utilized   skin sealant/moisture barrier applied   transparent dressing maintained   tubing/devices free from skin contact  Intervention: Prevent and Manage VTE (Venous Thromboembolism) Risk  Recent Flowsheet Documentation  Taken 12/21/2022 1600 by Anastasiya Gongora RN  VTE Prevention/Management:   bilateral   sequential compression devices off  Taken  12/21/2022 1400 by Anastasiya Gongora RN  VTE Prevention/Management:   bilateral   sequential compression devices off  Taken 12/21/2022 1200 by Anastasiya Gongora RN  VTE Prevention/Management:   bilateral   sequential compression devices off  Taken 12/21/2022 1044 by Anastasiya Gongora RN  VTE Prevention/Management:   bilateral   sequential compression devices off  Taken 12/21/2022 0855 by Anastasiya Gongora RN  VTE Prevention/Management:   bilateral   sequential compression devices off  Intervention: Prevent Infection  Recent Flowsheet Documentation  Taken 12/21/2022 1600 by Anastasiya Gongora RN  Infection Prevention: environmental surveillance performed  Taken 12/21/2022 1400 by Anastasiya Gongora RN  Infection Prevention: environmental surveillance performed  Taken 12/21/2022 1200 by Anastasiya Gongora RN  Infection Prevention: environmental surveillance performed  Taken 12/21/2022 1044 by Anastasiya Gongora RN  Infection Prevention: environmental surveillance performed  Taken 12/21/2022 0855 by Anastasiya Gongora RN  Infection Prevention: environmental surveillance performed  Goal: Optimal Comfort and Wellbeing  Outcome: Ongoing, Progressing  Intervention: Provide Person-Centered Care  Recent Flowsheet Documentation  Taken 12/21/2022 1600 by Anastasiya Gongora RN  Trust Relationship/Rapport:   care explained   choices provided   questions encouraged   questions answered  Taken 12/21/2022 1400 by Anastasiya Gongora RN  Trust Relationship/Rapport: care explained  Taken 12/21/2022 1200 by Anastasiya Gongora RN  Trust Relationship/Rapport:   care explained   choices provided  Taken 12/21/2022 1044 by Anastasiya Gongora RN  Trust Relationship/Rapport:   care explained   choices provided  Taken 12/21/2022 0855 by Anastasiya Gongora RN  Trust Relationship/Rapport:   care explained   choices provided   questions encouraged   questions answered   reassurance provided   thoughts/feelings acknowledged  Goal: Readiness  for Transition of Care  Outcome: Ongoing, Progressing     Problem: Fall Injury Risk  Goal: Absence of Fall and Fall-Related Injury  Outcome: Ongoing, Progressing  Intervention: Identify and Manage Contributors  Recent Flowsheet Documentation  Taken 12/21/2022 0855 by Anastasiya Gongora RN  Medication Review/Management: medications reviewed  Intervention: Promote Injury-Free Environment  Recent Flowsheet Documentation  Taken 12/21/2022 1600 by Anastasiya Gongora RN  Safety Promotion/Fall Prevention:   activity supervised   assistive device/personal items within reach   clutter free environment maintained   fall prevention program maintained   gait belt   safety round/check completed   toileting scheduled   room organization consistent   nonskid shoes/slippers when out of bed  Taken 12/21/2022 1400 by Anastasiya Gongora, RN  Safety Promotion/Fall Prevention:   activity supervised   assistive device/personal items within reach   clutter free environment maintained   fall prevention program maintained   gait belt   toileting scheduled   safety round/check completed   room organization consistent   nonskid shoes/slippers when out of bed  Taken 12/21/2022 1200 by Anastasiya Gongora, RN  Safety Promotion/Fall Prevention:   clutter free environment maintained   activity supervised   assistive device/personal items within reach   fall prevention program maintained   gait belt   toileting scheduled   safety round/check completed   room organization consistent   nonskid shoes/slippers when out of bed  Taken 12/21/2022 1044 by Anastasiya Gongora, RN  Safety Promotion/Fall Prevention:   activity supervised   assistive device/personal items within reach   clutter free environment maintained   fall prevention program maintained   gait belt   toileting scheduled   safety round/check completed   room organization consistent   nonskid shoes/slippers when out of bed  Taken 12/21/2022 0855 by Anastasiya Gongora, RN  Safety Promotion/Fall  Prevention:   activity supervised   clutter free environment maintained   assistive device/personal items within reach   fall prevention program maintained   gait belt   toileting scheduled   safety round/check completed   room organization consistent   nonskid shoes/slippers when out of bed  Goal: Absence of Fall and Fall-Related Injury  Outcome: Ongoing, Progressing  Intervention: Identify and Manage Contributors  Recent Flowsheet Documentation  Taken 12/21/2022 0855 by Anastasiya Gongora, RN  Medication Review/Management: medications reviewed  Intervention: Promote Injury-Free Environment  Recent Flowsheet Documentation  Taken 12/21/2022 1600 by Anastasiya Gongora, RN  Safety Promotion/Fall Prevention:   activity supervised   assistive device/personal items within reach   clutter free environment maintained   fall prevention program maintained   gait belt   safety round/check completed   toileting scheduled   room organization consistent   nonskid shoes/slippers when out of bed  Taken 12/21/2022 1400 by Anastasiya Gongora, RN  Safety Promotion/Fall Prevention:   activity supervised   assistive device/personal items within reach   clutter free environment maintained   fall prevention program maintained   gait belt   toileting scheduled   safety round/check completed   room organization consistent   nonskid shoes/slippers when out of bed  Taken 12/21/2022 1200 by Anastasiya Gongora, RN  Safety Promotion/Fall Prevention:   clutter free environment maintained   activity supervised   assistive device/personal items within reach   fall prevention program maintained   gait belt   toileting scheduled   safety round/check completed   room organization consistent   nonskid shoes/slippers when out of bed  Taken 12/21/2022 1044 by Anastasiya Gongora, RN  Safety Promotion/Fall Prevention:   activity supervised   assistive device/personal items within reach   clutter free environment maintained   fall prevention program  maintained   gait belt   toileting scheduled   safety round/check completed   room organization consistent   nonskid shoes/slippers when out of bed  Taken 12/21/2022 0855 by Anastasiya Gongora RN  Safety Promotion/Fall Prevention:   activity supervised   clutter free environment maintained   assistive device/personal items within reach   fall prevention program maintained   gait belt   toileting scheduled   safety round/check completed   room organization consistent   nonskid shoes/slippers when out of bed     Problem: Skin Injury Risk Increased  Goal: Skin Health and Integrity  Outcome: Ongoing, Progressing  Intervention: Optimize Skin Protection  Recent Flowsheet Documentation  Taken 12/21/2022 1600 by Anastasiya Gongora, RN  Pressure Reduction Techniques:   frequent weight shift encouraged   positioned off wounds   weight shift assistance provided  Head of Bed (HOB) Positioning: HOB at 60-90 degrees  Pressure Reduction Devices:   specialty bed utilized   positioning supports utilized  Skin Protection:   adhesive use limited   incontinence pads utilized   skin sealant/moisture barrier applied   transparent dressing maintained   tubing/devices free from skin contact  Taken 12/21/2022 1400 by Anastasiya Gongora RN  Pressure Reduction Techniques:   frequent weight shift encouraged   positioned off wounds   weight shift assistance provided   pressure points protected  Head of Bed (HOB) Positioning: HOB at 30 degrees  Pressure Reduction Devices:   specialty bed utilized   positioning supports utilized  Skin Protection:   adhesive use limited   tubing/devices free from skin contact   transparent dressing maintained  Taken 12/21/2022 1200 by Anastasiya Gongora RN  Pressure Reduction Techniques:   frequent weight shift encouraged   positioned off wounds   weight shift assistance provided  Head of Bed (HOB) Positioning: HOB at 60 degrees  Pressure Reduction Devices:   specialty bed utilized   positioning supports  utilized  Skin Protection:   adhesive use limited   transparent dressing maintained   tubing/devices free from skin contact  Taken 12/21/2022 1044 by Anastasiya Gongora, RN  Pressure Reduction Techniques:   frequent weight shift encouraged   positioned off wounds   weight shift assistance provided  Head of Bed (HOB) Positioning: HOB at 30 degrees  Pressure Reduction Devices:   specialty bed utilized   positioning supports utilized  Skin Protection:   adhesive use limited   transparent dressing maintained   tubing/devices free from skin contact  Taken 12/21/2022 0855 by Anastasiya Gongora, RN  Pressure Reduction Techniques:   frequent weight shift encouraged   positioned off wounds   weight shift assistance provided  Head of Bed (HOB) Positioning: HOB at 60 degrees  Pressure Reduction Devices:   specialty bed utilized   positioning supports utilized  Skin Protection:   adhesive use limited   incontinence pads utilized   skin sealant/moisture barrier applied   transparent dressing maintained   tubing/devices free from skin contact     Problem: Adjustment to Amputation (Lower Extremity Amputation)  Goal: Optimal Adjustment to Amputation  Outcome: Ongoing, Progressing  Intervention: Promote Patient and Family Adjustment to Amputation  Recent Flowsheet Documentation  Taken 12/21/2022 1600 by Anastasiya Gongora, RN  Family/Support System Care: support provided  Taken 12/21/2022 1400 by Anastasiya Gongora, RN  Family/Support System Care: support provided  Taken 12/21/2022 1200 by Anastasiya Gongora, RN  Family/Support System Care: support provided  Taken 12/21/2022 1044 by Anastasiya Gongora, RN  Family/Support System Care:   support provided   self-care encouraged  Taken 12/21/2022 0855 by Anastasiya Gongora, RN  Family/Support System Care:   self-care encouraged   support provided     Problem: BADL (Basic Activities of Daily Living) Impairment (Lower Extremity Amputation)  Goal: Optimal Safe BADL Performance  Outcome: Ongoing,  Progressing     Problem: IADL (Instrumental Activities of Daily Living) Impairment (Lower Extremity Amputation)  Goal: Optimal Safe IADL Performance  Outcome: Ongoing, Progressing     Problem: Lower Limb Care (Lower Extremity Amputation)  Goal: Effective Lower Limb Care  Outcome: Ongoing, Progressing     Problem: Mobility Impairment (Lower Extremity Amputation)  Goal: Optimal Mobility [place] and Safety  Outcome: Ongoing, Progressing     Problem: Pain and Hypersensitivity (Lower Extremity Amputation)  Goal: Acceptable Pain/Hypersensitivity Control  Outcome: Ongoing, Progressing     Problem: Prosthetic Use and Management (Lower Extremity Amputation)  Goal: Effective Prosthesis Use and Management  Outcome: Ongoing, Progressing   Goal Outcome Evaluation:  Plan of Care Reviewed With: patient        Progress: improving  Outcome Evaluation: Patient VSS, RA, A&Ox4. C/o pain, scheduled pain meds given. Q2 turned, specialty bed in place, z-guard applied to bottom. Voiding well. Seferal BMs this shift. Awaiting rehab placement. Continue to monitor.

## 2022-12-22 LAB
ANION GAP SERPL CALCULATED.3IONS-SCNC: 9 MMOL/L (ref 5–15)
BASOPHILS # BLD AUTO: 0.03 10*3/MM3 (ref 0–0.2)
BASOPHILS NFR BLD AUTO: 0.5 % (ref 0–1.5)
BUN SERPL-MCNC: 17 MG/DL (ref 6–20)
BUN/CREAT SERPL: 27.4 (ref 7–25)
CALCIUM SPEC-SCNC: 9.6 MG/DL (ref 8.6–10.5)
CHLORIDE SERPL-SCNC: 103 MMOL/L (ref 98–107)
CO2 SERPL-SCNC: 28 MMOL/L (ref 22–29)
CREAT SERPL-MCNC: 0.62 MG/DL (ref 0.76–1.27)
DEPRECATED RDW RBC AUTO: 52.4 FL (ref 37–54)
EGFRCR SERPLBLD CKD-EPI 2021: 110.8 ML/MIN/1.73
EOSINOPHIL # BLD AUTO: 0.15 10*3/MM3 (ref 0–0.4)
EOSINOPHIL NFR BLD AUTO: 2.3 % (ref 0.3–6.2)
ERYTHROCYTE [DISTWIDTH] IN BLOOD BY AUTOMATED COUNT: 17.1 % (ref 12.3–15.4)
GLUCOSE BLDC GLUCOMTR-MCNC: 131 MG/DL (ref 70–130)
GLUCOSE BLDC GLUCOMTR-MCNC: 147 MG/DL (ref 70–130)
GLUCOSE BLDC GLUCOMTR-MCNC: 219 MG/DL (ref 70–130)
GLUCOSE BLDC GLUCOMTR-MCNC: 230 MG/DL (ref 70–130)
GLUCOSE SERPL-MCNC: 159 MG/DL (ref 65–99)
HCT VFR BLD AUTO: 33.2 % (ref 37.5–51)
HGB BLD-MCNC: 10.4 G/DL (ref 13–17.7)
IMM GRANULOCYTES # BLD AUTO: 0.05 10*3/MM3 (ref 0–0.05)
IMM GRANULOCYTES NFR BLD AUTO: 0.8 % (ref 0–0.5)
LYMPHOCYTES # BLD AUTO: 1.62 10*3/MM3 (ref 0.7–3.1)
LYMPHOCYTES NFR BLD AUTO: 25.2 % (ref 19.6–45.3)
MCH RBC QN AUTO: 26.4 PG (ref 26.6–33)
MCHC RBC AUTO-ENTMCNC: 31.3 G/DL (ref 31.5–35.7)
MCV RBC AUTO: 84.3 FL (ref 79–97)
MONOCYTES # BLD AUTO: 0.58 10*3/MM3 (ref 0.1–0.9)
MONOCYTES NFR BLD AUTO: 9 % (ref 5–12)
NEUTROPHILS NFR BLD AUTO: 3.99 10*3/MM3 (ref 1.7–7)
NEUTROPHILS NFR BLD AUTO: 62.2 % (ref 42.7–76)
NRBC BLD AUTO-RTO: 0 /100 WBC (ref 0–0.2)
PLATELET # BLD AUTO: 380 10*3/MM3 (ref 140–450)
PMV BLD AUTO: 9.8 FL (ref 6–12)
POTASSIUM SERPL-SCNC: 4 MMOL/L (ref 3.5–5.2)
RBC # BLD AUTO: 3.94 10*6/MM3 (ref 4.14–5.8)
SODIUM SERPL-SCNC: 140 MMOL/L (ref 136–145)
WBC NRBC COR # BLD: 6.42 10*3/MM3 (ref 3.4–10.8)

## 2022-12-22 PROCEDURE — 97530 THERAPEUTIC ACTIVITIES: CPT

## 2022-12-22 PROCEDURE — 99232 SBSQ HOSP IP/OBS MODERATE 35: CPT | Performed by: PHYSICIAN ASSISTANT

## 2022-12-22 PROCEDURE — 92526 ORAL FUNCTION THERAPY: CPT

## 2022-12-22 PROCEDURE — 63710000001 INSULIN DETEMIR PER 5 UNITS: Performed by: PEDIATRICS

## 2022-12-22 PROCEDURE — 82962 GLUCOSE BLOOD TEST: CPT

## 2022-12-22 PROCEDURE — 85025 COMPLETE CBC W/AUTO DIFF WBC: CPT | Performed by: NURSE PRACTITIONER

## 2022-12-22 PROCEDURE — 63710000001 INSULIN LISPRO (HUMAN) PER 5 UNITS: Performed by: HOSPITALIST

## 2022-12-22 PROCEDURE — 97535 SELF CARE MNGMENT TRAINING: CPT

## 2022-12-22 PROCEDURE — 80048 BASIC METABOLIC PNL TOTAL CA: CPT | Performed by: NURSE PRACTITIONER

## 2022-12-22 PROCEDURE — 99024 POSTOP FOLLOW-UP VISIT: CPT | Performed by: THORACIC SURGERY (CARDIOTHORACIC VASCULAR SURGERY)

## 2022-12-22 PROCEDURE — 25010000002 DAPTOMYCIN PER 1 MG: Performed by: INTERNAL MEDICINE

## 2022-12-22 RX ORDER — AMOXICILLIN 250 MG
2 CAPSULE ORAL 2 TIMES DAILY PRN
Status: DISCONTINUED | OUTPATIENT
Start: 2022-12-22 | End: 2023-01-03

## 2022-12-22 RX ORDER — BISACODYL 5 MG/1
5 TABLET, DELAYED RELEASE ORAL DAILY PRN
Status: DISCONTINUED | OUTPATIENT
Start: 2022-12-22 | End: 2023-01-03

## 2022-12-22 RX ORDER — BISACODYL 10 MG
10 SUPPOSITORY, RECTAL RECTAL DAILY PRN
Status: DISCONTINUED | OUTPATIENT
Start: 2022-12-22 | End: 2023-01-03

## 2022-12-22 RX ORDER — POLYETHYLENE GLYCOL 3350 17 G/17G
17 POWDER, FOR SOLUTION ORAL DAILY PRN
Status: DISCONTINUED | OUTPATIENT
Start: 2022-12-22 | End: 2023-01-03

## 2022-12-22 RX ADMIN — METHADONE HYDROCHLORIDE 10 MG: 10 TABLET ORAL at 20:14

## 2022-12-22 RX ADMIN — INSULIN LISPRO 3 UNITS: 100 INJECTION, SOLUTION INTRAVENOUS; SUBCUTANEOUS at 17:19

## 2022-12-22 RX ADMIN — HYDROXYZINE HYDROCHLORIDE 50 MG: 25 TABLET ORAL at 21:45

## 2022-12-22 RX ADMIN — Medication 10 ML: at 20:15

## 2022-12-22 RX ADMIN — Medication 10 ML: at 08:42

## 2022-12-22 RX ADMIN — HYDROXYZINE HYDROCHLORIDE 50 MG: 25 TABLET ORAL at 03:08

## 2022-12-22 RX ADMIN — DAPTOMYCIN 800 MG: 500 INJECTION, POWDER, LYOPHILIZED, FOR SOLUTION INTRAVENOUS at 10:32

## 2022-12-22 RX ADMIN — METHADONE HYDROCHLORIDE 10 MG: 10 TABLET ORAL at 14:09

## 2022-12-22 RX ADMIN — TAMSULOSIN HYDROCHLORIDE 0.4 MG: 0.4 CAPSULE ORAL at 20:14

## 2022-12-22 RX ADMIN — METFORMIN HYDROCHLORIDE 500 MG: 500 TABLET ORAL at 08:42

## 2022-12-22 RX ADMIN — ATORVASTATIN CALCIUM 20 MG: 20 TABLET, FILM COATED ORAL at 20:14

## 2022-12-22 RX ADMIN — METOPROLOL TARTRATE 25 MG: 25 TABLET, FILM COATED ORAL at 20:14

## 2022-12-22 RX ADMIN — METOPROLOL TARTRATE 25 MG: 25 TABLET, FILM COATED ORAL at 08:42

## 2022-12-22 RX ADMIN — METHADONE HYDROCHLORIDE 10 MG: 10 TABLET ORAL at 02:56

## 2022-12-22 RX ADMIN — METHADONE HYDROCHLORIDE 10 MG: 10 TABLET ORAL at 08:42

## 2022-12-22 RX ADMIN — METFORMIN HYDROCHLORIDE 500 MG: 500 TABLET ORAL at 17:20

## 2022-12-22 RX ADMIN — INSULIN DETEMIR 10 UNITS: 100 INJECTION, SOLUTION SUBCUTANEOUS at 20:14

## 2022-12-22 NOTE — PROGRESS NOTES
[facility] Medicine Services  PROGRESS NOTE    Patient Name: Buster Velasco  : 1965  MRN: [id number]    Date of Admission: 2022  Primary Care Physician: Ludivina Bowers MD    Subjective     CC: f/u R foot infection s/p BKA     HPI:  Sitting up in bed. Good pain control. Denies chest pain or dyspnea. No abdominal pain, nausea or vomiting. Bowels moving well. Awaits rehab. Says he wouldn't mind being in the hospital over [facility] because he will be closer to his family that way    ROS:  Gen- No fevers, chills  CV- No chest pain, palpitations  Resp- No cough, dyspnea  GI- No N/V/D, abd pain    Objective     Vital Signs:   Temp:  [97.7 °F (36.5 °C)-98.1 °F (36.7 °C)] 98 °F (36.7 °C)  Heart Rate:  [62-91] 91  Resp:  [18] 18  BP: (116-143)/(73-98) 135/98     Physical Exam:  Constitutional: No acute distress, awake, alert and conversant. Sitting up in bed   HENT: NCAT, mucous membranes moist  Respiratory: Clear to auscultation bilaterally, respiratory effort normal on room air   Cardiovascular: RRR, no murmurs, rubs, or gallops  Gastrointestinal: Positive bowel sounds, soft, nontender, nondistended. PEG in place   Musculoskeletal: R BKA stump dressed, did not remove for exam. L BKA stump intact.   Psychiatric: Appropriate affect, cooperative  Neurologic: Oriented x 3, moves all extremities spontaneously without focal deficits, speech clear and coherent     Results Reviewed:  LAB RESULTS:      Lab 22  0510 22  0615 22  0806   WBC 6.42 7.37 7.38   HEMOGLOBIN 10.4* 10.2* 10.1*   HEMATOCRIT 33.2* 33.0* 33.4*   PLATELETS 380 410 414   NEUTROS ABS 3.99 5.00 4.62   IMMATURE GRANS (ABS) 0.05 0.04 0.07*   LYMPHS ABS 1.62 1.68 1.90   MONOS ABS 0.58 0.47 0.60   EOS ABS 0.15 0.15 0.16   MCV 84.3 85.1 85.2         Lab 22  0510 22  0615 22  0806   SODIUM 140 140 138   POTASSIUM 4.0 4.1 4.5   CHLORIDE 103 102 101   CO2 28.0 27.0 27.0   ANION GAP 9.0  11.0 10.0   BUN 17 15 17   CREATININE 0.62* 0.59* 0.73*   EGFR 110.8 112.5 105.5   GLUCOSE 159* 153* 162*   CALCIUM 9.6 10.0 9.5         Lab 12/20/22  0806   TOTAL PROTEIN 6.4   ALBUMIN 3.20*   GLOBULIN 3.2   ALT (SGPT) 15   AST (SGOT) 14   BILIRUBIN 0.2   ALK PHOS 89     Brief Urine Lab Results  (Last result in the past 365 days)      Color   Clarity   Blood   Leuk Est   Nitrite   Protein   CREAT   Urine HCG        08/20/22 0108 Yellow   Clear   Negative   Negative     Negative               Microbiology Results Abnormal     Procedure Component Value - Date/Time    Blood Culture - Blood, Wrist, Left [[id number]]  (Normal) Collected: 12/06/22 1402    Lab Status: Final result Specimen: Blood from Wrist, Left Updated: 12/11/22 1545     Blood Culture No growth at 5 days    Blood Culture - Blood, Arm, Left [[id number]]  (Normal) Collected: 12/06/22 1402    Lab Status: Final result Specimen: Blood from Arm, Left Updated: 12/11/22 1545     Blood Culture No growth at 5 days        No radiology results from the last 24 hrs    Results for orders placed during the hospital encounter of 11/30/22    Adult Transesophageal Echo (JERALD) W/ Cont if Necessary Per Protocol    Interpretation Summary  •  Left ventricular systolic function is normal. Estimated left ventricular EF = 55%  •  The aortic valve is structurally normal with no stenosis present. Trace aortic valve regurgitation is present. There is no evidence of an aortic valve mass is present.  •  There is mild, anterior mitral leaflet thickening present. No evidence of a mitral valve mass is present. Trace mitral valve regurgitation is present. No significant mitral valve stenosis is present  •  The tricuspid valve is normal in structure. There is no evidence of a mass on the tricuspid valve. Mild tricuspid valve regurgitation is present.  •  There is mild thickening of the pulmonic valve. There is trace pulmonic valve regurgitation present. There is no pulmonic valve stenosis  present.    I have reviewed the medications:  Scheduled Meds:atorvastatin, 20 mg, Oral, Nightly  DAPTOmycin, 8 mg/kg, Intravenous, Q24H  insulin detemir, 10 Units, Subcutaneous, Nightly  insulin lispro, 0-7 Units, Subcutaneous, TID AC  metFORMIN, 500 mg, Oral, BID With Meals  methadone, 10 mg, Oral, Q6H  metoprolol tartrate, 25 mg, Oral, Q12H  sodium chloride, 10 mL, Intravenous, Q12H  sodium chloride, 10 mL, Intravenous, Q12H  tamsulosin, 0.4 mg, Oral, Nightly      Continuous Infusions:   PRN Meds:.•  acetaminophen  •  senna-docusate sodium **AND** polyethylene glycol **AND** bisacodyl **AND** bisacodyl  •  calcium carbonate  •  dextrose  •  dextrose  •  glucagon (human recombinant)  •  hydrOXYzine  •  magnesium hydroxide  •  melatonin  •  ondansetron **OR** ondansetron  •  sodium chloride    Assessment & Plan     Active Hospital Problems    Diagnosis  POA   • **Right foot infection [L08.9]  Yes   • MRSA bacteremia [R78.81, B95.62]  Unknown   • Sepsis (HCC) [A41.9]  Yes   • Hypoglycemia [E16.2]  Yes   • Anemia [D64.9]  Yes   • Hyponatremia [E87.1]  Yes   • Hypoalbuminemia [E88.09]  Yes   • Essential hypertension [I10]  Yes   • Hyperlipidemia [E78.5]  Yes   • Paroxysmal atrial fibrillation (HCC) [I48.0]  Yes      Resolved Hospital Problems   No resolved problems to display.     Brief Hospital Course to date:  Buster Velasco is a 57yoM with PMH significant for HTN, insulin-dependent DMII, prior DVT s/p IVC filter, prior osteomyelitis (s/p L BKA), R foot transmetatarsal amputation and L hand 3rd metacarpal resection (April 2022 at OSH - completed 6 weeks IV abx for MRSA bacteremia), remote history of IVDA and more recent history of meth use. Admitted to [facility] 11/30/22 for sepsis secondary to acute R foot gangrene and MRSA bacteremia      Sepsis   Acute R foot gangrene and likely osteomyelitis, resolved  MRSA bacteremia  - s/p R BKA on 12/2/22 by Dr. Peguero  - ID following. On IV Daptomycin  for at least 6 weeks per Dr. George  - Repeat blood cultures no growth x 5 days  - TTE and JERALD without evidence of valvular vegetation     Recurrent hypoglycemia  Insulin-dependent DMII  - HbA1c 6.9%  - Per home med list, on Metformin 1000mg BID, Farxiga 10mg daily, Tresiba 72 units daily   - Requiring much less medication here  - Continue Levemir 10 units QHS, add Metformin 500mg BID today     GERD, continue Tums / PRN milk of mag      Chronic pain on opioids   History of IVDA/substance abuse  - Continue home Methadone 10mg QID     History of atrial fibrillation  - Multiple hospitalizations at [facility] in [place], [place] with reported history of atrial fibrillation   - Patient denies personal history of atrial fibrillation or anticoagulation.   - Continue Metoprolol 25mg BID  - Continue to monitor on telemetry     Chronic vocal cord paralysis   Dysphagia  History of PEG placement  - Per review of records, history of multiple intubations  - Had PEG placed 8/19/22 at [facility] - he is no longer using this. Will defer to [facility] to removal. RN to continue routine PEG care   - SLP following. Recommend regular texture diet with nectar thick liquids. Patient initially wanted thin liquids and understood risk of aspiration, however has been drinking the nectar thick liquids, so will leave as is. Can change to pure comfort diet if he absolutely refuses nectar thick    Hypertension  - Continue Metoprolol 25mg BID  - Held Losartan / Bumex on admission due to hypotension. BP currently stable so will continue to hold      History of DVT s/p IVC filter   - Not anticoagulated?  - IVC filter placed in spring 2022 per patient. Defer to PCP for further assessment and time of removal.      Hx of seizure in setting of meth use      BPH   - Continue home Flomax    Expected Discharge Location and Transportation: SNF via EMS. Referral to [facility] pending   Expected Discharge   Expected Discharge Date and Time     Expected  Discharge Date Expected Discharge Time    Dec 23, 2022          DVT prophylaxis:Mechanical DVT prophylaxis orders are present.     AM-PAC 6 Clicks Score (PT): 10 (12/21/22 1407)    CODE STATUS:   Code Status and Medical Interventions:   Ordered at: 12/01/22 0035     Code Status (Patient has no pulse and is not breathing):    CPR (Attempt to Resuscitate)     Medical Interventions (Patient has pulse or is breathing):    Full Support     Raeann Knapp PA-C  12/22/22

## 2022-12-22 NOTE — PROGRESS NOTES
Cardiothoracic Surgery Progress Note      POD #: 20-right below-knee amputation     LOS: 22 days      Subjective: Awake and alert    Objective:  Vital Signs vital signs below noted T-max past 24 hours 90.2 °F  Temp:  [97.7 °F (36.5 °C)-98.2 °F (36.8 °C)] 97.7 °F (36.5 °C)  Heart Rate:  [62-74] 65  Resp:  [18] 18  BP: (113-143)/(75-88) 143/88    Physical Exam:   General Appearance: Oriented x3   Lungs:   Heart:   Skin:   Incision: Amputation site dressing change.  Sutures intact skin margin viable skin flaps are viable.     Results:  Results from last 7 days   Lab Units 12/21/22  0615   WBC 10*3/mm3 7.37   HEMOGLOBIN g/dL 10.2*   HEMATOCRIT % 33.0*   PLATELETS 10*3/mm3 410     Results from last 7 days   Lab Units 12/21/22  0615   SODIUM mmol/L 140   POTASSIUM mmol/L 4.1   CHLORIDE mmol/L 102   CO2 mmol/L 27.0   BUN mg/dL 15   CREATININE mg/dL 0.59*   GLUCOSE mg/dL 153*   CALCIUM mg/dL 10.0         Assessment: #1.  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  Diabetes mellitus with neuropathy      Plan: Continue daily dressing change amputation site.  Medical manage per hospitalist.  Antibiotics per infectious disease.  Rehab per case management okay with me at any time.      John Peguero MD - 12/22/22 - 05:23 EST

## 2022-12-22 NOTE — PLAN OF CARE
Goal Outcome Evaluation:  Plan of Care Reviewed With: patient        Progress: improving  Outcome Evaluation: Pt demonstrated improved stability in unsupported sitting, completed grooming tasks with ALEXANDRE and UBD with Fabi at EOB. Pt gave good effort for core strengthening activities at EOB, incl. lateral weight shifting and dynamic reaching. Continue per OT POC.

## 2022-12-22 NOTE — THERAPY TREATMENT NOTE
Patient Name: Buster Velasco  : 1965    MRN: [id number]                              Today's Date: 2022       Admit Date: 2022    Visit Dx:     ICD-10-CM ICD-9-CM   1. Right foot infection  L08.9 686.9   2. Hypoglycemia  E16.2 251.2   3. Decubitus ulcer of coccygeal region, unspecified ulcer stage  L89.159 707.03     707.20   4. Pharyngeal dysphagia  R13.13 787.23     Patient Active Problem List   Diagnosis   • Right foot infection   • Hypoglycemia   • Anemia   • Hyponatremia   • Hypoalbuminemia   • Essential hypertension   • Hyperlipidemia   • Paroxysmal atrial fibrillation (HCC)   • Sepsis (HCC)   • MRSA bacteremia     Past Medical History:   Diagnosis Date   • Diabetes (HCC)    • DVT (deep venous thrombosis) (HCC)    • Hypertension    • Mood disorder (HCC)      Past Surgical History:   Procedure Laterality Date   • BELOW KNEE AMPUTATION Left    • BELOW KNEE AMPUTATION Right 2022    Procedure: AMPUTATION BELOW KNEE RIGHT;  Surgeon: John Peguero MD;  Location: [facility];  Service: Vascular;  Laterality: Right;   • TRANS METATARSAL AMPUTATION Right       General Information     Row Name 22 1206          OT Time and Intention    Document Type therapy note (daily note)  -BRAULIO     Mode of Treatment occupational therapy  -BRAULIO     Row Name 22 1206          General Information    Patient Profile Reviewed yes  -BRAULIO     Existing Precautions/Restrictions fall;other (see comments)  B BKA (R BKA 2022); B shoulder limitations; PEG tube  -BRAULIO     Barriers to Rehab medically complex;previous functional deficit  -BRAULIO     Row Name 22 1206          Cognition    Orientation Status (Cognition) oriented x 4  -BRAULIO     Row Name 22 1206          Safety Issues, Functional Mobility    Impairments Affecting Function (Mobility) balance;coordination;endurance/activity tolerance;grasp;postural/trunk control;range of motion (ROM);strength  -BRAULIO           User Key  (r) = Recorded By, (t)  = Taken By, (c) = Cosigned By    Initials Name Provider Type    BRAULIO Kristy Michel OT Occupational Therapist                 Mobility/ADL's     Row Name 12/22/22 1209          Bed Mobility    Bed Mobility supine-sit;sit-supine;scooting/bridging  -BRAULIO     Scooting/Bridging [place] (Bed Mobility) dependent (less than 25% patient effort)  -     Supine-Sit [place] (Bed Mobility) minimum assist (75% patient effort)  -     Sit-Supine [place] (Bed Mobility) supervision  -     Bed Mobility, Safety Issues decreased use of arms for pushing/pulling;impaired trunk control for bed mobility  -     Assistive Device (Bed Mobility) bed rails  -BRAULIO     Comment, (Bed Mobility) assist for trunk control for supine to sit; Dep for repositioning toward HOB for self-feeding  -     Row Name 12/22/22 1209          Transfers    Comment, (Transfers) declined transfer to chair d/t diarrhea  -     Row Name 12/22/22 1209          Activities of Daily Living    BADL Assessment/Intervention grooming;feeding  -     Row Name 12/22/22 1209          Mobility    Extremity Weight-bearing Status right lower extremity  -     Right Lower Extremity (Weight-bearing Status) non weight-bearing (NWB)  -     Row Name 12/22/22 1209          Self-Feeding Assessment/Training    [place] Level (Feeding) feeding skills;prepare tray/open items;modified independence  -     Position (Self-Feeding) sitting up in bed  -     Row Name 12/22/22 1209          Grooming Assessment/Training    [place] Level (Grooming) oral care regimen;wash face, hands;supervision  -     Position (Grooming) edge of bed sitting  -     Row Name 12/22/22 1209          Upper Body Dressing Assessment/Training    [place] Level (Upper Body Dressing) doff;don;minimum assist (75% patient effort);tanvir/cruz  -     Position (Upper Body Dressing) edge of bed sitting  -     Comment, (Upper Body Dressing) assist for R sleeve d/t IV management  -BRAULIO            User Key  (r) = Recorded By, (t) = Taken By, (c) = Cosigned By    Initials Name Provider Type    Kristy King OT Occupational Therapist               Obj/Interventions     Row Name 12/22/22 1301          Shoulder (Therapeutic Exercise)    Shoulder (Therapeutic Exercise) AROM (active range of motion)  -     Shoulder AROM (Therapeutic Exercise) bilateral;scapular protraction;scapular retraction;sitting;10 repetitions;2 sets  -     Row Name 12/22/22 1301          Elbow/Forearm (Therapeutic Exercise)    Elbow/Forearm (Therapeutic Exercise) AROM (active range of motion)  -     Elbow/Forearm AROM (Therapeutic Exercise) bilateral;flexion;sitting;10 repetitions  -     Row Name 12/22/22 1301          Motor Skills    Therapeutic Exercise shoulder;elbow/forearm  -     Row Name 12/22/22 1301          Balance    Balance Assessment sitting static balance;sitting dynamic balance  -     Static Sitting Balance set-up  -     Dynamic Sitting Balance supervision  -     Position, Sitting Balance unsupported;sitting edge of bed  -     Balance Interventions sitting;dynamic reaching;weight shifting activity  -     Comment, Balance Pt completed lateral seated weight shift activity, down on elbow and offloading hip L/R at EOB; trunk rotations/reaching across midline at EOB with Close SUP.  -           User Key  (r) = Recorded By, (t) = Taken By, (c) = Cosigned By    Initials Name Provider Type    Kristy King OT Occupational Therapist               Goals/Plan     Row Name 12/22/22 1117          Transfer Goal 1 (OT)    Activity/Assistive Device (Transfer Goal 1, OT) other (see comments)  -           User Key  (r) = Recorded By, (t) = Taken By, (c) = Cosigned By    Initials Name Provider Type    Kristy King OT Occupational Therapist               Clinical Impression     Row Name 12/22/22 1306          Pain Scale: FACES Pre/Post-Treatment    Pain: FACES Scale, Pretreatment 0-->no hurt  -BRAULIO      Posttreatment Pain Rating 0-->no hurt  -BRAULIO     Row Name 12/22/22 1306          Plan of Care Review    Plan of Care Reviewed With patient  -BRAULIO     Progress improving  -BRAULIO     Outcome Evaluation Pt demonstrated improved stability in unsupported sitting, completed grooming tasks with ALEXANDRE and UBD with Fabi at EOB. Pt gave good effort for core strengthening activities at EOB, incl. lateral weight shifting and dynamic reaching. Continue per OT POC.  -BRAULIO     Row Name 12/22/22 1306          Vital Signs    O2 Delivery Pre Treatment room air  -BRAULIO     O2 Delivery Intra Treatment room air  -BRAULIO     O2 Delivery Post Treatment room air  -BRAULIO     Pre Patient Position Supine  -BRAULIO     Intra Patient Position Sitting  -BRAULIO     Post Patient Position Supine  -BRAULIO     Row Name 12/22/22 1306          Positioning and Restraints    Pre-Treatment Position in bed  -BRAULIO     Post Treatment Position bed  -BRAULIO     In Bed notified nsg;fowlers;call light within reach;encouraged to call for assist;exit alarm on  -BRAULIO           User Key  (r) = Recorded By, (t) = Taken By, (c) = Cosigned By    Initials Name Provider Type    Kristy King OT Occupational Therapist               Outcome Measures     Row Name 12/22/22 1310          How much help from another is currently needed...    Putting on and taking off regular lower body clothing? 1  -BRAULIO     Bathing (including washing, rinsing, and drying) 2  -BRAULIO     Toileting (which includes using toilet bed pan or urinal) 1  -BRAULIO     Putting on and taking off regular upper body clothing 3  -BRAULIO     Taking care of personal grooming (such as brushing teeth) 3  -BRAULIO     Eating meals 4  -BRAULIO     AM-PAC 6 Clicks Score (OT) 14  -BRAULIO     Row Name 12/22/22 1310          Functional Assessment    Outcome Measure Options AM-PAC 6 Clicks Daily Activity (OT)  -BRAULIO           User Key  (r) = Recorded By, (t) = Taken By, (c) = Cosigned By    Initials Name Provider Type    Kristy King OT Occupational Therapist                 Occupational Therapy Education     Title: PT OT SLP Therapies (In Progress)     Topic: Occupational Therapy (In Progress)     Point: ADL training (Done)     Description:   Instruct learner(s) on proper safety adaptation and remediation techniques during self care or transfers.   Instruct in proper use of assistive devices.              Learning Progress Summary           Patient Acceptance, E, VU,DU by BRAULIO at 12/22/2022 1311    Comment: Sitting balance; UE HEP; pressure relief    Acceptance, E,D, NR by JY at 12/19/2022 0942    Acceptance, E,D, VU,DU,NR by TB at 12/14/2022 1441    Acceptance, E, VU by MR at 12/11/2022 1448    Eager, E, VU,DU by SC at 12/7/2022 1115    Comment: REVIEWED HEP    Acceptance, E, VU by MR at 12/7/2022 1110                   Point: Home exercise program (Done)     Description:   Instruct learner(s) on appropriate technique for monitoring, assisting and/or progressing therapeutic exercises/activities.              Learning Progress Summary           Patient Acceptance, E, VU,DU by BRAULIO at 12/22/2022 1311    Comment: Sitting balance; UE HEP; pressure relief    Acceptance, E,D, VU,DU,NR by TB at 12/14/2022 1441    Acceptance, E, VU by MR at 12/11/2022 1448    Eager, E, VU,DU by SC at 12/7/2022 1115    Comment: REVIEWED HEP    Acceptance, E, VU by MR at 12/7/2022 1110                   Point: Precautions (Done)     Description:   Instruct learner(s) on prescribed precautions during self-care and functional transfers.              Learning Progress Summary           Patient Acceptance, E, VU,DU by BRAULIO at 12/22/2022 1311    Comment: Sitting balance; UE HEP; pressure relief    Acceptance, E,D, NR by JY at 12/19/2022 0942    Acceptance, E,D, VU,DU,NR by TB at 12/14/2022 1441    Acceptance, E, VU by MR at 12/11/2022 1448    Eager, E, VU,DU by SC at 12/7/2022 1115    Comment: REVIEWED HEP    Acceptance, E, VU by MR at 12/7/2022 1110                   Point: Body mechanics (In Progress)      Description:   Instruct learner(s) on proper positioning and spine alignment during self-care, functional mobility activities and/or exercises.              Learning Progress Summary           Patient Acceptance, E,D, NR by  at 12/19/2022 0942    Acceptance, E, VU by MR at 12/11/2022 1448    Eager, E, VU,DU by SC at 12/7/2022 1115    Comment: REVIEWED HEP    Acceptance, E, VU by MR at 12/7/2022 1110                               User Key     Initials Effective Dates Name Provider Type Discipline    SC 06/16/21 -  Shamika Patel, PT Physical Therapist PT    TB 06/16/21 -  Joseline Lemon, OT Occupational Therapist OT    JY 06/16/21 -  Kristy Canas OT Occupational Therapist OT    BRAULIO 06/16/21 -  Kristy Michel, OT Occupational Therapist OT    MR 09/22/22 -  Marilou Lennon OT Occupational Therapist OT              OT Recommendation and Plan     Plan of Care Review  Plan of Care Reviewed With: patient  Progress: improving  Outcome Evaluation: Pt demonstrated improved stability in unsupported sitting, completed grooming tasks with ALEXANDRE and UBD with Fabi at EOB. Pt gave good effort for core strengthening activities at EOB, incl. lateral weight shifting and dynamic reaching. Continue per OT POC.     Time Calculation:    Time Calculation- OT     Row Name 12/22/22 1115             Time Calculation- OT    OT Start Time 1115  -BRAULIO      OT Received On 12/22/22  -BRAULIO         Timed Charges    66895 - OT Therapeutic Activity Minutes 28  -BRAULIO      97163 - OT Self Care/Mgmt Minutes 14  -BRAULIO         Total Minutes    Timed Charges Total Minutes 42  -BRAULIO       Total Minutes 42  -BRAULIO            User Key  (r) = Recorded By, (t) = Taken By, (c) = Cosigned By    Initials Name Provider Type    Kristy King OT Occupational Therapist              Therapy Charges for Today     Code Description Service Date Service Provider Modifiers Qty    22039297090 HC OT THERAPEUTIC ACT EA 15 MIN 12/22/2022 Kristy Michel OT GO 2    [id number]   OT SELF CARE/MGMT/TRAIN EA 15 MIN 12/22/2022 Kristy Michel OT GO 1               Kristy Michel OT  12/22/2022

## 2022-12-22 NOTE — PLAN OF CARE
Blood glucose level remains elevated. Multi layer dressing to the right stump CDI. Pain adequately controlled with scheduled methadone. VSS on RA. Will cont to mx. Call light in reach,

## 2022-12-22 NOTE — THERAPY TREATMENT NOTE
Acute Care - Speech Language Pathology   Swallow Treatment Note [facility]     Patient Name: Buster Velasco  : 1965  MRN: [id number]  Today's Date: 2022               Admit Date: 2022    Visit Dx:     ICD-10-CM ICD-9-CM   1. Right foot infection  L08.9 686.9   2. Hypoglycemia  E16.2 251.2   3. Decubitus ulcer of coccygeal region, unspecified ulcer stage  L89.159 707.03     707.20   4. Pharyngeal dysphagia  R13.13 787.23     Patient Active Problem List   Diagnosis   • Right foot infection   • Hypoglycemia   • Anemia   • Hyponatremia   • Hypoalbuminemia   • Essential hypertension   • Hyperlipidemia   • Paroxysmal atrial fibrillation (HCC)   • Sepsis (HCC)   • MRSA bacteremia     Past Medical History:   Diagnosis Date   • Diabetes (HCC)    • DVT (deep venous thrombosis) (HCC)    • Hypertension    • Mood disorder (HCC)      Past Surgical History:   Procedure Laterality Date   • BELOW KNEE AMPUTATION Left    • BELOW KNEE AMPUTATION Right 2022    Procedure: AMPUTATION BELOW KNEE RIGHT;  Surgeon: John Peguero MD;  Location: [facility];  Service: Vascular;  Laterality: Right;   • TRANS METATARSAL AMPUTATION Right        SLP Recommendation and Plan     SLP Diet Recommendation: regular textures, nectar thick liquids, water between meals after oral care, with supervision, ice chips between meals after oral care, with supervision, other (see comments) (thin H2O between meals must be from spoon or from cup sip + chin tuck, as tolerated) (22)  Recommended Precautions and Strategies: upright posture during/after eating, general aspiration precautions (22)  SLP Rec. for Method of Medication Administration: meds whole, with thick liquids, with puree, as tolerated (22)     Monitor for Signs of Aspiration: yes, notify SLP if any concerns (22)        Anticipated Discharge Disposition (SLP): anticipate therapy at next level of care, inpatient  rehabilitation facility (12/22/22 1315)     Therapy Frequency (Swallow): 5 days per week (12/22/22 1315)  Predicted Duration Therapy Intervention (Days): until discharge (12/22/22 1315)        Daily Summary of Progress (SLP): progress toward functional goals is good (12/22/22 1315)               Treatment Assessment (SLP): suspected, continued, pharyngeal dysphagia (12/22/22 1315)  Treatment Assessment Comments (SLP): Reviewed last MBS results & recs w/ pt - expressed understanding of nectar-thick w/ meals & thin via small single cup sip + chin tuck between meals. Able to demonstrate compensations w/ min cueing - provided written instruction. Provided EMST-75 to use in tx and completed w/ pt - set to 35 resistance. Will continue to follow for tx. (12/22/22 1315)  Plan for Continued Treatment (SLP): continue treatment per plan of care (12/22/22 1315)         Plan of Care Reviewed With: patient      SWALLOW EVALUATION (last 72 hours)     SLP Adult Swallow Evaluation     Row Name 12/22/22 1315                   Rehab Evaluation    Document Type therapy note (daily note)  -MP        Subjective Information no complaints  -MP        Patient Observations alert;cooperative  -MP        Patient/Family/Caregiver Comments/Observations No family present  -MP        Patient Effort good  -MP           Pain    Additional Documentation Pain Scale: FACES Pre/Post-Treatment (Group)  -MP           Pain Scale: FACES Pre/Post-Treatment    Pain: FACES Scale, Pretreatment 0-->no hurt  -MP        Posttreatment Pain Rating 0-->no hurt  -MP           SLP Treatment Clinical Impressions    Treatment Assessment (SLP) suspected;continued;pharyngeal dysphagia  -MP        Treatment Assessment Comments (SLP) Reviewed last MBS results & recs w/ pt - expressed understanding of nectar-thick w/ meals & thin via small single cup sip + chin tuck between meals. Able to demonstrate compensations w/ min cueing - provided written instruction. Provided EMST-75  to use in tx and completed w/ pt - set to 35 resistance. Will continue to follow for tx.  -MP        Daily Summary of Progress (SLP) progress toward functional goals is good  -MP        Barriers to Overall Progress (SLP) Baseline deficits  -MP        Plan for Continued Treatment (SLP) continue treatment per plan of care  -MP        Care Plan Review care plan/treatment goals reviewed;patient/other agree to care plan  -MP           Recommendations    Therapy Frequency (Swallow) 5 days per week  -MP        Predicted Duration Therapy Intervention (Days) until discharge  -MP        SLP Diet Recommendation regular textures;nectar thick liquids;water between meals after oral care, with supervision;ice chips between meals after oral care, with supervision;other (see comments)  thin H2O between meals must be from spoon or from cup sip + chin tuck, as tolerated  -MP        Recommended Precautions and Strategies upright posture during/after eating;general aspiration precautions  -MP        SLP Rec. for Method of Medication Administration meds whole;with thick liquids;with puree;as tolerated  -MP        Monitor for Signs of Aspiration yes;notify SLP if any concerns  -MP        Anticipated Discharge Disposition (SLP) anticipate therapy at next level of care;inpatient rehabilitation facility  -MP        Equipment Issued to Patient EMST  -MP              User Key  (r) = Recorded By, (t) = Taken By, (c) = Cosigned By    Initials Name Effective Dates    MP Jordan Crystal, MS CCC-SLP 12/28/21 -                 EDUCATION  The patient has been educated in the following areas:   Dysphagia (Swallowing Impairment) Modified Diet Instruction.        SLP GOALS     Row Name 12/22/22 5026             (LTG) Patient will demonstrate functional swallow for    Diet Texture (Demonstrate functional swallow) regular textures  -MP      Liquid viscosity (Demonstrate functional swallow) thin liquids  -MP      [place] (Demonstrate functional  swallow) with use of compensatory strategies  -MP      Time Frame (Demonstrate functional swallow) by discharge  -MP      Progress/Outcomes (Demonstrate functional swallow) continuing progress toward goal  -MP         (STG) Patient will tolerate therapeutic trials of    Consistencies Trialed (Tolerate therapeutic trials) thin liquids  -MP      Desired Outcome (Tolerate therapeutic trials) without signs/symptoms of aspiration  -MP      [place] (Tolerate therapeutic trials) with minimal cues (75-90% accuracy)  -MP      Time Frame (Tolerate therapeutic trials) by discharge  -MP      Progress/Outcomes (Tolerate therapeutic trials) continuing progress toward goal  -MP      Comment (Tolerate therapeutic trials) Cough w/ thin when not using small sip + chin tuck compensation  -MP         (STG) Pharyngeal Strengthening Exercise Goal 1 (SLP)    Increase Timing prepping - 3 second prep or suck swallow or 3-step swallow  -MP      Increase Superior Movement of the Hyolaryngeal Complex effortful pitch glide (falsetto + pharyngeal squeeze)  -MP      Increase Anterior Movement of the Hyolaryngeal Complex EMST;chin tuck against resistance (CTAR)  -MP      Increase Epiglottic Inversion and Retroflexion Mendelsohn  -MP      Increase Closure at Entrance to Airway/Closure of Airway at Glottis super-supraglottic swallow  -MP      Increase Squeeze/Positive Pressure Generation janine  -MP      Increase Tongue Base Retraction hard effortful swallow  -MP      [place]/Accuracy (Pharyngeal Strengthening Goal 1, SLP) with minimal cues (75-90% accuracy)  -MP      Time Frame (Pharyngeal Strengthening Goal 1, SLP) short term goal (STG)  -MP      Progress/Outcomes (Pharyngeal Strengthening Goal 1, SLP) continuing progress toward goal  -MP      Comment (Pharyngeal Strengthening Goal 1, SLP) Provided new handout of exercises - pt very familiar w/ all. Provided EMST-75, set up (resistance @ 35), and completed w/ pt.  -MP         (STG)  Swallow Compensatory Strategies Goal 1 (SLP)    Activity (Swallow Compensatory Strategies/Techniques Goal 1, SLP) compensatory strategies;during p.o. trials;small cup sips;chin tuck posture;other (see comments)  w/ thin liquid in isolation (w/o solids or as liquid wash)  -MP      Jul[place]/Accuracy (Swallow Compensatory Strategies/Techniques Goal 1, SLP) independently (over 90% accuracy)  -MP      Time Frame (Swallow Compensatory Strategies/Techniques Goal 1, SLP) short term goal (STG)  -MP      Progress/Outcomes (Swallow Compensatory Strategies/Techniques Goal 1, SLP) continuing progress toward goal  -MP            User Key  (r) = Recorded By, (t) = Taken By, (c) = Cosigned By    Initials Name Provider Type    Jordan Morales MS CCC-SLP Speech and Language Pathologist                   Time Calculation:    Time Calculation- SLP     Row Name 12/22/22 1348             Time Calculation- SLP    SLP Start Time 1315  -MP      SLP Received On 12/22/22  -MP         Untimed Charges    62831-FZ Treatment Swallow Minutes 40  -MP         Total Minutes    Untimed Charges Total Minutes 40  -MP       Total Minutes 40  -MP            User Key  (r) = Recorded By, (t) = Taken By, (c) = Cosigned By    Initials Name Provider Type    Jordan Morales MS CCC-SLP Speech and Language Pathologist                Therapy Charges for Today     Code Description Service Date Service Provider Modifiers Qty    [id number]  ST TREATMENT SWALLOW 3 12/22/2022 Jordan Crystal MS CCC-SLP GN 1               MS ALIA Barnes  12/22/2022

## 2022-12-23 LAB
GLUCOSE BLDC GLUCOMTR-MCNC: 128 MG/DL (ref 70–130)
GLUCOSE BLDC GLUCOMTR-MCNC: 141 MG/DL (ref 70–130)
GLUCOSE BLDC GLUCOMTR-MCNC: 162 MG/DL (ref 70–130)
GLUCOSE BLDC GLUCOMTR-MCNC: 175 MG/DL (ref 70–130)

## 2022-12-23 PROCEDURE — 63710000001 INSULIN DETEMIR PER 5 UNITS: Performed by: PEDIATRICS

## 2022-12-23 PROCEDURE — 99024 POSTOP FOLLOW-UP VISIT: CPT | Performed by: THORACIC SURGERY (CARDIOTHORACIC VASCULAR SURGERY)

## 2022-12-23 PROCEDURE — 25010000002 DAPTOMYCIN PER 1 MG: Performed by: INTERNAL MEDICINE

## 2022-12-23 PROCEDURE — 63710000001 INSULIN LISPRO (HUMAN) PER 5 UNITS: Performed by: HOSPITALIST

## 2022-12-23 PROCEDURE — 82962 GLUCOSE BLOOD TEST: CPT

## 2022-12-23 PROCEDURE — 99232 SBSQ HOSP IP/OBS MODERATE 35: CPT | Performed by: PHYSICIAN ASSISTANT

## 2022-12-23 RX ADMIN — METFORMIN HYDROCHLORIDE 1000 MG: 1000 TABLET, FILM COATED ORAL at 08:49

## 2022-12-23 RX ADMIN — HYDROXYZINE HYDROCHLORIDE 50 MG: 25 TABLET ORAL at 22:15

## 2022-12-23 RX ADMIN — ATORVASTATIN CALCIUM 20 MG: 20 TABLET, FILM COATED ORAL at 20:36

## 2022-12-23 RX ADMIN — METFORMIN HYDROCHLORIDE 1000 MG: 1000 TABLET, FILM COATED ORAL at 17:37

## 2022-12-23 RX ADMIN — METOPROLOL TARTRATE 25 MG: 25 TABLET, FILM COATED ORAL at 20:36

## 2022-12-23 RX ADMIN — METHADONE HYDROCHLORIDE 10 MG: 10 TABLET ORAL at 08:49

## 2022-12-23 RX ADMIN — METOPROLOL TARTRATE 25 MG: 25 TABLET, FILM COATED ORAL at 08:49

## 2022-12-23 RX ADMIN — INSULIN DETEMIR 10 UNITS: 100 INJECTION, SOLUTION SUBCUTANEOUS at 20:36

## 2022-12-23 RX ADMIN — Medication 10 ML: at 22:15

## 2022-12-23 RX ADMIN — DAPTOMYCIN 800 MG: 500 INJECTION, POWDER, LYOPHILIZED, FOR SOLUTION INTRAVENOUS at 10:22

## 2022-12-23 RX ADMIN — METHADONE HYDROCHLORIDE 10 MG: 10 TABLET ORAL at 14:20

## 2022-12-23 RX ADMIN — METHADONE HYDROCHLORIDE 10 MG: 10 TABLET ORAL at 01:55

## 2022-12-23 RX ADMIN — METHADONE HYDROCHLORIDE 10 MG: 10 TABLET ORAL at 20:36

## 2022-12-23 RX ADMIN — TAMSULOSIN HYDROCHLORIDE 0.4 MG: 0.4 CAPSULE ORAL at 20:36

## 2022-12-23 RX ADMIN — INSULIN LISPRO 2 UNITS: 100 INJECTION, SOLUTION INTRAVENOUS; SUBCUTANEOUS at 17:37

## 2022-12-23 NOTE — PROGRESS NOTES
[facility] Medicine Services  PROGRESS NOTE    Patient Name: Buster Velasco  : 1965  MRN: [id number]    Date of Admission: 2022  Primary Care Physician: Ludivina Bowers MD    Subjective     CC: f/u R foot infection s/p BKA     HPI:  In bed. Slept well last night. Pain is well-controlled. Denies chest pain, dyspnea, abdominal pain, nausea or vomiting. Voiding well and had a BM today     ROS:  Gen- No fevers, chills  CV- No chest pain, palpitations  Resp- No cough, dyspnea  GI- No N/V/D, abd pain    Objective     Vital Signs:   Temp:  [98 °F (36.7 °C)-98.4 °F (36.9 °C)] 98.2 °F (36.8 °C)  Heart Rate:  [63-91] 71  Resp:  [18] 18  BP: (109-155)/(81-98) 130/89     Physical Exam:  Constitutional: No acute distress, awake, alert and conversant. Sitting up in bed   HENT: NCAT, mucous membranes moist  Respiratory: Clear to auscultation bilaterally, respiratory effort normal on room air   Cardiovascular: RRR, no murmurs, rubs, or gallops  Gastrointestinal: Positive bowel sounds, soft, nontender, nondistended  Musculoskeletal: R BKA stump dressed, did not remove for exam. L BKA stump intact.   Psychiatric: Appropriate affect, cooperative  Neurologic: Oriented x 3, moves all extremities spontaneously without focal deficits, speech clear and coherent     Results Reviewed:  LAB RESULTS:      Lab 22  0510 22  0615 22  0806   WBC 6.42 7.37 7.38   HEMOGLOBIN 10.4* 10.2* 10.1*   HEMATOCRIT 33.2* 33.0* 33.4*   PLATELETS 380 410 414   NEUTROS ABS 3.99 5.00 4.62   IMMATURE GRANS (ABS) 0.05 0.04 0.07*   LYMPHS ABS 1.62 1.68 1.90   MONOS ABS 0.58 0.47 0.60   EOS ABS 0.15 0.15 0.16   MCV 84.3 85.1 85.2         Lab 22  0510 22  0615 22  0806   SODIUM 140 140 138   POTASSIUM 4.0 4.1 4.5   CHLORIDE 103 102 101   CO2 28.0 27.0 27.0   ANION GAP 9.0 11.0 10.0   BUN 17 15 17   CREATININE 0.62* 0.59* 0.73*   EGFR 110.8 112.5 105.5   GLUCOSE 159* 153* 162*    CALCIUM 9.6 10.0 9.5         Lab 12/20/22  0806   TOTAL PROTEIN 6.4   ALBUMIN 3.20*   GLOBULIN 3.2   ALT (SGPT) 15   AST (SGOT) 14   BILIRUBIN 0.2   ALK PHOS 89     Brief Urine Lab Results  (Last result in the past 365 days)      Color   Clarity   Blood   Leuk Est   Nitrite   Protein   CREAT   Urine HCG        08/20/22 0108 Yellow   Clear   Negative   Negative     Negative               Microbiology Results Abnormal     Procedure Component Value - Date/Time    Blood Culture - Blood, Wrist, Left [[id number]]  (Normal) Collected: 12/06/22 1402    Lab Status: Final result Specimen: Blood from Wrist, Left Updated: 12/11/22 1545     Blood Culture No growth at 5 days    Blood Culture - Blood, Arm, Left [[id number]]  (Normal) Collected: 12/06/22 1402    Lab Status: Final result Specimen: Blood from Arm, Left Updated: 12/11/22 1545     Blood Culture No growth at 5 days        No radiology results from the last 24 hrs    Results for orders placed during the hospital encounter of 11/30/22    Adult Transesophageal Echo (JERALD) W/ Cont if Necessary Per Protocol    Interpretation Summary  •  Left ventricular systolic function is normal. Estimated left ventricular EF = 55%  •  The aortic valve is structurally normal with no stenosis present. Trace aortic valve regurgitation is present. There is no evidence of an aortic valve mass is present.  •  There is mild, anterior mitral leaflet thickening present. No evidence of a mitral valve mass is present. Trace mitral valve regurgitation is present. No significant mitral valve stenosis is present  •  The tricuspid valve is normal in structure. There is no evidence of a mass on the tricuspid valve. Mild tricuspid valve regurgitation is present.  •  There is mild thickening of the pulmonic valve. There is trace pulmonic valve regurgitation present. There is no pulmonic valve stenosis present.    I have reviewed the medications:  Scheduled Meds:atorvastatin, 20 mg, Oral,  Nightly  DAPTOmycin, 8 mg/kg, Intravenous, Q24H  insulin detemir, 10 Units, Subcutaneous, Nightly  insulin lispro, 0-7 Units, Subcutaneous, TID AC  metFORMIN, 1,000 mg, Oral, BID With Meals  methadone, 10 mg, Oral, Q6H  metoprolol tartrate, 25 mg, Oral, Q12H  sodium chloride, 10 mL, Intravenous, Q12H  sodium chloride, 10 mL, Intravenous, Q12H  tamsulosin, 0.4 mg, Oral, Nightly      Continuous Infusions:   PRN Meds:.•  acetaminophen  •  senna-docusate sodium **AND** polyethylene glycol **AND** bisacodyl **AND** bisacodyl  •  calcium carbonate  •  dextrose  •  dextrose  •  glucagon (human recombinant)  •  hydrOXYzine  •  magnesium hydroxide  •  melatonin  •  ondansetron **OR** ondansetron  •  sodium chloride    Assessment & Plan     Active Hospital Problems    Diagnosis  POA   • **Right foot infection [L08.9]  Yes   • MRSA bacteremia [R78.81, B95.62]  Unknown   • Sepsis (HCC) [A41.9]  Yes   • Hypoglycemia [E16.2]  Yes   • Anemia [D64.9]  Yes   • Hyponatremia [E87.1]  Yes   • Hypoalbuminemia [E88.09]  Yes   • Essential hypertension [I10]  Yes   • Hyperlipidemia [E78.5]  Yes   • Paroxysmal atrial fibrillation (HCC) [I48.0]  Yes      Resolved Hospital Problems   No resolved problems to display.     Brief Hospital Course to date:  Buster Velasco is a 57yoM with PMH significant for HTN, insulin-dependent DMII, prior DVT s/p IVC filter, prior osteomyelitis (s/p L BKA), R foot transmetatarsal amputation and L hand 3rd metacarpal resection (April 2022 at OSH - completed 6 weeks IV abx for MRSA bacteremia), remote history of IVDA and more recent history of meth use. Admitted to [facility] 11/30/22 for sepsis secondary to acute R foot gangrene and MRSA bacteremia      Sepsis   Acute R foot gangrene and likely osteomyelitis, resolved  MRSA bacteremia  - s/p R BKA on 12/2/22 by Dr. Sudhir SONI PICC placed 12/1/22   - ID following. On IV Daptomycin for at least 6 weeks per Dr. George  - Repeat blood  cultures no growth x 5 days  - TTE and JERALD without evidence of valvular vegetation     Recurrent hypoglycemia  Insulin-dependent DMII  - HbA1c 6.9%  - Per home med list, on Metformin 1000mg BID, Farxiga 10mg daily, Tresiba 72 units daily   - Requiring much less medication here  - Continue Levemir 10 units QHS, increase Metformin to 1000mg BID today     GERD, continue Tums / PRN milk of mag      Chronic pain on opioids   History of IVDA/substance abuse  - Continue home Methadone 10mg QID     History of atrial fibrillation  - Multiple hospitalizations at [facility] in [place], [place] with reported history of atrial fibrillation / RVR   - Continue Metoprolol 25mg BID  - Not anticoagulated for unclear reasons   - Continue to monitor on telemetry     Chronic vocal cord paralysis   Dysphagia  History of PEG placement  - Per review of records, history of multiple intubations  - Had PEG placed 8/19/22 at [facility] - he is no longer using this. Will defer to [facility] to removal. RN to continue routine PEG care   - SLP following. Recommend regular texture diet with nectar thick liquids. Patient initially wanted thin liquids and understood risk of aspiration, however has been drinking the nectar thick liquids, so will leave as is. Can change to pure comfort diet if he absolutely refuses nectar thick    Hypertension  - Continue Metoprolol 25mg BID  - Held Losartan / Bumex on admission due to hypotension. BP currently stable so will continue to hold      History of DVT s/p IVC filter   - IVC filter placed in spring 2022 per patient. Defer to PCP for further assessment and timing of removal.      Hx of seizure in setting of meth use      BPH   - Continue home Flomax    Expected Discharge Location and Transportation: SNF via EMS. Referral to [facility] pending   Expected Discharge - patient is medically ready for DC whenever rehab arrangements are made     Expected Discharge Date and Time     Expected Discharge Date Expected  Discharge Time    Dec 27, 2022          DVT prophylaxis:Mechanical DVT prophylaxis orders are present.     AM-PAC 6 Clicks Score (PT): 10 (12/21/22 2627)    CODE STATUS:   Code Status and Medical Interventions:   Ordered at: 12/01/22 0035     Code Status (Patient has no pulse and is not breathing):    CPR (Attempt to Resuscitate)     Medical Interventions (Patient has pulse or is breathing):    Full Support     Raeann Knapp PA-C  12/23/22

## 2022-12-23 NOTE — CASE MANAGEMENT/SOCIAL WORK
Continued Stay Note  [facility]     Patient Name: uBster Velasco  MRN: [id number]  Today's Date: 12/23/2022    Admit Date: 11/30/2022    Plan: [facility]   Discharge Plan     Row Name 12/23/22 1423       Plan    Plan [facility]    Patient/Family in Agreement with Plan yes    Plan Comments Attempted to speak with Nicole, admissions liaison with [facility], and was unsuccessful in reaching her but was able to leave a voicemail. Will await a return call from her. Do not anticipate Mr. Velasco leaving prior to Tuesday. Case management will continue to follow.    Final Discharge Disposition Code 62 - inpatient rehab facility               Discharge Codes    No documentation.               Expected Discharge Date and Time     Expected Discharge Date Expected Discharge Time    Dec 27, 2022             Alan Downing RN

## 2022-12-23 NOTE — PLAN OF CARE
Goal Outcome Evaluation:  Plan of Care Reviewed With: patient            VSS. Pt alert, oriented and very pleasant. Pain controlled throughout shift. Pt slept on and off throughout shift. Pt frequently turned. BM this shift.

## 2022-12-23 NOTE — PROGRESS NOTES
Cardiothoracic Surgery Progress Note      POD #: 21-right below-knee amputation     LOS: 23 days      Subjective: Awake and alert    Objective:  Vital Signs vital signs below noted T-max past 24 hours 98.4 °F  Temp:  [98 °F (36.7 °C)-98.4 °F (36.9 °C)] 98 °F (36.7 °C)  Heart Rate:  [63-91] 63  Resp:  [18] 18  BP: (109-155)/(73-98) 137/82    Physical Exam:   General Appearance: Oriented x3   Lungs:   Heart:   Skin:   Incision: Amputation dressing change.  Suture intact skin margin viable skin flaps are viable.     Results:  Results from last 7 days   Lab Units 12/22/22  0510   WBC 10*3/mm3 6.42   HEMOGLOBIN g/dL 10.4*   HEMATOCRIT % 33.2*   PLATELETS 10*3/mm3 380     Results from last 7 days   Lab Units 12/22/22  0510   SODIUM mmol/L 140   POTASSIUM mmol/L 4.0   CHLORIDE mmol/L 103   CO2 mmol/L 28.0   BUN mg/dL 17   CREATININE mg/dL 0.62*   GLUCOSE mg/dL 159*   CALCIUM mg/dL 9.6         Assessment: #1.  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  Diabetes mellitus with neuropathy      Plan: Continue daily dressing change amputation site.  Medical manage per hospitalist.  Antibiotics per infectious disease.  Rehab okay with me at any time.      John Peguero MD - 12/23/22 - 06:18 EST

## 2022-12-23 NOTE — PLAN OF CARE
Problem: Adult Inpatient Plan of Care  Goal: Absence of Hospital-Acquired Illness or Injury  Intervention: Identify and Manage Fall Risk  Recent Flowsheet Documentation  Taken 12/23/2022 0400 by Najma Ruby RN  Safety Promotion/Fall Prevention:   activity supervised   assistive device/personal items within reach   clutter free environment maintained   nonskid shoes/slippers when out of bed   room organization consistent   safety round/check completed  Taken 12/23/2022 0000 by Najma Ruby RN  Safety Promotion/Fall Prevention:   activity supervised   assistive device/personal items within reach   clutter free environment maintained   nonskid shoes/slippers when out of bed   room organization consistent   safety round/check completed  Taken 12/22/2022 2015 by Najma Ruby RN  Safety Promotion/Fall Prevention:   activity supervised   assistive device/personal items within reach   clutter free environment maintained   nonskid shoes/slippers when out of bed   room organization consistent   safety round/check completed     Problem: Adult Inpatient Plan of Care  Goal: Absence of Hospital-Acquired Illness or Injury  Intervention: Prevent Skin Injury  Recent Flowsheet Documentation  Taken 12/23/2022 0400 by Najma Ruby RN  Body Position: weight shifting  Skin Protection: adhesive use limited  Taken 12/23/2022 0000 by Najma Ruby RN  Skin Protection: adhesive use limited  Taken 12/22/2022 2015 by Najma Ruby RN  Body Position: weight shifting  Skin Protection: adhesive use limited   Goal Outcome Evaluation:  Plan of Care Reviewed With: patient

## 2022-12-23 NOTE — PROGRESS NOTES
[facility] Progress Note        Antibiotics:  Anti-Infectives (From admission, onward)    Ordered     Dose/Rate Route Frequency Start Stop    12/07/22 0603  DAPTOmycin (CUBICIN) 800 mg in sodium chloride 0.9 % 50 mL IVPB        Ordering Provider: Zeke George MD    8 mg/kg × 100 kg  100 mL/hr over 30 Minutes Intravenous Every 24 Hours 12/07/22 1000 12/29/22 0959    11/30/22 2219  vancomycin 2000 mg/500 mL 0.9% NS IVPB ([facility])        Ordering Provider: Reggie Batres MD    20 mg/kg × 98.9 kg  over 2 Hours Intravenous Once 11/30/22 2221 12/01/22 0315    11/30/22 2219  piperacillin-tazobactam (ZOSYN) 3.375 g in iso-osmotic dextrose 50 ml (premix)        Ordering Provider: Reggie Batres MD    3.375 g Intravenous Once 11/30/22 2221 11/30/22 2335          CC: foot infection    HPI:    Patient is a 57 y.o.  Yr old male with history of prior lower extremity infection/amputations done in [facility].  He has a history of left BKA years ago.  More recent right transmetatarsal amputation but specific dates unclear and unclear who the surgeon was.  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  He has history DVT/IVC filter, diabetes and other comorbidities as below.  He reports a chronic right lateral foot wound present for months but apparently not precipitated by any specific event or trauma.  He is admitted to the hospital on November 30 with deterioration at the foot including redness/swelling    **patient had reported remote drug abuse (initially to me reported as IV drug abuse but then later he reported not injection use and I am unable to corroborate otherwise at prsent; +drug screen earlier this year per d/w Dr Chau for Meth at [facility] and reported by them to be IVDA/substance abuse),   " chronic methadone. He reported to me this was 17 years ago and therefore some inconsistencies    ** patient reports MRSA blood stream infection treated in [place] spring of 2022, 6 weeks of vancomycin by his report.  Otherwise data deficit.  Try to retrieve information     12/2/22 Dr Peguero did surgery  \"Procedure(s): Mid level right below-knee amputation and primary closure \"    12/6 with MRSA in blood culture;  TTE done but limited per cardiology    12/9/22   JERALD no vegetation per cardiology    12/23/22   Sleepy; no new focal pain.He is hoping for placement in [facility].   No adverse reaction to antibiotics.  No myalgia or shortness of breath/cough. Nursing reports no new focal pain or distress.  postop pain to the right LE which is controlled/dull at present, constant, nonradiating, worse with palpation, generally better with pain meds and 1-2  out of 10 in severity.  Not progressive     No headache photophobia or neck stiffness.  No shortness of breath cough or hemoptysis.  No nausea vomiting diarrhea or abdominal pain.  No dysuria hematuria or pyuria.  No other new skin rash       ROS:      12/23/22 per nursing,No f/c/s. No n/v/d. No rash.   No adverse reaction to antibiotics.    Constitutional-- No Fever, chills or sweats.  Appetite good, and no malaise. No fatigue.  Heent--chronic hoarse voice.  No new vision, hearing or throat complaints.  No epistaxis or oral sores.   No flashers, floaters or eye pain.   No headache, photophobia or neck stiffness.  Chronic PEG tube  CV-- No chest pain, palpitation or syncope  Resp-- No SOB/cough/Hemoptysis  GI- No nausea, vomiting, or diarrhea.  No hematochezia, melena, or hematemesis. Denies jaundice or chronic liver disease.  -- No dysuria, hematuria, or flank pain.  Denies hesitancy, urgency or flank pain.  Lymph- no swollen lymph nodes in neck/axilla or groin.   Heme- No active bruising or bleeding; no Hx of DVT or PE.  MS-- no swelling or pain in " "the bones or joints of arms/legs.  No new back pain.  Neuro-- No acute focal weakness or numbness in the arms or legs.  No seizures.     Full 12 point review of systems reviewed and negative otherwise for acute complaints, except for above      PE: nursing/chaperone present  /89 (BP Location: Left arm, Patient Position: Lying)   Pulse 71   Temp 98.2 °F (36.8 °C) (Oral)   Resp 18   Ht 193 cm (75.98\")   Wt 107 kg (235 lb)   SpO2 93%   BMI 28.62 kg/m²          GENERAL:  awakened;  in no acute distress.  chronic Hoarse voice noted   HEENT: Normocephalic, atraumatic.   No conjunctival injection. No icterus. Oropharynx   without evidence of thrush or exudate. No evidence of peridontal disease.     HEART: RRR; No murmur, rubs, gallops.   LUNGS: Diminished at bases but otherwise clear to auscultation bilaterally without wheezing, rales, rhonchi. Normal respiratory effort. Nonlabored. No dullness.  ABDOMEN: Soft, nontender, nondistended. Positive bowel sounds. No rebound or guarding. NO mass or HSM.  PEG tube noted  EXT:  No cyanosis, clubbing;No cord.   MSK: FROM without joint effusions noted arms/legs.    SKIN: Warm and dry without cutaneous eruptions on Inspection/palpation.    NEURO:  awake     Right lower extremity surgical site noted;  No new redness;  no discrete mass bulge or fluctuance.  No crepitus or bulla.       No peripheral stigmata/phenomenon of endocarditis     IV without obvious redness or drainage     Left BKA site with no redness induration or warmth.  No discrete mass bulge or fluctuance.    Laboratory Data    Results from last 7 days   Lab Units 12/22/22  0510 12/21/22  0615 12/20/22  0806   WBC 10*3/mm3 6.42 7.37 7.38   HEMOGLOBIN g/dL 10.4* 10.2* 10.1*   HEMATOCRIT % 33.2* 33.0* 33.4*   PLATELETS 10*3/mm3 380 410 414     Results from last 7 days   Lab Units 12/22/22  0510   SODIUM mmol/L 140   POTASSIUM mmol/L 4.0   CHLORIDE mmol/L 103   CO2 mmol/L 28.0   BUN mg/dL 17   CREATININE mg/dL " 0.62*   GLUCOSE mg/dL 159*   CALCIUM mg/dL 9.6     Results from last 7 days   Lab Units 12/20/22  0806   ALK PHOS U/L 89   BILIRUBIN mg/dL 0.2   ALT (SGPT) U/L 15   AST (SGOT) U/L 14               Estimated Creatinine Clearance: 174.3 mL/min (A) (by C-G formula based on SCr of 0.62 mg/dL (L)).      Microbiology:      Radiology:  Imaging Results (Last 72 Hours)     ** No results found for the last 72 hours. **            Impression:     --Acute right foot/ankle with multifocal ulceration, cellulitis/wound infection and probable osteomyelitis with probe to bone at the lateral foot and abnormal MRI.  Other comorbidities as outlined above and high risk for further serious morbidity and other serious sequelae including persistent/recurrent or nonhealing wounds, persistent/progressive or recurrent infection and risk for further functional/limb loss/amputation.  Surgery following to help define timing/option of threshold for any future surgical intervention .      **Surgery 12/2 with BKA     --MRSA bacteremia from November 30 (daptomycin sensitive).  Presumed from foot but also risk for endovascular focus particularly with  History of prior MRSA bloodstream infection.  Transthoracic echocardiogram limited per cardiology.  JERALD no vegetation per cardiology; follow-up blood cultures negative so far from December 6    **No  new metastatic foci of involvement as of December 21    --History MRSA with bacteremia by his report in spring/summer 2022 and treated with 6 weeks of vancomycin in [place].  Specifics unclear and trying to retrieve information    --coag-negative staph in blood culture likely contaminant     --Chronic vocal cord paralysis per notes with chronic hoarseness and indwelling PEG tube.  Medicine team to clarify     --History left BKA.     --Diabetes.  You need to tightly control blood sugar to give best chance for healing     --History of DVT with IVC filter    --history of remote drug abuse Per his  reports to me; he denies injection drug abuse for 17 years by his report to me although as above, medicine team has find records indicating more recent abuse in 2022 [facility] [place] [facility]     PLAN:      -- Daptomycin  IV  4-6 weeks but final duration to depend on clinical course of study results and response to therapy; no new metastatic focus of involvement so far     -- Check/review labs cultures and scans     -- Partial history per nursing staff     -- Discussed with microbiology and family     --d/w Dr Peguero        -- Highly complex set of issues with high risk for further serious morbidity and other serious sequela     --Case management looking into options with potential placement to [facility]; I anticipate IV daptomycin at least until early January until follow-up with me.  If he is discharged, he would need follow-up with me one week after discharge; he needs every Monday CBC/CMP and CPK while on daptomycin.    **I am out of town until January 2.  My partners can see next week as needed.  Call if needed sooner         **Copied text in this note has been reviewed and is accurate as of 12/23/22.     Zeke George MD  12/23/2022

## 2022-12-24 LAB
GLUCOSE BLDC GLUCOMTR-MCNC: 103 MG/DL (ref 70–130)
GLUCOSE BLDC GLUCOMTR-MCNC: 134 MG/DL (ref 70–130)
GLUCOSE BLDC GLUCOMTR-MCNC: 143 MG/DL (ref 70–130)

## 2022-12-24 PROCEDURE — 25010000002 DAPTOMYCIN PER 1 MG: Performed by: INTERNAL MEDICINE

## 2022-12-24 PROCEDURE — 99024 POSTOP FOLLOW-UP VISIT: CPT | Performed by: THORACIC SURGERY (CARDIOTHORACIC VASCULAR SURGERY)

## 2022-12-24 PROCEDURE — 97535 SELF CARE MNGMENT TRAINING: CPT

## 2022-12-24 PROCEDURE — 82962 GLUCOSE BLOOD TEST: CPT

## 2022-12-24 PROCEDURE — 63710000001 INSULIN DETEMIR PER 5 UNITS: Performed by: PEDIATRICS

## 2022-12-24 PROCEDURE — 99231 SBSQ HOSP IP/OBS SF/LOW 25: CPT | Performed by: INTERNAL MEDICINE

## 2022-12-24 RX ADMIN — METOPROLOL TARTRATE 25 MG: 25 TABLET, FILM COATED ORAL at 20:21

## 2022-12-24 RX ADMIN — HYDROXYZINE HYDROCHLORIDE 50 MG: 25 TABLET ORAL at 20:21

## 2022-12-24 RX ADMIN — INSULIN DETEMIR 10 UNITS: 100 INJECTION, SOLUTION SUBCUTANEOUS at 20:21

## 2022-12-24 RX ADMIN — Medication 10 ML: at 08:17

## 2022-12-24 RX ADMIN — METHADONE HYDROCHLORIDE 10 MG: 10 TABLET ORAL at 20:21

## 2022-12-24 RX ADMIN — Medication 10 ML: at 20:21

## 2022-12-24 RX ADMIN — METFORMIN HYDROCHLORIDE 1000 MG: 1000 TABLET, FILM COATED ORAL at 08:16

## 2022-12-24 RX ADMIN — METHADONE HYDROCHLORIDE 10 MG: 10 TABLET ORAL at 17:23

## 2022-12-24 RX ADMIN — METHADONE HYDROCHLORIDE 10 MG: 10 TABLET ORAL at 08:16

## 2022-12-24 RX ADMIN — Medication 5 MG: at 20:21

## 2022-12-24 RX ADMIN — ATORVASTATIN CALCIUM 20 MG: 20 TABLET, FILM COATED ORAL at 20:21

## 2022-12-24 RX ADMIN — ACETAMINOPHEN 650 MG: 325 TABLET ORAL at 08:16

## 2022-12-24 RX ADMIN — METOPROLOL TARTRATE 25 MG: 25 TABLET, FILM COATED ORAL at 08:16

## 2022-12-24 RX ADMIN — TAMSULOSIN HYDROCHLORIDE 0.4 MG: 0.4 CAPSULE ORAL at 20:21

## 2022-12-24 RX ADMIN — METHADONE HYDROCHLORIDE 10 MG: 10 TABLET ORAL at 02:55

## 2022-12-24 RX ADMIN — DAPTOMYCIN 800 MG: 500 INJECTION, POWDER, LYOPHILIZED, FOR SOLUTION INTRAVENOUS at 11:06

## 2022-12-24 RX ADMIN — METFORMIN HYDROCHLORIDE 1000 MG: 1000 TABLET, FILM COATED ORAL at 17:23

## 2022-12-24 NOTE — PROGRESS NOTES
[facility] Medicine Services  PROGRESS NOTE    Patient Name: Buster Velasco  : 1965  MRN: [id number]    Date of Admission: 2022  Primary Care Physician: Ludivina Bowers MD    Subjective     CC: f/u R foot infection s/p BKA     HPI:  In bed, no issues overnight, happy about getting to stay through the weekend.    ROS:  Gen- No fevers, chills  CV- No chest pain, palpitations  Resp- No cough, dyspnea  GI- No N/V/D, abd pain    Objective     Vital Signs:   Temp:  [97.9 °F (36.6 °C)-98 °F (36.7 °C)] 98 °F (36.7 °C)  Heart Rate:  [68-86] 86  Resp:  [16-18] 16  BP: (102-135)/(70-83) 102/72     Physical Exam:  Constitutional: No acute distress, awake, alert  HENT: NCAT, mucous membranes moist  Respiratory: Clear to auscultation bilaterally, respiratory effort normal   Cardiovascular: RRR, no murmurs, rubs, or gallops  Gastrointestinal: Positive bowel sounds, soft, nontender, nondistended  Musculoskeletal: bilateral BKA, stump bandage not removed.  Psychiatric: Appropriate affect, cooperative  Neurologic: Oriented x 3, speech is clear  Skin: No rashes      Results Reviewed:  LAB RESULTS:      Lab 22  0510 22  0615 22  0806   WBC 6.42 7.37 7.38   HEMOGLOBIN 10.4* 10.2* 10.1*   HEMATOCRIT 33.2* 33.0* 33.4*   PLATELETS 380 410 414   NEUTROS ABS 3.99 5.00 4.62   IMMATURE GRANS (ABS) 0.05 0.04 0.07*   LYMPHS ABS 1.62 1.68 1.90   MONOS ABS 0.58 0.47 0.60   EOS ABS 0.15 0.15 0.16   MCV 84.3 85.1 85.2         Lab 22  0510 22  0615 22  0806   SODIUM 140 140 138   POTASSIUM 4.0 4.1 4.5   CHLORIDE 103 102 101   CO2 28.0 27.0 27.0   ANION GAP 9.0 11.0 10.0   BUN 17 15 17   CREATININE 0.62* 0.59* 0.73*   EGFR 110.8 112.5 105.5   GLUCOSE 159* 153* 162*   CALCIUM 9.6 10.0 9.5         Lab 22  0806   TOTAL PROTEIN 6.4   ALBUMIN 3.20*   GLOBULIN 3.2   ALT (SGPT) 15   AST (SGOT) 14   BILIRUBIN 0.2   ALK PHOS 89     Brief Urine Lab Results  (Last  result in the past 365 days)      Color   Clarity   Blood   Leuk Est   Nitrite   Protein   CREAT   Urine HCG        08/20/22 0108 Yellow   Clear   Negative   Negative     Negative               Microbiology Results Abnormal     Procedure Component Value - Date/Time    Blood Culture - Blood, Wrist, Left [[id number]]  (Normal) Collected: 12/06/22 1402    Lab Status: Final result Specimen: Blood from Wrist, Left Updated: 12/11/22 1545     Blood Culture No growth at 5 days    Blood Culture - Blood, Arm, Left [[id number]]  (Normal) Collected: 12/06/22 1402    Lab Status: Final result Specimen: Blood from Arm, Left Updated: 12/11/22 1545     Blood Culture No growth at 5 days        No radiology results from the last 24 hrs    Results for orders placed during the hospital encounter of 11/30/22    Adult Transesophageal Echo (JERALD) W/ Cont if Necessary Per Protocol    Interpretation Summary  •  Left ventricular systolic function is normal. Estimated left ventricular EF = 55%  •  The aortic valve is structurally normal with no stenosis present. Trace aortic valve regurgitation is present. There is no evidence of an aortic valve mass is present.  •  There is mild, anterior mitral leaflet thickening present. No evidence of a mitral valve mass is present. Trace mitral valve regurgitation is present. No significant mitral valve stenosis is present  •  The tricuspid valve is normal in structure. There is no evidence of a mass on the tricuspid valve. Mild tricuspid valve regurgitation is present.  •  There is mild thickening of the pulmonic valve. There is trace pulmonic valve regurgitation present. There is no pulmonic valve stenosis present.    I have reviewed the medications:  Scheduled Meds:atorvastatin, 20 mg, Oral, Nightly  DAPTOmycin, 8 mg/kg, Intravenous, Q24H  insulin detemir, 10 Units, Subcutaneous, Nightly  insulin lispro, 0-7 Units, Subcutaneous, TID AC  metFORMIN, 1,000 mg, Oral, BID With Meals  methadone, 10 mg, Oral,  Q6H  metoprolol tartrate, 25 mg, Oral, Q12H  sodium chloride, 10 mL, Intravenous, Q12H  sodium chloride, 10 mL, Intravenous, Q12H  tamsulosin, 0.4 mg, Oral, Nightly      Continuous Infusions:   PRN Meds:.•  acetaminophen  •  senna-docusate sodium **AND** polyethylene glycol **AND** bisacodyl **AND** bisacodyl  •  calcium carbonate  •  dextrose  •  dextrose  •  glucagon (human recombinant)  •  hydrOXYzine  •  magnesium hydroxide  •  melatonin  •  ondansetron **OR** ondansetron  •  sodium chloride    Assessment & Plan     Active Hospital Problems    Diagnosis  POA   • **Right foot infection [L08.9]  Yes   • MRSA bacteremia [R78.81, B95.62]  Unknown   • Sepsis (HCC) [A41.9]  Yes   • Hypoglycemia [E16.2]  Yes   • Anemia [D64.9]  Yes   • Hyponatremia [E87.1]  Yes   • Hypoalbuminemia [E88.09]  Yes   • Essential hypertension [I10]  Yes   • Hyperlipidemia [E78.5]  Yes   • Paroxysmal atrial fibrillation (HCC) [I48.0]  Yes      Resolved Hospital Problems   No resolved problems to display.     Brief Hospital Course to date:  Buster Velasco is a 57yoM with PMH significant for HTN, insulin-dependent DMII, prior DVT s/p IVC filter, prior osteomyelitis (s/p L BKA), R foot transmetatarsal amputation and L hand 3rd metacarpal resection (April 2022 at OSH - completed 6 weeks IV abx for MRSA bacteremia), remote history of IVDA and more recent history of meth use. Admitted to [facility] 11/30/22 for sepsis secondary to acute R foot gangrene and MRSA bacteremia      Sepsis   Acute R foot gangrene and likely osteomyelitis, resolved  MRSA bacteremia  - s/p R BKA on 12/2/22 by Dr. Peguero  - NAE PICC placed 12/1/22   - ID following. On IV Daptomycin for at least 6 weeks per Dr. George  - Repeat blood cultures no growth x 5 days  - TTE and JERALD without evidence of valvular vegetation     Recurrent hypoglycemia  Insulin-dependent DMII  - HbA1c 6.9%  - Per home med list, on Metformin 1000mg BID, Farxiga 10mg daily,  Tresiba 72 units daily   - Requiring much less medication here  - Continue Levemir 10 units QHS, increase Metformin to 1000mg BID today     GERD, continue Tums / PRN milk of mag      Chronic pain on opioids   History of IVDA/substance abuse  - Continue home Methadone 10mg QID     History of atrial fibrillation  - Multiple hospitalizations at [facility] in [place], [place] with reported history of atrial fibrillation / RVR   - Continue Metoprolol 25mg BID  - Not anticoagulated for unclear reasons   - Continue to monitor on telemetry     Chronic vocal cord paralysis   Dysphagia  History of PEG placement  - Per review of records, history of multiple intubations  - Had PEG placed 8/19/22 at [facility] - he is no longer using this. Will defer to [facility] to removal. RN to continue routine PEG care   - SLP following. Recommend regular texture diet with nectar thick liquids. Patient initially wanted thin liquids and understood risk of aspiration, however has been drinking the nectar thick liquids, so will leave as is. Can change to pure comfort diet if he absolutely refuses nectar thick    Hypertension  - Continue Metoprolol 25mg BID  - Held Losartan / Bumex on admission due to hypotension. BP currently stable so will continue to hold      History of DVT s/p IVC filter   - IVC filter placed in spring 2022 per patient. Defer to PCP for further assessment and timing of removal.      Hx of seizure in setting of meth use      BPH   - Continue home Flomax    Expected Discharge Location and Transportation: SNF via EMS. Referral to [facility] pending   Expected Discharge - patient is medically ready for DC whenever rehab arrangements are made     Expected Discharge Date and Time     Expected Discharge Date Expected Discharge Time    Dec 27, 2022          DVT prophylaxis:Mechanical DVT prophylaxis orders are present.     AM-PAC 6 Clicks Score (PT): 10 (12/21/22 1494)    CODE STATUS:   Code Status and Medical Interventions:    Ordered at: 12/01/22 0035     Code Status (Patient has no pulse and is not breathing):    CPR (Attempt to Resuscitate)     Medical Interventions (Patient has pulse or is breathing):    Full Support     Tianna Musa,   12/24/22

## 2022-12-24 NOTE — PLAN OF CARE
Problem: Adult Inpatient Plan of Care  Goal: Plan of Care Review  Recent Flowsheet Documentation  Taken 12/24/2022 1524 by Joseline Lemon OT  Plan of Care Reviewed With: patient  Outcome Evaluation: Pt participates in therapy with encouragement. Declined EOB/OOB activity this session. Min A rolling in bed to complete dependent toileting hygiene. Min A UB dressing. Set-up simple grooming and self-feeding. OT will continue to follow IP. Plan is IRF when medically ready.

## 2022-12-24 NOTE — PLAN OF CARE
Goal Outcome Evaluation:  Plan of Care Reviewed With: patient        Progress: no change   Pt. A& O this shift, refusing turn q 2 hr and refuses karyn care when needed, continue with mild blanchable redness to buttocks and stage 2 to coccyx small opening at crease. BM very soft formed. Refusing spirometry assist. Will continue to educate to spirometry use and importance of q2 hr turns.

## 2022-12-24 NOTE — PROGRESS NOTES
Cardiothoracic Surgery Progress Note      POD #: 22-right below-knee amputation     LOS: 24 days      Subjective: Awake and alert    Objective:  Vital Signs vital signs below noted T-max past 24 hours 98.2 °F  Temp:  [97.9 °F (36.6 °C)-98.2 °F (36.8 °C)] 98 °F (36.7 °C)  Heart Rate:  [68-77] 68  Resp:  [16-18] 16  BP: (123-135)/(70-89) 126/76    Physical Exam:   General Appearance: Oriented x3   Lungs:   Heart:   Skin:   Incision: Amputation site dressing change.  Sutures intact skin margin viable and skin flaps are viable.     Results:  Results from last 7 days   Lab Units 12/22/22  0510   WBC 10*3/mm3 6.42   HEMOGLOBIN g/dL 10.4*   HEMATOCRIT % 33.2*   PLATELETS 10*3/mm3 380     Results from last 7 days   Lab Units 12/22/22  0510   SODIUM mmol/L 140   POTASSIUM mmol/L 4.0   CHLORIDE mmol/L 103   CO2 mmol/L 28.0   BUN mg/dL 17   CREATININE mg/dL 0.62*   GLUCOSE mg/dL 159*   CALCIUM mg/dL 9.6         Assessment: #1.  Postop day 22 right below-knee amputation which is healing well  2.  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  Diabetes mellitus and neuropathy      Plan: Continue daily dressing change amputation site.  Medical manage per hospitalist.  Antibiotics per infectious disease.  Okay with rehab anytime from my standpoint.      John Peguero MD - 12/24/22 - 07:07 EST

## 2022-12-24 NOTE — THERAPY TREATMENT NOTE
Patient Name: Buster Velasco  : 1965    MRN: [id number]                              Today's Date: 2022       Admit Date: 2022    Visit Dx:     ICD-10-CM ICD-9-CM   1. Right foot infection  L08.9 686.9   2. Hypoglycemia  E16.2 251.2   3. Decubitus ulcer of coccygeal region, unspecified ulcer stage  L89.159 707.03     707.20   4. Pharyngeal dysphagia  R13.13 787.23     Patient Active Problem List   Diagnosis   • Right foot infection   • Hypoglycemia   • Anemia   • Hyponatremia   • Hypoalbuminemia   • Essential hypertension   • Hyperlipidemia   • Paroxysmal atrial fibrillation (HCC)   • Sepsis (HCC)   • MRSA bacteremia     Past Medical History:   Diagnosis Date   • Diabetes (HCC)    • DVT (deep venous thrombosis) (HCC)    • Hypertension    • Mood disorder (HCC)      Past Surgical History:   Procedure Laterality Date   • BELOW KNEE AMPUTATION Left    • BELOW KNEE AMPUTATION Right 2022    Procedure: AMPUTATION BELOW KNEE RIGHT;  Surgeon: John Peguero MD;  Location: [facility];  Service: Vascular;  Laterality: Right;   • TRANS METATARSAL AMPUTATION Right       General Information     Row Name 22 1517          OT Time and Intention    Document Type therapy note (daily note)  -TB     Mode of Treatment occupational therapy;individual therapy  -TB     Row Name 22 151          General Information    Patient Profile Reviewed yes  -TB     Existing Precautions/Restrictions fall  R BKA 2022, h/o remote L BKA, B shoulder limitations, PEG tube  -TB     Barriers to Rehab medically complex;previous functional deficit  -TB     Row Name 22 1517          Cognition    Orientation Status (Cognition) oriented x 4  -TB     Row Name 22 1517          Safety Issues, Functional Mobility    Safety Issues Affecting Function (Mobility) --  Pt has good insight into current funcational deficits and need for assist with mobility and self-care  -TB     Impairments Affecting Function  (Mobility) balance;endurance/activity tolerance;pain;strength;postural/trunk control;range of motion (ROM)  -TB           User Key  (r) = Recorded By, (t) = Taken By, (c) = Cosigned By    Initials Name Provider Type    TB Joseline Lemon, OT Occupational Therapist                 Mobility/ADL's     Row Name 12/24/22 1518          Bed Mobility    Bed Mobility rolling left;rolling right;scooting/bridging  -TB     Rolling Left [place] (Bed Mobility) minimum assist (75% patient effort);1 person assist;verbal cues  -TB     Rolling Right [place] (Bed Mobility) minimum assist (75% patient effort);1 person assist;verbal cues  -TB     Scooting/Bridging [place] (Bed Mobility) maximum assist (25% patient effort);2 person assist;verbal cues  -TB     Comment, (Bed Mobility) Pt declined EOB. Rolling for toileting hygiene and to reposition for activity  -TB     Row Name 12/24/22 1518          Activities of Daily Living    BADL Assessment/Intervention bathing;upper body dressing;grooming;feeding;toileting  -TB     Row Name 12/24/22 1518          Mobility    Extremity Weight-bearing Status right lower extremity  -TB     Right Lower Extremity (Weight-bearing Status) non weight-bearing (NWB)   -TB     Row Name 12/24/22 1518          Self-Feeding Assessment/Training    [place] Level (Feeding) independent;liquids to mouth  -TB     Position (Self-Feeding) sitting up in bed;supported sitting  -TB     Row Name 12/24/22 1518          Grooming Assessment/Training    [place] Level (Grooming) set up;wash face, hands  -TB     Position (Grooming) sitting up in bed;supported sitting  -TB     Row Name 12/24/22 1518          Upper Body Dressing Assessment/Training    [place] Level (Upper Body Dressing) doff;don;pajama/robe;minimum assist (75% patient effort);verbal cues  -TB     Position (Upper Body Dressing) sitting up in bed;supported sitting  -TB     Row Name 12/24/22 1518          Bathing  Assessment/Intervention    [place] Level (Bathing) dependent (less than 25% patient effort);perineal area  -TB     Position (Bathing) supine  -TB     Row Name 12/24/22 1518          Toileting Assessment/Training    [place] Level (Toileting) dependent (less than 25% patient effort);perform perineal hygiene  -TB     Position (Toileting) supine  -TB     Comment, (Toileting) Independent for urinal use. Total assist for bowel incontinence.  -TB           User Key  (r) = Recorded By, (t) = Taken By, (c) = Cosigned By    Initials Name Provider Type    TB Joseline Lemon OT Occupational Therapist               Obj/Interventions     Row Name 12/24/22 1522          Shoulder (Therapeutic Exercise)    Shoulder AROM (Therapeutic Exercise) bilateral;scapular retraction;scapular elevation;10 repetitions  -TB     Row Name 12/24/22 1522          Elbow/Forearm (Therapeutic Exercise)    Elbow/Forearm AROM (Therapeutic Exercise) bilateral;flexion;extension;10 repetitions  -TB     Row Name 12/24/22 1522          Motor Skills    Therapeutic Exercise --  Education reinforced for benefits of activity/therapy and role of OT  -TB           User Key  (r) = Recorded By, (t) = Taken By, (c) = Cosigned By    Initials Name Provider Type    Joseline Abdi OT Occupational Therapist               Goals/Plan    No documentation.                Clinical Impression     Row Name 12/24/22 1524          Pain Assessment    Pretreatment Pain Rating 0/10 - no pain  -TB     Posttreatment Pain Rating 0/10 - no pain  -TB     Pre/Posttreatment Pain Comment Pt denies pain with activity  -TB     Pain Intervention(s) Ambulation/increased activity;Repositioned  -TB     Row Name 12/24/22 1523          Plan of Care Review    Plan of Care Reviewed With patient  -TB     Outcome Evaluation Pt participates in therapy with encouragement. Declined EOB/OOB activity. Min A rolling in bed to complete dependent toileting hygiene. Min A UB dressing.  Set-up simple grooming and self-feeding. OT will continue to follow IP. Plan is IRF when medically ready.  -TB     Row Name 12/24/22 1524          Therapy Plan Review/Discharge Plan (OT)    Anticipated Discharge Disposition (OT) inpatient rehabilitation facility  -TB     Row Name 12/24/22 1524          Vital Signs    Pre Systolic BP Rehab --  RN cleared OT  -TB     O2 Delivery Pre Treatment room air  -TB     Pre Patient Position Supine  -TB     Intra Patient Position Side Lying  -TB     Post Patient Position Sitting  -TB     Row Name 12/24/22 1524          Positioning and Restraints    Pre-Treatment Position in bed  -TB     Post Treatment Position bed  -TB     In Bed notified nsg;fowlers;call light within reach;encouraged to call for assist;exit alarm on;RLE elevated  -TB           User Key  (r) = Recorded By, (t) = Taken By, (c) = Cosigned By    Initials Name Provider Type    Joseline Abdi OT Occupational Therapist               Outcome Measures     Row Name 12/24/22 1528          How much help from another is currently needed...    Putting on and taking off regular lower body clothing? 2  -TB     Bathing (including washing, rinsing, and drying) 2  -TB     Toileting (which includes using toilet bed pan or urinal) 1  -TB     Putting on and taking off regular upper body clothing 3  -TB     Taking care of personal grooming (such as brushing teeth) 3  -TB     Eating meals 4  -TB     AM-PAC 6 Clicks Score (OT) 15  -TB     Row Name 12/24/22 1528          Functional Assessment    Outcome Measure Options AM-PAC 6 Clicks Daily Activity (OT)  -TB           User Key  (r) = Recorded By, (t) = Taken By, (c) = Cosigned By    Initials Name Provider Type    Joseline Abdi OT Occupational Therapist                Occupational Therapy Education     Title: PT OT SLP Therapies (In Progress)     Topic: Occupational Therapy (In Progress)     Point: ADL training (Done)     Description:   Instruct learner(s) on  proper safety adaptation and remediation techniques during self care or transfers.   Instruct in proper use of assistive devices.              Learning Progress Summary           Patient Acceptance, E,D, VU,DU,NR by TB at 12/24/2022 1529    Acceptance, E, VU,DU by BRAULIO at 12/22/2022 1311    Comment: Sitting balance; UE HEP; pressure relief    Acceptance, E,D, NR by JY at 12/19/2022 0942    Acceptance, E,D, VU,DU,NR by TB at 12/14/2022 1441    Acceptance, E, VU by MR at 12/11/2022 1448    Eager, E, VU,DU by SC at 12/7/2022 1115    Comment: REVIEWED HEP    Acceptance, E, VU by MR at 12/7/2022 1110                   Point: Home exercise program (Done)     Description:   Instruct learner(s) on appropriate technique for monitoring, assisting and/or progressing therapeutic exercises/activities.              Learning Progress Summary           Patient Acceptance, E,D, VU,DU,NR by TB at 12/24/2022 1529    Acceptance, E, VU,DU by BRAULIO at 12/22/2022 1311    Comment: Sitting balance; UE HEP; pressure relief    Acceptance, E,D, VU,DU,NR by TB at 12/14/2022 1441    Acceptance, E, VU by MR at 12/11/2022 1448    Eager, E, VU,DU by SC at 12/7/2022 1115    Comment: REVIEWED HEP    Acceptance, E, VU by MR at 12/7/2022 1110                   Point: Precautions (Done)     Description:   Instruct learner(s) on prescribed precautions during self-care and functional transfers.              Learning Progress Summary           Patient Acceptance, E,D, VU,DU,NR by TB at 12/24/2022 1529    Acceptance, E, VU,DU by BRAULIO at 12/22/2022 1311    Comment: Sitting balance; UE HEP; pressure relief    Acceptance, E,D, NR by JY at 12/19/2022 0942    Acceptance, E,D, VU,DU,NR by TB at 12/14/2022 1441    Acceptance, E, VU by MR at 12/11/2022 1448    Eager, E, VU,DU by SC at 12/7/2022 1115    Comment: REVIEWED HEP    Acceptance, E, VU by MR at 12/7/2022 1110                   Point: Body mechanics (In Progress)     Description:   Instruct learner(s) on proper  positioning and spine alignment during self-care, functional mobility activities and/or exercises.              Learning Progress Summary           Patient Acceptance, E,D, NR by  at 12/19/2022 0942    Acceptance, E, VU by MR at 12/11/2022 1448    Eager, E, VU,DU by SC at 12/7/2022 1115    Comment: REVIEWED HEP    Acceptance, E, VU by MR at 12/7/2022 1110                               User Key     Initials Effective Dates Name Provider Type Discipline    SC 06/16/21 -  Shamika Patel PT Physical Therapist PT    TB 06/16/21 -  Joseline Lemon, OT Occupational Therapist OT    JY 06/16/21 -  Kristy Canas, OT Occupational Therapist OT    BRAULIO 06/16/21 -  Kristy Michel, OT Occupational Therapist OT    MR 09/22/22 -  Marilou Lennon, OT Occupational Therapist OT              OT Recommendation and Plan     Plan of Care Review  Plan of Care Reviewed With: patient  Outcome Evaluation: Pt participates in therapy with encouragement. Declined EOB/OOB activity. Min A rolling in bed to complete dependent toileting hygiene. Min A UB dressing. Set-up simple grooming and self-feeding. OT will continue to follow IP. Plan is IRF when medically ready.     Time Calculation:    Time Calculation- OT     Row Name 12/24/22 1442             Time Calculation- OT    OT Start Time 1442  -TB      OT Received On 12/24/22  -TB      OT Goal Re-Cert Due Date 12/29/22  -TB         Timed Charges    46399 - OT Self Care/Mgmt Minutes 28  -TB         Total Minutes    Timed Charges Total Minutes 28  -TB       Total Minutes 28  -TB            User Key  (r) = Recorded By, (t) = Taken By, (c) = Cosigned By    Initials Name Provider Type    TB Joseline Lemon, OT Occupational Therapist              Therapy Charges for Today     Code Description Service Date Service Provider Modifiers Qty    [id number] HC OT SELF CARE/MGMT/TRAIN EA 15 MIN 12/24/2022 Joseline Lemon OT GO 2               Joseline Lemon OT  12/24/2022

## 2022-12-25 LAB
GLUCOSE BLDC GLUCOMTR-MCNC: 120 MG/DL (ref 70–130)
GLUCOSE BLDC GLUCOMTR-MCNC: 149 MG/DL (ref 70–130)
GLUCOSE BLDC GLUCOMTR-MCNC: 163 MG/DL (ref 70–130)
GLUCOSE BLDC GLUCOMTR-MCNC: 193 MG/DL (ref 70–130)

## 2022-12-25 PROCEDURE — 25010000002 DAPTOMYCIN PER 1 MG: Performed by: INTERNAL MEDICINE

## 2022-12-25 PROCEDURE — 99024 POSTOP FOLLOW-UP VISIT: CPT | Performed by: THORACIC SURGERY (CARDIOTHORACIC VASCULAR SURGERY)

## 2022-12-25 PROCEDURE — 63710000001 INSULIN DETEMIR PER 5 UNITS: Performed by: PEDIATRICS

## 2022-12-25 PROCEDURE — 82962 GLUCOSE BLOOD TEST: CPT

## 2022-12-25 PROCEDURE — 63710000001 INSULIN LISPRO (HUMAN) PER 5 UNITS: Performed by: HOSPITALIST

## 2022-12-25 PROCEDURE — 99232 SBSQ HOSP IP/OBS MODERATE 35: CPT | Performed by: INTERNAL MEDICINE

## 2022-12-25 RX ADMIN — INSULIN DETEMIR 10 UNITS: 100 INJECTION, SOLUTION SUBCUTANEOUS at 20:32

## 2022-12-25 RX ADMIN — METOPROLOL TARTRATE 25 MG: 25 TABLET, FILM COATED ORAL at 20:32

## 2022-12-25 RX ADMIN — HYDROXYZINE HYDROCHLORIDE 50 MG: 25 TABLET ORAL at 21:54

## 2022-12-25 RX ADMIN — METHADONE HYDROCHLORIDE 10 MG: 10 TABLET ORAL at 02:38

## 2022-12-25 RX ADMIN — Medication 10 ML: at 09:27

## 2022-12-25 RX ADMIN — METHADONE HYDROCHLORIDE 10 MG: 10 TABLET ORAL at 09:25

## 2022-12-25 RX ADMIN — DAPTOMYCIN 800 MG: 500 INJECTION, POWDER, LYOPHILIZED, FOR SOLUTION INTRAVENOUS at 09:29

## 2022-12-25 RX ADMIN — METHADONE HYDROCHLORIDE 10 MG: 10 TABLET ORAL at 20:32

## 2022-12-25 RX ADMIN — METHADONE HYDROCHLORIDE 10 MG: 10 TABLET ORAL at 14:31

## 2022-12-25 RX ADMIN — INSULIN LISPRO 2 UNITS: 100 INJECTION, SOLUTION INTRAVENOUS; SUBCUTANEOUS at 18:15

## 2022-12-25 RX ADMIN — TAMSULOSIN HYDROCHLORIDE 0.4 MG: 0.4 CAPSULE ORAL at 20:32

## 2022-12-25 RX ADMIN — METFORMIN HYDROCHLORIDE 1000 MG: 1000 TABLET, FILM COATED ORAL at 18:15

## 2022-12-25 RX ADMIN — ATORVASTATIN CALCIUM 20 MG: 20 TABLET, FILM COATED ORAL at 20:32

## 2022-12-25 RX ADMIN — METFORMIN HYDROCHLORIDE 1000 MG: 1000 TABLET, FILM COATED ORAL at 09:26

## 2022-12-25 NOTE — PROGRESS NOTES
[facility] Medicine Services  PROGRESS NOTE    Patient Name: Buster Velasco  : 1965  MRN: [id number]    Date of Admission: 2022  Primary Care Physician: Ludivina Bowers MD    Subjective     CC: f/u R foot infection s/p BKA     HPI:  In bed, no issues overnight. Happy to be in the hospital says he likes it here. Refused PT yesterday.    ROS:  Gen- No fevers, chills  CV- No chest pain, palpitations  Resp- No cough, dyspnea  GI- No N/V/D, abd pain      Objective     Vital Signs:   Temp:  [97.4 °F (36.3 °C)-98.8 °F (37.1 °C)] 97.8 °F (36.6 °C)  Heart Rate:  [51-76] 51  Resp:  [16-18] 16  BP: (103-129)/(57-84) 103/73  Flow (L/min):  [2-3] 3     Physical Exam:  Constitutional: No acute distress, awake, alert  HENT: NCAT, mucous membranes moist  Respiratory: Clear to auscultation bilaterally, respiratory effort normal   Cardiovascular: RRR, no murmurs, rubs, or gallops  Gastrointestinal: Positive bowel sounds, soft, nontender, nondistended  Musculoskeletal: bilateral BKA, stump bandage not removed.  Psychiatric: Appropriate affect, cooperative  Neurologic: Oriented x 3, speech is clear  Skin: No rashes    Exam is unchanged from       Results Reviewed:  LAB RESULTS:      Lab 22  0510 22  0615 22  0806   WBC 6.42 7.37 7.38   HEMOGLOBIN 10.4* 10.2* 10.1*   HEMATOCRIT 33.2* 33.0* 33.4*   PLATELETS 380 410 414   NEUTROS ABS 3.99 5.00 4.62   IMMATURE GRANS (ABS) 0.05 0.04 0.07*   LYMPHS ABS 1.62 1.68 1.90   MONOS ABS 0.58 0.47 0.60   EOS ABS 0.15 0.15 0.16   MCV 84.3 85.1 85.2         Lab 22  0510 22  0615 22  0806   SODIUM 140 140 138   POTASSIUM 4.0 4.1 4.5   CHLORIDE 103 102 101   CO2 28.0 27.0 27.0   ANION GAP 9.0 11.0 10.0   BUN 17 15 17   CREATININE 0.62* 0.59* 0.73*   EGFR 110.8 112.5 105.5   GLUCOSE 159* 153* 162*   CALCIUM 9.6 10.0 9.5         Lab 22  0806   TOTAL PROTEIN 6.4   ALBUMIN 3.20*   GLOBULIN 3.2   ALT (SGPT) 15    AST (SGOT) 14   BILIRUBIN 0.2   ALK PHOS 89     Brief Urine Lab Results  (Last result in the past 365 days)      Color   Clarity   Blood   Leuk Est   Nitrite   Protein   CREAT   Urine HCG        08/20/22 0108 Yellow   Clear   Negative   Negative     Negative               Microbiology Results Abnormal     Procedure Component Value - Date/Time    Blood Culture - Blood, Wrist, Left [[id number]]  (Normal) Collected: 12/06/22 1402    Lab Status: Final result Specimen: Blood from Wrist, Left Updated: 12/11/22 1545     Blood Culture No growth at 5 days    Blood Culture - Blood, Arm, Left [[id number]]  (Normal) Collected: 12/06/22 1402    Lab Status: Final result Specimen: Blood from Arm, Left Updated: 12/11/22 1545     Blood Culture No growth at 5 days        No radiology results from the last 24 hrs    Results for orders placed during the hospital encounter of 11/30/22    Adult Transesophageal Echo (JERALD) W/ Cont if Necessary Per Protocol    Interpretation Summary  •  Left ventricular systolic function is normal. Estimated left ventricular EF = 55%  •  The aortic valve is structurally normal with no stenosis present. Trace aortic valve regurgitation is present. There is no evidence of an aortic valve mass is present.  •  There is mild, anterior mitral leaflet thickening present. No evidence of a mitral valve mass is present. Trace mitral valve regurgitation is present. No significant mitral valve stenosis is present  •  The tricuspid valve is normal in structure. There is no evidence of a mass on the tricuspid valve. Mild tricuspid valve regurgitation is present.  •  There is mild thickening of the pulmonic valve. There is trace pulmonic valve regurgitation present. There is no pulmonic valve stenosis present.    I have reviewed the medications:  Scheduled Meds:atorvastatin, 20 mg, Oral, Nightly  DAPTOmycin, 8 mg/kg, Intravenous, Q24H  insulin detemir, 10 Units, Subcutaneous, Nightly  insulin lispro, 0-7 Units,  Subcutaneous, TID AC  metFORMIN, 1,000 mg, Oral, BID With Meals  methadone, 10 mg, Oral, Q6H  metoprolol tartrate, 25 mg, Oral, Q12H  sodium chloride, 10 mL, Intravenous, Q12H  sodium chloride, 10 mL, Intravenous, Q12H  tamsulosin, 0.4 mg, Oral, Nightly      Continuous Infusions:   PRN Meds:.•  acetaminophen  •  senna-docusate sodium **AND** polyethylene glycol **AND** bisacodyl **AND** bisacodyl  •  calcium carbonate  •  dextrose  •  dextrose  •  glucagon (human recombinant)  •  hydrOXYzine  •  magnesium hydroxide  •  melatonin  •  ondansetron **OR** ondansetron  •  sodium chloride    Assessment & Plan     Active Hospital Problems    Diagnosis  POA   • **Right foot infection [L08.9]  Yes   • MRSA bacteremia [R78.81, B95.62]  Unknown   • Sepsis (HCC) [A41.9]  Yes   • Hypoglycemia [E16.2]  Yes   • Anemia [D64.9]  Yes   • Hyponatremia [E87.1]  Yes   • Hypoalbuminemia [E88.09]  Yes   • Essential hypertension [I10]  Yes   • Hyperlipidemia [E78.5]  Yes   • Paroxysmal atrial fibrillation (HCC) [I48.0]  Yes      Resolved Hospital Problems   No resolved problems to display.     Brief Hospital Course to date:  Buster Velasco is a 57yoM with PMH significant for HTN, insulin-dependent DMII, prior DVT s/p IVC filter, prior osteomyelitis (s/p L BKA), R foot transmetatarsal amputation and L hand 3rd metacarpal resection (April 2022 at OSH - completed 6 weeks IV abx for MRSA bacteremia), remote history of IVDA and more recent history of meth use. Admitted to [facility] 11/30/22 for sepsis secondary to acute R foot gangrene and MRSA bacteremia      Sepsis   Acute R foot gangrene and likely osteomyelitis, resolved  MRSA bacteremia  - s/p R BKA on 12/2/22 by Dr. Peguero  - NAE PICC placed 12/1/22   - ID following. On IV Daptomycin for at least 6 weeks per Dr. George  - Repeat blood cultures no growth x 5 days  - TTE and JERALD without evidence of valvular vegetation     Recurrent hypoglycemia  Insulin-dependent  DMII  - HbA1c 6.9%  - Per home med list, on Metformin 1000mg BID, Farxiga 10mg daily, Tresiba 72 units daily   - Requiring much less medication here  - Continue Levemir 10 units QHS, increase Metformin to 1000mg BID today     GERD, continue Tums / PRN milk of mag      Chronic pain on opioids   History of IVDA/substance abuse  - Continue home Methadone 10mg QID     History of atrial fibrillation  - Multiple hospitalizations at [facility] in [place], [place] with reported history of atrial fibrillation / RVR   - Continue Metoprolol 25mg BID  - Not anticoagulated for unclear reasons   - Continue to monitor on telemetry     Chronic vocal cord paralysis   Dysphagia  History of PEG placement  - Per review of records, history of multiple intubations  - Had PEG placed 8/19/22 at [facility] - he is no longer using this. Will defer to [facility] to removal. RN to continue routine PEG care   - SLP following. Recommend regular texture diet with nectar thick liquids. Patient initially wanted thin liquids and understood risk of aspiration, however has been drinking the nectar thick liquids, so will leave as is. Can change to pure comfort diet if he absolutely refuses nectar thick    Hypertension  - Continue Metoprolol 25mg BID  - Held Losartan / Bumex on admission due to hypotension. BP currently stable so will continue to hold      History of DVT s/p IVC filter   - IVC filter placed in spring 2022 per patient. Defer to PCP for further assessment and timing of removal.      Hx of seizure in setting of meth use      BPH   - Continue home Flomax    Expected Discharge Location and Transportation: SNF via EMS. Referral to [facility] pending   Expected Discharge - patient is medically ready for DC whenever rehab arrangements are made     Expected Discharge Date and Time     Expected Discharge Date Expected Discharge Time    Dec 27, 2022          DVT prophylaxis:Mechanical DVT prophylaxis orders are present.     AM-PAC 6 Clicks Score  (PT): 9 (12/24/22 0815)    CODE STATUS:   Code Status and Medical Interventions:   Ordered at: 12/01/22 0035     Code Status (Patient has no pulse and is not breathing):    CPR (Attempt to Resuscitate)     Medical Interventions (Patient has pulse or is breathing):    Full Support     Tianna Musa, DO  12/25/22

## 2022-12-25 NOTE — PLAN OF CARE
Goal Outcome Evaluation:  Plan of Care Reviewed With: patient        Progress: no change   Pt is alert and oriented x4. Vss on room air to 2L NC at night. Pt has slept off and on throughout the shift. Scheduled methadone given as ordered for pain. PRN hydroxyzine and melatonin given as ordered. Dressing to right BKA is CDI. Specialty bed in place and pt turned q2hrs. Incontinent of Bowel; 2 Bms this shift. Urinal provided and voiding spontaneously. PICC dressing changed this am. Awaiting rehab placement. Will continue to monitor.

## 2022-12-25 NOTE — PROGRESS NOTES
Cardiothoracic Surgery Progress Note      POD #: 23-right below-knee amputation     LOS: 25 days      Subjective: Awake and alert    Objective:  Vital Signs vital signs below noted T-max past 24 hours 98.8 °F  Temp:  [97.4 °F (36.3 °C)-98.8 °F (37.1 °C)] 98.1 °F (36.7 °C)  Heart Rate:  [55-86] 55  Resp:  [16-18] 16  BP: (102-129)/(57-84) 109/76    Physical Exam:   General Appearance: Oriented x3   Lungs:   Heart:   Skin:   Incision: Amputation site dressing change.  Suture intact skin margin viable skin flaps are viable.     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  Diabetes mellitus with neuropathy      Plan: Continue to address change amputation site.  Medical manage per hospitalist.  Antibiotics infectious disease.  Rehab okay with me at any time.      John Peguero MD - 12/25/22 - 06:10 EST

## 2022-12-26 LAB
GLUCOSE BLDC GLUCOMTR-MCNC: 106 MG/DL (ref 70–130)
GLUCOSE BLDC GLUCOMTR-MCNC: 116 MG/DL (ref 70–130)
GLUCOSE BLDC GLUCOMTR-MCNC: 142 MG/DL (ref 70–130)
GLUCOSE BLDC GLUCOMTR-MCNC: 235 MG/DL (ref 70–130)

## 2022-12-26 PROCEDURE — 25010000002 DAPTOMYCIN PER 1 MG: Performed by: INTERNAL MEDICINE

## 2022-12-26 PROCEDURE — 97110 THERAPEUTIC EXERCISES: CPT

## 2022-12-26 PROCEDURE — 99232 SBSQ HOSP IP/OBS MODERATE 35: CPT | Performed by: INTERNAL MEDICINE

## 2022-12-26 PROCEDURE — 97530 THERAPEUTIC ACTIVITIES: CPT

## 2022-12-26 PROCEDURE — 99024 POSTOP FOLLOW-UP VISIT: CPT | Performed by: THORACIC SURGERY (CARDIOTHORACIC VASCULAR SURGERY)

## 2022-12-26 PROCEDURE — 63710000001 INSULIN DETEMIR PER 5 UNITS: Performed by: PEDIATRICS

## 2022-12-26 PROCEDURE — 82962 GLUCOSE BLOOD TEST: CPT

## 2022-12-26 RX ADMIN — METFORMIN HYDROCHLORIDE 1000 MG: 1000 TABLET, FILM COATED ORAL at 18:02

## 2022-12-26 RX ADMIN — METOPROLOL TARTRATE 25 MG: 25 TABLET, FILM COATED ORAL at 08:57

## 2022-12-26 RX ADMIN — ATORVASTATIN CALCIUM 20 MG: 20 TABLET, FILM COATED ORAL at 20:31

## 2022-12-26 RX ADMIN — TAMSULOSIN HYDROCHLORIDE 0.4 MG: 0.4 CAPSULE ORAL at 20:31

## 2022-12-26 RX ADMIN — Medication 10 ML: at 11:02

## 2022-12-26 RX ADMIN — METOPROLOL TARTRATE 25 MG: 25 TABLET, FILM COATED ORAL at 20:31

## 2022-12-26 RX ADMIN — INSULIN DETEMIR 10 UNITS: 100 INJECTION, SOLUTION SUBCUTANEOUS at 20:31

## 2022-12-26 RX ADMIN — METHADONE HYDROCHLORIDE 10 MG: 10 TABLET ORAL at 08:57

## 2022-12-26 RX ADMIN — DAPTOMYCIN 800 MG: 500 INJECTION, POWDER, LYOPHILIZED, FOR SOLUTION INTRAVENOUS at 11:02

## 2022-12-26 RX ADMIN — METHADONE HYDROCHLORIDE 10 MG: 10 TABLET ORAL at 13:13

## 2022-12-26 RX ADMIN — Medication 10 ML: at 11:03

## 2022-12-26 RX ADMIN — METHADONE HYDROCHLORIDE 10 MG: 10 TABLET ORAL at 20:31

## 2022-12-26 RX ADMIN — METHADONE HYDROCHLORIDE 10 MG: 10 TABLET ORAL at 04:10

## 2022-12-26 RX ADMIN — HYDROXYZINE HYDROCHLORIDE 50 MG: 25 TABLET ORAL at 20:33

## 2022-12-26 RX ADMIN — METFORMIN HYDROCHLORIDE 1000 MG: 1000 TABLET, FILM COATED ORAL at 08:57

## 2022-12-26 NOTE — THERAPY TREATMENT NOTE
Patient Name: Buster Velasco  : 1965    MRN: [id number]                              Today's Date: 2022       Admit Date: 2022    Visit Dx:     ICD-10-CM ICD-9-CM   1. Right foot infection  L08.9 686.9   2. Hypoglycemia  E16.2 251.2   3. Decubitus ulcer of coccygeal region, unspecified ulcer stage  L89.159 707.03     707.20   4. Pharyngeal dysphagia  R13.13 787.23     Patient Active Problem List   Diagnosis   • Right foot infection   • Hypoglycemia   • Anemia   • Hyponatremia   • Hypoalbuminemia   • Essential hypertension   • Hyperlipidemia   • Paroxysmal atrial fibrillation (HCC)   • Sepsis (HCC)   • MRSA bacteremia     Past Medical History:   Diagnosis Date   • Diabetes (HCC)    • DVT (deep venous thrombosis) (HCC)    • Hypertension    • Mood disorder (HCC)      Past Surgical History:   Procedure Laterality Date   • BELOW KNEE AMPUTATION Left    • BELOW KNEE AMPUTATION Right 2022    Procedure: AMPUTATION BELOW KNEE RIGHT;  Surgeon: John Peguero MD;  Location: [facility];  Service: Vascular;  Laterality: Right;   • TRANS METATARSAL AMPUTATION Right       General Information     Row Name 22 1508          Physical Therapy Time and Intention    Document Type therapy note (daily note)  -     Mode of Treatment physical therapy  -     Row Name 22 1508          General Information    Patient Profile Reviewed yes  -     Existing Precautions/Restrictions fall  -     Row Name 22 1508          Cognition    Orientation Status (Cognition) oriented x 4  -     Row Name 22 1508          Safety Issues, Functional Mobility    Impairments Affecting Function (Mobility) balance;endurance/activity tolerance;pain;strength;postural/trunk control;range of motion (ROM)  -           User Key  (r) = Recorded By, (t) = Taken By, (c) = Cosigned By    Initials Name Provider Type     Nora Addison PT Physical Therapist               Mobility     Row Name 22 9435           Bed Mobility    Bed Mobility rolling right;supine-sit;sit-supine  -     Rolling Right [place] (Bed Mobility) standby assist  -     Supine-Sit [place] (Bed Mobility) minimum assist (75% patient effort)  -     Sit-Supine [place] (Bed Mobility) supervision  -     Comment, (Bed Mobility) Min A utilized to help shift weight for coming up to sit. Pt able to return to supine without assist  -     Row Name 12/26/22 1450          Transfers    Comment, (Transfers) Pt is on a specialty mattress at this time, his L prosthetic leg does not fit properly, and his w/c is not safe to utilize right now. All of these factors make a sliding board transfer very difficult and possibly unsafe at this time. Dep lift transfer to the chair was deferred due to skin integrity issues. Pt was very agreeable to participating and spent excessive time sitting up EOB working on balance and pre-transfer activities.  -     Row Name 12/26/22 1457          Mobility    Extremity Weight-bearing Status right lower extremity  -     Right Lower Extremity (Weight-bearing Status) non weight-bearing (NWB)   -           User Key  (r) = Recorded By, (t) = Taken By, (c) = Cosigned By    Initials Name Provider Type     Nora Addison PT Physical Therapist               Obj/Interventions     Row Name 12/26/22 1454          Motor Skills    Therapeutic Exercise hip;knee  -     Row Name 12/26/22 1454          Hip (Therapeutic Exercise)    Hip Strengthening (Therapeutic Exercise) bilateral;marching while seated  -     Row Name 12/26/22 1451          Knee (Therapeutic Exercise)    Knee (Therapeutic Exercise) strengthening exercise  -     Knee AROM (Therapeutic Exercise) bilateral;SLR (straight leg raise);LAQ (long arc quad);sitting;10 repetitions  -     Row Name 12/26/22 1454          Balance    Balance Assessment sitting static balance;sitting dynamic balance  -     Static Sitting Balance standby assist  -     Dynamic  Sitting Balance standby assist  -     Balance Interventions sitting;standing;sit to stand;occupation based/functional task  -     Comment, Balance Pt sat EOB x15 min while working on dynamic sitting balance. Lateral leans onto forearm, cross body reaches, anterior/posterior rocks, and sitting ther-ex x10 each  -HP           User Key  (r) = Recorded By, (t) = Taken By, (c) = Cosigned By    Initials Name Provider Type     Nora Addison PT Physical Therapist               Goals/Plan    No documentation.                Clinical Impression     Row Name 12/26/22 1500          Pain    Pretreatment Pain Rating 0/10 - no pain  -HP     Posttreatment Pain Rating 0/10 - no pain  -HP     Row Name 12/26/22 1500          Plan of Care Review    Plan of Care Reviewed With patient  -HP     Progress no change  -     Outcome Evaluation Pt demonstrated good participation with therapy this date. Pt is on a specialty mattress at this time, his L prosthetic leg does not fit properly, and his w/c is not safe to utilize right now. All of these factors make a sliding board transfer very difficult and possibly unsafe at this time. Dep lift transfer to the chair was deferred due to skin integrity issues. Pt performed bed mobility with Min A. Pt sat EOB x15 minutes working on dynamic sitting balance and pre-transfer activities. Pt motivated to improve mobility and is agreeable to rehab. Continue to recommend d/c to IRF when medically appropriate for best functional outcome.  -     Row Name 12/26/22 1500          Vital Signs    Pre Systolic BP Rehab --  VSS  -HP     Pre Patient Position Supine  -HP     Intra Patient Position Sitting  -HP     Post Patient Position Supine  -HP     Row Name 12/26/22 1500          Positioning and Restraints    Pre-Treatment Position in bed  -HP     Post Treatment Position bed  -HP     In Bed notified nsg;supine;fowlers;call light within reach;encouraged to call for assist;exit alarm on;side rails up x2   -           User Key  (r) = Recorded By, (t) = Taken By, (c) = Cosigned By    Initials Name Provider Type     Nora Addison PT Physical Therapist               Outcome Measures     Row Name 12/26/22 1506          How much help from another person do you currently need...    Turning from your back to your side while in flat bed without using bedrails? 3  -HP     Moving from lying on back to sitting on the side of a flat bed without bedrails? 3  -HP     Moving to and from a bed to a chair (including a wheelchair)? 2  -HP     Standing up from a chair using your arms (e.g., wheelchair, bedside chair)? 1  -HP     Climbing 3-5 steps with a railing? 1  -HP     To walk in hospital room? 1  -HP     AM-PAC 6 Clicks Score (PT) 11  -HP     Highest level of mobility 4 --> Transferred to chair/commode  -HP     Row Name 12/26/22 1506          Functional Assessment    Outcome Measure Options AM-PAC 6 Clicks Basic Mobility (PT)  -           User Key  (r) = Recorded By, (t) = Taken By, (c) = Cosigned By    Initials Name Provider Type     Nora Addison PT Physical Therapist                             Physical Therapy Education     Title: PT OT SLP Therapies (In Progress)     Topic: Physical Therapy (Done)     Point: Mobility training (Done)     Learning Progress Summary           Patient Acceptance, E,D, VU,DU by  at 12/26/2022 1506    KRIS Martinez VU by SC at 12/21/2022 1407    Comment: reviewed benefits of getting oob    AcceptanceKRIS VU by  at 12/19/2022 1040    Comment: educated on transfer safety    AcceptanceKRIS VU by  at 12/15/2022 1410    Comment: educated on importance of knee extension at rest to prevent taut hamstrings    KRIS Martinez VU by SC at 12/12/2022 1121    Comment: reviewed benefits of activity    KRIS Martinez VU, DU by SC at 12/7/2022 1115    Comment: REVIEWED HEP                   Point: Home exercise program (Done)     Learning Progress Summary           Patient Acceptance, E,D, VU,DU by  at  12/26/2022 1506    Eager, E, VU by SC at 12/21/2022 1407    Comment: reviewed benefits of getting oob    Acceptance, E, VU by  at 12/19/2022 1040    Comment: educated on transfer safety    Acceptance, E, VU by  at 12/15/2022 1410    Comment: educated on importance of knee extension at rest to prevent taut hamstrings    Eager, E, VU by SC at 12/12/2022 1121    Comment: reviewed benefits of activity    Eager, E, VU,DU by SC at 12/7/2022 1115    Comment: REVIEWED HEP                   Point: Body mechanics (Done)     Learning Progress Summary           Patient Acceptance, E,D, VU,DU by  at 12/26/2022 1506    Eager, E, VU by SC at 12/21/2022 1407    Comment: reviewed benefits of getting oob    Acceptance, E, VU by  at 12/19/2022 1040    Comment: educated on transfer safety    Acceptance, E, VU by  at 12/15/2022 1410    Comment: educated on importance of knee extension at rest to prevent taut hamstrings    Eager, E, VU by SC at 12/12/2022 1121    Comment: reviewed benefits of activity    Eager, E, VU,DU by SC at 12/7/2022 1115    Comment: REVIEWED HEP                   Point: Precautions (Done)     Learning Progress Summary           Patient Acceptance, E,D, VU,DU by  at 12/26/2022 1506    Eager, E, VU by SC at 12/21/2022 1407    Comment: reviewed benefits of getting oob    Acceptance, E, VU by  at 12/19/2022 1040    Comment: educated on transfer safety    Acceptance, E, VU by  at 12/15/2022 1410    Comment: educated on importance of knee extension at rest to prevent taut hamstrings    Eager, E, VU by SC at 12/12/2022 1121    Comment: reviewed benefits of activity    Eager, E, VU,DU by SC at 12/7/2022 1115    Comment: REVIEWED HEP                               User Key     Initials Effective Dates Name Provider Type Discipline    SC 06/16/21 -  Shamika Patel, PT Physical Therapist PT     05/05/22 -  Margarito Ellison, PT Physical Therapist PT     06/01/21 -  Nora Addison, PT Physical Therapist PT               PT Recommendation and Plan     Plan of Care Reviewed With: patient  Progress: no change  Outcome Evaluation: Pt demonstrated good participation with therapy this date. Pt is on a specialty mattress at this time, his L prosthetic leg does not fit properly, and his w/c is not safe to utilize right now. All of these factors make a sliding board transfer very difficult and possibly unsafe at this time. Dep lift transfer to the chair was deferred due to skin integrity issues. Pt performed bed mobility with Min A. Pt sat EOB x15 minutes working on dynamic sitting balance and pre-transfer activities. Pt motivated to improve mobility and is agreeable to rehab. Continue to recommend d/c to IRF when medically appropriate for best functional outcome.     Time Calculation:    PT Charges     Row Name 12/26/22 1410             Time Calculation    Start Time 1406  -HP      PT Received On 12/26/22  -HP         Timed Charges    27856 - PT Therapeutic Exercise Minutes 10  -HP      07693 - PT Therapeutic Activity Minutes 30  -HP         Total Minutes    Timed Charges Total Minutes 40  -HP       Total Minutes 40  -HP            User Key  (r) = Recorded By, (t) = Taken By, (c) = Cosigned By    Initials Name Provider Type    HP Nora Addison PT Physical Therapist              Therapy Charges for Today     Code Description Service Date Service Provider Modifiers Qty    77025387356 HC PT THER PROC EA 15 MIN 12/26/2022 Nora Addison, PT GP 1    48199885399 HC PT THERAPEUTIC ACT EA 15 MIN 12/26/2022 Nora Addison PT GP 2          PT G-Codes  Outcome Measure Options: AM-PAC 6 Clicks Basic Mobility (PT)  AM-PAC 6 Clicks Score (PT): 11  AM-PAC 6 Clicks Score (OT): 15       Nora Addison PT  12/26/2022

## 2022-12-26 NOTE — PLAN OF CARE
Problem: Adult Inpatient Plan of Care  Goal: Plan of Care Review  Outcome: Ongoing, Progressing  Goal: Patient-Specific Goal (Individualized)  Outcome: Ongoing, Progressing  Goal: Absence of Hospital-Acquired Illness or Injury  Outcome: Ongoing, Progressing  Intervention: Identify and Manage Fall Risk  Recent Flowsheet Documentation  Taken 12/26/2022 1600 by Joan Kuo RN  Safety Promotion/Fall Prevention: safety round/check completed  Taken 12/26/2022 1400 by Joan Kuo RN  Safety Promotion/Fall Prevention: safety round/check completed  Taken 12/26/2022 1200 by Joan Kuo RN  Safety Promotion/Fall Prevention:   safety round/check completed   activity supervised  Taken 12/26/2022 1000 by Joan Kuo RN  Safety Promotion/Fall Prevention:   activity supervised   safety round/check completed  Taken 12/26/2022 0800 by Joan Kuo RN  Safety Promotion/Fall Prevention:   activity supervised   assistive device/personal items within reach   clutter free environment maintained   safety round/check completed  Intervention: Prevent Skin Injury  Recent Flowsheet Documentation  Taken 12/26/2022 1600 by Joan Kuo RN  Body Position: sitting up in bed  Taken 12/26/2022 1400 by Joan Kuo RN  Body Position:   right   supine, legs elevated  Taken 12/26/2022 1200 by Joan Kuo RN  Body Position: left  Taken 12/26/2022 1000 by Joan Kuo RN  Body Position:   turned   left  Taken 12/26/2022 0800 by Joan Kuo RN  Body Position: sitting up in bed  Skin Protection: adhesive use limited  Intervention: Prevent and Manage VTE (Venous Thromboembolism) Risk  Recent Flowsheet Documentation  Taken 12/26/2022 1400 by Joan Kuo RN  Activity Management: activity encouraged  Taken 12/26/2022 1200 by Joan Kuo RN  Activity Management: activity encouraged  Taken 12/26/2022 0800 by Joan Kuo RN  Activity Management:   activity adjusted per tolerance   activity encouraged  Intervention: Prevent  Infection  Recent Flowsheet Documentation  Taken 12/26/2022 1600 by Joan Kuo RN  Infection Prevention: environmental surveillance performed  Taken 12/26/2022 1200 by Joan Kuo RN  Infection Prevention: environmental surveillance performed  Taken 12/26/2022 1000 by Joan Kuo RN  Infection Prevention: environmental surveillance performed  Taken 12/26/2022 0800 by Joan Kuo RN  Infection Prevention:   environmental surveillance performed   equipment surfaces disinfected   hand hygiene promoted   rest/sleep promoted   single patient room provided  Goal: Optimal Comfort and Wellbeing  Outcome: Ongoing, Progressing  Intervention: Provide Person-Centered Care  Recent Flowsheet Documentation  Taken 12/26/2022 0800 by Joan Kuo RN  Trust Relationship/Rapport:   care explained   questions answered  Goal: Readiness for Transition of Care  Outcome: Ongoing, Progressing     Problem: Fall Injury Risk  Goal: Absence of Fall and Fall-Related Injury  Outcome: Ongoing, Progressing  Intervention: Identify and Manage Contributors  Recent Flowsheet Documentation  Taken 12/26/2022 1600 by Joan Kuo RN  Medication Review/Management: medications reviewed  Taken 12/26/2022 1400 by Joan Kuo RN  Medication Review/Management: medications reviewed  Taken 12/26/2022 1200 by Joan Kuo RN  Medication Review/Management: medications reviewed  Taken 12/26/2022 1000 by Joan Kuo RN  Medication Review/Management: medications reviewed  Taken 12/26/2022 0800 by Joan Kuo RN  Medication Review/Management: medications reviewed  Self-Care Promotion: independence encouraged  Intervention: Promote Injury-Free Environment  Recent Flowsheet Documentation  Taken 12/26/2022 1600 by Joan Kuo RN  Safety Promotion/Fall Prevention: safety round/check completed  Taken 12/26/2022 1400 by Joan Kuo RN  Safety Promotion/Fall Prevention: safety round/check completed  Taken 12/26/2022 1200 by Joan Kuo  RN  Safety Promotion/Fall Prevention:   safety round/check completed   activity supervised  Taken 12/26/2022 1000 by Joan Kuo RN  Safety Promotion/Fall Prevention:   activity supervised   safety round/check completed  Taken 12/26/2022 0800 by Joan Kuo RN  Safety Promotion/Fall Prevention:   activity supervised   assistive device/personal items within reach   clutter free environment maintained   safety round/check completed  Goal: Absence of Fall and Fall-Related Injury  Outcome: Ongoing, Progressing  Intervention: Identify and Manage Contributors  Recent Flowsheet Documentation  Taken 12/26/2022 1600 by Joan Kuo RN  Medication Review/Management: medications reviewed  Taken 12/26/2022 1400 by Joan Kuo RN  Medication Review/Management: medications reviewed  Taken 12/26/2022 1200 by Joan Kuo RN  Medication Review/Management: medications reviewed  Taken 12/26/2022 1000 by Joan Kuo RN  Medication Review/Management: medications reviewed  Taken 12/26/2022 0800 by Joan Kuo RN  Medication Review/Management: medications reviewed  Self-Care Promotion: independence encouraged  Intervention: Promote Injury-Free Environment  Recent Flowsheet Documentation  Taken 12/26/2022 1600 by Joan Kuo RN  Safety Promotion/Fall Prevention: safety round/check completed  Taken 12/26/2022 1400 by Joan Kuo RN  Safety Promotion/Fall Prevention: safety round/check completed  Taken 12/26/2022 1200 by Joan Kuo RN  Safety Promotion/Fall Prevention:   safety round/check completed   activity supervised  Taken 12/26/2022 1000 by Joan Kuo RN  Safety Promotion/Fall Prevention:   activity supervised   safety round/check completed  Taken 12/26/2022 0800 by Joan Kuo RN  Safety Promotion/Fall Prevention:   activity supervised   assistive device/personal items within reach   clutter free environment maintained   safety round/check completed     Problem: Skin Injury Risk Increased  Goal:  Skin Health and Integrity  Outcome: Ongoing, Progressing  Intervention: Optimize Skin Protection  Recent Flowsheet Documentation  Taken 12/26/2022 1600 by Joan Kuo RN  Head of Bed (HOB) Positioning: HOB at 60-90 degrees  Taken 12/26/2022 1400 by Joan Kuo RN  Head of Bed (HOB) Positioning: HOB at 20-30 degrees  Taken 12/26/2022 1200 by Joan Kuo RN  Head of Bed (HOB) Positioning: HOB elevated  Taken 12/26/2022 1000 by Joan Kuo RN  Head of Bed (HOB) Positioning: HOB elevated  Taken 12/26/2022 0800 by Joan Kuo RN  Pressure Reduction Techniques:   frequent weight shift encouraged   positioned off wounds  Head of Bed (HOB) Positioning: HOB elevated  Pressure Reduction Devices: pressure-redistributing mattress utilized  Skin Protection: adhesive use limited     Problem: Adjustment to Amputation (Lower Extremity Amputation)  Goal: Optimal Adjustment to Amputation  Outcome: Ongoing, Progressing  Intervention: Promote Patient and Family Adjustment to Amputation  Recent Flowsheet Documentation  Taken 12/26/2022 0800 by Joan Kuo RN  Supportive Measures:   verbalization of feelings encouraged   self-care encouraged  Family/Support System Care: self-care encouraged     Problem: BADL (Basic Activities of Daily Living) Impairment (Lower Extremity Amputation)  Goal: Optimal Safe BADL Performance  Outcome: Ongoing, Progressing     Problem: IADL (Instrumental Activities of Daily Living) Impairment (Lower Extremity Amputation)  Goal: Optimal Safe IADL Performance  Outcome: Ongoing, Progressing     Problem: Lower Limb Care (Lower Extremity Amputation)  Goal: Effective Lower Limb Care  Outcome: Ongoing, Progressing     Problem: Mobility Impairment (Lower Extremity Amputation)  Goal: Optimal Mobility [place] and Safety  Outcome: Ongoing, Progressing     Problem: Pain and Hypersensitivity (Lower Extremity Amputation)  Goal: Acceptable Pain/Hypersensitivity Control  Outcome: Ongoing, Progressing      Problem: Prosthetic Use and Management (Lower Extremity Amputation)  Goal: Effective Prosthesis Use and Management  Outcome: Ongoing, Progressing   Goal Outcome Evaluation:      VSS, FSBG remain stable today with no required insulin coverage. Pt in good spirits and is agreeable to turns as well as working with PT. Pt resting comfortably with no requests at this time. Will continue to assess for needs.

## 2022-12-26 NOTE — CASE MANAGEMENT/SOCIAL WORK
Continued Stay Note  [facility]     Patient Name: Bustre Velasco  MRN: [id number]  Today's Date: 12/26/2022    Admit Date: 11/30/2022    Plan: Rehab   Discharge Plan     Row Name 12/26/22 1131       Plan    Plan Rehab    Patient/Family in Agreement with Plan yes    Plan Comments Spoke with Nicole, admissions liaison with [facility], and she is not currently interested in taking Mr. Velasco due to his refusal to participate in out of bed activities. I spoke with Mr. Velasco at the bedside today and he tells me that he is unable to get out of bed due to the height difference between the hospital bed and recliner. I spoke with the physical therapist that will see Mr. Velasco and she will evaluate this situation when she sees him later this afternoon. I have sent out additional referrals to [facility] and [facility]. Case management will continue to follow.    Final Discharge Disposition Code 62 - inpatient rehab facility               Discharge Codes    No documentation.               Expected Discharge Date and Time     Expected Discharge Date Expected Discharge Time    Dec 27, 2022             Alan Downing RN     Impression: Retinal detachment with multiple breaks, right eye Plan: S/P RD repair elsewhere, stable, doing well.

## 2022-12-26 NOTE — PROGRESS NOTES
[facility] Medicine Services  PROGRESS NOTE    Patient Name: Buster Velasco  : 1965  MRN: [id number]    Date of Admission: 2022  Primary Care Physician: Ludivina Bowers MD    Subjective     CC: f/u R foot infection s/p BKA     HPI:  States that doing ok, without complaints.      ROS:  Gen- No fevers, chills  CV- No chest pain, palpitations  Resp- No cough, dyspnea  GI- No N/V/D, abd pain      Objective     Vital Signs:   Temp:  [98 °F (36.7 °C)-98.1 °F (36.7 °C)] 98.1 °F (36.7 °C)  Heart Rate:  [61-73] 62  Resp:  [18] 18  BP: (113-147)/(69-89) 121/77  Flow (L/min):  [3] 3     Physical Exam:  Constitutional: No acute distress, awake, alert, sitting up in bed  HENT: NCAT, mucous membranes moist  Respiratory: Clear to auscultation bilaterally, respiratory effort normal   Cardiovascular: RRR, no murmurs, rubs, or gallops  Gastrointestinal: Positive bowel sounds, soft, nontender, nondistended  Musculoskeletal: bandaged right BKA, old left BKA.  Psychiatric: Appropriate affect, cooperative  Neurologic: Oriented x 3, speech is clear  Skin: No rashes        Results Reviewed:  LAB RESULTS:      Lab 22  0510 22  0615 22  0806   WBC 6.42 7.37 7.38   HEMOGLOBIN 10.4* 10.2* 10.1*   HEMATOCRIT 33.2* 33.0* 33.4*   PLATELETS 380 410 414   NEUTROS ABS 3.99 5.00 4.62   IMMATURE GRANS (ABS) 0.05 0.04 0.07*   LYMPHS ABS 1.62 1.68 1.90   MONOS ABS 0.58 0.47 0.60   EOS ABS 0.15 0.15 0.16   MCV 84.3 85.1 85.2         Lab 22  0510 22  0615 22  0806   SODIUM 140 140 138   POTASSIUM 4.0 4.1 4.5   CHLORIDE 103 102 101   CO2 28.0 27.0 27.0   ANION GAP 9.0 11.0 10.0   BUN 17 15 17   CREATININE 0.62* 0.59* 0.73*   EGFR 110.8 112.5 105.5   GLUCOSE 159* 153* 162*   CALCIUM 9.6 10.0 9.5         Lab 22  0806   TOTAL PROTEIN 6.4   ALBUMIN 3.20*   GLOBULIN 3.2   ALT (SGPT) 15   AST (SGOT) 14   BILIRUBIN 0.2   ALK PHOS 89     Brief Urine Lab Results  (Last  result in the past 365 days)      Color   Clarity   Blood   Leuk Est   Nitrite   Protein   CREAT   Urine HCG        08/20/22 0108 Yellow   Clear   Negative   Negative     Negative               Microbiology Results Abnormal     Procedure Component Value - Date/Time    Blood Culture - Blood, Wrist, Left [[id number]]  (Normal) Collected: 12/06/22 1402    Lab Status: Final result Specimen: Blood from Wrist, Left Updated: 12/11/22 1545     Blood Culture No growth at 5 days    Blood Culture - Blood, Arm, Left [[id number]]  (Normal) Collected: 12/06/22 1402    Lab Status: Final result Specimen: Blood from Arm, Left Updated: 12/11/22 1545     Blood Culture No growth at 5 days        No radiology results from the last 24 hrs    Results for orders placed during the hospital encounter of 11/30/22    Adult Transesophageal Echo (JERALD) W/ Cont if Necessary Per Protocol    Interpretation Summary  •  Left ventricular systolic function is normal. Estimated left ventricular EF = 55%  •  The aortic valve is structurally normal with no stenosis present. Trace aortic valve regurgitation is present. There is no evidence of an aortic valve mass is present.  •  There is mild, anterior mitral leaflet thickening present. No evidence of a mitral valve mass is present. Trace mitral valve regurgitation is present. No significant mitral valve stenosis is present  •  The tricuspid valve is normal in structure. There is no evidence of a mass on the tricuspid valve. Mild tricuspid valve regurgitation is present.  •  There is mild thickening of the pulmonic valve. There is trace pulmonic valve regurgitation present. There is no pulmonic valve stenosis present.    I have reviewed the medications:  Scheduled Meds:atorvastatin, 20 mg, Oral, Nightly  DAPTOmycin, 8 mg/kg, Intravenous, Q24H  insulin detemir, 10 Units, Subcutaneous, Nightly  insulin lispro, 0-7 Units, Subcutaneous, TID AC  metFORMIN, 1,000 mg, Oral, BID With Meals  methadone, 10 mg, Oral,  Q6H  metoprolol tartrate, 25 mg, Oral, Q12H  sodium chloride, 10 mL, Intravenous, Q12H  sodium chloride, 10 mL, Intravenous, Q12H  tamsulosin, 0.4 mg, Oral, Nightly      Continuous Infusions:   PRN Meds:.•  acetaminophen  •  senna-docusate sodium **AND** polyethylene glycol **AND** bisacodyl **AND** bisacodyl  •  calcium carbonate  •  dextrose  •  dextrose  •  glucagon (human recombinant)  •  hydrOXYzine  •  magnesium hydroxide  •  melatonin  •  ondansetron **OR** ondansetron  •  sodium chloride    Assessment & Plan     Active Hospital Problems    Diagnosis  POA   • **Right foot infection [L08.9]  Yes   • MRSA bacteremia [R78.81, B95.62]  Unknown   • Sepsis (HCC) [A41.9]  Yes   • Hypoglycemia [E16.2]  Yes   • Anemia [D64.9]  Yes   • Hyponatremia [E87.1]  Yes   • Hypoalbuminemia [E88.09]  Yes   • Essential hypertension [I10]  Yes   • Hyperlipidemia [E78.5]  Yes   • Paroxysmal atrial fibrillation (HCC) [I48.0]  Yes      Resolved Hospital Problems   No resolved problems to display.     Brief Hospital Course to date:  Buster Velasco is a 57yoM with PMH significant for HTN, insulin-dependent DMII, prior DVT s/p IVC filter, prior osteomyelitis (s/p L BKA), R foot transmetatarsal amputation and L hand 3rd metacarpal resection (April 2022 at OSH - completed 6 weeks IV abx for MRSA bacteremia), remote history of IVDA and more recent history of meth use. Admitted to [facility] 11/30/22 for sepsis secondary to acute R foot gangrene and MRSA bacteremia      Sepsis   Acute R foot gangrene and likely osteomyelitis, resolved  MRSA bacteremia  - s/p R BKA on 12/2/22 by Dr. Peguero  - NAE PICC placed 12/1/22   - ID following. On IV Daptomycin for at least 6 weeks per Dr. George  - Repeat blood cultures no growth x 5 days  - TTE and JERALD without evidence of valvular vegetation     Recurrent hypoglycemia  Insulin-dependent DMII  - HbA1c 6.9%  - Per home med list, on Metformin 1000mg BID, Farxiga 10mg daily,  Tresiba 72 units daily   - Requiring much less medication here  - Continue Levemir 10 units QHS, increased Metformin to 1000mg BID    GERD, continue Tums / PRN milk of mag      Chronic pain on opioids   History of IVDA/substance abuse  - Continue home Methadone 10mg QID     History of atrial fibrillation  - Multiple hospitalizations at [facility] in [place], [place] with reported history of atrial fibrillation / RVR   - Continue Metoprolol 25mg BID  - Not anticoagulated for unclear reasons   - Continue to monitor on telemetry     Chronic vocal cord paralysis   Dysphagia  History of PEG placement  - Per review of records, history of multiple intubations  - Had PEG placed 8/19/22 at [facility] - he is no longer using this. Will defer to [facility] to removal. RN to continue routine PEG care   - SLP following. Recommend regular texture diet with nectar thick liquids. Patient initially wanted thin liquids and understood risk of aspiration, however has been drinking the nectar thick liquids, so will leave as is. Can change to pure comfort diet if he absolutely refuses nectar thick    Hypertension  - Continue Metoprolol 25mg BID  - Held Losartan / Bumex on admission due to hypotension. BP currently stable so will continue to hold      History of DVT s/p IVC filter   - IVC filter placed in spring 2022 per patient. Defer to PCP for further assessment and timing of removal.      Hx of seizure in setting of meth use      BPH   - Continue home Flomax    Expected Discharge Location and Transportation: SNF via EMS. Referral to [facility] pending   Expected Discharge - patient is medically ready for DC whenever rehab arrangements are made     Expected Discharge Date and Time     Expected Discharge Date Expected Discharge Time    Dec 28, 2022          DVT prophylaxis:Mechanical DVT prophylaxis orders are present.     AM-PAC 6 Clicks Score (PT): 9 (12/24/22 2415)    CODE STATUS:   Code Status and Medical Interventions:   Ordered  at: 12/01/22 0035     Code Status (Patient has no pulse and is not breathing):    CPR (Attempt to Resuscitate)     Medical Interventions (Patient has pulse or is breathing):    Full Support     Justino Gee MD  12/26/22

## 2022-12-26 NOTE — PROGRESS NOTES
Cardiothoracic Surgery Progress Note      POD #: 24-right below-knee amputation     LOS: 26 days      Subjective: Awake and alert    Objective:  Vital Signs vital signs below noted T-max past 24 hours 98.1 °F oriented x3  Temp:  [97.8 °F (36.6 °C)-98.1 °F (36.7 °C)] 98 °F (36.7 °C)  Heart Rate:  [51-73] 73  Resp:  [16-18] 18  BP: (103-136)/(69-81) 136/81    Physical Exam:   General Appearance:    Lungs:   Heart:   Skin:    Incision: Amputation site dressing change..  Suture intact skin margin viable and skin flaps are viable     Results:  Results from last 7 days   Lab Units 12/22/22  0510   WBC 10*3/mm3 6.42   HEMOGLOBIN g/dL 10.4*   HEMATOCRIT % 33.2*   PLATELETS 10*3/mm3 380     Results from last 7 days   Lab Units 12/22/22  0510   SODIUM mmol/L 140   POTASSIUM mmol/L 4.0   CHLORIDE mmol/L 103   CO2 mmol/L 28.0   BUN mg/dL 17   CREATININE mg/dL 0.62*   GLUCOSE mg/dL 159*   CALCIUM mg/dL 9.6         Assessment: #1.  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  Diabetes mellitus with neuropathy    Plan: Continue daily dressing change amputation site.  Medical manage per hospitalist.  Antibiotics per infectious disease.  Okay for rehab anytime from my concerns.      John Peguero MD - 12/26/22 - 05:29 EST

## 2022-12-26 NOTE — PLAN OF CARE
Goal Outcome Evaluation:  Plan of Care Reviewed With: patient        Progress: no change  Outcome Evaluation: Pt demonstrated good participation with therapy this date. Pt is on a specialty mattress at this time, his L prosthetic leg does not fit properly, and his w/c is not safe to utilize right now. All of these factors make a sliding board transfer very difficult and possibly unsafe at this time. Dep lift transfer to the chair was deferred due to skin integrity issues. Pt performed bed mobility with Min A. Pt sat EOB x15 minutes working on dynamic sitting balance and pre-transfer activities. Pt motivated to improve mobility and is agreeable to rehab. Continue to recommend d/c to IRF when medically appropriate for best functional outcome.

## 2022-12-26 NOTE — DISCHARGE PLACEMENT REQUEST
"Isidro Velasco (57 y.o. Male)     Date of Birth   1965    Social Security Number       [place]   [address] [facility] [place] [address]    Home Phone   [phone]    MRN   [id number]       Anabaptism   Mormon    Marital Status                               Admission Date   22    Admission Type   Emergency    Admitting Provider   Justino Gee MD    Attending Provider   Justino Gee MD    Department, Room/Bed   [facility] 3G, S366/1       Discharge Date       Discharge Disposition       Discharge Destination                               Attending Provider: Justino Gee MD    Allergies: Gabapentin, Lyrica [Pregabalin]    Isolation: None   Infection: MRSA (22)   Code Status: CPR    Ht: 193 cm (75.98\")   Wt: 107 kg (235 lb)    Admission Cmt: None   Principal Problem: Right foot infection [L08.9]                 Active Insurance as of 2022     Primary Coverage     Payor Plan Insurance Group Employer/Plan Group    [place]      Payor Plan Address Payor Plan Phone Number Payor Plan Fax Number Effective Dates    [address] [phone]  2022 - 2022    [facility] [id number]       Subscriber Name Subscriber Birth Date Member ID       ISIDRO VELASCO 1965 [id number]                 Emergency Contacts      (Rel.) Home Phone Work Phone Mobile Phone    NELSY VELASCO (Spouse) [phone] -- [phone]               History & Physical      Tracy Oliveira DO at 22 [facility]              [facility] Medicine Services  HISTORY AND PHYSICAL    Patient Name: Isidro Velasco  : 1965  MRN: [id number]  Primary Care Physician: Ludivina Bowers MD  Date of admission: 2022    Subjective    Subjective     Chief Complaint:  Ulcer on foot    HPI:  Isidro Velasco is a 57 y.o. 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 He states that these have been present for a few months, but have gotten worse. He does admit to some fatigue and chills, but denies fever, chest pain, SOB, nausea or vomiting. He denies any recent antibiotic use.           Review of Systems   Constitutional: Positive for chills and fatigue. Negative for fever and unexpected weight change.   HENT: Positive for voice change (chronic hoarseness). Negative for sinus pressure, sore throat and trouble swallowing.    Eyes: Negative for photophobia and visual disturbance.   Respiratory: Negative for cough, shortness of breath and wheezing.    Cardiovascular: Negative for chest pain and palpitations.   Gastrointestinal: Negative for abdominal pain, diarrhea, nausea and vomiting.   Genitourinary: Negative for dysuria and hematuria.   Musculoskeletal: Positive for arthralgias and myalgias.   Skin: Positive for color change and wound.   Neurological: Negative for tremors, syncope, speech difficulty and weakness.   Psychiatric/Behavioral: Negative for confusion. The patient is not nervous/anxious.       All other systems reviewed and are negative.     Personal History     Past Medical History:   Diagnosis Date   • Diabetes (HCC)    • DVT (deep venous thrombosis) (HCC)    • Hypertension    • Mood disorder (HCC)              Past Surgical History:   Procedure Laterality Date   • BELOW KNEE AMPUTATION Left    • TRANS METATARSAL AMPUTATION Right        Family History:  family history includes Diabetes in his maternal uncle; Diabetes (age of onset: 65) in his mother; Heart attack in his maternal grandfather; Stroke (age of onset: 74) in his mother. Otherwise pertinent FHx was reviewed and  unremarkable.     Social History:  reports that he has never smoked. He has never used smokeless tobacco. He reports that he does not drink alcohol.  Social History     Social History Narrative   • Not on file       Medications:  Insulin Degludec, bumetanide, dapagliflozin Propanediol, ferrous sulfate, fluticasone, hydrOXYzine, losartan, metFORMIN, methadone, metoprolol tartrate, simvastatin, and tamsulosin    Allergies   Allergen Reactions   • Gabapentin Rash   • Lyrica [Pregabalin] Rash       Objective    Objective     Vital Signs:   Temp:  [98 °F (36.7 °C)] 98 °F (36.7 °C)  Heart Rate:  [] 106  Resp:  [16-20] 16  BP: (102-108)/(58-65) 102/58    Physical Exam  Constitutional:       General: He is not in acute distress.     Appearance: Normal appearance.   HENT:      Head: Atraumatic.      Right Ear: External ear normal.      Left Ear: External ear normal.      Nose: Nose normal.      Mouth/Throat:      Comments: Chronic hoarseness when speaking.   Eyes:      Extraocular Movements: Extraocular movements intact.      Conjunctiva/sclera: Conjunctivae normal.      Pupils: Pupils are equal, round, and reactive to light.   Cardiovascular:      Rate and Rhythm: Regular rhythm. Tachycardia present.      Heart sounds: Normal heart sounds. No murmur heard.  Pulmonary:      Effort: Pulmonary effort is normal. No respiratory distress.      Breath sounds: Normal breath sounds. No wheezing, rhonchi or rales.   Abdominal:      General: Bowel sounds are normal. There is no distension.      Tenderness: There is no abdominal tenderness. There is no guarding or rebound.      Comments: PEG tube in place. No evidence of erythema.    Musculoskeletal:      Cervical back: No rigidity.      Comments: Hx left BKA. Hx right transmetatarsal amputation.   Skin:     General: Skin is warm and dry.      Coloration: Skin is not jaundiced.      Findings: Erythema and lesion present. No rash.      Comments: 2 Large ulcerations on right  lateral foot. Area of surrounding erythema.    Neurological:      General: No focal deficit present.      Mental Status: He is alert and oriented to person, place, and time.   Psychiatric:         Attention and Perception: Attention normal.         Mood and Affect: Mood normal.         Behavior: Behavior normal.         Thought Content: Thought content normal.          Result Review:  I have personally reviewed the results from the time of this admission to 12/1/2022 01:39 EST and agree with these findings:  [x]  Laboratory list / accordion  [x]  Microbiology  []  Radiology  [x]  EKG/Telemetry   []  Cardiology/Vascular   []  Pathology  []  Old records  []  Other:    LAB RESULTS:      Lab 12/01/22  0053 11/30/22 1905   WBC  --  13.92*   HEMOGLOBIN  --  11.1*   HEMATOCRIT  --  35.5*   PLATELETS  --  532*   NEUTROS ABS  --  11.44*   IMMATURE GRANS (ABS)  --  0.14*   LYMPHS ABS  --  1.64   MONOS ABS  --  0.64   EOS ABS  --  0.03   MCV  --  84.5   PROCALCITONIN  --  0.17   LACTATE 2.0 2.1*         Lab 11/30/22 1905   SODIUM 134*   POTASSIUM 4.1   CHLORIDE 100   CO2 21.0*   ANION GAP 13.0   BUN 18   CREATININE 1.29*   EGFR 64.3   GLUCOSE 54*   CALCIUM 9.1         Lab 11/30/22 1905   TOTAL PROTEIN 7.6   ALBUMIN 3.00*   GLOBULIN 4.6   ALT (SGPT) 12   AST (SGOT) 19   BILIRUBIN 0.2   ALK PHOS 85   LIPASE 14                 Lab 11/30/22 1905   IRON 18*   IRON SATURATION 6*   TIBC 277*   TRANSFERRIN 186*   FERRITIN 317.40         Brief Urine Lab Results  (Last result in the past 365 days)      Color   Clarity   Blood   Leuk Est   Nitrite   Protein   CREAT   Urine HCG        08/20/22 0108 Yellow   Clear   Negative   Negative     Negative               Microbiology Results (last 10 days)     ** No results found for the last 240 hours. **          XR Chest 1 View    Result Date: 12/1/2022  EXAMINATION: Chest one view. DATE: 11/30/2022. COMPARISON: None available. CLINICAL HISTORY: Infection. FINDINGS: The lungs and pleural  spaces are clear. The cardiomediastinal silhouette and the pulmonary vessels are within normal limits.     Impression: No acute cardiopulmonary process. Electronically signed by:  Pierre Baron D.O.  11/30/2022 10:15 PM Mountain Time          Assessment & Plan   Assessment & Plan       Right foot infection    Hypoglycemia    Sepsis (HCC)    Essential hypertension    Hyperlipidemia    Paroxysmal atrial fibrillation (HCC)    Anemia    Hyponatremia    Hypoalbuminemia      Buster Velasco is a 57 y.o. 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   Sepsis: infectious source, tachycardia, borderline BP, leukocytosis, elevated lactate.  - Check CRP, sed rate  -Blood cultures pending  -Given hx of MRSA osteomyelitis as well as multiple amputations, will need to start IV Vancomycin tonight.   -MRI right foot tonight to investigate for possible osteomyelitis.  -Wound care for the am  -Infectious Disease consult for the am  -Dr. Peguero to see in the am. May ultimately need right BKA.  -IV fluids    Decubitus ulcer  -Wound care to see.   -Bedrest. Turn q2    Chronic Vocal Cord Paralysis  -NPO tonight.   -Has PEG tube in place, but reportedly is requesting to be taken out?  -SLP to see in the am.     Hypoglycemia  T2DM  -Blood glucose 45 on arrival, persistent with rechecks.   -Point-of-care glucose check every hour  - Start D5W at 75 cc/hour  - Patient on Tresiba 75 units every morning, hold  - We will start SSI when needed  -Check A1C  -Repeat bmp in the am    HTN  HLD  -Hold home BP meds for now d/t  borderline hypotension  -Continue statin    Parox Atrial Fib  -Noted during admission to  back in July.   -EKG pending  -Not currently on anticoagulation    Anemia  -Hgb 11.1, Hct 35.5  -Appears chronic. Actually has improved since prior results.  -Anemia studies   -Repeat cbc in the am    Hyponatremia  -Na+ 134. Likely 2ry to poor PO intake.   -Check Serum Osm, Urine Osm, and Urine Na+  -IV fluids tonight  -Repeat bmp in the am    Hypoalbuminemia  -Alb 3.0  -Nutrition consult for the am    History of DVT  - S/p IVC filter      DVT prophylaxis:  Hep subQ    CODE STATUS:  Full Code  Code Status (Patient has no pulse and is not breathing): CPR (Attempt to Resuscitate)  Medical Interventions (Patient has pulse or is breathing): Full Support      This note has been completed as part of a split-shared workflow.     Signature: Electronically signed by Cecily Molina PA-C, 11/30/22, 11:33 PM EST      Attending   Admission Attestation       I have performed an independent face-to-face diagnostic evaluation including performing an independent physical examination as documented here.  The documented plan of care above was reviewed and developed with the advanced practice clinician (APC).      Brief Summary Statement:   Buster Velasco is a 57 y.o. male with a PMH significant for insulin-dependent diabetes mellitus type 2, DVT s/p IVC filter, atrial fibrillation not on anticoagulation, HTN, HLD, history of osteomyelitis s/p left BKA, s/p right transmetatarsal amputation, MRSA osteomyelitis s/p third metacarpal resection who comes to the ED due to worsening foot infection.  Patient reports a 3-month history of an open wound on his right foot.  Over the past several weeks the wound has become more erythematous, painful with drainage.  He reports fatigue, chills.  He denies fever, vomiting.  He reports diarrhea which is currently resolved.  He is not following with wound care outpatient and has not been on  any recent antibiotics.    Remainder of detailed HPI is as noted by APC and has been reviewed and/or edited by me for completeness.    Attending Physical Exam:  Temp:  [98 °F (36.7 °C)-98.6 °F (37 °C)] 98.6 °F (37 °C)  Heart Rate:  [] 89  Resp:  [16-20] 16  BP: (102-112)/(58-76) 112/74    Constitutional: Awake, alert  Eyes: PERRLA, sclerae anicteric, no conjunctival injection  HENT: NCAT, mucous membranes moist  Neck: Supple, no thyromegaly, no lymphadenopathy, trachea midline  Respiratory: Clear to auscultation bilaterally, nonlabored respirations   Cardiovascular: RRR, no murmurs, rubs, or gallops, palpable radial pulses bilaterally  Gastrointestinal: Positive bowel sounds, soft, nontender, nondistended  Musculoskeletal: Left BKA, metatarsal resection to right foot, foot bandaged  Psychiatric: Appropriate affect, cooperative  Neurologic: Oriented x 3, strength symmetric in all extremities, Cranial Nerves grossly intact to confrontation, speech clear  Skin: Bandage dry and intact to right foot      Brief Assessment/Plan :  See detailed assessment and plan developed with APC which I have reviewed and/or edited for completeness.    Tracy Oliveira DO  12/01/22                        Electronically signed by Tracy Oliveira DO at 12/01/22 1379          Physician Progress Notes (last 24 hours)      John Peguero MD at 12/26/22 0529          Cardiothoracic Surgery Progress Note      POD #: 24-right below-knee amputation     LOS: 26 days      Subjective: Awake and alert    Objective:  Vital Signs vital signs below noted T-max past 24 hours 98.1 °F oriented x3  Temp:  [97.8 °F (36.6 °C)-98.1 °F (36.7 °C)] 98 °F (36.7 °C)  Heart Rate:  [51-73] 73  Resp:  [16-18] 18  BP: (103-136)/(69-81) 136/81    Physical Exam:   General Appearance:    Lungs:   Heart:   Skin:    Incision: Amputation site dressing change..  Suture intact skin margin viable and skin flaps are viable     Results:  Results from last 7 days   Lab Units  22  0510   WBC 10*3/mm3 6.42   HEMOGLOBIN g/dL 10.4*   HEMATOCRIT % 33.2*   PLATELETS 10*3/mm3 380     Results from last 7 days   Lab Units 22  0510   SODIUM mmol/L 140   POTASSIUM mmol/L 4.0   CHLORIDE mmol/L 103   CO2 mmol/L 28.0   BUN mg/dL 17   CREATININE mg/dL 0.62*   GLUCOSE mg/dL 159*   CALCIUM mg/dL 9.6         Assessment: #1.  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  Diabetes mellitus with neuropathy    Plan: Continue daily dressing change amputation site.  Medical manage per hospitalist.  Antibiotics per infectious disease.  Okay for rehab anytime from my concerns.      John Peguero MD - 22 - 05:29 EST        Electronically signed by John Peguero MD at 22 0530          Physical Therapy Notes (most recent note)      Shamika Patel, PT at 22 1326  Version 1 of 1         Patient Name: Buster Velasco  : 1965    MRN: [id number]                              Today's Date: 2022       Admit Date: 2022    Visit Dx:     ICD-10-CM ICD-9-CM   1. Right foot infection  L08.9 686.9   2. Hypoglycemia  E16.2 251.2   3. Decubitus ulcer of coccygeal region, unspecified ulcer stage  L89.159 707.03     707.20   4. Pharyngeal dysphagia  R13.13 787.23     Patient Active Problem List   Diagnosis   • Right foot infection   • Hypoglycemia   • Anemia   • Hyponatremia   • Hypoalbuminemia   • Essential hypertension   • Hyperlipidemia   • Paroxysmal atrial fibrillation (HCC)   • Sepsis (HCC)   • MRSA bacteremia     Past Medical History:   Diagnosis Date   • Diabetes (HCC)    • DVT (deep venous thrombosis) (HCC)    • Hypertension    • Mood disorder (HCC)      Past Surgical History:   Procedure Laterality Date   • BELOW KNEE AMPUTATION Left    • BELOW KNEE AMPUTATION Right 2022    Procedure: AMPUTATION BELOW KNEE RIGHT;  Surgeon: John Peguero MD;  Location: [facility] OR;   Service: Vascular;  Laterality: Right;   • TRANS METATARSAL AMPUTATION Right       General Information     [facility] Name 12/21/22 3190          Physical Therapy Time and Intention    Document Type therapy note (daily note)  -SC     Mode of Treatment physical therapy  -[facility] Name 12/21/22 2128          General Information    Patient Profile Reviewed yes  -SC     Existing Precautions/Restrictions --  B bka, Shoulder limitations  -[facility] Name 12/21/22 2988          Cognition    Orientation Status (Cognition) oriented x 4  -[facility] Name 12/21/22 9675          Safety Issues, Functional Mobility    Impairments Affecting Function (Mobility) balance;endurance/activity tolerance;pain;strength;postural/trunk control;range of motion (ROM);grasp  -SC     Comment, Safety Issues/Impairments (Mobility) alert, following  commands  -SC           User Key  (r) = Recorded By, (t) = Taken By, (c) = Cosigned By    Initials Name Provider Type    SC Shamika Patel PT Physical Therapist               Mobility     Row Name 12/21/22 4803          Bed Mobility    Bed Mobility supine-sit;scooting/bridging;sit-supine  -SC     Rolling Left [place] (Bed Mobility) minimum assist (75% patient effort);verbal cues;modified independence  -SC     Scooting/Bridging [place] (Bed Mobility) 1 person assist;verbal cues  -SC     Supine-Sit [place] (Bed Mobility) 1 person assist;minimum assist (75% patient effort)  -SC     Sit-Supine [place] (Bed Mobility) modified independence  -SC     Assistive Device (Bed Mobility) head of bed elevated;bed rails  -SC     Comment, (Bed Mobility) Worked on all bed mobility including scooting backwards in long sitting. Also attempted to do 45 deg turns while in long sitting-- patient unable to do without hand rails. Time taken to work on sitting exercises including trunk stability challanges.  -SC     Row Name 12/21/22 8067          Transfers    Comment, (Transfers) oob deferred- no  w/c  available  -[facility] Name 12/21/22 1358          Gait/Stairs (Locomotion)    Comment, (Gait/Stairs) Patient deferrered to be lifted up to recliner. Prosthetic not fitting well enough to safely try sliding board transfers.  -[facility] Name 12/21/22 1358          Mobility    Extremity Weight-bearing Status right lower extremity  -SC     Right Lower Extremity (Weight-bearing Status) non weight-bearing (NWB)  -SC           User Key  (r) = Recorded By, (t) = Taken By, (c) = Cosigned By    Initials Name Provider Type    SC Shamika Patel, PT Physical Therapist               Obj/Interventions     [facility] Name 12/21/22 1402          Motor Skills    Therapeutic Exercise hip  -SC     Row Name 12/21/22 1402          Hip (Therapeutic Exercise)    Hip AROM (Therapeutic Exercise) bilateral;aDduction;aBduction;supine;20 repititions;internal rotation;external rotation  -SC     Row Name 12/21/22 1402          Knee (Therapeutic Exercise)    Knee (Therapeutic Exercise) strengthening exercise  -SC     Knee AROM (Therapeutic Exercise) LAQ (long arc quad);SLR (straight leg raise);20 repititions  sitting active hamstring stretches  -[facility] Name 12/21/22 1402          Balance    Balance Assessment sit to stand dynamic balance;sitting dynamic balance  -SC     Static Sitting Balance independent  -SC     Dynamic Sitting Balance contact guard  -SC     Comment, Balance working on trunk stability challanges/pertubations at all angles. Patient able to sustain moderate chalances without LOB  -SC           User Key  (r) = Recorded By, (t) = Taken By, (c) = Cosigned By    Initials Name Provider Type    SC Shamika Patel, PT Physical Therapist               Goals/Plan    No documentation.                Clinical Impression     [facility] Name 12/21/22 140          Pain    Additional Documentation Pain Scale: FACES Pre/Post-Treatment (Group)  -[facility] Name 12/21/22 1401          Pain Scale: FACES Pre/Post-Treatment    Pain: FACES Scale, Pretreatment  0-->no hurt  -SC     Posttreatment Pain Rating 4-->hurts little more  -SC     Pain Location - Side/Orientation Right  -SC     Pain Location - residual limb  -SC     Row Name 12/21/22 1403          Plan of Care Review    Plan of Care Reviewed With patient  -SC     Progress improving  -SC     Outcome Evaluation Patient demonstrating improved bed mobility and overall strength. He is willing to exercise and participate in therapeutic activities. His prosthesis is not fitting him well limiting his lateral transfers. His w/c is unsafe to use at this time. He is awating Rehab and will need these issues addressed  -SC     Row Name 12/21/22 1403          Therapy Assessment/Plan (PT)    Patient/Family Therapy Goals Statement (PT) rehab  -SC     Rehab Potential (PT) good, to achieve stated therapy goals  -SC     Criteria for Skilled Interventions Met (PT) yes;meets criteria;skilled treatment is necessary  -SC     Therapy Frequency (PT) daily  -SC     Row Name 12/21/22 1409          Positioning and Restraints    Pre-Treatment Position in bed  -SC     Post Treatment Position chair  -SC     In Bed notified nsg;supine;exit alarm on;call light within reach  -SC           User Key  (r) = Recorded By, (t) = Taken By, (c) = Cosigned By    Initials Name Provider Type    SC Shamika Patel, PT Physical Therapist               Outcome Measures     Row Name 12/21/22 1407 12/21/22 0890       How much help from another person do you currently need...    Turning from your back to your side while in flat bed without using bedrails? 3  -SC 3  -TS    Moving from lying on back to sitting on the side of a flat bed without bedrails? 3  -SC 2  -TS    Moving to and from a bed to a chair (including a wheelchair)? 1  -SC 1  -TS    Standing up from a chair using your arms (e.g., wheelchair, bedside chair)? 1  -SC 1  -TS    Climbing 3-5 steps with a railing? 1  -SC 1  -TS    To walk in hospital room? 1  -SC 1  -TS    AM-PAC 6 Clicks Score (PT) 10  -SC 9   -TS    Highest level of mobility 4 --> Transferred to chair/commode  -SC 3 --> Sat at edge of bed  -TS    Row Name 12/21/22 1407          Functional Assessment    Outcome Measure Options AM-PAC 6 Clicks Basic Mobility (PT)  -SC           User Key  (r) = Recorded By, (t) = Taken By, (c) = Cosigned By    Initials Name Provider Type    SC Shamika Patel, PT Physical Therapist    Anastasiya Zacarias RN Registered Nurse                             Physical Therapy Education     Title: PT OT SLP Therapies (In Progress)     Topic: Physical Therapy (Done)     Point: Mobility training (Done)     Learning Progress Summary           Patient Eager, E, VU by SC at 12/21/2022 1407    Comment: reviewed benefits of getting oob    Acceptance, E, VU by  at 12/19/2022 1040    Comment: educated on transfer safety    Acceptance, E, VU by  at 12/15/2022 1410    Comment: educated on importance of knee extension at rest to prevent taut hamstrings    Eager, E, VU by SC at 12/12/2022 1121    Comment: reviewed benefits of activity    Eager, E, VU,DU by SC at 12/7/2022 1115    Comment: REVIEWED HEP                   Point: Home exercise program (Done)     Learning Progress Summary           Patient Eager, E, VU by SC at 12/21/2022 1407    Comment: reviewed benefits of getting oob    Acceptance, E, VU by  at 12/19/2022 1040    Comment: educated on transfer safety    Acceptance, E, VU by  at 12/15/2022 1410    Comment: educated on importance of knee extension at rest to prevent taut hamstrings    Eager, E, VU by SC at 12/12/2022 1121    Comment: reviewed benefits of activity    Eager, E, VU,DU by SC at 12/7/2022 1115    Comment: REVIEWED HEP                   Point: Body mechanics (Done)     Learning Progress Summary           Patient Eager, E, VU by SC at 12/21/2022 1407    Comment: reviewed benefits of getting oob    Acceptance, E, VU by  at 12/19/2022 1040    Comment: educated on transfer safety    Acceptance, E, VU by  at  12/15/2022 1410    Comment: educated on importance of knee extension at rest to prevent taut hamstrings    Eager, E, VU by SC at 12/12/2022 1121    Comment: reviewed benefits of activity    Eager, E, VU,DU by SC at 12/7/2022 1115    Comment: REVIEWED HEP                   Point: Precautions (Done)     Learning Progress Summary           Patient Eager, E, VU by SC at 12/21/2022 1407    Comment: reviewed benefits of getting oob    Acceptance, E, VU by  at 12/19/2022 1040    Comment: educated on transfer safety    Acceptance, E, VU by  at 12/15/2022 1410    Comment: educated on importance of knee extension at rest to prevent taut hamstrings    Eager, E, VU by SC at 12/12/2022 1121    Comment: reviewed benefits of activity    Eager, E, VU,DU by SC at 12/7/2022 1115    Comment: REVIEWED HEP                               User Key     Initials Effective Dates Name Provider Type Discipline    SC 06/16/21 -  Shamika Patel PT Physical Therapist PT     05/05/22 -  Margarito Ellison PT Physical Therapist PT              PT Recommendation and Plan     Plan of Care Reviewed With: patient  Progress: improving  Outcome Evaluation: Patient demonstrating improved bed mobility and overall strength. He is willing to exercise and participate in therapeutic activities. His prosthesis is not fitting him well limiting his lateral transfers. His w/c is unsafe to use at this time. He is awating Rehab and will need these issues addressed     Time Calculation:    PT Charges     Row Name 12/21/22 1326             Time Calculation    Start Time 1326  -SC      PT Received On 12/21/22  -SC      PT Goal Re-Cert Due Date 12/29/22  -SC         Time Calculation- PT    Total Timed Code Minutes- PT 23 minute(s)  -SC         Timed Charges    49885 - PT Therapeutic Exercise Minutes 13  -SC      63922 - PT Therapeutic Activity Minutes 10  -SC         Total Minutes    Timed Charges Total Minutes 23  -SC       Total Minutes 23  -SC            User  Key  (r) = Recorded By, (t) = Taken By, (c) = Cosigned By    Initials Name Provider Type    SC Shamika Patel PT Physical Therapist              Therapy Charges for Today     Code Description Service Date Service Provider Modifiers Qty    75845252326 HC PT THER PROC EA 15 MIN 2022 Shamika Patel PT GP 1    56022418901 HC PT THERAPEUTIC ACT EA 15 MIN 2022 Shamika Patel PT GP 1          PT G-Codes  Outcome Measure Options: AM-PAC 6 Clicks Basic Mobility (PT)  AM-PAC 6 Clicks Score (PT): 10  AM-PAC 6 Clicks Score (OT): 14  PT Discharge Summary  Anticipated Discharge Disposition (PT): inpatient rehabilitation facility    Shamika Patel PT  2022      Electronically signed by Shamika Patel PT at 22 1410          Occupational Therapy Notes (most recent note)      Joseline Lemon, OT at 22 1442          Patient Name: Buster Velasco  : 1965    MRN: [id number]                              Today's Date: 2022       Admit Date: 2022    Visit Dx:     ICD-10-CM ICD-9-CM   1. Right foot infection  L08.9 686.9   2. Hypoglycemia  E16.2 251.2   3. Decubitus ulcer of coccygeal region, unspecified ulcer stage  L89.159 707.03     707.20   4. Pharyngeal dysphagia  R13.13 787.23     Patient Active Problem List   Diagnosis   • Right foot infection   • Hypoglycemia   • Anemia   • Hyponatremia   • Hypoalbuminemia   • Essential hypertension   • Hyperlipidemia   • Paroxysmal atrial fibrillation (HCC)   • Sepsis (HCC)   • MRSA bacteremia     Past Medical History:   Diagnosis Date   • Diabetes (HCC)    • DVT (deep venous thrombosis) (HCC)    • Hypertension    • Mood disorder (HCC)      Past Surgical History:   Procedure Laterality Date   • BELOW KNEE AMPUTATION Left    • BELOW KNEE AMPUTATION Right 2022    Procedure: AMPUTATION BELOW KNEE RIGHT;  Surgeon: John Peguero MD;  Location: [facility];  Service: Vascular;  Laterality: Right;   • TRANS METATARSAL  AMPUTATION Right       General Information     Row Name 12/24/22 1517          OT Time and Intention    Document Type therapy note (daily note)  -TB     Mode of Treatment occupational therapy;individual therapy  -TB     Row Name 12/24/22 1517          General Information    Patient Profile Reviewed yes  -TB     Existing Precautions/Restrictions fall  R BKA 12/02/2022, h/o remote L BKA, B shoulder limitations, PEG tube  -TB     Barriers to Rehab medically complex;previous functional deficit  -TB     Row Name 12/24/22 1517          Cognition    Orientation Status (Cognition) oriented x 4  -TB     Row Name 12/24/22 1517          Safety Issues, Functional Mobility    Safety Issues Affecting Function (Mobility) --  Pt has good insight into current funcational deficits and need for assist with mobility and self-care  -TB     Impairments Affecting Function (Mobility) balance;endurance/activity tolerance;pain;strength;postural/trunk control;range of motion (ROM)  -TB           User Key  (r) = Recorded By, (t) = Taken By, (c) = Cosigned By    Initials Name Provider Type    TB Joseline Lemon OT Occupational Therapist                 Mobility/ADL's     Row Name 12/24/22 1518          Bed Mobility    Bed Mobility rolling left;rolling right;scooting/bridging  -TB     Rolling Left [place] (Bed Mobility) minimum assist (75% patient effort);1 person assist;verbal cues  -TB     Rolling Right [place] (Bed Mobility) minimum assist (75% patient effort);1 person assist;verbal cues  -TB     Scooting/Bridging [place] (Bed Mobility) maximum assist (25% patient effort);2 person assist;verbal cues  -TB     Comment, (Bed Mobility) Pt declined EOB. Rolling for toileting hygiene and to reposition for activity  -TB     Row Name 12/24/22 1518          Activities of Daily Living    BADL Assessment/Intervention bathing;upper body dressing;grooming;feeding;toileting  -TB     Row Name 12/24/22 1518          Mobility     Extremity Weight-bearing Status right lower extremity  -TB     Right Lower Extremity (Weight-bearing Status) non weight-bearing (NWB)   -TB     Row Name 12/24/22 1518          Self-Feeding Assessment/Training    [place] Level (Feeding) independent;liquids to mouth  -TB     Position (Self-Feeding) sitting up in bed;supported sitting  -TB     Row Name 12/24/22 1518          Grooming Assessment/Training    [place] Level (Grooming) set up;wash face, hands  -TB     Position (Grooming) sitting up in bed;supported sitting  -TB     Row Name 12/24/22 1518          Upper Body Dressing Assessment/Training    [place] Level (Upper Body Dressing) doff;don;pajama/robe;minimum assist (75% patient effort);verbal cues  -TB     Position (Upper Body Dressing) sitting up in bed;supported sitting  -TB     Row Name 12/24/22 1518          Bathing Assessment/Intervention    [place] Level (Bathing) dependent (less than 25% patient effort);perineal area  -TB     Position (Bathing) supine  -TB     Row Name 12/24/22 1518          Toileting Assessment/Training    [place] Level (Toileting) dependent (less than 25% patient effort);perform perineal hygiene  -TB     Position (Toileting) supine  -TB     Comment, (Toileting) Independent for urinal use. Total assist for bowel incontinence.  -TB           User Key  (r) = Recorded By, (t) = Taken By, (c) = Cosigned By    Initials Name Provider Type    TB Joseline Lemon OT Occupational Therapist               Obj/Interventions     Row Name 12/24/22 1522          Shoulder (Therapeutic Exercise)    Shoulder AROM (Therapeutic Exercise) bilateral;scapular retraction;scapular elevation;10 repetitions  -TB     Row Name 12/24/22 1522          Elbow/Forearm (Therapeutic Exercise)    Elbow/Forearm AROM (Therapeutic Exercise) bilateral;flexion;extension;10 repetitions  -TB     Row Name 12/24/22 1522          Motor Skills    Therapeutic Exercise --  Education reinforced for benefits  of activity/therapy and role of OT  -TB           User Key  (r) = Recorded By, (t) = Taken By, (c) = Cosigned By    Initials Name Provider Type    Joseline Abdi OT Occupational Therapist               Goals/Plan    No documentation.                Clinical Impression     Row Name 12/24/22 1524          Pain Assessment    Pretreatment Pain Rating 0/10 - no pain  -TB     Posttreatment Pain Rating 0/10 - no pain  -TB     Pre/Posttreatment Pain Comment Pt denies pain with activity  -TB     Pain Intervention(s) Ambulation/increased activity;Repositioned  -TB     Row Name 12/24/22 1524          Plan of Care Review    Plan of Care Reviewed With patient  -TB     Outcome Evaluation Pt participates in therapy with encouragement. Declined EOB/OOB activity. Min A rolling in bed to complete dependent toileting hygiene. Min A UB dressing. Set-up simple grooming and self-feeding. OT will continue to follow IP. Plan is IRF when medically ready.  -TB     Row Name 12/24/22 1524          Therapy Plan Review/Discharge Plan (OT)    Anticipated Discharge Disposition (OT) inpatient rehabilitation facility  -TB     Row Name 12/24/22 1524          Vital Signs    Pre Systolic BP Rehab --  RN cleared OT  -TB     O2 Delivery Pre Treatment room air  -TB     Pre Patient Position Supine  -TB     Intra Patient Position Side Lying  -TB     Post Patient Position Sitting  -TB     Row Name 12/24/22 1524          Positioning and Restraints    Pre-Treatment Position in bed  -TB     Post Treatment Position bed  -TB     In Bed notified nsg;fowlers;call light within reach;encouraged to call for assist;exit alarm on;RLE elevated  -TB           User Key  (r) = Recorded By, (t) = Taken By, (c) = Cosigned By    Initials Name Provider Type    Joseline Abdi OT Occupational Therapist               Outcome Measures     Row Name 12/24/22 1528          How much help from another is currently needed...    Putting on and taking off regular  lower body clothing? 2  -TB     Bathing (including washing, rinsing, and drying) 2  -TB     Toileting (which includes using toilet bed pan or urinal) 1  -TB     Putting on and taking off regular upper body clothing 3  -TB     Taking care of personal grooming (such as brushing teeth) 3  -TB     Eating meals 4  -TB     AM-PAC 6 Clicks Score (OT) 15  -TB     Row Name 12/24/22 1528          Functional Assessment    Outcome Measure Options AM-PAC 6 Clicks Daily Activity (OT)  -TB           User Key  (r) = Recorded By, (t) = Taken By, (c) = Cosigned By    Initials Name Provider Type    TB Joseline Lemon, OT Occupational Therapist                Occupational Therapy Education     Title: PT OT SLP Therapies (In Progress)     Topic: Occupational Therapy (In Progress)     Point: ADL training (Done)     Description:   Instruct learner(s) on proper safety adaptation and remediation techniques during self care or transfers.   Instruct in proper use of assistive devices.              Learning Progress Summary           Patient Acceptance, E,D, VU,DU,NR by TB at 12/24/2022 1529    Acceptance, E, VU,DU by  at 12/22/2022 1311    Comment: Sitting balance; UE HEP; pressure relief    Acceptance, E,D, NR by JY at 12/19/2022 0942    Acceptance, E,D, VU,DU,NR by TB at 12/14/2022 1441    Acceptance, E, VU by MR at 12/11/2022 1448    Eager, E, VU,DU by SC at 12/7/2022 1115    Comment: REVIEWED HEP    Acceptance, E, VU by MR at 12/7/2022 1110                   Point: Home exercise program (Done)     Description:   Instruct learner(s) on appropriate technique for monitoring, assisting and/or progressing therapeutic exercises/activities.              Learning Progress Summary           Patient Acceptance, E,D, VU,DU,NR by TB at 12/24/2022 1529    Acceptance, E, VU,DU by BRAULIO at 12/22/2022 1311    Comment: Sitting balance; UE HEP; pressure relief    Acceptance, E,D, VU,DU,NR by TB at 12/14/2022 1441    Acceptance, E, VU by MR at  12/11/2022 1448    Eager, E, VU,DU by SC at 12/7/2022 1115    Comment: REVIEWED HEP    Acceptance, E, VU by MR at 12/7/2022 1110                   Point: Precautions (Done)     Description:   Instruct learner(s) on prescribed precautions during self-care and functional transfers.              Learning Progress Summary           Patient Acceptance, E,D, VU,DU,NR by TB at 12/24/2022 1529    Acceptance, E, VU,DU by  at 12/22/2022 1311    Comment: Sitting balance; UE HEP; pressure relief    Acceptance, E,D, NR by JY at 12/19/2022 0942    Acceptance, E,D, VU,DU,NR by  at 12/14/2022 1441    Acceptance, E, VU by MR at 12/11/2022 1448    Eager, E, VU,DU by SC at 12/7/2022 1115    Comment: REVIEWED HEP    Acceptance, E, VU by MR at 12/7/2022 1110                   Point: Body mechanics (In Progress)     Description:   Instruct learner(s) on proper positioning and spine alignment during self-care, functional mobility activities and/or exercises.              Learning Progress Summary           Patient Acceptance, E,D, NR by JY at 12/19/2022 0942    Acceptance, E, VU by MR at 12/11/2022 1448    Eager, E, VU,DU by SC at 12/7/2022 1115    Comment: REVIEWED HEP    Acceptance, E, VU by MR at 12/7/2022 1110                               User Key     Initials Effective Dates Name Provider Type Discipline    SC 06/16/21 -  Shamika Patel PT Physical Therapist PT    TB 06/16/21 -  Joseline Lemon, OT Occupational Therapist OT    JY 06/16/21 -  Kristy Canas, OT Occupational Therapist OT    BRAULIO 06/16/21 -  Kristy Michel, OT Occupational Therapist OT    MR 09/22/22 -  Marilou Lennon, OT Occupational Therapist OT              OT Recommendation and Plan     Plan of Care Review  Plan of Care Reviewed With: patient  Outcome Evaluation: Pt participates in therapy with encouragement. Declined EOB/OOB activity. Min A rolling in bed to complete dependent toileting hygiene. Min A UB dressing. Set-up simple grooming and self-feeding.  OT will continue to follow IP. Plan is IRF when medically ready.     Time Calculation:    Time Calculation- OT     Row Name 12/24/22 1442             Time Calculation- OT    OT Start Time 1442  -TB      OT Received On 12/24/22  -TB      OT Goal Re-Cert Due Date 12/29/22  -TB         Timed Charges    95831 - OT Self Care/Mgmt Minutes 28  -TB         Total Minutes    Timed Charges Total Minutes 28  -TB       Total Minutes 28  -TB            User Key  (r) = Recorded By, (t) = Taken By, (c) = Cosigned By    Initials Name Provider Type    TB Joseline Lemon OT Occupational Therapist              Therapy Charges for Today     Code Description Service Date Service Provider Modifiers Qty    48918839644 HC OT SELF CARE/MGMT/TRAIN EA 15 MIN 12/24/2022 Joseline Lemon OT GO 2               Joseline Lemon OT  12/24/2022    Electronically signed by Joseline Lemon OT at 12/24/22 4357

## 2022-12-27 LAB
GLUCOSE BLDC GLUCOMTR-MCNC: 147 MG/DL (ref 70–130)
GLUCOSE BLDC GLUCOMTR-MCNC: 168 MG/DL (ref 70–130)
GLUCOSE BLDC GLUCOMTR-MCNC: 173 MG/DL (ref 70–130)
GLUCOSE BLDC GLUCOMTR-MCNC: 181 MG/DL (ref 70–130)

## 2022-12-27 PROCEDURE — 99232 SBSQ HOSP IP/OBS MODERATE 35: CPT | Performed by: INTERNAL MEDICINE

## 2022-12-27 PROCEDURE — 25010000002 DAPTOMYCIN PER 1 MG: Performed by: INTERNAL MEDICINE

## 2022-12-27 PROCEDURE — 82962 GLUCOSE BLOOD TEST: CPT

## 2022-12-27 PROCEDURE — 63710000001 INSULIN LISPRO (HUMAN) PER 5 UNITS: Performed by: HOSPITALIST

## 2022-12-27 PROCEDURE — 63710000001 INSULIN DETEMIR PER 5 UNITS: Performed by: PEDIATRICS

## 2022-12-27 PROCEDURE — 99024 POSTOP FOLLOW-UP VISIT: CPT | Performed by: THORACIC SURGERY (CARDIOTHORACIC VASCULAR SURGERY)

## 2022-12-27 RX ADMIN — METOPROLOL TARTRATE 25 MG: 25 TABLET, FILM COATED ORAL at 08:54

## 2022-12-27 RX ADMIN — INSULIN LISPRO 2 UNITS: 100 INJECTION, SOLUTION INTRAVENOUS; SUBCUTANEOUS at 11:51

## 2022-12-27 RX ADMIN — TAMSULOSIN HYDROCHLORIDE 0.4 MG: 0.4 CAPSULE ORAL at 21:42

## 2022-12-27 RX ADMIN — DAPTOMYCIN 800 MG: 500 INJECTION, POWDER, LYOPHILIZED, FOR SOLUTION INTRAVENOUS at 09:01

## 2022-12-27 RX ADMIN — METHADONE HYDROCHLORIDE 10 MG: 10 TABLET ORAL at 03:24

## 2022-12-27 RX ADMIN — INSULIN DETEMIR 10 UNITS: 100 INJECTION, SOLUTION SUBCUTANEOUS at 21:42

## 2022-12-27 RX ADMIN — METHADONE HYDROCHLORIDE 10 MG: 10 TABLET ORAL at 08:54

## 2022-12-27 RX ADMIN — Medication 5 MG: at 21:42

## 2022-12-27 RX ADMIN — HYDROXYZINE HYDROCHLORIDE 50 MG: 25 TABLET ORAL at 21:42

## 2022-12-27 RX ADMIN — METFORMIN HYDROCHLORIDE 1000 MG: 1000 TABLET, FILM COATED ORAL at 16:32

## 2022-12-27 RX ADMIN — METOPROLOL TARTRATE 25 MG: 25 TABLET, FILM COATED ORAL at 21:42

## 2022-12-27 RX ADMIN — Medication 10 ML: at 08:56

## 2022-12-27 RX ADMIN — METHADONE HYDROCHLORIDE 10 MG: 10 TABLET ORAL at 21:42

## 2022-12-27 RX ADMIN — METHADONE HYDROCHLORIDE 10 MG: 10 TABLET ORAL at 13:20

## 2022-12-27 RX ADMIN — Medication 10 ML: at 08:55

## 2022-12-27 RX ADMIN — Medication 10 ML: at 21:47

## 2022-12-27 RX ADMIN — INSULIN LISPRO 2 UNITS: 100 INJECTION, SOLUTION INTRAVENOUS; SUBCUTANEOUS at 08:55

## 2022-12-27 RX ADMIN — ATORVASTATIN CALCIUM 20 MG: 20 TABLET, FILM COATED ORAL at 21:42

## 2022-12-27 RX ADMIN — METFORMIN HYDROCHLORIDE 1000 MG: 1000 TABLET, FILM COATED ORAL at 08:54

## 2022-12-27 NOTE — PLAN OF CARE
Goal Outcome Evaluation:                 Problem: Adult Inpatient Plan of Care  Goal: Absence of Hospital-Acquired Illness or Injury  Intervention: Identify and Manage Fall Risk  Recent Flowsheet Documentation  Taken 12/27/2022 0400 by Delvin Gray RN  Safety Promotion/Fall Prevention:  • activity supervised  • safety round/check completed  • toileting scheduled  Taken 12/27/2022 0200 by Delvin Gray RN  Safety Promotion/Fall Prevention:  • activity supervised  • safety round/check completed  • toileting scheduled  Taken 12/27/2022 0000 by Delvin Gray RN  Safety Promotion/Fall Prevention:  • activity supervised  • safety round/check completed  • toileting scheduled  Taken 12/26/2022 2200 by Delvin Gray RN  Safety Promotion/Fall Prevention:  • activity supervised  • safety round/check completed  • toileting scheduled  Taken 12/26/2022 2000 by Delvin Gray RN  Safety Promotion/Fall Prevention:  • activity supervised  • safety round/check completed  • toileting scheduled  Intervention: Prevent Skin Injury  Recent Flowsheet Documentation  Taken 12/27/2022 0400 by Delvin Gray RN  Body Position: supine  Skin Protection: adhesive use limited  Taken 12/27/2022 0200 by Delvin Gray RN  Body Position: supine  Skin Protection: adhesive use limited  Taken 12/27/2022 0000 by Delvin Gray RN  Body Position: supine  Skin Protection: adhesive use limited  Taken 12/26/2022 2200 by Delvin Gray RN  Body Position: supine  Skin Protection: adhesive use limited  Taken 12/26/2022 2000 by Delvin Gray RN  Body Position: supine  Skin Protection: adhesive use limited  Intervention: Prevent and Manage VTE (Venous Thromboembolism) Risk  Recent Flowsheet Documentation  Taken 12/27/2022 0400 by Delvin Gray RN  VTE Prevention/Management: patient refused intervention  Taken 12/27/2022 0200 by Delvin Gray RN  VTE Prevention/Management: patient refused intervention  Taken 12/27/2022 0000 by  Delvin Gray RN  VTE Prevention/Management: patient refused intervention  Taken 12/26/2022 2200 by Delvin Gray RN  VTE Prevention/Management: patient refused intervention  Taken 12/26/2022 2000 by Delvin Gray RN  VTE Prevention/Management: patient refused intervention  Intervention: Prevent Infection  Recent Flowsheet Documentation  Taken 12/27/2022 0400 by Delvin Gray RN  Infection Prevention: environmental surveillance performed  Taken 12/27/2022 0200 by Delvin Gray RN  Infection Prevention: environmental surveillance performed  Taken 12/27/2022 0000 by Delvin Gray RN  Infection Prevention: environmental surveillance performed  Taken 12/26/2022 2200 by Delvin Gray RN  Infection Prevention: environmental surveillance performed  Taken 12/26/2022 2000 by Delvin Gray RN  Infection Prevention: environmental surveillance performed  Goal: Optimal Comfort and Wellbeing  Intervention: Monitor Pain and Promote Comfort  Recent Flowsheet Documentation  Taken 12/27/2022 0400 by Delvin Gray RN  Pain Management Interventions: quiet environment facilitated  Taken 12/27/2022 0200 by Delvin Gray RN  Pain Management Interventions: quiet environment facilitated  Taken 12/27/2022 0000 by Delvin Gray RN  Pain Management Interventions: quiet environment facilitated  Taken 12/26/2022 2200 by Delvin Gray RN  Pain Management Interventions: quiet environment facilitated  Taken 12/26/2022 2000 by Delvin Gray RN  Pain Management Interventions: quiet environment facilitated  Intervention: Provide Person-Centered Care  Recent Flowsheet Documentation  Taken 12/27/2022 0400 by Delvin Gray RN  Trust Relationship/Rapport: care explained  Taken 12/27/2022 0200 by Delvin Gray RN  Trust Relationship/Rapport: care explained  Taken 12/27/2022 0000 by Delvin Gray RN  Trust Relationship/Rapport: care explained  Taken 12/26/2022 2200 by Delvin Gray RN  Trust  Relationship/Rapport: care explained  Taken 12/26/2022 2000 by Delvin Gray, RN  Trust Relationship/Rapport: care explained     VSS voids well, rested throughout the night, pain managed with PRN medications, will continue to monitor for changes

## 2022-12-27 NOTE — PROGRESS NOTES
Cardiothoracic Surgery Progress Note      POD #: 25-right below-knee amputation     LOS: 27 days      Subjective: Awake and alert    Objective:  Vital Signs vital signs below noted T-max past 24 hours 98.3 °F  Temp:  [97.2 °F (36.2 °C)-98.3 °F (36.8 °C)] 98.3 °F (36.8 °C)  Heart Rate:  [55-62] 55  Resp:  [18] 18  BP: (119-147)/(72-89) 123/78    Physical Exam:   General Appearance: Oriented x3   Lungs:   Heart:   Skin:   Incision: The amputation site dressing change.  Suture intact skin margin viable skin flaps are viable.     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  Medical manage per hospitalist.  Antibiotics per infectious disease.  Rehab okay with me at any time.      John Peguero MD - 12/27/22 - 05:48 EST

## 2022-12-27 NOTE — PROGRESS NOTES
[facility] Medicine Services  PROGRESS NOTE    Patient Name: Buster Velasco  : 1965  MRN: [id number]    Date of Admission: 2022  Primary Care Physician: Ludivina Bowers MD    Subjective     CC: f/u R foot infection s/p BKA     HPI:  No complaints today.  States that he needs appropriate equipment to move safely.  Per nurses he is on nectar thick liquids but has been refusing and only drinking thin liquids.      ROS:  Gen- No fevers, chills  CV- No chest pain, palpitations  Resp- No cough, dyspnea  GI- No N/V/D, abd pain      Objective     Vital Signs:   Temp:  [97.2 °F (36.2 °C)-98.3 °F (36.8 °C)] 97.9 °F (36.6 °C)  Heart Rate:  [55-75] 75  Resp:  [18] 18  BP: (119-145)/(72-96) 145/96     Physical Exam:  Constitutional: No acute distress, awake, alert, sitting up in bed  HENT: NCAT, mucous membranes moist  Respiratory: Clear to auscultation bilaterally, respiratory effort normal   Cardiovascular: RRR, no murmurs, rubs, or gallops  Gastrointestinal: Positive bowel sounds, soft, nontender, nondistended  Musculoskeletal: bandaged right BKA, old left BKA.  Psychiatric: Appropriate affect, cooperative  Neurologic: Oriented x 3, speech is clear  Skin: No rashes        Results Reviewed:  LAB RESULTS:      Lab 22  0510 22  0615   WBC 6.42 7.37   HEMOGLOBIN 10.4* 10.2*   HEMATOCRIT 33.2* 33.0*   PLATELETS 380 410   NEUTROS ABS 3.99 5.00   IMMATURE GRANS (ABS) 0.05 0.04   LYMPHS ABS 1.62 1.68   MONOS ABS 0.58 0.47   EOS ABS 0.15 0.15   MCV 84.3 85.1         Lab 22  0510 22  0615   SODIUM 140 140   POTASSIUM 4.0 4.1   CHLORIDE 103 102   CO2 28.0 27.0   ANION GAP 9.0 11.0   BUN 17 15   CREATININE 0.62* 0.59*   EGFR 110.8 112.5   GLUCOSE 159* 153*   CALCIUM 9.6 10.0         Brief Urine Lab Results  (Last result in the past 365 days)      Color   Clarity   Blood   Leuk Est   Nitrite   Protein   CREAT   Urine HCG        22 0108 Yellow   Clear    Negative   Negative     Negative               Microbiology Results Abnormal     Procedure Component Value - Date/Time    Blood Culture - Blood, Wrist, Left [[id number]]  (Normal) Collected: 12/06/22 1402    Lab Status: Final result Specimen: Blood from Wrist, Left Updated: 12/11/22 1545     Blood Culture No growth at 5 days    Blood Culture - Blood, Arm, Left [[id number]]  (Normal) Collected: 12/06/22 1402    Lab Status: Final result Specimen: Blood from Arm, Left Updated: 12/11/22 1545     Blood Culture No growth at 5 days        No radiology results from the last 24 hrs    Results for orders placed during the hospital encounter of 11/30/22    Adult Transesophageal Echo (JERALD) W/ Cont if Necessary Per Protocol    Interpretation Summary  •  Left ventricular systolic function is normal. Estimated left ventricular EF = 55%  •  The aortic valve is structurally normal with no stenosis present. Trace aortic valve regurgitation is present. There is no evidence of an aortic valve mass is present.  •  There is mild, anterior mitral leaflet thickening present. No evidence of a mitral valve mass is present. Trace mitral valve regurgitation is present. No significant mitral valve stenosis is present  •  The tricuspid valve is normal in structure. There is no evidence of a mass on the tricuspid valve. Mild tricuspid valve regurgitation is present.  •  There is mild thickening of the pulmonic valve. There is trace pulmonic valve regurgitation present. There is no pulmonic valve stenosis present.    I have reviewed the medications:  Scheduled Meds:atorvastatin, 20 mg, Oral, Nightly  DAPTOmycin, 8 mg/kg, Intravenous, Q24H  insulin detemir, 10 Units, Subcutaneous, Nightly  insulin lispro, 0-7 Units, Subcutaneous, TID AC  metFORMIN, 1,000 mg, Oral, BID With Meals  methadone, 10 mg, Oral, Q6H  metoprolol tartrate, 25 mg, Oral, Q12H  sodium chloride, 10 mL, Intravenous, Q12H  sodium chloride, 10 mL, Intravenous, Q12H  tamsulosin, 0.4  mg, Oral, Nightly      Continuous Infusions:   PRN Meds:.•  acetaminophen  •  senna-docusate sodium **AND** polyethylene glycol **AND** bisacodyl **AND** bisacodyl  •  calcium carbonate  •  dextrose  •  dextrose  •  glucagon (human recombinant)  •  hydrOXYzine  •  magnesium hydroxide  •  melatonin  •  ondansetron **OR** ondansetron  •  sodium chloride    Assessment & Plan     Active Hospital Problems    Diagnosis  POA   • **Right foot infection [L08.9]  Yes   • MRSA bacteremia [R78.81, B95.62]  Unknown   • Sepsis (HCC) [A41.9]  Yes   • Hypoglycemia [E16.2]  Yes   • Anemia [D64.9]  Yes   • Hyponatremia [E87.1]  Yes   • Hypoalbuminemia [E88.09]  Yes   • Essential hypertension [I10]  Yes   • Hyperlipidemia [E78.5]  Yes   • Paroxysmal atrial fibrillation (HCC) [I48.0]  Yes      Resolved Hospital Problems   No resolved problems to display.     Brief Hospital Course to date:  Buster Velasco is a 57yoM with PMH significant for HTN, insulin-dependent DMII, prior DVT s/p IVC filter, prior osteomyelitis (s/p L BKA), R foot transmetatarsal amputation and L hand 3rd metacarpal resection (April 2022 at OSH - completed 6 weeks IV abx for MRSA bacteremia), remote history of IVDA and more recent history of meth use. Admitted to [facility] 11/30/22 for sepsis secondary to acute R foot gangrene and MRSA bacteremia      Sepsis   Acute R foot gangrene and likely osteomyelitis, resolved  MRSA bacteremia  - s/p R BKA on 12/2/22 by Dr. Sudhir SONI PICC placed 12/1/22   - ID following. On IV Daptomycin for at least 6 weeks per Dr. George  - Repeat blood cultures no growth x 5 days  - TTE and JERALD without evidence of valvular vegetation     Recurrent hypoglycemia  Insulin-dependent DMII  - HbA1c 6.9%  - Per home med list, on Metformin 1000mg BID, Farxiga 10mg daily, Tresiba 72 units daily   - Requiring much less medication here  - Continue Levemir 10 units QHS, increased Metformin to 1000mg BID    GERD, continue  Tums / PRN milk of mag      Chronic pain on opioids   History of IVDA/substance abuse  - Continue home Methadone 10mg QID     History of atrial fibrillation  - Multiple hospitalizations at [facility] in [place], [place] with reported history of atrial fibrillation / RVR   - Continue Metoprolol 25mg BID  - Not anticoagulated for unclear reasons   - Continue to monitor on telemetry     Chronic vocal cord paralysis   Dysphagia  History of PEG placement  - Per review of records, history of multiple intubations  - Had PEG placed 8/19/22 at [facility] - he is no longer using this. Will defer to [facility] to removal. RN to continue routine PEG care   - SLP following. Recommend regular texture diet with nectar thick liquids but patient refusing and understands risk of aspiration    Hypertension  - Continue Metoprolol 25mg BID  - Held Losartan / Bumex on admission due to hypotension. BP currently stable so will continue to hold      History of DVT s/p IVC filter   - IVC filter placed in spring 2022 per patient. Defer to PCP for further assessment and timing of removal.      Hx of seizure in setting of meth use      BPH   - Continue home Flomax    Expected Discharge Location and Transportation: SNF via EMS. Referral to [facility] pending   Expected Discharge - patient is medically ready for DC whenever rehab arrangements are made     Expected Discharge Date and Time     Expected Discharge Date Expected Discharge Time    Dec 29, 2022          DVT prophylaxis:Mechanical DVT prophylaxis orders are present.     AM-PAC 6 Clicks Score (PT): 11 (12/26/22 1506)    CODE STATUS:   Code Status and Medical Interventions:   Ordered at: 12/01/22 0035     Code Status (Patient has no pulse and is not breathing):    CPR (Attempt to Resuscitate)     Medical Interventions (Patient has pulse or is breathing):    Full Support     Justino Gee MD  12/27/22

## 2022-12-27 NOTE — CASE MANAGEMENT/SOCIAL WORK
Continued Stay Note  [facility]     Patient Name: Buster Velasco  MRN: [id number]  Today's Date: 12/27/2022    Admit Date: 11/30/2022    Plan: Rehab   Discharge Plan     Row Name 12/27/22 1149       Plan    Plan Rehab    Patient/Family in Agreement with Plan yes    Plan Comments Physical therapy evaluated Mr. Velasco yesterday and has stated that him transferring from bed to wheelchair with a slideboard is unsafe and that his skin issues prevent him from being placed in a swing and transferred to sit up in a recliner. I have asked [facility] to reevaluate him since he is so close to being done with his IV antibiotics. This will not require insurance precert. Will await a bed offer or denial from [facility]. Case management will continue to follow.    Final Discharge Disposition Code 62 - inpatient rehab facility               Discharge Codes    No documentation.               Expected Discharge Date and Time     Expected Discharge Date Expected Discharge Time    Dec 29, 2022             Alan Downing RN

## 2022-12-27 NOTE — PLAN OF CARE
Goal Outcome Evaluation:  Problem: Adult Inpatient Plan of Care  Goal: Absence of Hospital-Acquired Illness or Injury  Intervention: Identify and Manage Fall Risk  Recent Flowsheet Documentation  Taken 12/27/2022 1604 by Charley Amaro RN  Safety Promotion/Fall Prevention: safety round/check completed  Taken 12/27/2022 1400 by Charley Amaro RN  Safety Promotion/Fall Prevention:   activity supervised   assistive device/personal items within reach  Taken 12/27/2022 1230 by Charley Amaro RN  Safety Promotion/Fall Prevention:   assistive device/personal items within reach   activity supervised  Taken 12/27/2022 1030 by Charley Amaro RN  Safety Promotion/Fall Prevention:   assistive device/personal items within reach   clutter free environment maintained   safety round/check completed   room organization consistent  Taken 12/27/2022 0853 by Charley Amaro RN  Safety Promotion/Fall Prevention:   assistive device/personal items within reach   clutter free environment maintained   lighting adjusted   safety round/check completed

## 2022-12-27 NOTE — PROGRESS NOTES
Clinical Nutrition     Patient Name: Buster Velasco  YOB: 1965  MRN: [id number]  Date of Encounter: 22 10:21 EST  Admission date: 2022    Reason for Visit   Follow up    EMR reviewed    Yes    Diet Nutrition Related History     Patient reports an excellent appetite and is eating 100% of his meals. Patient denies any N/V/D and his last recorded BM was on 22. Patient declined offer of ONS.    22 Previous diet tech note  Patient was sleeping hard and did not wake up to my voice.Patient's intake is good at 87.5% of last 4 meals. Last documented BM was on 22.    Current Nutrition Prescription    Diet: Cardiac Diets, Diabetic Diets; Healthy Heart (2-3 Na+); Consistent Carbohydrate; Texture: Regular Texture (IDDSI 7); Fluid Consistency: Nectar Thick  Orders Placed This Encounter      DIET MESSAGE Please send pt a lunch tray!! Thank you!!      DIET MESSAGE Please send dinner tray, thank you      DIET MESSAGE Patient would like a cheeseburger and baked chips for supper please. Thank you.    Average Intake from [place]% x last 5 meals    Actions:    Follow treatment progress, Care plan reviewed, Menu provided, Encourage intake, Supplement offered/refused    Monitor Per Protocol    Cornelia Lam,   Time Spent: 20 minutes

## 2022-12-28 LAB
GLUCOSE BLDC GLUCOMTR-MCNC: 132 MG/DL (ref 70–130)
GLUCOSE BLDC GLUCOMTR-MCNC: 150 MG/DL (ref 70–130)
GLUCOSE BLDC GLUCOMTR-MCNC: 163 MG/DL (ref 70–130)
GLUCOSE BLDC GLUCOMTR-MCNC: 168 MG/DL (ref 70–130)

## 2022-12-28 PROCEDURE — 63710000001 ONDANSETRON PER 8 MG: Performed by: PEDIATRICS

## 2022-12-28 PROCEDURE — 25010000002 DAPTOMYCIN PER 1 MG: Performed by: INTERNAL MEDICINE

## 2022-12-28 PROCEDURE — 82962 GLUCOSE BLOOD TEST: CPT

## 2022-12-28 PROCEDURE — 63710000001 INSULIN DETEMIR PER 5 UNITS: Performed by: PEDIATRICS

## 2022-12-28 PROCEDURE — 63710000001 INSULIN LISPRO (HUMAN) PER 5 UNITS: Performed by: HOSPITALIST

## 2022-12-28 PROCEDURE — 99232 SBSQ HOSP IP/OBS MODERATE 35: CPT | Performed by: INTERNAL MEDICINE

## 2022-12-28 PROCEDURE — 99024 POSTOP FOLLOW-UP VISIT: CPT | Performed by: THORACIC SURGERY (CARDIOTHORACIC VASCULAR SURGERY)

## 2022-12-28 RX ADMIN — Medication 10 ML: at 08:34

## 2022-12-28 RX ADMIN — METOPROLOL TARTRATE 25 MG: 25 TABLET, FILM COATED ORAL at 21:28

## 2022-12-28 RX ADMIN — METOPROLOL TARTRATE 25 MG: 25 TABLET, FILM COATED ORAL at 08:33

## 2022-12-28 RX ADMIN — TAMSULOSIN HYDROCHLORIDE 0.4 MG: 0.4 CAPSULE ORAL at 21:28

## 2022-12-28 RX ADMIN — Medication 10 ML: at 21:28

## 2022-12-28 RX ADMIN — METHADONE HYDROCHLORIDE 10 MG: 10 TABLET ORAL at 08:33

## 2022-12-28 RX ADMIN — INSULIN LISPRO 2 UNITS: 100 INJECTION, SOLUTION INTRAVENOUS; SUBCUTANEOUS at 17:17

## 2022-12-28 RX ADMIN — INSULIN LISPRO 2 UNITS: 100 INJECTION, SOLUTION INTRAVENOUS; SUBCUTANEOUS at 08:48

## 2022-12-28 RX ADMIN — DAPTOMYCIN 800 MG: 500 INJECTION, POWDER, LYOPHILIZED, FOR SOLUTION INTRAVENOUS at 08:35

## 2022-12-28 RX ADMIN — METHADONE HYDROCHLORIDE 10 MG: 10 TABLET ORAL at 21:28

## 2022-12-28 RX ADMIN — Medication 5 MG: at 21:28

## 2022-12-28 RX ADMIN — METHADONE HYDROCHLORIDE 10 MG: 10 TABLET ORAL at 13:40

## 2022-12-28 RX ADMIN — HYDROXYZINE HYDROCHLORIDE 50 MG: 25 TABLET ORAL at 21:27

## 2022-12-28 RX ADMIN — METFORMIN HYDROCHLORIDE 1000 MG: 1000 TABLET, FILM COATED ORAL at 08:33

## 2022-12-28 RX ADMIN — ONDANSETRON HYDROCHLORIDE 4 MG: 4 TABLET, FILM COATED ORAL at 13:40

## 2022-12-28 RX ADMIN — ATORVASTATIN CALCIUM 20 MG: 20 TABLET, FILM COATED ORAL at 21:28

## 2022-12-28 RX ADMIN — INSULIN LISPRO 2 UNITS: 100 INJECTION, SOLUTION INTRAVENOUS; SUBCUTANEOUS at 12:02

## 2022-12-28 RX ADMIN — INSULIN DETEMIR 10 UNITS: 100 INJECTION, SOLUTION SUBCUTANEOUS at 21:27

## 2022-12-28 RX ADMIN — ONDANSETRON HYDROCHLORIDE 4 MG: 4 TABLET, FILM COATED ORAL at 19:19

## 2022-12-28 RX ADMIN — METFORMIN HYDROCHLORIDE 1000 MG: 1000 TABLET, FILM COATED ORAL at 17:17

## 2022-12-28 RX ADMIN — METHADONE HYDROCHLORIDE 10 MG: 10 TABLET ORAL at 01:29

## 2022-12-28 NOTE — PLAN OF CARE
Goal Outcome Evaluation:  Plan of Care Reviewed With: patient        Progress: improving       No changes overnight, slept well between care, RA, VS WDL, pain controlled with scheduled meds, voiding spontaneously, specialty bed in place, dressing CDI

## 2022-12-28 NOTE — PROGRESS NOTES
Cardiothoracic Surgery Progress Note      POD #: 26-right below-knee amputation     LOS: 28 days      Subjective: Oriented x3    Objective:  Vital Signs vital signs below noted T-max past 24 hours 98.2 °F  Temp:  [97.9 °F (36.6 °C)-98.2 °F (36.8 °C)] 97.9 °F (36.6 °C)  Heart Rate:  [65-75] 74  Resp:  [18] 18  BP: (121-145)/(70-96) 130/77    Physical Exam:   General Appearance: Oriented times   Lungs:   Heart:   Skin:   Incision: The amputation site dressing change.  Sutures intact skin margin viable and skin flaps are viable     Results:  Results from last 7 days   Lab Units 12/22/22  0510   WBC 10*3/mm3 6.42   HEMOGLOBIN g/dL 10.4*   HEMATOCRIT % 33.2*   PLATELETS 10*3/mm3 380     Results from last 7 days   Lab Units 12/22/22  0510   SODIUM mmol/L 140   POTASSIUM mmol/L 4.0   CHLORIDE mmol/L 103   CO2 mmol/L 28.0   BUN mg/dL 17   CREATININE mg/dL 0.62*   GLUCOSE mg/dL 159*   CALCIUM mg/dL 9.6         Assessment: #1.  Postop day 26 right below-knee amputation healing well  2.  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  Diabetes mellitus and neuropathy    Plan: Continue daily dressing change amputation site.  Medical manage per hospitalist.  Antibiotics per infectious disease.  Rehab okay with me at any time.      John Peguero MD - 12/28/22 - 05:44 EST

## 2022-12-28 NOTE — CASE MANAGEMENT/SOCIAL WORK
"Continued Stay Note  [facility]     Patient Name: Buster Velasco  MRN: [id number]  Today's Date: 12/28/2022    Admit Date: 11/30/2022    Plan: Rehab   Discharge Plan     Row Name 12/28/22 1636       Plan    Plan Rehab    Patient/Family in Agreement with Plan yes    Plan Comments Facilities continue to deny Mr. Velasco due to his lack of out of bed activities. I have explained to Mr. Velasco in detail that he needs to get out of bed. He asked me \"what is the big deal with me getting out of bed?\". I had a long conversation with him about how the rehab facilities want to see movement and we could use a lift to get him out of the bed. He is concerned about the skin integrity on his bottom. I will order a WOCN consult for him to ensure that his skin is ok for sitting in a recliner and how nursing could get him into a recliner safely. Case management will continue to follow.    Final Discharge Disposition Code 62 - inpatient rehab facility               Discharge Codes    No documentation.               Expected Discharge Date and Time     Expected Discharge Date Expected Discharge Time    Dec 29, 2022             Alan Downing RN    "

## 2022-12-28 NOTE — PROGRESS NOTES
[facility] Medicine Services  PROGRESS NOTE    Patient Name: Buster Velasco  : 1965  MRN: [id number]    Date of Admission: 2022  Primary Care Physician: Ludivina Bowers MD    Subjective     CC: f/u R foot infection s/p BKA     HPI:  No complaints.      ROS:  Gen- No fevers, chills  CV- No chest pain, palpitations  Resp- No cough, dyspnea  GI- No N/V/D, abd pain      Objective     Vital Signs:   Temp:  [97.9 °F (36.6 °C)-99 °F (37.2 °C)] 98 °F (36.7 °C)  Heart Rate:  [68-75] 74  Resp:  [18] 18  BP: (113-131)/(70-80) 131/79     Physical Exam:  Constitutional: No acute distress, awake, alert, sitting up in bed  HENT: NCAT, mucous membranes moist  Respiratory: Clear to auscultation bilaterally, respiratory effort normal   Cardiovascular: RRR, no murmurs, rubs, or gallops  Gastrointestinal: Positive bowel sounds, soft, nontender, nondistended  Musculoskeletal: bandaged right BKA, old left BKA.  Psychiatric: Appropriate affect, cooperative  Neurologic: Oriented x 3, speech is clear  Skin: No rashes        Results Reviewed:  LAB RESULTS:      Lab 22  0510   WBC 6.42   HEMOGLOBIN 10.4*   HEMATOCRIT 33.2*   PLATELETS 380   NEUTROS ABS 3.99   IMMATURE GRANS (ABS) 0.05   LYMPHS ABS 1.62   MONOS ABS 0.58   EOS ABS 0.15   MCV 84.3         Lab 22  0510   SODIUM 140   POTASSIUM 4.0   CHLORIDE 103   CO2 28.0   ANION GAP 9.0   BUN 17   CREATININE 0.62*   EGFR 110.8   GLUCOSE 159*   CALCIUM 9.6         Brief Urine Lab Results  (Last result in the past 365 days)      Color   Clarity   Blood   Leuk Est   Nitrite   Protein   CREAT   Urine HCG        22 0108 Yellow   Clear   Negative   Negative     Negative               Microbiology Results Abnormal     Procedure Component Value - Date/Time    Blood Culture - Blood, Wrist, Left [[id number]]  (Normal) Collected: 22 1402    Lab Status: Final result Specimen: Blood from Wrist, Left Updated: 22 1227     Blood  Culture No growth at 5 days    Blood Culture - Blood, Arm, Left [[id number]]  (Normal) Collected: 12/06/22 1402    Lab Status: Final result Specimen: Blood from Arm, Left Updated: 12/11/22 1545     Blood Culture No growth at 5 days        No radiology results from the last 24 hrs    Results for orders placed during the hospital encounter of 11/30/22    Adult Transesophageal Echo (JERALD) W/ Cont if Necessary Per Protocol    Interpretation Summary  •  Left ventricular systolic function is normal. Estimated left ventricular EF = 55%  •  The aortic valve is structurally normal with no stenosis present. Trace aortic valve regurgitation is present. There is no evidence of an aortic valve mass is present.  •  There is mild, anterior mitral leaflet thickening present. No evidence of a mitral valve mass is present. Trace mitral valve regurgitation is present. No significant mitral valve stenosis is present  •  The tricuspid valve is normal in structure. There is no evidence of a mass on the tricuspid valve. Mild tricuspid valve regurgitation is present.  •  There is mild thickening of the pulmonic valve. There is trace pulmonic valve regurgitation present. There is no pulmonic valve stenosis present.    I have reviewed the medications:  Scheduled Meds:atorvastatin, 20 mg, Oral, Nightly  insulin detemir, 10 Units, Subcutaneous, Nightly  insulin lispro, 0-7 Units, Subcutaneous, TID AC  metFORMIN, 1,000 mg, Oral, BID With Meals  methadone, 10 mg, Oral, Q6H  metoprolol tartrate, 25 mg, Oral, Q12H  sodium chloride, 10 mL, Intravenous, Q12H  sodium chloride, 10 mL, Intravenous, Q12H  tamsulosin, 0.4 mg, Oral, Nightly      Continuous Infusions:   PRN Meds:.•  acetaminophen  •  senna-docusate sodium **AND** polyethylene glycol **AND** bisacodyl **AND** bisacodyl  •  calcium carbonate  •  dextrose  •  dextrose  •  glucagon (human recombinant)  •  hydrOXYzine  •  magnesium hydroxide  •  melatonin  •  ondansetron **OR** ondansetron  •   sodium chloride    Assessment & Plan     Active Hospital Problems    Diagnosis  POA   • **Right foot infection [L08.9]  Yes   • MRSA bacteremia [R78.81, B95.62]  Unknown   • Sepsis (HCC) [A41.9]  Yes   • Hypoglycemia [E16.2]  Yes   • Anemia [D64.9]  Yes   • Hyponatremia [E87.1]  Yes   • Hypoalbuminemia [E88.09]  Yes   • Essential hypertension [I10]  Yes   • Hyperlipidemia [E78.5]  Yes   • Paroxysmal atrial fibrillation (HCC) [I48.0]  Yes      Resolved Hospital Problems   No resolved problems to display.     Brief Hospital Course to date:  Buster Velasco is a 57yoM with PMH significant for HTN, insulin-dependent DMII, prior DVT s/p IVC filter, prior osteomyelitis (s/p L BKA), R foot transmetatarsal amputation and L hand 3rd metacarpal resection (April 2022 at OSH - completed 6 weeks IV abx for MRSA bacteremia), remote history of IVDA and more recent history of meth use. Admitted to [facility] 11/30/22 for sepsis secondary to acute R foot gangrene and MRSA bacteremia      Sepsis   Acute R foot gangrene and likely osteomyelitis, resolved  MRSA bacteremia  - s/p R BKA on 12/2/22 by Dr. Peguero  - [facility] PICC placed 12/1/22   - ID following. On IV Daptomycin for at least 6 weeks per Dr. George  - Repeat blood cultures no growth x 5 days  - TTE and JERALD without evidence of valvular vegetation     Recurrent hypoglycemia  Insulin-dependent DMII  - HbA1c 6.9%  - Per home med list, on Metformin 1000mg BID, Farxiga 10mg daily, Tresiba 72 units daily   - Requiring much less medication here  - Continue Levemir 10 units QHS, increased Metformin to 1000mg BID    GERD, continue Tums / PRN milk of mag      Chronic pain on opioids   History of IVDA/substance abuse  - Continue home Methadone 10mg QID     History of atrial fibrillation  - Multiple hospitalizations at [facility] in [place], [place] with reported history of atrial fibrillation / RVR   - Continue Metoprolol 25mg BID  - Not anticoagulated for unclear  reasons   - Continue to monitor on telemetry     Chronic vocal cord paralysis   Dysphagia  History of PEG placement  - Per review of records, history of multiple intubations  - Had PEG placed 8/19/22 at [facility] - he is no longer using this. Will defer to [facility] to removal. RN to continue routine PEG care   - SLP following. Recommend regular texture diet with nectar thick liquids but patient refusing and understands risk of aspiration    Hypertension  - Continue Metoprolol 25mg BID  - Held Losartan / Bumex on admission due to hypotension. BP currently stable so will continue to hold      History of DVT s/p IVC filter   - IVC filter placed in spring 2022 per patient. Defer to PCP for further assessment and timing of removal.      Hx of seizure in setting of meth use      BPH   - Continue home Flomax    Expected Discharge Location and Transportation: SNF via EMS. Referral to [facility] pending   Expected Discharge - patient is medically ready for DC whenever rehab arrangements are made     Expected Discharge Date and Time     Expected Discharge Date Expected Discharge Time    Dec 29, 2022          DVT prophylaxis:Mechanical DVT prophylaxis orders are present.     AM-PAC 6 Clicks Score (PT): 11 (12/26/22 1506)    CODE STATUS:   Code Status and Medical Interventions:   Ordered at: 12/01/22 0035     Code Status (Patient has no pulse and is not breathing):    CPR (Attempt to Resuscitate)     Medical Interventions (Patient has pulse or is breathing):    Full Support     Justino Gee MD  12/28/22

## 2022-12-29 LAB
ANION GAP SERPL CALCULATED.3IONS-SCNC: 12 MMOL/L (ref 5–15)
BUN SERPL-MCNC: 20 MG/DL (ref 6–20)
BUN/CREAT SERPL: 25.6 (ref 7–25)
CALCIUM SPEC-SCNC: 9.6 MG/DL (ref 8.6–10.5)
CHLORIDE SERPL-SCNC: 107 MMOL/L (ref 98–107)
CK SERPL-CCNC: 47 U/L (ref 20–200)
CO2 SERPL-SCNC: 26 MMOL/L (ref 22–29)
CREAT SERPL-MCNC: 0.78 MG/DL (ref 0.76–1.27)
DEPRECATED RDW RBC AUTO: 52.8 FL (ref 37–54)
EGFRCR SERPLBLD CKD-EPI 2021: 103.4 ML/MIN/1.73
ERYTHROCYTE [DISTWIDTH] IN BLOOD BY AUTOMATED COUNT: 17.1 % (ref 12.3–15.4)
GLUCOSE BLDC GLUCOMTR-MCNC: 105 MG/DL (ref 70–130)
GLUCOSE BLDC GLUCOMTR-MCNC: 116 MG/DL (ref 70–130)
GLUCOSE BLDC GLUCOMTR-MCNC: 129 MG/DL (ref 70–130)
GLUCOSE BLDC GLUCOMTR-MCNC: 162 MG/DL (ref 70–130)
GLUCOSE SERPL-MCNC: 88 MG/DL (ref 65–99)
HCT VFR BLD AUTO: 34.6 % (ref 37.5–51)
HGB BLD-MCNC: 10.6 G/DL (ref 13–17.7)
MCH RBC QN AUTO: 26 PG (ref 26.6–33)
MCHC RBC AUTO-ENTMCNC: 30.6 G/DL (ref 31.5–35.7)
MCV RBC AUTO: 84.8 FL (ref 79–97)
PLATELET # BLD AUTO: 315 10*3/MM3 (ref 140–450)
PMV BLD AUTO: 10.1 FL (ref 6–12)
POTASSIUM SERPL-SCNC: 4.3 MMOL/L (ref 3.5–5.2)
RBC # BLD AUTO: 4.08 10*6/MM3 (ref 4.14–5.8)
SODIUM SERPL-SCNC: 145 MMOL/L (ref 136–145)
WBC NRBC COR # BLD: 6.66 10*3/MM3 (ref 3.4–10.8)

## 2022-12-29 PROCEDURE — 63710000001 ONDANSETRON PER 8 MG: Performed by: PEDIATRICS

## 2022-12-29 PROCEDURE — 99024 POSTOP FOLLOW-UP VISIT: CPT | Performed by: THORACIC SURGERY (CARDIOTHORACIC VASCULAR SURGERY)

## 2022-12-29 PROCEDURE — 25010000002 DAPTOMYCIN PER 1 MG: Performed by: INTERNAL MEDICINE

## 2022-12-29 PROCEDURE — 82962 GLUCOSE BLOOD TEST: CPT

## 2022-12-29 PROCEDURE — 63710000001 INSULIN DETEMIR PER 5 UNITS: Performed by: PEDIATRICS

## 2022-12-29 PROCEDURE — 80048 BASIC METABOLIC PNL TOTAL CA: CPT | Performed by: INTERNAL MEDICINE

## 2022-12-29 PROCEDURE — 92526 ORAL FUNCTION THERAPY: CPT

## 2022-12-29 PROCEDURE — 85027 COMPLETE CBC AUTOMATED: CPT | Performed by: INTERNAL MEDICINE

## 2022-12-29 PROCEDURE — 82550 ASSAY OF CK (CPK): CPT

## 2022-12-29 PROCEDURE — 99232 SBSQ HOSP IP/OBS MODERATE 35: CPT | Performed by: INTERNAL MEDICINE

## 2022-12-29 RX ORDER — SIMETHICONE 80 MG
80 TABLET,CHEWABLE ORAL 4 TIMES DAILY PRN
Status: DISCONTINUED | OUTPATIENT
Start: 2022-12-29 | End: 2023-01-06 | Stop reason: HOSPADM

## 2022-12-29 RX ADMIN — METFORMIN HYDROCHLORIDE 1000 MG: 1000 TABLET, FILM COATED ORAL at 08:22

## 2022-12-29 RX ADMIN — Medication 10 ML: at 08:27

## 2022-12-29 RX ADMIN — ATORVASTATIN CALCIUM 20 MG: 20 TABLET, FILM COATED ORAL at 20:10

## 2022-12-29 RX ADMIN — ONDANSETRON HYDROCHLORIDE 4 MG: 4 TABLET, FILM COATED ORAL at 08:23

## 2022-12-29 RX ADMIN — ONDANSETRON HYDROCHLORIDE 4 MG: 4 TABLET, FILM COATED ORAL at 01:34

## 2022-12-29 RX ADMIN — METHADONE HYDROCHLORIDE 10 MG: 10 TABLET ORAL at 08:23

## 2022-12-29 RX ADMIN — METHADONE HYDROCHLORIDE 10 MG: 10 TABLET ORAL at 20:10

## 2022-12-29 RX ADMIN — METHADONE HYDROCHLORIDE 10 MG: 10 TABLET ORAL at 01:27

## 2022-12-29 RX ADMIN — DAPTOMYCIN 800 MG: 500 INJECTION, POWDER, LYOPHILIZED, FOR SOLUTION INTRAVENOUS at 09:37

## 2022-12-29 RX ADMIN — METHADONE HYDROCHLORIDE 10 MG: 10 TABLET ORAL at 14:25

## 2022-12-29 RX ADMIN — TAMSULOSIN HYDROCHLORIDE 0.4 MG: 0.4 CAPSULE ORAL at 20:10

## 2022-12-29 RX ADMIN — METOPROLOL TARTRATE 25 MG: 25 TABLET, FILM COATED ORAL at 08:23

## 2022-12-29 RX ADMIN — ONDANSETRON HYDROCHLORIDE 4 MG: 4 TABLET, FILM COATED ORAL at 14:25

## 2022-12-29 RX ADMIN — INSULIN DETEMIR 10 UNITS: 100 INJECTION, SOLUTION SUBCUTANEOUS at 20:10

## 2022-12-29 RX ADMIN — Medication 10 ML: at 20:10

## 2022-12-29 RX ADMIN — METFORMIN HYDROCHLORIDE 1000 MG: 1000 TABLET, FILM COATED ORAL at 16:58

## 2022-12-29 RX ADMIN — METOPROLOL TARTRATE 25 MG: 25 TABLET, FILM COATED ORAL at 20:10

## 2022-12-29 NOTE — PLAN OF CARE
Problem: Adult Inpatient Plan of Care  Goal: Absence of Hospital-Acquired Illness or Injury  Outcome: Ongoing, Progressing  Intervention: Identify and Manage Fall Risk  Recent Flowsheet Documentation  Taken 12/29/2022 1610 by Charley Amaro RN  Safety Promotion/Fall Prevention:   assistive device/personal items within reach   activity supervised   clutter free environment maintained   fall prevention program maintained   lighting adjusted   safety round/check completed   room organization consistent  Taken 12/29/2022 1425 by Charley Amaro RN  Safety Promotion/Fall Prevention:   assistive device/personal items within reach   clutter free environment maintained   safety round/check completed   room organization consistent   lighting adjusted  Taken 12/29/2022 1217 by Charley Amaro RN  Safety Promotion/Fall Prevention:   clutter free environment maintained   room organization consistent   safety round/check completed   lighting adjusted  Taken 12/29/2022 1030 by Charley Amaro RN  Safety Promotion/Fall Prevention:   clutter free environment maintained   safety round/check completed   room organization consistent   activity supervised   assistive device/personal items within reach  Taken 12/29/2022 0937 by Charley Amaro RN  Safety Promotion/Fall Prevention:   activity supervised   assistive device/personal items within reach   clutter free environment maintained   safety round/check completed  Intervention: Prevent Skin Injury  Recent Flowsheet Documentation  Taken 12/29/2022 1425 by Charley Amaro RN  Body Position: patient/family refused  Taken 12/29/2022 0937 by Charley Amaro RN  Body Position: supine, legs elevated  Skin Protection: adhesive use limited  Intervention: Prevent and Manage VTE (Venous Thromboembolism) Risk  Recent Flowsheet Documentation  Taken 12/29/2022 1425 by Charley Amaro RN  Activity Management: activity encouraged  Taken 12/29/2022 1217 by Charley Amaro RN  Activity  Management:   activity adjusted per tolerance   activity encouraged  Taken 12/29/2022 1030 by Charley mAaro RN  Activity Management:   activity adjusted per tolerance   activity encouraged  Taken 12/29/2022 0937 by Charley Amaro RN  Activity Management:   activity adjusted per tolerance   activity encouraged  Range of Motion: active ROM (range of motion) encouraged  Intervention: Prevent Infection  Recent Flowsheet Documentation  Taken 12/29/2022 0937 by Charley Amaro RN  Infection Prevention: environmental surveillance performed     Problem: Adult Inpatient Plan of Care  Goal: Absence of Hospital-Acquired Illness or Injury  Intervention: Prevent Skin Injury  Recent Flowsheet Documentation  Taken 12/29/2022 1425 by Charley Amaro RN  Body Position: patient/family refused  Taken 12/29/2022 0937 by Charley Amaro RN  Body Position: supine, legs elevated  Skin Protection: adhesive use limited   Goal Outcome Evaluation: AAOx4, VS stable, pt educated on importance of turning and moving to prevent further skin breakdown and loss of muscle mass by wound nurse, encouraged to work with PT/OT.

## 2022-12-29 NOTE — THERAPY TREATMENT NOTE
Acute Care - Speech Language Pathology   Swallow Treatment Note [facility]     Patient Name: Buster Velasco  : 1965  MRN: [id number]  Today's Date: 2022               Admit Date: 2022    Visit Dx:     ICD-10-CM ICD-9-CM   1. Right foot infection  L08.9 686.9   2. Hypoglycemia  E16.2 251.2   3. Decubitus ulcer of coccygeal region, unspecified ulcer stage  L89.159 707.03     707.20   4. Pharyngeal dysphagia  R13.13 787.23     Patient Active Problem List   Diagnosis   • Right foot infection   • Hypoglycemia   • Anemia   • Hyponatremia   • Hypoalbuminemia   • Essential hypertension   • Hyperlipidemia   • Paroxysmal atrial fibrillation (HCC)   • Sepsis (HCC)   • MRSA bacteremia     Past Medical History:   Diagnosis Date   • Diabetes (HCC)    • DVT (deep venous thrombosis) (HCC)    • Hypertension    • Mood disorder (HCC)      Past Surgical History:   Procedure Laterality Date   • BELOW KNEE AMPUTATION Left    • BELOW KNEE AMPUTATION Right 2022    Procedure: AMPUTATION BELOW KNEE RIGHT;  Surgeon: John Peguero MD;  [facility];  Service: Vascular;  Laterality: Right;   • TRANS METATARSAL AMPUTATION Right        SLP Recommendation and Plan  SLP Swallowing Diagnosis: mild-moderate, pharyngeal dysphagia (22)  SLP Diet Recommendation: regular textures, nectar thick liquids, water between meals after oral care, with supervision, ice chips between meals after oral care, with supervision, other (see comments) (thin H2O between meals must be from spoon or from cup sip + chin tuck, as tolerated) (22)  Recommended Precautions and Strategies: upright posture during/after eating, general aspiration precautions (22)  SLP Rec. for Method of Medication Administration: meds whole, with thick liquids, with puree, as tolerated (22)     Monitor for Signs of Aspiration: yes, notify SLP if any concerns (22)  Recommended Diagnostics: VFSS ([facility])  (12/29/22 1500)  Swallow Criteria for Skilled Therapeutic Interventions Met: demonstrates skilled criteria (12/29/22 1500)  Anticipated Discharge Disposition (SLP): anticipate therapy at next level of care, inpatient rehabilitation facility (12/29/22 1500)  Rehab Potential/Prognosis, Swallowing: good, to achieve stated therapy goals (12/29/22 1500)  Therapy Frequency (Swallow): 5 days per week (12/29/22 1500)  Predicted Duration Therapy Intervention (Days): until discharge (12/29/22 1500)  Demonstrates Need for Referral to Another Service: otolaryngology (ENT), other (see comments) (Reported he has never followed-up w/ ENT to address vocal cord paralysis (acute onset in July following intubation). May be able to provide recommendations/interventions that could improve both voice & swallow function.) (12/29/22 1500)     Daily Summary of Progress (SLP): progress toward functional goals as expected (12/29/22 1500)               Treatment Assessment (SLP): suspected, continued, pharyngeal dysphagia (12/29/22 1500)  Treatment Assessment Comments (SLP): THroat clear with trials of thin liquids via cup sips. Patient required verbal cues for consistent use of chin tuck and no straw with NT liquids. (12/29/22 1500)  Plan for Continued Treatment (SLP): continue treatment per plan of care (12/29/22 1500)                SWALLOW EVALUATION (last 72 hours)     SLP Adult Swallow Evaluation     Row Name 12/29/22 1500                   Rehab Evaluation    Document Type therapy note (daily note)  -CH        Subjective Information no complaints  -CH        Patient Observations alert;cooperative;agree to therapy  -CH        Patient/Family/Caregiver Comments/Observations none present  -CH        Patient Effort good  -CH        Symptoms Noted During/After Treatment none  -CH           General Information    Patient Profile Reviewed yes  -CH           Pain    Additional Documentation Pain Scale: FACES Pre/Post-Treatment (Group)  -CH            Pain Scale: FACES Pre/Post-Treatment    Pain: FACES Scale, Pretreatment 0-->no hurt  -        Posttreatment Pain Rating 0-->no hurt  -           SLP Evaluation Clinical Impression    SLP Swallowing Diagnosis mild-moderate;pharyngeal dysphagia  -        Functional Impact risk of aspiration/pneumonia  -        Rehab Potential/Prognosis, Swallowing good, to achieve stated therapy goals  -        Swallow Criteria for Skilled Therapeutic Interventions Met demonstrates skilled criteria  -           SLP Treatment Clinical Impressions    Treatment Assessment (SLP) suspected;continued;pharyngeal dysphagia  -        Treatment Assessment Comments (SLP) THroat clear with trials of thin liquids via cup sips. Patient required verbal cues for consistent use of chin tuck and no straw with NT liquids.  -        Daily Summary of Progress (SLP) progress toward functional goals as expected  -        Barriers to Overall Progress (SLP) Baseline deficits  -        Plan for Continued Treatment (SLP) continue treatment per plan of care  -        Care Plan Review care plan/treatment goals reviewed  -           Recommendations    Therapy Frequency (Swallow) 5 days per week  -        Predicted Duration Therapy Intervention (Days) until discharge  -        SLP Diet Recommendation regular textures;nectar thick liquids;water between meals after oral care, with supervision;ice chips between meals after oral care, with supervision;other (see comments)  thin H2O between meals must be from spoon or from cup sip + chin tuck, as tolerated  -        Recommended Diagnostics VFSS ([facility])  -        Recommended Precautions and Strategies upright posture during/after eating;general aspiration precautions  -        Oral Care Recommendations Oral Care BID/PRN  -        SLP Rec. for Method of Medication Administration meds whole;with thick liquids;with puree;as tolerated  -        Monitor for Signs of Aspiration  yes;notify SLP if any concerns  -        Anticipated Discharge Disposition (SLP) anticipate therapy at next level of care;inpatient rehabilitation facility  -CH        Demonstrates Need for Referral to Another Service otolaryngology (ENT);other (see comments)  Reported he has never followed-up w/ ENT to address vocal cord paralysis (acute onset in July following intubation). May be able to provide recommendations/interventions that could improve both voice & swallow function.  -        Equipment Issued to Patient EMST  -              User Key  (r) = Recorded By, (t) = Taken By, (c) = Cosigned By    Initials Name Effective Dates     Yoli Jaeger, MS CCC-SLP 06/16/21 -                 EDUCATION  The patient has been educated in the following areas:   Home Exercise Program (HEP) Dysphagia (Swallowing Impairment) Oral Care/Hydration Modified Diet Instruction.        SLP GOALS     Row Name 12/29/22 1500             (LTG) Patient will demonstrate functional swallow for    Diet Texture (Demonstrate functional swallow) regular textures  -CH      Liquid viscosity (Demonstrate functional swallow) thin liquids  -      [place] (Demonstrate functional swallow) with use of compensatory strategies  -      Time Frame (Demonstrate functional swallow) by discharge  -      Progress/Outcomes (Demonstrate functional swallow) continuing progress toward goal  -      Comment (Demonstrate functional swallow) No overt s/s of aspiration with soft solid or NT liquid trials. Patient required verbal cues for no straw. Personal reusable cup and straws on bedside table. Patient educated re risk for aspiration with straw use. He voiced understanding.  -         (STG) Patient will tolerate trials of    Consistencies Trialed (Tolerate trials) regular textures;nectar/ mildly thick liquids  -      Desired Outcome (Tolerate trials) without signs/symptoms of aspiration;without signs of distress  -      [place]  (Tolerate trials) independently (over 90% accuracy)  -CH      Time Frame (Tolerate trials) by discharge  -CH      Progress/Outcomes (Tolerate trials) goal no longer appropriate  -CH      Comment (Tolerate trials) Assessed w/ dinner tray. Impulsive self-feeding behaviors. No overt s/s aspiration w/ regular/cohesive textures & nectar-thick liquids via cup sips (but needs max cues).  -CH         (STG) Patient will tolerate therapeutic trials of    Consistencies Trialed (Tolerate therapeutic trials) thin liquids  -CH      Desired Outcome (Tolerate therapeutic trials) without signs/symptoms of aspiration  -CH      [place] (Tolerate therapeutic trials) with minimal cues (75-90% accuracy)  -CH      Time Frame (Tolerate therapeutic trials) by discharge  -CH      Progress/Outcomes (Tolerate therapeutic trials) continuing progress toward goal  -CH      Comment (Tolerate therapeutic trials) throat clear with thin without chin tuck + small sips.  -CH         (STG) Pharyngeal Strengthening Exercise Goal 1 (SLP)    Activity (Pharyngeal Strengthening Goal 1, SLP) increase timing  -CH      Increase Timing prepping - 3 second prep or suck swallow or 3-step swallow  -CH      Increase Superior Movement of the Hyolaryngeal Complex effortful pitch glide (falsetto + pharyngeal squeeze)  -CH      Increase Anterior Movement of the Hyolaryngeal Complex EMST;chin tuck against resistance (CTAR)  -CH      Increase Epiglottic Inversion and Retroflexion Mendelsohn  -CH      Increase Closure at Entrance to Airway/Closure of Airway at Glottis super-supraglottic swallow  -CH      Increase Squeeze/Positive Pressure Generation janine  -CH      Increase Tongue Base Retraction hard effortful swallow  -CH      [place]/Accuracy (Pharyngeal Strengthening Goal 1, SLP) with minimal cues (75-90% accuracy)  -CH      Time Frame (Pharyngeal Strengthening Goal 1, SLP) short term goal (STG)  -CH      Progress/Outcomes (Pharyngeal Strengthening Goal 1,  SLP) continuing progress toward goal  -CH      Comment (Pharyngeal Strengthening Goal 1, SLP) completed exercises w min cues  -CH         (STG) Swallow Management Recall Goal 1 (SLP)    Activity (Swallow Management Recall Goal 1, SLP) independent recall of;compensatory swallow strategies/techniques;safe diet/liquid level  -CH      [place]/Accuracy (Swallow Management Recall Goal 1, SLP) independently (over 90% accuracy)  -CH      Time Frame (Swallow Management Recall Goal 1, SLP) short term goal (STG)  -CH      Progress/Outcomes (Swallow Management Recall Goal 1, SLP) goal no longer appropriate  -CH      Comment (Swallow Management Recall Goal 1, SLP) Verbal cue required for pt to state comps  -CH         (STG) Swallow Compensatory Strategies Goal 1 (SLP)    Activity (Swallow Compensatory Strategies/Techniques Goal 1, SLP) compensatory strategies;during p.o. trials;small cup sips;chin tuck posture;other (see comments)  w/ thin liquid in isolation (w/o solids or as liquid wash)  -CH      [place]/Accuracy (Swallow Compensatory Strategies/Techniques Goal 1, SLP) independently (over 90% accuracy)  -CH      Time Frame (Swallow Compensatory Strategies/Techniques Goal 1, SLP) short term goal (STG)  -CH      Progress/Outcomes (Swallow Compensatory Strategies/Techniques Goal 1, SLP) continuing progress toward goal  -CH      Comment (Swallow Compensatory Strategies/Techniques Goal 1, SLP) Required cues for small cup sips, able to accept small sips following cue  -CH            User Key  (r) = Recorded By, (t) = Taken By, (c) = Cosigned By    Initials Name Provider Type    Yoli Trujillo MS CCC-SLP Speech and Language Pathologist                   Time Calculation:    Time Calculation- SLP     Row Name 12/29/22 1632             Time Calculation- SLP    SLP Start Time 1500  -CH      SLP Received On 12/29/22  -CH         Untimed Charges    01219-LA Treatment Swallow Minutes 45  -CH         Total Minutes     Untimed Charges Total Minutes 45  -CH       Total Minutes 45  -CH            User Key  (r) = Recorded By, (t) = Taken By, (c) = Cosigned By    Initials Name Provider Type    Yoli Trujillo MS CCC-SLP Speech and Language Pathologist                Therapy Charges for Today     Code Description Service Date Service Provider Modifiers Qty    [id number] HC ST TREATMENT SWALLOW 3 12/29/2022 Yoli Jaeger MS CCC-SLP GN 1               MS ALIA Finch  12/29/2022

## 2022-12-29 NOTE — PROGRESS NOTES
Cardiothoracic Surgery Progress Note      POD #: 27-right below-knee amputation     LOS: 29 days      Subjective: Awake and alert    Objective:  Vital Signs vital signs below noted T-max past 24 hours 98.2 °F  Temp:  [97.9 °F (36.6 °C)-98.2 °F (36.8 °C)] 98 °F (36.7 °C)  Heart Rate:  [71-96] 80  Resp:  [18] 18  BP: (126-152)/(79-97) 152/97    Physical Exam:   General Appearance: Oriented x3   Lungs:   Heart:   Skin:   Incision: Amputation site dressing change.  Suture intact skin margin viable skin flaps are viable     Results:              Assessment: #1 postop day 27 right below-knee amputation healing well  2 status post remote left below-knee amputation secondary diabetic gangrene.  3 diabetes mellitus and neuropathy    Plan: Continue daily dressing change amputation site.  Medical manage per hospitalist.  Antibiotics per infectious disease.  Rehab okay with me at any time.      John Peguero MD - 12/29/22 - 05:46 EST

## 2022-12-29 NOTE — PLAN OF CARE
Goal Outcome Evaluation:    SLP treatment completed. Will continue to address dysphagia. Please see note for further details and recommendations.

## 2022-12-29 NOTE — NURSING NOTE
"Reason for Wound, Ostomy and Continence (WOC) Nursing Consultation: \"Patient refusing to sit in recliner because of wound on his bottom.  Please see and advise on benefits of getting out of the bed\" -Case mgt.    Had a long discussion with patient about importance of getting out of the bed.  Inspection of patient's bottom revealed very mild MASD at the perianal skin.  When asked if patient is able to recognize if he needs to have a bowel movement he stated that he \"does not know that he is having a bowel movement while he sleeps\".  Explained to patient that there are no open wounds on the patient's bottom that would inhibit him from getting out of the bed. ITD at gluteal cleft, but this not a pressure wound. Skin is intact and blanchable.  Patient stated that he cannot use the slide board to get into his recliner.  Reminded patient that he is in a room with a lift which can transport him to his chair safely. Nothing to add from a WOC standpoint.        Recommendation(s) for management of wound:   -Apply barrier cream daily and after incontinence episodes.  -Keep patient skin clean and dry.  -Apply waffle overlay to recliner.  -Practice pressure injury prevention protocol.    Needs to be addressed: Encourage patient about importance of getting out of the bed.    Most recent Shakeel Scale score:  Sensory Perception: 4-->no impairment  Moisture: 3-->occasionally moist  Activity: 1-->bedfast  Mobility: 2-->very limited  Nutrition: 3-->adequate  Friction and Shear: 1-->problem  Shakeel Score: 14 (12/28/22 2128)     Specialty support surface: Low Air Loss (NICO) Mattress       Pressure Injury Prevention Protocol (initiate for Shakeel Score of 18 or less):   *Keep skin dry, turn q 2 hr, keep heels elevated and offloaded with offloading heel boots.    *Apply z-guard to bottom and bony prominences daily and as needed with incontinence episodes.  *Follow C.A.R.E protocol if medical devices (Bipap, sunshine, Ng tube, etc) are being " used.     Discussed plan of care with RN.    Thank you for consulting the WOC Nurse.  WOC Team will sign off.  Please re consult if the wound(s) worsens.     Jose Valle RN, BSN, CCRN, CWOCN  Wound, Ostomy and Continence (WOC) Department  [facility]

## 2022-12-29 NOTE — PROGRESS NOTES
"    [facility] Medicine Services  PROGRESS NOTE    Patient Name: Buster Velasco  : 1965  MRN: [id number]    Date of Admission: 2022  Primary Care Physician: Ludivina Bowers MD    Subjective     CC: f/u R foot infection s/p BKA     HPI:  C/o that \"sick.\"  States that he is nauseated and and diffuse abdomen \"sore.\"  States that his BM's are normal.      ROS:  Gen- No fevers, chills  CV- No chest pain, palpitations  Resp- No cough, dyspnea  GI- No N/V/D, abd pain      Objective     Vital Signs:   Temp:  [97.5 °F (36.4 °C)-98.2 °F (36.8 °C)] 97.5 °F (36.4 °C)  Heart Rate:  [78-96] 78  Resp:  [16-18] 16  BP: (112-152)/(73-97) 114/74     Physical Exam:  Constitutional: No acute distress, awake, alert, sitting up in bed  HENT: NCAT, mucous membranes moist  Respiratory: Clear to auscultation bilaterally, respiratory effort normal   Cardiovascular: RRR, no murmurs, rubs, or gallops  Gastrointestinal: Positive bowel sounds, soft, nontender, nondistended  Musculoskeletal: bandaged right BKA, old left BKA.  Psychiatric: Appropriate affect, cooperative  Neurologic: Oriented x 3, speech is clear  Skin: No rashes        Results Reviewed:  LAB RESULTS:      Lab 22  0615   WBC 6.66   HEMOGLOBIN 10.6*   HEMATOCRIT 34.6*   PLATELETS 315   MCV 84.8         Lab 22  0615   SODIUM 145   POTASSIUM 4.3   CHLORIDE 107   CO2 26.0   ANION GAP 12.0   BUN 20   CREATININE 0.78   EGFR 103.4   GLUCOSE 88   CALCIUM 9.6         Brief Urine Lab Results  (Last result in the past 365 days)      Color   Clarity   Blood   Leuk Est   Nitrite   Protein   CREAT   Urine HCG        22 0108 Yellow   Clear   Negative   Negative     Negative               Microbiology Results Abnormal     Procedure Component Value - Date/Time    Blood Culture - Blood, Wrist, Left [[id number]]  (Normal) Collected: 22 1402    Lab Status: Final result Specimen: Blood from Wrist, Left Updated: 22 0361     " Blood Culture No growth at 5 days    Blood Culture - Blood, Arm, Left [[id number]]  (Normal) Collected: 12/06/22 1402    Lab Status: Final result Specimen: Blood from Arm, Left Updated: 12/11/22 4245     Blood Culture No growth at 5 days        No radiology results from the last 24 hrs    Results for orders placed during the hospital encounter of 11/30/22    Adult Transesophageal Echo (JERALD) W/ Cont if Necessary Per Protocol    Interpretation Summary  •  Left ventricular systolic function is normal. Estimated left ventricular EF = 55%  •  The aortic valve is structurally normal with no stenosis present. Trace aortic valve regurgitation is present. There is no evidence of an aortic valve mass is present.  •  There is mild, anterior mitral leaflet thickening present. No evidence of a mitral valve mass is present. Trace mitral valve regurgitation is present. No significant mitral valve stenosis is present  •  The tricuspid valve is normal in structure. There is no evidence of a mass on the tricuspid valve. Mild tricuspid valve regurgitation is present.  •  There is mild thickening of the pulmonic valve. There is trace pulmonic valve regurgitation present. There is no pulmonic valve stenosis present.    I have reviewed the medications:  Scheduled Meds:atorvastatin, 20 mg, Oral, Nightly  DAPTOmycin, 8 mg/kg (Order-Specific), Intravenous, Q24H  insulin detemir, 10 Units, Subcutaneous, Nightly  insulin lispro, 0-7 Units, Subcutaneous, TID AC  metFORMIN, 1,000 mg, Oral, BID With Meals  methadone, 10 mg, Oral, Q6H  metoprolol tartrate, 25 mg, Oral, Q12H  sodium chloride, 10 mL, Intravenous, Q12H  sodium chloride, 10 mL, Intravenous, Q12H  tamsulosin, 0.4 mg, Oral, Nightly      Continuous Infusions:   PRN Meds:.•  acetaminophen  •  senna-docusate sodium **AND** polyethylene glycol **AND** bisacodyl **AND** bisacodyl  •  calcium carbonate  •  dextrose  •  dextrose  •  glucagon (human recombinant)  •  hydrOXYzine  •  magnesium  hydroxide  •  melatonin  •  ondansetron **OR** ondansetron  •  sodium chloride    Assessment & Plan     Active Hospital Problems    Diagnosis  POA   • **Right foot infection [L08.9]  Yes   • MRSA bacteremia [R78.81, B95.62]  Unknown   • Sepsis (HCC) [A41.9]  Yes   • Hypoglycemia [E16.2]  Yes   • Anemia [D64.9]  Yes   • Hyponatremia [E87.1]  Yes   • Hypoalbuminemia [E88.09]  Yes   • Essential hypertension [I10]  Yes   • Hyperlipidemia [E78.5]  Yes   • Paroxysmal atrial fibrillation (HCC) [I48.0]  Yes      Resolved Hospital Problems   No resolved problems to display.     Brief Hospital Course to date:  Buster Velasco is a 57yoM with PMH significant for HTN, insulin-dependent DMII, prior DVT s/p IVC filter, prior osteomyelitis (s/p L BKA), R foot transmetatarsal amputation and L hand 3rd metacarpal resection (April 2022 at OSH - completed 6 weeks IV abx for MRSA bacteremia), remote history of IVDA and more recent history of meth use. Admitted to [facility] 11/30/22 for sepsis secondary to acute R foot gangrene and MRSA bacteremia      Sepsis   Acute R foot gangrene and likely osteomyelitis, resolved  MRSA bacteremia  - s/p R BKA on 12/2/22 by Dr. Peguero  - NAE PICC placed 12/1/22   - ID following. On IV Daptomycin for at least 6 weeks per Dr. George  - Repeat blood cultures no growth x 5 days  - TTE and JERALD without evidence of valvular vegetation     Recurrent hypoglycemia  Insulin-dependent DMII  - HbA1c 6.9%  - Per home med list, on Metformin 1000mg BID, Farxiga 10mg daily, Tresiba 72 units daily   - Requiring much less medication here  - Continue Levemir 10 units QHS, increased Metformin to 1000mg BID    Nausea/abdominal pain  --try gas-ex.  BM's normal.  Abdominal exam benign/nontender    GERD, continue Tums / PRN milk of mag      Chronic pain on opioids   History of IVDA/substance abuse  - Continue home Methadone 10mg QID     History of atrial fibrillation  - Multiple hospitalizations at  [facility] in [place], [place] with reported history of atrial fibrillation / RVR   - Continue Metoprolol 25mg BID  - Not anticoagulated for unclear reasons   - Continue to monitor on telemetry     Chronic vocal cord paralysis   Dysphagia  History of PEG placement  - Per review of records, history of multiple intubations  - Had PEG placed 8/19/22 at [facility] - he is no longer using this. Will defer to [facility] to removal. RN to continue routine PEG care   - SLP following. Recommend regular texture diet with nectar thick liquids but patient refusing and understands risk of aspiration    Hypertension  - Continue Metoprolol 25mg BID  - Held Losartan / Bumex on admission due to hypotension. BP currently stable so will continue to hold      History of DVT s/p IVC filter   - IVC filter placed in spring 2022 per patient. Defer to PCP for further assessment and timing of removal.      Hx of seizure in setting of meth use      BPH   - Continue home Flomax    Expected Discharge Location and Transportation: SNF via EMS.   Expected Discharge - patient is medically ready for DC whenever rehab arrangements are made     Expected Discharge Date and Time     Expected Discharge Date Expected Discharge Time    Jan 6, 2023          DVT prophylaxis:Mechanical DVT prophylaxis orders are present.     AM-PAC 6 Clicks Score (PT): 11 (12/26/22 1506)    CODE STATUS:   Code Status and Medical Interventions:   Ordered at: 12/01/22 0035     Code Status (Patient has no pulse and is not breathing):    CPR (Attempt to Resuscitate)     Medical Interventions (Patient has pulse or is breathing):    Full Support     Justino Gee MD  12/29/22

## 2022-12-29 NOTE — PLAN OF CARE
Goal Outcome Evaluation:  Plan of Care Reviewed With: patient        Progress: no change       Pt AOx4, VS WDL, RA, specialty bed in place, intermittent nausea throughout shift, zofran po given x2, no vomiting this shift, slept well between rounds, refused turning several times during shift d/t nausea symptoms, specialty bed in place, dressings CDI

## 2022-12-29 NOTE — SIGNIFICANT NOTE
Pt vomited a large amount on self, bed/rails, and floor. RN and a NA cleaned pt up, pt had vomit all over arms and bands, therefore red band cut off at the time. Pt bathed, bed changed, etc. Administered pt PO Zofran.       1850- Pt requesting another dose of PO Zofran. Administered Zofran when time was acceptable. Relayed to night shift RN.       1915- Was made aware when blood bank bands come off, lab is to be notified for pt to receive new band and another type and screen to be drawn. Was unaware of this. Pt is only still admitted awaiting placement. Lab notified by night shift RN.

## 2022-12-30 LAB
ALBUMIN SERPL-MCNC: 3.3 G/DL (ref 3.5–5.2)
ALBUMIN/GLOB SERPL: 1.1 G/DL
ALP SERPL-CCNC: 97 U/L (ref 39–117)
ALT SERPL W P-5'-P-CCNC: 21 U/L (ref 1–41)
AMYLASE SERPL-CCNC: 39 U/L (ref 28–100)
ANION GAP SERPL CALCULATED.3IONS-SCNC: 13 MMOL/L (ref 5–15)
AST SERPL-CCNC: 20 U/L (ref 1–40)
BILIRUB SERPL-MCNC: 0.2 MG/DL (ref 0–1.2)
BUN SERPL-MCNC: 19 MG/DL (ref 6–20)
BUN/CREAT SERPL: 28.8 (ref 7–25)
CALCIUM SPEC-SCNC: 9 MG/DL (ref 8.6–10.5)
CHLORIDE SERPL-SCNC: 101 MMOL/L (ref 98–107)
CO2 SERPL-SCNC: 24 MMOL/L (ref 22–29)
CREAT SERPL-MCNC: 0.66 MG/DL (ref 0.76–1.27)
EGFRCR SERPLBLD CKD-EPI 2021: 108.7 ML/MIN/1.73
GLOBULIN UR ELPH-MCNC: 3 GM/DL
GLUCOSE BLDC GLUCOMTR-MCNC: 125 MG/DL (ref 70–130)
GLUCOSE BLDC GLUCOMTR-MCNC: 146 MG/DL (ref 70–130)
GLUCOSE BLDC GLUCOMTR-MCNC: 86 MG/DL (ref 70–130)
GLUCOSE BLDC GLUCOMTR-MCNC: 97 MG/DL (ref 70–130)
GLUCOSE SERPL-MCNC: 98 MG/DL (ref 65–99)
LIPASE SERPL-CCNC: 12 U/L (ref 13–60)
POTASSIUM SERPL-SCNC: 4 MMOL/L (ref 3.5–5.2)
PROT SERPL-MCNC: 6.3 G/DL (ref 6–8.5)
SODIUM SERPL-SCNC: 138 MMOL/L (ref 136–145)

## 2022-12-30 PROCEDURE — 63710000001 INSULIN DETEMIR PER 5 UNITS: Performed by: HOSPITALIST

## 2022-12-30 PROCEDURE — 99024 POSTOP FOLLOW-UP VISIT: CPT | Performed by: THORACIC SURGERY (CARDIOTHORACIC VASCULAR SURGERY)

## 2022-12-30 PROCEDURE — 83690 ASSAY OF LIPASE: CPT | Performed by: INTERNAL MEDICINE

## 2022-12-30 PROCEDURE — 80053 COMPREHEN METABOLIC PANEL: CPT | Performed by: INTERNAL MEDICINE

## 2022-12-30 PROCEDURE — 97110 THERAPEUTIC EXERCISES: CPT

## 2022-12-30 PROCEDURE — 25010000002 DAPTOMYCIN PER 1 MG: Performed by: INTERNAL MEDICINE

## 2022-12-30 PROCEDURE — 99232 SBSQ HOSP IP/OBS MODERATE 35: CPT | Performed by: HOSPITALIST

## 2022-12-30 PROCEDURE — 97535 SELF CARE MNGMENT TRAINING: CPT

## 2022-12-30 PROCEDURE — 82962 GLUCOSE BLOOD TEST: CPT

## 2022-12-30 PROCEDURE — 82150 ASSAY OF AMYLASE: CPT | Performed by: INTERNAL MEDICINE

## 2022-12-30 RX ORDER — LOPERAMIDE HYDROCHLORIDE 2 MG/1
2 CAPSULE ORAL 3 TIMES DAILY PRN
Status: DISCONTINUED | OUTPATIENT
Start: 2022-12-30 | End: 2023-01-06 | Stop reason: HOSPADM

## 2022-12-30 RX ADMIN — METHADONE HYDROCHLORIDE 10 MG: 10 TABLET ORAL at 14:19

## 2022-12-30 RX ADMIN — LOPERAMIDE HYDROCHLORIDE 2 MG: 2 CAPSULE ORAL at 13:10

## 2022-12-30 RX ADMIN — DAPTOMYCIN 800 MG: 500 INJECTION, POWDER, LYOPHILIZED, FOR SOLUTION INTRAVENOUS at 09:01

## 2022-12-30 RX ADMIN — LOPERAMIDE HYDROCHLORIDE 2 MG: 2 CAPSULE ORAL at 18:32

## 2022-12-30 RX ADMIN — Medication 10 ML: at 20:02

## 2022-12-30 RX ADMIN — METHADONE HYDROCHLORIDE 10 MG: 10 TABLET ORAL at 08:39

## 2022-12-30 RX ADMIN — METHADONE HYDROCHLORIDE 10 MG: 10 TABLET ORAL at 02:02

## 2022-12-30 RX ADMIN — METFORMIN HYDROCHLORIDE 1000 MG: 1000 TABLET, FILM COATED ORAL at 18:33

## 2022-12-30 RX ADMIN — METHADONE HYDROCHLORIDE 10 MG: 10 TABLET ORAL at 20:00

## 2022-12-30 RX ADMIN — Medication 10 ML: at 08:40

## 2022-12-30 RX ADMIN — METFORMIN HYDROCHLORIDE 1000 MG: 1000 TABLET, FILM COATED ORAL at 08:39

## 2022-12-30 RX ADMIN — ATORVASTATIN CALCIUM 20 MG: 20 TABLET, FILM COATED ORAL at 20:00

## 2022-12-30 RX ADMIN — TAMSULOSIN HYDROCHLORIDE 0.4 MG: 0.4 CAPSULE ORAL at 20:00

## 2022-12-30 RX ADMIN — METOPROLOL TARTRATE 25 MG: 25 TABLET, FILM COATED ORAL at 20:01

## 2022-12-30 RX ADMIN — INSULIN DETEMIR 5 UNITS: 100 INJECTION, SOLUTION SUBCUTANEOUS at 20:00

## 2022-12-30 NOTE — PAYOR COMM NOTE
"Auth # [id number]  Clinical Update    Isidro Velasco (57 y.o. Male)     Date of Birth   1965    Social Security Number       Address   [address] [address]    Home Phone   [phone]    MRN   [id number]       Christianity   Ep[employer]opalian    Marital Status                               Admission Date   11/30/22    Admission Type   Emergency    Admitting Provider   Carmen Bolaños MD    Attending Provider   Carmen Bolaños MD    Department, Room/Bed   [facility] 3G, S366/1       Discharge Date       Discharge Disposition       Discharge Destination                               Attending Provider: Carmen Bolaños MD    Allergies: Gabapentin, Lyrica [Pregabalin]    Isolation: None   Infection: MRSA (12/06/22)   Code Status: CPR    Ht: 193 cm (75.98\")   Wt: 107 kg (235 lb)    Admission Cmt: None   Principal Problem: Right foot infection [L08.9]                 Active Insurance as of 11/30/2022     Primary Coverage     Payor Plan Insurance Group Employer/Plan Group    [place]      Payor Plan Address Payor Plan Phone Number Payor Plan Fax Number Effective Dates    [address] [phone]  11/30/2022 - 11/30/2022    [facility] [address]       Subscriber Name Subscriber Birth Date Member ID       ISIDRO VELASCO 1965 [id number]                 Emergency Contacts      (Rel.) Home Phone Work Phone Mobile Phone    NELSY VELASCO (Spouse) [phone] -- [phone]               Physician Progress Notes (last 72 hours)      John Peguero MD at 12/30/22 0538          Cardiothoracic Surgery Progress Note      POD #: 28-right below-knee amputation     LOS: 30 days      Subjective: Awake and alert    Objective:  Vital Signs vital signs below noted T-max past 24 hours 90.1 °F  Temp:  [97.5 °F (36.4 °C)-98.1 °F (36.7 °C)] 97.7 °F (36.5 °C)  Heart Rate:  [65-78] 65  Resp:  [16] 16  BP: (114-129)/(73-89) " "125/80    Physical Exam:   General Appearance: Oriented x3   Lungs:   Heart:   Skin:   Incision: Amputation site dressing change.  Suture intact skin margin viable skin flaps are viable.     Results:  Results from last 7 days   Lab Units 22  0615   WBC 10*3/mm3 6.66   HEMOGLOBIN g/dL 10.6*   HEMATOCRIT % 34.6*   PLATELETS 10*3/mm3 315     Results from last 7 days   Lab Units 22  0615   SODIUM mmol/L 145   POTASSIUM mmol/L 4.3   CHLORIDE mmol/L 107   CO2 mmol/L 26.0   BUN mg/dL 20   CREATININE mg/dL 0.78   GLUCOSE mg/dL 88   CALCIUM mg/dL 9.6         Assessment: #1 postop day 28 right below-knee amputation healing well  2.  Status post remote left below-knee amputation secondary diabetic gangrene  3.  Diabetes mellitus with neuropathy      Plan: Continue daily dressing change amputation site.  Medical manage per hospitalist.  Antibiotics infectious disease.  Rehab okay with me at any time.      John Peguero MD - 22 - 05:38 EST        Electronically signed by John Peguero MD at 22 0539     Justino Gee MD at 22 [facility]6              [facility] Medicine Services  PROGRESS NOTE    Patient Name: Buster Velasco  : 1965  MRN: [id number]    Date of Admission: 2022  Primary Care Physician: Ludivina Bowers MD    Subjective     CC: f/u R foot infection s/p BKA     HPI:  C/o that \"sick.\"  States that he is nauseated and and diffuse abdomen \"sore.\"  States that his BM's are normal.      ROS:  Gen- No fevers, chills  CV- No chest pain, palpitations  Resp- No cough, dyspnea  GI- No N/V/D, abd pain      Objective     Vital Signs:   Temp:  [97.5 °F (36.4 °C)-98.2 °F (36.8 °C)] 97.5 °F (36.4 °C)  Heart Rate:  [78-96] 78  Resp:  [16-18] 16  BP: (112-152)/(73-97) 114/74     Physical Exam:  Constitutional: No acute distress, awake, alert, sitting up in bed  HENT: NCAT, mucous membranes moist  Respiratory: Clear to auscultation bilaterally, respiratory " effort normal   Cardiovascular: RRR, no murmurs, rubs, or gallops  Gastrointestinal: Positive bowel sounds, soft, nontender, nondistended  Musculoskeletal: bandaged right BKA, old left BKA.  Psychiatric: Appropriate affect, cooperative  Neurologic: Oriented x 3, speech is clear  Skin: No rashes        Results Reviewed:  LAB RESULTS:      Lab 12/29/22  0615   WBC 6.66   HEMOGLOBIN 10.6*   HEMATOCRIT 34.6*   PLATELETS 315   MCV 84.8         Lab 12/29/22  0615   SODIUM 145   POTASSIUM 4.3   CHLORIDE 107   CO2 26.0   ANION GAP 12.0   BUN 20   CREATININE 0.78   EGFR 103.4   GLUCOSE 88   CALCIUM 9.6         Brief Urine Lab Results  (Last result in the past 365 days)      Color   Clarity   Blood   Leuk Est   Nitrite   Protein   CREAT   Urine HCG        08/20/22 0108 Yellow   Clear   Negative   Negative     Negative               Microbiology Results Abnormal     Procedure Component Value - Date/Time    Blood Culture - Blood, Wrist, Left [[id number]]  (Normal) Collected: 12/06/22 1402    Lab Status: Final result Specimen: Blood from Wrist, Left Updated: 12/11/22 1545     Blood Culture No growth at 5 days    Blood Culture - Blood, Arm, Left [[id number]]  (Normal) Collected: 12/06/22 1402    Lab Status: Final result Specimen: Blood from Arm, Left Updated: 12/11/22 1545     Blood Culture No growth at 5 days        No radiology results from the last 24 hrs    Results for orders placed during the hospital encounter of 11/30/22    Adult Transesophageal Echo (JERALD) W/ Cont if Necessary Per Protocol    Interpretation Summary  •  Left ventricular systolic function is normal. Estimated left ventricular EF = 55%  •  The aortic valve is structurally normal with no stenosis present. Trace aortic valve regurgitation is present. There is no evidence of an aortic valve mass is present.  •  There is mild, anterior mitral leaflet thickening present. No evidence of a mitral valve mass is present. Trace mitral valve regurgitation is present. No  significant mitral valve stenosis is present  •  The tricuspid valve is normal in structure. There is no evidence of a mass on the tricuspid valve. Mild tricuspid valve regurgitation is present.  •  There is mild thickening of the pulmonic valve. There is trace pulmonic valve regurgitation present. There is no pulmonic valve stenosis present.    I have reviewed the medications:  Scheduled Meds:atorvastatin, 20 mg, Oral, Nightly  DAPTOmycin, 8 mg/kg (Order-Specific), Intravenous, Q24H  insulin detemir, 10 Units, Subcutaneous, Nightly  insulin lispro, 0-7 Units, Subcutaneous, TID AC  metFORMIN, 1,000 mg, Oral, BID With Meals  methadone, 10 mg, Oral, Q6H  metoprolol tartrate, 25 mg, Oral, Q12H  sodium chloride, 10 mL, Intravenous, Q12H  sodium chloride, 10 mL, Intravenous, Q12H  tamsulosin, 0.4 mg, Oral, Nightly      Continuous Infusions:   PRN Meds:.•  acetaminophen  •  senna-docusate sodium **AND** polyethylene glycol **AND** bisacodyl **AND** bisacodyl  •  calcium carbonate  •  dextrose  •  dextrose  •  glucagon (human recombinant)  •  hydrOXYzine  •  magnesium hydroxide  •  melatonin  •  ondansetron **OR** ondansetron  •  sodium chloride    Assessment & Plan     Active Hospital Problems    Diagnosis  POA   • **Right foot infection [L08.9]  Yes   • MRSA bacteremia [R78.81, B95.62]  Unknown   • Sepsis (HCC) [A41.9]  Yes   • Hypoglycemia [E16.2]  Yes   • Anemia [D64.9]  Yes   • Hyponatremia [E87.1]  Yes   • Hypoalbuminemia [E88.09]  Yes   • Essential hypertension [I10]  Yes   • Hyperlipidemia [E78.5]  Yes   • Paroxysmal atrial fibrillation (HCC) [I48.0]  Yes      Resolved Hospital Problems   No resolved problems to display.     Brief Hospital Course to date:  Buster Velasco is a 57yoM with PMH significant for HTN, insulin-dependent DMII, prior DVT s/p IVC filter, prior osteomyelitis (s/p L BKA), R foot transmetatarsal amputation and L hand 3rd metacarpal resection (April 2022 at OSH - completed 6 weeks IV  abx for MRSA bacteremia), remote history of IVDA and more recent history of meth use. Admitted to [facility] 11/30/22 for sepsis secondary to acute R foot gangrene and MRSA bacteremia      Sepsis   Acute R foot gangrene and likely osteomyelitis, resolved  MRSA bacteremia  - s/p R BKA on 12/2/22 by Dr. Peguero  - NAE PICC placed 12/1/22   - ID following. On IV Daptomycin for at least 6 weeks per Dr. George  - Repeat blood cultures no growth x 5 days  - TTE and JERALD without evidence of valvular vegetation     Recurrent hypoglycemia  Insulin-dependent DMII  - HbA1c 6.9%  - Per home med list, on Metformin 1000mg BID, Farxiga 10mg daily, Tresiba 72 units daily   - Requiring much less medication here  - Continue Levemir 10 units QHS, increased Metformin to 1000mg BID    Nausea/abdominal pain  --try gas-ex.  BM's normal.  Abdominal exam benign/nontender    GERD, continue Tums / PRN milk of mag      Chronic pain on opioids   History of IVDA/substance abuse  - Continue home Methadone 10mg QID     History of atrial fibrillation  - Multiple hospitalizations at [facility] in [place], [place] with reported history of atrial fibrillation / RVR   - Continue Metoprolol 25mg BID  - Not anticoagulated for unclear reasons   - Continue to monitor on telemetry     Chronic vocal cord paralysis   Dysphagia  History of PEG placement  - Per review of records, history of multiple intubations  - Had PEG placed 8/19/22 at [facility] - he is no longer using this. Will defer to [facility] to removal. RN to continue routine PEG care   - SLP following. Recommend regular texture diet with nectar thick liquids but patient refusing and understands risk of aspiration    Hypertension  - Continue Metoprolol 25mg BID  - Held Losartan / Bumex on admission due to hypotension. BP currently stable so will continue to hold      History of DVT s/p IVC filter   - IVC filter placed in spring 2022 per patient. Defer to PCP for further assessment  and timing of removal.      Hx of seizure in setting of meth use      BPH   - Continue home Flomax    Expected Discharge Location and Transportation: SNF via EMS.   Expected Discharge - patient is medically ready for DC whenever rehab arrangements are made     Expected Discharge Date and Time     Expected Discharge Date Expected Discharge Time    Jan 6, 2023          DVT prophylaxis:Mechanical DVT prophylaxis orders are present.     AM-PAC 6 Clicks Score (PT): 11 (12/26/22 1506)    CODE STATUS:   Code Status and Medical Interventions:   Ordered at: 12/01/22 0035     Code Status (Patient has no pulse and is not breathing):    CPR (Attempt to Resuscitate)     Medical Interventions (Patient has pulse or is breathing):    Full Support     Justino Gee MD  12/29/22                Electronically signed by Justino Gee MD at 12/29/22 0626     John Peguero MD at 12/29/22 4876        Cardiothoracic Surgery Progress Note      POD #: 27-right below-knee amputation     LOS: 29 days      Subjective: Awake and alert    Objective:  Vital Signs vital signs below noted T-max past 24 hours 98.2 °F  Temp:  [97.9 °F (36.6 °C)-98.2 °F (36.8 °C)] 98 °F (36.7 °C)  Heart Rate:  [71-96] 80  Resp:  [18] 18  BP: (126-152)/(79-97) 152/97    Physical Exam:   General Appearance: Oriented x3   Lungs:   Heart:   Skin:   Incision: Amputation site dressing change.  Suture intact skin margin viable skin flaps are viable     Results:              Assessment: #1 postop day 27 right below-knee amputation healing well  2 status post remote left below-knee amputation secondary diabetic gangrene.  3 diabetes mellitus and neuropathy    Plan: Continue daily dressing change amputation site.  Medical manage per hospitalist.  Antibiotics per infectious disease.  Rehab okay with me at any time.      John Peguero MD - 12/29/22 - 05:46 EST          Electronically signed by John Peguero MD at 12/29/22 7456     Justino Gee MD at 12/28/22 9983               [facility] Medicine Services  PROGRESS NOTE    Patient Name: Buster Velasco  : 1965  MRN: [id number]    Date of Admission: 2022  Primary Care Physician: Ludivina Bowers MD    Subjective     CC: f/u R foot infection s/p BKA     HPI:  No complaints.      ROS:  Gen- No fevers, chills  CV- No chest pain, palpitations  Resp- No cough, dyspnea  GI- No N/V/D, abd pain      Objective     Vital Signs:   Temp:  [97.9 °F (36.6 °C)-99 °F (37.2 °C)] 98 °F (36.7 °C)  Heart Rate:  [68-75] 74  Resp:  [18] 18  BP: (113-131)/(70-80) 131/79     Physical Exam:  Constitutional: No acute distress, awake, alert, sitting up in bed  HENT: NCAT, mucous membranes moist  Respiratory: Clear to auscultation bilaterally, respiratory effort normal   Cardiovascular: RRR, no murmurs, rubs, or gallops  Gastrointestinal: Positive bowel sounds, soft, nontender, nondistended  Musculoskeletal: bandaged right BKA, old left BKA.  Psychiatric: Appropriate affect, cooperative  Neurologic: Oriented x 3, speech is clear  Skin: No rashes        Results Reviewed:  LAB RESULTS:      Lab 22  0510   WBC 6.42   HEMOGLOBIN 10.4*   HEMATOCRIT 33.2*   PLATELETS 380   NEUTROS ABS 3.99   IMMATURE GRANS (ABS) 0.05   LYMPHS ABS 1.62   MONOS ABS 0.58   EOS ABS 0.15   MCV 84.3         Lab 22  0510   SODIUM 140   POTASSIUM 4.0   CHLORIDE 103   CO2 28.0   ANION GAP 9.0   BUN 17   CREATININE 0.62*   EGFR 110.8   GLUCOSE 159*   CALCIUM 9.6         Brief Urine Lab Results  (Last result in the past 365 days)      Color   Clarity   Blood   Leuk Est   Nitrite   Protein   CREAT   Urine HCG        22 0108 Yellow   Clear   Negative   Negative     Negative               Microbiology Results Abnormal     Procedure Component Value - Date/Time    Blood Culture - Blood, Wrist, Left [[id number]]  (Normal) Collected: 22 1402    Lab Status: Final result Specimen: Blood from Wrist, Left Updated: 22 6952      Blood Culture No growth at 5 days    Blood Culture - Blood, Arm, Left [[id number]]  (Normal) Collected: 12/06/22 1402    Lab Status: Final result Specimen: Blood from Arm, Left Updated: 12/11/22 1543     Blood Culture No growth at 5 days        No radiology results from the last 24 hrs    Results for orders placed during the hospital encounter of 11/30/22    Adult Transesophageal Echo (JERALD) W/ Cont if Necessary Per Protocol    Interpretation Summary  •  Left ventricular systolic function is normal. Estimated left ventricular EF = 55%  •  The aortic valve is structurally normal with no stenosis present. Trace aortic valve regurgitation is present. There is no evidence of an aortic valve mass is present.  •  There is mild, anterior mitral leaflet thickening present. No evidence of a mitral valve mass is present. Trace mitral valve regurgitation is present. No significant mitral valve stenosis is present  •  The tricuspid valve is normal in structure. There is no evidence of a mass on the tricuspid valve. Mild tricuspid valve regurgitation is present.  •  There is mild thickening of the pulmonic valve. There is trace pulmonic valve regurgitation present. There is no pulmonic valve stenosis present.    I have reviewed the medications:  Scheduled Meds:atorvastatin, 20 mg, Oral, Nightly  insulin detemir, 10 Units, Subcutaneous, Nightly  insulin lispro, 0-7 Units, Subcutaneous, TID AC  metFORMIN, 1,000 mg, Oral, BID With Meals  methadone, 10 mg, Oral, Q6H  metoprolol tartrate, 25 mg, Oral, Q12H  sodium chloride, 10 mL, Intravenous, Q12H  sodium chloride, 10 mL, Intravenous, Q12H  tamsulosin, 0.4 mg, Oral, Nightly      Continuous Infusions:   PRN Meds:.•  acetaminophen  •  senna-docusate sodium **AND** polyethylene glycol **AND** bisacodyl **AND** bisacodyl  •  calcium carbonate  •  dextrose  •  dextrose  •  glucagon (human recombinant)  •  hydrOXYzine  •  magnesium hydroxide  •  melatonin  •  ondansetron **OR**  ondansetron  •  sodium chloride    Assessment & Plan     Active Hospital Problems    Diagnosis  POA   • **Right foot infection [L08.9]  Yes   • MRSA bacteremia [R78.81, B95.62]  Unknown   • Sepsis (HCC) [A41.9]  Yes   • Hypoglycemia [E16.2]  Yes   • Anemia [D64.9]  Yes   • Hyponatremia [E87.1]  Yes   • Hypoalbuminemia [E88.09]  Yes   • Essential hypertension [I10]  Yes   • Hyperlipidemia [E78.5]  Yes   • Paroxysmal atrial fibrillation (HCC) [I48.0]  Yes      Resolved Hospital Problems   No resolved problems to display.     Brief Hospital Course to date:  Buster Velasco is a 57yoM with PMH significant for HTN, insulin-dependent DMII, prior DVT s/p IVC filter, prior osteomyelitis (s/p L BKA), R foot transmetatarsal amputation and L hand 3rd metacarpal resection (April 2022 at OSH - completed 6 weeks IV abx for MRSA bacteremia), remote history of IVDA and more recent history of meth use. Admitted to [facility] 11/30/22 for sepsis secondary to acute R foot gangrene and MRSA bacteremia      Sepsis   Acute R foot gangrene and likely osteomyelitis, resolved  MRSA bacteremia  - s/p R BKA on 12/2/22 by Dr. Sudhir SONI PICC placed 12/1/22   - ID following. On IV Daptomycin for at least 6 weeks per Dr. George  - Repeat blood cultures no growth x 5 days  - TTE and JERALD without evidence of valvular vegetation     Recurrent hypoglycemia  Insulin-dependent DMII  - HbA1c 6.9%  - Per home med list, on Metformin 1000mg BID, Farxiga 10mg daily, Tresiba 72 units daily   - Requiring much less medication here  - Continue Levemir 10 units QHS, increased Metformin to 1000mg BID    GERD, continue Tums / PRN milk of mag      Chronic pain on opioids   History of IVDA/substance abuse  - Continue home Methadone 10mg QID     History of atrial fibrillation  - Multiple hospitalizations at [facility] in [place], [place] with reported history of atrial fibrillation / RVR   - Continue Metoprolol 25mg BID  - Not anticoagulated  for unclear reasons   - Continue to monitor on telemetry     Chronic vocal cord paralysis   Dysphagia  History of PEG placement  - Per review of records, history of multiple intubations  - Had PEG placed 8/19/22 at [facility] - he is no longer using this. Will defer to [facility] to removal. RN to continue routine PEG care   - SLP following. Recommend regular texture diet with nectar thick liquids but patient refusing and understands risk of aspiration    Hypertension  - Continue Metoprolol 25mg BID  - Held Losartan / Bumex on admission due to hypotension. BP currently stable so will continue to hold      History of DVT s/p IVC filter   - IVC filter placed in spring 2022 per patient. Defer to PCP for further assessment and timing of removal.      Hx of seizure in setting of meth use      BPH   - Continue home Flomax    Expected Discharge Location and Transportation: SNF via EMS. Referral to [place] pending   Expected Discharge - patient is medically ready for DC whenever rehab arrangements are made     Expected Discharge Date and Time     Expected Discharge Date Expected Discharge Time    Dec 29, 2022          DVT prophylaxis:Mechanical DVT prophylaxis orders are present.     AM-PAC 6 Clicks Score (PT): 11 (12/26/22 1506)    CODE STATUS:   Code Status and Medical Interventions:   Ordered at: 12/01/22 0035     Code Status (Patient has no pulse and is not breathing):    CPR (Attempt to Resuscitate)     Medical Interventions (Patient has pulse or is breathing):    Full Support     Justino Gee MD  12/28/22                Electronically signed by Justino Gee MD at 12/28/22 8040     John Peguero MD at 12/28/22 6974          Cardiothoracic Surgery Progress Note      POD #: 26-right below-knee amputation     LOS: 28 days      Subjective: Oriented x3    Objective:  Vital Signs vital signs below noted T-max past 24 hours 98.2 °F  Temp:  [97.9 °F (36.6 °C)-98.2 °F (36.8 °C)] 97.9 °F (36.6 °C)  Heart Rate:   [65-75] 74  Resp:  [18] 18  BP: (121-145)/(70-96) 130/77    Physical Exam:   General Appearance: Oriented times   Lungs:   Heart:   Skin:   Incision: The amputation site dressing change.  Sutures intact skin margin viable and skin flaps are viable     Results:  Results from last 7 days   Lab Units 12/22/22  0510   WBC 10*3/mm3 6.42   HEMOGLOBIN g/dL 10.4*   HEMATOCRIT % 33.2*   PLATELETS 10*3/mm3 380     Results from last 7 days   Lab Units 12/22/22  0510   SODIUM mmol/L 140   POTASSIUM mmol/L 4.0   CHLORIDE mmol/L 103   CO2 mmol/L 28.0   BUN mg/dL 17   CREATININE mg/dL 0.62*   GLUCOSE mg/dL 159*   CALCIUM mg/dL 9.6         Assessment: #1.  Postop day 26 right below-knee amputation healing well  2.  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  Diabetes mellitus and neuropathy    Plan: Continue daily dressing change amputation site.  Medical manage per hospitalist.  Antibiotics per infectious disease.  Rehab okay with me at any time.      John Peguero MD - 12/28/22 - 05:44 EST        Electronically signed by John Peguero MD at 12/28/22 0556       Consult Notes (last 72 hours)  Notes from 12/27/22 1458 through 12/30/22 1458   No notes of this type exist for this encounter.

## 2022-12-30 NOTE — PROGRESS NOTES
Cardiothoracic Surgery Progress Note      POD #: 28-right below-knee amputation     LOS: 30 days      Subjective: Awake and alert    Objective:  Vital Signs vital signs below noted T-max past 24 hours 90.1 °F  Temp:  [97.5 °F (36.4 °C)-98.1 °F (36.7 °C)] 97.7 °F (36.5 °C)  Heart Rate:  [65-78] 65  Resp:  [16] 16  BP: (114-129)/(73-89) 125/80    Physical Exam:   General Appearance: Oriented x3   Lungs:   Heart:   Skin:   Incision: Amputation site dressing change.  Suture intact skin margin viable skin flaps are viable.     Results:  Results from last 7 days   Lab Units 12/29/22  0615   WBC 10*3/mm3 6.66   HEMOGLOBIN g/dL 10.6*   HEMATOCRIT % 34.6*   PLATELETS 10*3/mm3 315     Results from last 7 days   Lab Units 12/29/22  0615   SODIUM mmol/L 145   POTASSIUM mmol/L 4.3   CHLORIDE mmol/L 107   CO2 mmol/L 26.0   BUN mg/dL 20   CREATININE mg/dL 0.78   GLUCOSE mg/dL 88   CALCIUM mg/dL 9.6         Assessment: #1 postop day 28 right below-knee amputation healing well  2.  Status post remote left below-knee amputation secondary diabetic gangrene  3.  Diabetes mellitus with neuropathy      Plan: Continue daily dressing change amputation site.  Medical manage per hospitalist.  Antibiotics infectious disease.  Rehab okay with me at any time.      John Peguero MD - 12/30/22 - 05:38 EST

## 2022-12-30 NOTE — PLAN OF CARE
Goal Outcome Evaluation:  Plan of Care Reviewed With: patient        Progress: no change  Outcome Evaluation: Pt declined to complete sliding board transfer this date due to fear of falling with use of recliner. W/c not safe to utilize per prior notes. Pt rolled L to R in bed with minAx1 and good effort with use of UE on bed rail. Pt dependent for bed to chair transfer via mechanical lift. Pt sat unsupported in chair to complete oral care with setup, wash face with setup, and comb hair with Jamey from OT due to dereased ROM in L UE. Pt demonstrates good ability to weight shift and good effort for all grooming. Limited for transfers by decreased B UE strength and fear. OT will continue to see pt 3x/week to address ROM, strength, balance, and safety to increase independence with ADLS and transfers for ADLS. Recommend d/c to SNF.

## 2022-12-30 NOTE — THERAPY PROGRESS REPORT/RE-CERT
Patient Name: Buster Velasco  : 1965    MRN: [id number]                              Today's Date: 2022       Admit Date: 2022    Visit Dx:     ICD-10-CM ICD-9-CM   1. Right foot infection  L08.9 686.9   2. Hypoglycemia  E16.2 251.2   3. Decubitus ulcer of coccygeal region, unspecified ulcer stage  L89.159 707.03     707.20   4. Pharyngeal dysphagia  R13.13 787.23     Patient Active Problem List   Diagnosis   • Right foot infection   • Hypoglycemia   • Anemia   • Hyponatremia   • Hypoalbuminemia   • Essential hypertension   • Hyperlipidemia   • Paroxysmal atrial fibrillation (HCC)   • Sepsis (HCC)   • MRSA bacteremia     Past Medical History:   Diagnosis Date   • Diabetes (HCC)    • DVT (deep venous thrombosis) (HCC)    • Hypertension    • Mood disorder (HCC)      Past Surgical History:   Procedure Laterality Date   • BELOW KNEE AMPUTATION Left    • BELOW KNEE AMPUTATION Right 2022    Procedure: AMPUTATION BELOW KNEE RIGHT;  Surgeon: John Peguero MD;  Location: [facility];  Service: Vascular;  Laterality: Right;   • TRANS METATARSAL AMPUTATION Right       General Information     Row Name 22 1351          OT Time and Intention    Document Type progress note/recertification  -     Mode of Treatment occupational therapy  -     Row Name 22 1354          General Information    Patient Profile Reviewed yes  -     Existing Precautions/Restrictions fall;other (see comments)   bilateral BKA  -     Barriers to Rehab medically complex;previous functional deficit  -     Row Name 22 135          Living Environment    People in Home spouse  -     Row Name 22 1352          Cognition    Orientation Status (Cognition) oriented x 4  -HM     Row Name 22 1352          Safety Issues, Functional Mobility    Safety Issues Affecting Function (Mobility) awareness of need for assistance;insight into deficits/self-awareness  -     Impairments Affecting Function  "(Mobility) balance;endurance/activity tolerance;pain;strength;postural/trunk control;range of motion (ROM)  -           User Key  (r) = Recorded By, (t) = Taken By, (c) = Cosigned By    Initials Name Provider Type     Nora Abbasi OT Occupational Therapist                 Mobility/ADL's     Row Name 12/30/22 1400          Bed Mobility    Bed Mobility rolling left;rolling right  -     Rolling Left Payette (Bed Mobility) minimum assist (75% patient effort);1 person assist;verbal cues  -     Rolling Right Payette (Bed Mobility) minimum assist (75% patient effort);1 person assist;verbal cues  -     Bed Mobility, Safety Issues decreased use of arms for pushing/pulling;impaired trunk control for bed mobility  -     Assistive Device (Bed Mobility) bed rails  -     Comment, (Bed Mobility) rolled L to R with Jamey for sling placement. Good effort from pt and use if B UE to grab bed rails.  -     Row Name 12/30/22 1400          Transfers    Comment, (Transfers) Dependent for bed to chair transfers at this time. Prosthesis is not properly fitted and w/c unsafe to use. Pt declines sliding board as he finds it \"scary\". Pt sat upright unsupported in chair to complete oral care, wash face/hands and wash face. Pt demonstrates good ability to weight shift. Limited by arm weakness making sliding boar difficult to use.  -     Row Name 12/30/22 1400          Bed-Chair Transfer    Bed-Chair Payette (Transfers) dependent (less than 25% patient effort)  -     Assistive Device (Bed-Chair Transfers) lift device  -     Row Name 12/30/22 1400          Sit-Stand Transfer    Comment, (Sit-Stand Transfer) unable to complete.  -     Row Name 12/30/22 1400          Functional Mobility    Functional Mobility- Ind. Level not tested  -     Row Name 12/30/22 1400          Activities of Daily Living    BADL Assessment/Intervention grooming  -     Row Name 12/30/22 1400          Mobility    Extremity " Weight-bearing Status right lower extremity  -     Right Lower Extremity (Weight-bearing Status) non weight-bearing (NWB)  -     Row Name 12/30/22 1400          Grooming Assessment/Training    Harrison Level (Grooming) hair care, combing/brushing;minimum assist (75% patient effort);oral care regimen;wash face, hands;set up  -     Position (Grooming) sitting up in bed;supported sitting  -     Comment, (Grooming) Jamey for hair care due to decreased ROM in LUE.  -           User Key  (r) = Recorded By, (t) = Taken By, (c) = Cosigned By    Initials Name Provider Type     Nora Abbasi OT Occupational Therapist               Obj/Interventions     Row Name 12/30/22 1409          Sensory Assessment (Somatosensory)    Sensory Assessment (Somatosensory) sensation intact  -     Row Name 12/30/22 1409          Balance    Balance Assessment sitting static balance;sitting dynamic balance  -     Static Sitting Balance supervision  -     Dynamic Sitting Balance contact guard  -     Position, Sitting Balance unsupported;sitting in chair  -     Static Standing Balance not tested  -     Dynamic Standing Balance not tested  -     Balance Interventions sitting;occupation based/functional task  -           User Key  (r) = Recorded By, (t) = Taken By, (c) = Cosigned By    Initials Name Provider Type     Nora Abbasi OT Occupational Therapist               Goals/Plan     Row Name 12/30/22 1413          Bed Mobility Goal 1 (OT)    Activity/Assistive Device (Bed Mobility Goal 1, OT) sit to supine/supine to sit;scooting  -     Harrison Level/Cues Needed (Bed Mobility Goal 1, OT) modified independence  -     Time Frame (Bed Mobility Goal 1, OT) by discharge;long term goal (LTG)  -     Progress/Outcomes (Bed Mobility Goal 1, OT) goal ongoing  -     Row Name 12/30/22 1413          Transfer Goal 1 (OT)    Activity/Assistive Device (Transfer Goal 1, OT) other (see comments)  transfer  board  -HM     Panther Burn Level/Cues Needed (Transfer Goal 1, OT) moderate assist (50-74% patient effort);tactile cues required;verbal cues required  -HM     Time Frame (Transfer Goal 1, OT) long term goal (LTG);by discharge  -HM     Progress/Outcome (Transfer Goal 1, OT) progress slower than expected;goal ongoing;continuing progress toward goal  -HM     Row Name 12/30/22 1413          Dressing Goal 1 (OT)    Activity/Device (Dressing Goal 1, OT) upper body dressing  -HM     Panther Burn/Cues Needed (Dressing Goal 1, OT) verbal cues required;contact guard required  -HM     Time Frame (Dressing Goal 1, OT) long term goal (LTG);by discharge  -HM     Progress/Outcome (Dressing Goal 1, OT) good progress toward goal;goal ongoing  -     Row Name 12/30/22 1413          Strength Goal 1 (OT)    Strength Goal 1 (OT) Pt to complete seated HEP encompassing BUEs with respect to remote shoulder limitations targeting strength and fxl endurance w/ progressive sets/reps/resistance in order to improve integration in ADLs, related t/fs, mobility  -HM     Time Frame (Strength Goal 1, OT) long term goal (LTG);by discharge  -HM     Progress/Outcome (Strength Goal 1, OT) goal ongoing;continuing progress toward goal  -HM           User Key  (r) = Recorded By, (t) = Taken By, (c) = Cosigned By    Initials Name Provider Type     Nora Abbasi, OT Occupational Therapist               Clinical Impression     Row Name 12/30/22 1400          Pain Assessment    Pretreatment Pain Rating 0/10 - no pain  -HM     Posttreatment Pain Rating 0/10 - no pain  -HM     Row Name 12/30/22 1405          Pain Scale: FACES Pre/Post-Treatment    Pain: FACES Scale, Pretreatment 0-->no hurt  -HM     Posttreatment Pain Rating 0-->no hurt  -HM     Row Name 12/30/22 1407          Plan of Care Review    Plan of Care Reviewed With patient  -HM     Progress no change  -HM     Outcome Evaluation Pt declined to complete sliding board transfer this date due to  fear of falling with use of recliner. W/c not safe to utilize per prior notes. Pt rolled L to R in bed with minAx1 and good effort with use of UE on bed rail. Pt dependent for bed to chair transfer via mechanical lift. Pt sat unsupported in chair to complete oral care with setup, wash face with setup, and comb hair with Jamey from OT due to dereased ROM in L UE. Pt demonstrates good ability to weight shift and good effort for all grooming. Limited for transfers by decreased B UE strength and fear. OT will continue to see pt 3x/week to address ROM, strength, balance, and safety to increase independence with ADLS and transfers for ADLS. Recommend d/c to SNF.  -     Row Name 12/30/22 1408          Therapy Assessment/Plan (OT)    Rehab Potential (OT) fair, will monitor progress closely  -     Criteria for Skilled Therapeutic Interventions Met (OT) yes;meets criteria;skilled treatment is necessary  -     Therapy Frequency (OT) 3 times/wk  -     Row Name 12/30/22 1400          Therapy Plan Review/Discharge Plan (OT)    Anticipated Discharge Disposition (OT) skilled nursing facility  -     Row Name 12/30/22 1407          Vital Signs    Pre Systolic BP Rehab --  RN cleared for tx; VSS  -HM     O2 Delivery Pre Treatment room air  -HM     O2 Delivery Intra Treatment room air  -HM     O2 Delivery Post Treatment room air  -HM     Pre Patient Position Supine  -HM     Intra Patient Position Sitting  -HM     Post Patient Position Sitting  -     Row Name 12/30/22 1406          Positioning and Restraints    Pre-Treatment Position in bed  -     Post Treatment Position chair  -HM     In Chair notified nsg;reclined;call light within reach;encouraged to call for assist;exit alarm on  -           User Key  (r) = Recorded By, (t) = Taken By, (c) = Cosigned By    Initials Name Provider Type     Nora Abbasi, OT Occupational Therapist               Outcome Measures     Row Name 12/30/22 6146          How much help  from another is currently needed...    Putting on and taking off regular lower body clothing? 2  -HM     Bathing (including washing, rinsing, and drying) 2  -HM     Toileting (which includes using toilet bed pan or urinal) 1  -HM     Putting on and taking off regular upper body clothing 3  -HM     Taking care of personal grooming (such as brushing teeth) 3  -HM     Eating meals 4  -     AM-PAC 6 Clicks Score (OT) 15  -     Row Name 12/30/22 0800          How much help from another person do you currently need...    Turning from your back to your side while in flat bed without using bedrails? 3  -BC     Moving from lying on back to sitting on the side of a flat bed without bedrails? 2  -BC     Moving to and from a bed to a chair (including a wheelchair)? 1  -BC     Standing up from a chair using your arms (e.g., wheelchair, bedside chair)? 1  -BC     Climbing 3-5 steps with a railing? 1  -BC     To walk in hospital room? 1  -BC     AM-PAC 6 Clicks Score (PT) 9  -BC     Highest level of mobility 3 --> Sat at edge of bed  -BC     Row Name 12/30/22 1414          Functional Assessment    Outcome Measure Options AM-PAC 6 Clicks Daily Activity (OT)  -           User Key  (r) = Recorded By, (t) = Taken By, (c) = Cosigned By    Initials Name Provider Type     Nora Abbasi OT Occupational Therapist    Jo Call, RN Registered Nurse                Occupational Therapy Education     Title: PT OT SLP Therapies (In Progress)     Topic: Occupational Therapy (Done)     Point: ADL training (Done)     Description:   Instruct learner(s) on proper safety adaptation and remediation techniques during self care or transfers.   Instruct in proper use of assistive devices.              Learning Progress Summary           Patient Acceptance, E,TB,D, VU,NR by  at 12/30/2022 1416    Acceptance, E,D, VU,DU,NR by TB at 12/24/2022 1529    Acceptance, E, VU,DU by BRAULIO at 12/22/2022 1311    Comment: Sitting balance; UE HEP;  pressure relief    Acceptance, E,D, NR by JY at 12/19/2022 0942    Acceptance, E,D, VU,DU,NR by TB at 12/14/2022 1441    Acceptance, E, VU by MR at 12/11/2022 1448    Eager, E, VU,DU by SC at 12/7/2022 1115    Comment: REVIEWED HEP    Acceptance, E, VU by MR at 12/7/2022 1110                   Point: Home exercise program (Done)     Description:   Instruct learner(s) on appropriate technique for monitoring, assisting and/or progressing therapeutic exercises/activities.              Learning Progress Summary           Patient Acceptance, E,D, VU,DU,NR by TB at 12/24/2022 1529    Acceptance, E, VU,DU by BRAULIO at 12/22/2022 1311    Comment: Sitting balance; UE HEP; pressure relief    Acceptance, E,D, VU,DU,NR by TB at 12/14/2022 1441    Acceptance, E, VU by MR at 12/11/2022 1448    Eager, E, VU,DU by SC at 12/7/2022 1115    Comment: REVIEWED HEP    Acceptance, E, VU by MR at 12/7/2022 1110                   Point: Precautions (Done)     Description:   Instruct learner(s) on prescribed precautions during self-care and functional transfers.              Learning Progress Summary           Patient Acceptance, E,TB,D, VU,NR by  at 12/30/2022 1416    Acceptance, E,D, VU,DU,NR by TB at 12/24/2022 1529    Acceptance, E, VU,DU by  at 12/22/2022 1311    Comment: Sitting balance; UE HEP; pressure relief    Acceptance, E,D, NR by JY at 12/19/2022 0942    Acceptance, E,D, VU,DU,NR by TB at 12/14/2022 1441    Acceptance, E, VU by MR at 12/11/2022 1448    Eager, E, VU,DU by SC at 12/7/2022 1115    Comment: REVIEWED HEP    Acceptance, E, VU by MR at 12/7/2022 1110                   Point: Body mechanics (Done)     Description:   Instruct learner(s) on proper positioning and spine alignment during self-care, functional mobility activities and/or exercises.              Learning Progress Summary           Patient Acceptance, E,TB,D, VU,NR by USHA at 12/30/2022 1416    Acceptance, GEOVANNY PONCE, NR by MEMO at 12/19/2022 0942    Acceptance, CAMILA PONCE by  MR at 12/11/2022 1448    KRIS Martinez VU, DU by SC at 12/7/2022 1115    Comment: REVIEWED HEP    KRIS Espinal VU by MR at 12/7/2022 1110                               User Key     Initials Effective Dates Name Provider Type Discipline    SC 06/16/21 -  Shamika Patel, PT Physical Therapist PT    TB 06/16/21 -  Joseline Lemon, OT Occupational Therapist OT    HM 10/25/22 -  Nora Abbasi, OT Occupational Therapist OT    JY 06/16/21 -  Kristy Canas, OT Occupational Therapist OT    BRAULIO 06/16/21 -  Kristy Michel, OT Occupational Therapist OT    MR 09/22/22 -  Marilou Lennon, OT Occupational Therapist OT              OT Recommendation and Plan  Therapy Frequency (OT): 3 times/wk  Plan of Care Review  Plan of Care Reviewed With: patient  Progress: no change  Outcome Evaluation: Pt declined to complete sliding board transfer this date due to fear of falling with use of recliner. W/c not safe to utilize per prior notes. Pt rolled L to R in bed with minAx1 and good effort with use of UE on bed rail. Pt dependent for bed to chair transfer via mechanical lift. Pt sat unsupported in chair to complete oral care with setup, wash face with setup, and comb hair with Jamey from OT due to dereased ROM in L UE. Pt demonstrates good ability to weight shift and good effort for all grooming. Limited for transfers by decreased B UE strength and fear. OT will continue to see pt 3x/week to address ROM, strength, balance, and safety to increase independence with ADLS and transfers for ADLS. Recommend d/c to SNF.     Time Calculation:    Time Calculation- OT     Row Name 12/30/22 1305             Time Calculation- OT    OT Start Time 1305  -HM      OT Received On 12/30/22  -HM      OT Goal Re-Cert Due Date 01/09/23  -HM         Timed Charges    97048 - OT Self Care/Mgmt Minutes 15  -HM         Total Minutes    Timed Charges Total Minutes 15  -HM       Total Minutes 15  -HM            User Key  (r) = Recorded By, (t) = Taken By, (c)  = Cosigned By    Initials Name Provider Type     Nora Abbasi, LUCAS Occupational Therapist              Therapy Charges for Today     Code Description Service Date Service Provider Modifiers Qty    [id number] HC OT SELF CARE/MGMT/TRAIN EA 15 MIN 12/30/2022 Nora Abbasi OT GO 1               Nora Abbasi OT  12/30/2022

## 2022-12-30 NOTE — PLAN OF CARE
Goal Outcome Evaluation:  Plan of Care Reviewed With: patient           Outcome Evaluation: Pt continues with balance deficits, generalized weakness, and poor activity tolerance limiting mobility. Pt performed rolling left and right min A and bed to chair dependently x2 via mechanical lift. Pt dynamic trunk control and balance was challenged by reaching outside of GENNY requiring SBA to min A. Pt further more worked on weight shifting left and right to practice slide board placement. Pt tolerated therex well. Continue PT POC. Recommend dc rehab.   Refused

## 2022-12-30 NOTE — CASE MANAGEMENT/SOCIAL WORK
Continued Stay Note   Kirstie     Patient Name: Buster Velasco  MRN: [id number]  Today's Date: 12/30/2022    Admit Date: 11/30/2022    Plan: rehab   Discharge Plan     Row Name 12/30/22 1159       Plan    Plan rehab    Patient/Family in Agreement with Plan yes    Plan Comments per request from PT, CM went back into the room to speak with Mr Velasco regarding his need to participate in PT/OT in order to get into a rehab facility. He stated that he can only use a lyft to transfer, but CM explained that PT stated he needs to show that he can transfer into the chair with a slide and that he has been cleared to do that. He did not agree with that assessment. I asked him to please comply with PT/OT today when they come back into the room or they may have to discharge him from their services. He verbalized understanding.    Final Discharge Disposition Code 03 - skilled nursing facility (SNF)               Discharge Codes    No documentation.               Expected Discharge Date and Time     Expected Discharge Date Expected Discharge Time    Jan 6, 2023             Ansley Zambrano RN

## 2022-12-30 NOTE — PLAN OF CARE
Goal Outcome Evaluation:      Patient denied nausea throughout shift. Continues to refuse any thickened liquids as previously recommended following swallow evaluation. He states he understands the risks of drinking thin liquids. No cough or distress present when drinking thin liquids or immediately after. Plan is to dc to rehab pending placement. No acute changes/issues. Monitoring is ongoing.

## 2022-12-30 NOTE — CASE MANAGEMENT/SOCIAL WORK
Continued Stay Note   Kirstie     Patient Name: Buster Velasco  MRN: [id number]  Today's Date: 12/30/2022    Admit Date: 11/30/2022    Plan: rehab   Discharge Plan     Row Name 12/30/22 0817       Plan    Plan rehab    Patient/Family in Agreement with Plan yes    Plan Comments I met with Mr Velasco regarding rehab denials for non-participation in PT and OT. He assured me that he will work with PT and OT today as they are scheduled to see him. Apparently [facility] and [facility] in [place] have both denied him beds at their facilities and patient still needs placement. [facility] following patient as he is on IV Dapto until early Jan. CM will follow up.    Final Discharge Disposition Code 03 - skilled nursing facility (SNF)               Discharge Codes    No documentation.               Expected Discharge Date and Time     Expected Discharge Date Expected Discharge Time    Jan 6, 2023             Ansley Zambrano RN

## 2022-12-30 NOTE — THERAPY PROGRESS REPORT/RE-CERT
Patient Name: Buster Velasco  : 1965    MRN: [id number]                              Today's Date: 2022       Admit Date: 2022    Visit Dx:     ICD-10-CM ICD-9-CM   1. Right foot infection  L08.9 686.9   2. Hypoglycemia  E16.2 251.2   3. Decubitus ulcer of coccygeal region, unspecified ulcer stage  L89.159 707.03     707.20   4. Pharyngeal dysphagia  R13.13 787.23     Patient Active Problem List   Diagnosis   • Right foot infection   • Hypoglycemia   • Anemia   • Hyponatremia   • Hypoalbuminemia   • Essential hypertension   • Hyperlipidemia   • Paroxysmal atrial fibrillation (HCC)   • Sepsis (HCC)   • MRSA bacteremia     Past Medical History:   Diagnosis Date   • Diabetes (HCC)    • DVT (deep venous thrombosis) (HCC)    • Hypertension    • Mood disorder (HCC)      Past Surgical History:   Procedure Laterality Date   • BELOW KNEE AMPUTATION Left    • BELOW KNEE AMPUTATION Right 2022    Procedure: AMPUTATION BELOW KNEE RIGHT;  Surgeon: John Peguero MD;  Location: [facility];  Service: Vascular;  Laterality: Right;   • TRANS METATARSAL AMPUTATION Right       General Information     Row Name 22 1407 22 1332       Physical Therapy Time and Intention    Document Type progress note/recertification  -FW therapy note (daily note)  -FW    Mode of Treatment physical therapy  -FW physical therapy  -FW    Row Name 22 1407 22 1332       General Information    Patient Profile Reviewed yes  -FW yes  -FW    Existing Precautions/Restrictions fall;other (see comments)  bilateral BKA  -FW fall  -FW    Barriers to Rehab medically complex;previous functional deficit  -FW --    Row Name 22 1407          Living Environment    People in Home --  refer to initial eval  -FW     Row Name 22 1407 22 1332       Cognition    Orientation Status (Cognition) oriented x 4  -FW oriented x 4  -FW    Row Name 22 1407 22 1332       Safety Issues, Functional  Mobility    Impairments Affecting Function (Mobility) balance;endurance/activity tolerance;pain;strength;postural/trunk control;range of motion (ROM)  -FW balance;endurance/activity tolerance;pain;strength;postural/trunk control;range of motion (ROM)  -FW          User Key  (r) = Recorded By, (t) = Taken By, (c) = Cosigned By    Initials Name Provider Type     Margarito Ellison PT Physical Therapist               Mobility     Row Name 12/30/22 1332          Bed Mobility    Bed Mobility rolling right;rolling left  -FW     Rolling Left Mathews (Bed Mobility) minimum assist (75% patient effort);1 person assist;verbal cues  -FW     Rolling Right Mathews (Bed Mobility) minimum assist (75% patient effort);verbal cues  -FW     Row Name 12/30/22 1332          Bed-Chair Transfer    Bed-Chair Mathews (Transfers) dependent (less than 25% patient effort);2 person assist  -FW     Assistive Device (Bed-Chair Transfers) lift device  -FW     Comment, (Bed-Chair Transfer) pt fearful of slide board transfer and requested to use lift for transfs  -FW     Row Name 12/30/22 1332          Sit-Stand Transfer    Sit-Stand Mathews (Transfers) unable to assess  -FW     Row Name 12/30/22 1409 12/30/22 1332       Mobility    Extremity Weight-bearing Status -- right lower extremity  -FW    Left Lower Extremity (Weight-bearing Status) --  BKA 2006  -FW --  BKA 2006  -FW    Right Lower Extremity (Weight-bearing Status) -- non weight-bearing (NWB)  -FW          User Key  (r) = Recorded By, (t) = Taken By, (c) = Cosigned By    Initials Name Provider Type     Margarito Ellison PT Physical Therapist               Obj/Interventions     Row Name 12/30/22 1410          Range of Motion Comprehensive    General Range of Motion lower extremity range of motion deficits identified  -FW     Comment, General Range of Motion taught right hamstring- knee extension limited 10%  -FW     Row Name 12/30/22 1410          Strength  Comprehensive (MMT)    General Manual Muscle Testing (MMT) Assessment other (see comments)  difficult to assess due to Bilat BKA  -FW     Row Name 12/30/22 1410          Motor Skills    Therapeutic Exercise hip;knee;other (see comments)  10 reps of: seated marches, seated hip abd/add, LAQ, quad sets, gluteal sets, tricep/chair push ups  -FW     Row Name 12/30/22 1410          Balance    Balance Assessment sitting static balance;sitting dynamic balance  -FW     Static Sitting Balance supervision  -FW     Dynamic Sitting Balance standby assist  pt reaching outside GENNY and returning to starting position x20  -FW     Position, Sitting Balance sitting in chair  -FW     Balance Interventions sitting;weight shifting activity;trunk training exercise;core stability exercise;dynamic;UE activity with balance activity  -FW     Comment, Balance reaching outside GENNY w/ RUE x20- declined attempt w/ LUE due to shoulder dysfunciton/painp; progressed weight shifting to assist w/ slide board transfer placement  -FW           User Key  (r) = Recorded By, (t) = Taken By, (c) = Cosigned By    Initials Name Provider Type    FW Margarito Ellison, PT Physical Therapist               Goals/Plan     Row Name 12/30/22 1423          Bed Mobility Goal 1 (PT)    Activity/Assistive Device (Bed Mobility Goal 1, PT) bed mobility activities, all  -FW     [place] Level/Cues Needed (Bed Mobility Goal 1, PT) standby assist  -FW     Time Frame (Bed Mobility Goal 1, PT) long term goal (LTG);10 days  -FW     Progress/Outcomes (Bed Mobility Goal 1, PT) goal revised this date  -     Row Name 12/30/22 1425          Transfer Goal 1 (PT)    Activity/Assistive Device (Transfer Goal 1, PT) bed-to-chair/chair-to-bed;wheelchair transfer;sliding board  -FW     [place] Level/Cues Needed (Transfer Goal 1, PT) minimum assist (75% or more patient effort)  -FW     Time Frame (Transfer Goal 1, PT) long term goal (LTG);10 days  -FW     Progress/Outcome  (Transfer Goal 1, PT) goal ongoing  -     Row Name 12/30/22 1423          Therapy Assessment/Plan (PT)    Planned Therapy Interventions (PT) balance training;bed mobility training;gait training;home exercise program;patient/family education;stair training;strengthening;ROM (range of motion);transfer training;wheelchair management/propulsion training;orthotic fitting/training;manual therapy techniques;prosthetic fitting/training  -           User Key  (r) = Recorded By, (t) = Taken By, (c) = Cosigned By    Initials Name Provider Type     Margarito Ellison, PT Physical Therapist               Clinical Impression     Row Name 12/30/22 1419          Pain    Pretreatment Pain Rating 0/10 - no pain  -     Posttreatment Pain Rating 0/10 - no pain  -     Row Name 12/30/22 1419          Plan of Care Review    Plan of Care Reviewed With patient  -     Outcome Evaluation Pt continues with balance deficits, generalized weakness, and poor activity tolerance limiting mobility. Pt performed rolling left and right min A and bed to chair dependently x2 via mechanical lift. Pt dynamic trunk control and balance was challenged by reahcing outside of GENNY requiring SBA to min A. Pt further more worked on weight shifting left and right to practice slide board placement. Pt tolerated therex well. Continue PT POC. Recommend dc rehab.  -     Row Name 12/30/22 1419          Vital Signs    Pre Systolic BP Rehab --  vss  -FW     O2 Delivery Pre Treatment room air  -FW     O2 Delivery Intra Treatment room air  -FW     O2 Delivery Post Treatment room air  -FW     Pre Patient Position Supine  -FW     Intra Patient Position Sitting  -FW     Post Patient Position Sitting  -     Row Name 12/30/22 1419          Positioning and Restraints    Pre-Treatment Position in bed  -FW     Post Treatment Position chair  -FW     In Chair notified nsg;reclined;call light within reach;encouraged to call for assist;exit alarm on;RLE elevated;waffle  cushion;on mechanical lift sling;sitting  -           User Key  (r) = Recorded By, (t) = Taken By, (c) = Cosigned By    Initials Name Provider Type     Margarito Ellison PT Physical Therapist               Outcome Measures     Row Name 12/30/22 1424 12/30/22 0800       How much help from another person do you currently need...    Turning from your back to your side while in flat bed without using bedrails? 3  -FW 3  -BC    Moving from lying on back to sitting on the side of a flat bed without bedrails? 2  -FW 2  -BC    Moving to and from a bed to a chair (including a wheelchair)? 2  -FW 1  -BC    Standing up from a chair using your arms (e.g., wheelchair, bedside chair)? 1  -FW 1  -BC    Climbing 3-5 steps with a railing? 1  -FW 1  -BC    To walk in hospital room? 1  -FW 1  -BC    AM-PAC 6 Clicks Score (PT) 10  - 9  -BC    Highest level of mobility 4 --> Transferred to chair/commode  - 3 --> Sat at edge of bed  -BC    Row Name 12/30/22 1424 12/30/22 1414       Functional Assessment    Outcome Measure Options AM-PAC 6 Clicks Basic Mobility (PT)  - AM-PAC 6 Clicks Daily Activity (OT)  -          User Key  (r) = Recorded By, (t) = Taken By, (c) = Cosigned By    Initials Name Provider Type     Nora Abbasi OT Occupational Therapist    BC Jo Malloy RN Registered Nurse     Margarito Ellison PT Physical Therapist                             Physical Therapy Education     Title: PT OT SLP Therapies (In Progress)     Topic: Physical Therapy (In Progress)     Point: Mobility training (In Progress)     Learning Progress Summary           Patient Acceptance, E, NR by  at 12/30/2022 1425    Acceptance, E,TB, NR by  at 12/26/2022 1512    Acceptance, E,D, VU,DU by  at 12/26/2022 1506    Eager, E, VU by SC at 12/21/2022 1407    Comment: reviewed benefits of getting oob    Acceptance, E, VU by  at 12/19/2022 1040    Comment: educated on transfer safety    Acceptance, E, VU by  at 12/15/2022  1410    Comment: educated on importance of knee extension at rest to prevent taut hamstrings    Eager, E, VU by SC at 12/12/2022 1121    Comment: reviewed benefits of activity    Eager, E, VU,DU by SC at 12/7/2022 1115    Comment: REVIEWED HEP                   Point: Home exercise program (In Progress)     Learning Progress Summary           Patient Acceptance, E, NR by FW at 12/30/2022 1425    Acceptance, E,TB, NR by  at 12/26/2022 1512    Acceptance, E,D, VU,DU by  at 12/26/2022 1506    Eager, E, VU by SC at 12/21/2022 1407    Comment: reviewed benefits of getting oob    Acceptance, E, VU by  at 12/19/2022 1040    Comment: educated on transfer safety    Acceptance, E, VU by  at 12/15/2022 1410    Comment: educated on importance of knee extension at rest to prevent taut hamstrings    Eager, E, VU by SC at 12/12/2022 1121    Comment: reviewed benefits of activity    Eager, E, VU,DU by SC at 12/7/2022 1115    Comment: REVIEWED HEP                   Point: Body mechanics (In Progress)     Learning Progress Summary           Patient Acceptance, E, NR by FW at 12/30/2022 1425    Acceptance, E,TB, NR by  at 12/26/2022 1512    Acceptance, E,D, VU,DU by  at 12/26/2022 1506    Eager, E, VU by SC at 12/21/2022 1407    Comment: reviewed benefits of getting oob    Acceptance, E, VU by  at 12/19/2022 1040    Comment: educated on transfer safety    Acceptance, E, VU by  at 12/15/2022 1410    Comment: educated on importance of knee extension at rest to prevent taut hamstrings    Eager, E, VU by SC at 12/12/2022 1121    Comment: reviewed benefits of activity    Eager, E, VU,DU by SC at 12/7/2022 1115    Comment: REVIEWED HEP                   Point: Precautions (In Progress)     Learning Progress Summary           Patient Acceptance, E, NR by FW at 12/30/2022 1425    Acceptance, E,TB, NR by  at 12/26/2022 1512    Acceptance, E,D, VU,DU by  at 12/26/2022 1506    KRIS Martinez VU by SC at 12/21/2022 1407    Comment:  reviewed benefits of getting oob    Acceptance, E, VU by  at 12/19/2022 1040    Comment: educated on transfer safety    Acceptance, E, VU by  at 12/15/2022 1410    Comment: educated on importance of knee extension at rest to prevent taut hamstrings    Eager, E, VU by SC at 12/12/2022 1121    Comment: reviewed benefits of activity    Eager, E, VU,DU by SC at 12/7/2022 1115    Comment: REVIEWED HEP                               User Key     Initials Effective Dates Name Provider Type Discipline    SC 06/16/21 -  Shamika Patel, PT Physical Therapist PT    FW 05/05/22 -  Margarito Ellison, PT Physical Therapist PT     06/01/21 -  Nora Addison, PT Physical Therapist PT     12/20/22 -  Joan Kuo RN Registered Nurse Nurse              PT Recommendation and Plan  Planned Therapy Interventions (PT): balance training, bed mobility training, gait training, home exercise program, patient/family education, stair training, strengthening, ROM (range of motion), transfer training, wheelchair management/propulsion training, orthotic fitting/training, manual therapy techniques, prosthetic fitting/training  Plan of Care Reviewed With: patient  Outcome Evaluation: Pt continues with balance deficits, generalized weakness, and poor activity tolerance limiting mobility. Pt performed rolling left and right min A and bed to chair dependently x2 via mechanical lift. Pt dynamic trunk control and balance was challenged by reahcing outside of GENNY requiring SBA to min A. Pt further more worked on weight shifting left and right to practice slide board placement. Pt tolerated therex well. Continue PT POC. Recommend dc rehab.     Time Calculation:    PT Charges     Row Name 12/30/22 1426             Time Calculation    Start Time 1301  -FW      PT Received On 12/30/22  -      PT Goal Re-Cert Due Date 01/09/23  -FW         Timed Charges    53168 - PT Therapeutic Exercise Minutes 8  -FW      91995 - PT Therapeutic Activity Minutes  16  -FW         Total Minutes    Timed Charges Total Minutes 24  -FW       Total Minutes 24  -FW            User Key  (r) = Recorded By, (t) = Taken By, (c) = Cosigned By    Initials Name Provider Type    FW Margarito Ellison PT Physical Therapist              Therapy Charges for Today     Code Description Service Date Service Provider Modifiers Qty    90777569201 HC PT THER PROC EA 15 MIN 12/30/2022 Margarito Ellison PT GP 1          PT G-Codes  Outcome Measure Options: AM-PAC 6 Clicks Basic Mobility (PT)  AM-PAC 6 Clicks Score (PT): 10  AM-PAC 6 Clicks Score (OT): 15  PT Discharge Summary  Anticipated Discharge Disposition (PT): inpatient rehabilitation facility, skilled nursing facility    Margarito Ellison PT  12/30/2022

## 2022-12-30 NOTE — PROGRESS NOTES
[facility] Medicine Services  PROGRESS NOTE    Patient Name: Buster Velasco  : 1965  MRN: [id number]    Date of Admission: 2022  Primary Care Physician: Ludivina Bowers MD    Subjective   Subjective     CC:  F/U s/p right BKA    HPI:  Patient seen this morning. Had some stomach upset a few days ago but he says that is pretty much resolved today.    ROS:  Gen-no fevers, no chills  CV-no chest pain, no palpitations  Resp-no cough, no dyspnea  GI-no N/V/D, no abd pain    All other systems reviewed and negative except any additional pertinent positives and negatives as discussed in HPI.      Objective   Objective     Vital Signs:   Temp:  [97.7 °F (36.5 °C)-98.4 °F (36.9 °C)] 98.4 °F (36.9 °C)  Heart Rate:  [] 69  Resp:  [16-18] 18  BP: (105-187)/(70-90) 138/83     Physical Exam:  Gen-no acute distress  HENT-NCAT, mucous membranes moist  CV-RRR, S1 S2 normal, no m/r/g  Resp-CTAB, no wheezes or rales  Abd-soft, NT, ND, +BS  Ext-left BKA present, new right BKA with dressing intact  Neuro-A&Ox3, no focal deficits  Skin-no rashes  Psych-appropriate mood    Results Reviewed:  LAB RESULTS:      Lab 22  0615   WBC 6.66   HEMOGLOBIN 10.6*   HEMATOCRIT 34.6*   PLATELETS 315   MCV 84.8         Lab 22  1024 22  0615   SODIUM 138 145   POTASSIUM 4.0 4.3   CHLORIDE 101 107   CO2 24.0 26.0   ANION GAP 13.0 12.0   BUN 19 20   CREATININE 0.66* 0.78   EGFR 108.7 103.4   GLUCOSE 98 88   CALCIUM 9.0 9.6         Lab 22  1024   TOTAL PROTEIN 6.3   ALBUMIN 3.3*   GLOBULIN 3.0   ALT (SGPT) 21   AST (SGOT) 20   BILIRUBIN 0.2   ALK PHOS 97   AMYLASE 39   LIPASE 12*                     Brief Urine Lab Results  (Last result in the past 365 days)      Color   Clarity   Blood   Leuk Est   Nitrite   Protein   CREAT   Urine HCG        22 0108 Yellow   Clear   Negative   Negative     Negative                 Microbiology Results Abnormal     Procedure Component  Value - Date/Time    Blood Culture - Blood, Wrist, Left [[id number]]  (Normal) Collected: 12/06/22 1402    Lab Status: Final result Specimen: Blood from Wrist, Left Updated: 12/11/22 1545     Blood Culture No growth at 5 days    Blood Culture - Blood, Arm, Left [[id number]]  (Normal) Collected: 12/06/22 1402    Lab Status: Final result Specimen: Blood from Arm, Left Updated: 12/11/22 1545     Blood Culture No growth at 5 days          No radiology results from the last 24 hrs    Results for orders placed during the hospital encounter of 11/30/22    Adult Transesophageal Echo (JERALD) W/ Cont if Necessary Per Protocol    Interpretation Summary  •  Left ventricular systolic function is normal. Estimated left ventricular EF = 55%  •  The aortic valve is structurally normal with no stenosis present. Trace aortic valve regurgitation is present. There is no evidence of an aortic valve mass is present.  •  There is mild, anterior mitral leaflet thickening present. No evidence of a mitral valve mass is present. Trace mitral valve regurgitation is present. No significant mitral valve stenosis is present  •  The tricuspid valve is normal in structure. There is no evidence of a mass on the tricuspid valve. Mild tricuspid valve regurgitation is present.  •  There is mild thickening of the pulmonic valve. There is trace pulmonic valve regurgitation present. There is no pulmonic valve stenosis present.      I have reviewed the medications:  Scheduled Meds:atorvastatin, 20 mg, Oral, Nightly  DAPTOmycin, 8 mg/kg (Order-Specific), Intravenous, Q24H  insulin detemir, 5 Units, Subcutaneous, Nightly  insulin lispro, 0-7 Units, Subcutaneous, TID AC  metFORMIN, 1,000 mg, Oral, BID With Meals  methadone, 10 mg, Oral, Q6H  metoprolol tartrate, 25 mg, Oral, Q12H  sodium chloride, 10 mL, Intravenous, Q12H  sodium chloride, 10 mL, Intravenous, Q12H  tamsulosin, 0.4 mg, Oral, Nightly      Continuous Infusions:   PRN Meds:.•  acetaminophen  •   "senna-docusate sodium **AND** polyethylene glycol **AND** bisacodyl **AND** bisacodyl  •  calcium carbonate  •  dextrose  •  dextrose  •  glucagon (human recombinant)  •  hydrOXYzine  •  loperamide  •  magnesium hydroxide  •  melatonin  •  ondansetron **OR** ondansetron  •  simethicone  •  sodium chloride    Assessment & Plan   Assessment & Plan     Active Hospital Problems    Diagnosis  POA   • **Right foot infection [L08.9]  Yes   • MRSA bacteremia [R78.81, B95.62]  Unknown   • Sepsis (HCC) [A41.9]  Yes   • Hypoglycemia [E16.2]  Yes   • Anemia [D64.9]  Yes   • Hyponatremia [E87.1]  Yes   • Hypoalbuminemia [E88.09]  Yes   • Essential hypertension [I10]  Yes   • Hyperlipidemia [E78.5]  Yes   • Paroxysmal atrial fibrillation (HCC) [I48.0]  Yes      Resolved Hospital Problems   No resolved problems to display.        Brief Hospital Course to date:  Buster Velasco is a 57 y.o. male with hx of prior osteomyelitis s/p left BKA, s/p right transmetatarsal amputation, left hand 3rd metacarpal resection (April 2022 at OSH, had 6 weeks of IV antibiotics for MRSA bacteremia), remote hx of IVDA, more recent hx of polysubstance abuse (meth), and DVT s/p IVC filter who presents with right foot wounds. S/p right BKA on 12/2/22 with Dr. Peguero. His blood cultures grew MRSA. ID following and recommend at least 6 weeks of IV ATBx.     This patient's problems and plans were partially entered by my partner and updated as appropriate by me 12/30/22.     Sepsis secondary to acute right foot non-healing wound and likely osteomyelitis, resolved  MRSA bacteremia  - s/p right BKA 12/2/22 with Dr. Peguero  - ID following, currently on Daptomycin monotherapy x 6 weeks, Dr. George had stated at least through \"early January\" - he will return 1/2/22 and hopefully make final recs  - Blood cultures with coag negative Staph and MRSA  - Repeat blood cultures NGTD  - TTE 12/6/22 no acute findings, JERALD 12/9/22 negative  - Not a good candidate " for PICC line/home IV antibiotics given hx of substance abuse, current plan is discharge to rehab     Recurrent hypoglycemia in setting of DMII, resolved  - HbA1c 6.9%  - High dose Tresiba qAM before arrival which has been held  - Briefly required D5, now discontinued  - Decrease Levemir from 10 to 5 units nightly as glucose running <100 recently  - Continue low dose SSI     Chronic pain on opioids   Previous IVDA/substance abuse  - Continue home methadone 10 mg QID  - Patient denied hx of IVDA to me but in Care Everywhere admission notes from April 2022 at [facility], they documented prior IVDA/substance abuse, most recently with meth in UDS from 4/2/22; he has told ID that his last IVDA was 17 years ago     Reported hx of Afib   - Patient denies any hx of Afib. Not previously on anticoagulation and given murky history will not start.   - On Metoprolol at home which is continued  - No evidence of Afib on telemetry, discontinued telemetry monitoring 12/10/22      Chronic vocal cord paralysis s/p PEG  Dysphagia  - SLP evaluation: regular diet but with nectar thick liquids recommended, patient refusing nectar thick liquids and understands risks of aspiration  - SLP should continue to follow for possible advancement of diet recommendations  -PEG was placed 8/19/22 at [facility], will defer to them for removal; routine PEG care/flushing per nursing as needed     Hypotension with hx of HTN  - Hold parameters with Metoprolol  - Hypotension resolved     HLD   - Continue statin     DVT s/p IVC filter   - Placed in spring 2022 per patient. Defer to PCP for further assessment and timing of removal.     Hx of seizure in setting of meth use   - Not on AED's given meth trigger     BPH   - Continue Flomax    Expected Discharge Location and Transportation: rehab  Expected Discharge anytime rehab placement is found  Expected Discharge Date and Time     Expected Discharge Date Expected Discharge Time    Jan 6, 2023             DVT prophylaxis:  Mechanical DVT prophylaxis orders are present.     AM-PAC 6 Clicks Score (PT): 10 (12/30/22 7993)    CODE STATUS:   Code Status and Medical Interventions:   Ordered at: 12/01/22 0035     Code Status (Patient has no pulse and is not breathing):    CPR (Attempt to Resuscitate)     Medical Interventions (Patient has pulse or is breathing):    Full Support       Carmen Bolaños MD  12/30/22

## 2022-12-31 LAB
GLUCOSE BLDC GLUCOMTR-MCNC: 106 MG/DL (ref 70–130)
GLUCOSE BLDC GLUCOMTR-MCNC: 140 MG/DL (ref 70–130)
GLUCOSE BLDC GLUCOMTR-MCNC: 171 MG/DL (ref 70–130)
GLUCOSE BLDC GLUCOMTR-MCNC: 96 MG/DL (ref 70–130)

## 2022-12-31 PROCEDURE — 63710000001 INSULIN DETEMIR PER 5 UNITS: Performed by: HOSPITALIST

## 2022-12-31 PROCEDURE — 82962 GLUCOSE BLOOD TEST: CPT

## 2022-12-31 PROCEDURE — 99024 POSTOP FOLLOW-UP VISIT: CPT | Performed by: THORACIC SURGERY (CARDIOTHORACIC VASCULAR SURGERY)

## 2022-12-31 PROCEDURE — 25010000002 DAPTOMYCIN PER 1 MG: Performed by: INTERNAL MEDICINE

## 2022-12-31 PROCEDURE — 99232 SBSQ HOSP IP/OBS MODERATE 35: CPT | Performed by: HOSPITALIST

## 2022-12-31 RX ADMIN — METFORMIN HYDROCHLORIDE 1000 MG: 1000 TABLET, FILM COATED ORAL at 17:46

## 2022-12-31 RX ADMIN — METOPROLOL TARTRATE 25 MG: 25 TABLET, FILM COATED ORAL at 20:00

## 2022-12-31 RX ADMIN — METHADONE HYDROCHLORIDE 10 MG: 10 TABLET ORAL at 14:13

## 2022-12-31 RX ADMIN — LOPERAMIDE HYDROCHLORIDE 2 MG: 2 CAPSULE ORAL at 09:04

## 2022-12-31 RX ADMIN — METHADONE HYDROCHLORIDE 10 MG: 10 TABLET ORAL at 09:04

## 2022-12-31 RX ADMIN — TAMSULOSIN HYDROCHLORIDE 0.4 MG: 0.4 CAPSULE ORAL at 20:00

## 2022-12-31 RX ADMIN — METOPROLOL TARTRATE 25 MG: 25 TABLET, FILM COATED ORAL at 09:04

## 2022-12-31 RX ADMIN — METFORMIN HYDROCHLORIDE 1000 MG: 1000 TABLET, FILM COATED ORAL at 09:04

## 2022-12-31 RX ADMIN — Medication 10 ML: at 20:02

## 2022-12-31 RX ADMIN — HYDROXYZINE HYDROCHLORIDE 50 MG: 25 TABLET ORAL at 21:15

## 2022-12-31 RX ADMIN — Medication 10 ML: at 09:04

## 2022-12-31 RX ADMIN — DAPTOMYCIN 800 MG: 500 INJECTION, POWDER, LYOPHILIZED, FOR SOLUTION INTRAVENOUS at 09:04

## 2022-12-31 RX ADMIN — METHADONE HYDROCHLORIDE 10 MG: 10 TABLET ORAL at 20:00

## 2022-12-31 RX ADMIN — LOPERAMIDE HYDROCHLORIDE 2 MG: 2 CAPSULE ORAL at 17:46

## 2022-12-31 RX ADMIN — INSULIN DETEMIR 5 UNITS: 100 INJECTION, SOLUTION SUBCUTANEOUS at 20:00

## 2022-12-31 RX ADMIN — METHADONE HYDROCHLORIDE 10 MG: 10 TABLET ORAL at 02:30

## 2022-12-31 RX ADMIN — ATORVASTATIN CALCIUM 20 MG: 20 TABLET, FILM COATED ORAL at 20:00

## 2022-12-31 NOTE — PLAN OF CARE
Goal Outcome Evaluation:   VSS, alert and orientated. No complaints throughout the shift and patient rested well.

## 2022-12-31 NOTE — PROGRESS NOTES
Cardiothoracic Surgery Progress Note      POD #: 29-right below-knee amputation     LOS: 31 days      Subjective: Awake alert  Objective:  Vital Signs vital signs below noted T-max past 2490.4 °F  Temp:  [97.7 °F (36.5 °C)-98.4 °F (36.9 °C)] 98.4 °F (36.9 °C)  Heart Rate:  [] 77  Resp:  [18] 18  BP: (105-187)/(70-91) 133/82    Physical Exam:   General Appearance: Oriented x3   Lungs:   Heart:   Skin:   Incision: Amputation site dressing change.  Sutures intact skin margin viable skin flaps are viable     Results:  Results from last 7 days   Lab Units 12/29/22  0615   WBC 10*3/mm3 6.66   HEMOGLOBIN g/dL 10.6*   HEMATOCRIT % 34.6*   PLATELETS 10*3/mm3 315     Results from last 7 days   Lab Units 12/30/22  1024   SODIUM mmol/L 138   POTASSIUM mmol/L 4.0   CHLORIDE mmol/L 101   CO2 mmol/L 24.0   BUN mg/dL 19   CREATININE mg/dL 0.66*   GLUCOSE mg/dL 98   CALCIUM mg/dL 9.0         Assessment: #1 postop day 29 right below-knee amputation healing well  2 status post remote left below-knee amputation secondary diabetic gangrene  3 diabetes mellitus with neuropathy      Plan: Continue daily dressing change amputation site.  Medical manage per hospitalist.  Antibiotics per infectious disease.  Okay with me today any time for rehab.  At facility      John Peguero MD - 12/31/22 - 05:52 EST

## 2022-12-31 NOTE — PLAN OF CARE
Goal Outcome Evaluation:  Plan of Care Reviewed With: patient           Outcome Evaluation: VSS. Alert oriented x4. Voiding well per urinal. Several BM's during the shift. PRN imodium given. Specialty bed in place, frequent weight shift encouraged. PICC in NAE drsg change due 1/1/2023.

## 2022-12-31 NOTE — PROGRESS NOTES
[facility] Medicine Services  PROGRESS NOTE    Patient Name: Buster Velasco  : 1965  MRN: [id number]    Date of Admission: 2022  Primary Care Physician: Ludivina Bowers MD    Subjective   Subjective     CC:  F/U s/p right BKA    HPI:  Patient seen this morning. Complains of diarrhea, asking for Imodium.    ROS:  Gen-no fevers, no chills  CV-no chest pain, no palpitations  Resp-no cough, no dyspnea  GI-no N/V, +diarrhea, no abd pain    All other systems reviewed and negative except any additional pertinent positives and negatives as discussed in HPI.      Objective   Objective     Vital Signs:   Temp:  [97.7 °F (36.5 °C)-98.4 °F (36.9 °C)] 98.2 °F (36.8 °C)  Heart Rate:  [69-77] 69  Resp:  [18] 18  BP: (121-138)/(82-91) 126/83     Physical Exam:  Gen-no acute distress  HENT-NCAT, mucous membranes moist  CV-RRR, S1 S2 normal, no m/r/g  Resp-CTAB, no wheezes or rales  Abd-soft, NT, ND, +BS  Ext-left BKA present, new right BKA with dressing intact  Neuro-A&Ox3, no focal deficits  Skin-no rashes  Psych-appropriate mood  No change from yesterday    Results Reviewed:  LAB RESULTS:      Lab 22  0615   WBC 6.66   HEMOGLOBIN 10.6*   HEMATOCRIT 34.6*   PLATELETS 315   MCV 84.8         Lab 22  1024 22  0615   SODIUM 138 145   POTASSIUM 4.0 4.3   CHLORIDE 101 107   CO2 24.0 26.0   ANION GAP 13.0 12.0   BUN 19 20   CREATININE 0.66* 0.78   EGFR 108.7 103.4   GLUCOSE 98 88   CALCIUM 9.0 9.6         Lab 22  1024   TOTAL PROTEIN 6.3   ALBUMIN 3.3*   GLOBULIN 3.0   ALT (SGPT) 21   AST (SGOT) 20   BILIRUBIN 0.2   ALK PHOS 97   AMYLASE 39   LIPASE 12*                     Brief Urine Lab Results  (Last result in the past 365 days)      Color   Clarity   Blood   Leuk Est   Nitrite   Protein   CREAT   Urine HCG        22 0108 Yellow   Clear   Negative   Negative     Negative                 Microbiology Results Abnormal     Procedure Component Value -  Date/Time    Blood Culture - Blood, Wrist, Left [[id number]]  (Normal) Collected: 12/06/22 1402    Lab Status: Final result Specimen: Blood from Wrist, Left Updated: 12/11/22 1545     Blood Culture No growth at 5 days    Blood Culture - Blood, Arm, Left [[id number]]  (Normal) Collected: 12/06/22 1402    Lab Status: Final result Specimen: Blood from Arm, Left Updated: 12/11/22 1545     Blood Culture No growth at 5 days          No radiology results from the last 24 hrs    Results for orders placed during the hospital encounter of 11/30/22    Adult Transesophageal Echo (JERALD) W/ Cont if Necessary Per Protocol    Interpretation Summary  •  Left ventricular systolic function is normal. Estimated left ventricular EF = 55%  •  The aortic valve is structurally normal with no stenosis present. Trace aortic valve regurgitation is present. There is no evidence of an aortic valve mass is present.  •  There is mild, anterior mitral leaflet thickening present. No evidence of a mitral valve mass is present. Trace mitral valve regurgitation is present. No significant mitral valve stenosis is present  •  The tricuspid valve is normal in structure. There is no evidence of a mass on the tricuspid valve. Mild tricuspid valve regurgitation is present.  •  There is mild thickening of the pulmonic valve. There is trace pulmonic valve regurgitation present. There is no pulmonic valve stenosis present.      I have reviewed the medications:  Scheduled Meds:atorvastatin, 20 mg, Oral, Nightly  DAPTOmycin, 8 mg/kg (Order-Specific), Intravenous, Q24H  insulin detemir, 5 Units, Subcutaneous, Nightly  insulin lispro, 0-7 Units, Subcutaneous, TID AC  metFORMIN, 1,000 mg, Oral, BID With Meals  methadone, 10 mg, Oral, Q6H  metoprolol tartrate, 25 mg, Oral, Q12H  sodium chloride, 10 mL, Intravenous, Q12H  sodium chloride, 10 mL, Intravenous, Q12H  tamsulosin, 0.4 mg, Oral, Nightly      Continuous Infusions:   PRN Meds:.•  acetaminophen  •   "senna-docusate sodium **AND** polyethylene glycol **AND** bisacodyl **AND** bisacodyl  •  calcium carbonate  •  dextrose  •  dextrose  •  glucagon (human recombinant)  •  hydrOXYzine  •  loperamide  •  magnesium hydroxide  •  melatonin  •  ondansetron **OR** ondansetron  •  simethicone  •  sodium chloride    Assessment & Plan   Assessment & Plan     Active Hospital Problems    Diagnosis  POA   • **Right foot infection [L08.9]  Yes   • MRSA bacteremia [R78.81, B95.62]  Unknown   • Sepsis (HCC) [A41.9]  Yes   • Hypoglycemia [E16.2]  Yes   • Anemia [D64.9]  Yes   • Hyponatremia [E87.1]  Yes   • Hypoalbuminemia [E88.09]  Yes   • Essential hypertension [I10]  Yes   • Hyperlipidemia [E78.5]  Yes   • Paroxysmal atrial fibrillation (HCC) [I48.0]  Yes      Resolved Hospital Problems   No resolved problems to display.        Brief Hospital Course to date:  Buster Velasco is a 57 y.o. male with hx of prior osteomyelitis s/p left BKA, s/p right transmetatarsal amputation, left hand 3rd metacarpal resection (April 2022 at OSH, had 6 weeks of IV antibiotics for MRSA bacteremia), remote hx of IVDA, more recent hx of polysubstance abuse (meth), and DVT s/p IVC filter who presents with right foot wounds. S/p right BKA on 12/2/22 with Dr. Peguero. His blood cultures grew MRSA. ID following and recommend at least 6 weeks of IV ATBx.     This patient's problems and plans were partially entered by my partner and updated as appropriate by me 12/31/22.     Sepsis secondary to acute right foot non-healing wound and likely osteomyelitis, resolved  MRSA bacteremia  - s/p right BKA 12/2/22 with Dr. Peguero  - ID following, currently on Daptomycin monotherapy x 6 weeks, Dr. George had stated at least through \"early January\" - he will return 1/2/22 and hopefully make final recs  - Blood cultures with coag negative Staph and MRSA  - Repeat blood cultures NGTD  - TTE 12/6/22 no acute findings, JERALD 12/9/22 negative  - Not a good candidate " for PICC line/home IV antibiotics given hx of substance abuse, current plan is discharge to rehab    Diarrhea  - PRN Imodium  - If persists, consider further workup/C.diff testing     Recurrent hypoglycemia in setting of DMII, resolved  - HbA1c 6.9%  - High dose Tresiba qAM before arrival which has been held  - Briefly required D5, now discontinued  - Decreased Levemir from 10 to 5 units nightly on 12/30/22   - Continue low dose SSI     Chronic pain on opioids   Previous IVDA/substance abuse  - Continue home methadone 10 mg QID  - Patient denied hx of IVDA to me but in Care Everywhere admission notes from April 2022 at [place], they documented prior IVDA/substance abuse, most recently with meth in [facility] from 4/2/22; he has told ID that his last IVDA was 17 years ago     Reported hx of Afib   - Patient denies any hx of Afib. Not previously on anticoagulation and given murky history will not start.   - On Metoprolol at home which is continued  - No evidence of Afib on telemetry, discontinued telemetry monitoring 12/10/22      Chronic vocal cord paralysis s/p PEG  Dysphagia  - SLP evaluation: regular diet but with nectar thick liquids recommended, patient refusing nectar thick liquids and understands risks of aspiration  - SLP should continue to follow for possible advancement of diet recommendations  -PEG was placed 8/19/22 at [place], will defer to them for removal; routine PEG care/flushing per nursing as needed     Hypotension with hx of HTN  - Hold parameters with Metoprolol  - Hypotension resolved     HLD   - Continue statin     DVT s/p IVC filter   - Placed in spring 2022 per patient. Defer to PCP for further assessment and timing of removal.     Hx of seizure in setting of meth use   - Not on AED's given meth trigger     BPH   - Continue Flomax    Expected Discharge Location and Transportation: rehab  Expected Discharge anytime rehab placement is found  Expected Discharge Date and Time     Expected  Discharge Date Expected Discharge Time    Jan 6, 2023            DVT prophylaxis:  Mechanical DVT prophylaxis orders are present.     AM-PAC 6 Clicks Score (PT): 10 (12/31/22 0800)    CODE STATUS:   Code Status and Medical Interventions:   Ordered at: 12/01/22 0035     Code Status (Patient has no pulse and is not breathing):    CPR (Attempt to Resuscitate)     Medical Interventions (Patient has pulse or is breathing):    Full Support       Carmen Bolaños MD  12/31/22

## 2023-01-01 LAB
GLUCOSE BLDC GLUCOMTR-MCNC: 107 MG/DL (ref 70–130)
GLUCOSE BLDC GLUCOMTR-MCNC: 134 MG/DL (ref 70–130)
GLUCOSE BLDC GLUCOMTR-MCNC: 144 MG/DL (ref 70–130)
GLUCOSE BLDC GLUCOMTR-MCNC: 86 MG/DL (ref 70–130)

## 2023-01-01 PROCEDURE — 99024 POSTOP FOLLOW-UP VISIT: CPT | Performed by: THORACIC SURGERY (CARDIOTHORACIC VASCULAR SURGERY)

## 2023-01-01 PROCEDURE — 82962 GLUCOSE BLOOD TEST: CPT

## 2023-01-01 PROCEDURE — 63710000001 INSULIN DETEMIR PER 5 UNITS: Performed by: HOSPITALIST

## 2023-01-01 PROCEDURE — 99232 SBSQ HOSP IP/OBS MODERATE 35: CPT | Performed by: NURSE PRACTITIONER

## 2023-01-01 PROCEDURE — 25010000002 DAPTOMYCIN PER 1 MG: Performed by: INTERNAL MEDICINE

## 2023-01-01 RX ORDER — SACCHAROMYCES BOULARDII 250 MG
250 CAPSULE ORAL 2 TIMES DAILY
Status: DISCONTINUED | OUTPATIENT
Start: 2023-01-01 | End: 2023-01-06 | Stop reason: HOSPADM

## 2023-01-01 RX ADMIN — TAMSULOSIN HYDROCHLORIDE 0.4 MG: 0.4 CAPSULE ORAL at 20:53

## 2023-01-01 RX ADMIN — METHADONE HYDROCHLORIDE 10 MG: 10 TABLET ORAL at 13:52

## 2023-01-01 RX ADMIN — LOPERAMIDE HYDROCHLORIDE 2 MG: 2 CAPSULE ORAL at 08:34

## 2023-01-01 RX ADMIN — Medication 10 ML: at 20:53

## 2023-01-01 RX ADMIN — METHADONE HYDROCHLORIDE 10 MG: 10 TABLET ORAL at 20:51

## 2023-01-01 RX ADMIN — METOPROLOL TARTRATE 25 MG: 25 TABLET, FILM COATED ORAL at 20:51

## 2023-01-01 RX ADMIN — Medication 10 ML: at 08:25

## 2023-01-01 RX ADMIN — METFORMIN HYDROCHLORIDE 1000 MG: 1000 TABLET, FILM COATED ORAL at 08:34

## 2023-01-01 RX ADMIN — HYDROXYZINE HYDROCHLORIDE 50 MG: 25 TABLET ORAL at 20:59

## 2023-01-01 RX ADMIN — METHADONE HYDROCHLORIDE 10 MG: 10 TABLET ORAL at 08:34

## 2023-01-01 RX ADMIN — LOPERAMIDE HYDROCHLORIDE 2 MG: 2 CAPSULE ORAL at 16:32

## 2023-01-01 RX ADMIN — Medication 5 MG: at 20:52

## 2023-01-01 RX ADMIN — Medication 10 ML: at 08:33

## 2023-01-01 RX ADMIN — METHADONE HYDROCHLORIDE 10 MG: 10 TABLET ORAL at 03:15

## 2023-01-01 RX ADMIN — METFORMIN HYDROCHLORIDE 1000 MG: 1000 TABLET, FILM COATED ORAL at 16:31

## 2023-01-01 RX ADMIN — METOPROLOL TARTRATE 25 MG: 25 TABLET, FILM COATED ORAL at 08:34

## 2023-01-01 RX ADMIN — Medication 250 MG: at 20:51

## 2023-01-01 RX ADMIN — DAPTOMYCIN 800 MG: 500 INJECTION, POWDER, LYOPHILIZED, FOR SOLUTION INTRAVENOUS at 10:26

## 2023-01-01 RX ADMIN — INSULIN DETEMIR 5 UNITS: 100 INJECTION, SOLUTION SUBCUTANEOUS at 20:51

## 2023-01-01 RX ADMIN — Medication 250 MG: at 08:34

## 2023-01-01 RX ADMIN — ATORVASTATIN CALCIUM 20 MG: 20 TABLET, FILM COATED ORAL at 20:52

## 2023-01-01 NOTE — PLAN OF CARE
Goal Outcome Evaluation:  Plan of Care Reviewed With: patient        Progress: no change   Pt. Has had moderate to severe pain, scheduled medication given. VSS, on specialty bed and q 2 turns. Immodium given twice, only one BM today. Awaiting rehab placement.

## 2023-01-01 NOTE — PROGRESS NOTES
[facility] Medicine Services  PROGRESS NOTE    Patient Name: Buster Velasco  : 1965  MRN: [id number]    Date of Admission: 2022  Primary Care Physician: Ludivina oBwers MD    Subjective   Subjective     CC:  F/u s/p right BKA     HPI:  Patient is sitting up in bed in NAD. Had small loose stool this am. Mild abd cramps. Tolerating diet.     ROS:  Gen- No fevers, chills  CV- No chest pain, palpitations  Resp- No cough, dyspnea  GI- No N/V, abd pain, + diarrhea         Objective   Objective     Vital Signs:   Temp:  [98.2 °F (36.8 °C)-98.4 °F (36.9 °C)] 98.4 °F (36.9 °C)  Heart Rate:  [62-72] 67  Resp:  [18] 18  BP: (114-141)/(75-88) 139/84     Physical Exam:  Constitutional: No acute distress, awake, alert  HENT: NCAT, mucous membranes moist  Respiratory: Clear to auscultation bilaterally, respiratory effort normal   Cardiovascular: RRR, no murmurs, rubs, or gallops  Gastrointestinal: Positive bowel sounds, soft, nontender, nondistended  Musculoskeletal: tita BKA, dressing on right BKA   Psychiatric: Appropriate affect, cooperative  Neurologic: Oriented x 3, strength symmetric in all extremities, Cranial Nerves grossly intact to confrontation, speech clear  Skin: No rashes, right arm PICC line       Results Reviewed:  LAB RESULTS:      Lab 22  0615   WBC 6.66   HEMOGLOBIN 10.6*   HEMATOCRIT 34.6*   PLATELETS 315   MCV 84.8         Lab 22  1024 22  0615   SODIUM 138 145   POTASSIUM 4.0 4.3   CHLORIDE 101 107   CO2 24.0 26.0   ANION GAP 13.0 12.0   BUN 19 20   CREATININE 0.66* 0.78   EGFR 108.7 103.4   GLUCOSE 98 88   CALCIUM 9.0 9.6         Lab 22  1024   TOTAL PROTEIN 6.3   ALBUMIN 3.3*   GLOBULIN 3.0   ALT (SGPT) 21   AST (SGOT) 20   BILIRUBIN 0.2   ALK PHOS 97   AMYLASE 39   LIPASE 12*                     Brief Urine Lab Results  (Last result in the past 365 days)      Color   Clarity   Blood   Leuk Est   Nitrite   Protein   CREAT   Urine HCG         08/20/22 0108 Yellow   Clear   Negative   Negative     Negative                 Microbiology Results Abnormal     Procedure Component Value - Date/Time    Blood Culture - Blood, Wrist, Left [[id number]]  (Normal) Collected: 12/06/22 1402    Lab Status: Final result Specimen: Blood from Wrist, Left Updated: 12/11/22 1545     Blood Culture No growth at 5 days    Blood Culture - Blood, Arm, Left [[id number]]  (Normal) Collected: 12/06/22 1402    Lab Status: Final result Specimen: Blood from Arm, Left Updated: 12/11/22 1545     Blood Culture No growth at 5 days          No radiology results from the last 24 hrs    Results for orders placed during the hospital encounter of 11/30/22    Adult Transesophageal Echo (JERALD) W/ Cont if Necessary Per Protocol    Interpretation Summary  •  Left ventricular systolic function is normal. Estimated left ventricular EF = 55%  •  The aortic valve is structurally normal with no stenosis present. Trace aortic valve regurgitation is present. There is no evidence of an aortic valve mass is present.  •  There is mild, anterior mitral leaflet thickening present. No evidence of a mitral valve mass is present. Trace mitral valve regurgitation is present. No significant mitral valve stenosis is present  •  The tricuspid valve is normal in structure. There is no evidence of a mass on the tricuspid valve. Mild tricuspid valve regurgitation is present.  •  There is mild thickening of the pulmonic valve. There is trace pulmonic valve regurgitation present. There is no pulmonic valve stenosis present.      I have reviewed the medications:  Scheduled Meds:atorvastatin, 20 mg, Oral, Nightly  DAPTOmycin, 8 mg/kg (Order-Specific), Intravenous, Q24H  insulin detemir, 5 Units, Subcutaneous, Nightly  insulin lispro, 0-7 Units, Subcutaneous, TID AC  metFORMIN, 1,000 mg, Oral, BID With Meals  methadone, 10 mg, Oral, Q6H  metoprolol tartrate, 25 mg, Oral, Q12H  saccharomyces boulardii, 250 mg, Oral,  "BID  sodium chloride, 10 mL, Intravenous, Q12H  sodium chloride, 10 mL, Intravenous, Q12H  tamsulosin, 0.4 mg, Oral, Nightly      Continuous Infusions:   PRN Meds:.•  acetaminophen  •  senna-docusate sodium **AND** polyethylene glycol **AND** bisacodyl **AND** bisacodyl  •  calcium carbonate  •  dextrose  •  dextrose  •  glucagon (human recombinant)  •  hydrOXYzine  •  loperamide  •  magnesium hydroxide  •  melatonin  •  ondansetron **OR** ondansetron  •  simethicone  •  sodium chloride    Assessment & Plan   Assessment & Plan     Active Hospital Problems    Diagnosis  POA   • **Right foot infection [L08.9]  Yes   • MRSA bacteremia [R78.81, B95.62]  Unknown   • Sepsis (HCC) [A41.9]  Yes   • Hypoglycemia [E16.2]  Yes   • Anemia [D64.9]  Yes   • Hyponatremia [E87.1]  Yes   • Hypoalbuminemia [E88.09]  Yes   • Essential hypertension [I10]  Yes   • Hyperlipidemia [E78.5]  Yes   • Paroxysmal atrial fibrillation (HCC) [I48.0]  Yes      Resolved Hospital Problems   No resolved problems to display.        Brief Hospital Course to date:  Buster Velasco is a 57 y.o. male  with hx of prior osteomyelitis s/p left BKA, s/p right transmetatarsal amputation, left hand 3rd metacarpal resection (April 2022 at OSH, had 6 weeks of IV antibiotics for MRSA bacteremia), remote hx of IVDA, more recent hx of polysubstance abuse (meth), and DVT s/p IVC filter who presents with right foot wounds. S/p right BKA on 12/2/22 with Dr. Peguero. His blood cultures grew MRSA. ID following and recommend at least 6 weeks of IV ATBx.     This patient's problems and plans were partially entered by my partner and updated as appropriate by me 1/1/2023.     Sepsis secondary to acute right foot non-healing wound and likely osteomyelitis, resolved  MRSA bacteremia  - s/p right BKA 12/2/22 with Dr. Peguero  - ID following, currently on Daptomycin monotherapy x 6 weeks, Dr. George had stated at least through \"early January\" - he will return 1/2/22 and " hopefully make final recs  - Blood cultures with coag negative Staph and MRSA  - Repeat blood cultures NGTD  - TTE 12/6/22 no acute findings, JERALD 12/9/22 negative  - Not a good candidate for PICC line/home IV antibiotics given hx of substance abuse, current plan is discharge to rehab     Diarrhea  - PRN Imodium  - If persists, consider further workup/C.diff testing  -- it patient develops fever, leukocytosis then check stool   -- start probiotic      Recurrent hypoglycemia in setting of DMII, resolved  - HbA1c 6.9%  - High dose Tresiba qAM before arrival which has been held  - Briefly required D5, now discontinued  - Decreased Levemir from 10 to 5 units nightly on 12/30/22   - Continue low dose SSI and metformin      Chronic pain on opioids   Previous IVDA/substance abuse  - Continue home methadone 10 mg QID  - Patient denied hx of IVDA to me but in Care Everywhere admission notes from April 2022 at [place] [facility], they documented prior IVDA/substance abuse, most recently with meth in [facility] from 4/2/22; he has told ID that his last IVDA was 17 years ago     Reported hx of Afib   - Patient denies any hx of Afib. Not previously on anticoagulation and given murky history will not start.   - On Metoprolol at home which is continued  - No evidence of Afib on telemetry, discontinued telemetry monitoring 12/10/22      Chronic vocal cord paralysis s/p PEG  Dysphagia  - SLP evaluation: regular diet but with nectar thick liquids recommended, patient refusing nectar thick liquids and understands risks of aspiration  - SLP should continue to follow for possible advancement of diet recommendations  -PEG was placed 8/19/22 at [facility], will defer to them for removal; routine PEG care/flushing per nursing as needed     Hypotension with hx of HTN  - Hold parameters with Metoprolol  - Hypotension resolved     HLD   - Continue statin     DVT s/p IVC filter   - Placed in spring 2022 per patient. Defer to PCP for further assessment  and timing of removal.     Hx of seizure in setting of meth use   - Not on AED's given meth trigger     BPH   - Continue Flomax    Expected Discharge Location and Transportation: rehab   Expected Discharge whenever approved   Expected Discharge Date and Time     Expected Discharge Date Expected Discharge Time    Jan 6, 2023            DVT prophylaxis:  Mechanical DVT prophylaxis orders are present.     AM-PAC 6 Clicks Score (PT): 10 (01/01/23 0830)    CODE STATUS:   Code Status and Medical Interventions:   Ordered at: 12/01/22 0035     Code Status (Patient has no pulse and is not breathing):    CPR (Attempt to Resuscitate)     Medical Interventions (Patient has pulse or is breathing):    Full Support       Shiloh Pate, APRN  01/01/23

## 2023-01-01 NOTE — PLAN OF CARE
Goal Outcome Evaluation:  Plan of Care Reviewed With: patient        Progress: no change     Pt is alert end oriented X 4. VSS. RA. Frequent weight shifting. PICC line dressing changed.

## 2023-01-01 NOTE — PROGRESS NOTES
Cardiothoracic Surgery Progress Note      POD #: 30-right below-knee amputation     LOS: 32 days      Subjective: Awake and alert    Objective:  Vital Signs vital signs below noted T-max past 24 hours 98.4 °F  Temp:  [98.2 °F (36.8 °C)-98.4 °F (36.9 °C)] 98.2 °F (36.8 °C)  Heart Rate:  [62-70] 69  Resp:  [18] 18  BP: (114-141)/(75-88) 130/75    Physical Exam:   General Appearance: Oriented x3   Lungs:   Heart:   Skin:   Incision: The amputation dressing change.  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  Medical manage per hospitalist.  Antibiotics per infectious disease.  Rehab okay with me at any time.      John Peguero MD - 01/01/23 - 05:38 EST

## 2023-01-02 LAB
ALBUMIN SERPL-MCNC: 3.7 G/DL (ref 3.5–5.2)
ALBUMIN/GLOB SERPL: 1.3 G/DL
ALP SERPL-CCNC: 99 U/L (ref 39–117)
ALT SERPL W P-5'-P-CCNC: 20 U/L (ref 1–41)
ANION GAP SERPL CALCULATED.3IONS-SCNC: 14 MMOL/L (ref 5–15)
AST SERPL-CCNC: 18 U/L (ref 1–40)
BILIRUB SERPL-MCNC: 0.2 MG/DL (ref 0–1.2)
BUN SERPL-MCNC: 19 MG/DL (ref 6–20)
BUN/CREAT SERPL: 27.1 (ref 7–25)
CALCIUM SPEC-SCNC: 9.2 MG/DL (ref 8.6–10.5)
CHLORIDE SERPL-SCNC: 102 MMOL/L (ref 98–107)
CO2 SERPL-SCNC: 23 MMOL/L (ref 22–29)
CREAT SERPL-MCNC: 0.7 MG/DL (ref 0.76–1.27)
DEPRECATED RDW RBC AUTO: 53 FL (ref 37–54)
EGFRCR SERPLBLD CKD-EPI 2021: 106.8 ML/MIN/1.73
ERYTHROCYTE [DISTWIDTH] IN BLOOD BY AUTOMATED COUNT: 17 % (ref 12.3–15.4)
GLOBULIN UR ELPH-MCNC: 2.9 GM/DL
GLUCOSE BLDC GLUCOMTR-MCNC: 101 MG/DL (ref 70–130)
GLUCOSE BLDC GLUCOMTR-MCNC: 131 MG/DL (ref 70–130)
GLUCOSE BLDC GLUCOMTR-MCNC: 94 MG/DL (ref 70–130)
GLUCOSE BLDC GLUCOMTR-MCNC: 98 MG/DL (ref 70–130)
GLUCOSE SERPL-MCNC: 103 MG/DL (ref 65–99)
HCT VFR BLD AUTO: 35 % (ref 37.5–51)
HGB BLD-MCNC: 10.7 G/DL (ref 13–17.7)
MCH RBC QN AUTO: 26.2 PG (ref 26.6–33)
MCHC RBC AUTO-ENTMCNC: 30.6 G/DL (ref 31.5–35.7)
MCV RBC AUTO: 85.8 FL (ref 79–97)
PLATELET # BLD AUTO: 306 10*3/MM3 (ref 140–450)
PMV BLD AUTO: 10.6 FL (ref 6–12)
POTASSIUM SERPL-SCNC: 4.2 MMOL/L (ref 3.5–5.2)
PROT SERPL-MCNC: 6.6 G/DL (ref 6–8.5)
RBC # BLD AUTO: 4.08 10*6/MM3 (ref 4.14–5.8)
SODIUM SERPL-SCNC: 139 MMOL/L (ref 136–145)
WBC NRBC COR # BLD: 9.35 10*3/MM3 (ref 3.4–10.8)

## 2023-01-02 PROCEDURE — 99024 POSTOP FOLLOW-UP VISIT: CPT | Performed by: THORACIC SURGERY (CARDIOTHORACIC VASCULAR SURGERY)

## 2023-01-02 PROCEDURE — 85027 COMPLETE CBC AUTOMATED: CPT | Performed by: INTERNAL MEDICINE

## 2023-01-02 PROCEDURE — 25010000002 DAPTOMYCIN PER 1 MG: Performed by: INTERNAL MEDICINE

## 2023-01-02 PROCEDURE — 99231 SBSQ HOSP IP/OBS SF/LOW 25: CPT | Performed by: NURSE PRACTITIONER

## 2023-01-02 PROCEDURE — 80053 COMPREHEN METABOLIC PANEL: CPT | Performed by: INTERNAL MEDICINE

## 2023-01-02 PROCEDURE — 63710000001 INSULIN DETEMIR PER 5 UNITS: Performed by: HOSPITALIST

## 2023-01-02 PROCEDURE — 82962 GLUCOSE BLOOD TEST: CPT

## 2023-01-02 RX ADMIN — METOPROLOL TARTRATE 25 MG: 25 TABLET, FILM COATED ORAL at 08:19

## 2023-01-02 RX ADMIN — Medication 10 ML: at 08:20

## 2023-01-02 RX ADMIN — Medication 250 MG: at 20:37

## 2023-01-02 RX ADMIN — LOPERAMIDE HYDROCHLORIDE 2 MG: 2 CAPSULE ORAL at 23:38

## 2023-01-02 RX ADMIN — TAMSULOSIN HYDROCHLORIDE 0.4 MG: 0.4 CAPSULE ORAL at 20:37

## 2023-01-02 RX ADMIN — Medication 250 MG: at 08:19

## 2023-01-02 RX ADMIN — INSULIN DETEMIR 5 UNITS: 100 INJECTION, SOLUTION SUBCUTANEOUS at 20:37

## 2023-01-02 RX ADMIN — METFORMIN HYDROCHLORIDE 1000 MG: 1000 TABLET, FILM COATED ORAL at 18:14

## 2023-01-02 RX ADMIN — ATORVASTATIN CALCIUM 20 MG: 20 TABLET, FILM COATED ORAL at 20:37

## 2023-01-02 RX ADMIN — LOPERAMIDE HYDROCHLORIDE 2 MG: 2 CAPSULE ORAL at 16:20

## 2023-01-02 RX ADMIN — LOPERAMIDE HYDROCHLORIDE 2 MG: 2 CAPSULE ORAL at 08:19

## 2023-01-02 RX ADMIN — METFORMIN HYDROCHLORIDE 1000 MG: 1000 TABLET, FILM COATED ORAL at 08:19

## 2023-01-02 RX ADMIN — Medication 10 ML: at 20:38

## 2023-01-02 RX ADMIN — HYDROXYZINE HYDROCHLORIDE 50 MG: 25 TABLET ORAL at 20:37

## 2023-01-02 RX ADMIN — METHADONE HYDROCHLORIDE 10 MG: 10 TABLET ORAL at 01:49

## 2023-01-02 RX ADMIN — METHADONE HYDROCHLORIDE 10 MG: 10 TABLET ORAL at 13:34

## 2023-01-02 RX ADMIN — METHADONE HYDROCHLORIDE 10 MG: 10 TABLET ORAL at 08:19

## 2023-01-02 RX ADMIN — DAPTOMYCIN 800 MG: 500 INJECTION, POWDER, LYOPHILIZED, FOR SOLUTION INTRAVENOUS at 10:58

## 2023-01-02 RX ADMIN — Medication 5 MG: at 20:37

## 2023-01-02 RX ADMIN — METOPROLOL TARTRATE 25 MG: 25 TABLET, FILM COATED ORAL at 20:37

## 2023-01-02 RX ADMIN — METHADONE HYDROCHLORIDE 10 MG: 10 TABLET ORAL at 20:37

## 2023-01-02 NOTE — CASE MANAGEMENT/SOCIAL WORK
Continued Stay Note  [facility]     Patient Name: Buster Velasco  MRN: [id number]  Today's Date: 1/2/2023    Admit Date: 11/30/2022    Plan: Rehab   Discharge Plan     Row Name 01/02/23 1106       Plan    Plan Rehab    Patient/Family in Agreement with Plan yes    Plan Comments Mr. Velasco has been working with therapy (using the lift and slide board) to assist with transfers. I have asked April, admissions liaison with [facility], to evaluate him again. He has gotten out of bed and his antibiotics will be completed soon. Will await a bed offer or denial from [facility]. Case management will continue to follow.    Final Discharge Disposition Code 62 - inpatient rehab facility               Discharge Codes    No documentation.               Expected Discharge Date and Time     Expected Discharge Date Expected Discharge Time    [place] 3, 2023             Alan Downing RN

## 2023-01-02 NOTE — PROGRESS NOTES
Cardiothoracic Surgery Progress Note      POD #: 31-right below-knee amputation     LOS: 33 days      Subjective: Awake and alert    Objective:  Vital Signs vital signs below noted T-max past 24 hours 98.4 °F  Temp:  [98 °F (36.7 °C)-98.4 °F (36.9 °C)] 98 °F (36.7 °C)  Heart Rate:  [59-67] 66  Resp:  [18] 18  BP: (127-153)/(78-94) 130/78    Physical Exam:   General Appearance: Oriented x3   Lungs:   Heart:   Skin:   Incision: The amputation site dressing change.  Suture intact skin margin viable skin flaps are viable     Results:  Results from last 7 days   Lab Units 12/29/22  0615   WBC 10*3/mm3 6.66   HEMOGLOBIN g/dL 10.6*   HEMATOCRIT % 34.6*   PLATELETS 10*3/mm3 315     Results from last 7 days   Lab Units 12/30/22  1024   SODIUM mmol/L 138   POTASSIUM mmol/L 4.0   CHLORIDE mmol/L 101   CO2 mmol/L 24.0   BUN mg/dL 19   CREATININE mg/dL 0.66*   GLUCOSE mg/dL 98   CALCIUM mg/dL 9.0         Assessment: #1.  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  Antibiotics per infectious disease.  Rehab okay with me at any time.      John Peguero MD - 01/02/23 - 05:22 EST

## 2023-01-02 NOTE — PROGRESS NOTES
[facility] Medicine Services  PROGRESS NOTE    Patient Name: Buster Velasco  : 1965  MRN: [id number]    Date of Admission: 2022  Primary Care Physician: Ludivina Bowers MD    Subjective   Subjective     CC:  F/u s/p right BKA     HPI:  Pt is resting in bed. He is still having loose stools today. Denies any other issues.     Copied text in this note has been reviewed and is accurate as of 23.       ROS:  Gen- No fevers, chills  CV- No chest pain, palpitations  Resp- No cough, dyspnea  GI- No N/V, abd pain, (+) diarrhea    Objective   Objective     Vital Signs:   Temp:  [97.9 °F (36.6 °C)-98.1 °F (36.7 °C)] 98 °F (36.7 °C)  Heart Rate:  [59-89] 65  Resp:  [16-18] 18  BP: (130-153)/(78-94) 139/86     Physical Exam:  Constitutional: Awake, alert, NAD  HENT: NCAT, mucous membranes moist  Respiratory: Clear to auscultation bilaterally, nonlabored respirations   Cardiovascular: RRR, no murmurs, rubs, or gallops  Gastrointestinal: Positive bowel sounds, soft, nontender, nondistended, PEG tube  Musculoskeletal: New RBKA with dsg CDI. Old LBKA  Psychiatric: Appropriate affect, cooperative  Neurologic: Oriented x 3, strength symmetric in all extremities, Cranial Nerves grossly intact to confrontation, speech clear  Skin: No rashes. PICC line right arm    Results Reviewed:  LAB RESULTS:      Lab 23  1422 22  0615   WBC 9.35 6.66   HEMOGLOBIN 10.7* 10.6*   HEMATOCRIT 35.0* 34.6*   PLATELETS 306 315   MCV 85.8 84.8         Lab 23  1317 22  1024 22  0615   SODIUM 139 138 145   POTASSIUM 4.2 4.0 4.3   CHLORIDE 102 101 107   CO2 23.0 24.0 26.0   ANION GAP 14.0 13.0 12.0   BUN 19 19 20   CREATININE 0.70* 0.66* 0.78   EGFR 106.8 108.7 103.4   GLUCOSE 103* 98 88   CALCIUM 9.2 9.0 9.6         Lab 23  1317 22  1024   TOTAL PROTEIN 6.6 6.3   ALBUMIN 3.7 3.3*   GLOBULIN 2.9 3.0   ALT (SGPT) 20 21   AST (SGOT) 18 20   BILIRUBIN 0.2 0.2   ALK  PHOS 99 97   AMYLASE  --  39   LIPASE  --  12*                     Brief Urine Lab Results  (Last result in the past 365 days)      Color   Clarity   Blood   Leuk Est   Nitrite   Protein   CREAT   Urine HCG        08/20/22 0108 Yellow   Clear   Negative   Negative     Negative                 Microbiology Results Abnormal     Procedure Component Value - Date/Time    Blood Culture - Blood, Wrist, Left [[id number]]  (Normal) Collected: 12/06/22 1402    Lab Status: Final result Specimen: Blood from Wrist, Left Updated: 12/11/22 1545     Blood Culture No growth at 5 days    Blood Culture - Blood, Arm, Left [[id number]]  (Normal) Collected: 12/06/22 1402    Lab Status: Final result Specimen: Blood from Arm, Left Updated: 12/11/22 1545     Blood Culture No growth at 5 days          No radiology results from the last 24 hrs    Results for orders placed during the hospital encounter of 11/30/22    Adult Transesophageal Echo (JERALD) W/ Cont if Necessary Per Protocol    Interpretation Summary  •  Left ventricular systolic function is normal. Estimated left ventricular EF = 55%  •  The aortic valve is structurally normal with no stenosis present. Trace aortic valve regurgitation is present. There is no evidence of an aortic valve mass is present.  •  There is mild, anterior mitral leaflet thickening present. No evidence of a mitral valve mass is present. Trace mitral valve regurgitation is present. No significant mitral valve stenosis is present  •  The tricuspid valve is normal in structure. There is no evidence of a mass on the tricuspid valve. Mild tricuspid valve regurgitation is present.  •  There is mild thickening of the pulmonic valve. There is trace pulmonic valve regurgitation present. There is no pulmonic valve stenosis present.      I have reviewed the medications:  Scheduled Meds:atorvastatin, 20 mg, Oral, Nightly  DAPTOmycin, 8 mg/kg (Order-Specific), Intravenous, Q24H  insulin detemir, 5 Units, Subcutaneous,  Nightly  insulin lispro, 0-7 Units, Subcutaneous, TID AC  metFORMIN, 1,000 mg, Oral, BID With Meals  methadone, 10 mg, Oral, Q6H  metoprolol tartrate, 25 mg, Oral, Q12H  saccharomyces boulardii, 250 mg, Oral, BID  sodium chloride, 10 mL, Intravenous, Q12H  sodium chloride, 10 mL, Intravenous, Q12H  tamsulosin, 0.4 mg, Oral, Nightly      Continuous Infusions:   PRN Meds:.•  acetaminophen  •  senna-docusate sodium **AND** polyethylene glycol **AND** bisacodyl **AND** bisacodyl  •  calcium carbonate  •  dextrose  •  dextrose  •  glucagon (human recombinant)  •  hydrOXYzine  •  loperamide  •  magnesium hydroxide  •  melatonin  •  ondansetron **OR** ondansetron  •  simethicone  •  sodium chloride    Assessment & Plan   Assessment & Plan     Active Hospital Problems    Diagnosis  POA   • **Right foot infection [L08.9]  Yes   • MRSA bacteremia [R78.81, B95.62]  Unknown   • Sepsis (HCC) [A41.9]  Yes   • Hypoglycemia [E16.2]  Yes   • Anemia [D64.9]  Yes   • Hyponatremia [E87.1]  Yes   • Hypoalbuminemia [E88.09]  Yes   • Essential hypertension [I10]  Yes   • Hyperlipidemia [E78.5]  Yes   • Paroxysmal atrial fibrillation (HCC) [I48.0]  Yes      Resolved Hospital Problems   No resolved problems to display.     Brief Hospital Course to date:  Buster Velasco is a 57 y.o. male  with hx of prior osteomyelitis s/p left BKA, s/p right transmetatarsal amputation, left hand 3rd metacarpal resection (April 2022 at OSH, had 6 weeks of IV antibiotics for MRSA bacteremia), remote hx of IVDA, more recent hx of polysubstance abuse (meth), and DVT s/p IVC filter who presents with right foot wounds. S/p right BKA on 12/2/22 with Dr. Peguero. His blood cultures grew MRSA. ID following and recommend at least 6 weeks of IV ATBx.       Sepsis secondary to acute right foot non-healing wound and likely osteomyelitis, resolved  MRSA bacteremia  - s/p right BKA 12/2/22 with Dr. Peguero  - ID following, currently on Daptomycin monotherapy x 6  "weeks, Dr. George had stated at least through \"early January\" - he will return 1/2/22 and hopefully make final recs  - Blood cultures with coag negative Staph and MRSA  - Repeat blood cultures NGTD  - TTE 12/6/22 no acute findings, JERALD 12/9/22 negative  - Not a good candidate for PICC line/home IV antibiotics given hx of substance abuse, current plan is discharge to rehab     Diarrhea  - PRN Imodium  - If persists, consider further workup/C.diff testing  -- it patient develops fever, leukocytosis then check stool   -- start probiotic      Recurrent hypoglycemia in setting of DMII, resolved  - HbA1c 6.9%  - High dose Tresiba qAM before arrival which has been held  - Briefly required D5, now discontinued  - Decreased Levemir from 10 to 5 units nightly on 12/30/22   - Continue low dose SSI and metformin      Chronic pain on opioids   Previous IVDA/substance abuse  - Continue home methadone 10 mg QID  - Patient denied hx of IVDA to me but in Care Everywhere admission notes from April 2022 at [facility], they documented prior IVDA/substance abuse, most recently with meth in [facility] from 4/2/22; he has told ID that his last IVDA was 17 years ago     Reported hx of Afib   - Patient denies any hx of Afib. Not previously on anticoagulation and given murky history will not start.   - On Metoprolol at home which is continued  - No evidence of Afib on telemetry, discontinued telemetry monitoring 12/10/22      Chronic vocal cord paralysis s/p PEG  Dysphagia  - SLP evaluation: regular diet but with nectar thick liquids recommended, patient refusing nectar thick liquids and understands risks of aspiration  - SLP should continue to follow for possible advancement of diet recommendations  -PEG was placed 8/19/22 at [facility], will defer to them for removal; routine PEG care/flushing per nursing as needed     Hypotension with hx of HTN  - Hold parameters with Metoprolol  - Hypotension resolved     HLD   - Continue statin     DVT " s/p IVC filter   - Placed in spring 2022 per patient. Defer to PCP for further assessment and timing of removal.     Hx of seizure in setting of meth use   - Not on AED's given meth trigger     BPH   - Continue Flomax    Expected Discharge Location and Transportation: rehab   Expected Discharge whenever approved   Expected Discharge Date and Time     Expected Discharge Date Expected Discharge Time    [place] 3, 2023            DVT prophylaxis:  Mechanical DVT prophylaxis orders are present.     AM-PAC 6 Clicks Score (PT): 10 (01/02/23 0820)    CODE STATUS:   Code Status and Medical Interventions:   Ordered at: 12/01/22 0035     Code Status (Patient has no pulse and is not breathing):    CPR (Attempt to Resuscitate)     Medical Interventions (Patient has pulse or is breathing):    Full Support       Shanti Branch, PITER  01/02/23

## 2023-01-02 NOTE — PROGRESS NOTES
[facility] Progress Note        Antibiotics:  Anti-Infectives (From admission, onward)    Ordered     Dose/Rate Route Frequency Start Stop    12/28/22 1526  DAPTOmycin (CUBICIN) 800 mg in sodium chloride 0.9 % 50 mL IVPB        Ordering Provider: Justino Gee MD    8 mg/kg × 100 kg (Order-Specific)  100 mL/hr over 30 Minutes Intravenous Every 24 Hours 12/29/22 1000 01/06/23 0959    12/07/22 0603  DAPTOmycin (CUBICIN) 800 mg in sodium chloride 0.9 % 50 mL IVPB        Ordering Provider: Zeke George MD    8 mg/kg × 100 kg  100 mL/hr over 30 Minutes Intravenous Every 24 Hours 12/07/22 1000 12/28/22 0905    11/30/22 2219  vancomycin 2000 mg/500 mL 0.9% NS IVPB ([facility])        Ordering Provider: Reggie Batres MD    20 mg/kg × 98.9 kg  over 2 Hours Intravenous Once 11/30/22 2221 12/01/22 0315    11/30/22 2219  piperacillin-tazobactam (ZOSYN) 3.375 g in iso-osmotic dextrose 50 ml (premix)        Ordering Provider: Reggie Batres MD    3.375 g Intravenous Once 11/30/22 2221 11/30/22 2335          CC: foot infection    HPI:    Patient is a 57 y.o.  Yr old male with history of prior lower extremity infection/amputations done in [facility].  He has a history of left BKA years ago.  More recent right transmetatarsal amputation but specific dates unclear and unclear who the surgeon was.  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  He has history DVT/IVC filter, diabetes and other comorbidities as below.  He reports a chronic right lateral foot wound present for months but apparently not precipitated by any specific event or trauma.  He is admitted to the hospital on November 30 with deterioration at the foot including redness/swelling    **patient had reported remote drug abuse (initially to me reported  "as IV drug abuse but then later he reported not injection use and [facility]; +drug screen earlier this year per d/w Dr Chau [facility] Meth [facility] and reported by them to be IVDA/substance abuse),   chronic methadone. He reported to me this was 17 years ago and therefore some inconsistencies    ** patient reports MRSA blood stream infection treated in [place] spring of 2022, 6 weeks of vancomycin by his report.  Otherwise data deficit.  Try to retrieve information     12/2/22 Dr Peguero did surgery  \"Procedure(s): Mid level right below-knee amputation and primary closure \"    12/6 with MRSA in blood culture;  TTE done but limited per cardiology    12/9/22   JERALD no vegetation per cardiology    1/2/23   Interval Hx noted; no new focal pain.  No fever;  Diarrhea x 1 today;  No diarrhea on 1/1 per him; at present, no abd pain;  No myalgia or shortness of breath/cough. Nursing reports no new focal pain or distress.  postop pain to the right LE which is controlled/dull at present, constant, nonradiating, worse with palpation, generally better with pain meds and 1-2  out of 10 in severity.  Not progressive     No headache photophobia or neck stiffness.  No shortness of breath cough or hemoptysis.  No nausea vomiting  .  No dysuria hematuria or pyuria.  No other new skin rash       ROS:      1/2/23 per nursing, No f/c/s.   No rash.       Constitutional-- No Fever, chills or sweats.  Appetite good, and no malaise. No fatigue.  Heent--chronic hoarse voice.  No new vision, hearing or throat complaints.  No epistaxis or oral sores.   No flashers, floaters or eye pain.   No headache, photophobia or neck stiffness.  Chronic PEG tube  CV-- No chest pain, palpitation or syncope  Resp-- No SOB/cough/Hemoptysis  GI-  No hematochezia, melena, or hematemesis. Denies jaundice or chronic liver disease.  -- No dysuria, hematuria, or flank pain.  Denies hesitancy, urgency or flank " "pain.  Lymph- no swollen lymph nodes in neck/axilla or groin.   Heme- No active bruising or bleeding; no Hx of DVT or PE.  MS-- no swelling or pain in the bones or joints of arms/legs.  No new back pain.  Neuro-- No acute focal weakness or numbness in the arms or legs.  No seizures.     Full 12 point review of systems reviewed and negative otherwise for acute complaints, except for above      PE: nursing/chaperone present  /78 (BP Location: Left arm, Patient Position: Lying)   Pulse 66   Temp 98 °F (36.7 °C) (Oral)   Resp 18   Ht 193 cm (75.98\")   Wt 107 kg (235 lb)   SpO2 98%   BMI 28.62 kg/m²          GENERAL:  sleepy;  in no acute distress.  chronic Hoarse voice noted   HEENT: Normocephalic, atraumatic.   No conjunctival injection. No icterus. Oropharynx   without evidence of thrush or exudate. No evidence of peridontal disease.     HEART: RRR; No murmur, rubs, gallops.   LUNGS: Diminished at bases but otherwise clear to auscultation bilaterally without wheezing, rales, rhonchi. Normal respiratory effort. Nonlabored. No dullness.  ABDOMEN: Soft, nontender, nondistended. Positive bowel sounds. No rebound or guarding. NO mass or HSM.  PEG tube noted  EXT:  No cyanosis, clubbing;No cord.   MSK: FROM without joint effusions noted arms/legs.    SKIN: Warm and dry without cutaneous eruptions on Inspection/palpation.    NEURO:  sleepy     Right lower extremity surgical site noted;  No new redness;  no discrete mass bulge or fluctuance.  No crepitus or bulla.       No peripheral stigmata/phenomenon of endocarditis     IV without obvious redness or drainage     Left BKA site with no redness induration or warmth.  No discrete mass bulge or fluctuance.    Laboratory Data    Results from last 7 days   Lab Units 12/29/22  0615   WBC 10*3/mm3 6.66   HEMOGLOBIN g/dL 10.6*   HEMATOCRIT % 34.6*   PLATELETS 10*3/mm3 315     Results from last 7 days   Lab Units 12/30/22  1024   SODIUM mmol/L 138   POTASSIUM mmol/L 4.0 "   CHLORIDE mmol/L 101   CO2 mmol/L 24.0   BUN mg/dL 19   CREATININE mg/dL 0.66*   GLUCOSE mg/dL 98   CALCIUM mg/dL 9.0     Results from last 7 days   Lab Units 12/30/22  1024   ALK PHOS U/L 97   BILIRUBIN mg/dL 0.2   ALT (SGPT) U/L 21   AST (SGOT) U/L 20               Estimated Creatinine Clearance: 163.8 mL/min (A) (by C-G formula based on SCr of 0.66 mg/dL (L)).      Microbiology:      Radiology:  Imaging Results (Last 72 Hours)     ** No results found for the last 72 hours. **            Impression:     --Acute right foot/ankle with multifocal ulceration, cellulitis/wound infection and probable osteomyelitis with probe to bone at the lateral foot and abnormal MRI.  Other comorbidities as outlined above and high risk for further serious morbidity and other serious sequelae including persistent/recurrent or nonhealing wounds, persistent/progressive or recurrent infection and risk for further functional/limb loss/amputation.  Surgery following to help define timing/option of threshold for any future surgical intervention .      **Surgery 12/2 with BKA     --MRSA bacteremia from November 30 (daptomycin sensitive).  Presumed from foot but also risk for endovascular focus particularly with  History of prior MRSA bloodstream infection.  Transthoracic echocardiogram limited per cardiology.  JERALD no vegetation per cardiology; follow-up blood cultures negative so far from December 6    **No  new metastatic foci of involvement as of December 21    --History MRSA with bacteremia by his report in spring/summer 2022 and treated with 6 weeks of vancomycin in [place].  Specifics unclear and trying to retrieve information    --coag-negative staph in blood culture likely contaminant     --Chronic vocal cord paralysis per notes with chronic hoarseness and indwelling PEG tube.  Medicine team to clarify     --History left BKA.     --Diabetes.  You need to tightly control blood sugar to give best chance for  healing     --History of DVT with IVC filter    --history of remote drug abuse Per his reports to me; he denies injection drug abuse for 17 years by his report to me although as above, medicine team has find records indicating more recent abuse in 2022 [facility]    --diarrhea x 1 1/2; if increased/persistent, you would need to consider checking stool for CDiff     PLAN:      -- Daptomycin  IV    duration to depend on clinical course of study results and response to therapy; no new metastatic focus of involvement so far; will d/w medicine team/case management     -- Check/review labs cultures and scans     -- Partial history per nursing staff     -- Discussed with microbiology and family     --d/w Dr Peguero        -- Highly complex set of issues with high risk for further serious morbidity and other serious sequela     --Case management looking into options with potential placement to [facility];           **Copied text in this note has been reviewed and is accurate as of 01/02/23.     Zeke George MD  1/2/2023

## 2023-01-03 LAB
GLUCOSE BLDC GLUCOMTR-MCNC: 112 MG/DL (ref 70–130)
GLUCOSE BLDC GLUCOMTR-MCNC: 129 MG/DL (ref 70–130)
GLUCOSE BLDC GLUCOMTR-MCNC: 159 MG/DL (ref 70–130)
GLUCOSE BLDC GLUCOMTR-MCNC: 180 MG/DL (ref 70–130)

## 2023-01-03 PROCEDURE — 97530 THERAPEUTIC ACTIVITIES: CPT

## 2023-01-03 PROCEDURE — 92526 ORAL FUNCTION THERAPY: CPT

## 2023-01-03 PROCEDURE — 99024 POSTOP FOLLOW-UP VISIT: CPT | Performed by: THORACIC SURGERY (CARDIOTHORACIC VASCULAR SURGERY)

## 2023-01-03 PROCEDURE — 63710000001 INSULIN LISPRO (HUMAN) PER 5 UNITS: Performed by: HOSPITALIST

## 2023-01-03 PROCEDURE — 82962 GLUCOSE BLOOD TEST: CPT

## 2023-01-03 PROCEDURE — 25010000002 DAPTOMYCIN PER 1 MG: Performed by: INTERNAL MEDICINE

## 2023-01-03 PROCEDURE — 63710000001 INSULIN DETEMIR PER 5 UNITS: Performed by: HOSPITALIST

## 2023-01-03 PROCEDURE — 99232 SBSQ HOSP IP/OBS MODERATE 35: CPT | Performed by: NURSE PRACTITIONER

## 2023-01-03 PROCEDURE — 97110 THERAPEUTIC EXERCISES: CPT

## 2023-01-03 RX ADMIN — METOPROLOL TARTRATE 25 MG: 25 TABLET, FILM COATED ORAL at 20:11

## 2023-01-03 RX ADMIN — METFORMIN HYDROCHLORIDE 1000 MG: 1000 TABLET, FILM COATED ORAL at 18:03

## 2023-01-03 RX ADMIN — DAPTOMYCIN 800 MG: 500 INJECTION, POWDER, LYOPHILIZED, FOR SOLUTION INTRAVENOUS at 10:59

## 2023-01-03 RX ADMIN — TAMSULOSIN HYDROCHLORIDE 0.4 MG: 0.4 CAPSULE ORAL at 20:11

## 2023-01-03 RX ADMIN — Medication 250 MG: at 20:12

## 2023-01-03 RX ADMIN — HYDROXYZINE HYDROCHLORIDE 50 MG: 25 TABLET ORAL at 20:12

## 2023-01-03 RX ADMIN — METHADONE HYDROCHLORIDE 10 MG: 10 TABLET ORAL at 01:19

## 2023-01-03 RX ADMIN — METFORMIN HYDROCHLORIDE 1000 MG: 1000 TABLET, FILM COATED ORAL at 08:14

## 2023-01-03 RX ADMIN — METHADONE HYDROCHLORIDE 10 MG: 10 TABLET ORAL at 08:14

## 2023-01-03 RX ADMIN — METHADONE HYDROCHLORIDE 10 MG: 10 TABLET ORAL at 14:15

## 2023-01-03 RX ADMIN — METHADONE HYDROCHLORIDE 10 MG: 10 TABLET ORAL at 20:12

## 2023-01-03 RX ADMIN — Medication 250 MG: at 08:14

## 2023-01-03 RX ADMIN — LOPERAMIDE HYDROCHLORIDE 2 MG: 2 CAPSULE ORAL at 16:34

## 2023-01-03 RX ADMIN — Medication 10 ML: at 20:12

## 2023-01-03 RX ADMIN — LOPERAMIDE HYDROCHLORIDE 2 MG: 2 CAPSULE ORAL at 08:14

## 2023-01-03 RX ADMIN — INSULIN DETEMIR 5 UNITS: 100 INJECTION, SOLUTION SUBCUTANEOUS at 20:11

## 2023-01-03 RX ADMIN — METOPROLOL TARTRATE 25 MG: 25 TABLET, FILM COATED ORAL at 08:14

## 2023-01-03 RX ADMIN — ATORVASTATIN CALCIUM 20 MG: 20 TABLET, FILM COATED ORAL at 20:11

## 2023-01-03 RX ADMIN — Medication 10 ML: at 08:14

## 2023-01-03 RX ADMIN — INSULIN LISPRO 2 UNITS: 100 INJECTION, SOLUTION INTRAVENOUS; SUBCUTANEOUS at 11:51

## 2023-01-03 NOTE — PROGRESS NOTES
[facility] Medicine Services  PROGRESS NOTE    Patient Name: Buster Velasco  : 1965  MRN: [id number]    Date of Admission: 2022  Primary Care Physician: Ludivina Bowers MD    Subjective   Subjective     CC:  F/u right BKA    HPI:  Patient resting in bed.  Says his diarrhea is a little better.  Is hoping to go to rehab soon.  Says his pain is about a 6/10 but improves with pain medications.    ROS:  Gen- No fevers, chills  CV- No chest pain, palpitations  Resp- No cough, dyspnea  GI- No N/V/D, abd pain    Objective   Objective     Vital Signs:   Temp:  [97.8 °F (36.6 °C)-98.1 °F (36.7 °C)] 98 °F (36.7 °C)  Heart Rate:  [64-86] 64  Resp:  [17-18] 17  BP: (116-149)/(72-93) 126/82     Physical Exam:  Constitutional: No acute distress, awake, alert  HENT: NCAT, mucous membranes moist  Respiratory: Clear to auscultation bilaterally, respiratory effort normal, room air  Cardiovascular: RRR, no murmurs, rubs, or gallops  Gastrointestinal: Positive bowel sounds, soft, nontender, nondistended, PEG  Musculoskeletal: Right BKA wrapped, left BKA (previous)  Psychiatric: Appropriate affect, cooperative  Neurologic: Oriented x 3, moves all extremities, no focal deficits, speech clear  Skin: No rashes      Results Reviewed:  LAB RESULTS:      Lab 23  1422 22  0615   WBC 9.35 6.66   HEMOGLOBIN 10.7* 10.6*   HEMATOCRIT 35.0* 34.6*   PLATELETS 306 315   MCV 85.8 84.8         Lab 23  1317 22  1024 22  0615   SODIUM 139 138 145   POTASSIUM 4.2 4.0 4.3   CHLORIDE 102 101 107   CO2 23.0 24.0 26.0   ANION GAP 14.0 13.0 12.0   BUN 19 19 20   CREATININE 0.70* 0.66* 0.78   EGFR 106.8 108.7 103.4   GLUCOSE 103* 98 88   CALCIUM 9.2 9.0 9.6         Lab 23  1317 22  1024   TOTAL PROTEIN 6.6 6.3   ALBUMIN 3.7 3.3*   GLOBULIN 2.9 3.0   ALT (SGPT) 20 21   AST (SGOT) 18 20   BILIRUBIN 0.2 0.2   ALK PHOS 99 97   AMYLASE  --  39   LIPASE  --  12*                      Brief Urine Lab Results  (Last result in the past 365 days)      Color   Clarity   Blood   Leuk Est   Nitrite   Protein   CREAT   Urine HCG        08/20/22 0108 Yellow   Clear   Negative   Negative     Negative                 Microbiology Results Abnormal     Procedure Component Value - Date/Time    Blood Culture - Blood, Wrist, Left [[id number]]  (Normal) Collected: 12/06/22 1402    Lab Status: Final result Specimen: Blood from Wrist, Left Updated: 12/11/22 1545     Blood Culture No growth at 5 days    Blood Culture - Blood, Arm, Left [[id number]]  (Normal) Collected: 12/06/22 1402    Lab Status: Final result Specimen: Blood from Arm, Left Updated: 12/11/22 1545     Blood Culture No growth at 5 days          No radiology results from the last 24 hrs    Results for orders placed during the hospital encounter of 11/30/22    Adult Transesophageal Echo (JERALD) W/ Cont if Necessary Per Protocol    Interpretation Summary  •  Left ventricular systolic function is normal. Estimated left ventricular EF = 55%  •  The aortic valve is structurally normal with no stenosis present. Trace aortic valve regurgitation is present. There is no evidence of an aortic valve mass is present.  •  There is mild, anterior mitral leaflet thickening present. No evidence of a mitral valve mass is present. Trace mitral valve regurgitation is present. No significant mitral valve stenosis is present  •  The tricuspid valve is normal in structure. There is no evidence of a mass on the tricuspid valve. Mild tricuspid valve regurgitation is present.  •  There is mild thickening of the pulmonic valve. There is trace pulmonic valve regurgitation present. There is no pulmonic valve stenosis present.      I have reviewed the medications:  Scheduled Meds:atorvastatin, 20 mg, Oral, Nightly  DAPTOmycin, 8 mg/kg (Order-Specific), Intravenous, Q24H  insulin detemir, 5 Units, Subcutaneous, Nightly  insulin lispro, 0-7 Units, Subcutaneous, TID AC  metFORMIN,  1,000 mg, Oral, BID With Meals  methadone, 10 mg, Oral, Q6H  metoprolol tartrate, 25 mg, Oral, Q12H  saccharomyces boulardii, 250 mg, Oral, BID  sodium chloride, 10 mL, Intravenous, Q12H  sodium chloride, 10 mL, Intravenous, Q12H  tamsulosin, 0.4 mg, Oral, Nightly      Continuous Infusions:   PRN Meds:.•  acetaminophen  •  calcium carbonate  •  dextrose  •  dextrose  •  glucagon (human recombinant)  •  hydrOXYzine  •  loperamide  •  magnesium hydroxide  •  melatonin  •  ondansetron **OR** ondansetron  •  simethicone  •  sodium chloride    Assessment & Plan   Assessment & Plan     Active Hospital Problems    Diagnosis  POA   • **Right foot infection [L08.9]  Yes   • MRSA bacteremia [R78.81, B95.62]  Unknown   • Sepsis (HCC) [A41.9]  Yes   • Hypoglycemia [E16.2]  Yes   • Anemia [D64.9]  Yes   • Hyponatremia [E87.1]  Yes   • Hypoalbuminemia [E88.09]  Yes   • Essential hypertension [I10]  Yes   • Hyperlipidemia [E78.5]  Yes   • Paroxysmal atrial fibrillation (HCC) [I48.0]  Yes      Resolved Hospital Problems   No resolved problems to display.        Brief Hospital Course to date:  Buster Velasco is a 57 y.o. male  with hx of prior osteomyelitis s/p left BKA, s/p right transmetatarsal amputation, left hand 3rd metacarpal resection (April 2022 at OSH, had 6 weeks of IV antibiotics for MRSA bacteremia), remote hx of IVDA, more recent hx of polysubstance abuse (meth), and DVT s/p IVC filter who presented with right foot wounds. S/p right BKA on 12/2/22 with Dr. Peguero. His blood cultures grew MRSA. ID following and recommends at least 6 weeks of IV ATBx.     This patient's problems and plans were partially entered by my partner and updated as appropriate by me 01/03/23.      Sepsis secondary to acute right foot non-healing wound and likely osteomyelitis, resolved  MRSA bacteremia  - s/p right BKA 12/2/22 with Dr. Peguero  -Wound care per Dr. Peguero  - ID following, Dr. George, currently on Daptomycin monotherapy x 6  weeks, will complete tomorrow, 1/4/2023  - Blood cultures with coag negative Staph and MRSA  - Repeat blood cultures NGTD  - TTE 12/6/22 no acute findings, JERALD 12/9/22 negative  - Not a good candidate for PICC line/home IV antibiotics given hx of substance abuse, current plan is discharge to rehab     Diarrhea  -- PRN Imodium, MOM  -- If persists, consider further workup/C.diff testing  -- it patient develops fever, leukocytosis then check stool, has been afebrile  -- start probiotic      Recurrent hypoglycemia in setting of DMII, resolved  - HbA1c 6.9%  - High dose Tresiba qAM before arrival which has been held  - Briefly required D5, now discontinued  - Decreased Levemir from 10 to 5 units nightly on 12/30/22   - Continue low dose SSI and metformin      Chronic pain on opioids   Previous IVDA/substance abuse  - Continue home methadone 10 mg QID  - Patient denied hx of IVDA to me but in Care Everywhere admission notes from April 2022 at [facility], they documented prior IVDA/substance abuse, most recently with meth in UDS from 4/2/22; he has told ID that his last IVDA was 17 years ago     Reported hx of Afib   - Patient denies any hx of Afib. Not previously on anticoagulation and given murky history will not start.   - On Metoprolol at home which is continued  - No evidence of Afib on telemetry, discontinued telemetry monitoring 12/10/22      Chronic vocal cord paralysis, s/p PEG  Dysphagia  - SLP evaluation: regular diet but with nectar thick liquids recommended, patient refusing nectar thick liquids and understands risks of aspiration  - SLP should continue to follow for possible advancement of diet recommendations  -PEG was placed 8/19/22 at [facility], will defer to them for removal; routine PEG care/flushing per nursing as needed     Hypotension with hx of HTN  - Hold parameters with Metoprolol  - Hypotension resolved     HLD   - Continue statin     DVT s/p IVC filter   - Placed in spring 2022 per  patient. Defer to PCP for further assessment and timing of removal.     Hx of seizure in setting of meth use   - Not on AED's given meth trigger     BPH   - Continue Flomax    Expected Discharge Location and Transportation: [place], WC van  Expected Discharge   Expected Discharge Date and Time     Expected Discharge Date Expected Discharge Time    Jan 4, 2023            DVT prophylaxis:  Mechanical DVT prophylaxis orders are present.     AM-PAC 6 Clicks Score (PT): 10 (01/03/23 1125)    CODE STATUS:   Code Status and Medical Interventions:   Ordered at: 12/01/22 0035     Code Status (Patient has no pulse and is not breathing):    CPR (Attempt to Resuscitate)     Medical Interventions (Patient has pulse or is breathing):    Full Support       Duyen Maldonado, APRN  01/03/23

## 2023-01-03 NOTE — PROGRESS NOTES
Clinical Nutrition     Patient Name: Buster Velasco  YOB: 1965  MRN: [id number]  Date of Encounter: 23 13:51 EST  Admission date: 2022    Reason for Visit   Follow up    EMR reviewed    Yes    Diet Nutrition Related History     Patient was sleeping just prior to our conversation. Patient reports his appetite is still good (average of 87% intake) but he has had diarrhea for the last 5 days. (No nausea or vomiting.) Patient's last BM was today, 1/3/23.     22Previous diet tech note:  Patient reports an excellent appetite and is eating 100% of his meals. Patient denies any N/V/D and his last recorded BM was on 22. Patient declined offer of ONS.     22 Previous diet tech note  Patient was sleeping hard and did not wake up to my voice.Patient's intake is good at 87.5% of last 4 meals. Last documented BM was on 22.      Current Nutrition Prescription    Diet: Cardiac Diets, Diabetic Diets; Healthy Heart (2-3 Na+); Consistent Carbohydrate; No Mixed Consistencies; Texture: Regular Texture (IDDSI 7); Fluid Consistency: Nectar Thick  Orders Placed This Encounter      DIET MESSAGE Please send pt a lunch tray!! Thank you!!      DIET MESSAGE Please send dinner tray, thank you      DIET MESSAGE Patient would like a cheeseburger and baked chips for supper please. Thank you.      DIET MESSAGE Pt ordered chicken noodle soup and was delivered meatloaf. Can we please have some brought up? Thank you!    Average Intake from [place]% x 6    Actions:    Follow treatment progress, Care plan reviewed, Interview for preferences, Encourage intake    Monitor Per Protocol    Cornelia Lam,   Time Spent: 20 minutes

## 2023-01-03 NOTE — PLAN OF CARE
Goal Outcome Evaluation:  Plan of Care Reviewed With: patient        Progress: improving  Outcome Evaluation: Patient able to tolerate being up in w/c for propulsion activity. No dizziness. He was mostly limited by shoulder discomfort. Recommend donning scrubs for sliding board transfers to protech his skin.

## 2023-01-03 NOTE — PROGRESS NOTES
Cardiothoracic Surgery Progress Note      POD #: 32-right below-knee amputation     LOS: 34 days      Subjective: Awake and alert    Objective:  Vital Signs vital signs below noted T-max past 24 hours 98.1 °F  Temp:  [97.8 °F (36.6 °C)-98.1 °F (36.7 °C)] 97.8 °F (36.6 °C)  Heart Rate:  [64-89] 64  Resp:  [16-18] 18  BP: (116-149)/(72-93) 116/72    Physical Exam:   General Appearance: Oriented x3   Lungs:   Heart:   Skin:   Incision: Amputation site dressing change.  Suture intact skin margin viable skin flaps are viable     Results:  Results from last 7 days   Lab Units 01/02/23  1422   WBC 10*3/mm3 9.35   HEMOGLOBIN g/dL 10.7*   HEMATOCRIT % 35.0*   PLATELETS 10*3/mm3 306     Results from last 7 days   Lab Units 01/02/23  1317   SODIUM mmol/L 139   POTASSIUM mmol/L 4.2   CHLORIDE mmol/L 102   CO2 mmol/L 23.0   BUN mg/dL 19   CREATININE mg/dL 0.70*   GLUCOSE mg/dL 103*   CALCIUM mg/dL 9.2         Assessment: #1.  Postop day 34 right below-knee amputation healing well  2 status post remote left below-knee amputation healed well  3.  Diabetes mellitus and neuropathy      Plan: Continue daily dressing change amputation site.  Medical manage per hospitalist.  Antibiotics per infectious disease.  Rehab okay with me at any time.      John Peguero MD - 01/03/23 - 05:44 EST

## 2023-01-03 NOTE — THERAPY TREATMENT NOTE
Acute Care - Speech Language Pathology   Swallow Treatment Note [facility]     Patient Name: Buster Velasco  : 1965  MRN: [id number]  Today's Date: 1/3/2023               Admit Date: 2022    Visit Dx:     ICD-10-CM ICD-9-CM   1. Right foot infection  L08.9 686.9   2. Hypoglycemia  E16.2 251.2   3. Decubitus ulcer of coccygeal region, unspecified ulcer stage  L89.159 707.03     707.20   4. Pharyngeal dysphagia  R13.13 787.23     Patient Active Problem List   Diagnosis   • Right foot infection   • Hypoglycemia   • Anemia   • Hyponatremia   • Hypoalbuminemia   • Essential hypertension   • Hyperlipidemia   • Paroxysmal atrial fibrillation (HCC)   • Sepsis (HCC)   • MRSA bacteremia     Past Medical History:   Diagnosis Date   • Diabetes (HCC)    • DVT (deep venous thrombosis) (HCC)    • Hypertension    • Mood disorder (HCC)      Past Surgical History:   Procedure Laterality Date   • BELOW KNEE AMPUTATION Left    • BELOW KNEE AMPUTATION Right 2022    Procedure: AMPUTATION BELOW KNEE RIGHT;  Surgeon: John Peguero MD;  Location: [facility];  Service: Vascular;  Laterality: Right;   • TRANS METATARSAL AMPUTATION Right        SLP Recommendation and Plan     SLP Diet Recommendation: regular textures, nectar thick liquids, water between meals after oral care, with supervision, ice chips between meals after oral care, with supervision, no mixed consistencies, other (see comments) (Chin tuck w/ NTL. thin H2O between meals must be from spoon or from cup sip + chin tuck, as tolerated) (23 102)  Recommended Precautions and Strategies: upright posture during/after eating, general aspiration precautions, chin tuck (23 102)  SLP Rec. for Method of Medication Administration: meds whole, with thick liquids, with puree, as tolerated (23 1025)     Monitor for Signs of Aspiration: yes, notify SLP if any concerns (23 102)  Recommended Diagnostics: VFSS (MBS), other (see comments)  (1/4) (01/03/23 1025)     Anticipated Discharge Disposition (SLP): anticipate therapy at next level of care, inpatient rehabilitation facility (01/03/23 1025)     Therapy Frequency (Swallow): 5 days per week (01/03/23 1025)  Predicted Duration Therapy Intervention (Days): until discharge (01/03/23 1025)        Daily Summary of Progress (SLP): progress toward functional goals as expected (01/03/23 1025)               Treatment Assessment (SLP): suspected, continued, pharyngeal dysphagia (01/03/23 1025)  Treatment Assessment Comments (SLP): Pt tolerated trials of thin liquid & NTL w/o overt s/s of aspiration. Reviewed recs w/ pt, able to demonstrate understanding of recs: NTL w/ meals via small cup sip + chin tuck & thin liquid between meals via small cup sip & chin tuck. Pt stated strong dislike of NTL, reviewed MBS results/rationale for thickened liquids/ comps. Pt demonstrated understanding of diet recs, agreeable to cont current diet w/ comps. Plan for repeat MBS 1/4 (01/03/23 1025)  Plan for Continued Treatment (SLP): continue treatment per plan of care (01/03/23 1025)                SWALLOW EVALUATION (last 72 hours)     SLP Adult Swallow Evaluation     Row Name 01/03/23 1025                   Rehab Evaluation    Document Type therapy note (daily note)  -        Subjective Information no complaints  -        Patient Observations alert;cooperative  -        Patient/Family/Caregiver Comments/Observations No family present  -        Patient Effort good  -        Symptoms Noted During/After Treatment none  -           Pain    Additional Documentation Pain Scale: FACES Pre/Post-Treatment (Group)  -           Pain Scale: FACES Pre/Post-Treatment    Pain: FACES Scale, Pretreatment 0-->no hurt  -        Posttreatment Pain Rating 0-->no hurt  -           SLP Treatment Clinical Impressions    Treatment Assessment (SLP) suspected;continued;pharyngeal dysphagia  -        Treatment Assessment Comments  (SLP) Pt tolerated trials of thin liquid & NTL w/o overt s/s of aspiration. Reviewed recs w/ pt, able to demonstrate understanding of recs: NTL w/ meals via small cup sip + chin tuck & thin liquid between meals via small cup sip & chin tuck. Pt stated strong dislike of NTL, reviewed MBS results/rationale for thickened liquids/ comps. Pt demonstrated understanding of diet recs, agreeable to cont current diet w/ comps. Plan for repeat MBS 1/4  -        Daily Summary of Progress (SLP) progress toward functional goals as expected  -        Plan for Continued Treatment (SLP) continue treatment per plan of care  -        Care Plan Review care plan/treatment goals reviewed  -           Recommendations    Therapy Frequency (Swallow) 5 days per week  -        Predicted Duration Therapy Intervention (Days) until discharge  -        SLP Diet Recommendation regular textures;nectar thick liquids;water between meals after oral care, with supervision;ice chips between meals after oral care, with supervision;no mixed consistencies;other (see comments)  Chin tuck w/ NTL. thin H2O between meals must be from spoon or from cup sip + chin tuck, as tolerated  -        Recommended Diagnostics VFSS ([facility]);other (see comments)  1/4  -        Recommended Precautions and Strategies upright posture during/after eating;general aspiration precautions;chin tuck  -        Oral Care Recommendations Oral Care BID/PRN  -        SLP Rec. for Method of Medication Administration meds whole;with thick liquids;with puree;as tolerated  -        Monitor for Signs of Aspiration yes;notify SLP if any concerns  -        Anticipated Discharge Disposition (SLP) anticipate therapy at next level of care;inpatient rehabilitation facility  -              User Key  (r) = Recorded By, (t) = Taken By, (c) = Cosigned By    Initials Name Effective Dates     Li Rhoades, MS, CFY-SLP 06/22/22 -                 EDUCATION  The patient has  been educated in the following areas:   Dysphagia (Swallowing Impairment).        SLP GOALS     Row Name 01/03/23 1025             (LTG) Patient will demonstrate functional swallow for    Diet Texture (Demonstrate functional swallow) regular textures  -      Liquid viscosity (Demonstrate functional swallow) thin liquids  -      Tucker (Demonstrate functional swallow) with use of compensatory strategies  -      Time Frame (Demonstrate functional swallow) by discharge  -      Progress/Outcomes (Demonstrate functional swallow) continuing progress toward goal  -      Comment (Demonstrate functional swallow) No overt s/s w/ small cup sips of thin liquid  -         (STG) Patient will tolerate trials of    Consistencies Trialed (Tolerate trials) regular textures;nectar/ mildly thick liquids  -      Desired Outcome (Tolerate trials) without signs/symptoms of aspiration;without signs of distress  -      Tucker (Tolerate trials) independently (over 90% accuracy)  -      Time Frame (Tolerate trials) by discharge  -      Progress/Outcomes (Tolerate trials) continuing progress toward goal  -      Comment (Tolerate trials) No overt s/s of aspiration w/ NTL trials, initial cue required for chin tuck  -         (STG) Patient will tolerate therapeutic trials of    Consistencies Trialed (Tolerate therapeutic trials) thin liquids  -      Desired Outcome (Tolerate therapeutic trials) without signs/symptoms of aspiration  -      Tucker (Tolerate therapeutic trials) with minimal cues (75-90% accuracy)  -      Time Frame (Tolerate therapeutic trials) by discharge  -      Progress/Outcomes (Tolerate therapeutic trials) continuing progress toward goal  -         (STG) Pharyngeal Strengthening Exercise Goal 1 (SLP)    Activity (Pharyngeal Strengthening Goal 1, SLP) increase timing  -      Increase Timing prepping - 3 second prep or suck swallow or 3-step swallow  -      Increase  Superior Movement of the Hyolaryngeal Complex effortful pitch glide (falsetto + pharyngeal squeeze)  -MH      Increase Anterior Movement of the Hyolaryngeal Complex EMST;chin tuck against resistance (CTAR)  -MH      Increase Epiglottic Inversion and Retroflexion Mendelsohn  -MH      Increase Closure at Entrance to Airway/Closure of Airway at Glottis super-supraglottic swallow  -MH      Increase Squeeze/Positive Pressure Generation janine  -MH      Increase Tongue Base Retraction hard effortful swallow  -      [place]/Accuracy (Pharyngeal Strengthening Goal 1, SLP) with minimal cues (75-90% accuracy)  -      Time Frame (Pharyngeal Strengthening Goal 1, SLP) short term goal (STG)  -      Progress/Outcomes (Pharyngeal Strengthening Goal 1, SLP) continuing progress toward goal  -      Comment (Pharyngeal Strengthening Goal 1, SLP) Reviewed and completed all. Handout @ bedside  -         (STG) Swallow Management Recall Goal 1 (SLP)    Activity (Swallow Management Recall Goal 1, SLP) independent recall of;compensatory swallow strategies/techniques;safe diet/liquid level  -      [place]/Accuracy (Swallow Management Recall Goal 1, SLP) independently (over 90% accuracy)  -      Time Frame (Swallow Management Recall Goal 1, SLP) short term goal (STG)  -      Progress/Outcomes (Swallow Management Recall Goal 1, SLP) goal no longer appropriate  -         (STG) Swallow Compensatory Strategies Goal 1 (SLP)    Activity (Swallow Compensatory Strategies/Techniques Goal 1, SLP) compensatory strategies;during p.o. trials;small cup sips;chin tuck posture;other (see comments)  w/ thin liquid in isolation (w/o solids or liquid wash)  -      [place]/Accuracy (Swallow Compensatory Strategies/Techniques Goal 1, SLP) independently (over 90% accuracy)  -      Time Frame (Swallow Compensatory Strategies/Techniques Goal 1, SLP) short term goal (STG)  -      Progress/Outcomes (Swallow Compensatory  Strategies/Techniques Goal 1, SLP) continuing progress toward goal  -      Comment (Swallow Compensatory Strategies/Techniques Goal 1, SLP) Pt able to state/demonstrate comps w/ single verbal cue. Comps written on exercise handout  -            User Key  (r) = Recorded By, (t) = Taken By, (c) = Cosigned By    Initials Name Provider Type    Li Navarro MS, CFY-SLP Speech and Language Pathologist                   Time Calculation:    Time Calculation- SLP     Row Name 01/03/23 1138             Time Calculation- SLP    SLP Start Time 1025  -      SLP Received On 01/03/23  -         Untimed Charges    [id number] Treatment Swallow Minutes 45  -MH         Total Minutes    Untimed Charges Total Minutes 45  -MH       Total Minutes 45  -MH            User Key  (r) = Recorded By, (t) = Taken By, (c) = Cosigned By    Initials Name Provider Type    Li Navarro MS, CFY-SLP Speech and Language Pathologist                Therapy Charges for Today     Code Description Service Date Service Provider Modifiers Qty    [id number] HC ST TREATMENT SWALLOW 3 1/3/2023 Li Rhoades MS, CFY-SLP GN 1               Li Rhoades MS, BETSEY-SLP  1/3/2023

## 2023-01-03 NOTE — PLAN OF CARE
Goal Outcome Evaluation:  Plan of Care Reviewed With: patient        Progress: no change       No changes overnight, RA, VS WDL, AOx4, specialty bed in place, freq weight shifting, PICC line capped, imodium given x1

## 2023-01-03 NOTE — THERAPY TREATMENT NOTE
Patient Name: Buster Velasco  : 1965    MRN: [id number]                              Today's Date: 1/3/2023       Admit Date: 2022    Visit Dx:     ICD-10-CM ICD-9-CM   1. Right foot infection  L08.9 686.9   2. Hypoglycemia  E16.2 251.2   3. Decubitus ulcer of coccygeal region, unspecified ulcer stage  L89.159 707.03     707.20   4. Pharyngeal dysphagia  R13.13 787.23     Patient Active Problem List   Diagnosis   • Right foot infection   • Hypoglycemia   • Anemia   • Hyponatremia   • Hypoalbuminemia   • Essential hypertension   • Hyperlipidemia   • Paroxysmal atrial fibrillation (HCC)   • Sepsis (HCC)   • MRSA bacteremia     Past Medical History:   Diagnosis Date   • Diabetes (HCC)    • DVT (deep venous thrombosis) (HCC)    • Hypertension    • Mood disorder (HCC)      Past Surgical History:   Procedure Laterality Date   • BELOW KNEE AMPUTATION Left    • BELOW KNEE AMPUTATION Right 2022    Procedure: AMPUTATION BELOW KNEE RIGHT;  Surgeon: John Peguero MD;  Location: [facility];  Service: Vascular;  Laterality: Right;   • TRANS METATARSAL AMPUTATION Right       General Information     Row Name 23 1117          Physical Therapy Time and Intention    Document Type therapy note (daily note)  -SC     Mode of Treatment physical therapy  -SC     Row Name 23 [facility]          General Information    Patient Profile Reviewed yes  -SC     Existing Precautions/Restrictions fall  B BKA  -SC     Row Name 23 1117          Cognition    Orientation Status (Cognition) oriented x 4  -SC     Row Name 23 1117          Safety Issues, Functional Mobility    Impairments Affecting Function (Mobility) balance;endurance/activity tolerance;pain;strength;postural/trunk control;range of motion (ROM)  -SC     Comment, Safety Issues/Impairments (Mobility) alert, following commands, B BKA without working prosthetic  -SC           User Key  (r) = Recorded By, (t) = Taken By, (c) = Cosigned By     Initials Name Provider Type    SC Shamika Patel PT Physical Therapist               Mobility     Row Name 01/03/23 1118          Bed Mobility    Bed Mobility rolling left;rolling right;scooting/bridging  -SC     Rolling Left [place] (Bed Mobility) minimum assist (75% patient effort);1 person assist  -SC     Rolling Right [place] (Bed Mobility) modified independence;1 person assist  -SC     Scooting/Bridging [place]ette (Bed Mobility) verbal cues;moderate assist (50% patient effort)  -SC     Comment, (Bed Mobility) rolled to sling for transfers to w/c  -SC     Row Name 01/03/23 1118          Transfers    Comment, (Transfers) Sliding board transfers deferred due to safety- no working prosthesis at this time and buttox very red. Patient lifted to w/c for w/c activities  -[facility] Name 01/03/23 1118          Gait/Stairs (Locomotion)    Number of Steps (Stairs) 110+110  -SC     Comment, (Gait/Stairs) Propelled w/c in hallway . Required x2 rest breaks with some c/o shoulder pain. Able to independently propell w/c with good turns and able to lock/unlock breaks  -[facility] Name 01/03/23 1118          Mobility    Extremity Weight-bearing Status right lower extremity  -SC     Right Lower Extremity (Weight-bearing Status) non weight-bearing (NWB)  -SC           User Key  (r) = Recorded By, (t) = Taken By, (c) = Cosigned By    Initials Name Provider Type    SC Shamika Patel PT Physical Therapist               Obj/Interventions     [facility] Name 01/03/23 1121          Motor Skills    Therapeutic Exercise hip;knee  -[facility] Name 01/03/23 1121          Shoulder (Therapeutic Exercise)    Shoulder AROM (Therapeutic Exercise) bilateral;aBduction;10 repetitions;sitting  -[facility] Name 01/03/23 1121          Knee (Therapeutic Exercise)    Knee (Therapeutic Exercise) AROM (active range of motion)  -SC     Knee AROM (Therapeutic Exercise) flexion;bilateral;extension;20 repititions;LAQ (long arc quad);sitting  -SC      Row Name 01/03/23 1121          Balance    Dynamic Sitting Balance standby assist  -SC           User Key  (r) = Recorded By, (t) = Taken By, (c) = Cosigned By    Initials Name Provider Type    Shamika Brar, PT Physical Therapist               Goals/Plan    No documentation.                Clinical Impression     Row Name 01/03/23 1122          Pain    Pretreatment Pain Rating 0/10 - no pain  -SC     Posttreatment Pain Rating 0/10 - no pain  -SC     Row Name 01/03/23 1122          Plan of Care Review    Progress improving  -SC     Outcome Evaluation Patient able to tolerate being up in w/c for propulsion activity. No dizziness. He was mostly limited by shoulder discomfort. Recommend donning scrubs for sliding board transfers to protech his skin.  -SC     Row Name 01/03/23 1122          Therapy Assessment/Plan (PT)    Rehab Potential (PT) good, to achieve stated therapy goals  -SC     Criteria for Skilled Interventions Met (PT) yes;meets criteria;skilled treatment is necessary  -SC     Therapy Frequency (PT) daily  -SC     Row Name 01/03/23 1122          Positioning and Restraints    Pre-Treatment Position in bed  -SC     Post Treatment Position bed  -SC     In Bed supine;with nsg  -SC           User Key  (r) = Recorded By, (t) = Taken By, (c) = Cosigned By    Initials Name Provider Type    Shamika Brar, PT Physical Therapist               Outcome Measures     Row Name 01/03/23 1125 01/03/23 0810       How much help from another person do you currently need...    Turning from your back to your side while in flat bed without using bedrails? 3  -SC 3  -KL    Moving from lying on back to sitting on the side of a flat bed without bedrails? 2  -SC 2  -KL    Moving to and from a bed to a chair (including a wheelchair)? 2  -SC 2  -KL    Standing up from a chair using your arms (e.g., wheelchair, bedside chair)? 1  -SC 1  -KL    Climbing 3-5 steps with a railing? 1  -SC 1  -KL    To walk in hospital room? 1  -SC  1  -KL    AM-PAC 6 Clicks Score (PT) 10  -SC 10  -KL    Highest level of mobility 4 --> Transferred to chair/commode  -SC 4 --> Transferred to chair/commode  -    Row Name 01/03/23 1125          Functional Assessment    Outcome Measure Options AM-PAC 6 Clicks Basic Mobility (PT)  -SC           User Key  (r) = Recorded By, (t) = Taken By, (c) = Cosigned By    Initials Name Provider Type    SC Shamika Patel, PT Physical Therapist    Geetha Christianson, RN Registered Nurse                             Physical Therapy Education     Title: PT OT SLP Therapies (Done)     Topic: Physical Therapy (Done)     Point: Mobility training (Done)     Learning Progress Summary           Patient Eager, E, VU by SC at 1/3/2023 1125    Comment: reviewed benefits of activity    Acceptance, E, NR by  at 12/30/2022 1425    Acceptance, E,TB, NR by  at 12/26/2022 1512    Acceptance, E,D, VU,DU by  at 12/26/2022 1506    Eager, E, VU by SC at 12/21/2022 1407    Comment: reviewed benefits of getting oob    Acceptance, E, VU by  at 12/19/2022 1040    Comment: educated on transfer safety    Acceptance, E, VU by  at 12/15/2022 1410    Comment: educated on importance of knee extension at rest to prevent taut hamstrings    Eager, E, VU by SC at 12/12/2022 1121    Comment: reviewed benefits of activity    Eager, E, VU,DU by SC at 12/7/2022 1115    Comment: REVIEWED HEP                   Point: Home exercise program (Done)     Learning Progress Summary           Patient Eager, E, VU by SC at 1/3/2023 1125    Comment: reviewed benefits of activity    Acceptance, E, NR by  at 12/30/2022 1425    Acceptance, E,TB, NR by  at 12/26/2022 1512    Acceptance, E,D, VU,DU by  at 12/26/2022 1506    Eager, E, VU by SC at 12/21/2022 1407    Comment: reviewed benefits of getting oob    Acceptance, E, VU by  at 12/19/2022 1040    Comment: educated on transfer safety    Acceptance, E, VU by  at 12/15/2022 1410    Comment: educated on  importance of knee extension at rest to prevent taut hamstrings    Eager, E, VU by SC at 12/12/2022 1121    Comment: reviewed benefits of activity    Eager, E, VU,DU by SC at 12/7/2022 1115    Comment: REVIEWED HEP                   Point: Body mechanics (Done)     Learning Progress Summary           Patient Eager, E, VU by SC at 1/3/2023 1125    Comment: reviewed benefits of activity    Acceptance, E, NR by  at 12/30/2022 1425    Acceptance, E,TB, NR by  at 12/26/2022 1512    Acceptance, E,D, VU,DU by  at 12/26/2022 1506    Eager, E, VU by SC at 12/21/2022 1407    Comment: reviewed benefits of getting oob    Acceptance, E, VU by  at 12/19/2022 1040    Comment: educated on transfer safety    Acceptance, E, VU by  at 12/15/2022 1410    Comment: educated on importance of knee extension at rest to prevent taut hamstrings    Eager, E, VU by SC at 12/12/2022 1121    Comment: reviewed benefits of activity    Eager, E, VU,DU by SC at 12/7/2022 1115    Comment: REVIEWED HEP                   Point: Precautions (Done)     Learning Progress Summary           Patient Eager, E, VU by SC at 1/3/2023 1125    Comment: reviewed benefits of activity    Acceptance, E, NR by  at 12/30/2022 1425    Acceptance, E,TB, NR by  at 12/26/2022 1512    Acceptance, E,D, VU,DU by  at 12/26/2022 1506    Eager, E, VU by SC at 12/21/2022 1407    Comment: reviewed benefits of getting oob    Acceptance, E, VU by  at 12/19/2022 1040    Comment: educated on transfer safety    Acceptance, E, VU by  at 12/15/2022 1410    Comment: educated on importance of knee extension at rest to prevent taut hamstrings    Eager, E, VU by SC at 12/12/2022 1121    Comment: reviewed benefits of activity    Eager, E, VU,DU by SC at 12/7/2022 1115    Comment: REVIEWED HEP                               User Key     Initials Effective Dates Name Provider Type Discipline    SC 06/16/21 -  Shamika Patel PT Physical Therapist PT    FW 05/05/22 -  Scot,  Margarito, PT Physical Therapist PT     06/01/21 -  Nora Addison, ALVIN Physical Therapist PT     12/20/22 -  Joan Kuo, RN Registered Nurse Nurse              PT Recommendation and Plan     Plan of Care Reviewed With: patient  Progress: improving  Outcome Evaluation: Patient able to tolerate being up in w/c for propulsion activity. No dizziness. He was mostly limited by shoulder discomfort. Recommend donning scrubs for sliding board transfers to protech his skin.     Time Calculation:    PT Charges     Row Name 01/03/23 1025             Time Calculation    Start Time 1025  -SC      PT Received On 01/03/23  -SC      PT Goal Re-Cert Due Date 01/20/23  -SC         Time Calculation- PT    Total Timed Code Minutes- PT 40 minute(s)  -SC         Timed Charges    84694 - PT Therapeutic Exercise Minutes 10  -SC      68999 - PT Therapeutic Activity Minutes 30  -SC         Total Minutes    Timed Charges Total Minutes 40  -SC       Total Minutes 40  -SC            User Key  (r) = Recorded By, (t) = Taken By, (c) = Cosigned By    Initials Name Provider Type    SC Shamika Patel, PT Physical Therapist              Therapy Charges for Today     Code Description Service Date Service Provider Modifiers Qty    41176150112 HC PT THER PROC EA 15 MIN 1/3/2023 Shamika Patel, PT GP 1    75672325390 HC PT THERAPEUTIC ACT EA 15 MIN 1/3/2023 Shamika Patel, PT GP 2    89196195216 HC PT THER SUPP EA 15 MIN 1/3/2023 Shamika Patel, PT GP 2          PT G-Codes  Outcome Measure Options: AM-PAC 6 Clicks Basic Mobility (PT)  AM-PAC 6 Clicks Score (PT): 10  AM-PAC 6 Clicks Score (OT): 15  PT Discharge Summary  Anticipated Discharge Disposition (PT): inpatient rehabilitation facility, skilled nursing facility    Shamika Patel, PT  1/3/2023

## 2023-01-03 NOTE — PROGRESS NOTES
[facility] Progress Note        Antibiotics:  Anti-Infectives (From admission, onward)    Ordered     Dose/Rate Route Frequency Start Stop    12/28/22 1526  DAPTOmycin (CUBICIN) 800 mg in sodium chloride 0.9 % 50 mL IVPB        Ordering Provider: Justino Gee MD    8 mg/kg × 100 kg (Order-Specific)  100 mL/hr over 30 Minutes Intravenous Every 24 Hours 12/29/22 1000 01/06/23 0959    12/07/22 0603  DAPTOmycin (CUBICIN) 800 mg in sodium chloride 0.9 % 50 mL IVPB        Ordering Provider: Zeke George MD    8 mg/kg × 100 kg  100 mL/hr over 30 Minutes Intravenous Every 24 Hours 12/07/22 1000 12/28/22 0905    11/30/22 2219  vancomycin 2000 mg/500 mL 0.9% NS IVPB ([facility])        Ordering Provider: Reggie Batres MD    20 mg/kg × 98.9 kg  over 2 Hours Intravenous Once 11/30/22 2221 12/01/22 0315    11/30/22 2219  piperacillin-tazobactam (ZOSYN) 3.375 g in iso-osmotic dextrose 50 ml (premix)        Ordering Provider: Reggie Batres MD    3.375 g Intravenous Once 11/30/22 2221 11/30/22 2335          CC: foot infection    HPI:    Patient is a 57 y.o.  Yr old male with history of prior lower extremity infection/amputations done in [facility].  He has a history of left BKA years ago.  More recent right transmetatarsal amputation but specific dates unclear and unclear who the surgeon was.  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  He has history DVT/IVC filter, diabetes and other comorbidities as below.  He reports a chronic right lateral foot wound present for months but apparently not precipitated by any specific event or trauma.  He is admitted to the hospital on November 30 with deterioration at the foot including redness/swelling    **patient had reported remote drug abuse (initially to me reported  "as IV drug abuse but then later he reported not injection use and I am unable to corroborate otherwise at [facility]; +drug screen earlier this year per d/w Dr Chau for Meth at [facility] and reported by them to be IVDA/substance abuse),   chronic methadone. He reported to me this was 17 years ago and therefore some inconsistencies    ** patient reports MRSA blood stream infection treated in [place] spring of 2022, 6 weeks of vancomycin by his report.  Otherwise data deficit.  Try to retrieve information     12/2/22 Dr Peguero did surgery  \"Procedure(s): Mid level right below-knee amputation and primary closure \"    12/6 with MRSA in blood culture from 11/30 and repeat from 12/6 negative;  TTE done but limited per cardiology    12/9/22   JERALD no vegetation per cardiology    1/2/23   Interval Hx noted; no new focal pain.  No fever;  Diarrhea x 1 today;  No diarrhea on 1/1 per him; at present, no abd pain    1/3/23 no diarrhea today; no abd pain; no fever;  Wbc normal 1/2;  No myalgia or shortness of breath/cough. Nursing reports no new focal pain or distress.  postop pain to the right LE which is controlled/dull at present, constant, nonradiating, worse with palpation, generally better with pain meds and 1-2  out of 10 in severity.  Not progressive     No headache photophobia or neck stiffness.  No shortness of breath cough or hemoptysis.  No nausea vomiting  .  No dysuria hematuria or pyuria.  No other new skin rash       ROS:      1/3/23 per nursing, No f/c/s.   No rash.       Constitutional--   Appetite good, and no malaise. No fatigue.  Heent--chronic hoarse voice.  No new vision, hearing or throat complaints.  No epistaxis or oral sores.   No flashers, floaters or eye pain.   No headache, photophobia or neck stiffness.  Chronic PEG tube  CV-- No chest pain, palpitation or syncope  Resp-- No SOB/cough/Hemoptysis  GI-  No hematochezia, melena, or hematemesis. Denies jaundice or chronic liver " "disease.  -- No dysuria, hematuria, or flank pain.  Denies hesitancy, urgency or flank pain.  Lymph- no swollen lymph nodes in neck/axilla or groin.   Heme- No active bruising or bleeding; no Hx of DVT or PE.  MS-- no swelling or pain in the bones or joints of arms/legs.  No new back pain.  Neuro-- No acute focal weakness or numbness in the arms or legs.  No seizures.     Full 12 point review of systems reviewed and negative otherwise for acute complaints, except for above      PE: nursing/chaperone present  /72 (BP Location: Left arm, Patient Position: Lying)   Pulse 64   Temp 97.8 °F (36.6 °C) (Axillary)   Resp 18   Ht 193 cm (75.98\")   Wt 107 kg (235 lb)   SpO2 95%   BMI 28.62 kg/m²          GENERAL:  sleepy;  in no acute distress.  chronic Hoarse voice noted   HEENT: Normocephalic, atraumatic.   No conjunctival injection. No icterus. Oropharynx   without evidence of thrush or exudate. No evidence of peridontal disease.     HEART: RRR; No murmur, rubs, gallops.   LUNGS: Diminished at bases but otherwise clear to auscultation bilaterally without wheezing, rales, rhonchi. Normal respiratory effort. Nonlabored. No dullness.  ABDOMEN: Soft, nontender, nondistended. Positive bowel sounds. No rebound or guarding. NO mass or HSM.  PEG tube noted  EXT:  No cyanosis, clubbing;No cord.   MSK: FROM without joint effusions noted arms/legs.    SKIN: Warm and dry without cutaneous eruptions on Inspection/palpation.    NEURO:  sleepy     Right lower extremity surgical site noted;  No new redness;  no discrete mass bulge or fluctuance.  No crepitus or bulla.       No peripheral stigmata/phenomenon of endocarditis     IV without obvious redness or drainage     Left BKA site with no redness induration or warmth.  No discrete mass bulge or fluctuance.    Laboratory Data    Results from last 7 days   Lab Units 01/02/23  1422 12/29/22  0615   WBC 10*3/mm3 9.35 6.66   HEMOGLOBIN g/dL 10.7* 10.6*   HEMATOCRIT % 35.0* " 34.6*   PLATELETS 10*3/mm3 306 315     Results from last 7 days   Lab Units 01/02/23  1317   SODIUM mmol/L 139   POTASSIUM mmol/L 4.2   CHLORIDE mmol/L 102   CO2 mmol/L 23.0   BUN mg/dL 19   CREATININE mg/dL 0.70*   GLUCOSE mg/dL 103*   CALCIUM mg/dL 9.2     Results from last 7 days   Lab Units 01/02/23  1317   ALK PHOS U/L 99   BILIRUBIN mg/dL 0.2   ALT (SGPT) U/L 20   AST (SGOT) U/L 18               Estimated Creatinine Clearance: 154.4 mL/min (A) (by C-G formula based on SCr of 0.7 mg/dL (L)).      Microbiology:      Radiology:  Imaging Results (Last 72 Hours)     ** No results found for the last 72 hours. **            Impression:     --Acute right foot/ankle with multifocal ulceration, cellulitis/wound infection and probable osteomyelitis with probe to bone at the lateral foot and abnormal MRI.  Other comorbidities as outlined above and high risk for further serious morbidity and other serious sequelae including persistent/recurrent or nonhealing wounds, persistent/progressive or recurrent infection and risk for further functional/limb loss/amputation.  Surgery following to help define timing/option of threshold for any future surgical intervention .      **Surgery 12/2 with BKA     --MRSA bacteremia from November 30 (daptomycin sensitive).  Presumed from foot but also risk for endovascular focus particularly with  History of prior MRSA bloodstream infection.  Transthoracic echocardiogram limited per cardiology.  JERALD no vegetation per cardiology; follow-up blood cultures negative so far from December 6    **No  new metastatic foci of involvement as of December 21    --History MRSA with bacteremia by his report in spring/summer 2022 and treated with 6 weeks of vancomycin in [place].  Specifics unclear and trying to retrieve information    --coag-negative staph in blood culture likely contaminant     --Chronic vocal cord paralysis per notes with chronic hoarseness and indwelling PEG tube.  Medicine team  to clarify     --History left BKA.     --Diabetes.  You need to tightly control blood sugar to give best chance for healing     --History of DVT with IVC filter    --history of remote drug abuse Per his reports to me; he denies injection drug abuse for 17 years by his report to me although as above, medicine team has find records indicating more recent abuse in 2022 [facility] [place] [facility]    --diarrhea x 1 1/2; if increased/persistent, you would need to consider checking stool for CDiff; 1/3 no diarrhea so far, no pain, no fever and wbc normal 1/2     PLAN:      -- Daptomycin  IV  To finish 1/4; no new metastatic focus of involvement so far; will d/w medicine team/case management     -- Check/review labs cultures and scans     -- Partial history per nursing staff     -- Discussed with microbiology and family     --d/w Dr Peguero        -- Highly complex set of issues with high risk for further serious morbidity and other serious sequela     --d/w Dr Bolaños; daptomycin to finish 1/4          **Copied text in this note has been reviewed and is accurate as of 01/03/23.     Zeke George MD  1/3/2023

## 2023-01-04 LAB
GLUCOSE BLDC GLUCOMTR-MCNC: 116 MG/DL (ref 70–130)
GLUCOSE BLDC GLUCOMTR-MCNC: 119 MG/DL (ref 70–130)
GLUCOSE BLDC GLUCOMTR-MCNC: 119 MG/DL (ref 70–130)

## 2023-01-04 PROCEDURE — 97530 THERAPEUTIC ACTIVITIES: CPT

## 2023-01-04 PROCEDURE — 97110 THERAPEUTIC EXERCISES: CPT | Performed by: OCCUPATIONAL THERAPIST

## 2023-01-04 PROCEDURE — 97535 SELF CARE MNGMENT TRAINING: CPT | Performed by: OCCUPATIONAL THERAPIST

## 2023-01-04 PROCEDURE — 25010000002 DAPTOMYCIN PER 1 MG: Performed by: INTERNAL MEDICINE

## 2023-01-04 PROCEDURE — 99024 POSTOP FOLLOW-UP VISIT: CPT | Performed by: THORACIC SURGERY (CARDIOTHORACIC VASCULAR SURGERY)

## 2023-01-04 PROCEDURE — 99232 SBSQ HOSP IP/OBS MODERATE 35: CPT | Performed by: NURSE PRACTITIONER

## 2023-01-04 PROCEDURE — 82962 GLUCOSE BLOOD TEST: CPT

## 2023-01-04 PROCEDURE — 63710000001 INSULIN DETEMIR PER 5 UNITS: Performed by: HOSPITALIST

## 2023-01-04 PROCEDURE — 97110 THERAPEUTIC EXERCISES: CPT

## 2023-01-04 RX ADMIN — Medication 250 MG: at 08:54

## 2023-01-04 RX ADMIN — LOPERAMIDE HYDROCHLORIDE 2 MG: 2 CAPSULE ORAL at 00:36

## 2023-01-04 RX ADMIN — INSULIN DETEMIR 5 UNITS: 100 INJECTION, SOLUTION SUBCUTANEOUS at 21:24

## 2023-01-04 RX ADMIN — METHADONE HYDROCHLORIDE 10 MG: 10 TABLET ORAL at 00:36

## 2023-01-04 RX ADMIN — METOPROLOL TARTRATE 25 MG: 25 TABLET, FILM COATED ORAL at 08:54

## 2023-01-04 RX ADMIN — LOPERAMIDE HYDROCHLORIDE 2 MG: 2 CAPSULE ORAL at 18:23

## 2023-01-04 RX ADMIN — ATORVASTATIN CALCIUM 20 MG: 20 TABLET, FILM COATED ORAL at 21:24

## 2023-01-04 RX ADMIN — HYDROXYZINE HYDROCHLORIDE 50 MG: 25 TABLET ORAL at 21:24

## 2023-01-04 RX ADMIN — Medication 250 MG: at 21:24

## 2023-01-04 RX ADMIN — METHADONE HYDROCHLORIDE 10 MG: 10 TABLET ORAL at 21:24

## 2023-01-04 RX ADMIN — DAPTOMYCIN 800 MG: 500 INJECTION, POWDER, LYOPHILIZED, FOR SOLUTION INTRAVENOUS at 11:58

## 2023-01-04 RX ADMIN — TAMSULOSIN HYDROCHLORIDE 0.4 MG: 0.4 CAPSULE ORAL at 21:30

## 2023-01-04 RX ADMIN — METHADONE HYDROCHLORIDE 10 MG: 10 TABLET ORAL at 08:54

## 2023-01-04 RX ADMIN — Medication 10 ML: at 21:25

## 2023-01-04 RX ADMIN — METOPROLOL TARTRATE 25 MG: 25 TABLET, FILM COATED ORAL at 21:24

## 2023-01-04 RX ADMIN — METHADONE HYDROCHLORIDE 10 MG: 10 TABLET ORAL at 18:23

## 2023-01-04 RX ADMIN — METFORMIN HYDROCHLORIDE 1000 MG: 1000 TABLET, FILM COATED ORAL at 08:54

## 2023-01-04 NOTE — SIGNIFICANT NOTE
01/04/23 1600   SLP Deferred Reason   SLP Deferred Reason Patient/family declined evaluation, not feeling well  (Pt unable to transport to radiology this date 2/2 not feeling well. Will defer and f/u tomorrow.)

## 2023-01-04 NOTE — PROGRESS NOTES
[facility] Medicine Services  PROGRESS NOTE    Patient Name: Buster Velasco  : 1965  MRN: [id number]    Date of Admission: 2022  Primary Care Physician: Ludivina Bowers MD    Subjective   Subjective     CC:  F/u right BKA    HPI:   Patient resting in bed.  Says diarrhea is somewhat better.  Says he finishes his antibiotics today.  Is hoping to go to [facility] soon.  Stools are still liquid per RN. Will try holding metformin x 24 hours.     ROS:   Gen- No fevers, chills  CV- No chest pain, palpitations  Resp- No cough, dyspnea  GI- No N/V/D, abd pain    Objective   Objective     Vital Signs:   Temp:  [97.8 °F (36.6 °C)-98.3 °F (36.8 °C)] 98.1 °F (36.7 °C)  Heart Rate:  [60-82] 82  Resp:  [17-18] 18  BP: (122-161)/() 161/103     Physical Exam:   Constitutional: No acute distress, awake, alert  HENT: NCAT, mucous membranes moist  Respiratory: Clear to auscultation bilaterally, respiratory effort normal, room air  Cardiovascular: RRR, no murmurs, rubs, or gallops  Gastrointestinal: Positive bowel sounds, soft, nontender, nondistended, PEG  Musculoskeletal: Right BKA wrapped, left BKA (previous)  Psychiatric: Appropriate affect, cooperative  Neurologic: Oriented x 3, moves all extremities, no focal deficits, speech clear  Skin: No rashes      Results Reviewed:  LAB RESULTS:      Lab 23  1422 22  0615   WBC 9.35 6.66   HEMOGLOBIN 10.7* 10.6*   HEMATOCRIT 35.0* 34.6*   PLATELETS 306 315   MCV 85.8 84.8         Lab 23  1317 22  1024 22  0615   SODIUM 139 138 145   POTASSIUM 4.2 4.0 4.3   CHLORIDE 102 101 107   CO2 23.0 24.0 26.0   ANION GAP 14.0 13.0 12.0   BUN 19 19 20   CREATININE 0.70* 0.66* 0.78   EGFR 106.8 108.7 103.4   GLUCOSE 103* 98 88   CALCIUM 9.2 9.0 9.6         Lab 23  1317 22  1024   TOTAL PROTEIN 6.6 6.3   ALBUMIN 3.7 3.3*   GLOBULIN 2.9 3.0   ALT (SGPT) 20 21   AST (SGOT) 18 20   BILIRUBIN 0.2 0.2   ALK  PHOS 99 97   AMYLASE  --  39   LIPASE  --  12*                     Brief Urine Lab Results  (Last result in the past 365 days)      Color   Clarity   Blood   Leuk Est   Nitrite   Protein   CREAT   Urine HCG        08/20/22 0108 Yellow   Clear   Negative   Negative     Negative                 Microbiology Results Abnormal     Procedure Component Value - Date/Time    Blood Culture - Blood, Wrist, Left [[id number]]  (Normal) Collected: 12/06/22 1402    Lab Status: Final result Specimen: Blood from Wrist, Left Updated: 12/11/22 1545     Blood Culture No growth at 5 days    Blood Culture - Blood, Arm, Left [[id number]]  (Normal) Collected: 12/06/22 1402    Lab Status: Final result Specimen: Blood from Arm, Left Updated: 12/11/22 1545     Blood Culture No growth at 5 days          No radiology results from the last 24 hrs    Results for orders placed during the hospital encounter of 11/30/22    Adult Transesophageal Echo (JERALD) W/ Cont if Necessary Per Protocol    Interpretation Summary  •  Left ventricular systolic function is normal. Estimated left ventricular EF = 55%  •  The aortic valve is structurally normal with no stenosis present. Trace aortic valve regurgitation is present. There is no evidence of an aortic valve mass is present.  •  There is mild, anterior mitral leaflet thickening present. No evidence of a mitral valve mass is present. Trace mitral valve regurgitation is present. No significant mitral valve stenosis is present  •  The tricuspid valve is normal in structure. There is no evidence of a mass on the tricuspid valve. Mild tricuspid valve regurgitation is present.  •  There is mild thickening of the pulmonic valve. There is trace pulmonic valve regurgitation present. There is no pulmonic valve stenosis present.      I have reviewed the medications:  Scheduled Meds:atorvastatin, 20 mg, Oral, Nightly  DAPTOmycin, 8 mg/kg (Order-Specific), Intravenous, Q24H  insulin detemir, 5 Units, Subcutaneous,  Nightly  insulin lispro, 0-7 Units, Subcutaneous, TID AC  metFORMIN, 1,000 mg, Oral, BID With Meals  methadone, 10 mg, Oral, Q6H  metoprolol tartrate, 25 mg, Oral, Q12H  saccharomyces boulardii, 250 mg, Oral, BID  sodium chloride, 10 mL, Intravenous, Q12H  sodium chloride, 10 mL, Intravenous, Q12H  tamsulosin, 0.4 mg, Oral, Nightly      Continuous Infusions:   PRN Meds:.•  acetaminophen  •  calcium carbonate  •  dextrose  •  dextrose  •  glucagon (human recombinant)  •  hydrOXYzine  •  loperamide  •  magnesium hydroxide  •  melatonin  •  ondansetron **OR** ondansetron  •  simethicone  •  sodium chloride    Assessment & Plan   Assessment & Plan     Active Hospital Problems    Diagnosis  POA   • **Right foot infection [L08.9]  Yes   • MRSA bacteremia [R78.81, B95.62]  Unknown   • Sepsis (HCC) [A41.9]  Yes   • Hypoglycemia [E16.2]  Yes   • Anemia [D64.9]  Yes   • Hyponatremia [E87.1]  Yes   • Hypoalbuminemia [E88.09]  Yes   • Essential hypertension [I10]  Yes   • Hyperlipidemia [E78.5]  Yes   • Paroxysmal atrial fibrillation (HCC) [I48.0]  Yes      Resolved Hospital Problems   No resolved problems to display.        Brief Hospital Course to date:  Buster Velasco is a 57 y.o. male  with hx of prior osteomyelitis s/p left BKA, s/p right transmetatarsal amputation, left hand 3rd metacarpal resection (April 2022 at OSH, had 6 weeks of IV antibiotics for MRSA bacteremia), remote hx of IVDA, more recent hx of polysubstance abuse (meth), and DVT s/p IVC filter who presented with right foot wounds. S/p right BKA on 12/2/22 with Dr. Peguero. His blood cultures grew MRSA. ID following and recommends at least 6 weeks of IV ATBx.     This patient's problems and plans were partially entered by my partner and updated as appropriate by me 01/04/23.      Sepsis secondary to acute right foot non-healing wound and likely osteomyelitis, resolved  MRSA bacteremia  - s/p right BKA 12/2/22 with Dr. Peguero  -Wound care per   Sudhir  - ID following, Dr. George, currently on Daptomycin monotherapy x 6 weeks,completed today, 1/4/2023  - Blood cultures with coag negative Staph and MRSA  - Repeat blood cultures NGTD  - TTE 12/6/22 no acute findings, JERALD 12/9/22 negative  - Not a good candidate for PICC line/home IV antibiotics given hx of substance abuse, current plan is discharge to rehab     Diarrhea  -- Imodium only if needed  -- If persists, consider further workup/C.diff testing  -- it patient develops fever, leukocytosis then check stool, has been afebrile  -- started on probiotic   -- hold metformin x 24-48 hours to see if it improves  -- AM BMP, has been on Imodium fairly regularly x 5 days     Recurrent hypoglycemia in setting of DMII, resolved  - HbA1c 6.9%  - High dose Tresiba qAM before arrival which has been held  - Briefly required D5, now discontinued  - Decreased Levemir from 10 to 5 units nightly on 12/30/22  - Continue low dose SSI , hold metformin to see if diarrhea improves     Chronic pain on opioids   Previous IVDA/substance abuse  - Continue home methadone 10 mg QID  - Patient denied hx of IVDA to me but in Care Everywhere admission notes from April 2022 at [facility], they documented prior IVDA/substance abuse, most recently with meth in UDS from 4/2/22; he has told ID that his last IVDA was 17 years ago     Reported hx of Afib   - Patient denies any hx of Afib. Not previously on anticoagulation and given murky history will not start.   - On Metoprolol at home which is continued  - No evidence of Afib on telemetry, discontinued telemetry monitoring 12/10/22      Chronic vocal cord paralysis, s/p PEG  Dysphagia  - SLP evaluation: regular diet but with nectar thick liquids recommended, patient refusing nectar thick liquids and understands risks of aspiration  - SLP continues to follow for possible advancement of diet recommendations  -PEG was placed 8/19/22 at [facility], will defer to them for removal; routine  PEG care/flushing per nursing as needed     Hypotension with hx of HTN  - Hold parameters with Metoprolol  - Hypotension resolved     HLD   - Continue statin     DVT s/p IVC filter   - Placed in spring 2022 per patient. Defer to PCP for further assessment and timing of removal.     Hx of seizure in setting of meth use   - Not on AED's given meth trigger     BPH   - Continue Flomax    Expected Discharge Location and Transportation: [place] [facility], WC van  Expected Discharge   Expected Discharge Date and Time     Expected Discharge Date Expected Discharge Time    Jan 5, 2023            DVT prophylaxis:  Mechanical DVT prophylaxis orders are present.     AM-PAC 6 Clicks Score (PT): 10 (01/03/23 1125)    CODE STATUS:   Code Status and Medical Interventions:   Ordered at: 12/01/22 0035     Code Status (Patient has no pulse and is not breathing):    CPR (Attempt to Resuscitate)     Medical Interventions (Patient has pulse or is breathing):    Full Support       Duyen Maldonado, APRN  01/04/23

## 2023-01-04 NOTE — THERAPY TREATMENT NOTE
Patient Name: Buster Velasco  : 1965    MRN: [id number]                              Today's Date: 2023       Admit Date: 2022    Visit Dx:     ICD-10-CM ICD-9-CM   1. Right foot infection  L08.9 686.9   2. Hypoglycemia  E16.2 251.2   3. Decubitus ulcer of coccygeal region, unspecified ulcer stage  L89.159 707.03     707.20   4. Pharyngeal dysphagia  R13.13 787.23     Patient Active Problem List   Diagnosis   • Right foot infection   • Hypoglycemia   • Anemia   • Hyponatremia   • Hypoalbuminemia   • Essential hypertension   • Hyperlipidemia   • Paroxysmal atrial fibrillation (HCC)   • Sepsis (HCC)   • MRSA bacteremia     Past Medical History:   Diagnosis Date   • Diabetes (HCC)    • DVT (deep venous thrombosis) (HCC)    • Hypertension    • Mood disorder (HCC)      Past Surgical History:   Procedure Laterality Date   • BELOW KNEE AMPUTATION Left    • BELOW KNEE AMPUTATION Right 2022    Procedure: AMPUTATION BELOW KNEE RIGHT;  Surgeon: John Peguero MD;  Location: [facility];  Service: Vascular;  Laterality: Right;   • TRANS METATARSAL AMPUTATION Right       General Information     Row Name 2303          OT Time and Intention    Document Type therapy note (daily note)  -AR     Mode of Treatment individual therapy;occupational therapy  -AR     Row Name 23          General Information    Existing Precautions/Restrictions fall  post #35 right BKA, remote left BKA, B shoulder weakness- left worse than right  -AR     Row Name 23          Cognition    Orientation Status (Cognition) oriented x 4  -AR     Row Name 2303          Safety Issues, Functional Mobility    Safety Issues Affecting Function (Mobility) safety precaution awareness;safety precautions follow-through/compliance  -AR     Impairments Affecting Function (Mobility) endurance/activity tolerance;pain;strength;range of motion (ROM)  -AR           User Key  (r) = Recorded By, (t) = Taken By,  (c) = Cosigned By    Initials Name Provider Type    Obdulia Mejía OT Occupational Therapist                 Mobility/ADL's     Row Name 01/04/23 0907          Bed Mobility    Rolling Left [place] (Bed Mobility) minimum assist (75% patient effort)  -AR     Rolling Right [place] (Bed Mobility) modified independence  -AR     Assistive Device (Bed Mobility) bed rails;head of bed elevated  -AR     Comment, (Bed Mobility) Pt declined EOB sitting ot getting up to chair d/t c/o loose stools which nurse confirmed at bedside  -AR     Row Name 01/04/23 0907          Transfers    Comment, (Transfers) Pt declined d/t loost stool  -AR     Row Name 01/04/23 0907          Activities of Daily Living    BADL Assessment/Intervention grooming  -AR     Row Name 01/04/23 0907          Mobility    Extremity Weight-bearing Status right lower extremity  -AR     Right Lower Extremity (Weight-bearing Status) non weight-bearing (NWB)  -AR     Row Name 01/04/23 0907          Grooming Assessment/Training    [place] Level (Grooming) oral care regimen;supervision;set up;wash face, hands  -AR     Position (Grooming) supine  -AR           User Key  (r) = Recorded By, (t) = Taken By, (c) = Cosigned By    Initials Name Provider Type    Obdulia Mejía OT Occupational Therapist               Obj/Interventions     Row Name 01/04/23 0909          Shoulder (Therapeutic Exercise)    Shoulder AROM (Therapeutic Exercise) right;flexion;extension;supine;10 repetitions  -AR     Shoulder AAROM (Therapeutic Exercise) left;flexion;extension;supine;10 repetitions  -AR     Row Name 01/04/23 0909          Elbow/Forearm (Therapeutic Exercise)    Elbow/Forearm (Therapeutic Exercise) strengthening exercise  -AR     Elbow/Forearm Strengthening (Therapeutic Exercise) bilateral;flexion;extension;right;resistance tubing;red;10 repetitions  B chest press  -AR     Row Name 01/04/23 0909          Motor Skills    Therapeutic Exercise  shoulder;elbow/forearm  -AR           User Key  (r) = Recorded By, (t) = Taken By, (c) = Cosigned By    Initials Name Provider Type    Obdulia Mejía OT Occupational Therapist               Goals/Plan     Row Name 01/04/23 0915          Bed Mobility Goal 1 (OT)    Progress/Outcomes (Bed Mobility Goal 1, OT) goal ongoing  -AR     Row Name 01/04/23 0915          Transfer Goal 1 (OT)    Progress/Outcome (Transfer Goal 1, OT) goal ongoing  -AR     Row Name 01/04/23 0915          Dressing Goal 1 (OT)    Progress/Outcome (Dressing Goal 1, OT) goal ongoing  -AR     Row Name 01/04/23 0915          Grooming Goal 1 (OT)    Progress/Outcome (Grooming Goal 1, OT) goal ongoing  -AR     Row Name 01/04/23 0915          Strength Goal 1 (OT)    Progress/Outcome (Strength Goal 1, OT) goal ongoing  -AR           User Key  (r) = Recorded By, (t) = Taken By, (c) = Cosigned By    Initials Name Provider Type    Obdulia Mejía OT Occupational Therapist               Clinical Impression     Row Name 01/04/23 0911          Pain Assessment    Pretreatment Pain Rating 0/10 - no pain  -AR     Posttreatment Pain Rating 0/10 - no pain  -AR     Row Name 01/04/23 0911          Plan of Care Review    Plan of Care Reviewed With patient  -AR     Progress no change  -AR     Outcome Evaluation Pt alert, Ox4 and declined EOB sitting or transfer to chair via lift d/t loose stools which nurse confirmed. He completed rolling R/L midline with min assist-supervision, grooming with supervision and BUE theraband HEP. Pt limited with decreased B shoulder AROM and strength and pain with B shoulder movement.  -AR     Row Name 01/04/23 0911          Therapy Plan Review/Discharge Plan (OT)    Anticipated Discharge Disposition (OT) skilled nursing facility  -AR     Row Name 01/04/23 0911          Vital Signs    Pre Patient Position Supine  -AR     Intra Patient Position Supine  -AR     Post Patient Position Supine  -AR     Row Name 01/04/23 0911           Positioning and Restraints    Pre-Treatment Position in bed  -AR     Post Treatment Position bed  -AR     In Bed supine;call light within reach;encouraged to call for assist;exit alarm on;with nsg  -AR           User Key  (r) = Recorded By, (t) = Taken By, (c) = Cosigned By    Initials Name Provider Type    Obdulia Mejía OT Occupational Therapist               Outcome Measures     Row Name 01/04/23 0918          How much help from another is currently needed...    Putting on and taking off regular lower body clothing? 2  -AR     Bathing (including washing, rinsing, and drying) 2  -AR     Toileting (which includes using toilet bed pan or urinal) 2  -AR     Putting on and taking off regular upper body clothing 3  -AR     Taking care of personal grooming (such as brushing teeth) 3  -AR     Eating meals 3  -AR     AM-PAC 6 Clicks Score (OT) 15  -AR     Row Name 01/04/23 0918          Functional Assessment    Outcome Measure Options AM-PAC 6 Clicks Daily Activity (OT)  -AR           User Key  (r) = Recorded By, (t) = Taken By, (c) = Cosigned By    Initials Name Provider Type    Obdulia Mejía OT Occupational Therapist                Occupational Therapy Education     Title: PT OT SLP Therapies (Done)     Topic: Occupational Therapy (Done)     Point: ADL training (Done)     Description:   Instruct learner(s) on proper safety adaptation and remediation techniques during self care or transfers.   Instruct in proper use of assistive devices.              Learning Progress Summary           Patient Eager, E,TB,D, VU,NR by AR at 1/4/2023 0919    Acceptance, E,TB,D, VU,NR by HM at 12/30/2022 1416    Acceptance, E,D, VU,DU,NR by TB at 12/24/2022 1529    Acceptance, E, VU,DU by BRAULIO at 12/22/2022 1311    Comment: Sitting balance; UE HEP; pressure relief    Acceptance, E,D, NR by JDAYTON at 12/19/2022 0942    Acceptance, E,D, VU,DU,NR by TB at 12/14/2022 1441    Acceptance, E, VU by MR at 12/11/2022 1448     Eager, E, VU,DU by SC at 12/7/2022 1115    Comment: REVIEWED HEP    Acceptance, E, VU by MR at 12/7/2022 1110                   Point: Home exercise program (Done)     Description:   Instruct learner(s) on appropriate technique for monitoring, assisting and/or progressing therapeutic exercises/activities.              Learning Progress Summary           Patient Eager, E,TB,D, VU,NR by AR at 1/4/2023 0919    Acceptance, E,D, VU,DU,NR by  at 12/24/2022 1529    Acceptance, E, VU,DU by  at 12/22/2022 1311    Comment: Sitting balance; UE HEP; pressure relief    Acceptance, E,D, VU,DU,NR by  at 12/14/2022 1441    Acceptance, E, VU by  at 12/11/2022 1448    Eager, E, VU,DU by SC at 12/7/2022 1115    Comment: REVIEWED HEP    Acceptance, E, VU by MR at 12/7/2022 1110                   Point: Precautions (Done)     Description:   Instruct learner(s) on prescribed precautions during self-care and functional transfers.              Learning Progress Summary           Patient Eager, E,TB,D, VU,NR by AR at 1/4/2023 0919    Acceptance, E,TB,D, VU,NR by  at 12/30/2022 1416    Acceptance, E,D, VU,DU,NR by  at 12/24/2022 1529    Acceptance, E, VU,DU by BRAULIO at 12/22/2022 1311    Comment: Sitting balance; UE HEP; pressure relief    Acceptance, E,D, NR by JY at 12/19/2022 0942    Acceptance, E,D, VU,DU,NR by  at 12/14/2022 1441    Acceptance, E, VU by MR at 12/11/2022 1448    Eager, E, VU,DU by SC at 12/7/2022 1115    Comment: REVIEWED HEP    Acceptance, E, VU by MR at 12/7/2022 1110                   Point: Body mechanics (Done)     Description:   Instruct learner(s) on proper positioning and spine alignment during self-care, functional mobility activities and/or exercises.              Learning Progress Summary           Patient Eager, E,TB,D, VU,NR by AR at 1/4/2023 0919    Acceptance, E,TB,D, VU,NR by HM at 12/30/2022 1416    Acceptance, E,D, NR by MEMO at 12/19/2022 0942    Acceptance, E, VU by MR at 12/11/2022 1449     KRIS Martinez VU, DU by SC at 12/7/2022 1115    Comment: REVIEWED HEP    KRIS Espinal VU by MR at 12/7/2022 1110                               User Key     Initials Effective Dates Name Provider Type Discipline    SC 06/16/21 -  Shamika Patel, PT Physical Therapist PT    TB 06/16/21 -  Joseline Lemon, OT Occupational Therapist OT    AR 06/16/21 -  Obdulia Porter, OT Occupational Therapist OT    HM 10/25/22 -  Nora Abbasi, OT Occupational Therapist OT    JY 06/16/21 -  Kristy Canas, OT Occupational Therapist OT    BRAULIO 06/16/21 -  Kristy Michel, OT Occupational Therapist OT    MR 09/22/22 -  Marilou Lennon, OT Occupational Therapist OT              OT Recommendation and Plan     Plan of Care Review  Plan of Care Reviewed With: patient  Progress: no change  Outcome Evaluation: Pt alert, Ox4 and declined EOB sitting or transfer to chair via lift d/t loose stools which nurse confirmed. He completed rolling R/L midline with min assist-supervision, grooming with supervision and BUE theraband HEP. Pt limited with decreased B shoulder AROM and strength and pain with B shoulder movement.     Time Calculation:    Time Calculation- OT     Row Name 01/04/23 0920             Time Calculation- OT    OT Start Time 0823  -AR      OT Received On 01/04/23  -AR      OT Goal Re-Cert Due Date 01/09/23  -AR         Timed Charges    66813 - OT Therapeutic Exercise Minutes 12  -AR      39675 - OT Self Care/Mgmt Minutes 11  -AR         Total Minutes    Timed Charges Total Minutes 23  -AR       Total Minutes 23  -AR            User Key  (r) = Recorded By, (t) = Taken By, (c) = Cosigned By    Initials Name Provider Type    AR Obdulia Porter OT Occupational Therapist              Therapy Charges for Today     Code Description Service Date Service Provider Modifiers Qty    80332569476 HC OT THER PROC EA 15 MIN 1/4/2023 Obdulia Porter OT GO 1    33071669523 HC OT SELF CARE/MGMT/TRAIN EA 15 MIN 1/4/2023 [place]  Obdulia Fuchs OT GO 1    16341508726 HC OT THER SUPP EA 15 MIN 1/4/2023 Obdulia Porter OT GO 1               Obdulia Porter OT  1/4/2023

## 2023-01-04 NOTE — PLAN OF CARE
Goal Outcome Evaluation:  Plan of Care Reviewed With: patient        Progress: no change       No changes overnight, slept well, freq weight shifting and multiple inc BM this shift, pain controlled with PO meds and specialty bed in place, VS WDL, RA

## 2023-01-04 NOTE — PROGRESS NOTES
[facility] Progress Note        Antibiotics:  Anti-Infectives (From admission, onward)    Ordered     Dose/Rate Route Frequency Start Stop    12/28/22 1526  DAPTOmycin (CUBICIN) 800 mg in sodium chloride 0.9 % 50 mL IVPB        Ordering Provider: Zeke George MD    8 mg/kg × 100 kg (Order-Specific)  100 mL/hr over 30 Minutes Intravenous Every 24 Hours 12/29/22 1000 01/05/23 0959    12/07/22 0603  DAPTOmycin (CUBICIN) 800 mg in sodium chloride 0.9 % 50 mL IVPB        Ordering Provider: Zeke George MD    8 mg/kg × 100 kg  100 mL/hr over 30 Minutes Intravenous Every 24 Hours 12/07/22 1000 12/28/22 0905    11/30/22 2219  vancomycin 2000 mg/500 mL 0.9% NS IVPB ([facility])        Ordering Provider: Reggie Batres MD    20 mg/kg × 98.9 kg  over 2 Hours Intravenous Once 11/30/22 2221 12/01/22 0315    11/30/22 2219  piperacillin-tazobactam (ZOSYN) 3.375 g in iso-osmotic dextrose 50 ml (premix)        Ordering Provider: Reggie Batres MD    3.375 g Intravenous Once 11/30/22 2221 11/30/22 2335          CC: foot infection    HPI:    Patient is a 57 y.o.  Yr old male with history of prior lower extremity infection/amputations done in [facility].  He has a history of left BKA years ago.  More recent right transmetatarsal amputation but specific dates unclear and unclear who the surgeon was.  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  He has history DVT/IVC filter, diabetes and other comorbidities as below.  He reports a chronic right lateral foot wound present for months but apparently not precipitated by any specific event or trauma.  He is admitted to the hospital on November 30 with deterioration at the foot including redness/swelling    **patient had reported remote drug abuse (initially to me  "reported as IV drug abuse but then later he reported not injection use and I am unable to corroborate otherwise at prsent; +drug screen earlier this year per d/w Dr Chau for Meth at [facility] and reported by them to be IVDA/substance abuse),   chronic methadone. He reported to me this was 17 years ago and therefore some inconsistencies    ** patient reports MRSA blood stream infection treated in [place] spring of [place]22, 6 weeks of vancomycin by his report.  Otherwise data deficit.  Try to retrieve information     12/2/22 Dr Peguero did surgery  \"Procedure(s): Mid level right below-knee amputation and primary closure \"    12/6 with MRSA in blood culture from 11/30 and repeat from 12/6 negative;  TTE done but limited per cardiology    12/9/22   JERALD no vegetation per cardiology    1/2/23   Interval Hx noted; no new focal pain.  No fever;  Diarrhea x 1 today;  No diarrhea on 1/1 per him; at present, no abd pain    1/4/23 possible [facility] per him today;  Diarrhea x 1 overnight;  BM not > 3 per him last 24 hours and no new GI symptoms; he says diarrhea not worse; no abd pain; no fever;    No myalgia or shortness of breath/cough. Nursing reports no new focal pain or distress.  postop pain to the right LE which is controlled/dull at present, constant, nonradiating, worse with palpation, generally better with pain meds and 1-2  out of 10 in severity.  Not progressive     No headache photophobia or neck stiffness.  No shortness of breath cough or hemoptysis.  No nausea/ vomiting.  No dysuria hematuria or pyuria.  No other new skin rash       ROS:      1/4/23 per nursing, No f/c/s.   No rash.       Constitutional--   Appetite good, and no malaise. No fatigue.  Heent--chronic hoarse voice.  No new vision, hearing or throat complaints.  No epistaxis or oral sores.   No flashers, floaters or eye pain.   No headache, photophobia or neck stiffness.  Chronic PEG tube  CV-- No chest pain, palpitation or " "syncope  Resp-- No SOB/cough/Hemoptysis  GI-  No hematochezia, melena, or hematemesis. Denies jaundice or chronic liver disease.  -- No dysuria, hematuria, or flank pain.  Denies hesitancy, urgency or flank pain.  Lymph- no swollen lymph nodes in neck/axilla or groin.   Heme- No active bruising or bleeding; no Hx of DVT or PE.  MS-- no swelling or pain in the bones or joints of arms/legs.  No new back pain.  Neuro-- No acute focal weakness or numbness in the arms or legs.  No seizures.     Full 12 point review of systems reviewed and negative otherwise for acute complaints, except for above      PE: nursing/chaperone present  BP (!) 161/103 (BP Location: Left arm, Patient Position: Lying)   Pulse 82   Temp 98.1 °F (36.7 °C) (Oral)   Resp 18   Ht 193 cm (75.98\")   Wt 107 kg (235 lb)   SpO2 99%   BMI 28.62 kg/m²          GENERAL:  sleepy;  in no acute distress.  chronic Hoarse voice noted   HEENT: Normocephalic, atraumatic.   No conjunctival injection. No icterus. Oropharynx   without evidence of thrush or exudate. No evidence of peridontal disease.     HEART: RRR; No murmur, rubs, gallops.   LUNGS: Diminished at bases but otherwise clear to auscultation bilaterally without wheezing, rales, rhonchi. Normal respiratory effort. Nonlabored. No dullness.  ABDOMEN: Soft, nontender, nondistended. Positive bowel sounds. No rebound or guarding. NO mass or HSM.  PEG tube noted  EXT:  No cyanosis, clubbing;No cord.   MSK: FROM without joint effusions noted arms/legs.    SKIN: Warm and dry without cutaneous eruptions on Inspection/palpation.    NEURO:  sleepy     Right lower extremity surgical site noted;  No new redness;  no discrete mass bulge or fluctuance.  No crepitus or bulla.       No peripheral stigmata/phenomenon of endocarditis     IV without obvious redness or drainage     Left BKA site with no redness induration or warmth.  No discrete mass bulge or fluctuance.    Laboratory Data    Results from last 7 days "   Lab Units 01/02/23  1422 12/29/22  0615   WBC 10*3/mm3 9.35 6.66   HEMOGLOBIN g/dL 10.7* 10.6*   HEMATOCRIT % 35.0* 34.6*   PLATELETS 10*3/mm3 306 315     Results from last 7 days   Lab Units 01/02/23  1317   SODIUM mmol/L 139   POTASSIUM mmol/L 4.2   CHLORIDE mmol/L 102   CO2 mmol/L 23.0   BUN mg/dL 19   CREATININE mg/dL 0.70*   GLUCOSE mg/dL 103*   CALCIUM mg/dL 9.2     Results from last 7 days   Lab Units 01/02/23  1317   ALK PHOS U/L 99   BILIRUBIN mg/dL 0.2   ALT (SGPT) U/L 20   AST (SGOT) U/L 18               Estimated Creatinine Clearance: 154.4 mL/min (A) (by C-G formula based on SCr of 0.7 mg/dL (L)).      Microbiology:      Radiology:  Imaging Results (Last 72 Hours)     ** No results found for the last 72 hours. **            Impression:     --Acute right foot/ankle with multifocal ulceration, cellulitis/wound infection and probable osteomyelitis with probe to bone at the lateral foot and abnormal MRI.  Other comorbidities as outlined above and high risk for further serious morbidity and other serious sequelae including persistent/recurrent or nonhealing wounds, persistent/progressive or recurrent infection and risk for further functional/limb loss/amputation.  Surgery following to help define timing/option of threshold for any future surgical intervention .      **Surgery 12/2 with BKA     --MRSA bacteremia from November 30 (daptomycin sensitive).  Presumed from foot but also risk for endovascular focus particularly with  History of prior MRSA bloodstream infection.  Transthoracic echocardiogram limited per cardiology.  JERALD no vegetation per cardiology; follow-up blood cultures negative so far from December 6    **No  new metastatic foci of involvement as of December 21    --History MRSA with bacteremia by his report in spring/summer 2022 and treated with 6 weeks of vancomycin in [place].  Specifics unclear and trying to retrieve information    --coag-negative staph in blood culture likely  contaminant     --Chronic vocal cord paralysis per notes with chronic hoarseness and indwelling PEG tube.  Medicine team to clarify     --History left BKA.     --Diabetes.  You need to tightly control blood sugar to give best chance for healing     --History of DVT with IVC filter    --history of remote drug abuse Per his reports to me; he denies injection drug abuse for 17 years by his report to me although as above, medicine team has find records indicating more recent abuse in 2022 [facility] [place] [facility]    --diarrhea  Intermittent per him, not progressive as of 1/4 and no fever/pain; if increased/persistent, you would need to consider checking stool for CDiff      PLAN:      -- Daptomycin  IV  To finish today; possible [facility] today per case management notes; no new metastatic focus of involvement so far; will d/w medicine team/case management     -- Check/review labs cultures and scans     -- Partial history per nursing staff     -- Discussed with microbiology and family     --d/w Dr Peguero        -- Highly complex set of issues with high risk for further serious morbidity and other serious sequela     --d/w Dr Bolaños; daptomycin to finish today;  [facility] ok with me          **Copied text in this note has been reviewed and is accurate as of 01/04/23.     Zeke George MD  1/4/2023

## 2023-01-04 NOTE — PROGRESS NOTES
Cardiothoracic Surgery Progress Note      POD #: 33-right below-knee amputation     LOS: 35 days      Subjective: Patient is awake and alert states he is going to go to [facility] rehab facility today.    Objective:  Vital Signs vital signs below noted T-max past 24 hours 98.3 °F  Temp:  [97.8 °F (36.6 °C)-98.3 °F (36.8 °C)] 98 °F (36.7 °C)  Heart Rate:  [60-73] 73  Resp:  [17-18] 18  BP: (122-133)/(75-90) 133/75    Physical Exam:   General Appearance: Oriented x3   Lungs:   Heart:   Skin:   Incision: Amputation site dressing change.  Sutures intact skin margin viable skin flaps are viable.     Results:  Results from last 7 days   Lab Units 01/02/23  1422   WBC 10*3/mm3 9.35   HEMOGLOBIN g/dL 10.7*   HEMATOCRIT % 35.0*   PLATELETS 10*3/mm3 306     Results from last 7 days   Lab Units 01/02/23  1317   SODIUM mmol/L 139   POTASSIUM mmol/L 4.2   CHLORIDE mmol/L 102   CO2 mmol/L 23.0   BUN mg/dL 19   CREATININE mg/dL 0.70*   GLUCOSE mg/dL 103*   CALCIUM mg/dL 9.2         Assessment: #1 postop day 35 right below-knee amputation healing well  2.  Status post remote left below-knee amputation healed well  3.  Diabetes mellitus and neuropathy    Plan: Rehab at [facility] today per patient.  Overall medical manage per hospitalist.  Antibiotics infectious disease.  I have left daily dressing change orders for [facility] in the nursing communication column.      John Peguero MD - 01/04/23 - 05:49 EST

## 2023-01-04 NOTE — PLAN OF CARE
Goal Outcome Evaluation:  Plan of Care Reviewed With: patient        Progress: improving  Outcome Evaluation: Patient getting stronger wtih w/c mobility and bed mobility. Good rehab candidate. Constant diarrhea affecting some mobility.

## 2023-01-04 NOTE — THERAPY TREATMENT NOTE
Patient Name: Buster Velasco  : 1965    MRN: [id number]                              Today's Date: 2023       Admit Date: 2022    Visit Dx:     ICD-10-CM ICD-9-CM   1. Right foot infection  L08.9 686.9   2. Hypoglycemia  E16.2 251.2   3. Decubitus ulcer of coccygeal region, unspecified ulcer stage  L89.159 707.03     707.20   4. Pharyngeal dysphagia  R13.13 787.23     Patient Active Problem List   Diagnosis   • Right foot infection   • Hypoglycemia   • Anemia   • Hyponatremia   • Hypoalbuminemia   • Essential hypertension   • Hyperlipidemia   • Paroxysmal atrial fibrillation (HCC)   • Sepsis (HCC)   • MRSA bacteremia     Past Medical History:   Diagnosis Date   • Diabetes (HCC)    • DVT (deep venous thrombosis) (HCC)    • Hypertension    • Mood disorder (HCC)      Past Surgical History:   Procedure Laterality Date   • BELOW KNEE AMPUTATION Left    • BELOW KNEE AMPUTATION Right 2022    Procedure: AMPUTATION BELOW KNEE RIGHT;  Surgeon: John Peguero MD;  Location: [facility];  Service: Vascular;  Laterality: Right;   • TRANS METATARSAL AMPUTATION Right       General Information     Row Name 23 1626          Physical Therapy Time and Intention    Document Type therapy note (daily note)  -SC     Mode of Treatment physical therapy  -SC     Row Name 23 [facility]          General Information    Patient Profile Reviewed yes  -SC     Existing Precautions/Restrictions fall  B BKA, limited shoulder ROM  -SC     Row Name 23 1626          Cognition    Orientation Status (Cognition) oriented x 4  -SC     Row Name 23 1626          Safety Issues, Functional Mobility    Impairments Affecting Function (Mobility) endurance/activity tolerance;pain;strength;range of motion (ROM)  -SC     Comment, Safety Issues/Impairments (Mobility) alert, following commands  -SC           User Key  (r) = Recorded By, (t) = Taken By, (c) = Cosigned By    Initials Name Provider Type    SC Shamika Patel  A, PT Physical Therapist               Mobility     Row Name 01/04/23 1627          Bed Mobility    Bed Mobility rolling left;rolling right;scooting/bridging  -SC     Rolling Left Dickinson (Bed Mobility) minimum assist (75% patient effort)  -SC     Rolling Right Dickinson (Bed Mobility) modified independence  -SC     Scooting/Bridging Dickinson (Bed Mobility) modified independence  -SC     Comment, (Bed Mobility) rolled to don sling and lifted to w/c for therapeutic activitiy. Able to scoot back in w/c using w/c arm rests  -SC     Row Name 01/04/23 1627          Transfers    Comment, (Transfers) sliding board transfers deferred due to no prostetic available and bottom redness from constant diarrhea  -SC     Row Name 01/04/23 1627          Bed-Chair Transfer    Bed-Chair Dickinson (Transfers) dependent (less than 25% patient effort)  -SC     Assistive Device (Bed-Chair Transfers) lift device  -SC     Row Name 01/04/23 1627          Sit-Stand Transfer    Comment, (Sit-Stand Transfer) not appropriate . B BKA with not [id number]  -[facility] Name 01/04/23 1627          Gait/Stairs (Locomotion)    Distance in Feet (Gait) 300  -SC     Comment, (Gait/Stairs) Propelled w/c in hallway. Required x2 rest breaks with some c/o shoulder pain. Able to independently propell w/c with good turns and able to lock and unlock breaks  -SC     Row Name 01/04/23 1627          Mobility    Extremity Weight-bearing Status right lower extremity  -SC     Right Lower Extremity (Weight-bearing Status) non weight-bearing (NWB)  -SC           User Key  (r) = Recorded By, (t) = Taken By, (c) = Cosigned By    Initials Name Provider Type    SC Shamika Patel, PT Physical Therapist               Obj/Interventions     Row Name 01/04/23 1631          Motor Skills    Therapeutic Exercise hip;knee  -[facility] Name 01/04/23 1631          Hip (Therapeutic Exercise)    Hip (Therapeutic Exercise) AROM (active range of motion)  -SC     Hip  AROM (Therapeutic Exercise) bilateral;flexion;extension;aBduction;aDduction;supine;20 repititions  -SC     Row Name 01/04/23 1631          Knee (Therapeutic Exercise)    Knee (Therapeutic Exercise) AROM (active range of motion)  -SC     Knee AROM (Therapeutic Exercise) bilateral;flexion;extension;15 repititions;supine  -[facility] Name 01/04/23 1631          Balance    Static Standing Balance standby assist  -SC     Comment, Balance able to long sit in bed independently  -SC           User Key  (r) = Recorded By, (t) = Taken By, (c) = Cosigned By    Initials Name Provider Type    SC Shamika Patel, PT Physical Therapist               Goals/Plan    No documentation.                Clinical Impression     [facility] Name 01/04/23 1632          Pain    Pain Location - Side/Orientation Right  -SC     Pain Location lower  -SC     Pain Location - residual limb  -SC     Pain Intervention(s) Repositioned  -[facility] Name 01/04/23 1632          Pain Scale: FACES Pre/Post-Treatment    Pain: FACES Scale, Pretreatment 2-->hurts little bit  -SC     Posttreatment Pain Rating 2-->hurts little bit  -[facility] Name 01/04/23 1632          Plan of Care Review    Plan of Care Reviewed With patient  -SC     Progress improving  -SC     Outcome Evaluation Patient getting stronger wtih w/c mobility and bed mobility. Good rehab candidate. Constant diarrhea affecting some mobility.  -[facility] Name 01/04/23 1632          Therapy Assessment/Plan (PT)    Rehab Potential (PT) good, to achieve stated therapy goals  -SC     Criteria for Skilled Interventions Met (PT) yes;meets criteria;skilled treatment is necessary  -SC     Therapy Frequency (PT) daily  -[facility] Name 01/04/23 1632          Positioning and Restraints    Pre-Treatment Position in bed  -SC     Post Treatment Position bed  -SC     In Bed notified nsg;supine;call light within reach  -SC           User Key  (r) = Recorded By, (t) = Taken By, (c) = Cosigned By    Initials Name Provider  Type    SC Shamika Patel, PT Physical Therapist               Outcome Measures     Row Name 01/04/23 1634          How much help from another person do you currently need...    Turning from your back to your side while in flat bed without using bedrails? 3  -SC     Moving from lying on back to sitting on the side of a flat bed without bedrails? 2  -SC     Moving to and from a bed to a chair (including a wheelchair)? 2  -SC     Standing up from a chair using your arms (e.g., wheelchair, bedside chair)? 1  -SC     Climbing 3-5 steps with a railing? 1  -SC     To walk in hospital room? 1  -SC     AM-PAC 6 Clicks Score (PT) 10  -SC     Highest level of mobility 4 --> Transferred to chair/commode  -SC     Row Name 01/04/23 1634 01/04/23 0918       Functional Assessment    Outcome Measure Options AM-PAC 6 Clicks Basic Mobility (PT)  -SC AM-PAC 6 Clicks Daily Activity (OT)  -AR          User Key  (r) = Recorded By, (t) = Taken By, (c) = Cosigned By    Initials Name Provider Type    SC Shamika Patel PT Physical Therapist    Obdulia Mejía, OT Occupational Therapist                             Physical Therapy Education     Title: PT OT SLP Therapies (Done)     Topic: Physical Therapy (Done)     Point: Mobility training (Done)     Learning Progress Summary           Patient Eager, E, VU by SC at 1/4/2023 1634    Comment: reviewed benefits of getting oob    Eager, E, VU by SC at 1/3/2023 1125    Comment: reviewed benefits of activity    Acceptance, E, NR by  at 12/30/2022 1425    Acceptance, E,TB, NR by  at 12/26/2022 1512    Acceptance, E,D, VU,DU by  at 12/26/2022 1506    Eager, E, VU by SC at 12/21/2022 1407    Comment: reviewed benefits of getting oob    Acceptance, E, VU by  at 12/19/2022 1040    Comment: educated on transfer safety    Acceptance, E, VU by  at 12/15/2022 1410    Comment: educated on importance of knee extension at rest to prevent taut hamstrings    Eager, E, VU by SC at 12/12/2022  1121    Comment: reviewed benefits of activity    Eager, E, VU,DU by SC at 12/7/2022 1115    Comment: REVIEWED HEP                   Point: Home exercise program (Done)     Learning Progress Summary           Patient Eager, E, VU by SC at 1/4/2023 1634    Comment: reviewed benefits of getting oob    Eager, E, VU by SC at 1/3/2023 1125    Comment: reviewed benefits of activity    Acceptance, E, NR by  at 12/30/2022 1425    Acceptance, E,TB, NR by  at 12/26/2022 1512    Acceptance, E,D, VU,DU by  at 12/26/2022 1506    Eager, E, VU by SC at 12/21/2022 1407    Comment: reviewed benefits of getting oob    Acceptance, E, VU by  at 12/19/2022 1040    Comment: educated on transfer safety    Acceptance, E, VU by  at 12/15/2022 1410    Comment: educated on importance of knee extension at rest to prevent taut hamstrings    Eager, E, VU by SC at 12/12/2022 1121    Comment: reviewed benefits of activity    Eager, E, VU,DU by SC at 12/7/2022 1115    Comment: REVIEWED HEP                   Point: Body mechanics (Done)     Learning Progress Summary           Patient Eager, E, VU by SC at 1/4/2023 1634    Comment: reviewed benefits of getting oob    Eager, E, VU by SC at 1/3/2023 1125    Comment: reviewed benefits of activity    Acceptance, E, NR by  at 12/30/2022 1425    Acceptance, E,TB, NR by  at 12/26/2022 1512    Acceptance, E,D, VU,DU by  at 12/26/2022 1506    Eager, E, VU by SC at 12/21/2022 1407    Comment: reviewed benefits of getting oob    Acceptance, E, VU by  at 12/19/2022 1040    Comment: educated on transfer safety    Acceptance, E, VU by  at 12/15/2022 1410    Comment: educated on importance of knee extension at rest to prevent taut hamstrings    Eager, E, VU by SC at 12/12/2022 1121    Comment: reviewed benefits of activity    Eager, E, VU,DU by SC at 12/7/2022 1115    Comment: REVIEWED HEP                   Point: Precautions (Done)     Learning Progress Summary           Patient KRIS Martinez VU by  SC at 1/4/2023 1634    Comment: reviewed benefits of getting oob    Eager, E, VU by SC at 1/3/2023 1125    Comment: reviewed benefits of activity    Acceptance, E, NR by  at 12/30/2022 1425    Acceptance, E,TB, NR by  at 12/26/2022 1512    Acceptance, E,D, VU,DU by  at 12/26/2022 1506    Eager, E, VU by SC at 12/21/2022 1407    Comment: reviewed benefits of getting oob    Acceptance, E, VU by  at 12/19/2022 1040    Comment: educated on transfer safety    Acceptance, E, VU by  at 12/15/2022 1410    Comment: educated on importance of knee extension at rest to prevent taut hamstrings    Eager, E, VU by SC at 12/12/2022 1121    Comment: reviewed benefits of activity    Eager, E, VU,DU by SC at 12/7/2022 1115    Comment: REVIEWED HEP                               User Key     Initials Effective Dates Name Provider Type Discipline    SC 06/16/21 -  Shamika Patel, PT Physical Therapist PT     05/05/22 -  Margarito Ellison, PT Physical Therapist PT     06/01/21 -  Nora Addison, PT Physical Therapist PT     12/20/22 -  Joan Kuo, MENDEZ Registered Nurse Nurse              PT Recommendation and Plan     Plan of Care Reviewed With: patient  Progress: improving  Outcome Evaluation: Patient getting stronger wtih w/c mobility and bed mobility. Good rehab candidate. Constant diarrhea affecting some mobility.     Time Calculation:    PT Charges     Row Name 01/04/23 1549             Time Calculation    Start Time 1549  -SC      PT Received On 01/04/23  -SC      PT Goal Re-Cert Due Date 01/20/23  -SC         Time Calculation- PT    Total Timed Code Minutes- PT 23 minute(s)  -SC         Timed Charges    98552 - PT Therapeutic Exercise Minutes 8  -SC      47597 - PT Therapeutic Activity Minutes 15  -SC         Total Minutes    Timed Charges Total Minutes 23  -SC       Total Minutes 23  -SC            User Key  (r) = Recorded By, (t) = Taken By, (c) = Cosigned By    Initials Name Provider Type    SC Shamika Patel  FRIDA, PT Physical Therapist              Therapy Charges for Today     Code Description Service Date Service Provider Modifiers Qty    26023611971 HC PT THER PROC EA 15 MIN 1/3/2023 Amanda, Shamika GALICIA, PT GP 1    26163493466 HC PT THERAPEUTIC ACT EA 15 MIN 1/3/2023 Amanda, Shamika GALICIA, PT GP 2    27641816360 HC PT THER SUPP EA 15 MIN 1/3/2023 Amanda, Shamika GALICIA, PT GP 2    03220225760 HC PT THER PROC EA 15 MIN 1/4/2023 Amanda, Shamika GALICIA, PT GP 1    53683343716 HC PT THERAPEUTIC ACT EA 15 MIN 1/4/2023 Amanda, Shamika GALICIA, PT GP 1          PT G-Codes  Outcome Measure Options: AM-PAC 6 Clicks Basic Mobility (PT)  AM-PAC 6 Clicks Score (PT): 10  AM-PAC 6 Clicks Score (OT): 15  PT Discharge Summary  Anticipated Discharge Disposition (PT): inpatient rehabilitation facility, skilled nursing facility    Shamika Patel PT  1/4/2023

## 2023-01-04 NOTE — CASE MANAGEMENT/SOCIAL WORK
Continued Stay Note  [facility]     Patient Name: Buster Velasco  MRN: [id number]  Today's Date: 1/4/2023    Admit Date: 11/30/2022    Plan: Rehab vs Home   Discharge Plan     Row Name 01/04/23 1200       Plan    Plan Rehab vs Home    Patient/Family in Agreement with Plan yes    Plan Comments [facility] has submitted Mr. Velasco for physician review. If they don't accept him he will have to go home with home health services. I have made multiple attempt to contact his wife and have been unsuccessful. Per Mr. Velasco, she will not answer the phone at work and she works from about 0800 or so until about 1800. He will need transportation at the time of discharge whether it be to home or [facility]. Case management will continue to follow.    Final Discharge Disposition Code 30 - still a patient               Discharge Codes    No documentation.               Expected Discharge Date and Time     Expected Discharge Date Expected Discharge Time    Jan 5, 2023             Alan Downing RN

## 2023-01-04 NOTE — PLAN OF CARE
Goal Outcome Evaluation:  Plan of Care Reviewed With: patient        Progress: no change  Outcome Evaluation: Pt alert, Ox4 and declined EOB sitting or transfer to chair via lift d/t loose stools which nurse confirmed. He completed rolling R/L midline with min assist-supervision, grooming with supervision and BUE theraband HEP. Pt limited with decreased B shoulder AROM and strength and pain with B shoulder movement.

## 2023-01-05 LAB
ANION GAP SERPL CALCULATED.3IONS-SCNC: 13 MMOL/L (ref 5–15)
BUN SERPL-MCNC: 16 MG/DL (ref 6–20)
BUN/CREAT SERPL: 26.7 (ref 7–25)
CALCIUM SPEC-SCNC: 9.1 MG/DL (ref 8.6–10.5)
CHLORIDE SERPL-SCNC: 103 MMOL/L (ref 98–107)
CO2 SERPL-SCNC: 22 MMOL/L (ref 22–29)
CREAT SERPL-MCNC: 0.6 MG/DL (ref 0.76–1.27)
DEPRECATED RDW RBC AUTO: 52.8 FL (ref 37–54)
EGFRCR SERPLBLD CKD-EPI 2021: 111.9 ML/MIN/1.73
ERYTHROCYTE [DISTWIDTH] IN BLOOD BY AUTOMATED COUNT: 17.1 % (ref 12.3–15.4)
GLUCOSE BLDC GLUCOMTR-MCNC: 113 MG/DL (ref 70–130)
GLUCOSE BLDC GLUCOMTR-MCNC: 129 MG/DL (ref 70–130)
GLUCOSE BLDC GLUCOMTR-MCNC: 138 MG/DL (ref 70–130)
GLUCOSE BLDC GLUCOMTR-MCNC: 190 MG/DL (ref 70–130)
GLUCOSE SERPL-MCNC: 104 MG/DL (ref 65–99)
HCT VFR BLD AUTO: 34.6 % (ref 37.5–51)
HGB BLD-MCNC: 10.9 G/DL (ref 13–17.7)
MCH RBC QN AUTO: 26.5 PG (ref 26.6–33)
MCHC RBC AUTO-ENTMCNC: 31.5 G/DL (ref 31.5–35.7)
MCV RBC AUTO: 84.2 FL (ref 79–97)
PLATELET # BLD AUTO: 278 10*3/MM3 (ref 140–450)
PMV BLD AUTO: 10.9 FL (ref 6–12)
POTASSIUM SERPL-SCNC: 4.4 MMOL/L (ref 3.5–5.2)
RBC # BLD AUTO: 4.11 10*6/MM3 (ref 4.14–5.8)
SODIUM SERPL-SCNC: 138 MMOL/L (ref 136–145)
WBC NRBC COR # BLD: 7.29 10*3/MM3 (ref 3.4–10.8)

## 2023-01-05 PROCEDURE — 97530 THERAPEUTIC ACTIVITIES: CPT

## 2023-01-05 PROCEDURE — 63710000001 INSULIN DETEMIR PER 5 UNITS: Performed by: HOSPITALIST

## 2023-01-05 PROCEDURE — 82962 GLUCOSE BLOOD TEST: CPT

## 2023-01-05 PROCEDURE — 99232 SBSQ HOSP IP/OBS MODERATE 35: CPT | Performed by: FAMILY MEDICINE

## 2023-01-05 PROCEDURE — 80048 BASIC METABOLIC PNL TOTAL CA: CPT | Performed by: NURSE PRACTITIONER

## 2023-01-05 PROCEDURE — 85027 COMPLETE CBC AUTOMATED: CPT | Performed by: NURSE PRACTITIONER

## 2023-01-05 PROCEDURE — 97535 SELF CARE MNGMENT TRAINING: CPT

## 2023-01-05 PROCEDURE — 99024 POSTOP FOLLOW-UP VISIT: CPT | Performed by: THORACIC SURGERY (CARDIOTHORACIC VASCULAR SURGERY)

## 2023-01-05 RX ADMIN — METHADONE HYDROCHLORIDE 10 MG: 10 TABLET ORAL at 20:28

## 2023-01-05 RX ADMIN — INSULIN DETEMIR 5 UNITS: 100 INJECTION, SOLUTION SUBCUTANEOUS at 20:27

## 2023-01-05 RX ADMIN — METOPROLOL TARTRATE 25 MG: 25 TABLET, FILM COATED ORAL at 20:28

## 2023-01-05 RX ADMIN — ATORVASTATIN CALCIUM 20 MG: 20 TABLET, FILM COATED ORAL at 20:28

## 2023-01-05 RX ADMIN — HYDROXYZINE HYDROCHLORIDE 50 MG: 25 TABLET ORAL at 20:31

## 2023-01-05 RX ADMIN — Medication 10 ML: at 08:31

## 2023-01-05 RX ADMIN — Medication 250 MG: at 20:28

## 2023-01-05 RX ADMIN — METHADONE HYDROCHLORIDE 10 MG: 10 TABLET ORAL at 01:49

## 2023-01-05 RX ADMIN — METOPROLOL TARTRATE 25 MG: 25 TABLET, FILM COATED ORAL at 08:31

## 2023-01-05 RX ADMIN — METHADONE HYDROCHLORIDE 10 MG: 10 TABLET ORAL at 14:26

## 2023-01-05 RX ADMIN — METHADONE HYDROCHLORIDE 10 MG: 10 TABLET ORAL at 08:32

## 2023-01-05 RX ADMIN — LOPERAMIDE HYDROCHLORIDE 2 MG: 2 CAPSULE ORAL at 01:49

## 2023-01-05 RX ADMIN — Medication 250 MG: at 08:31

## 2023-01-05 RX ADMIN — TAMSULOSIN HYDROCHLORIDE 0.4 MG: 0.4 CAPSULE ORAL at 20:28

## 2023-01-05 NOTE — THERAPY EVALUATION
Patient Name: Buster Velasco  : 1965    MRN: [id number]                              Today's Date: 2023       Admit Date: 2022    Visit Dx:     ICD-10-CM ICD-9-CM   1. Right foot infection  L08.9 686.9   2. Hypoglycemia  E16.2 251.2   3. Decubitus ulcer of coccygeal region, unspecified ulcer stage  L89.159 707.03     707.20   4. Pharyngeal dysphagia  R13.13 787.23     Patient Active Problem List   Diagnosis   • Right foot infection   • Hypoglycemia   • Anemia   • Hyponatremia   • Hypoalbuminemia   • Essential hypertension   • Hyperlipidemia   • Paroxysmal atrial fibrillation (HCC)   • Sepsis (HCC)   • MRSA bacteremia     Past Medical History:   Diagnosis Date   • Diabetes (HCC)    • DVT (deep venous thrombosis) (HCC)    • Hypertension    • Mood disorder (HCC)      Past Surgical History:   Procedure Laterality Date   • BELOW KNEE AMPUTATION Left    • BELOW KNEE AMPUTATION Right 2022    Procedure: AMPUTATION BELOW KNEE RIGHT;  Surgeon: John Peguero MD;  [facility];  Service: Vascular;  Laterality: Right;   • TRANS METATARSAL AMPUTATION Right       General Information     Row Name 23 1210          Physical Therapy Time and Intention    Document Type therapy note (daily note)  -     Mode of Treatment physical therapy  -     Row Name 23 1210          General Information    Patient Profile Reviewed yes  -     Existing Precautions/Restrictions fall;other (see comments)  B BKA, limited shoulder ROM  -     Row Name 23 1210          Cognition    Orientation Status (Cognition) oriented x 4  -     Row Name 23 1210          Safety Issues, Functional Mobility    Impairments Affecting Function (Mobility) endurance/activity tolerance;pain;strength;range of motion (ROM)  -           User Key  (r) = Recorded By, (t) = Taken By, (c) = Cosigned By    Initials Name Provider Type     Nora Addison PT Physical Therapist               Mobility     Row Name  01/05/23 1210          Bed Mobility    Bed Mobility rolling left;rolling right  -     Rolling Left [place] (Bed Mobility) minimum assist (75% patient effort)  -     Rolling Right [place] (Bed Mobility) minimum assist (75% patient effort)  -     Comment, (Bed Mobility) rolling for sling placement  -     Row Name 01/05/23 1210          Transfers    Comment, (Transfers) Dep lift bed><w/c secondary to unsafe conditions for sliding board transfer  -     Row Name 01/05/23 1210          Bed-Chair Transfer    Bed-Chair [place] (Transfers) dependent (less than 25% patient effort);2 person assist  -     Assistive Device (Bed-Chair Transfers) lift device  -     Row Name 01/05/23 1210          Gait/Stairs (Locomotion)    Comment, (Gait/Stairs) pt self-propelled w/c 300' with SBA  -     Row Name 01/05/23 1210          Mobility    Extremity Weight-bearing Status right lower extremity  -     Right Lower Extremity (Weight-bearing Status) non weight-bearing (NWB)  -           User Key  (r) = Recorded By, (t) = Taken By, (c) = Cosigned By    Initials Name Provider Type     Nora Addison PT Physical Therapist               Obj/Interventions     Row Name 01/05/23 1212          Balance    Balance Assessment sitting static balance;sitting dynamic balance  -     Static Sitting Balance standby assist  -     Dynamic Sitting Balance standby assist  -           User Key  (r) = Recorded By, (t) = Taken By, (c) = Cosigned By    Initials Name Provider Type    Nora Rm PT Physical Therapist               Goals/Plan    No documentation.                Clinical Impression     Row Name 01/05/23 1212          Pain    Pretreatment Pain Rating 0/10 - no pain  -     Posttreatment Pain Rating 0/10 - no pain  -     Row Name 01/05/23 1212          Plan of Care Review    Plan of Care Reviewed With patient  -     Progress no change  -     Outcome Evaluation Pt performed bed mobility with Min Ax2  for sling placement. Dep lift transfer to w/c due to unsafe conditions for sliding board transfer. Pt agreeable and engaged with session. Pt self propelled w/c 300' wioth SBA. Activity limited by shoulder weakness/pain. Continue to recommend d/c to IRF for best functional outcome.  -     Row Name 01/05/23 1212          Therapy Assessment/Plan (PT)    Rehab Potential (PT) good, to achieve stated therapy goals  -     Criteria for Skilled Interventions Met (PT) yes;meets criteria;skilled treatment is necessary  -     Therapy Frequency (PT) daily  -     Row Name 01/05/23 1212          Vital Signs    Pre Systolic BP Rehab --  VSS  -HP     Pre Patient Position Supine  -HP     Intra Patient Position Sitting  -HP     Post Patient Position Supine  -HP     Row Name 01/05/23 1212          Positioning and Restraints    Pre-Treatment Position in bed  -HP     Post Treatment Position bed  -HP     In Bed notified nsg;supine;fowlers;call light within reach;encouraged to call for assist;exit alarm on;with family/caregiver;side rails up x2  -           User Key  (r) = Recorded By, (t) = Taken By, (c) = Cosigned By    Initials Name Provider Type    Nora Rm, PT Physical Therapist               Outcome Measures     Row Name 01/05/23 1225 01/05/23 0800       How much help from another person do you currently need...    Turning from your back to your side while in flat bed without using bedrails? 3  -HP 3  -TS    Moving from lying on back to sitting on the side of a flat bed without bedrails? 3  -HP 2  -TS    Moving to and from a bed to a chair (including a wheelchair)? 2  -HP 2  -TS    Standing up from a chair using your arms (e.g., wheelchair, bedside chair)? 1  -HP 1  -TS    Climbing 3-5 steps with a railing? 1  -HP 1  -TS    To walk in hospital room? 1  -HP 1  -TS    AM-PAC 6 Clicks Score (PT) 11  -HP 10  -TS    Highest level of mobility 4 --> Transferred to chair/commode  -HP 4 --> Transferred to chair/commode  -TS     Row Name 01/05/23 1225 01/05/23 1145       Functional Assessment    Outcome Measure Options AM-PAC 6 Clicks Basic Mobility (PT)  -HP AM-PAC 6 Clicks Daily Activity (OT)  -MR          User Key  (r) = Recorded By, (t) = Taken By, (c) = Cosigned By    Initials Name Provider Type    Anastasiya Zacarias, RN Registered Nurse     Nora Addison, PT Physical Therapist    MR Saji Marilou, OT Occupational Therapist                             Physical Therapy Education     Title: PT OT SLP Therapies (Done)     Topic: Physical Therapy (Done)     Point: Mobility training (Done)     Learning Progress Summary           Patient Acceptance, E,D, VU,NR by  at 1/5/2023 1226    Acceptance, E,D, VU,DU by  at 1/5/2023 1226    Eager, E, VU by SC at 1/4/2023 1634    Comment: reviewed benefits of getting oob    Eager, E, VU by SC at 1/3/2023 1125    Comment: reviewed benefits of activity    Acceptance, E, NR by  at 12/30/2022 1425    Acceptance, E,TB, NR by  at 12/26/2022 1512    Acceptance, E,D, VU,DU by  at 12/26/2022 1506    Eager, E, VU by SC at 12/21/2022 1407    Comment: reviewed benefits of getting oob    Acceptance, E, VU by  at 12/19/2022 1040    Comment: educated on transfer safety    Acceptance, E, VU by  at 12/15/2022 1410    Comment: educated on importance of knee extension at rest to prevent taut hamstrings    Eager, E, VU by SC at 12/12/2022 1121    Comment: reviewed benefits of activity    Eager, E, VU,DU by SC at 12/7/2022 1115    Comment: REVIEWED HEP                   Point: Home exercise program (Done)     Learning Progress Summary           Patient Acceptance, E,D, VU,NR by  at 1/5/2023 1226    Acceptance, E,D, VU,DU by  at 1/5/2023 1226    Eager, E, VU by SC at 1/4/2023 1634    Comment: reviewed benefits of getting oob    Eager, E, VU by SC at 1/3/2023 1125    Comment: reviewed benefits of activity    Acceptance, E, NR by  at 12/30/2022 1425    Acceptance, E,TB, NR by  at 12/26/2022 2132     Acceptance, E,D, VU,DU by  at 12/26/2022 1506    Eager, E, VU by SC at 12/21/2022 1407    Comment: reviewed benefits of getting oob    Acceptance, E, VU by  at 12/19/2022 1040    Comment: educated on transfer safety    Acceptance, E, VU by  at 12/15/2022 1410    Comment: educated on importance of knee extension at rest to prevent taut hamstrings    Eager, E, VU by SC at 12/12/2022 1121    Comment: reviewed benefits of activity    Eager, E, VU,DU by SC at 12/7/2022 1115    Comment: REVIEWED HEP                   Point: Body mechanics (Done)     Learning Progress Summary           Patient Acceptance, E,D, VU,NR by  at 1/5/2023 1226    Acceptance, E,D, VU,DU by  at 1/5/2023 1226    Eager, E, VU by SC at 1/4/2023 1634    Comment: reviewed benefits of getting oob    Eager, E, VU by SC at 1/3/2023 1125    Comment: reviewed benefits of activity    Acceptance, E, NR by  at 12/30/2022 1425    Acceptance, E,TB, NR by  at 12/26/2022 1512    Acceptance, E,D, VU,DU by  at 12/26/2022 1506    Eager, E, VU by SC at 12/21/2022 1407    Comment: reviewed benefits of getting oob    Acceptance, E, VU by  at 12/19/2022 1040    Comment: educated on transfer safety    Acceptance, E, VU by  at 12/15/2022 1410    Comment: educated on importance of knee extension at rest to prevent taut hamstrings    Eager, E, VU by SC at 12/12/2022 1121    Comment: reviewed benefits of activity    Eager, E, VU,DU by SC at 12/7/2022 1115    Comment: REVIEWED HEP                   Point: Precautions (Done)     Learning Progress Summary           Patient Acceptance, E,D, VU,NR by  at 1/5/2023 1226    Acceptance, E,D, VU,DU by  at 1/5/2023 1226    Eager, E, VU by SC at 1/4/2023 1634    Comment: reviewed benefits of getting oob    Eager, E, VU by SC at 1/3/2023 1125    Comment: reviewed benefits of activity    Acceptance, KRIS NR by  at 12/30/2022 1425    AcceptanceKRIS TB, NR by  at 12/26/2022 1512    AcceptanceKRIS D, VUDU by  at  12/26/2022 1506    KRIS Martinez, VU by SC at 12/21/2022 1407    Comment: reviewed benefits of getting oob    Acceptance, KRIS, VU by  at 12/19/2022 1040    Comment: educated on transfer safety    Acceptance, KRIS, VU by  at 12/15/2022 1410    Comment: educated on importance of knee extension at rest to prevent taut hamstrings    KRIS Martinez, VU by SC at 12/12/2022 1121    Comment: reviewed benefits of activity    KRIS Martinez VU,DU by SC at 12/7/2022 1115    Comment: REVIEWED HEP                               User Key     Initials Effective Dates Name Provider Type Discipline    SC 06/16/21 -  Shamika Patel PT Physical Therapist PT     05/05/22 -  Margarito Ellison PT Physical Therapist PT     06/01/21 -  Nora Addison, PT Physical Therapist PT     12/20/22 -  Joan Kuo, RN Registered Nurse Nurse              PT Recommendation and Plan     Plan of Care Reviewed With: patient  Progress: no change  Outcome Evaluation: Pt performed bed mobility with Min Ax2 for sling placement. Dep lift transfer to w/c due to unsafe conditions for sliding board transfer. Pt agreeable and engaged with session. Pt self propelled w/c 300' wioth SBA. Activity limited by shoulder weakness/pain. Continue to recommend d/c to IRF for best functional outcome.     Time Calculation:    PT Charges     Row Name 01/05/23 0945             Time Calculation    Start Time 0945  -HP      PT Received On 01/05/23  -HP         Timed Charges    94211 - PT Therapeutic Activity Minutes 15  -HP         Total Minutes    Timed Charges Total Minutes 15  -HP       Total Minutes 15  -HP            User Key  (r) = Recorded By, (t) = Taken By, (c) = Cosigned By    Initials Name Provider Type     Nora Addison, PT Physical Therapist              Therapy Charges for Today     Code Description Service Date Service Provider Modifiers Qty    99134278985 HC PT THERAPEUTIC ACT EA 15 MIN 1/5/2023 Nora Addison, PT GP 1          PT G-Codes  Outcome Measure Options:  AM-PAC 6 Clicks Basic Mobility (PT)  AM-PAC 6 Clicks Score (PT): 11  AM-PAC 6 Clicks Score (OT): 15       Nora Addison, PT  1/5/2023

## 2023-01-05 NOTE — PLAN OF CARE
Goal Outcome Evaluation:  Plan of Care Reviewed With: patient        Progress: no change  Outcome Evaluation: Pt performed bed mobility with Min Ax2 for sling placement. Dep lift transfer to w/c due to unsafe conditions for sliding board transfer. Pt agreeable and engaged with session. Pt self propelled w/c 300' wioth SBA. Activity limited by shoulder weakness/pain. Continue to recommend d/c to IRF for best functional outcome.

## 2023-01-05 NOTE — THERAPY TREATMENT NOTE
Patient Name: Buster Velasco  : 1965    MRN: [id number]                              Today's Date: 2023       Admit Date: 2022    Visit Dx:     ICD-10-CM ICD-9-CM   1. Right foot infection  L08.9 686.9   2. Hypoglycemia  E16.2 251.2   3. Decubitus ulcer of coccygeal region, unspecified ulcer stage  L89.159 707.03     707.20   4. Pharyngeal dysphagia  R13.13 787.23     Patient Active Problem List   Diagnosis   • Right foot infection   • Hypoglycemia   • Anemia   • Hyponatremia   • Hypoalbuminemia   • Essential hypertension   • Hyperlipidemia   • Paroxysmal atrial fibrillation (HCC)   • Sepsis (HCC)   • MRSA bacteremia     Past Medical History:   Diagnosis Date   • Diabetes (HCC)    • DVT (deep venous thrombosis) (HCC)    • Hypertension    • Mood disorder (HCC)      Past Surgical History:   Procedure Laterality Date   • BELOW KNEE AMPUTATION Left    • BELOW KNEE AMPUTATION Right 2022    Procedure: AMPUTATION BELOW KNEE RIGHT;  Surgeon: John Peguero MD;  Location: [facility];  Service: Vascular;  Laterality: Right;   • TRANS METATARSAL AMPUTATION Right       General Information     Row Name 23 1133          OT Time and Intention    Document Type therapy note (daily note)  -MR     Mode of Treatment occupational therapy  -MR     Row Name 23 1133          General Information    Patient Profile Reviewed yes  -MR     Existing Precautions/Restrictions fall;other (see comments)  B BKA, limited shoulder ROM  -MR     Barriers to Rehab medically complex;previous functional deficit  -MR     Row Name 23 1133          Cognition    Orientation Status (Cognition) oriented x 4  -MR     Row Name 23 1133          Safety Issues, Functional Mobility    Safety Issues Affecting Function (Mobility) safety precaution awareness;safety precautions follow-through/compliance  -MR     Impairments Affecting Function (Mobility) endurance/activity tolerance;pain;strength;range of motion (ROM)   -MR           User Key  (r) = Recorded By, (t) = Taken By, (c) = Cosigned By    Initials Name Provider Type    Marilou Mcfarland OT Occupational Therapist                 Mobility/ADL's     Row Name 01/05/23 1133          Bed Mobility    Bed Mobility rolling left;rolling right  -MR     Rolling Left [place] (Bed Mobility) minimum assist (75% patient effort)  -MR     Rolling Right [place] (Bed Mobility) minimum assist (75% patient effort)  -MR     Bed Mobility, Safety Issues decreased use of arms for pushing/pulling;impaired trunk control for bed mobility  -MR     Assistive Device (Bed Mobility) bed rails  -MR     Row Name 01/05/23 1133          Transfers    Transfers bed-chair transfer  -MR     Comment, (Transfers) Lift device used for transfer from bed <> w/c.  -MR     Row Name 01/05/23 1133          Bed-Chair Transfer    Bed-Chair Lincoln (Transfers) dependent (less than 25% patient effort);2 person assist  -MR     Assistive Device (Bed-Chair Transfers) lift device  -MR     Row Name 01/05/23 1133          Functional Mobility    Functional Mobility- Ind. Level supervision required  -MR     Functional Mobility- Device other (see comments)  w/c mobility utilizing BUE for propulsion  -MR     Functional Mobility-Distance (Feet) --  > household distances  -MR     Row Name 01/05/23 1133          Mobility    Extremity Weight-bearing Status right lower extremity  -MR     Right Lower Extremity (Weight-bearing Status) non weight-bearing (NWB)  -MR           User Key  (r) = Recorded By, (t) = Taken By, (c) = Cosigned By    Initials Name Provider Type     Marilou Lennon OT Occupational Therapist               Obj/Interventions     Row Name 01/05/23 1136          Motor Skills    Therapeutic Exercise other (see comments)  Pt completed x 2 w/c pushups for readjusting  -MR     Row Name 01/05/23 1136          Balance    Balance Assessment sitting static balance;sitting dynamic balance  -MR     Static Sitting  Balance supervision  -MR     Dynamic Sitting Balance supervision  -MR     Position, Sitting Balance supported;sitting in chair  -MR           User Key  (r) = Recorded By, (t) = Taken By, (c) = Cosigned By    Initials Name Provider Type    MR Saji Marilou, OT Occupational Therapist               Goals/Plan    No documentation.                Clinical Impression     Row Name 01/05/23 1138          Pain Assessment    Pretreatment Pain Rating 0/10 - no pain  -MR     Posttreatment Pain Rating 0/10 - no pain  -MR     Pain Intervention(s) Ambulation/increased activity;Repositioned  -MR     Row Name 01/05/23 1138          Plan of Care Review    Plan of Care Reviewed With patient  -MR     Progress no change  -MR     Outcome Evaluation Pt motivated to participate in skilled OT session this date. Pt completed bed mobility rolling L and R w/ Fabi. Utilized lift device for transfer from bed to chair d/t pressure relief mattress being unsafe to complete sliding board transfer training. Good effort noted w/ w/c mobility utilizing BUE for propulsion completeing > household distances w/ x1 rest break. Continue to progress as able per current POC.  -MR     Row Name 01/05/23 1138          Therapy Plan Review/Discharge Plan (OT)    Anticipated Discharge Disposition (OT) [facility] nursing facility  -MR     Row Name 01/05/23 1138          Vital Signs    Pre Systolic BP Rehab --  VSS  -MR     O2 Delivery Pre Treatment room air  -MR     O2 Delivery Intra Treatment room air  -MR     O2 Delivery Post Treatment room air  -MR     Pre Patient Position Supine  -MR     Intra Patient Position Sitting  -MR     Post Patient Position Supine  -MR     Row Name 01/05/23 1138          Positioning and Restraints    Pre-Treatment Position in bed  -MR     Post Treatment Position bed  -MR     In Bed notified nsg;fowlers;side rails up x3;call light within reach;encouraged to call for assist;exit alarm on  -MR           User Key  (r) = Recorded By, (t) = Taken  By, (c) = Cosigned By    Initials Name Provider Type     Marilou Lennon, LUCAS Occupational Therapist               Outcome Measures     Row Name 01/05/23 1145          How much help from another is currently needed...    Putting on and taking off regular lower body clothing? 2  -MR     Bathing (including washing, rinsing, and drying) 2  -MR     Toileting (which includes using toilet bed pan or urinal) 2  -MR     Putting on and taking off regular upper body clothing 3  -MR     Taking care of personal grooming (such as brushing teeth) 3  -MR     Eating meals 3  -MR     AM-PAC 6 Clicks Score (OT) 15  -MR     Row Name 01/05/23 0800          How much help from another person do you currently need...    Turning from your back to your side while in flat bed without using bedrails? 3  -TS     Moving from lying on back to sitting on the side of a flat bed without bedrails? 2  -TS     Moving to and from a bed to a chair (including a wheelchair)? 2  -TS     Standing up from a chair using your arms (e.g., wheelchair, bedside chair)? 1  -TS     Climbing 3-5 steps with a railing? 1  -TS     To walk in hospital room? 1  -TS     AM-PAC 6 Clicks Score (PT) 10  -TS     Highest level of mobility 4 --> Transferred to chair/commode  -TS     Row Name 01/05/23 1145          Functional Assessment    Outcome Measure Options AM-PAC 6 Clicks Daily Activity (OT)  -MR           User Key  (r) = Recorded By, (t) = Taken By, (c) = Cosigned By    Initials Name Provider Type    Anastasiya Zacarias RN Registered Nurse    Marilou Mcfarland, LUCAS Occupational Therapist                Occupational Therapy Education     Title: PT OT SLP Therapies (Done)     Topic: Occupational Therapy (Done)     Point: ADL training (Done)     Description:   Instruct learner(s) on proper safety adaptation and remediation techniques during self care or transfers.   Instruct in proper use of assistive devices.              Learning Progress Summary           Patient  Acceptance, E, VU by MR at 1/5/2023 1146    Eager, E,TB,D, VU,NR by AR at 1/4/2023 0919    Acceptance, E,TB,D, VU,NR by  at 12/30/2022 1416    Acceptance, E,D, VU,DU,NR by TB at 12/24/2022 1529    Acceptance, E, VU,DU by BRAULIO at 12/22/2022 1311    Comment: Sitting balance; UE HEP; pressure relief    Acceptance, E,D, NR by JY at 12/19/2022 0942    Acceptance, E,D, VU,DU,NR by TB at 12/14/2022 1441    Acceptance, E, VU by MR at 12/11/2022 1448    Eager, E, VU,DU by SC at 12/7/2022 1115    Comment: REVIEWED HEP    Acceptance, E, VU by MR at 12/7/2022 1110                   Point: Home exercise program (Done)     Description:   Instruct learner(s) on appropriate technique for monitoring, assisting and/or progressing therapeutic exercises/activities.              Learning Progress Summary           Patient Acceptance, E, VU by MR at 1/5/2023 1146    Eager, E,TB,D, VU,NR by AR at 1/4/2023 0919    Acceptance, E,D, VU,DU,NR by  at 12/24/2022 1529    Acceptance, E, VU,DU by  at 12/22/2022 1311    Comment: Sitting balance; UE HEP; pressure relief    Acceptance, E,D, VU,DU,NR by TB at 12/14/2022 1441    Acceptance, E, VU by MR at 12/11/2022 1448    Eager, E, VU,DU by SC at 12/7/2022 1115    Comment: REVIEWED HEP    Acceptance, E, VU by MR at 12/7/2022 1110                   Point: Precautions (Done)     Description:   Instruct learner(s) on prescribed precautions during self-care and functional transfers.              Learning Progress Summary           Patient Acceptance, E, VU by MR at 1/5/2023 1146    Eager, E,TB,D, VU,NR by AR at 1/4/2023 0919    Acceptance, E,TB,D, VU,NR by  at 12/30/2022 1416    Acceptance, E,D, VU,DU,NR by TB at 12/24/2022 1529    Acceptance, E, VU,DU by BRAULIO at 12/22/2022 1311    Comment: Sitting balance; UE HEP; pressure relief    Acceptance, E,D, NR by MEMO at 12/19/2022 0942    Acceptance, E,D, CAMILA,DEB,NR by TB at 12/14/2022 1441    Acceptance, E, VU by MR at 12/11/2022 1448    Myleneer, CAMILA POCNE DU by SC  at 12/7/2022 1115    Comment: REVIEWED HEP    Acceptance, E, VU by MR at 12/7/2022 1110                   Point: Body mechanics (Done)     Description:   Instruct learner(s) on proper positioning and spine alignment during self-care, functional mobility activities and/or exercises.              Learning Progress Summary           Patient Acceptance, E, VU by MR at 1/5/2023 1146    Eager, E,TB,D, VU,NR by AR at 1/4/2023 0919    Acceptance, E,TB,D, VU,NR by  at 12/30/2022 1416    Acceptance, E,D, NR by JY at 12/19/2022 0942    Acceptance, E, VU by MR at 12/11/2022 1448    Eager, E, VU,DU by SC at 12/7/2022 1115    Comment: REVIEWED HEP    Acceptance, E, VU by MR at 12/7/2022 1110                               User Key     Initials Effective Dates Name Provider Type Discipline    SC 06/16/21 -  Shamika Patel, PT Physical Therapist PT    TB 06/16/21 -  Joseline Lemon, OT Occupational Therapist OT    AR 06/16/21 -  Obdulia Porter, OT Occupational Therapist OT    HM 10/25/22 -  Nora Abbasi, OT Occupational Therapist OT    JY 06/16/21 -  Kristy Canas, OT Occupational Therapist OT    BRAULIO 06/16/21 -  Kristy Michel, OT Occupational Therapist OT    MR 09/22/22 -  Marilou Lennon, OT Occupational Therapist OT              OT Recommendation and Plan  Planned Therapy Interventions (OT): activity tolerance training, adaptive equipment training, BADL retraining, functional balance retraining, IADL retraining, occupation/activity based interventions, patient/caregiver education/training, transfer/mobility retraining, strengthening exercise, ROM/therapeutic exercise  Therapy Frequency (OT): daily  Plan of Care Review  Plan of Care Reviewed With: patient  Progress: no change  Outcome Evaluation: Pt motivated to participate in skilled OT session this date. Pt completed bed mobility rolling L and R w/ Fabi. Utilized lift device for transfer from bed to chair d/t pressure relief mattress being unsafe to complete  sliding board transfer training. Good effort noted w/ w/c mobility utilizing BUE for propulsion completeing > household distances w/ x1 rest break. Continue to progress as able per current POC.     Time Calculation:    Time Calculation- OT     Row Name 01/05/23 1146             Time Calculation- OT    OT Start Time 1012  -MR      OT Received On 01/05/23  -MR      OT Goal Re-Cert Due Date 01/09/23  -MR         Timed Charges    07882 - OT Therapeutic Activity Minutes 18  -MR      73448 - OT Self Care/Mgmt Minutes 5  -MR         Total Minutes    Timed Charges Total Minutes 23  -MR       Total Minutes 23  -MR            User Key  (r) = Recorded By, (t) = Taken By, (c) = Cosigned By    Initials Name Provider Type     Marilou Lennon OT Occupational Therapist              Therapy Charges for Today     Code Description Service Date Service Provider Modifiers Qty    96199187756 HC OT THERAPEUTIC ACT EA 15 MIN 1/5/2023 Marilou Lennon OT GO 1    97414368899 HC OT SELF CARE/MGMT/TRAIN EA 15 MIN 1/5/2023 Marilou Lennon OT GO 1               Marilou Lennon OT  1/5/2023

## 2023-01-05 NOTE — CASE MANAGEMENT/SOCIAL WORK
Continued Stay Note   Kirstie     Patient Name: Buster Velasco  MRN: [id number]  Today's Date: 1/5/2023    Admit Date: 11/30/2022    Plan: Home   Discharge Plan     Row Name 01/05/23 1323       Plan    Plan Home    Patient/Family in Agreement with Plan yes    Plan Comments Mr. Velasco has been working well with therapy. [facility] has reviewed his chart and therapy notes and have declined him for rehab due to his marked improvement. At this time his only option is to return home. I will speak with  and Mrs. Velasco to updated them on this plan of care. I will arrange transportation for Mr. Velasco to his home via stretcher. Anticipate he will discharge late tomorrow so his wife will be able to be home to accept him from the stretcher. Case management will continue to follow.    Final Discharge Disposition Code 01 - home or self-care               Discharge Codes    No documentation.               Expected Discharge Date and Time     Expected Discharge Date Expected Discharge Time    Jan 5, 2023             Alan Downing RN

## 2023-01-05 NOTE — PLAN OF CARE
Problem: Adult Inpatient Plan of Care  Goal: Plan of Care Review  Outcome: Ongoing, Progressing  Flowsheets (Taken 1/5/2023 1847)  Progress: no change  Plan of Care Reviewed With: patient  Outcome Evaluation: Patient VSS, RA, A&Ox4. C/o pain, scheduled pain meds given. Up w/ lift into wheelchair. 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 PICC line pulled today. Voiding well. BM today. Bottom red/blanchable, z-guard applied, refusing turns. D/c home tmr. Continue to monitor.  Goal: Patient-Specific Goal (Individualized)  Outcome: Ongoing, Progressing  Goal: Absence of Hospital-Acquired Illness or Injury  Outcome: Ongoing, Progressing  Intervention: Identify and Manage Fall Risk  Recent Flowsheet Documentation  Taken 1/5/2023 1800 by Anastasiya Gongora, RN  Safety Promotion/Fall Prevention:   activity supervised   assistive device/personal items within reach   clutter free environment maintained   fall prevention program maintained   gait belt   toileting scheduled   room organization consistent   nonskid shoes/slippers when out of bed   safety round/check completed  Taken 1/5/2023 1600 by Anastasiya Gongora, RN  Safety Promotion/Fall Prevention:   activity supervised   assistive device/personal items within reach   clutter free environment maintained   fall prevention program maintained   gait belt   toileting scheduled   safety round/check completed   room organization consistent   nonskid shoes/slippers when out of bed  Taken 1/5/2023 1420 by Anastasiya Gongora, RN  Safety Promotion/Fall Prevention:   activity supervised   assistive device/personal items within reach   clutter free environment maintained   fall prevention program maintained   gait belt   toileting scheduled   safety round/check completed   room organization consistent   nonskid shoes/slippers when out of bed  Taken 1/5/2023 1200 by Anastasiya Gongora, RN  Safety Promotion/Fall Prevention:   activity supervised   assistive device/personal items within reach    clutter free environment maintained   fall prevention program maintained   gait belt   toileting scheduled   safety round/check completed   room organization consistent   nonskid shoes/slippers when out of bed  Taken 1/5/2023 1000 by Anastasiya Gongora RN  Safety Promotion/Fall Prevention:   assistive device/personal items within reach   activity supervised   clutter free environment maintained   fall prevention program maintained   gait belt   toileting scheduled   safety round/check completed   room organization consistent   nonskid shoes/slippers when out of bed  Taken 1/5/2023 0800 by Anastasiya Gongora RN  Safety Promotion/Fall Prevention:   activity supervised   clutter free environment maintained   assistive device/personal items within reach   fall prevention program maintained   gait belt   toileting scheduled   safety round/check completed   room organization consistent   nonskid shoes/slippers when out of bed  Intervention: Prevent Skin Injury  Recent Flowsheet Documentation  Taken 1/5/2023 1800 by Anastasiya Gongora RN  Body Position:   supine   weight shifting  Skin Protection:   adhesive use limited   transparent dressing maintained   tubing/devices free from skin contact  Taken 1/5/2023 1600 by Anastasiya Gongora RN  Body Position: supine  Skin Protection:   adhesive use limited   transparent dressing maintained   tubing/devices free from skin contact  Taken 1/5/2023 1420 by Anastasiya Gongora RN  Body Position: supine  Skin Protection:   adhesive use limited   transparent dressing maintained   tubing/devices free from skin contact  Taken 1/5/2023 1200 by Anastasiya Gongora RN  Body Position: supine  Skin Protection: adhesive use limited  Taken 1/5/2023 1000 by Anastasiya Gongora RN  Body Position: supine  Skin Protection: adhesive use limited  Taken 1/5/2023 0800 by Anastasiya Gongora RN  Body Position: sitting up in bed  Skin Protection:   adhesive use limited   transparent dressing maintained    tubing/devices free from skin contact  Intervention: Prevent and Manage VTE (Venous Thromboembolism) Risk  Recent Flowsheet Documentation  Taken 1/5/2023 1800 by Anastasiya Gongora RN  VTE Prevention/Management:   bilateral   sequential compression devices off  Taken 1/5/2023 1600 by Anastasiya Gongora RN  VTE Prevention/Management:   bilateral   sequential compression devices off  Taken 1/5/2023 1420 by Anastasiya Gongora RN  VTE Prevention/Management:   bilateral   sequential compression devices off  Taken 1/5/2023 1200 by Anastasiya Gongora RN  VTE Prevention/Management:   bilateral   sequential compression devices off  Taken 1/5/2023 1000 by Anastasiya Gongora RN  VTE Prevention/Management:   bilateral   sequential compression devices off  Taken 1/5/2023 0800 by Anastasiya Gongora RN  VTE Prevention/Management:   bilateral   sequential compression devices off  Intervention: Prevent Infection  Recent Flowsheet Documentation  Taken 1/5/2023 1800 by Anastasiya Gongora RN  Infection Prevention: environmental surveillance performed  Taken 1/5/2023 1600 by Anastasiya Gongora RN  Infection Prevention: environmental surveillance performed  Taken 1/5/2023 1420 by Anastasiya Gongora RN  Infection Prevention: environmental surveillance performed  Taken 1/5/2023 1200 by Anastasiya Gongora RN  Infection Prevention: environmental surveillance performed  Taken 1/5/2023 1000 by Anastasiya Gongora RN  Infection Prevention: environmental surveillance performed  Taken 1/5/2023 0800 by Anastasiya Gongora RN  Infection Prevention: environmental surveillance performed  Goal: Optimal Comfort and Wellbeing  Outcome: Ongoing, Progressing  Intervention: Provide Person-Centered Care  Recent Flowsheet Documentation  Taken 1/5/2023 1800 by Anastasiya Gongora RN  Trust Relationship/Rapport: care explained  Taken 1/5/2023 1600 by Anastasiya Gongora RN  Trust Relationship/Rapport: care explained  Taken 1/5/2023 1420 by Anastasiya Gongora  RN  Trust Relationship/Rapport: care explained  Taken 1/5/2023 1200 by Anastasiya Gongora RN  Trust Relationship/Rapport:   care explained   choices provided  Taken 1/5/2023 1000 by Anastasiya Gongora RN  Trust Relationship/Rapport:   care explained   choices provided  Taken 1/5/2023 0800 by Anastasiya Gongora RN  Trust Relationship/Rapport:   care explained   choices provided   questions encouraged   questions answered   reassurance provided   thoughts/feelings acknowledged  Goal: Readiness for Transition of Care  Outcome: Ongoing, Progressing     Problem: Fall Injury Risk  Goal: Absence of Fall and Fall-Related Injury  Outcome: Ongoing, Progressing  Intervention: Identify and Manage Contributors  Recent Flowsheet Documentation  Taken 1/5/2023 0800 by Anastasiya Gongora, RN  Medication Review/Management: medications reviewed  Intervention: Promote Injury-Free Environment  Recent Flowsheet Documentation  Taken 1/5/2023 1800 by Anastasiya Gongora, RN  Safety Promotion/Fall Prevention:   activity supervised   assistive device/personal items within reach   clutter free environment maintained   fall prevention program maintained   gait belt   toileting scheduled   room organization consistent   nonskid shoes/slippers when out of bed   safety round/check completed  Taken 1/5/2023 1600 by Anastasiya Gongora, RN  Safety Promotion/Fall Prevention:   activity supervised   assistive device/personal items within reach   clutter free environment maintained   fall prevention program maintained   gait belt   toileting scheduled   safety round/check completed   room organization consistent   nonskid shoes/slippers when out of bed  Taken 1/5/2023 1420 by Anastasiya Gongora, RN  Safety Promotion/Fall Prevention:   activity supervised   assistive device/personal items within reach   clutter free environment maintained   fall prevention program maintained   gait belt   toileting scheduled   safety round/check completed   room organization  consistent   nonskid shoes/slippers when out of bed  Taken 1/5/2023 1200 by Anastasiya Gongora, RN  Safety Promotion/Fall Prevention:   activity supervised   assistive device/personal items within reach   clutter free environment maintained   fall prevention program maintained   gait belt   toileting scheduled   safety round/check completed   room organization consistent   nonskid shoes/slippers when out of bed  Taken 1/5/2023 1000 by Anastasiya Gongora, RN  Safety Promotion/Fall Prevention:   assistive device/personal items within reach   activity supervised   clutter free environment maintained   fall prevention program maintained   gait belt   toileting scheduled   safety round/check completed   room organization consistent   nonskid shoes/slippers when out of bed  Taken 1/5/2023 0800 by Anastasiya Gongora, RN  Safety Promotion/Fall Prevention:   activity supervised   clutter free environment maintained   assistive device/personal items within reach   fall prevention program maintained   gait belt   toileting scheduled   safety round/check completed   room organization consistent   nonskid shoes/slippers when out of bed  Goal: Absence of Fall and Fall-Related Injury  Outcome: Ongoing, Progressing  Intervention: Identify and Manage Contributors  Recent Flowsheet Documentation  Taken 1/5/2023 0800 by Anastasiya Gongora, RN  Medication Review/Management: medications reviewed  Intervention: Promote Injury-Free Environment  Recent Flowsheet Documentation  Taken 1/5/2023 1800 by Anastasiya Gongora, RN  Safety Promotion/Fall Prevention:   activity supervised   assistive device/personal items within reach   clutter free environment maintained   fall prevention program maintained   gait belt   toileting scheduled   room organization consistent   nonskid shoes/slippers when out of bed   safety round/check completed  Taken 1/5/2023 1600 by Anastasiya Gongora, RN  Safety Promotion/Fall Prevention:   activity supervised   assistive  device/personal items within reach   clutter free environment maintained   fall prevention program maintained   gait belt   toileting scheduled   safety round/check completed   room organization consistent   nonskid shoes/slippers when out of bed  Taken 1/5/2023 1420 by Anastasiya Gongora RN  Safety Promotion/Fall Prevention:   activity supervised   assistive device/personal items within reach   clutter free environment maintained   fall prevention program maintained   gait belt   toileting scheduled   safety round/check completed   room organization consistent   nonskid shoes/slippers when out of bed  Taken 1/5/2023 1200 by Anastasiya Gongora RN  Safety Promotion/Fall Prevention:   activity supervised   assistive device/personal items within reach   clutter free environment maintained   fall prevention program maintained   gait belt   toileting scheduled   safety round/check completed   room organization consistent   nonskid shoes/slippers when out of bed  Taken 1/5/2023 1000 by Anastasiya Gongora RN  Safety Promotion/Fall Prevention:   assistive device/personal items within reach   activity supervised   clutter free environment maintained   fall prevention program maintained   gait belt   toileting scheduled   safety round/check completed   room organization consistent   nonskid shoes/slippers when out of bed  Taken 1/5/2023 0800 by Anastasiya Gongora, RN  Safety Promotion/Fall Prevention:   activity supervised   clutter free environment maintained   assistive device/personal items within reach   fall prevention program maintained   gait belt   toileting scheduled   safety round/check completed   room organization consistent   nonskid shoes/slippers when out of bed     Problem: Skin Injury Risk Increased  Goal: Skin Health and Integrity  Outcome: Ongoing, Progressing  Intervention: Optimize Skin Protection  Recent Flowsheet Documentation  Taken 1/5/2023 1800 by Anastasiya Gongora RN  Pressure Reduction  Techniques:   frequent weight shift encouraged   weight shift assistance provided  Head of Bed (HOB) Positioning: HOB at 45 degrees  Pressure Reduction Devices: specialty bed utilized  Skin Protection:   adhesive use limited   transparent dressing maintained   tubing/devices free from skin contact  Taken 1/5/2023 1600 by Anastasiya Gongora RN  Pressure Reduction Techniques: frequent weight shift encouraged  Head of Bed (HOB) Positioning: HOB at 30 degrees  Pressure Reduction Devices: specialty bed utilized  Skin Protection:   adhesive use limited   transparent dressing maintained   tubing/devices free from skin contact  Taken 1/5/2023 1420 by Anastasiya Gongora RN  Pressure Reduction Techniques: frequent weight shift encouraged  Head of Bed (HOB) Positioning: HOB at 30-45 degrees  Pressure Reduction Devices: specialty bed utilized  Skin Protection:   adhesive use limited   transparent dressing maintained   tubing/devices free from skin contact  Taken 1/5/2023 1200 by Anastasiya Gongora RN  Pressure Reduction Techniques: frequent weight shift encouraged  Head of Bed (HOB) Positioning: HOB at 45 degrees  Pressure Reduction Devices: specialty bed utilized  Skin Protection: adhesive use limited  Taken 1/5/2023 1000 by Anastasiya Gongora RN  Pressure Reduction Techniques: frequent weight shift encouraged  Head of Bed (HOB) Positioning: HOB at 30-45 degrees  Pressure Reduction Devices: specialty bed utilized  Skin Protection: adhesive use limited  Taken 1/5/2023 0800 by Anastasiya Gongora RN  Pressure Reduction Techniques: frequent weight shift encouraged  Head of Bed (HOB) Positioning: HOB at 45 degrees  Pressure Reduction Devices: specialty bed utilized  Skin Protection:   adhesive use limited   transparent dressing maintained   tubing/devices free from skin contact     Problem: Adjustment to Amputation (Lower Extremity Amputation)  Goal: Optimal Adjustment to Amputation  Outcome: Ongoing, Progressing  Intervention:  Promote Patient and Family Adjustment to Amputation  Recent Flowsheet Documentation  Taken 1/5/2023 1800 by Anastasiya Gongora, RN  Family/Support System Care: support provided  Taken 1/5/2023 1600 by Anastasiya Gongora, RN  Family/Support System Care:   self-care encouraged   support provided  Taken 1/5/2023 1420 by Anastasiya Gongora, RN  Family/Support System Care: support provided  Taken 1/5/2023 1200 by Anastasiya Gongora, RN  Family/Support System Care: support provided  Taken 1/5/2023 1000 by Anastasiya Gongora, RN  Family/Support System Care: support provided  Taken 1/5/2023 0800 by Anastasiya Gongora, RN  Family/Support System Care:   self-care encouraged   support provided     Problem: BADL (Basic Activities of Daily Living) Impairment (Lower Extremity Amputation)  Goal: Optimal Safe BADL Performance  Outcome: Ongoing, Progressing     Problem: IADL (Instrumental Activities of Daily Living) Impairment (Lower Extremity Amputation)  Goal: Optimal Safe IADL Performance  Outcome: Ongoing, Progressing     Problem: Lower Limb Care (Lower Extremity Amputation)  Goal: Effective Lower Limb Care  Outcome: Ongoing, Progressing     Problem: Mobility Impairment (Lower Extremity Amputation)  Goal: Optimal Mobility Tolstoy and Safety  Outcome: Ongoing, Progressing     Problem: Pain and Hypersensitivity (Lower Extremity Amputation)  Goal: Acceptable Pain/Hypersensitivity Control  Outcome: Ongoing, Progressing     Problem: Prosthetic Use and Management (Lower Extremity Amputation)  Goal: Effective Prosthesis Use and Management  Outcome: Ongoing, Progressing   Goal Outcome Evaluation:  Plan of Care Reviewed With: patient        Progress: no change  Outcome Evaluation: Patient VSS, RA, A&Ox4. C/o pain, scheduled pain meds given. Up w/ lift into wheelchair. 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 PICC line pulled today. Voiding well. BM today. Bottom red/blanchable, z-guard applied, refusing turns. D/c home tmr. Continue to monitor.

## 2023-01-05 NOTE — PLAN OF CARE
Goal Outcome Evaluation:  Plan of Care Reviewed With: patient        Progress: no change  Outcome Evaluation: Pt motivated to participate in skilled OT session this date. Pt completed bed mobility rolling L and R w/ Fabi. Utilized lift device for transfer from bed to chair d/t pressure relief mattress being unsafe to complete sliding board transfer training. Good effort noted w/ w/c mobility utilizing BUE for propulsion completeing > household distances w/ x1 rest break. Continue to progress as able per current POC.

## 2023-01-05 NOTE — SIGNIFICANT NOTE
01/05/23 1534   SLP Deferred Reason   SLP Deferred Reason Patient/family declined evaluation  (Patient declined MBS)

## 2023-01-05 NOTE — PLAN OF CARE
Goal Outcome Evaluation:  Plan of Care Reviewed With: patient        Progress: no change       No changes overnight, specialty bed in place, freq repositioning, VSS, RA

## 2023-01-05 NOTE — PROGRESS NOTES
[facility] Progress Note        Antibiotics:  Anti-Infectives (From admission, onward)    Ordered     Dose/Rate Route Frequency Start Stop    12/28/22 1526  DAPTOmycin (CUBICIN) 800 mg in sodium chloride 0.9 % 50 mL IVPB        Ordering Provider: Zeke George MD    8 mg/kg × 100 kg (Order-Specific)  100 mL/hr over 30 Minutes Intravenous Every 24 Hours 12/29/22 1000 01/04/23 1228    12/07/22 0603  DAPTOmycin (CUBICIN) 800 mg in sodium chloride 0.9 % 50 mL IVPB        Ordering Provider: Zeke George MD    8 mg/kg × 100 kg  100 mL/hr over 30 Minutes Intravenous Every 24 Hours 12/07/22 1000 12/28/22 0905    11/30/22 2219  vancomycin 2000 mg/500 mL 0.9% NS IVPB ([facility])        Ordering Provider: Reggie Batres MD    20 mg/kg × 98.9 kg  over 2 Hours Intravenous Once 11/30/22 2221 12/01/22 0315    11/30/22 2219  piperacillin-tazobactam (ZOSYN) 3.375 g in iso-osmotic dextrose 50 ml (premix)        Ordering Provider: Reggie Batres MD    3.375 g Intravenous Once 11/30/22 2221 11/30/22 2335          CC: foot infection    HPI:    Patient is a 57 y.o.  Yr old male with history of prior lower extremity infection/amputations done in [facility].  He has a history of left BKA years ago.  More recent right transmetatarsal amputation but specific dates unclear and unclear who the surgeon was.  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  He has history DVT/IVC filter, diabetes and other comorbidities as below.  He reports a chronic right lateral foot wound present for months but apparently not precipitated by any specific event or trauma.  He is admitted to the hospital on November 30 with deterioration at the foot including redness/swelling    **patient had reported remote drug abuse (initially to me  "reported as IV drug abuse but then later he reported not injection use and [facility]; +drug screen earlier this year per d/w Dr Chau for Meth at [facility] and reported by them to be IVDA/substance abuse),   chronic methadone. He reported to me this was 17 years ago and therefore some inconsistencies    ** patient reports MRSA blood stream infection treated in [place] spring of [place]22, 6 weeks of vancomycin by his report.  Otherwise data deficit.  Try to retrieve information     12/2/22 Dr Peguero did surgery  \"Procedure(s): Mid level right below-knee amputation and primary closure \"    12/6 with MRSA in blood culture from 11/30 and repeat from 12/6 negative;  TTE done but limited per cardiology    12/9/22   JERALD no vegetation per cardiology    1/2/23   Interval Hx noted; no new focal pain.  No fever;  Diarrhea x 1 today;  No diarrhea on 1/1 per him; at present, no abd pain    1/4/23 finished daptomycin;  Metformin stopped by medicine with loose stools     1/5/23 diarrhea better per him;  No diarrhea today;  BM x 2 on 1/4 per him; possible [facility] per him today;   no new GI symptoms;  no abd pain; no fever;    No myalgia or shortness of breath/cough. Nursing reports no new focal pain or distress.  postop pain to the right LE which is controlled/dull at present, constant, nonradiating, worse with palpation, generally better with pain meds and 1   out of 10 in severity.  Not progressive     No headache photophobia or neck stiffness.  No shortness of breath cough or hemoptysis.  No nausea/ vomiting.  No dysuria hematuria or pyuria.  No other new skin rash       ROS:      1/5/23 per nursing, No f/c/s.   No rash.       Constitutional--   Appetite good, and no malaise. No fatigue.  Heent--chronic hoarse voice.  No new vision, hearing or throat complaints.  No epistaxis or oral sores.   No flashers, floaters or eye pain.   No headache, photophobia or neck stiffness.  " "Chronic PEG tube  CV-- No chest pain, palpitation or syncope  Resp-- No SOB/cough/Hemoptysis  GI-  No hematochezia, melena, or hematemesis. Denies jaundice or chronic liver disease.  -- No dysuria, hematuria, or flank pain.  Denies hesitancy, urgency or flank pain.  Lymph- no swollen lymph nodes in neck/axilla or groin.   Heme- No active bruising or bleeding; no Hx of DVT or PE.  MS-- no swelling or pain in the bones or joints of arms/legs.  No new back pain.  Neuro-- No acute focal weakness or numbness in the arms or legs.  No seizures.     Full 12 point review of systems reviewed and negative otherwise for acute complaints, except for above      PE: nursing/chaperone present  /80 (BP Location: Left arm, Patient Position: Lying)   Pulse 63   Temp 98.1 °F (36.7 °C) (Oral)   Resp 16   Ht 193 cm (75.98\")   Wt 107 kg (235 lb)   SpO2 93%   BMI 28.62 kg/m²          GENERAL:  awake;  in no acute distress.  chronic Hoarse voice noted   HEENT: Normocephalic, atraumatic.   No conjunctival injection. No icterus. Oropharynx   without evidence of thrush or exudate. No evidence of peridontal disease.     HEART: RRR; No murmur, rubs, gallops.   LUNGS: Diminished at bases but otherwise clear to auscultation bilaterally without wheezing, rales, rhonchi. Normal respiratory effort. Nonlabored. No dullness.  ABDOMEN: Soft, nontender, nondistended. Positive bowel sounds. No rebound or guarding. NO mass or HSM.     EXT:  No cyanosis, clubbing;No cord.   MSK: FROM without joint effusions noted arms/legs.    SKIN: Warm and dry without cutaneous eruptions on Inspection/palpation.    NEURO:  awake     Right lower extremity surgical site noted;  No new redness;  no discrete mass bulge or fluctuance.  No crepitus or bulla.       No peripheral stigmata/phenomenon of endocarditis     IV without obvious redness or drainage     Left BKA site with no redness induration or warmth.  No discrete mass bulge or fluctuance.    Laboratory " Data    Results from last 7 days   Lab Units 01/02/23  1422   WBC 10*3/mm3 9.35   HEMOGLOBIN g/dL 10.7*   HEMATOCRIT % 35.0*   PLATELETS 10*3/mm3 306     Results from last 7 days   Lab Units 01/02/23  1317   SODIUM mmol/L 139   POTASSIUM mmol/L 4.2   CHLORIDE mmol/L 102   CO2 mmol/L 23.0   BUN mg/dL 19   CREATININE mg/dL 0.70*   GLUCOSE mg/dL 103*   CALCIUM mg/dL 9.2     Results from last 7 days   Lab Units 01/02/23  1317   ALK PHOS U/L 99   BILIRUBIN mg/dL 0.2   ALT (SGPT) U/L 20   AST (SGOT) U/L 18               Estimated Creatinine Clearance: 154.4 mL/min (A) (by C-G formula based on SCr of 0.7 mg/dL (L)).      Microbiology:      Radiology:  Imaging Results (Last 72 Hours)     ** No results found for the last 72 hours. **            Impression:     --Acute right foot/ankle with multifocal ulceration, cellulitis/wound infection and probable osteomyelitis with probe to bone at the lateral foot and abnormal MRI.  Other comorbidities as outlined above and high risk for further serious morbidity and other serious sequelae including persistent/recurrent or nonhealing wounds, persistent/progressive or recurrent infection and risk for further functional/limb loss/amputation.  Surgery following to help define timing/option of threshold for any future surgical intervention .      **Surgery 12/2 with BKA     --MRSA bacteremia from November 30 (daptomycin sensitive).  Presumed from foot but also risk for endovascular focus particularly with  History of prior MRSA bloodstream infection.  Transthoracic echocardiogram limited per cardiology.  JERALD no vegetation per cardiology; follow-up blood cultures negative  from December 6     **No  new metastatic foci of involvement as of December 21- January 5    --History MRSA with bacteremia by his report in spring/summer 2022 and treated with 6 weeks of vancomycin in [place].  Specifics unclear and trying to retrieve information    --coag-negative staph in blood culture likely  contaminant     --Chronic vocal cord paralysis per notes with chronic hoarseness and indwelling PEG tube.  Medicine team to clarify     --History left BKA.     --Diabetes.  You need to tightly control blood sugar to give best chance for healing     --History of DVT with IVC filter    --history of remote drug abuse Per his reports to me; he denies injection drug abuse for 17 years by his report to me although as above, medicine team has find records indicating more recent abuse in 2022 [facility] [place] [facility]    --diarrhea  Intermittent per him, not progressive as of 1/4, better 1/5 per him, and no fever/pain, metformin stopped 1/4 per notes; if increased/persistent particularly if with pain/fever/leukocytosis, you would need to consider checking stool for CDiff      PLAN:      -- monitor off abx;  possible [facility] today per case management notes     -- Check/review labs cultures and scans     -- Partial history per nursing staff     -- Discussed with microbiology       -- d/w Dr Peguero        -- Highly complex set of issues with high risk for further serious morbidity and other serious sequela although no new metastatic focus of involvement so far    -- [facility] ok with me          **Copied text in this note has been reviewed and is accurate as of 01/05/23.     Zeke George MD  1/5/2023

## 2023-01-05 NOTE — DISCHARGE SUMMARY
[facility] Medicine Services  DISCHARGE SUMMARY    Patient Name: Buster Velasco  : 1965  MRN: [id number]    Date of Admission: 2022  8:26 PM  Date of Discharge:  23  Primary Care Physician: Ludivina Bowers MD    Consults     Date and Time Order Name Status Description    2022 12:55 AM Inpatient Infectious Diseases Consult Completed     2022 12:35 AM Inpatient Cardiothoracic Surgery Consult Completed           Hospital Course     Presenting Problem:   Right foot infection [L08.9]    Active Hospital Problems    Diagnosis  POA   • **Right foot infection [L08.9]  Yes   • MRSA bacteremia [R78.81, B95.62]  Unknown   • Sepsis (HCC) [A41.9]  Yes   • Hypoglycemia [E16.2]  Yes   • Anemia [D64.9]  Yes   • Hyponatremia [E87.1]  Yes   • Hypoalbuminemia [E88.09]  Yes   • Essential hypertension [I10]  Yes   • Hyperlipidemia [E78.5]  Yes   • Paroxysmal atrial fibrillation (HCC) [I48.0]  Yes      Resolved Hospital Problems   No resolved problems to display.          Hospital Course:  Buster Velasco is a 57 y.o. male with hx of prior osteomyelitis s/p left BKA, s/p right transmetatarsal amputation, left hand 3rd metacarpal resection (2022 at OSH, had 6 weeks of IV antibiotics for MRSA bacteremia), remote hx of IVDA, more recent hx of polysubstance abuse (meth), and DVT s/p IVC filter who presented with right foot wounds. S/p right BKA on 22 with Dr. Peguero. His blood cultures grew MRSA. ID following and IV abx  Are complete        Sepsis secondary to acute right foot non-healing wound and likely osteomyelitis, resolved  MRSA bacteremia  - s/p right BKA 22 with Dr. Peguero  - ID following, Dr. George, completed  Daptomycin monotherapy x 6 weeks,completed 2023  - Blood cultures with coag negative Staph and MRSA  - Repeat blood cultures NGTD  - TTE 22 no acute findings, JERALD 22 negative       Diarrhea  -- Imodium only if  needed       Recurrent hypoglycemia in setting of DMII, resolved  - HbA1c 6.9%  -resume home meds      Chronic pain on opioids   Previous IVDA/substance abuse  - Continue home methadone 10 mg QID  - Patient denied hx of IVDA to me but in [facility] Every[facility] admission notes from April 2022 at [place], they documented prior IVDA/substance abuse, most recently with meth in [facility] from 4/2/22; he has told ID that his last IVDA was 17 years ago     Reported hx of Afib   - Patient denies any hx of Afib. Not previously on anticoagulation and given murky history will not start.   - On Metoprolol at home which is continued  - No evidence of Afib on telemetry, discontinued telemetry monitoring 12/10/22      Chronic vocal cord paralysis, s/p PEG  Dysphagia  - SLP evaluation: regular diet but with nectar thick liquids recommended, patient refusing nectar thick liquids and understands risks of aspiration  -PEG was placed 8/19/22 at [place], will defer  removal; routine PEG care/flushing per nursing as needed     Hypotension with hx of HTN  - Hold parameters with Metoprolol  - Hypotension resolved     HLD   - Continue statin    DVT s/p IVC filter   - Placed in spring 2022 per patient. Defer to PCP for further assessment and timing of removal.     Hx of seizure in setting of meth use   - Not on AED's given meth trigger     BPH   - Continue Flomax    Discharge Follow Up Recommendations for outpatient labs/diagnostics:   [facility]     Day of Discharge     HPI:   Pt without complaints     Review of Systems  Gen- No fevers, chills  CV- No chest pain, palpitations  Resp- No cough, dyspnea  GI- No N/V/D, abd pain        Vital Signs:   Temp:  [97.9 °F (36.6 °C)-98.3 °F (36.8 °C)] 98.1 °F (36.7 °C)  Heart Rate:  [56-74] 56  Resp:  [14-18] 16  BP: (124-132)/(77-87) 124/87      Physical Exam:  Constitutional: No acute distress, awake, alert  HENT: NCAT, mucous membranes moist  Respiratory: Clear to auscultation bilaterally,  respiratory effort normal   Cardiovascular: RRR, no murmurs, rubs, or gallops  Gastrointestinal: , nondistended  Musculoskeletal: Bilateral BKA   Psychiatric: Appropriate affect, cooperative  Neurologic: Oriented x 3,  speech clear  Skin: No rashes      Pertinent  and/or Most Recent Results     LAB RESULTS:      Lab 01/05/23  0807 01/02/23  1422   WBC 7.29 9.35   HEMOGLOBIN 10.9* 10.7*   HEMATOCRIT 34.6* 35.0*   PLATELETS 278 306   MCV 84.2 85.8         Lab 01/05/23  0807 01/02/23  1317 12/30/22  1024   SODIUM 138 139 138   POTASSIUM 4.4 4.2 4.0   CHLORIDE 103 102 101   CO2 22.0 23.0 24.0   ANION GAP 13.0 14.0 13.0   BUN 16 19 19   CREATININE 0.60* 0.70* 0.66*   EGFR 111.9 106.8 108.7   GLUCOSE 104* 103* 98   CALCIUM 9.1 9.2 9.0         Lab 01/02/23  1317 12/30/22  1024   TOTAL PROTEIN 6.6 6.3   ALBUMIN 3.7 3.3*   GLOBULIN 2.9 3.0   ALT (SGPT) 20 21   AST (SGOT) 18 20   BILIRUBIN 0.2 0.2   ALK PHOS 99 97   AMYLASE  --  39   LIPASE  --  12*                     Brief Urine Lab Results  (Last result in the past 365 days)      Color   Clarity   Blood   Leuk Est   Nitrite   Protein   CREAT   Urine HCG        08/20/22 0108 Yellow   Clear   Negative   Negative     Negative               Microbiology Results (last 10 days)     ** No results found for the last 240 hours. **          No radiology results for the last 10 days            Results for orders placed during the hospital encounter of 11/30/22    Adult Transesophageal Echo (JERALD) W/ Cont if Necessary Per Protocol    Interpretation Summary  •  Left ventricular systolic function is normal. Estimated left ventricular EF = 55%  •  The aortic valve is structurally normal with no stenosis present. Trace aortic valve regurgitation is present. There is no evidence of an aortic valve mass is present.  •  There is mild, anterior mitral leaflet thickening present. No evidence of a mitral valve mass is present. Trace mitral valve regurgitation is present. No significant mitral  valve stenosis is present  •  The tricuspid valve is normal in structure. There is no evidence of a mass on the tricuspid valve. Mild tricuspid valve regurgitation is present.  •  There is mild thickening of the pulmonic valve. There is trace pulmonic valve regurgitation present. There is no pulmonic valve stenosis present.      Plan for Follow-up of Pending Labs/Results:     Discharge Details        Discharge Medications      ASK your doctor about these medications      Instructions Start Date   bumetanide 0.5 MG tablet  Commonly known as: BUMEX   0.5 mg, Oral, Daily      Farxiga 10 MG tablet  Generic drug: dapagliflozin Propanediol   10 mg, Oral, Daily      ferrous sulfate 325 (65 FE) MG tablet   325 mg, Oral, Daily With Breakfast      fluticasone 50 MCG/ACT nasal spray  Commonly known as: FLONASE   2 sprays, Nasal, 2 Times Daily PRN      hydrOXYzine 25 MG tablet  Commonly known as: ATARAX   50 mg, Oral, Nightly PRN      losartan 50 MG tablet  Commonly known as: COZAAR   50 mg, Oral, Daily      metFORMIN 1000 MG tablet  Commonly known as: GLUCOPHAGE   1,000 mg, Oral, 2 Times Daily With Meals      methadone 10 MG tablet  Commonly known as: DOLOPHINE   10 mg, Oral, 4 Times Daily      metoprolol tartrate 25 MG tablet  Commonly known as: LOPRESSOR   25 mg, Oral, 2 Times Daily      simvastatin 40 MG tablet  Commonly known as: ZOCOR   40 mg, Oral, Nightly      tamsulosin 0.4 MG capsule 24 hr capsule  Commonly known as: FLOMAX   1 capsule, Oral, Daily      Tresiba 100 UNIT/ML solution injection  Generic drug: Insulin Degludec   72 Units, Subcutaneous, Daily             Allergies   Allergen Reactions   • Gabapentin Rash   • Lyrica [Pregabalin] Rash         Discharge Disposition:      Diet:  Hospital:  Diet Order   Procedures   • Diet: Cardiac Diets, Diabetic Diets; Healthy Heart (2-3 Na+); Consistent Carbohydrate; No Mixed Consistencies; Texture: Regular Texture (IDDSI 7); Fluid Consistency: Nectar Thick        Activity:      Restrictions or Other Recommendations:         CODE STATUS:    Code Status and Medical Interventions:   Ordered at: 12/01/22 0035     Code Status (Patient has no pulse and is not breathing):    CPR (Attempt to Resuscitate)     Medical Interventions (Patient has pulse or is breathing):    Full Support       No future appointments.              Nicole Arevalo,   01/05/23      Time Spent on Discharge:  I spent  35  minutes on this discharge activity which included: face-to-face encounter with the patient, reviewing the data in the system, coordination of the care with the nursing staff as well as consultants, documentation, and entering orders.

## 2023-01-05 NOTE — PROGRESS NOTES
Cardiothoracic Surgery Progress Note      POD #: 34-right below-knee amputation     LOS: 36 days      Subjective: Awake and alert.  Did not go to [facility] yesterday due to financial issues apparently    Objective:  Vital Signs: Vital signs below noted T-max past 24 hours 98.6  Temp:  [97.9 °F (36.6 °C)-98.6 °F (37 °C)] 98.1 °F (36.7 °C)  Heart Rate:  [64-82] 64  Resp:  [14-18] 16  BP: (124-161)/() 128/79    Physical Exam:   General Appearance: Oriented x3   Lungs:   Heart:   Skin:   Incision: Amputation dressing change.  Suture intact skin margin viable skin flaps are viable     Results:  Results from last 7 days   Lab Units 01/02/23  1422   WBC 10*3/mm3 9.35   HEMOGLOBIN g/dL 10.7*   HEMATOCRIT % 35.0*   PLATELETS 10*3/mm3 306     Results from last 7 days   Lab Units 01/02/23  1317   SODIUM mmol/L 139   POTASSIUM mmol/L 4.2   CHLORIDE mmol/L 102   CO2 mmol/L 23.0   BUN mg/dL 19   CREATININE mg/dL 0.70*   GLUCOSE mg/dL 103*   CALCIUM mg/dL 9.2         Assessment:  1.  Postop day 36 right below-knee amputation healing well  2.  Diabetes mellitus and neuropathy  3 status post remote left below-knee amputation healed.      Plan:  Continue daily dressing change amputation site.  Overall medical management per hospitalist.  Antibiotics per infectious disease.  Rehab to try to find a rehab facility for this patient.  Hopefully soon    John Peguero MD - 01/05/23 - 05:54 EST

## 2023-01-06 ENCOUNTER — READMISSION MANAGEMENT (OUTPATIENT)
Dept: CALL CENTER | Facility: HOSPITAL | Age: 58
End: 2023-01-06
Payer: COMMERCIAL

## 2023-01-06 VITALS
DIASTOLIC BLOOD PRESSURE: 72 MMHG | RESPIRATION RATE: 16 BRPM | OXYGEN SATURATION: 96 % | TEMPERATURE: 97.9 F | BODY MASS INDEX: 28.62 KG/M2 | HEIGHT: 76 IN | WEIGHT: 235 LBS | SYSTOLIC BLOOD PRESSURE: 126 MMHG | HEART RATE: 68 BPM

## 2023-01-06 PROBLEM — E87.1 HYPONATREMIA: Status: RESOLVED | Noted: 2022-11-30 | Resolved: 2023-01-06

## 2023-01-06 PROBLEM — A41.9 SEPSIS: Status: RESOLVED | Noted: 2022-12-01 | Resolved: 2023-01-06

## 2023-01-06 PROBLEM — R78.81 MRSA BACTEREMIA: Status: RESOLVED | Noted: 2022-12-06 | Resolved: 2023-01-06

## 2023-01-06 PROBLEM — D64.9 ANEMIA: Status: RESOLVED | Noted: 2022-11-30 | Resolved: 2023-01-06

## 2023-01-06 PROBLEM — B95.62 MRSA BACTEREMIA: Status: RESOLVED | Noted: 2022-12-06 | Resolved: 2023-01-06

## 2023-01-06 PROBLEM — E88.09 HYPOALBUMINEMIA: Status: RESOLVED | Noted: 2022-11-30 | Resolved: 2023-01-06

## 2023-01-06 PROBLEM — E16.2 HYPOGLYCEMIA: Status: RESOLVED | Noted: 2022-11-30 | Resolved: 2023-01-06

## 2023-01-06 LAB
GLUCOSE BLDC GLUCOMTR-MCNC: 108 MG/DL (ref 70–130)
GLUCOSE BLDC GLUCOMTR-MCNC: 138 MG/DL (ref 70–130)
GLUCOSE BLDC GLUCOMTR-MCNC: 145 MG/DL (ref 70–130)

## 2023-01-06 PROCEDURE — 92526 ORAL FUNCTION THERAPY: CPT

## 2023-01-06 PROCEDURE — 99238 HOSP IP/OBS DSCHRG MGMT 30/<: CPT | Performed by: FAMILY MEDICINE

## 2023-01-06 PROCEDURE — 99024 POSTOP FOLLOW-UP VISIT: CPT | Performed by: THORACIC SURGERY (CARDIOTHORACIC VASCULAR SURGERY)

## 2023-01-06 PROCEDURE — 82962 GLUCOSE BLOOD TEST: CPT

## 2023-01-06 RX ADMIN — METHADONE HYDROCHLORIDE 10 MG: 10 TABLET ORAL at 14:16

## 2023-01-06 RX ADMIN — ACETAMINOPHEN 650 MG: 325 TABLET ORAL at 18:52

## 2023-01-06 RX ADMIN — METHADONE HYDROCHLORIDE 10 MG: 10 TABLET ORAL at 01:38

## 2023-01-06 RX ADMIN — METHADONE HYDROCHLORIDE 10 MG: 10 TABLET ORAL at 19:54

## 2023-01-06 RX ADMIN — Medication 250 MG: at 08:18

## 2023-01-06 RX ADMIN — METHADONE HYDROCHLORIDE 10 MG: 10 TABLET ORAL at 08:18

## 2023-01-06 RX ADMIN — METOPROLOL TARTRATE 25 MG: 25 TABLET, FILM COATED ORAL at 08:18

## 2023-01-06 NOTE — CASE MANAGEMENT/SOCIAL WORK
Case Management Discharge Note      Final Note: Mr. Velasco will be discharged home today. He will be transported home by [employer] at 1900 via stretcher. I spoke with his wife by phone and she is in agreement with this plan. I have updated Mr. Velasco by the bedside and he is in agreement with this plan also. [facility] will follow him at home with skilled nursing, physical, occupational, and speech therapies. No additional discharge needs identified.         Selected Continued Care - Admitted Since 11/30/2022     Destination    No services have been selected for the patient.              Durable Medical Equipment    No services have been selected for the patient.              Dialysis/Infusion    No services have been selected for the patient.              Home Medical Care Coordination complete.    Service Provider Selected Services Address Phone Fax Patient Preferred    [facility], [facility] [address] [address] [phone] --          Therapy    No services have been selected for the patient.              Community Resources    No services have been selected for the patient.              Community & DME    No services have been selected for the patient.                  Transportation Services  W/C Van: Other ([employer])    Final Discharge Disposition Code: 06 - home with home health care

## 2023-01-06 NOTE — DISCHARGE PLACEMENT REQUEST
"Isidro Velasco (57 y.o. Male)     Date of Birth   1965    Social Security Number       Address   [address] [facility] [place] [address]    Home Phone   [phone]    MRN   [id number]       Moravian   Rastafari    Marital Status                               Admission Date   22    Admission Type   Emergency    Admitting Provider   Nicole Arevalo DO    Attending Provider   Nicole Arevalo DO    Department, Room/Bed   [address], S366/1       Discharge Date       Discharge Disposition   Home or Self Care    Discharge Destination                               Attending Provider: Nicole Arevalo DO    Allergies: Gabapentin, Lyrica [Pregabalin]    Isolation: None   Infection: MRSA (22)   Code Status: CPR    Ht: 193 cm (75.98\")   Wt: 107 kg (235 lb)    Admission Cmt: None   Principal Problem: Right foot infection [L08.9]                 Active Insurance as of 2022     Primary Coverage     Payor Plan Insurance Group Employer/Plan Group    [place]      Payor Plan Address Payor Plan Phone Number Payor Plan Fax Number Effective Dates    [address] [phone]  2022 - 2022    [facility] [address]       Subscriber Name Subscriber Birth Date Member ID       ISIDRO VELASCO 1965 [id number]                 Emergency Contacts      (Rel.) Home Phone Work Phone Mobile Phone    NELSY VELASCO (Spouse) [phone] -- [phone]           [address] [address]  Phone:  [phone]  Fax:   Date: 2023      Ambulatory Referral to Home Health     Patient:  Isidro Velasco MRN:  [id number]   [address] [facility] [address] :  1965  SSN:    Phone: [phone] Sex:  M      INSURANCE PAYOR PLAN GROUP # SUBSCRIBER ID   [place]    [place]    [id number] "      Referring Provider Information:  NICOLE AREVALO Phone: [phone] Fax: [phone]       Referral Information:   # Visits:  999 Referral Type: Home Health [42]   Urgency:  Routine Referral Reason: Specialty Services Required   Start Date: Jan 6, 2023 End Date:  To be determined by Insurer   Diagnosis: Right foot infection (L08.9 [ICD-10-CM] 686.9 [ICD-9-CM])      Refer to Dept:   Refer to Provider:   Refer to Provider Phone:   Refer to Facility:       Face to Face Visit Date: 1/6/2023  Follow-up provider for Plan of Care? I will be treating the patient on an ongoing basis.  Please send me the Plan of Care for signature.  Follow-up provider: YASEMIN SANFORD [[id number]]  Reason/Clinical Findings: right foot infection, right above the knee amputation  Describe mobility limitations that make leaving home difficult: impaired physical mobility and gait endurance  Nursing/Therapeutic Services Requested: Physical Therapy  PT orders: Total joint pathway  PT orders: Therapeutic exercise  PT orders: Transfer training  PT orders: Strengthening  PT orders: Home safety assessment  Frequency: 1 Week 1     This document serves as a request of services and does not constitute Insurance authorization or approval of services.  To determine eligibility, please contact the members Insurance carrier to verify and review coverage.     If you have medical questions regarding this request for services. Please contact [address] at [phone] during normal business hours.        Authorizing Provider:Nicole Arevalo DO  Authorizing Provider's NPI: [id number]  Order Entered By: Alan Downing RN 1/6/2023 11:15 AM     Electronically signed by: Nicole Arevalo DO 1/6/2023 11:15 AM            History & Physical      Tracy Oliveira DO at 11/30/22 8763              [facility] Medicine Services  HISTORY AND PHYSICAL    Patient Name: Buster Alanis  Krista  : 1965  MRN: [id number]  Primary Care Physician: Ludivina Bowers MD  Date of admission: 2022    Subjective    Subjective     Chief Complaint:  Ulcer on foot    HPI:  Buster Velasco is a 57 y.o. 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 He states that these have been present for a few months, but have gotten worse. He does admit to some fatigue and chills, but denies fever, chest pain, SOB, nausea or vomiting. He denies any recent antibiotic use.           Review of Systems   Constitutional: Positive for chills and fatigue. Negative for fever and unexpected weight change.   HENT: Positive for voice change (chronic hoarseness). Negative for sinus pressure, sore throat and trouble swallowing.    Eyes: Negative for photophobia and visual disturbance.   Respiratory: Negative for cough, shortness of breath and wheezing.    Cardiovascular: Negative for chest pain and palpitations.   Gastrointestinal: Negative for abdominal pain, diarrhea, nausea and vomiting.   Genitourinary: Negative for dysuria and hematuria.   Musculoskeletal: Positive for arthralgias and myalgias.   Skin: Positive for color change and wound.   Neurological: Negative for tremors, syncope, speech difficulty and weakness.   Psychiatric/Behavioral: Negative for confusion. The patient is not nervous/anxious.       All other systems reviewed and are negative.     Personal History     Past Medical History:   Diagnosis Date   • Diabetes (HCC)    • DVT (deep venous thrombosis) (HCC)    • Hypertension    • Mood disorder (HCC)              Past Surgical History:   Procedure Laterality Date   • BELOW KNEE AMPUTATION Left     • TRANS METATARSAL AMPUTATION Right        Family History:  family history includes Diabetes in his maternal uncle; Diabetes (age of onset: 65) in his mother; Heart attack in his maternal grandfather; Stroke (age of onset: 74) in his mother. Otherwise pertinent FHx was reviewed and unremarkable.     Social History:  reports that he has never smoked. He has never used smokeless tobacco. He reports that he does not drink alcohol.  Social History     Social History Narrative   • Not on file       Medications:  Insulin Degludec, bumetanide, dapagliflozin Propanediol, ferrous sulfate, fluticasone, hydrOXYzine, losartan, metFORMIN, methadone, metoprolol tartrate, simvastatin, and tamsulosin    Allergies   Allergen Reactions   • Gabapentin Rash   • Lyrica [Pregabalin] Rash       Objective    Objective     Vital Signs:   Temp:  [98 °F (36.7 °C)] 98 °F (36.7 °C)  Heart Rate:  [] 106  Resp:  [16-20] 16  BP: (102-108)/(58-65) 102/58    Physical Exam  Constitutional:       General: He is not in acute distress.     Appearance: Normal appearance.   HENT:      Head: Atraumatic.      Right Ear: External ear normal.      Left Ear: External ear normal.      Nose: Nose normal.      Mouth/Throat:      Comments: Chronic hoarseness when speaking.   Eyes:      Extraocular Movements: Extraocular movements intact.      Conjunctiva/sclera: Conjunctivae normal.      Pupils: Pupils are equal, round, and reactive to light.   Cardiovascular:      Rate and Rhythm: Regular rhythm. Tachycardia present.      Heart sounds: Normal heart sounds. No murmur heard.  Pulmonary:      Effort: Pulmonary effort is normal. No respiratory distress.      Breath sounds: Normal breath sounds. No wheezing, rhonchi or rales.   Abdominal:      General: Bowel sounds are normal. There is no distension.      Tenderness: There is no abdominal tenderness. There is no guarding or rebound.      Comments: PEG tube in place. No evidence of erythema.    Musculoskeletal:       Cervical back: No rigidity.      Comments: Hx left BKA. Hx right transmetatarsal amputation.   Skin:     General: Skin is warm and dry.      Coloration: Skin is not jaundiced.      Findings: Erythema and lesion present. No rash.      Comments: 2 Large ulcerations on right lateral foot. Area of surrounding erythema.    Neurological:      General: No focal deficit present.      Mental Status: He is alert and oriented to person, place, and time.   Psychiatric:         Attention and Perception: Attention normal.         Mood and Affect: Mood normal.         Behavior: Behavior normal.         Thought Content: Thought content normal.          Result Review:  I have personally reviewed the results from the time of this admission to 12/1/2022 01:39 EST and agree with these findings:  [x]  Laboratory list / accordion  [x]  Microbiology  []  Radiology  [x]  EKG/Telemetry   []  Cardiology/Vascular   []  Pathology  []  Old records  []  Other:    LAB RESULTS:      Lab 12/01/22  0053 11/30/22 1905   WBC  --  13.92*   HEMOGLOBIN  --  11.1*   HEMATOCRIT  --  35.5*   PLATELETS  --  532*   NEUTROS ABS  --  11.44*   IMMATURE GRANS (ABS)  --  0.14*   LYMPHS ABS  --  1.64   MONOS ABS  --  0.64   EOS ABS  --  0.03   MCV  --  84.5   PROCALCITONIN  --  0.17   LACTATE 2.0 2.1*         Lab 11/30/22 1905   SODIUM 134*   POTASSIUM 4.1   CHLORIDE 100   CO2 21.0*   ANION GAP 13.0   BUN 18   CREATININE 1.29*   EGFR 64.3   GLUCOSE 54*   CALCIUM 9.1         Lab 11/30/22 1905   TOTAL PROTEIN 7.6   ALBUMIN 3.00*   GLOBULIN 4.6   ALT (SGPT) 12   AST (SGOT) 19   BILIRUBIN 0.2   ALK PHOS 85   LIPASE 14                 Lab 11/30/22 1905   IRON 18*   IRON SATURATION 6*   TIBC 277*   TRANSFERRIN 186*   FERRITIN 317.40         Brief Urine Lab Results  (Last result in the past 365 days)      Color   Clarity   Blood   Leuk Est   Nitrite   Protein   CREAT   Urine HCG        08/20/22 0108 Yellow   Clear   Negative   Negative     Negative                Microbiology Results (last 10 days)     ** No results found for the last 240 hours. **          XR Chest 1 View    Result Date: 12/1/2022  EXAMINATION: Chest one view. DATE: 11/30/2022. COMPARISON: None available. CLINICAL HISTORY: Infection. FINDINGS: The lungs and pleural spaces are clear. The cardiomediastinal silhouette and the pulmonary vessels are within normal limits.     Impression: No acute cardiopulmonary process. Electronically signed by:  Pierre Baron D.O.  11/30/2022 10:15 PM Mountain Time          Assessment & Plan   Assessment & Plan       Right foot infection    Hypoglycemia    Sepsis (HCC)    Essential hypertension    Hyperlipidemia    Paroxysmal atrial fibrillation (HCC)    Anemia    Hyponatremia    Hypoalbuminemia      Buster Velasco is a 57 y.o. 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   Sepsis: infectious source, tachycardia, borderline BP, leukocytosis, elevated lactate.  - Check CRP, sed rate  -Blood cultures pending  -Given hx of MRSA osteomyelitis as well as multiple amputations, will need to start IV Vancomycin tonight.   -MRI right foot tonight to investigate for possible osteomyelitis.  -Wound care for the am  -Infectious Disease consult for the am  -Dr. Peguero to see in the am. May ultimately need right BKA.  -IV fluids    Decubitus ulcer  -Wound care to see.   -Bedrest. Turn q2    Chronic Vocal Cord Paralysis  -NPO tonight.   -Has PEG tube in place, but reportedly is requesting to be taken out?  -SLP to see in the am.     Hypoglycemia  T2DM  -Blood glucose 45  on arrival, persistent with rechecks.   -Point-of-care glucose check every hour  - Start D5W at 75 cc/hour  - Patient on Tresiba 75 units every morning, hold  - We will start SSI when needed  -Check A1C  -Repeat bmp in the am    HTN  HLD  -Hold home BP meds for now d/t borderline hypotension  -Continue statin    Parox Atrial Fib  -Noted during admission to  back in July.   -EKG pending  -Not currently on anticoagulation    Anemia  -Hgb 11.1, Hct 35.5  -Appears chronic. Actually has improved since prior results.  -Anemia studies   -Repeat cbc in the am    Hyponatremia  -Na+ 134. Likely 2ry to poor PO intake.   -Check Serum Osm, Urine Osm, and Urine Na+  -IV fluids tonight  -Repeat bmp in the am    Hypoalbuminemia  -Alb 3.0  -Nutrition consult for the am    History of DVT  - S/p IVC filter      DVT prophylaxis:  Hep subQ    CODE STATUS:  Full Code  Code Status (Patient has no pulse and is not breathing): CPR (Attempt to Resuscitate)  Medical Interventions (Patient has pulse or is breathing): Full Support      This note has been completed as part of a split-shared workflow.     Signature: Electronically signed by Cecily Molina PA-C, 11/30/22, 11:33 PM EST      Attending   Admission Attestation       I have performed an independent face-to-face diagnostic evaluation including performing an independent physical examination as documented here.  The documented plan of care above was reviewed and developed with the advanced practice clinician (APC).      Brief Summary Statement:   Buster Velasco is a 57 y.o. male with a PMH significant for insulin-dependent diabetes mellitus type 2, DVT s/p IVC filter, atrial fibrillation not on anticoagulation, HTN, HLD, history of osteomyelitis s/p left BKA, s/p right transmetatarsal amputation, MRSA osteomyelitis s/p third metacarpal resection who comes to the ED due to worsening foot infection.  Patient reports a 3-month history of an open wound on his right foot.   Over the past several weeks the wound has become more erythematous, painful with drainage.  He reports fatigue, chills.  He denies fever, vomiting.  He reports diarrhea which is currently resolved.  He is not following with wound care outpatient and has not been on any recent antibiotics.    Remainder of detailed HPI is as noted by APC and has been reviewed and/or edited by me for completeness.    Attending Physical Exam:  Temp:  [98 °F (36.7 °C)-98.6 °F (37 °C)] 98.6 °F (37 °C)  Heart Rate:  [] 89  Resp:  [16-20] 16  BP: (102-112)/(58-76) 112/74    Constitutional: Awake, alert  Eyes: PERRLA, sclerae anicteric, no conjunctival injection  HENT: NCAT, mucous membranes moist  Neck: Supple, no thyromegaly, no lymphadenopathy, trachea midline  Respiratory: Clear to auscultation bilaterally, nonlabored respirations   Cardiovascular: RRR, no murmurs, rubs, or gallops, palpable radial pulses bilaterally  Gastrointestinal: Positive bowel sounds, soft, nontender, nondistended  Musculoskeletal: Left BKA, metatarsal resection to right foot, foot bandaged  Psychiatric: Appropriate affect, cooperative  Neurologic: Oriented x 3, strength symmetric in all extremities, Cranial Nerves grossly intact to confrontation, speech clear  Skin: Bandage dry and intact to right foot      Brief Assessment/Plan :  See detailed assessment and plan developed with APC which I have reviewed and/or edited for completeness.    Tracy Oliveira DO  22                        Electronically signed by Tracy Oliveira DO at 22 0340          Physical Therapy Notes (most recent note)      Nora Addison, PT at 23 0945  Version 1 of 1         Patient Name: Buster Velasco  : 1965    MRN: [id number]                              Today's Date: 2023       Admit Date: 2022    Visit Dx:     ICD-10-CM ICD-9-CM   1. Right foot infection  L08.9 686.9   2. Hypoglycemia  E16.2 251.2   3. Decubitus ulcer of coccygeal  region, unspecified ulcer stage  L89.159 707.03     707.20   4. Pharyngeal dysphagia  R13.13 787.23     Patient Active Problem List   Diagnosis   • Right foot infection   • Hypoglycemia   • Anemia   • Hyponatremia   • Hypoalbuminemia   • Essential hypertension   • Hyperlipidemia   • Paroxysmal atrial fibrillation (HCC)   • Sepsis (HCC)   • MRSA bacteremia     Past Medical History:   Diagnosis Date   • Diabetes (HCC)    • DVT (deep venous thrombosis) (HCC)    • Hypertension    • Mood disorder (HCC)      Past Surgical History:   Procedure Laterality Date   • BELOW KNEE AMPUTATION Left    • BELOW KNEE AMPUTATION Right 12/2/2022    Procedure: AMPUTATION BELOW KNEE RIGHT;  Surgeon: John Peguero MD;  Location: [facility];  Service: Vascular;  Laterality: Right;   • TRANS METATARSAL AMPUTATION Right       General Information     Row Name 01/05/23 1210          Physical Therapy Time and Intention    Document Type therapy note (daily note)  -     Mode of Treatment physical therapy  -     Row Name 01/05/23 1210          General Information    Patient Profile Reviewed yes  -     Existing Precautions/Restrictions fall;other (see comments)  B BKA, limited shoulder ROM  -     Row Name 01/05/23 1210          Cognition    Orientation Status (Cognition) oriented x 4  -     Row Name 01/05/23 1210          Safety Issues, Functional Mobility    Impairments Affecting Function (Mobility) endurance/activity tolerance;pain;strength;range of motion (ROM)  -           User Key  (r) = Recorded By, (t) = Taken By, (c) = Cosigned By    Initials Name Provider Type     Nora Addison PT Physical Therapist               Mobility     Row Name 01/05/23 1210          Bed Mobility    Bed Mobility rolling left;rolling right  -     Rolling Left Evans (Bed Mobility) minimum assist (75% patient effort)  -     Rolling Right [place] (Bed Mobility) minimum assist (75% patient effort)  -     Comment, (Bed Mobility) rolling  for sling placement  -     Row Name 01/05/23 1210          Transfers    Comment, (Transfers) Dep lift bed><w/c secondary to unsafe conditions for sliding board transfer  -     Row Name 01/05/23 1210          Bed-Chair Transfer    Bed-Chair [place] (Transfers) dependent (less than 25% patient effort);2 person assist  -     Assistive Device (Bed-Chair Transfers) lift device  -     Row Name 01/05/23 1210          Gait/Stairs (Locomotion)    Comment, (Gait/Stairs) pt self-propelled w/c 300' with SBA  -     Row Name 01/05/23 1210          Mobility    Extremity Weight-bearing Status right lower extremity  -     Right Lower Extremity (Weight-bearing Status) non weight-bearing (NWB)  -           User Key  (r) = Recorded By, (t) = Taken By, (c) = Cosigned By    Initials Name Provider Type     Nora Addison PT Physical Therapist               Obj/Interventions     Row Name 01/05/23 1212          Balance    Balance Assessment sitting static balance;sitting dynamic balance  -     Static Sitting Balance standby assist  -     Dynamic Sitting Balance standby assist  -           User Key  (r) = Recorded By, (t) = Taken By, (c) = Cosigned By    Initials Name Provider Type    Nora Rm PT Physical Therapist               Goals/Plan    No documentation.                Clinical Impression     Row Name 01/05/23 1212          Pain    Pretreatment Pain Rating 0/10 - no pain  -     Posttreatment Pain Rating 0/10 - no pain  -[facility] Name 01/05/23 1212          Plan of Care Review    Plan of Care Reviewed With patient  -     Progress no change  -     Outcome Evaluation Pt performed bed mobility with Min Ax2 for sling placement. Dep lift transfer to w/c due to unsafe conditions for sliding board transfer. Pt agreeable and engaged with session. Pt self propelled w/c 300' wioth SBA. Activity limited by shoulder weakness/pain. Continue to recommend d/c to IRF for best functional outcome.  -      Row Name 01/05/23 1212          Therapy Assessment/Plan (PT)    Rehab Potential (PT) good, to achieve stated therapy goals  -     Criteria for Skilled Interventions Met (PT) yes;meets criteria;skilled treatment is necessary  -     Therapy Frequency (PT) daily  -HP     Row Name 01/05/23 1212          Vital Signs    Pre Systolic BP Rehab --  VSS  -HP     Pre Patient Position Supine  -HP     Intra Patient Position Sitting  -HP     Post Patient Position Supine  -HP     Row Name 01/05/23 1212          Positioning and Restraints    Pre-Treatment Position in bed  -HP     Post Treatment Position bed  -HP     In Bed notified nsg;supine;fowlers;call light within reach;encouraged to call for assist;exit alarm on;with family/caregiver;side rails up x2  -HP           User Key  (r) = Recorded By, (t) = Taken By, (c) = Cosigned By    Initials Name Provider Type    Nora Rm, PT Physical Therapist               Outcome Measures     Row Name 01/05/23 1225 01/05/23 0800       How much help from another person do you currently need...    Turning from your back to your side while in flat bed without using bedrails? 3  -HP 3  -TS    Moving from lying on back to sitting on the side of a flat bed without bedrails? 3  -HP 2  -TS    Moving to and from a bed to a chair (including a wheelchair)? 2  -HP 2  -TS    Standing up from a chair using your arms (e.g., wheelchair, bedside chair)? 1  -HP 1  -TS    Climbing 3-5 steps with a railing? 1  - 1  -TS    To walk in hospital room? 1  -HP 1  -TS    AM-PAC 6 Clicks Score (PT) 11  -HP 10  -TS    Highest level of mobility 4 --> Transferred to chair/commode  -HP 4 --> Transferred to chair/commode  -TS    Row Name 01/05/23 1225 01/05/23 1145       Functional Assessment    Outcome Measure Options AM-PAC 6 Clicks Basic Mobility (PT)  -HP AM-PAC 6 Clicks Daily Activity (OT)  -MR          User Key  (r) = Recorded By, (t) = Taken By, (c) = Cosigned By    Initials Name Provider Type    TS  Anastasiya Gongora, RN Registered Nurse     Nora Addison, PT Physical Therapist    MR Marilou Lennon, OT Occupational Therapist                             Physical Therapy Education     Title: PT OT SLP Therapies (Done)     Topic: Physical Therapy (Done)     Point: Mobility training (Done)     Learning Progress Summary           Patient Acceptance, E,D, VU,NR by  at 1/5/2023 1226    Acceptance, E,D, VU,DU by  at 1/5/2023 1226    Eager, E, VU by SC at 1/4/2023 1634    Comment: reviewed benefits of getting oob    Eager, E, VU by SC at 1/3/2023 1125    Comment: reviewed benefits of activity    Acceptance, E, NR by  at 12/30/2022 1425    Acceptance, E,TB, NR by  at 12/26/2022 1512    Acceptance, E,D, VU,DU by  at 12/26/2022 1506    Eager, E, VU by SC at 12/21/2022 1407    Comment: reviewed benefits of getting oob    Acceptance, E, VU by  at 12/19/2022 1040    Comment: educated on transfer safety    Acceptance, E, VU by  at 12/15/2022 1410    Comment: educated on importance of knee extension at rest to prevent taut hamstrings    Eager, E, VU by SC at 12/12/2022 1121    Comment: reviewed benefits of activity    Eager, E, VU,DU by SC at 12/7/2022 1115    Comment: REVIEWED HEP                   Point: Home exercise program (Done)     Learning Progress Summary           Patient Acceptance, E,D, VU,NR by  at 1/5/2023 1226    Acceptance, E,D, VU,DU by  at 1/5/2023 1226    Eager, E, VU by SC at 1/4/2023 1634    Comment: reviewed benefits of getting oob    Eager, E, VU by SC at 1/3/2023 1125    Comment: reviewed benefits of activity    Acceptance, E, NR by  at 12/30/2022 1425    Acceptance, E,TB, NR by  at 12/26/2022 1512    Acceptance, E,D, VU,DU by  at 12/26/2022 1506    Eager, E, VU by SC at 12/21/2022 1407    Comment: reviewed benefits of getting oob    Acceptance, E, VU by FW at 12/19/2022 1040    Comment: educated on transfer safety    Acceptance, CAMILA PONCE by FW at 12/15/2022 1410    Comment:  educated on importance of knee extension at rest to prevent taut hamstrings    Eager, E, VU by SC at 12/12/2022 1121    Comment: reviewed benefits of activity    Eager, E, VU,DU by SC at 12/7/2022 1115    Comment: REVIEWED HEP                   Point: Body mechanics (Done)     Learning Progress Summary           Patient Acceptance, E,D, VU,NR by  at 1/5/2023 1226    Acceptance, E,D, VU,DU by  at 1/5/2023 1226    Eager, E, VU by SC at 1/4/2023 1634    Comment: reviewed benefits of getting oob    Eager, E, VU by SC at 1/3/2023 1125    Comment: reviewed benefits of activity    Acceptance, E, NR by  at 12/30/2022 1425    Acceptance, E,TB, NR by  at 12/26/2022 1512    Acceptance, E,D, VU,DU by  at 12/26/2022 1506    Eager, E, VU by SC at 12/21/2022 1407    Comment: reviewed benefits of getting oob    Acceptance, E, VU by  at 12/19/2022 1040    Comment: educated on transfer safety    Acceptance, E, VU by  at 12/15/2022 1410    Comment: educated on importance of knee extension at rest to prevent taut hamstrings    Eager, E, VU by SC at 12/12/2022 1121    Comment: reviewed benefits of activity    Eager, E, VU,DU by SC at 12/7/2022 1115    Comment: REVIEWED HEP                   Point: Precautions (Done)     Learning Progress Summary           Patient Acceptance, E,D, VU,NR by  at 1/5/2023 1226    Acceptance, E,D, VU,DU by  at 1/5/2023 1226    Eager, E, VU by SC at 1/4/2023 1634    Comment: reviewed benefits of getting oob    Eager, E, VU by SC at 1/3/2023 1125    Comment: reviewed benefits of activity    Acceptance, E, NR by  at 12/30/2022 1425    Acceptance, E,TB, NR by  at 12/26/2022 1512    Acceptance, E,D, VU,DU by  at 12/26/2022 1506    Eager, E, VU by SC at 12/21/2022 1407    Comment: reviewed benefits of getting oob    Acceptance, CAMILA PONCE by FW at 12/19/2022 1040    Comment: educated on transfer safety    Acceptance, ECAMILA by FW at 12/15/2022 1410    Comment: educated on importance of knee  extension at rest to prevent taut hamstrings    KRIS Martinez VU by SC at 12/12/2022 1121    Comment: reviewed benefits of activity    KRIS Martinez VU, DU by SC at 12/7/2022 1115    Comment: REVIEWED HEP                               User Key     Initials Effective Dates Name Provider Type Discipline    SC 06/16/21 -  Shamika Patel, PT Physical Therapist PT    FW 05/05/22 -  Margarito Ellison, ALVIN Physical Therapist PT     06/01/21 -  Nora Addison, ALVIN Physical Therapist PT     12/20/22 -  Joan Kuo, RN Registered Nurse Nurse              PT Recommendation and Plan     Plan of Care Reviewed With: patient  Progress: no change  Outcome Evaluation: Pt performed bed mobility with Min Ax2 for sling placement. Dep lift transfer to w/c due to unsafe conditions for sliding board transfer. Pt agreeable and engaged with session. Pt self propelled w/c 300' wioth SBA. Activity limited by shoulder weakness/pain. Continue to recommend d/c to IRF for best functional outcome.     Time Calculation:    PT Charges     Row Name 01/05/23 0945             Time Calculation    Start Time 0945  -HP      PT Received On 01/05/23  -HP         Timed Charges    11458 - PT Therapeutic Activity Minutes 15  -HP         Total Minutes    Timed Charges Total Minutes 15  -HP       Total Minutes 15  -HP            User Key  (r) = Recorded By, (t) = Taken By, (c) = Cosigned By    Initials Name Provider Type     Nora Addison PT Physical Therapist              Therapy Charges for Today     Code Description Service Date Service Provider Modifiers Qty    61516839009 HC PT THERAPEUTIC ACT EA 15 MIN 1/5/2023 Nora Addison, PT GP 1          PT G-Codes  Outcome Measure Options: AM-PAC 6 Clicks Basic Mobility (PT)  AM-PAC 6 Clicks Score (PT): 11  AM-PAC 6 Clicks Score (OT): 15       Nora Addison PT  1/5/2023      Electronically signed by Nora Addison PT at 01/05/23 1228          Occupational Therapy Notes (most recent note)      Marilou Lennon, OT  at 23 1012          Patient Name: Buster Velasco  : 1965    MRN: [id number]                              Today's Date: 2023       Admit Date: 2022    Visit Dx:     ICD-10-CM ICD-9-CM   1. Right foot infection  L08.9 686.9   2. Hypoglycemia  E16.2 251.2   3. Decubitus ulcer of coccygeal region, unspecified ulcer stage  L89.159 707.03     707.20   4. Pharyngeal dysphagia  R13.13 787.23     Patient Active Problem List   Diagnosis   • Right foot infection   • Hypoglycemia   • Anemia   • Hyponatremia   • Hypoalbuminemia   • Essential hypertension   • Hyperlipidemia   • Paroxysmal atrial fibrillation (HCC)   • Sepsis (HCC)   • MRSA bacteremia     Past Medical History:   Diagnosis Date   • Diabetes (HCC)    • DVT (deep venous thrombosis) (HCC)    • Hypertension    • Mood disorder (HCC)      Past Surgical History:   Procedure Laterality Date   • BELOW KNEE AMPUTATION Left    • BELOW KNEE AMPUTATION Right 2022    Procedure: AMPUTATION BELOW KNEE RIGHT;  Surgeon: John Peguero MD;  Location: [facility] [place];  Service: Vascular;  Laterality: Right;   • TRANS METATARSAL AMPUTATION Right       General Information     Row Name 23 1133          OT Time and Intention    Document Type therapy note (daily note)  -MR     Mode of Treatment occupational therapy  -MR     Row Name 23 1133          General Information    Patient Profile Reviewed yes  -MR     Existing Precautions/Restrictions fall;other (see comments)  B BKA, limited shoulder ROM  -MR     Barriers to Rehab medically complex;previous functional deficit  -MR     Row Name 23 1133          Cognition    Orientation Status (Cognition) oriented x 4  -MR     Row Name 23 1133          Safety Issues, Functional Mobility    Safety Issues Affecting Function (Mobility) safety precaution awareness;safety precautions follow-through/compliance  -MR     Impairments Affecting Function (Mobility) endurance/activity  tolerance;pain;strength;range of motion (ROM)  -MR           User Key  (r) = Recorded By, (t) = Taken By, (c) = Cosigned By    Initials Name Provider Type    Marilou Mcfarland OT Occupational Therapist                 Mobility/ADL's     Row Name 01/05/23 1133          Bed Mobility    Bed Mobility rolling left;rolling right  -MR     Rolling Left [place] (Bed Mobility) minimum assist (75% patient effort)  -MR     Rolling Right [place] (Bed Mobility) minimum assist (75% patient effort)  -MR     Bed Mobility, Safety Issues decreased use of arms for pushing/pulling;impaired trunk control for bed mobility  -MR     Assistive Device (Bed Mobility) bed rails  -MR     Row Name 01/05/23 1133          Transfers    Transfers bed-chair transfer  -MR     Comment, (Transfers) Lift device used for transfer from bed <> w/c.  -MR     Row Name 01/05/23 1133          Bed-Chair Transfer    Bed-Chair [place] (Transfers) dependent (less than 25% patient effort);2 person assist  -MR     Assistive Device (Bed-Chair Transfers) lift device  -MR     Row Name 01/05/23 1133          Functional Mobility    Functional Mobility- Ind. Level supervision required  -MR     Functional Mobility- Device other (see comments)  w/c mobility utilizing BUE for propulsion  -MR     Functional Mobility-Distance (Feet) --  > household distances  -MR     Row Name 01/05/23 1133          Mobility    Extremity Weight-bearing Status right lower extremity  -MR     Right Lower Extremity (Weight-bearing Status) non weight-bearing (NWB)  -MR           User Key  (r) = Recorded By, (t) = Taken By, (c) = Cosigned By    Initials Name Provider Type    Marilou Mcfarland OT Occupational Therapist               Obj/Interventions     Row Name 01/05/23 1136          Motor Skills    Therapeutic Exercise other (see comments)  Pt completed x 2 w/c pushups for readjusting  -MR     Row Name 01/05/23 1136          Balance    Balance Assessment sitting static  balance;sitting dynamic balance  -MR     Static Sitting Balance supervision  -MR     Dynamic Sitting Balance supervision  -MR     Position, Sitting Balance supported;sitting in chair  -MR           User Key  (r) = Recorded By, (t) = Taken By, (c) = Cosigned By    Initials Name Provider Type    MR Marilou Lennon, OT Occupational Therapist               Goals/Plan    No documentation.                Clinical Impression     Row Name 01/05/23 1138          Pain Assessment    Pretreatment Pain Rating 0/10 - no pain  -MR     Posttreatment Pain Rating 0/10 - no pain  -MR     Pain Intervention(s) Ambulation/increased activity;Repositioned  -MR     Row Name 01/05/23 1138          Plan of Care Review    Plan of Care Reviewed With patient  -MR     Progress no change  -MR     Outcome Evaluation Pt motivated to participate in skilled OT session this date. Pt completed bed mobility rolling L and R w/ Fabi. Utilized lift device for transfer from bed to chair d/t pressure relief mattress being unsafe to complete sliding board transfer training. Good effort noted w/ w/c mobility utilizing BUE for propulsion completeing > household distances w/ x1 rest break. Continue to progress as able per current POC.  -MR     Row Name 01/05/23 1138          Therapy Plan Review/Discharge Plan (OT)    Anticipated Discharge Disposition (OT) skilled nursing facility  -MR     Row Name 01/05/23 1138          Vital Signs    Pre Systolic BP Rehab --  VSS  -MR     O2 Delivery Pre Treatment room air  -MR     O2 Delivery Intra Treatment room air  -MR     O2 Delivery Post Treatment room air  -MR     Pre Patient Position Supine  -MR     Intra Patient Position Sitting  -MR     Post Patient Position Supine  -MR     Row Name 01/05/23 1138          Positioning and Restraints    Pre-Treatment Position in bed  -MR     Post Treatment Position bed  -MR     In Bed notified nsg;fowlers;side rails up x3;call light within reach;encouraged to call for assist;exit alarm on   -MR           User Key  (r) = Recorded By, (t) = Taken By, (c) = Cosigned By    Initials Name Provider Type    Marilou Mcfarland OT Occupational Therapist               Outcome Measures     Row Name 01/05/23 1145          How much help from another is currently needed...    Putting on and taking off regular lower body clothing? 2  -MR     Bathing (including washing, rinsing, and drying) 2  -MR     Toileting (which includes using toilet bed pan or urinal) 2  -MR     Putting on and taking off regular upper body clothing 3  -MR     Taking care of personal grooming (such as brushing teeth) 3  -MR     Eating meals 3  -MR     AM-PAC 6 Clicks Score (OT) 15  -MR     Row Name 01/05/23 0800          How much help from another person do you currently need...    Turning from your back to your side while in flat bed without using bedrails? 3  -TS     Moving from lying on back to sitting on the side of a flat bed without bedrails? 2  -TS     Moving to and from a bed to a chair (including a wheelchair)? 2  -TS     Standing up from a chair using your arms (e.g., wheelchair, bedside chair)? 1  -TS     Climbing 3-5 steps with a railing? 1  -TS     To walk in hospital room? 1  -TS     AM-PAC 6 Clicks Score (PT) 10  -TS     Highest level of mobility 4 --> Transferred to chair/commode  -TS     Row Name 01/05/23 1145          Functional Assessment    Outcome Measure Options AM-PAC 6 Clicks Daily Activity (OT)  -MR           User Key  (r) = Recorded By, (t) = Taken By, (c) = Cosigned By    Initials Name Provider Type    Anastasiya Zacarias RN Registered Nurse    Marilou Mcfarland OT Occupational Therapist                Occupational Therapy Education     Title: PT OT SLP Therapies (Done)     Topic: Occupational Therapy (Done)     Point: ADL training (Done)     Description:   Instruct learner(s) on proper safety adaptation and remediation techniques during self care or transfers.   Instruct in proper use of assistive devices.               Learning Progress Summary           Patient Acceptance, E, VU by MR at 1/5/2023 1146    Eager, E,TB,D, VU,NR by AR at 1/4/2023 0919    Acceptance, E,TB,D, VU,NR by  at 12/30/2022 1416    Acceptance, E,D, VU,DU,NR by TB at 12/24/2022 1529    Acceptance, E, VU,DU by BRAULIO at 12/22/2022 1311    Comment: Sitting balance; UE HEP; pressure relief    Acceptance, E,D, NR by JY at 12/19/2022 0942    Acceptance, E,D, VU,DU,NR by TB at 12/14/2022 1441    Acceptance, E, VU by MR at 12/11/2022 1448    Eager, E, VU,DU by SC at 12/7/2022 1115    Comment: REVIEWED HEP    Acceptance, E, VU by MR at 12/7/2022 1110                   Point: Home exercise program (Done)     Description:   Instruct learner(s) on appropriate technique for monitoring, assisting and/or progressing therapeutic exercises/activities.              Learning Progress Summary           Patient Acceptance, E, VU by MR at 1/5/2023 1146    Eager, E,TB,D, VU,NR by AR at 1/4/2023 0919    Acceptance, E,D, VU,DU,NR by TB at 12/24/2022 1529    Acceptance, E, VU,DU by BRAULIO at 12/22/2022 1311    Comment: Sitting balance; UE HEP; pressure relief    Acceptance, E,D, VU,DU,NR by TB at 12/14/2022 1441    Acceptance, E, VU by MR at 12/11/2022 1448    Eager, E, VU,DU by SC at 12/7/2022 1115    Comment: REVIEWED HEP    Acceptance, E, VU by MR at 12/7/2022 1110                   Point: Precautions (Done)     Description:   Instruct learner(s) on prescribed precautions during self-care and functional transfers.              Learning Progress Summary           Patient Acceptance, E, VU by MR at 1/5/2023 1146    Eager, E,TB,D, VU,NR by AR at 1/4/2023 0919    Acceptance, E,TB,D, VU,NR by  at 12/30/2022 1416    Acceptance, E,D, CAMILA,DEB,NR by TB at 12/24/2022 1529    Acceptance, ECAMILA,DEB by BRAULIO at 12/22/2022 1311    Comment: Sitting balance; UE HEP; pressure relief    Acceptance, E,D, NR by MEMO at 12/19/2022 0942    Acceptance, E,GEOVANNY, DEB JENSEN,NR by TB at 12/14/2022 1441    Acceptance, E VU  by MR at 12/11/2022 1448    Eager, E, VU,DU by SC at 12/7/2022 1115    Comment: REVIEWED HEP    Acceptance, E, VU by MR at 12/7/2022 1110                   Point: Body mechanics (Done)     Description:   Instruct learner(s) on proper positioning and spine alignment during self-care, functional mobility activities and/or exercises.              Learning Progress Summary           Patient Acceptance, E, VU by MR at 1/5/2023 1146    Eager, E,TB,D, VU,NR by AR at 1/4/2023 0919    Acceptance, E,TB,D, VU,NR by  at 12/30/2022 1416    Acceptance, E,D, NR by JY at 12/19/2022 0942    Acceptance, E, VU by MR at 12/11/2022 1448    Eager, E, VU,DU by SC at 12/7/2022 1115    Comment: REVIEWED HEP    Acceptance, E, VU by MR at 12/7/2022 1110                               User Key     Initials Effective Dates Name Provider Type Discipline    SC 06/16/21 -  Shamika Patel, PT Physical Therapist PT    TB 06/16/21 -  Joseline Lemon, OT Occupational Therapist OT    AR 06/16/21 -  Obdulia Porter, OT Occupational Therapist OT    HM 10/25/22 -  Nora Abbasi, OT Occupational Therapist OT    JY 06/16/21 -  Kristy Canas, OT Occupational Therapist OT    BRAULIO 06/16/21 -  Kristy Michel, OT Occupational Therapist OT    MR 09/22/22 -  Marilou Lennon, OT Occupational Therapist OT              OT Recommendation and Plan  Planned Therapy Interventions (OT): activity tolerance training, adaptive equipment training, BADL retraining, functional balance retraining, IADL retraining, occupation/activity based interventions, patient/caregiver education/training, transfer/mobility retraining, strengthening exercise, ROM/therapeutic exercise  Therapy Frequency (OT): daily  Plan of Care Review  Plan of Care Reviewed With: patient  Progress: no change  Outcome Evaluation: Pt motivated to participate in skilled OT session this date. Pt completed bed mobility rolling L and R w/ Fabi. Utilized lift device for transfer from bed to chair d/t  pressure relief mattress being unsafe to complete sliding board transfer training. Good effort noted w/ w/c mobility utilizing BUE for propulsion completeing > household distances w/ x1 rest break. Continue to progress as able per current POC.     Time Calculation:    Time Calculation- OT     Row Name 23 1146             Time Calculation- OT    OT Start Time 1012  -MR      OT Received On 23  -MR      OT Goal Re-Cert Due Date 23  -MR         Timed Charges    72474 - OT Therapeutic Activity Minutes 18  -MR      84130 - OT Self Care/Mgmt Minutes 5  -MR         Total Minutes    Timed Charges Total Minutes 23  -MR       Total Minutes 23  -MR            User Key  (r) = Recorded By, (t) = Taken By, (c) = Cosigned By    Initials Name Provider Type     Marilou Lennon OT Occupational Therapist              Therapy Charges for Today     Code Description Service Date Service Provider Modifiers Qty    64390433501 HC OT THERAPEUTIC ACT EA 15 MIN 2023 Marilou Lennon OT GO 1    48207362111 HC OT SELF CARE/MGMT/TRAIN EA 15 MIN 2023 Marilou Lennon OT GO 1               Marilou Lennon OT  2023    Electronically signed by Marilou Lennon OT at 23 1152          Speech Language Pathology Notes (most recent note)      Alma Rosa Tovar MS CCC-SLP at 23 0940          Acute Care - Speech Language Pathology   Swallow Treatment Note [facility]     Patient Name: Buster Velasco  : 1965  MRN: [id number]  Today's Date: 2023               Admit Date: 2022    Visit Dx:     ICD-10-CM ICD-9-CM   1. Right foot infection  L08.9 686.9   2. Hypoglycemia  E16.2 251.2   3. Decubitus ulcer of coccygeal region, unspecified ulcer stage  L89.159 707.03     707.20   4. Pharyngeal dysphagia  R13.13 787.23     Patient Active Problem List   Diagnosis   • Right foot infection   • Essential hypertension   • Hyperlipidemia   • Paroxysmal atrial fibrillation (HCC)     Past Medical History:    Diagnosis Date   • Diabetes (HCC)    • DVT (deep venous thrombosis) (HCC)    • Hypertension    • Mood disorder (HCC)      Past Surgical History:   Procedure Laterality Date   • BELOW KNEE AMPUTATION Left    • BELOW KNEE AMPUTATION Right 12/2/2022    Procedure: AMPUTATION BELOW KNEE RIGHT;  Surgeon: John Peguero MD;  Location: [facility];  Service: Vascular;  Laterality: Right;   • TRANS METATARSAL AMPUTATION Right        SLP Recommendation and Plan        Recommended Precautions and Strategies: upright posture during/after eating, general aspiration precautions, small bites of food and sips of liquid, multiple swallows per bite of food (chin tuck has proven to prevent aspiration w/ thin liquids in past (when drinking liquids in isolation w/o solids)) (01/06/23 0845)        Monitor for Signs of Aspiration: yes, elevated WBC count, gurgly voice, fever, throat clearing, upper respiratory, pneumonia (01/06/23 0845)  Recommended Diagnostics: other (see comments) (consider OP MBS in future, as indicated/desired) (01/06/23 0845)     Anticipated Discharge Disposition (SLP): anticipate therapy at next level of care, other (see comments) (if not a candidate for rehab, will benefit from HH tx) (01/06/23 0845)           Demonstrates Need for Referral to Another Service: otolaryngology (ENT) (Reported he has never followed-up w/ ENT to address vocal cord paralysis (acute onset in July following intubation). May be able to provide recommendations/interventions that could improve both voice & swallow function.) (01/06/23 0845)     Daily Summary of Progress (SLP): progress toward functional goals as expected (01/06/23 0845)               Treatment Assessment (SLP): suspected, continued, pharyngeal dysphagia (01/06/23 0845)  Treatment Assessment Comments (SLP): Poor sensation to aspiration per last MBS on 12/19, so difficult to determine if continues to aspirate/demonstrate pharyngeal dysphagia clinically at bedside. Pt cont to  decline MBS. Indicated that he accepts risks of aspiration and would like to resume thin liquids at home, regardless of MBS results. He discussed this w/ physician yesterday. Pt was interested in further information re: aspiration precautions and therapy program. Provided extensive education & handouts. (01/06/23 0845)  Plan for Continued Treatment (SLP): continue treatment per plan of care (01/06/23 0845)         Plan of Care Reviewed With: patient  Progress: no change      SWALLOW EVALUATION (last 72 hours)     SLP Adult Swallow Evaluation     Row Name 01/06/23 0845 01/03/23 1025                Rehab Evaluation    Document Type therapy note (daily note)  - therapy note (daily note)  -       Subjective Information no complaints  - no complaints  -       Patient Observations alert;cooperative  - alert;cooperative  -       Patient/Family/Caregiver Comments/Observations No family present.  - No family present  -       Patient Effort good  - good  -       Symptoms Noted During/After Treatment -- none  -          Pain    Additional Documentation -- Pain Scale: FACES Pre/Post-Treatment (Group)  -          Pain Scale: FACES Pre/Post-Treatment    Pain: FACES Scale, Pretreatment 0-->no hurt  -AC 0-->no hurt  -       Posttreatment Pain Rating 0-->no hurt  -AC 0-->no hurt  -          Swallowing Quality of Life Assessment    Patient/family preferences Avoid thickened liquids  - --       Education and counseling provided Signs of aspiration;Silent aspiration;Risks of aspiration;Oral care recommendations and rationale;Aspiration precautions;Compensatory strategy recommendations and rationale  - --          SLP Treatment Clinical Impressions    Treatment Assessment (SLP) suspected;continued;pharyngeal dysphagia  - suspected;continued;pharyngeal dysphagia  -       Treatment Assessment Comments (SLP) Poor sensation to aspiration per last MBS on 12/19, so difficult to determine if continues to  aspirate/demonstrate pharyngeal dysphagia clinically at bedside. Pt cont to decline MBS. Indicated that he accepts risks of aspiration and would like to resume thin liquids at home, regardless of MBS results. Pt was interested in further information re: aspiration precautions and therapy program. Provided extensive education & handouts.  -AC Pt tolerated trials of thin liquid & NTL w/o overt s/s of aspiration. Reviewed recs w/ pt, able to demonstrate understanding of recs: NTL w/ meals via small cup sip + chin tuck & thin liquid between meals via small cup sip & chin tuck. Pt stated strong dislike of NTL, reviewed MBS results/rationale for thickened liquids/ comps. Pt demonstrated understanding of diet recs, agreeable to cont current diet w/ comps. Plan for repeat MBS 1/4  -       Daily Summary of Progress (SLP) progress toward functional goals as expected  - progress toward functional goals as expected  -       Plan for Continued Treatment (SLP) continue treatment per plan of care  - continue treatment per plan of care  -       Care Plan Review evaluation/treatment results reviewed;care plan/treatment goals reviewed;risks/benefits reviewed;current/potential barriers reviewed;patient/other agree to care plan  - care plan/treatment goals reviewed  -          Recommendations    Therapy Frequency (Swallow) -- 5 days per week  -       Predicted Duration Therapy Intervention (Days) -- until discharge  -       SLP Diet Recommendation -- regular textures;nectar thick liquids;water between meals after oral care, with supervision;ice chips between meals after oral care, with supervision;no mixed consistencies;other (see comments)  Chin tuck w/ NTL. thin H2O between meals must be from spoon or from cup sip + chin tuck, as tolerated  -       Recommended Diagnostics other (see comments)  consider OP MBS in future, as indicated/desired  - VFSS (MBS);other (see comments)  1/4  -       Recommended  Precautions and Strategies upright posture during/after eating;general aspiration precautions;small bites of food and sips of liquid;multiple swallows per bite of food  chin tuck has proven to prevent aspiration w/ thin liquids in past (when drinking liquids in isolation w/o solids)  - upright posture during/after eating;general aspiration precautions;chin tuck  -       Oral Care Recommendations Oral Care before breakfast, after meals and PRN  - Oral Care BID/PRN  -       SLP Rec. for Method of Medication Administration -- meds whole;with thick liquids;with puree;as tolerated  -       Monitor for Signs of Aspiration yes;elevated WBC count;gurgly voice;fever;throat clearing;upper respiratory;pneumonia  - yes;notify SLP if any concerns  -       Anticipated Discharge Disposition (SLP) anticipate therapy at next level of care;other (see comments)  if not a candidate for rehab, will benefit from  tx  - anticipate therapy at next level of care;inpatient rehabilitation facility  -       Demonstrates Need for Referral to Another Service otolaryngology (ENT)  Reported he has never followed-up w/ ENT to address vocal cord paralysis (acute onset in July following intubation). May be able to provide recommendations/interventions that could improve both voice & swallow function.  - --             User Key  (r) = Recorded By, (t) = Taken By, (c) = Cosigned By    Initials Name Effective Dates    AC Alma Rosa Tovar, MS CCC-SLP 06/16/21 -      Li Rhoades MS, BETSEY-SLP 06/22/22 -                 EDUCATION  The patient has been educated in the following areas:   Home Exercise Program (HEP) Dysphagia (Swallowing Impairment) Oral Care/Hydration.        SLP GOALS     Row Name 01/06/23 0845 01/03/23 1025          (LTG) Patient will demonstrate functional swallow for    Diet Texture (Demonstrate functional swallow) regular textures  - regular textures  -     Liquid viscosity (Demonstrate functional swallow)  thin liquids  -AC thin liquids  -     [place] (Demonstrate functional swallow) with use of compensatory strategies  -AC with use of compensatory strategies  -     Time Frame (Demonstrate functional swallow) by discharge  -AC by discharge  -     Progress/Outcomes (Demonstrate functional swallow) continuing progress toward goal  - continuing progress toward goal  -     Comment (Demonstrate functional swallow) -- No overt s/s w/ small cup sips of thin liquid  -        (STG) Patient will tolerate trials of    Consistencies Trialed (Tolerate trials) regular textures;nectar/ mildly thick liquids  -AC regular textures;[place]ar/ mildly thick liquids  -     Desired Outcome (Tolerate trials) without signs/symptoms of aspiration;without signs of distress  -AC without signs/symptoms of aspiration;without signs of distress  -     [place] (Tolerate trials) independently (over 90% accuracy)  -AC independently (over 90% accuracy)  -     Time Frame (Tolerate trials) by discharge  -AC by discharge  -     Progress/Outcomes (Tolerate trials) goal ongoing  -AC continuing progress toward goal  -     Comment (Tolerate trials) -- No overt s/s of aspiration w/ NTL trials, initial cue required for chin tuck  -        (STG) Patient will tolerate therapeutic trials of    Consistencies Trialed (Tolerate therapeutic trials) thin liquids  -AC thin liquids  -     Desired Outcome (Tolerate therapeutic trials) without signs/symptoms of aspiration  -AC without signs/symptoms of aspiration  -     [place] (Tolerate therapeutic trials) with minimal cues (75-90% accuracy)  -AC with minimal cues (75-90% accuracy)  -     Time Frame (Tolerate therapeutic trials) by discharge  -AC by discharge  -     Progress/Outcomes (Tolerate therapeutic trials) goal ongoing  - continuing progress toward goal  -        (STG) Pharyngeal Strengthening Exercise Goal 1 (SLP)    Activity (Pharyngeal Strengthening Goal 1, SLP) --  increase timing  -MH     Increase Timing prepping - 3 second prep or suck swallow or 3-step swallow  -AC prepping - 3 second prep or suck swallow or 3-step swallow  -MH     Increase Superior Movement of the Hyolaryngeal Complex effortful pitch glide (falsetto + pharyngeal squeeze)  -AC effortful pitch glide (falsetto + pharyngeal squeeze)  -MH     Increase Anterior Movement of the Hyolaryngeal Complex EMST;chin tuck against resistance (CTAR)  -AC EMST;chin tuck against resistance (CTAR)  -MH     Increase Epiglottic Inversion and Retroflexion Mendelsohn  -AC Mendelsohn  -MH     Increase Closure at Entrance to Airway/Closure of Airway at Glottis super-supraglottic swallow  -AC super-supraglottic swallow  -MH     Increase Squeeze/Positive Pressure Generation janine  -AC janine  -MH     Increase Tongue Base Retraction hard effortful swallow  -AC hard effortful swallow  -     [place]/Accuracy (Pharyngeal Strengthening Goal 1, SLP) with minimal cues (75-90% accuracy)  -AC with minimal cues (75-90% accuracy)  -     Time Frame (Pharyngeal Strengthening Goal 1, SLP) short term goal (STG)  -AC short term goal (STG)  -     Progress/Outcomes (Pharyngeal Strengthening Goal 1, SLP) continuing progress toward goal  -AC continuing progress toward goal  -     Comment (Pharyngeal Strengthening Goal 1, SLP) Reviewed EMST. Now appropriate at [id number]. Able to demonstrate x5 w/ min cues.  -AC Reviewed and completed all. Handout @ bedside  -        (STG) Swallow Management Recall Goal 1 (SLP)    Activity (Swallow Management Recall Goal 1, SLP) independent recall of;compensatory swallow strategies/techniques;safe diet/liquid level  -AC independent recall of;compensatory swallow strategies/techniques;safe diet/liquid level  -     [place]/Accuracy (Swallow Management Recall Goal 1, SLP) independently (over 90% accuracy)  -AC independently (over 90% accuracy)  -     Time Frame (Swallow Management Recall Goal 1, SLP)  short term goal (STG)  -AC short term goal (STG)  -MH     Progress/Outcomes (Swallow Management Recall Goal 1, SLP) good progress toward goal  -AC goal no longer appropriate  -MH     Comment (Swallow Management Recall Goal 1, SLP) Reviewed general aspiration precautions, compensations (chin tuck, small bolus sz, volitional cough, multiple swallows), oral hygiene program. Provided handouts. Pt stated understanding.  -AC --        (STG) Swallow Compensatory Strategies Goal 1 (SLP)    Activity (Swallow Compensatory Strategies/Techniques Goal 1, SLP) compensatory strategies;during p.o. trials;small cup sips;chin tuck posture;other (see comments)  -AC compensatory strategies;during p.o. trials;small cup sips;chin tuck posture;other (see comments)  w/ thin liquid in isolation (w/o solids or liquid wash)  -     [place]/Accuracy (Swallow Compensatory Strategies/Techniques Goal 1, SLP) independently (over 90% accuracy)  -AC independently (over 90% accuracy)  -     Time Frame (Swallow Compensatory Strategies/Techniques Goal 1, SLP) short term goal (STG)  -AC short term goal (STG)  -     Progress/Outcomes (Swallow Compensatory Strategies/Techniques Goal 1, SLP) goal ongoing  - continuing progress toward goal  -     Comment (Swallow Compensatory Strategies/Techniques Goal 1, SLP) -- Pt able to state/demonstrate comps w/ single verbal cue. Comps written on exercise handout  -           User Key  (r) = Recorded By, (t) = Taken By, (c) = Cosigned By    Initials Name Provider Type    AC Alma Rosa Tovar MS CCC-SLP Speech and Language Pathologist     Li Rhoades MS, CFY-SLP Speech and Language Pathologist                   Time Calculation:    Time Calculation- SLP     Row Name 01/06/23 0940             Time Calculation- SLP    SLP Start Time 0845  -      SLP Received On 01/06/23  -         Untimed Charges    06402-DB Treatment Swallow Minutes 61  -AC         Total Minutes    Untimed Charges Total  Minutes 61  -AC       Total Minutes 61  -AC            User Key  (r) = Recorded By, (t) = Taken By, (c) = Cosigned By    Initials Name Provider Type    AC Alma Rosa Tovar MS CCC-SLP Speech and Language Pathologist                Therapy Charges for Today     Code Description Service Date Service Provider Modifiers Qty    [id number]  ST TREATMENT SWALLOW 4 1/6/2023 Alma Rosa Tovar MS CCC-SLP GN 1               MS ALIA Riley  1/6/2023    Electronically signed by Alma Rosa Tovar MS CCC-SLP at 01/06/23 0942

## 2023-01-06 NOTE — THERAPY TREATMENT NOTE
Acute Care - Speech Language Pathology   Swallow Treatment Note [facility]     Patient Name: Buster Velasco  : 1965  MRN: [id number]  Today's Date: 2023               Admit Date: 2022    Visit Dx:     ICD-10-CM ICD-9-CM   1. Right foot infection  L08.9 686.9   2. Hypoglycemia  E16.2 251.2   3. Decubitus ulcer of coccygeal region, unspecified ulcer stage  L89.159 707.03     707.20   4. Pharyngeal dysphagia  R13.13 787.23     Patient Active Problem List   Diagnosis   • Right foot infection   • Essential hypertension   • Hyperlipidemia   • Paroxysmal atrial fibrillation (HCC)     Past Medical History:   Diagnosis Date   • Diabetes (HCC)    • DVT (deep venous thrombosis) (HCC)    • Hypertension    • Mood disorder (HCC)      Past Surgical History:   Procedure Laterality Date   • BELOW KNEE AMPUTATION Left    • BELOW KNEE AMPUTATION Right 2022    Procedure: AMPUTATION BELOW KNEE RIGHT;  Surgeon: John Peguero MD;  Location: [facility];  Service: Vascular;  Laterality: Right;   • TRANS METATARSAL AMPUTATION Right        SLP Recommendation and Plan        Recommended Precautions and Strategies: upright posture during/after eating, general aspiration precautions, small bites of food and sips of liquid, multiple swallows per bite of food (chin tuck has proven to prevent aspiration w/ thin liquids in past (when drinking liquids in isolation w/o solids)) (23)        Monitor for Signs of Aspiration: yes, elevated WBC count, gurgly voice, fever, throat clearing, upper respiratory, pneumonia (23)  Recommended Diagnostics: other (see comments) (consider OP MBS in future, as indicated/desired) (23)     Anticipated Discharge Disposition (SLP): anticipate therapy at next level of care, other (see comments) (if not a candidate for rehab, will benefit from  tx) (23)           Demonstrates Need for Referral to Another Service: otolaryngology (ENT) (Reported  he has never followed-up w/ ENT to address vocal cord paralysis (acute onset in July following intubation). May be able to provide recommendations/interventions that could improve both voice & swallow function.) (01/06/23 0845)     Daily Summary of Progress (SLP): progress toward functional goals as expected (01/06/23 0845)               Treatment Assessment (SLP): suspected, continued, pharyngeal dysphagia (01/06/23 0845)  Treatment Assessment Comments (SLP): Poor sensation to aspiration per last MBS on 12/19, so difficult to determine if continues to aspirate/demonstrate pharyngeal dysphagia clinically at bedside. Pt cont to decline MBS. Indicated that he accepts risks of aspiration and would like to resume thin liquids at home, regardless of MBS results. He discussed this w/ physician yesterday. Pt was interested in further information re: aspiration precautions and therapy program. Provided extensive education & handouts. (01/06/23 0845)  Plan for Continued Treatment (SLP): continue treatment per plan of care (01/06/23 0845)         Plan of Care Reviewed With: patient  Progress: no change      SWALLOW EVALUATION (last 72 hours)     SLP Adult Swallow Evaluation     Row Name 01/06/23 0845 01/03/23 1025                Rehab Evaluation    Document Type therapy note (daily note)  - therapy note (daily note)  -       Subjective Information no complaints  - no complaints  -       Patient Observations alert;cooperative  - alert;cooperative  -       Patient/Family/Caregiver Comments/Observations No family present.  - No family present  -       Patient Effort good  - good  -       Symptoms Noted During/After Treatment -- none  -          Pain    Additional Documentation -- Pain Scale: FACES Pre/Post-Treatment (Group)  -          Pain Scale: FACES Pre/Post-Treatment    Pain: FACES Scale, Pretreatment 0-->no hurt  -AC 0-->no hurt  -MH       Posttreatment Pain Rating 0-->no hurt  -AC 0-->no hurt  -MH           Swallowing Quality of Life Assessment    Patient/family preferences Avoid thickened liquids  - --       Education and counseling provided Signs of aspiration;Silent aspiration;Risks of aspiration;Oral care recommendations and rationale;Aspiration precautions;Compensatory strategy recommendations and rationale  - --          SLP Treatment Clinical Impressions    Treatment Assessment (SLP) suspected;continued;pharyngeal dysphagia  - suspected;continued;pharyngeal dysphagia  -       Treatment Assessment Comments (SLP) Poor sensation to aspiration per last MBS on 12/19, so difficult to determine if continues to aspirate/demonstrate pharyngeal dysphagia clinically at bedside. Pt cont to decline MBS. Indicated that he accepts risks of aspiration and would like to resume thin liquids at home, regardless of MBS results. Pt was interested in further information re: aspiration precautions and therapy program. Provided extensive education & handouts.  - Pt tolerated trials of thin liquid & NTL w/o overt s/s of aspiration. Reviewed recs w/ pt, able to demonstrate understanding of recs: NTL w/ meals via small cup sip + chin tuck & thin liquid between meals via small cup sip & chin tuck. Pt stated strong dislike of NTL, reviewed MBS results/rationale for thickened liquids/ comps. Pt demonstrated understanding of diet recs, agreeable to cont current diet w/ comps. Plan for repeat MBS 1/4  -       Daily Summary of Progress (SLP) progress toward functional goals as expected  - progress toward functional goals as expected  -       Plan for Continued Treatment (SLP) continue treatment per plan of care  - continue treatment per plan of care  -       Care Plan Review evaluation/treatment results reviewed;care plan/treatment goals reviewed;risks/benefits reviewed;current/potential barriers reviewed;patient/other agree to care plan  - care plan/treatment goals reviewed  -          Recommendations    Therapy  Frequency (Swallow) -- 5 days per week  -       Predicted Duration Therapy Intervention (Days) -- until discharge  -       SLP Diet Recommendation -- regular textures;nectar thick liquids;water between meals after oral care, with supervision;ice chips between meals after oral care, with supervision;no mixed consistencies;other (see comments)  Chin tuck w/ NTL. thin H2O between meals must be from spoon or from cup sip + chin tuck, as tolerated  -       Recommended Diagnostics other (see comments)  consider OP MBS in future, as indicated/desired  - VFSS (MBS);other (see comments)  1/4  -       Recommended Precautions and Strategies upright posture during/after eating;general aspiration precautions;small bites of food and sips of liquid;multiple swallows per bite of food  chin tuck has proven to prevent aspiration w/ thin liquids in past (when drinking liquids in isolation w/o solids)  - upright posture during/after eating;general aspiration precautions;chin tuck  -       Oral Care Recommendations Oral Care before breakfast, after meals and PRN  - Oral Care BID/PRN  -       SLP Rec. for Method of Medication Administration -- meds whole;with thick liquids;with puree;as tolerated  -       Monitor for Signs of Aspiration yes;elevated WBC count;gurgly voice;fever;throat clearing;upper respiratory;pneumonia  - yes;notify SLP if any concerns  -       Anticipated Discharge Disposition (SLP) anticipate therapy at next level of care;other (see comments)  if not a candidate for rehab, will benefit from  tx  - anticipate therapy at next level of care;inpatient rehabilitation facility  -       Demonstrates Need for Referral to Another Service otolaryngology (ENT)  Reported he has never followed-up w/ ENT to address vocal cord paralysis (acute onset in July following intubation). May be able to provide recommendations/interventions that could improve both voice & swallow function.  - --              User Key  (r) = Recorded By, (t) = Taken By, (c) = Cosigned By    Initials Name Effective Dates    AC Alma Rosa Tovar, MS PATTERSON-SLP 06/16/21 -      Li Rhoades MS, BETSEY-SLP 06/22/22 -                 EDUCATION  The patient has been educated in the following areas:   Home Exercise Program (HEP) Dysphagia (Swallowing Impairment) Oral Care/Hydration.        SLP GOALS     Row Name 01/06/23 0845 01/03/23 1025          (LTG) Patient will demonstrate functional swallow for    Diet Texture (Demonstrate functional swallow) regular textures  - regular textures  -     Liquid viscosity (Demonstrate functional swallow) thin liquids  - thin liquids  -     [place] (Demonstrate functional swallow) with use of compensatory strategies  - with use of compensatory strategies  -     Time Frame (Demonstrate functional swallow) by discharge  - by discharge  -     Progress/Outcomes (Demonstrate functional swallow) continuing progress toward goal  - continuing progress toward goal  -     Comment (Demonstrate functional swallow) -- No overt s/s w/ small cup sips of thin liquid  -        (STG) Patient will tolerate trials of    Consistencies Trialed (Tolerate trials) regular textures;[place]/ mildly thick liquids  - regular textures;[place]/ mildly thick liquids  -     Desired Outcome (Tolerate trials) without signs/symptoms of aspiration;without signs of distress  - without signs/symptoms of aspiration;without signs of distress  -     [place] (Tolerate trials) independently (over 90% accuracy)  - independently (over 90% accuracy)  -     Time Frame (Tolerate trials) by discharge  - by discharge  -     Progress/Outcomes (Tolerate trials) goal ongoing  - continuing progress toward goal  -     Comment (Tolerate trials) -- No overt s/s of aspiration w/ NTL trials, initial cue required for chin tuck  -        (STG) Patient will tolerate therapeutic trials of    Consistencies Trialed  (Tolerate therapeutic trials) thin liquids  -AC thin liquids  -     Desired Outcome (Tolerate therapeutic trials) without signs/symptoms of aspiration  -AC without signs/symptoms of aspiration  -     [place] (Tolerate therapeutic trials) with minimal cues (75-90% accuracy)  -AC with minimal cues (75-90% accuracy)  -     Time Frame (Tolerate therapeutic trials) by discharge  -AC by discharge  -     Progress/Outcomes (Tolerate therapeutic trials) goal ongoing  -AC continuing progress toward goal  -        (STG) Pharyngeal Strengthening Exercise Goal 1 (SLP)    Activity (Pharyngeal Strengthening Goal 1, SLP) -- increase timing  -MH     Increase Timing prepping - 3 second prep or suck swallow or 3-step swallow  -AC prepping - 3 second prep or suck swallow or 3-step swallow  -MH     Increase Superior Movement of the Hyolaryngeal Complex effortful pitch glide (falsetto + pharyngeal squeeze)  -AC effortful pitch glide (falsetto + pharyngeal squeeze)  -MH     Increase Anterior Movement of the Hyolaryngeal Complex EMST;chin tuck against resistance (CTAR)  -AC EMST;chin tuck against resistance (CTAR)  -     Increase Epiglottic Inversion and Retroflexion Mendelsohn  -AC Mendelsohn  -MH     Increase Closure at Entrance to Airway/Closure of Airway at Glottis super-supraglottic swallow  -AC super-supraglottic swallow  -MH     Increase Squeeze/Positive Pressure Generation janine  -AC janine  -     Increase Tongue Base Retraction hard effortful swallow  -AC hard effortful swallow  -     [place]/Accuracy (Pharyngeal Strengthening Goal 1, SLP) with minimal cues (75-90% accuracy)  -AC with minimal cues (75-90% accuracy)  -     Time Frame (Pharyngeal Strengthening Goal 1, SLP) short term goal (STG)  -AC short term goal (STG)  -     Progress/Outcomes (Pharyngeal Strengthening Goal 1, SLP) continuing progress toward goal  -AC continuing progress toward goal  -     Comment (Pharyngeal Strengthening Goal 1,  SLP) Reviewed EMST. Now appropriate at [id number]. Able to demonstrate x5 w/ min cues.  -AC Reviewed and completed all. Handout @ bedside  -        (STG) Swallow Management Recall Goal 1 (SLP)    Activity (Swallow Management Recall Goal 1, SLP) independent recall of;compensatory swallow strategies/techniques;safe diet/liquid level  -AC independent recall of;compensatory swallow strategies/techniques;safe diet/liquid level  -     [place]/Accuracy (Swallow Management Recall Goal 1, SLP) independently (over 90% accuracy)  -AC independently (over 90% accuracy)  -MH     Time Frame (Swallow Management Recall Goal 1, SLP) short term goal (STG)  -AC short term goal (STG)  -MH     Progress/Outcomes (Swallow Management Recall Goal 1, SLP) good progress toward goal  -AC goal no longer appropriate  -MH     Comment (Swallow Management Recall Goal 1, SLP) Reviewed general aspiration precautions, compensations (chin tuck, small bolus sz, volitional cough, multiple swallows), oral hygiene program. Provided handouts. Pt stated understanding.  -AC --        (STG) Swallow Compensatory Strategies Goal 1 (SLP)    Activity (Swallow Compensatory Strategies/Techniques Goal 1, SLP) compensatory strategies;during p.o. trials;small cup sips;chin tuck posture;other (see comments)  -AC compensatory strategies;during p.o. trials;small cup sips;chin tuck posture;other (see comments)  w/ thin liquid in isolation (w/o solids or liquid wash)  -     [place]/Accuracy (Swallow Compensatory Strategies/Techniques Goal 1, SLP) independently (over 90% accuracy)  -AC independently (over 90% accuracy)  -     Time Frame (Swallow Compensatory Strategies/Techniques Goal 1, SLP) short term goal (STG)  -AC short term goal (STG)  -     Progress/Outcomes (Swallow Compensatory Strategies/Techniques Goal 1, SLP) goal ongoing  -AC continuing progress toward goal  -     Comment (Swallow Compensatory Strategies/Techniques Goal 1, SLP) -- Pt able to  state/demonstrate comps w/ single verbal cue. Comps written on exercise handout  -           User Key  (r) = Recorded By, (t) = Taken By, (c) = Cosigned By    Initials Name Provider Type    Alma Rosa Callahan MS CCC-SLP Speech and Language Pathologist     Li Rhoades, MS, CFY-SLP Speech and Language Pathologist                   Time Calculation:    Time Calculation- SLP     Row Name 01/06/23 0940             Time Calculation- SLP    SLP Start Time 0845  -      SLP Received On 01/06/23  -         Untimed Charges    94214-NA Treatment Swallow Minutes 61  -AC         Total Minutes    Untimed Charges Total Minutes 61  -AC       Total Minutes 61  -AC            User Key  (r) = Recorded By, (t) = Taken By, (c) = Cosigned By    Initials Name Provider Type    Alma Rosa Callahan MS CCC-SLP Speech and Language Pathologist                Therapy Charges for Today     Code Description Service Date Service Provider Modifiers Qty    [id number] HC ST TREATMENT SWALLOW 4 1/6/2023 Alma Rosa Tovar MS CCC-SLP GN 1               Alma Rosa Tovar MS CCC-SLP  1/6/2023

## 2023-01-06 NOTE — PROGRESS NOTES
Cardiothoracic Surgery Progress Note      POD #: 35-right below-knee amputation      LOS: 37 days      Subjective: Awake and alert    Objective:  Vital Signs vital signs below noted T-max past 24 hours 98 1.1 °F  Temp:  [97.7 °F (36.5 °C)-98.1 °F (36.7 °C)] 98 °F (36.7 °C)  Heart Rate:  [56-92] 92  Resp:  [16-18] 17  BP: (108-139)/(64-87) 139/84    Physical Exam:   General Appearance: Oriented x3   Lungs:   Heart:   Skin:   Incision: Right below knee amputation stump covered with border island dressing.  Healing well.  Sutures intact.     Results:  Results from last 7 days   Lab Units 01/05/23  0807   WBC 10*3/mm3 7.29   HEMOGLOBIN g/dL 10.9*   HEMATOCRIT % 34.6*   PLATELETS 10*3/mm3 278     Results from last 7 days   Lab Units 01/05/23  0807   SODIUM mmol/L 138   POTASSIUM mmol/L 4.4   CHLORIDE mmol/L 103   CO2 mmol/L 22.0   BUN mg/dL 16   CREATININE mg/dL 0.60*   GLUCOSE mg/dL 104*   CALCIUM mg/dL 9.1         Assessment: #1.  Postop day 37 right below-knee amputation healing well  #2.  Diabetes mellitus and neuropathy  3 status post remote left below-knee amputation he will    Plan: Continue to address change amputation site.  Medical manage per hospitalist.  Antibiotics per infectious disease.  Rehab okay with me at any time.      John Peguero MD - 01/06/23 - 05:22 EST

## 2023-01-06 NOTE — PLAN OF CARE
Goal Outcome Evaluation:  Plan of Care Reviewed With: patient      Pt. Rested well throughout night shift.  VSS. A/O x4.  Pt. Only required his scheduled methadone for pain management.  Voiding per urinal.

## 2023-01-06 NOTE — PROGRESS NOTES
[facility] Progress Note        Antibiotics:  Anti-Infectives (From admission, onward)    Ordered     Dose/Rate Route Frequency Start Stop    12/28/22 1526  DAPTOmycin (CUBICIN) 800 mg in sodium chloride 0.9 % 50 mL IVPB        Ordering Provider: Zeke George MD    8 mg/kg × 100 kg (Order-Specific)  100 mL/hr over 30 Minutes Intravenous Every 24 Hours 12/29/22 1000 01/04/23 1228    12/07/22 0603  DAPTOmycin (CUBICIN) 800 mg in sodium chloride 0.9 % 50 mL IVPB        Ordering Provider: Zeke George MD    8 mg/kg × 100 kg  100 mL/hr over 30 Minutes Intravenous Every 24 Hours 12/07/22 1000 12/28/22 0905    11/30/22 2219  vancomycin 2000 mg/500 mL 0.9% NS IVPB ([facility])        Ordering Provider: Reggie Batres MD    20 mg/kg × 98.9 kg  over 2 Hours Intravenous Once 11/30/22 2221 12/01/22 0315    11/30/22 2219  piperacillin-tazobactam (ZOSYN) 3.375 g in iso-osmotic dextrose 50 ml (premix)        Ordering Provider: Reggie Batres MD    3.375 g Intravenous Once 11/30/22 2221 11/30/22 2335          CC: foot infection    HPI:    Patient is a 57 y.o.  Yr old male with history of prior lower extremity infection/amputations done in [facility].  He has a history of left BKA years ago.  More recent right transmetatarsal amputation but specific dates unclear and unclear who the surgeon was.  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  He has history DVT/IVC filter, diabetes and other comorbidities as below.  He reports a chronic right lateral foot wound present for months but apparently not precipitated by any specific event or trauma.  He is admitted to the hospital on November 30 with deterioration at the foot including redness/swelling    **patient had reported remote drug abuse (initially to me  "reported as IV drug abuse but then later he reported not injection use and [facility]; +drug screen earlier this year per d/w Dr Chau for Meth at [facility] and reported by them to be IVDA/substance abuse),   chronic methadone. He reported to me this was 17 years ago and therefore some inconsistencies    ** patient reports MRSA blood stream infection treated in [place] spring of 2022, 6 weeks of vancomycin by his report.  Otherwise data deficit.  Try to retrieve information     12/2/22 Dr Peguero did surgery  \"Procedure(s): Mid level right below-knee amputation and primary closure \"    12/6 with MRSA in blood culture from 11/30 and repeat from 12/6 negative;  TTE done but limited per cardiology    12/9/22   JERALD no vegetation per cardiology    1/2/23   Interval Hx noted; no new focal pain.  No fever;  Diarrhea x 1 today;  No diarrhea on 1/1 per him; at present, no abd pain    1/4/23 finished daptomycin;  Metformin stopped by medicine with loose stools     1/6/23 possible plans for home per patient.  No diarrhea in the last 24 hours per him;   no new GI symptoms;  no abd pain; no fever;    No myalgia or shortness of breath/cough. Nursing reports no new focal pain or distress.  postop pain to the right LE which is controlled/dull at present, constant, nonradiating, worse with palpation, generally better with pain meds and 1   out of 10 in severity.  Not progressive     No headache photophobia or neck stiffness.  No shortness of breath cough or hemoptysis.  No nausea/ vomiting.  No dysuria hematuria or pyuria.  No other new skin rash       ROS:      1/6/23 per nursing, No f/c/s.   No rash.       Constitutional--   Appetite good, and no malaise. No fatigue.  Heent--chronic hoarse voice.  No new vision, hearing or throat complaints.  No epistaxis or oral sores.   No flashers, floaters or eye pain.   No headache, photophobia or neck stiffness.     CV-- No chest pain, " "palpitation or syncope  Resp-- No SOB/cough/Hemoptysis  GI-  No hematochezia, melena, or hematemesis. Denies jaundice or chronic liver disease.  -- No dysuria, hematuria, or flank pain.  Denies hesitancy, urgency or flank pain.  Lymph- no swollen lymph nodes in neck/axilla or groin.   Heme- No active bruising or bleeding; no Hx of DVT or PE.  MS-- no swelling or pain in the bones or joints of arms/legs.  No new back pain.  Neuro-- No acute focal weakness or numbness in the arms or legs.  No seizures.     Full 12 point review of systems reviewed and negative otherwise for acute complaints, except for above      PE: nursing/chaperone present  /92   Pulse 64   Temp 97.8 °F (36.6 °C) (Oral)   Resp 14   Ht 193 cm (75.98\")   Wt 107 kg (235 lb)   SpO2 96%   BMI 28.62 kg/m²          GENERAL:  awake;  in no acute distress.  chronic Hoarse voice noted   HEENT: Normocephalic, atraumatic.   No conjunctival injection. No icterus. Oropharynx   without evidence of thrush or exudate. No evidence of peridontal disease.     HEART: RRR; No murmur, rubs, gallops.   LUNGS: Diminished at bases but otherwise clear to auscultation bilaterally without wheezing, rales, rhonchi. Normal respiratory effort. Nonlabored. No dullness.  ABDOMEN: Soft, nontender, nondistended. Positive bowel sounds. No rebound or guarding. NO mass or HSM.     EXT:  No cyanosis, clubbing;No cord.   MSK: FROM without joint effusions noted arms/legs.    SKIN: Warm and dry without cutaneous eruptions on Inspection/palpation.    NEURO:  awake     Right lower extremity surgical site noted;  No new redness;  no discrete mass bulge or fluctuance.  No crepitus or bulla.       No peripheral stigmata/phenomenon of endocarditis       Left BKA site with no redness induration or warmth.  No discrete mass bulge or fluctuance.    Laboratory Data    Results from last 7 days   Lab Units 01/05/23  0807 01/02/23  1422   WBC 10*3/mm3 7.29 9.35   HEMOGLOBIN g/dL 10.9* " 10.7*   HEMATOCRIT % 34.6* 35.0*   PLATELETS 10*3/mm3 278 306     Results from last 7 days   Lab Units 01/05/23  0807   SODIUM mmol/L 138   POTASSIUM mmol/L 4.4   CHLORIDE mmol/L 103   CO2 mmol/L 22.0   BUN mg/dL 16   CREATININE mg/dL 0.60*   GLUCOSE mg/dL 104*   CALCIUM mg/dL 9.1     Results from last 7 days   Lab Units 01/02/23  1317   ALK PHOS U/L 99   BILIRUBIN mg/dL 0.2   ALT (SGPT) U/L 20   AST (SGOT) U/L 18               Estimated Creatinine Clearance: 180.1 mL/min (A) (by C-G formula based on SCr of 0.6 mg/dL (L)).      Microbiology:      Radiology:  Imaging Results (Last 72 Hours)     ** No results found for the last 72 hours. **            Impression:     --Acute right foot/ankle with multifocal ulceration, cellulitis/wound infection and probable osteomyelitis with probe to bone at the lateral foot and abnormal MRI.  Other comorbidities as outlined above and high risk for further serious morbidity and other serious sequelae including persistent/recurrent or nonhealing wounds, persistent/progressive or recurrent infection and risk for further functional/limb loss/amputation.  Surgery following to help define timing/option of threshold for any future surgical intervention .      **Surgery 12/2 with BKA     --MRSA bacteremia from November 30 (daptomycin sensitive).  Presumed from foot but also risk for endovascular focus particularly with  History of prior MRSA bloodstream infection.  Transthoracic echocardiogram limited per cardiology.  JERALD no vegetation per cardiology; follow-up blood cultures negative  from December 6     **No  new metastatic foci of involvement as of December 21- January 5    --History MRSA with bacteremia by his report in spring/summer 2022 and treated with 6 weeks of vancomycin in [place].  Specifics unclear and trying to retrieve information    --coag-negative staph in blood culture likely contaminant     --Chronic vocal cord paralysis per notes with chronic hoarseness and  indwelling PEG tube.  Medicine team to clarify     --History left BKA.     --Diabetes.  You need to tightly control blood sugar to give best chance for healing     --History of DVT with IVC filter    --history of remote drug abuse Per his reports to me; he denies injection drug abuse for 17 years by his report to me although as above, medicine team has find records indicating more recent abuse in 2022 [facility] [place] [facility]    --diarrhea  Intermittent per him, not progressive as of 1/4, better 1/5 per him, and no fever/pain, metformin stopped 1/4 per notes; if increased/persistent particularly if with pain/fever/leukocytosis, you would need to consider checking stool for CDiff      PLAN:      -- monitor off abx      -- Check/review labs cultures and scans     -- Partial history per nursing staff     -- Discussed with microbiology       -- d/w Dr Peguero        -- Highly complex set of issues with high risk for further serious morbidity and other serious sequela although no new metastatic focus of involvement so far    -- He can follow-up with me 2 to 4 weeks after discharge          **Copied text in this note has been reviewed and is accurate as of 01/06/23.     Zeke George MD  1/6/2023

## 2023-01-06 NOTE — PLAN OF CARE
Goal Outcome Evaluation:  Plan of Care Reviewed With: patient       D/C home      Problem: Adult Inpatient Plan of Care  Goal: Plan of Care Review  Outcome: Met  Goal: Patient-Specific Goal (Individualized)  Outcome: Met  Goal: Absence of Hospital-Acquired Illness or Injury  Outcome: Met  Intervention: Identify and Manage Fall Risk  Recent Flowsheet Documentation  Taken 1/6/2023 1800 by Anastasiya Gongora, RN  Safety Promotion/Fall Prevention:   activity supervised   assistive device/personal items within reach   clutter free environment maintained   fall prevention program maintained   gait belt   toileting scheduled   safety round/check completed   room organization consistent   nonskid shoes/slippers when out of bed  Taken 1/6/2023 1600 by Anastasiya Gongora, RN  Safety Promotion/Fall Prevention:   activity supervised   assistive device/personal items within reach   clutter free environment maintained   fall prevention program maintained   gait belt   toileting scheduled   safety round/check completed   room organization consistent   nonskid shoes/slippers when out of bed  Taken 1/6/2023 1415 by Anastasiya Gongora, RN  Safety Promotion/Fall Prevention:   activity supervised   clutter free environment maintained   assistive device/personal items within reach   gait belt   fall prevention program maintained   toileting scheduled   safety round/check completed   room organization consistent   nonskid shoes/slippers when out of bed  Taken 1/6/2023 1200 by Anastasiya Gongora, RN  Safety Promotion/Fall Prevention:   activity supervised   assistive device/personal items within reach   clutter free environment maintained   fall prevention program maintained   gait belt   toileting scheduled   safety round/check completed   room organization consistent   nonskid shoes/slippers when out of bed  Taken 1/6/2023 1000 by Anastasiya Gongora, RN  Safety Promotion/Fall Prevention:   activity supervised   assistive device/personal  items within reach   clutter free environment maintained   fall prevention program maintained   gait belt   toileting scheduled   safety round/check completed   room organization consistent   nonskid shoes/slippers when out of bed  Taken 1/6/2023 0800 by Anastasiya Gongora RN  Safety Promotion/Fall Prevention:   activity supervised   assistive device/personal items within reach   clutter free environment maintained   fall prevention program maintained   gait belt   toileting scheduled   safety round/check completed   room organization consistent   nonskid shoes/slippers when out of bed  Intervention: Prevent Skin Injury  Recent Flowsheet Documentation  Taken 1/6/2023 1800 by Anastasiya Gongora RN  Body Position: sitting up in bed  Skin Protection: adhesive use limited  Taken 1/6/2023 1600 by Anastasiya Gongora RN  Body Position: sitting up in bed  Skin Protection:   adhesive use limited   transparent dressing maintained   tubing/devices free from skin contact  Taken 1/6/2023 1415 by Anastasiya Gongora RN  Body Position:   weight shifting   supine  Skin Protection:   adhesive use limited   tubing/devices free from skin contact   transparent dressing maintained  Taken 1/6/2023 1200 by Anastasiya Gongora RN  Body Position: supine  Skin Protection:   adhesive use limited   transparent dressing maintained   tubing/devices free from skin contact  Taken 1/6/2023 1000 by Anastasiya Gongora RN  Body Position:   supine   weight shifting  Skin Protection:   adhesive use limited   transparent dressing maintained   tubing/devices free from skin contact  Taken 1/6/2023 0800 by Anastasiya Gongora RN  Body Position: sitting up in bed  Skin Protection:   adhesive use limited   transparent dressing maintained   tubing/devices free from skin contact  Intervention: Prevent and Manage VTE (Venous Thromboembolism) Risk  Recent Flowsheet Documentation  Taken 1/6/2023 1800 by Anastasiya Gongora RN  VTE Prevention/Management:   bilateral    sequential compression devices off  Taken 1/6/2023 1600 by Anastasiya Gongora RN  VTE Prevention/Management:   left   sequential compression devices off  Taken 1/6/2023 1415 by Anastasiya Gongora RN  VTE Prevention/Management:   left   sequential compression devices off  Taken 1/6/2023 1200 by Anastasiya Gongora RN  VTE Prevention/Management:   bilateral   sequential compression devices off  Taken 1/6/2023 1000 by Anastasiya Gongora RN  VTE Prevention/Management:   bilateral   sequential compression devices off  Taken 1/6/2023 0800 by Anastasiya Gongora RN  VTE Prevention/Management:   bilateral   sequential compression devices off  Intervention: Prevent Infection  Recent Flowsheet Documentation  Taken 1/6/2023 1800 by Anastasiya Gongora RN  Infection Prevention: environmental surveillance performed  Taken 1/6/2023 1600 by Anastasiya Gongora RN  Infection Prevention: environmental surveillance performed  Taken 1/6/2023 1415 by Anastasiya Gongora RN  Infection Prevention: environmental surveillance performed  Taken 1/6/2023 1200 by Anastasiya Gongora RN  Infection Prevention: environmental surveillance performed  Taken 1/6/2023 1000 by Anastasiya Gongora RN  Infection Prevention: environmental surveillance performed  Taken 1/6/2023 0800 by Anastasiya Gongora RN  Infection Prevention: environmental surveillance performed  Goal: Optimal Comfort and Wellbeing  Outcome: Met  Intervention: Provide Person-Centered Care  Recent Flowsheet Documentation  Taken 1/6/2023 1800 by Anastasiya Gongora RN  Trust Relationship/Rapport: care explained  Taken 1/6/2023 1600 by Anastasiya Gongora RN  Trust Relationship/Rapport: care explained  Taken 1/6/2023 1415 by Anastasiya Gongora RN  Trust Relationship/Rapport:   care explained   choices provided   questions encouraged  Taken 1/6/2023 1200 by Anastasiya Gongora RN  Trust Relationship/Rapport:   care explained   choices provided  Taken 1/6/2023 1000 by Anastasiya Gongora RN Trust  Relationship/Rapport: care explained  Taken 1/6/2023 0800 by Anastasiya Gongora, RN  Trust Relationship/Rapport:   care explained   choices provided   questions encouraged   questions answered   reassurance provided   thoughts/feelings acknowledged  Goal: Readiness for Transition of Care  Outcome: Met     Problem: Fall Injury Risk  Goal: Absence of Fall and Fall-Related Injury  Outcome: Met  Intervention: Identify and Manage Contributors  Recent Flowsheet Documentation  Taken 1/6/2023 0800 by Anastasiya Gongora, RN  Medication Review/Management: medications reviewed  Intervention: Promote Injury-Free Environment  Recent Flowsheet Documentation  Taken 1/6/2023 1800 by Anastasiya Gongora, RN  Safety Promotion/Fall Prevention:   activity supervised   assistive device/personal items within reach   clutter free environment maintained   fall prevention program maintained   gait belt   toileting scheduled   safety round/check completed   room organization consistent   nonskid shoes/slippers when out of bed  Taken 1/6/2023 1600 by Anastasiya Gongora, RN  Safety Promotion/Fall Prevention:   activity supervised   assistive device/personal items within reach   clutter free environment maintained   fall prevention program maintained   gait belt   toileting scheduled   safety round/check completed   room organization consistent   nonskid shoes/slippers when out of bed  Taken 1/6/2023 1415 by Anastasiya Gongora, RN  Safety Promotion/Fall Prevention:   activity supervised   clutter free environment maintained   assistive device/personal items within reach   gait belt   fall prevention program maintained   toileting scheduled   safety round/check completed   room organization consistent   nonskid shoes/slippers when out of bed  Taken 1/6/2023 1200 by Anastasiya Gongora, RN  Safety Promotion/Fall Prevention:   activity supervised   assistive device/personal items within reach   clutter free environment maintained   fall prevention program  maintained   gait belt   toileting scheduled   safety round/check completed   room organization consistent   nonskid shoes/slippers when out of bed  Taken 1/6/2023 1000 by Anastasiya Gongora RN  Safety Promotion/Fall Prevention:   activity supervised   assistive device/personal items within reach   clutter free environment maintained   fall prevention program maintained   gait belt   toileting scheduled   safety round/check completed   room organization consistent   nonskid shoes/slippers when out of bed  Taken 1/6/2023 0800 by Anastasiya Gongora, RN  Safety Promotion/Fall Prevention:   activity supervised   assistive device/personal items within reach   clutter free environment maintained   fall prevention program maintained   gait belt   toileting scheduled   safety round/check completed   room organization consistent   nonskid shoes/slippers when out of bed  Goal: Absence of Fall and Fall-Related Injury  Outcome: Met  Intervention: Identify and Manage Contributors  Recent Flowsheet Documentation  Taken 1/6/2023 0800 by Anastasiya Gongora, RN  Medication Review/Management: medications reviewed  Intervention: Promote Injury-Free Environment  Recent Flowsheet Documentation  Taken 1/6/2023 1800 by Anastasiya Gongora, RN  Safety Promotion/Fall Prevention:   activity supervised   assistive device/personal items within reach   clutter free environment maintained   fall prevention program maintained   gait belt   toileting scheduled   safety round/check completed   room organization consistent   nonskid shoes/slippers when out of bed  Taken 1/6/2023 1600 by Anastasiya Gongora, RN  Safety Promotion/Fall Prevention:   activity supervised   assistive device/personal items within reach   clutter free environment maintained   fall prevention program maintained   gait belt   toileting scheduled   safety round/check completed   room organization consistent   nonskid shoes/slippers when out of bed  Taken 1/6/2023 1415 by Rolan  Anastasiya PERAZA RN  Safety Promotion/Fall Prevention:   activity supervised   clutter free environment maintained   assistive device/personal items within reach   gait belt   fall prevention program maintained   toileting scheduled   safety round/check completed   room organization consistent   nonskid shoes/slippers when out of bed  Taken 1/6/2023 1200 by Anastasiya Gongora RN  Safety Promotion/Fall Prevention:   activity supervised   assistive device/personal items within reach   clutter free environment maintained   fall prevention program maintained   gait belt   toileting scheduled   safety round/check completed   room organization consistent   nonskid shoes/slippers when out of bed  Taken 1/6/2023 1000 by Anastasiya Gongora RN  Safety Promotion/Fall Prevention:   activity supervised   assistive device/personal items within reach   clutter free environment maintained   fall prevention program maintained   gait belt   toileting scheduled   safety round/check completed   room organization consistent   nonskid shoes/slippers when out of bed  Taken 1/6/2023 0800 by Anastasiya Gongora RN  Safety Promotion/Fall Prevention:   activity supervised   assistive device/personal items within reach   clutter free environment maintained   fall prevention program maintained   gait belt   toileting scheduled   safety round/check completed   room organization consistent   nonskid shoes/slippers when out of bed     Problem: Skin Injury Risk Increased  Goal: Skin Health and Integrity  Outcome: Met  Intervention: Optimize Skin Protection  Recent Flowsheet Documentation  Taken 1/6/2023 1800 by Anastasiya Gongora RN  Pressure Reduction Techniques: frequent weight shift encouraged  Head of Bed (HOB) Positioning: HOB at 60 degrees  Pressure Reduction Devices: specialty bed utilized  Skin Protection: adhesive use limited  Taken 1/6/2023 1600 by Anastasiya Gongora RN  Pressure Reduction Techniques: frequent weight shift encouraged  Head of Bed  (HOB) Positioning: HOB at 45 degrees  Pressure Reduction Devices: specialty bed utilized  Skin Protection:   adhesive use limited   transparent dressing maintained   tubing/devices free from skin contact  Taken 1/6/2023 1415 by Anastasiya Gongora RN  Pressure Reduction Techniques: frequent weight shift encouraged  Head of Bed (HOB) Positioning: HOB at 30 degrees  Pressure Reduction Devices: specialty bed utilized  Skin Protection:   adhesive use limited   tubing/devices free from skin contact   transparent dressing maintained  Taken 1/6/2023 1200 by Anastasiya Gongora RN  Pressure Reduction Techniques:   frequent weight shift encouraged   weight shift assistance provided  Head of Bed (HOB) Positioning: HOB at 45 degrees  Pressure Reduction Devices: specialty bed utilized  Skin Protection:   adhesive use limited   transparent dressing maintained   tubing/devices free from skin contact  Taken 1/6/2023 1000 by Anastasiya Gongora RN  Pressure Reduction Techniques:   frequent weight shift encouraged   weight shift assistance provided  Head of Bed (HOB) Positioning: HOB at 30-45 degrees  Pressure Reduction Devices: specialty bed utilized  Skin Protection:   adhesive use limited   transparent dressing maintained   tubing/devices free from skin contact  Taken 1/6/2023 0800 by Anastasiya Gongora RN  Pressure Reduction Techniques:   frequent weight shift encouraged   weight shift assistance provided  Head of Bed (HOB) Positioning: HOB at 60 degrees  Pressure Reduction Devices: specialty bed utilized  Skin Protection:   adhesive use limited   transparent dressing maintained   tubing/devices free from skin contact     Problem: Adjustment to Amputation (Lower Extremity Amputation)  Goal: Optimal Adjustment to Amputation  Outcome: Met  Intervention: Promote Patient and Family Adjustment to Amputation  Recent Flowsheet Documentation  Taken 1/6/2023 1800 by Anastasiya Gongora RN  Family/Support System Care: support provided  Taken  1/6/2023 1600 by Anastasiya Gongora, RN  Family/Support System Care: support provided  Taken 1/6/2023 1415 by Anastasiya Gongora, RN  Family/Support System Care: support provided  Taken 1/6/2023 1200 by Anastasiya Gongora, RN  Family/Support System Care: support provided  Taken 1/6/2023 1000 by Anastasiya Gongora, RN  Family/Support System Care: support provided  Taken 1/6/2023 0800 by Anastasiya Gongora, RN  Family/Support System Care:   self-care encouraged   support provided     Problem: BADL (Basic Activities of Daily Living) Impairment (Lower Extremity Amputation)  Goal: Optimal Safe BADL Performance  Outcome: Met     Problem: IADL (Instrumental Activities of Daily Living) Impairment (Lower Extremity Amputation)  Goal: Optimal Safe IADL Performance  Outcome: Met     Problem: Lower Limb Care (Lower Extremity Amputation)  Goal: Effective Lower Limb Care  Outcome: Met     Problem: Mobility Impairment (Lower Extremity Amputation)  Goal: Optimal Mobility [place] and Safety  Outcome: Met     Problem: Pain and Hypersensitivity (Lower Extremity Amputation)  Goal: Acceptable Pain/Hypersensitivity Control  Outcome: Met     Problem: Prosthetic Use and Management (Lower Extremity Amputation)  Goal: Effective Prosthesis Use and Management  Outcome: Met

## 2023-01-06 NOTE — PLAN OF CARE
Goal Outcome Evaluation:  Plan of Care Reviewed With: patient        Progress: no change   SLP treatment completed. Pt would benefit from further dysphagia tx at next level of care. Please see note for further details and recommendations.

## 2023-01-06 NOTE — DISCHARGE PLACEMENT REQUEST
"Isidro Velasco (57 y.o. Male)     Date of Birth   1965    Social Security Number       Address   [address] [place] [address]    Home Phone   [phone]    MRN   [id number]       Protestant   Jain    Marital Status                               Admission Date   22    Admission Type   Emergency    Admitting Provider   Nicole Arevalo DO    Attending Provider   Nicole Arevalo DO    Department, Room/Bed   [address], S366/1       Discharge Date       Discharge Disposition   Home or Self Care    Discharge Destination                               Attending Provider: Nicole Arevalo DO    Allergies: Gabapentin, Lyrica [Pregabalin]    Isolation: None   Infection: MRSA (22)   Code Status: CPR    Ht: 193 cm (75.98\")   Wt: 107 kg (235 lb)    Admission Cmt: None   Principal Problem: Right foot infection [L08.9]                 Active Insurance as of 2022     Primary Coverage     Payor Plan Insurance Group Employer/Plan Group    [place]      Payor Plan Address Payor Plan Phone Number Payor Plan Fax Number Effective Dates    [address] [phone]  2022 - 2022    [facility] [address]       Subscriber Name Subscriber Birth Date Member ID       ISIDRO VELASCO 1965 [id number]                 Emergency Contacts      (Rel.) Home Phone Work Phone Mobile Phone    NELSY VELASCO (Spouse) [phone] -- [phone]           [address] [address]  Phone:  [phone]  Fax:   Date: 2023      Ambulatory Referral to Home Health     Patient:  Isidor Velasco MRN:  [id number]   [address] [facility] [address] :  1965  SSN:    Phone: [phone] Sex:  M      INSURANCE PAYOR PLAN GROUP # SUBSCRIBER ID   Primary[place]    [place]    [id number] "      Referring Provider Information:  WILLIE CARDOZA Phone: [phone] Fax: [phone]       Referral Information:   # Visits:  999 Referral Type: Home Health [42]   Urgency:  Routine Referral Reason: Specialty Services Required   Start Date: Jan 6, 2023 End Date:  To be determined by Insurer   Diagnosis: Right foot infection (L08.9 [ICD-10-CM] 686.9 [ICD-9-CM])      Refer to Dept:   Refer to Provider:   Refer to Provider Phone:   Refer to Facility:    Daily paint the amputation site with Betadine swabs then cover with a 4 x 10 border island dressing followed by Kerlix wrap and #8 Spandage tube net dressing to hold the Kerlix in place.   Daily paint the abrasions to the right knee area with Betadine and cover with a 6 x 6 Optifoam dressing.  Face to Face Visit Date: 1/6/2023  Follow-up provider for Plan of Care? I will be treating the patient on an ongoing basis.  Please send me the Plan of Care for signature.  Follow-up provider: YASEMIN SANFORD [[id number]]  Reason/Clinical Findings: right foot infection, right above the knee amputation  Describe mobility limitations that make leaving home difficult: impaired physical mobility and gait endurance  Nursing/Therapeutic Services Requested: Skilled Nursing  Nursing/Therapeutic Services Requested: Occupational Therapy  Nursing/Therapeutic Services Requested: Speech Therapy  Skilled nursing orders: Wound care dressing/changes ( )  Skilled nursing orders: Medication education  Skilled nursing orders: Pain management  Occupational orders: Activities of daily living  Occupational orders: Strengthening  Occupational orders: Energy conservation  Occupational orders: Home safety assessment  SLP orders: Dysphagia therapy  Frequency: 1 Week 1     This document serves as a request of services and does not constitute Insurance authorization or approval of services.  To determine eligibility, please contact the members Insurance carrier to verify and review  coverage.     If you have medical questions regarding this request for services. Please contact [address] at [phone] during normal business hours.        Authorizing Provider:Nicole Arevalo DO  Authorizing Provider's NPI: [id number]  Order Entered By: Alan Downing RN 2023 11:22 AM     Electronically signed by: Nicole Arevalo DO 2023 11:22 AM            Discharge Summary      Nicole Arevalo DO at 23 1108              [facility] Medicine Services  DISCHARGE SUMMARY    Patient Name: Buster Velasco  : 1965  MRN: [id number]    Date of Admission: 2022  8:26 PM  Date of Discharge:  23  Primary Care Physician: Ludivina Bowers MD    Consults     Date and Time Order Name Status Description    2022 12:55 AM Inpatient Infectious Diseases Consult Completed     2022 12:35 AM Inpatient Cardiothoracic Surgery Consult Completed           Hospital Course     Presenting Problem:   Right foot infection [L08.9]    Active Hospital Problems    Diagnosis  POA   • **Right foot infection [L08.9]  Yes   • MRSA bacteremia [R78.81, B95.62]  Unknown   • Sepsis (HCC) [A41.9]  Yes   • Hypoglycemia [E16.2]  Yes   • Anemia [D64.9]  Yes   • Hyponatremia [E87.1]  Yes   • Hypoalbuminemia [E88.09]  Yes   • Essential hypertension [I10]  Yes   • Hyperlipidemia [E78.5]  Yes   • Paroxysmal atrial fibrillation (HCC) [I48.0]  Yes      Resolved Hospital Problems   No resolved problems to display.          Hospital Course:  Buster Velasco is a 57 y.o. male with hx of prior osteomyelitis s/p left BKA, s/p right transmetatarsal amputation, left hand 3rd metacarpal resection (2022 at OSH, had 6 weeks of IV antibiotics for MRSA bacteremia), remote hx of IVDA, more recent hx of polysubstance abuse (meth), and DVT s/p IVC filter who presented with right foot wounds. S/p right BKA on 22 with Dr. Peguero.  His blood cultures grew MRSA. ID following and IV abx  Are complete        Sepsis secondary to acute right foot non-healing wound and likely osteomyelitis, resolved  MRSA bacteremia  - s/p right BKA 12/2/22 with Dr. Peguero  - ID following, Dr. George, completed  Daptomycin monotherapy x 6 weeks,completed 1/4/2023  - Blood cultures with coag negative Staph and MRSA  - Repeat blood cultures NGTD  - TTE 12/6/22 no acute findings, JERALD 12/9/22 negative       Diarrhea  -- Imodium only if needed       Recurrent hypoglycemia in setting of DMII, resolved  - HbA1c 6.9%  -resume home meds      Chronic pain on opioids   Previous IVDA/substance abuse  - Continue home methadone 10 mg QID  - Patient denied hx of IVDA to me but in Care Everywhere admission notes from April 2022 at [facility], they documented prior IVDA/substance abuse, most recently with meth in [facility] from 4/2/22; he has told ID that his last IVDA was 17 years ago     Reported hx of Afib   - Patient denies any hx of Afib. Not previously on anticoagulation and given murky history will not start.   - On Metoprolol at home which is continued  - No evidence of Afib on telemetry, discontinued telemetry monitoring 12/10/22      Chronic vocal cord paralysis, s/p PEG  Dysphagia  - SLP evaluation: regular diet but with nectar thick liquids recommended, patient refusing nectar thick liquids and understands risks of aspiration  -PEG was placed 8/19/22 at [facility], will defer  removal; routine PEG care/flushing per nursing as needed     Hypotension with hx of HTN  - Hold parameters with Metoprolol  - Hypotension resolved     HLD   - Continue statin    DVT s/p IVC filter   - Placed in spring 2022 per patient. Defer to PCP for further assessment and timing of removal.     Hx of seizure in setting of meth use   - Not on AED's given meth trigger     BPH   - Continue Flomax    Discharge Follow Up Recommendations for outpatient labs/diagnostics:   PCP  [facility]  Dr Sudhir Encarnacion  of Discharge     HPI:   Pt without complaints     Review of Systems  Gen- No fevers, chills  CV- No chest pain, palpitations  Resp- No cough, dyspnea  GI- No N/V/D, abd pain        Vital Signs:   Temp:  [97.9 °F (36.6 °C)-98.3 °F (36.8 °C)] 98.1 °F (36.7 °C)  Heart Rate:  [56-74] 56  Resp:  [14-18] 16  BP: (124-132)/(77-87) 124/87      Physical Exam:  Constitutional: No acute distress, awake, alert  HENT: NCAT, mucous membranes moist  Respiratory: Clear to auscultation bilaterally, respiratory effort normal   Cardiovascular: RRR, no murmurs, rubs, or gallops  Gastrointestinal: , nondistended  Musculoskeletal: Bilateral BKA   Psychiatric: Appropriate affect, cooperative  Neurologic: Oriented x 3,  speech clear  Skin: No rashes      Pertinent  and/or Most Recent Results     LAB RESULTS:      Lab 01/05/23  0807 01/02/23  1422   WBC 7.29 9.35   HEMOGLOBIN 10.9* 10.7*   HEMATOCRIT 34.6* 35.0*   PLATELETS 278 306   MCV 84.2 85.8         Lab 01/05/23  0807 01/02/23  1317 12/30/22  1024   SODIUM 138 139 138   POTASSIUM 4.4 4.2 4.0   CHLORIDE 103 102 101   CO2 22.0 23.0 24.0   ANION GAP 13.0 14.0 13.0   BUN 16 19 19   CREATININE 0.60* 0.70* 0.66*   EGFR 111.9 106.8 108.7   GLUCOSE 104* 103* 98   CALCIUM 9.1 9.2 9.0         Lab 01/02/23  1317 12/30/22  1024   TOTAL PROTEIN 6.6 6.3   ALBUMIN 3.7 3.3*   GLOBULIN 2.9 3.0   ALT (SGPT) 20 21   AST (SGOT) 18 20   BILIRUBIN 0.2 0.2   ALK PHOS 99 97   AMYLASE  --  39   LIPASE  --  12*                     Brief Urine Lab Results  (Last result in the past 365 days)      Color   Clarity   Blood   Leuk Est   Nitrite   Protein   CREAT   Urine HCG        08/20/22 0108 Yellow   Clear   Negative   Negative     Negative               Microbiology Results (last 10 days)     ** No results found for the last 240 hours. **          No radiology results for the last 10 days            Results for orders placed during the hospital encounter of 11/30/22    Adult Transesophageal Echo (JERALD) W/ Cont if  Necessary Per Protocol    Interpretation Summary  •  Left ventricular systolic function is normal. Estimated left ventricular EF = 55%  •  The aortic valve is structurally normal with no stenosis present. Trace aortic valve regurgitation is present. There is no evidence of an aortic valve mass is present.  •  There is mild, anterior mitral leaflet thickening present. No evidence of a mitral valve mass is present. Trace mitral valve regurgitation is present. No significant mitral valve stenosis is present  •  The tricuspid valve is normal in structure. There is no evidence of a mass on the tricuspid valve. Mild tricuspid valve regurgitation is present.  •  There is mild thickening of the pulmonic valve. There is trace pulmonic valve regurgitation present. There is no pulmonic valve stenosis present.      Plan for Follow-up of Pending Labs/Results:     Discharge Details        Discharge Medications      ASK your doctor about these medications      Instructions Start Date   bumetanide 0.5 MG tablet  Commonly known as: BUMEX   0.5 mg, Oral, Daily      Farxiga 10 MG tablet  Generic drug: dapagliflozin Propanediol   10 mg, Oral, Daily      ferrous sulfate 325 (65 FE) MG tablet   325 mg, Oral, Daily With Breakfast      fluticasone 50 MCG/ACT nasal spray  Commonly known as: FLONASE   2 sprays, Nasal, 2 Times Daily PRN      hydrOXYzine 25 MG tablet  Commonly known as: ATARAX   50 mg, Oral, Nightly PRN      losartan 50 MG tablet  Commonly known as: COZAAR   50 mg, Oral, Daily      metFORMIN 1000 MG tablet  Commonly known as: GLUCOPHAGE   1,000 mg, Oral, 2 Times Daily With Meals      methadone 10 MG tablet  Commonly known as: DOLOPHINE   10 mg, Oral, 4 Times Daily      metoprolol tartrate 25 MG tablet  Commonly known as: LOPRESSOR   25 mg, Oral, 2 Times Daily      simvastatin 40 MG tablet  Commonly known as: ZOCOR   40 mg, Oral, Nightly      tamsulosin 0.4 MG capsule 24 hr capsule  Commonly known as: FLOMAX   1 capsule, Oral,  Daily      Tresiba 100 UNIT/ML solution injection  Generic drug: Insulin Degludec   72 Units, Subcutaneous, Daily             Allergies   Allergen Reactions   • Gabapentin Rash   • Lyrica [Pregabalin] Rash         Discharge Disposition:      Diet:  Hospital:  Diet Order   Procedures   • Diet: Cardiac Diets, Diabetic Diets; Healthy Heart (2-3 Na+); Consistent Carbohydrate; No Mixed Consistencies; Texture: Regular Texture (IDDSI 7); Fluid Consistency: Nectar Thick       Activity:      Restrictions or Other Recommendations:         CODE STATUS:    Code Status and Medical Interventions:   Ordered at: 12/01/22 0035     Code Status (Patient has no pulse and is not breathing):    CPR (Attempt to Resuscitate)     Medical Interventions (Patient has pulse or is breathing):    Full Support       No future appointments.              Nicole Arevalo DO  01/05/23      Time Spent on Discharge:  I spent  35  minutes on this discharge activity which included: face-to-face encounter with the patient, reviewing the data in the system, coordination of the care with the nursing staff as well as consultants, documentation, and entering orders.            Electronically signed by Nicole Arevalo DO at 01/06/23 1113

## 2023-01-07 NOTE — OUTREACH NOTE
Prep Survey    Flowsheet Row Responses   [facility] facility patient discharged from? [place]   Is LACE score < 7 ? No   Eligibility Readm Mgmt   Discharge diagnosis Right foot infection  Sepsis   Does the patient have one of the following disease processes/diagnoses(primary or secondary)? Sepsis   Does the patient have Home health ordered? Yes   What is the Home health agency?   [facility]   Is there a DME ordered? No   Prep survey completed? Yes          JAMIL BELTRAN - Registered Nurse

## 2023-01-09 ENCOUNTER — READMISSION MANAGEMENT (OUTPATIENT)
Dept: CALL CENTER | Facility: HOSPITAL | Age: 58
End: 2023-01-09
Payer: COMMERCIAL

## 2023-01-09 NOTE — OUTREACH NOTE
Sepsis Week 1 Survey    Flowsheet Row Responses   [facility] patient discharged from? [place]   Does the patient have one of the following disease processes/diagnoses(primary or secondary)? Sepsis   Week 1 attempt successful? Yes   Call start time 0931   Call end time 0936   Discharge diagnosis Right foot infection  Sepsis   Meds reviewed with patient/caregiver? Yes   Is the patient having any side effects they believe may be caused by any medication additions or changes? No   Does the patient have all medications related to this admission filled (includes all antibiotics, inhalers, nebulizers,steroids,etc.) N/A   Is the patient taking all medications as directed (includes completed medication regime)? Yes   Comments regarding appointments post op appt on 2/1/23   Does the patient have a primary care provider?  Yes   Comments regarding PCP 1/16/23   Does the patient have an appointment with their PCP within 7 days of discharge? Yes   Has the patient kept scheduled appointments due by today? N/A   What is the Home health agency?   [facility]   Has home health visited the patient within 72 hours of discharge? Call prior to 72 hours   Psychosocial issues? No   Did the patient receive a copy of their discharge instructions? Yes   Nursing interventions Reviewed instructions with patient   What is the patient's perception of their health status since discharge? Same   Nursing interventions Nurse provided patient education   Nursing interventions Nurse provided patient education   Is patient/caregiver able to teach back steps to recovery at home? Set small, achievable goals for return to baseline health   Is the patient/caregiver able to teach back signs and symptoms of worsening condition: Fever, Shortness of breath/rapid respiratory rate, Altered mental status(confusion/coma)   Is the patient/caregiver able to teach back the hierarchy of who to call/visit for symptoms/problems? PCP, Specialist, Home  health nurse, Urgent Care, ED, 911 Yes   Week 1 call completed? Yes          EJIMY IRVING - Registered Nurse

## 2023-01-09 NOTE — CASE MANAGEMENT/SOCIAL WORK
Continued Stay Note  [facility]     Patient Name: Buster Velasco  MRN: [id number]  Today's Date: 1/9/2023    Admit Date: 11/30/2022    Plan: Home with [facility]   Discharge Plan     Row Name 01/09/23 0919       Plan    Plan Comments CM received a call from [facility] who reports they are out of network for pts insurance. CM then spoke with all three HH agencies in pts area and none of them accept pts insurance. CM has updated pts wife that HH will not be available.               Discharge Codes    No documentation.               Expected Discharge Date and Time     Expected Discharge Date Expected Discharge Time    Jan 6, 2023             Shiloh Grey RN

## 2023-01-10 ENCOUNTER — TELEPHONE (OUTPATIENT)
Dept: CARDIAC SURGERY | Facility: CLINIC | Age: 58
End: 2023-01-10
Payer: COMMERCIAL

## 2023-01-10 NOTE — TELEPHONE ENCOUNTER
Provider: HEBER  Caller: ISIDRO LARA  Relationship to Patient: SELF  Phone Number: [phone]  Reason for Call: PT CALLING IN IN REGARDS TO HOMEHEALTH ORDERS- PT STATED HE HASNT BEEN CONTACTED BY HOMEHEALTH & NEEDS SOMEONE TO HELP WITH THE CHANGING OF HIS DRESSING.    PLEASE CALLBACK PT WITH UPDATE.

## 2023-01-11 NOTE — TELEPHONE ENCOUNTER
Maliha called and said that this pt needs to wait for HH to come seee him on Monday. Pt was called and notified that HH would be too see him on Monday Pt V/U. Said he would get the supplies he needed at [facility] and keep it clean until Monday

## 2023-01-19 ENCOUNTER — READMISSION MANAGEMENT (OUTPATIENT)
Dept: CALL CENTER | Facility: HOSPITAL | Age: 58
End: 2023-01-19
Payer: COMMERCIAL

## 2023-01-19 NOTE — OUTREACH NOTE
Sepsis Week 2 Survey    Flowsheet Row Responses   [facility] patient discharged from? [place]   Does the patient have one of the following disease processes/diagnoses(primary or secondary)? Sepsis   Week 2 attempt successful? Yes   Call start time 1450   Call end time 1502   Discharge diagnosis Right foot infection  Sepsis   Meds reviewed with patient/caregiver? Yes   Is the patient having any side effects they believe may be caused by any medication additions or changes? No   Does the patient have all medications related to this admission filled (includes all antibiotics, inhalers, nebulizers,steroids,etc.) N/A   Is the patient taking all medications as directed (includes completed medication regime)? Yes   Does the patient have a primary care provider?  Yes   Does the patient have an appointment with their PCP within 7 days of discharge? Yes   Has the patient kept scheduled appointments due by today? N/A   Psychosocial issues? No   Did the patient receive a copy of their discharge instructions? Yes   Nursing interventions Reviewed instructions with patient   What is the patient's perception of their health status since discharge? Improving   Nursing interventions Nurse provided patient education   Is the patient/caregiver able to teach back TIME? T emperature - higher or lower than normal   Nursing interventions Nurse provided patient education   Is patient/caregiver able to teach back steps to recovery at home? Eat a balanced diet, Make a list of questions for PCP appoinment   Is the patient/caregiver able to teach back signs and symptoms of worsening condition: Fever, Edema, Shortness of breath/rapid respiratory rate   If the patient is a current smoker, are they able to teach back resources for cessation? Not a smoker   Is the patient/caregiver able to teach back the hierarchy of who to call/visit for symptoms/problems? PCP, Specialist, Home health nurse, Urgent Care, ED, 911 Yes   Additional teach back  comments He is able to do the dressing change with his wife. Sees surgery on 02/01/23 no other appts until then.   Week 2 call completed? Yes   Wrap up additional comments No questions at this time.          JUN MCKEON - Registered Nurse

## 2023-01-25 ENCOUNTER — TELEPHONE (OUTPATIENT)
Dept: CARDIAC SURGERY | Facility: CLINIC | Age: 58
End: 2023-01-25

## 2023-01-25 NOTE — TELEPHONE ENCOUNTER
Caller: Buster Velasco    Relationship to patient: Self    Best call back number: [phone]    Chief complaint: PATIENT WOULD LIKE AN AFTERNOON APPOINTMENT     Type of visit: POST OP     Requested date: AFTERNOON    If rescheduling, when is the original appointment: 2/1/23

## 2023-01-27 ENCOUNTER — READMISSION MANAGEMENT (OUTPATIENT)
Dept: CALL CENTER | Facility: HOSPITAL | Age: 58
End: 2023-01-27
Payer: COMMERCIAL

## 2023-01-27 NOTE — OUTREACH NOTE
Sepsis Week 3 Survey    Flowsheet Row Responses   [facility] [place] facility patient discharged from? [place]   Does the patient have one of the following disease processes/diagnoses(primary or secondary)? Sepsis   Week 3 attempt successful? No   Unsuccessful attempts Attempt 1          JOSETTE IRVING - Licensed Nurse

## 2023-01-31 ENCOUNTER — READMISSION MANAGEMENT (OUTPATIENT)
Dept: CALL CENTER | Facility: HOSPITAL | Age: 58
End: 2023-01-31
Payer: COMMERCIAL

## 2023-01-31 NOTE — OUTREACH NOTE
Sepsis Week 3 Survey    Flowsheet Row Responses   [facility] facility patient discharged from? [place]   Does the patient have one of the following disease processes/diagnoses(primary or secondary)? Sepsis   Week 3 attempt successful? No   Unsuccessful attempts Attempt 2          XIAO IRVING - Registered Nurse

## 2023-03-23 ENCOUNTER — TELEPHONE (OUTPATIENT)
Dept: CARDIAC SURGERY | Facility: CLINIC | Age: 58
End: 2023-03-23
Payer: COMMERCIAL

## 2023-03-23 NOTE — TELEPHONE ENCOUNTER
Attempted to call pt regarding multiple post op  appt cancellations and no shows since BKA on 12/02/22. Pt did not answer, LVM for pt to return call. Also tried wife's number, it was out of service.

## 2023-04-18 PROBLEM — F11.21 OPIOID DEPENDENCE IN REMISSION: Status: ACTIVE | Noted: 2022-08-20

## 2023-04-18 PROBLEM — E11.65 TYPE 2 DIABETES MELLITUS WITH HYPERGLYCEMIA, WITHOUT LONG-TERM CURRENT USE OF INSULIN: Status: ACTIVE | Noted: 2022-04-02

## 2023-04-18 PROBLEM — Z86.718 HX OF DEEP VENOUS THROMBOSIS: Status: ACTIVE | Noted: 2022-07-05

## 2023-04-18 NOTE — PROGRESS NOTES
[facility] Cardiothoracic Surgery Office Follow Up Note     Date of Encounter: 2023     Name: Buster Velasco  : 1965     Referred By: No ref. provider found  PCP: Ludivina Bowers MD    Chief Complaint:    Chief Complaint   Patient presents with   • Peripheral Vascular Disease     Follow up for right BKA wound check - done on 22       Subjective      History of Present Illness:    Buster Velasco is a 57 y.o. male lifetime non-smoker with history of IVDU on methadone, HTN, HLD on statin therapy, non insulin-dependent DM with peripheral neuropathy, DVT s/p IVC filter , and PVD who is s/p remote left BKA as well as right transmetatarsal amputation of the toes on the right foot performed at [facility] in [facility].  Patient developed osteomyelitis to the residual right foot now s/p mid level right below-knee amputation with primary closure on 2022 by Dr. Peguero.  Blood cultures revealing staph and MRSA with ID Dr. George following.  Please refer to discharge summary for 35+ day hospitalization. Patient has no showed or canceled more than 5 appointments but arrives today more than 4 months post-op for his first post-op eval. He states a friend took his sutures out. He was worried about infection once but put a hot washrag on it stump overnight and it disappeared. He has already went to [facility] orthopedics himself on Friday and has started being fitted for his new right prosthesis. Because he has not been up walking he is also needing adjustments to his left prosthesis. He requests referral to for  PT to help work with him getting up and moving. He is also stating he plans to self remove his PEG tube because he does not want to return to [facility] where it was placed 2022.    Review of Systems:  Review of Systems   Constitutional: Negative for chills, decreased appetite, diaphoresis, fever, malaise/fatigue, night sweats, weight gain and weight loss.    HENT: Negative for hoarse voice.    Eyes: Negative for blurred vision, double vision and visual disturbance.   Cardiovascular: Negative for chest pain, claudication, dyspnea on exertion, irregular heartbeat, leg swelling, near-syncope, orthopnea, palpitations, paroxysmal nocturnal dyspnea and syncope.   Respiratory: Negative for cough, hemoptysis, shortness of breath, sputum production and wheezing.    Hematologic/Lymphatic: Negative for adenopathy and bleeding problem. Does not bruise/bleed easily.   Skin: Negative for color change, nail changes, poor wound healing and rash.   Musculoskeletal: Negative for back pain, falls and muscle cramps.        Experiencing phantom pain in both lungs    Gastrointestinal: Negative for abdominal pain, dysphagia and heartburn.   Genitourinary: Negative for flank pain.   Neurological: Negative for brief paralysis, disturbances in coordination, dizziness, focal weakness, headaches, light-headedness, loss of balance, numbness, paresthesias, sensory change, vertigo and weakness.   Psychiatric/Behavioral: Negative for depression and suicidal ideas.   Allergic/Immunologic: Negative for persistent infections.       I have reviewed the following portions of the patient's history: allergies, current medications, past family history, past medical history, past social history, past surgical history and problem list and confirm it's accurate.    Allergies:  Allergies   Allergen Reactions   • Gabapentin Rash   • Lyrica [Pregabalin] Rash       Medications:      Current Outpatient Medications:   •  dapagliflozin Propanediol (Farxiga) 10 MG tablet, Take 10 mg by mouth Daily., Disp: , Rfl:   •  ferrous sulfate 325 (65 FE) MG tablet, Take 1 tablet by mouth Daily With Breakfast., Disp: , Rfl:   •  fluticasone (FLONASE) 50 MCG/ACT nasal spray, 2 sprays into the nostril(s) as directed by provider 2 (Two) Times a Day As Needed for Rhinitis., Disp: , Rfl:   •  hydrOXYzine (ATARAX) 25 MG tablet, Take 2  "tablets by mouth At Night As Needed (sleep)., Disp: , Rfl:   •  Insulin Degludec (TRESIBA) 100 UNIT/ML solution injection, Inject 72 Units under the skin into the appropriate area as directed Daily., Disp: , Rfl:   •  losartan (COZAAR) 50 MG tablet, Take 1 tablet by mouth Daily., Disp: , Rfl:   •  metFORMIN (GLUCOPHAGE) 1000 MG tablet, Take 1 tablet by mouth 2 (Two) Times a Day With Meals., Disp: , Rfl:   •  simvastatin (ZOCOR) 40 MG tablet, Take 1 tablet by mouth Every Night., Disp: , Rfl:   •  tamsulosin (FLOMAX) 0.4 MG capsule 24 hr capsule, Take 1 capsule by mouth Daily., Disp: , Rfl:   •  methadone (DOLOPHINE) 10 MG tablet, Take 10 mg by mouth 4 (Four) Times a Day, (Patient not taking: Reported on 4/19/2023,), Disp: , Rfl:     History:   Past Medical History:   Diagnosis Date   • Diabetes    • DVT (deep venous thrombosis)    • Hypertension    • Mood disorder        Past Surgical History:   Procedure Laterality Date   • BELOW KNEE AMPUTATION Left    • BELOW KNEE AMPUTATION Right 12/2/2022    Procedure: AMPUTATION BELOW KNEE RIGHT;  Surgeon: John Peguero MD;  Location: [facility];  Service: Vascular;  Laterality: Right;   • TRANS METATARSAL AMPUTATION Right        Social History     Socioeconomic History   • Marital status:    Tobacco Use   • Smoking status: Never   • Smokeless tobacco: Never   Vaping Use   • Vaping Use: Never used   Substance and Sexual Activity   • Alcohol use: Never   • Drug use: Never   • Sexual activity: Defer        Family History   Problem Relation Age of Onset   • Diabetes Mother 65   • Stroke Mother 74   • Diabetes Maternal Uncle    • Heart attack Maternal Grandfather        Objective     Physical Exam:  Vitals:    04/19/23 1244   BP: 110/68   BP Location: Right arm   Patient Position: Sitting   Pulse: 96   Temp: 97.8 °F (36.6 °C)   SpO2: 98%   Weight: 109 kg (240 lb)  Comment: pt reported   Height: 193 cm (76\")  Comment: pt reported      Body mass index is 29.21 " kg/m².    Physical Exam  Vitals and nursing note reviewed.   Constitutional:       Appearance: Normal appearance.      Comments: wheelchair   Cardiovascular:      Rate and Rhythm: Normal rate.   Pulmonary:      Effort: Pulmonary effort is normal.   Musculoskeletal:      Right Lower Extremity: Right leg is amputated below knee.      Left Lower Extremity: Left leg is amputated below knee.   Skin:     General: Skin is warm and dry.   Neurological:      Mental Status: He is alert and oriented to person, place, and time. Mental status is at baseline.   Psychiatric:         Speech: Speech is rapid and pressured.           Assessment / Plan      Assessment / Plan:  Diagnoses and all orders for this visit:    1. S/P BKA (below knee amputation), right (Primary)       · s/p mid level right below-knee amputation with primary closure on 12/2/2022 by Dr. Peguero.    · Blood cultures revealing staph and MRSA with ID Dr. George following  · 35+ day hospitalization  · no showed or canceled more than 5 appointments but arrives today more than 4 months post-op for his first post-op eval  · Has since had complete closure/healing of his stump site  · Has already went to [facility] and has started the process for prosthesis  · Will provide referral for  PT to assist with mobility  · Defer PEG removal to PCP     Follow Up:   Return if symptoms worsen or fail to improve.   Or sooner for any further concerns or worsening sign and symptoms. If unable to reach us in the office please dial 911 or go to the nearest emergency department.      PITER Regan  [facility] Cardiothoracic Surgery    Time Spent: I spent 28 minutes caring for Buster on this date of service. This time includes time spent by me in the following activities: preparing for the visit, reviewing tests, obtaining and/or reviewing a separately obtained history, performing a medically appropriate examination and/or evaluation, counseling and educating the  patient/family/caregiver, ordering medications, tests, or procedures, documenting information in the medical record, independently interpreting results and communicating that information with the patient/family/caregiver and care coordination.

## 2023-04-19 ENCOUNTER — OFFICE VISIT (OUTPATIENT)
Dept: CARDIAC SURGERY | Facility: CLINIC | Age: 58
End: 2023-04-19
Payer: COMMERCIAL

## 2023-04-19 VITALS
WEIGHT: 240 LBS | SYSTOLIC BLOOD PRESSURE: 110 MMHG | HEIGHT: 76 IN | BODY MASS INDEX: 29.22 KG/M2 | OXYGEN SATURATION: 98 % | HEART RATE: 96 BPM | DIASTOLIC BLOOD PRESSURE: 68 MMHG | TEMPERATURE: 97.8 F

## 2023-04-19 DIAGNOSIS — Z89.511 S/P BKA (BELOW KNEE AMPUTATION), RIGHT: Primary | ICD-10-CM

## 2023-04-19 PROBLEM — Z89.512 S/P BKA (BELOW KNEE AMPUTATION), LEFT: Status: ACTIVE | Noted: 2023-04-19

## 2023-04-19 PROBLEM — G06.1 SPINAL EPIDURAL ABSCESS: Status: ACTIVE | Noted: 2022-04-08

## 2023-04-19 PROBLEM — M86.242 SUBACUTE OSTEOMYELITIS OF LEFT HAND: Status: ACTIVE | Noted: 2022-04-02

## 2023-04-19 PROBLEM — J38.01 PARALYSIS OF LEFT VOCAL CORD: Status: ACTIVE | Noted: 2022-08-15

## 2023-04-19 PROBLEM — L08.9 RIGHT FOOT INFECTION: Status: RESOLVED | Noted: 2022-11-30 | Resolved: 2023-04-19

## 2023-04-20 ENCOUNTER — TELEPHONE (OUTPATIENT)
Dept: CARDIAC SURGERY | Facility: CLINIC | Age: 58
End: 2023-04-20
Payer: COMMERCIAL

## 2023-04-20 NOTE — TELEPHONE ENCOUNTER
[facility] health called and stated they are unable to accept patient. The patients home environment is unsafe for staff.   PITER Regan aware and left detailed voice message with patient     Authored by Attending

## 2024-03-12 ENCOUNTER — TELEPHONE (OUTPATIENT)
Dept: CARDIAC SURGERY | Facility: CLINIC | Age: 59
End: 2024-03-12
Payer: COMMERCIAL

## 2024-03-12 NOTE — TELEPHONE ENCOUNTER
Caller: Buster Velasco    Relationship: Self    Best call back number:    [phone] (Mobile)       What is the medical concern/diagnosis: PT FOR PROSTHETIC RIGHT LEG    What specialty or service is being requested: PHYSICAL THERAPY     What is the provider, practice or medical service name: [facility]     What is the office location: [facility]    What is the office phone number: [phone]    Any additional details: PT IS STILL UNABLE TO WALK WITH BOTH PROSTHETIC LEGS. HE SPOKE WITH TIM MARTINEZ AT [facility] IN [facility] AND HE WILL DO PHYSICAL THERAPY WITH PATIENT BUT NEEDS A REFERRAL FIRST. PT AND PROVIDER ARE HOPING TO GET STARTED AS SOON AS POSSIBLE AND PT WAS HOPING WE COULD SEND THE REFERRAL FOR HIM. PT WOULD LIKE A CALL TO CONFIRM ONCE REF IS SENT IF POSSIBLE.

## 2024-03-13 NOTE — TELEPHONE ENCOUNTER
Spoke with [employer] and faxed order to [phone] and left message for patient that order was faxed.

## 2024-03-13 NOTE — TELEPHONE ENCOUNTER
[facility] called back after referral faxed. They have patient on their do not take back list and will not see him.  I had to leave a message for patient explaining this and said he could call back to discuss other options.

## (undated) DEVICE — STRYKER PERFORMANCE SERIES SAGITTAL BLADE: Brand: STRYKER PERFORMANCE SERIES

## (undated) DEVICE — APPL CHLORAPREP TINTED 26ML TEAL

## (undated) DEVICE — JACKSON-PRATT 100CC BULB RESERVOIR: Brand: CARDINAL HEALTH

## (undated) DEVICE — ANTIBACTERIAL UNDYED BRAIDED (POLYGLACTIN 910), SYNTHETIC ABSORBABLE SUTURE: Brand: COATED VICRYL

## (undated) DEVICE — DRAIN JACKSON PRATT ROUND 15FR: Brand: CARDINAL HEALTH

## (undated) DEVICE — SUT ETHLN 2/0 PS 18IN 585H

## (undated) DEVICE — SUT GUT CHRM 0 STD TIES 54IN S114H

## (undated) DEVICE — PCH DRN BRST RECONSTRUCT BRA LXF

## (undated) DEVICE — PATIENT RETURN ELECTRODE, SINGLE-USE, CONTACT QUALITY MONITORING, ADULT, WITH 9FT CORD, FOR PATIENTS WEIGING OVER 33LBS. (15KG): Brand: MEGADYNE

## (undated) DEVICE — PK EXTREM LOWR 10

## (undated) DEVICE — GAUZE FLUFF 1 PLY: Brand: DEROYAL

## (undated) DEVICE — GLV SURG PREMIERPRO MIC LTX PF SZ7 BRN

## (undated) DEVICE — SUT SILK 2/0 SH CR8 18IN CR8 C012D

## (undated) DEVICE — TRAP FLD MINIVAC MEGADYNE 100ML

## (undated) DEVICE — TBG PENCL TELESCP MEGADYNE SMOKE EVAC 10FT

## (undated) DEVICE — SUT SILK 2/0 TIES 18IN A185H

## (undated) DEVICE — DRSNG GZ CURAD XEROFORM NONADHR OVERWRAP 5X9IN

## (undated) DEVICE — BANDAGE,GAUZE,BULKEE II,4.5"X4.1YD,STRL: Brand: MEDLINE

## (undated) DEVICE — 450 ML BOTTLE OF 0.05% CHLORHEXIDINE GLUCONATE IN 99.95% STERILE WATER FOR IRRIGATION, USP AND APPLICATOR.: Brand: IRRISEPT ANTIMICROBIAL WOUND LAVAGE

## (undated) DEVICE — SUT SILK 3/0 TIES 18IN A184H

## (undated) DEVICE — DRAPE,TOP,102X53,STERILE: Brand: MEDLINE

## (undated) DEVICE — SHEET, DRAPE, SPLIT, STERILE: Brand: MEDLINE

## (undated) DEVICE — SUT SILK 0 TIES 30IN A306H

## (undated) DEVICE — BNDG ELAS CO-FLEX SLF ADHR 4IN5YD LF STRL